# Patient Record
Sex: FEMALE | Race: WHITE | Employment: OTHER | URBAN - METROPOLITAN AREA
[De-identification: names, ages, dates, MRNs, and addresses within clinical notes are randomized per-mention and may not be internally consistent; named-entity substitution may affect disease eponyms.]

---

## 2017-01-05 ENCOUNTER — PRE VISIT (OUTPATIENT)
Dept: CARDIOLOGY | Facility: CLINIC | Age: 82
End: 2017-01-05

## 2017-01-05 DIAGNOSIS — I25.10 CORONARY ARTERY DISEASE INVOLVING NATIVE CORONARY ARTERY OF NATIVE HEART WITHOUT ANGINA PECTORIS: ICD-10-CM

## 2017-01-05 DIAGNOSIS — I10 BENIGN ESSENTIAL HYPERTENSION: Primary | ICD-10-CM

## 2017-01-11 ENCOUNTER — OFFICE VISIT (OUTPATIENT)
Dept: CARDIOLOGY | Facility: CLINIC | Age: 82
End: 2017-01-11
Attending: NURSE PRACTITIONER
Payer: COMMERCIAL

## 2017-01-11 VITALS
HEIGHT: 66 IN | WEIGHT: 193.2 LBS | HEART RATE: 58 BPM | DIASTOLIC BLOOD PRESSURE: 60 MMHG | BODY MASS INDEX: 31.05 KG/M2 | SYSTOLIC BLOOD PRESSURE: 138 MMHG

## 2017-01-11 DIAGNOSIS — I10 BENIGN ESSENTIAL HYPERTENSION: ICD-10-CM

## 2017-01-11 DIAGNOSIS — E78.00 HYPERCHOLESTEROLEMIA: ICD-10-CM

## 2017-01-11 DIAGNOSIS — Z82.49 FH: MITRAL VALVE REPAIR: ICD-10-CM

## 2017-01-11 DIAGNOSIS — I34.0 MITRAL VALVE INSUFFICIENCY, UNSPECIFIED ETIOLOGY: Primary | ICD-10-CM

## 2017-01-11 DIAGNOSIS — I48.20 CHRONIC ATRIAL FIBRILLATION (H): ICD-10-CM

## 2017-01-11 DIAGNOSIS — I50.22 CHRONIC SYSTOLIC CONGESTIVE HEART FAILURE (H): ICD-10-CM

## 2017-01-11 LAB
ANION GAP SERPL CALCULATED.3IONS-SCNC: 14.3 MMOL/L (ref 6–17)
BUN SERPL-MCNC: 17 MG/DL (ref 7–30)
CALCIUM SERPL-MCNC: 9.3 MG/DL (ref 8.5–10.5)
CHLORIDE SERPL-SCNC: 101 MMOL/L (ref 98–107)
CHOLEST SERPL-MCNC: 150 MG/DL
CO2 SERPL-SCNC: 25 MMOL/L (ref 23–29)
CREAT SERPL-MCNC: 0.93 MG/DL (ref 0.7–1.3)
GFR SERPL CREATININE-BSD FRML MDRD: 58 ML/MIN/1.7M2
GLUCOSE SERPL-MCNC: 114 MG/DL (ref 70–105)
HDLC SERPL-MCNC: 55 MG/DL
LDLC SERPL CALC-MCNC: 67 MG/DL
NONHDLC SERPL-MCNC: 95 MG/DL
POTASSIUM SERPL-SCNC: 4.3 MMOL/L (ref 3.5–5.1)
SODIUM SERPL-SCNC: 136 MMOL/L (ref 136–145)
TRIGL SERPL-MCNC: 141 MG/DL

## 2017-01-11 PROCEDURE — 80048 BASIC METABOLIC PNL TOTAL CA: CPT | Performed by: NURSE PRACTITIONER

## 2017-01-11 PROCEDURE — 99214 OFFICE O/P EST MOD 30 MIN: CPT | Performed by: INTERNAL MEDICINE

## 2017-01-11 PROCEDURE — 80061 LIPID PANEL: CPT | Performed by: NURSE PRACTITIONER

## 2017-01-11 PROCEDURE — 36415 COLL VENOUS BLD VENIPUNCTURE: CPT | Performed by: NURSE PRACTITIONER

## 2017-01-11 NOTE — Clinical Note
1/11/2017    Bess Lion MD  [facility]  [address]  [facility] [address]    RE: Kenyatta Donald       Dear Colleague,    I had the pleasure of seeing Kenyatta Donald in the [facility].    The patient is an 81-year-old overweight white female who presents to Cardiology Clinic for followup of atrial fibrillation, sometimes difficult to control hypertension, hypercholesterolemia.  There is no history of cigarette smoking, diabetes mellitus or family history of premature coronary disease.  She tends to be unaware of her irregular heartbeat but has had a diagnosis of atrial fibrillation that has been treated with aggressive rate control and anticoagulation.      In the summer 2015, she was hospitalized at [facility] because of volume overload, probable diastolic congestive heart failure, related atrial fibrillation with rapid ventricular response which responded nicely to diuretic therapy.  She continued to maintain her weight loss.  Unfortunately earlier this winter season, she fell and apparently injured her knee and required a short time in a TCU and followup and has limited her activity.        She denies arleth chest pain, shortness of breath at rest, dizziness, palpitations, nausea, vomiting, diaphoresis or syncope.  She is having a reasonable sleep and appetite at this time.  She does complain of easy fatigability and general malaise.  Echocardiography from last summer demonstrated a normal-sized left ventricle, normal left ventricular wall thickness, intact systolic function with ejection fraction 55%-60% with no regional wall motion abnormality.  The right ventricle was normal in size and function.  There was severe biatrial enlargement.        The patient is status post mitral valve clip on 01/09/2015.  There is moderate mitral insufficiency noted.  Mean gradient not evaluated.  No significant change from 2015.        Also in the summer, the  patient had a Holter monitor that demonstrated rhythm going from 33 to 139, average rate of 84, with rare ventricular ectopy and the primary rhythm was that of atrial fibrillation.  She had frequent brief pauses with the longest pause being approximately 3 seconds.  There were no symptoms described at the time of her pauses.      MEDICATIONS:   1.  Vitamin D 1000 units a day.   2.  Simvastatin 20 mg a day.   3.  Allopurinol 100 mg a day.   4.  Warfarin as directed.   5.  Atenolol 25 mg a day.   6.  Mycostatin 3 times a day.   7.  Levothyroxine 112 mcg a day.   8.  Furosemide 20 mg a day.   9.  Acetaminophen 650 mg every 4 hours as needed.   10.  Amoxicillin as indicated.   11.  Fosamax 70 mg a week.   12.  Albuterol inhaler as needed.   13.  Lisinopril 20 mg twice a day.      LABORATORY DATA:  Demonstrated cholesterol 150, HDL 55, LDL 67, triglyceride 141.  Sodium 136, potassium 4.3, BUN 17, creatinine 0.93.  ALT less than 5.      The patient presents to Cardiology Clinic for followup of her atrial fibrillation with evidence of bradytachy syndrome, hypertension and hyperlipidemia as well as a diagnosis of sleep apnea and gastroesophageal reflux disease.      PHYSICAL EXAMINATION:   VITAL SIGNS:  Blood pressure 138/60 with a heart rate of 50-60 and regular.  Weight was 193 pounds, which is relatively stable.   NECK:  Without jugular venous distention, carotid bruit or palpable thyroid.   CHEST:  Essentially clear to percussion and auscultation with slight decreased breath sounds at the bases, isolated crackles.   CARDIAC:  Revealed an irregular rhythm, variable S1.  There was a 2/6 holosystolic murmur heard best at the apex and radiating to the axilla.  No diastolic murmur, rub or S3.   EXTREMITIES:  Showed 1 to 2+ edema without cyanosis or erythema.      CLINICAL IMPRESSION:   1.  Stable cardiac condition.   2.  Status post episode of apparent diastolic congestive heart failure treated with diuretic therapy in  08/2015.   3.  Atrial fibrillation with evidence of a tachybrady syndrome, tending towards bradycardia.   4.  Mitral insufficiency, improved with mitral valve clip with very mild gradient across the mitral valve leaflets.   5.  Hypertension with slight elevation of systolic blood pressure.   6.  Hyperlipidemia, at goal.   7.  Sleep apnea.   8.  History of breast cancer.   9.  Gastroesophageal reflux disease.   10.  History of osteoarthritis and knee replacement.      DISCUSSION:  The patient has done reasonably well since her last clinic visit.  She has multiple medical issues.  She has not had significant symptoms of chest discomfort, shortness of breath, dizziness or palpitations.  She does have easy fatigability and general malaise.  She will have echocardiography and Holter monitor later this year to follow left ventricular function and rhythm.  She will continue antibiotic prophylaxis for dental and general surgical procedures as well as anticoagulation for atrial fibrillation.  She will need close followup of serum lipids, basic metabolic panel and blood pressure.      RECOMMENDATIONS:   1.  Continue present medications.   2.  Close followup of serum lipids, basic metabolic panel and blood pressure with followup with Bre Razo in 1 month.   3.  Anticoagulation.   4.  Antibiotic prophylaxis for dental and general surgical procedures.   5.  Echocardiography in 4 months to follow left ventricular function and mitral valve function.   6.  Holter monitor in 4 months to evaluate rhythm.   7.  Diet and exercise program, avoiding caffeine and salt.   8.  Routine medical followup.   9.  Cardiology followup up in 4 months.      Again, thank you for allowing me to participate in the care of your patient.      Sincerely,    Chris Guan MD     [facility]

## 2017-01-11 NOTE — MR AVS SNAPSHOT
After Visit Summary   1/11/2017    Kenyatta Donald    MRN: [id number]           Patient Information     Date Of Birth          1935        Visit Information        Provider Department      1/11/2017 2:45 PM Chris Guan MD [facility] [place]        Today's Diagnoses     Mitral valve insufficiency, unspecified etiology    -  1     Hypercholesterolemia         Chronic systolic congestive heart failure (H)         Benign essential hypertension         Chronic atrial fibrillation (H)         FH: mitral valve repair            Follow-ups after your visit        Additional Services     Follow-Up with Cardiac Advanced Practice Provider           Follow-Up with Cardiologist                 Your next 10 appointments already scheduled     Jan 16, 2017 11:30 AM   Anticoagulation Visit with [facility]   [facility]    [address] [place] [address] [id number] [phone]              Future tests that were ordered for you today     Open Future Orders        Priority Expected Expires Ordered    Basic metabolic panel Routine 5/11/2017 1/11/2018 1/11/2017    Lipid Profile Routine 5/11/2017 1/11/2018 1/11/2017    ALT Routine 5/11/2017 1/11/2018 1/11/2017    Echocardiogram Routine 5/11/2017 1/11/2018 1/11/2017    Follow-Up with Cardiologist Routine 5/11/2017 1/11/2018 1/11/2017    Basic metabolic panel Routine 2/10/2017 1/11/2018 1/11/2017    Follow-Up with Cardiac Advanced Practice Provider Routine 2/10/2017 1/11/2018 1/11/2017            Who to contact     If you have questions or need follow up information about today's clinic visit or your schedule please contact [facility] [place] directly at [phone].  Normal or non-critical lab and imaging results will be communicated to you by MyChart, letter or phone within 4  "business days after the clinic has received the results. If you do not hear from us within 7 days, please contact the clinic through [employer] or phone. If you have a critical or abnormal lab result, we will notify you by phone as soon as possible.  Submit refill requests through [employer] or call your pharmacy and they will forward the refill request to us. Please allow 3 business days for your refill to be completed.          Additional Information About Your Visit        [employer] Information     [employer] lets you send messages to your doctor, view your test results, renew your prescriptions, schedule appointments and more. To sign up, go to [email or web address] . Click on \"Log in\" on the left side of the screen, which will take you to the Welcome page. Then click on \"Sign up Now\" on the right side of the page.     You will be asked to enter the access code listed below, as well as some personal information. Please follow the directions to create your username and password.     Your access code is: [email or web address]  Expires: 2017  3:32 PM     Your access code will  in 90 days. If you need help or a new code, please call your [place] clinic or [phone].        Care EveryWhere ID     This is your Care EveryWhere ID. This could be used by other organizations to access your [place] medical records  [id number]        Your Vitals Were     Pulse Height BMI (Body Mass Index) Last Period          58 1.676 m (5' 6\") 31.20 kg/m2 (LMP Unknown)         Blood Pressure from Last 3 Encounters:   17 138/60   16 110/64   16 112/64    Weight from Last 3 Encounters:   17 87.635 kg (193 lb 3.2 oz)   16 87.091 kg (192 lb)   16 87.091 kg (192 lb)              We Performed the Following     Follow-Up with Cardiologist          Today's Medication Changes          These changes are accurate as of: 17  3:32 PM.  If you have any questions, ask your nurse or doctor.               These " medicines have changed or have updated prescriptions.        Dose/Directions    furosemide 20 MG tablet   Commonly known as:  LASIX   This may have changed:  Another medication with the same name was removed. Continue taking this medication, and follow the directions you see here.   Used for:  Chronic systolic congestive heart failure (H)   Changed by:  Bess Lion MD        TAKE 1 TABLET BY MOUTH EVERY MORNING FOR FLUID RETENTION   Quantity:  90 tablet   Refills:  3       * warfarin 1 MG tablet   Commonly known as:  COUMADIN   This may have changed:  Another medication with the same name was changed. Make sure you understand how and when to take each.   Used for:  Atrial fibrillation (H), History of pulmonary embolism   Changed by:  Bess Lion MD        Dose:  1 mg   Take 1 tablet (1 mg) by mouth daily   Quantity:  90 tablet   Refills:  1       * warfarin 4 MG tablet   Commonly known as:  COUMADIN   This may have changed:  See the new instructions.   Used for:  Atrial fibrillation (H), Personal history of PE (pulmonary embolism)   Changed by:  Raffy Lion MD        TAKE 1 1/2 TABLETS BY MOUTH DAILY ON MONDAY, FRIDAY AND TAKE 1 TABLET DAILY ALL OTHER DAYS OF THE WEEK   Quantity:  102 tablet   Refills:  1       * Notice:  This list has 2 medication(s) that are the same as other medications prescribed for you. Read the directions carefully, and ask your doctor or other care provider to review them with you.      Stop taking these medicines if you haven't already. Please contact your care team if you have questions.     olmesartan 20 MG tablet   Commonly known as:  BENICAR   Stopped by:  Chris Guan MD           PLAVIX PO   Stopped by:  Chris Guan MD                    Primary Care Provider Office Phone # Fax #    Bess Lion [phone]       [facility] [address]  [facility] [address]         Thank you!     Thank you for choosing [facility] [place]  for your care. Our goal is always to provide you with excellent care. Hearing back from our patients is one way we can continue to improve our services. Please take a few minutes to complete the written survey that you may receive in the mail after your visit with us. Thank you!             Your Updated Medication List - Protect others around you: Learn how to safely use, store and throw away your medicines at www.disposemymeds.org.          This list is accurate as of: 1/11/17  3:32 PM.  Always use your most recent med list.                   Brand Name Dispense Instructions for use    acetaminophen 325 MG tablet    TYLENOL    100 tablet    Take 2 tablets (650 mg) by mouth every 4 hours as needed for mild pain       albuterol 108 (90 BASE) MCG/ACT Inhaler    PROAIR HFA/PROVENTIL HFA/VENTOLIN HFA     Inhale 2 puffs into the lungs every 6 hours as needed for shortness of breath / dyspnea or wheezing       alendronate 70 MG tablet    FOSAMAX    12 tablet    TAKE 1 TABLET BY MOUTH EVERY 7 DAYS       allopurinol 100 MG tablet    ZYLOPRIM    90 tablet    TAKE 1 TABLET BY MOUTH EVERY DAY       amoxicillin 500 MG capsule    AMOXIL    4 capsule    Take 4 capsules by mouth 30 minutes prior to dental work       atenolol 25 MG tablet    TENORMIN    90 tablet    Take 1 tablet (25 mg) by mouth daily       cholecalciferol 1000 UNIT tablet    vitamin D     Take 1,000 Units by mouth daily       furosemide 20 MG tablet    LASIX    90 tablet    TAKE 1 TABLET BY MOUTH EVERY MORNING FOR FLUID RETENTION       levothyroxine 112 MCG tablet    SYNTHROID/LEVOTHROID    90 tablet    Take 1 tablet (112 mcg) by mouth daily       lisinopril 20 MG tablet    PRINIVIL/ZESTRIL    180 tablet    Take 1 tablet (20 mg) by mouth 2 times daily       nystatin 204805 UNIT/GM Powd    MYCOSTATIN    60 g    Apply topically 3 times daily as needed       order for DME      1 each    Equipment being ordered: mastectomy bras & prostheses  S/po mastectomy of unknown side     C50.919       simvastatin 40 MG tablet    ZOCOR    45 tablet    Take 0.5 tablets (20 mg) by mouth At Bedtime       * warfarin 1 MG tablet    COUMADIN    90 tablet    Take 1 tablet (1 mg) by mouth daily       * warfarin 4 MG tablet    COUMADIN    102 tablet    TAKE 1 1/2 TABLETS BY MOUTH DAILY ON MONDAY, FRIDAY AND TAKE 1 TABLET DAILY ALL OTHER DAYS OF THE WEEK       * Notice:  This list has 2 medication(s) that are the same as other medications prescribed for you. Read the directions carefully, and ask your doctor or other care provider to review them with you.

## 2017-01-12 NOTE — PROGRESS NOTES
HISTORY OF PRESENT ILLNESS:  The patient is an 81-year-old overweight white female who presents to Cardiology Clinic for followup of atrial fibrillation, sometimes difficult to control hypertension, hypercholesterolemia.  There is no history of cigarette smoking, diabetes mellitus or family history of premature coronary disease.  She tends to be unaware of her irregular heartbeat but has had a diagnosis of atrial fibrillation that has been treated with aggressive rate control and anticoagulation.      In the summer 2015, she was hospitalized at [facility] because of volume overload, probable diastolic congestive heart failure, related atrial fibrillation with rapid ventricular response which responded nicely to diuretic therapy.  She continued to maintain her weight loss.  Unfortunately earlier this winter season, she fell and apparently injured her knee and required a short time in a TCU and followup and has limited her activity.        She denies arleth chest pain, shortness of breath at rest, dizziness, palpitations, nausea, vomiting, diaphoresis or syncope.  She is having a reasonable sleep and appetite at this time.  She does complain of easy fatigability and general malaise.  Echocardiography from last summer demonstrated a normal-sized left ventricle, normal left ventricular wall thickness, intact systolic function with ejection fraction 55%-60% with no regional wall motion abnormality.  The right ventricle was normal in size and function.  There was severe biatrial enlargement.        The patient is status post mitral valve clip on 01/09/2015.  There is moderate mitral insufficiency noted.  Mean gradient not evaluated.  No significant change from 2015.        Also in the summer, the patient had a Holter monitor that demonstrated rhythm going from 33 to 139, average rate of 84, with rare ventricular ectopy and the primary rhythm was that of atrial fibrillation.  She had frequent brief pauses with the  longest pause being approximately 3 seconds.  There were no symptoms described at the time of her pauses.      MEDICATIONS:   1.  Vitamin D 1000 units a day.   2.  Simvastatin 20 mg a day.   3.  Allopurinol 100 mg a day.   4.  Warfarin as directed.   5.  Atenolol 25 mg a day.   6.  Mycostatin 3 times a day.   7.  Levothyroxine 112 mcg a day.   8.  Furosemide 20 mg a day.   9.  Acetaminophen 650 mg every 4 hours as needed.   10.  Amoxicillin as indicated.   11.  Fosamax 70 mg a week.   12.  Albuterol inhaler as needed.   13.  Lisinopril 20 mg twice a day.      LABORATORY DATA:  Demonstrated cholesterol 150, HDL 55, LDL 67, triglyceride 141.  Sodium 136, potassium 4.3, BUN 17, creatinine 0.93.  ALT less than 5.      The patient presents to Cardiology Clinic for followup of her atrial fibrillation with evidence of bradytachy syndrome, hypertension and hyperlipidemia as well as a diagnosis of sleep apnea and gastroesophageal reflux disease.      PHYSICAL EXAMINATION:   VITAL SIGNS:  Blood pressure 138/60 with a heart rate of 50-60 and regular.  Weight was 193 pounds, which is relatively stable.   NECK:  Without jugular venous distention, carotid bruit or palpable thyroid.   CHEST:  Essentially clear to percussion and auscultation with slight decreased breath sounds at the bases, isolated crackles.   CARDIAC:  Revealed an irregular rhythm, variable S1.  There was a 2/6 holosystolic murmur heard best at the apex and radiating to the axilla.  No diastolic murmur, rub or S3.   EXTREMITIES:  Showed 1 to 2+ edema without cyanosis or erythema.      CLINICAL IMPRESSION:   1.  Stable cardiac condition.   2.  Status post episode of apparent diastolic congestive heart failure treated with diuretic therapy in 08/2015.   3.  Atrial fibrillation with evidence of a tachybrady syndrome, tending towards bradycardia.   4.  Mitral insufficiency, improved with mitral valve clip with very mild gradient across the mitral valve leaflets.   5.   Hypertension with slight elevation of systolic blood pressure.   6.  Hyperlipidemia, at goal.   7.  Sleep apnea.   8.  History of breast cancer.   9.  Gastroesophageal reflux disease.   10.  History of osteoarthritis and knee replacement.      DISCUSSION:  The patient has done reasonably well since her last clinic visit.  She has multiple medical issues.  She has not had significant symptoms of chest discomfort, shortness of breath, dizziness or palpitations.  She does have easy fatigability and general malaise.  She will have echocardiography and Holter monitor later this year to follow left ventricular function and rhythm.  She will continue antibiotic prophylaxis for dental and general surgical procedures as well as anticoagulation for atrial fibrillation.  She will need close followup of serum lipids, basic metabolic panel and blood pressure.      RECOMMENDATIONS:   1.  Continue present medications.   2.  Close followup of serum lipids, basic metabolic panel and blood pressure with followup with Bre Razo in 1 month.   3.  Anticoagulation.   4.  Antibiotic prophylaxis for dental and general surgical procedures.   5.  Echocardiography in 4 months to follow left ventricular function and mitral valve function.   6.  Holter monitor in 4 months to evaluate rhythm.   7.  Diet and exercise program, avoiding caffeine and salt.   8.  Routine medical followup.   9.  Cardiology followup up in 4 months.      cc:      Bess Lion MD    [facility]   [address] [place] [address]         MATT RABAGO MD, [facility]             D: 2017 16:13   T: 2017 18:27   MT: MONI      Name:     DARLENE FRIAS   MRN:      [id number]        Account:      [id number]   :      1935           Service Date: 2017      Document: [id number]

## 2017-01-13 ENCOUNTER — OFFICE VISIT (OUTPATIENT)
Dept: FAMILY MEDICINE | Facility: CLINIC | Age: 82
End: 2017-01-13
Payer: COMMERCIAL

## 2017-01-13 VITALS
BODY MASS INDEX: 30.37 KG/M2 | OXYGEN SATURATION: 97 % | DIASTOLIC BLOOD PRESSURE: 60 MMHG | RESPIRATION RATE: 14 BRPM | TEMPERATURE: 97 F | WEIGHT: 189 LBS | SYSTOLIC BLOOD PRESSURE: 120 MMHG | HEIGHT: 66 IN | HEART RATE: 61 BPM

## 2017-01-13 DIAGNOSIS — E78.00 HYPERCHOLESTEROLEMIA: ICD-10-CM

## 2017-01-13 DIAGNOSIS — M10.279 DRUG-INDUCED GOUT INVOLVING TOE, UNSPECIFIED CHRONICITY, UNSPECIFIED LATERALITY: ICD-10-CM

## 2017-01-13 DIAGNOSIS — R73.02 GLUCOSE INTOLERANCE (IMPAIRED GLUCOSE TOLERANCE): Chronic | ICD-10-CM

## 2017-01-13 DIAGNOSIS — I50.22 CHRONIC SYSTOLIC CONGESTIVE HEART FAILURE (H): ICD-10-CM

## 2017-01-13 DIAGNOSIS — E03.9 ACQUIRED HYPOTHYROIDISM: ICD-10-CM

## 2017-01-13 DIAGNOSIS — Z79.01 LONG TERM CURRENT USE OF ANTICOAGULANT THERAPY: ICD-10-CM

## 2017-01-13 DIAGNOSIS — M54.41 ACUTE BILATERAL LOW BACK PAIN WITH RIGHT-SIDED SCIATICA: Primary | ICD-10-CM

## 2017-01-13 DIAGNOSIS — I10 BENIGN ESSENTIAL HYPERTENSION: ICD-10-CM

## 2017-01-13 DIAGNOSIS — E66.09 NON MORBID OBESITY DUE TO EXCESS CALORIES: ICD-10-CM

## 2017-01-13 DIAGNOSIS — S80.12XA TRAUMATIC ECCHYMOSIS OF LEFT LOWER LEG, INITIAL ENCOUNTER: ICD-10-CM

## 2017-01-13 PROCEDURE — 99214 OFFICE O/P EST MOD 30 MIN: CPT | Performed by: FAMILY MEDICINE

## 2017-01-13 NOTE — PATIENT INSTRUCTIONS
1. ICE  decreases inflammation & kills the pain coming from the nerves     WARM SOAKS/PACKS  Relax & loosen up tight muscles to reduce the pain of the        muscle tightness & spasm    2.  Weight Loss Tips  1. Do not eat after 6 hrs before your expected bedtime  2. Have your heaviest meal for breakfast, a slightly lighter meal at lunch and a snack 6 hrs before bed  3. No sugar/calorie drinks except milk ie no fruit juice, pop, alcohol.  4. Drink milk 30min before meals to decrease your hunger. Also it is excellent as part of your last meal of the day snack  5. Drink lots of water  6. Increase fiber in diet: all bran cereal, salads, popcorn etc  7. Have only one small serving of fruit a day about 1/2 cup (as this is high in sugar)  8. EXERCISE is the bottom line. Without it, you will gain weight even on a low calorie diet. Best if done 2-3X a day as can    Being overweight contributes to high blood pressure and high cholesterol, both of which cause heart attacks, strokes and kidney failure, prediabetes and diabetes, arthritis, and liver disease

## 2017-01-13 NOTE — NURSING NOTE
"Chief Complaint   Patient presents with     RECHECK     /60 mmHg  Pulse 61  Temp(Src) 97  F (36.1  C) (Tympanic)  Resp 14  Ht 5' 6\" (1.676 m)  Wt 189 lb (85.73 kg)  BMI 30.52 kg/m2  SpO2 97%  LMP  (LMP Unknown)  Breastfeeding? No Estimated body mass index is 30.52 kg/(m^2) as calculated from the following:    Height as of this encounter: 5' 6\" (1.676 m).    Weight as of this encounter: 189 lb (85.73 kg).  BP completed using cuff size: regular   Maxine Titus CMA    Health Maintenance Due   Topic Date Due     OP ANNUAL INR REFERRAL  04/08/2016     Health Maintenance reviewed at today's visit patient asked to schedule/complete:   None, Health Maintenance up to date.      "

## 2017-01-13 NOTE — MR AVS SNAPSHOT
After Visit Summary   1/13/2017    Kenyatta Donald    MRN: [id number]           Patient Information     Date Of Birth          1935        Visit Information        Provider Department      1/13/2017 11:00 AM Bess Lion MD [facility]        Today's Diagnoses     Acute bilateral low back pain with right-sided sciatica onset end of 12-16 - 1     Glucose intolerance (impaired glucose tolerance): HgbA1C= 5.5          Long term current use of anticoagulant therapy         Acquired hypothyroidism         Hypercholesterolemia         Non morbid obesity due to excess calories         Benign essential hypertension         Drug-induced gout involving toe, unspecified chronicity, unspecified laterality         Chronic systolic congestive heart failure (H)            Follow-ups after your visit        Additional Services     [facility] PT, HAND, AND CHIROPRACTIC REFERRAL       **This order will print in the [facility] Scheduling Office**    Physical Therapy, Hand Therapy and Chiropractic Care are available through:    *[facility]  *[facility]  *[facility]    Call one number to schedule at any of the above locations: [phone].    Your provider has referred you to: As Indicated:     Indication/Reason for Referral:   Onset of Illness:   Therapy Orders: Evaluate and Treat  Special Programs:   Special Request:     Harley Souza      Additional Comments for the Therapist or Chiropractor:     Please be aware that coverage of these services is subject to the terms and limitations of your health insurance plan.  Call member services at your health plan with any benefit or coverage questions.      Please bring the following to your appointment:    *Your personal calendar for scheduling future appointments  *Comfortable clothing                  Your next 10 appointments already scheduled     Jan 16, 2017 11:30 AM    Anticoagulation Visit with [facility]   [facility]    [address]  [facility] [address]   [phone]            Feb 17, 2017  2:10 PM   LAB with [facility]   [facility] ([facility])    [address] [place] [address] [address]  [facility] [id number] [phone]           Patient must bring picture ID.  Patient should be prepared to give a urine specimen  Please do not eat 10-12 hours before your appointment if you are coming in fasting for labs on lipids, cholesterol, or glucose (sugar).  Pregnant women should follow their Care Team instructions. Water with medications is okay. Do not drink coffee or other fluids.   If you have concerns about taking  your medications, please ask at office or if scheduling via [employer], send a message by clicking on Secure Messaging, Message Your Care Team.            Feb 17, 2017  3:10 PM   Return Visit with REBEKAH Nicolas CNP   [facility] ([facility])    [address] [place] [address] [address]  [facility] [id number] [phone]              Who to contact     If you have questions or need follow up information about today's clinic visit or your schedule please contact [facility] directly at [phone].  Normal or non-critical lab and imaging results will be communicated to you by MyChart, letter or phone within 4 business days after the clinic has received the results. If you do not hear from us within 7 days, please contact the clinic through MyChart or phone. If you have a critical or abnormal lab result, we will notify you by phone as soon as possible.  Submit refill requests through [employer] or call your pharmacy and they will forward the refill request to us. Please allow 3 business days for your refill to be completed.          Additional  "Information About Your Visit        Care EveryWhere ID     This is your Care EveryWhere ID. This could be used by other organizations to access your [facility] medical records  [id number]        Your Vitals Were     Pulse Temperature Respirations    61 97  F (36.1  C) (Tympanic) 14    Height BMI (Body Mass Index) Pulse Oximetry    5' 6\" (1.676 m) 30.52 kg/m2 97%    Last Period Breastfeeding?       (LMP Unknown) No        Blood Pressure from Last 3 Encounters:   01/13/17 120/60   01/11/17 138/60   11/07/16 110/64    Weight from Last 3 Encounters:   01/13/17 189 lb (85.73 kg)   01/11/17 193 lb 3.2 oz (87.635 kg)   11/07/16 192 lb (87.091 kg)              We Performed the Following     ALT     AST     Basic metabolic panel     HEART FAILURE ACTION PLAN     [facility], HAND, AND CHIROPRACTIC REFERRAL     Uric acid          Today's Medication Changes          These changes are accurate as of: 1/13/17 11:39 AM.  If you have any questions, ask your nurse or doctor.               These medicines have changed or have updated prescriptions.        Dose/Directions    * warfarin 1 MG tablet   Commonly known as:  COUMADIN   This may have changed:  Another medication with the same name was changed. Make sure you understand how and when to take each.   Used for:  Atrial fibrillation (H), History of pulmonary embolism   Changed by:  Bess Lion MD        Dose:  1 mg   Take 1 tablet (1 mg) by mouth daily   Quantity:  90 tablet   Refills:  1       * warfarin 4 MG tablet   Commonly known as:  COUMADIN   This may have changed:  See the new instructions.   Used for:  Atrial fibrillation (H), Personal history of PE (pulmonary embolism)   Changed by:  Raffy Lion MD        TAKE 1 1/2 TABLETS BY MOUTH DAILY ON MONDAY, FRIDAY AND TAKE 1 TABLET DAILY ALL OTHER DAYS OF THE WEEK   Quantity:  102 tablet   Refills:  1       * Notice:  This list has 2 medication(s) that are the same as other medications prescribed for " you. Read the directions carefully, and ask your doctor or other care provider to review them with you.             Primary Care Provider Office Phone # Fax #    Bess Lion [phone]       [facility] [address]  [facility] [address]        Thank you!     Thank you for choosing [facility]  for your care. Our goal is always to provide you with excellent care. Hearing back from our patients is one way we can continue to improve our services. Please take a few minutes to complete the written survey that you may receive in the mail after your visit with us. Thank you!             Your Updated Medication List - Protect others around you: Learn how to safely use, store and throw away your medicines at www.disposemymeds.org.          This list is accurate as of: 1/13/17 11:39 AM.  Always use your most recent med list.                   Brand Name Dispense Instructions for use    acetaminophen 325 MG tablet    TYLENOL    100 tablet    Take 2 tablets (650 mg) by mouth every 4 hours as needed for mild pain       albuterol 108 (90 BASE) MCG/ACT Inhaler    PROAIR HFA/PROVENTIL HFA/VENTOLIN HFA     Inhale 2 puffs into the lungs every 6 hours as needed for shortness of breath / dyspnea or wheezing       alendronate 70 MG tablet    FOSAMAX    12 tablet    TAKE 1 TABLET BY MOUTH EVERY 7 DAYS       allopurinol 100 MG tablet    ZYLOPRIM    90 tablet    TAKE 1 TABLET BY MOUTH EVERY DAY       amoxicillin 500 MG capsule    AMOXIL    4 capsule    Take 4 capsules by mouth 30 minutes prior to dental work       atenolol 25 MG tablet    TENORMIN    90 tablet    Take 1 tablet (25 mg) by mouth daily       cholecalciferol 1000 UNIT tablet    vitamin D     Take 1,000 Units by mouth daily       furosemide 20 MG tablet    LASIX    90 tablet    TAKE 1 TABLET BY MOUTH EVERY MORNING FOR FLUID RETENTION       levothyroxine 112 MCG tablet    SYNTHROID/LEVOTHROID    90  tablet    Take 1 tablet (112 mcg) by mouth daily       lisinopril 20 MG tablet    PRINIVIL/ZESTRIL    180 tablet    Take 1 tablet (20 mg) by mouth 2 times daily       nystatin [id number] UNIT/GM Powd    MYCOSTATIN    60 g    Apply topically 3 times daily as needed       order for DME     1 each    Equipment being ordered: mastectomy bras & prostheses  S/po mastectomy of unknown side     C50.919       simvastatin 40 MG tablet    ZOCOR    45 tablet    Take 0.5 tablets (20 mg) by mouth At Bedtime       * warfarin 1 MG tablet    COUMADIN    90 tablet    Take 1 tablet (1 mg) by mouth daily       * warfarin 4 MG tablet    COUMADIN    102 tablet    TAKE 1 1/2 TABLETS BY MOUTH DAILY ON MONDAY, FRIDAY AND TAKE 1 TABLET DAILY ALL OTHER DAYS OF THE WEEK       * Notice:  This list has 2 medication(s) that are the same as other medications prescribed for you. Read the directions carefully, and ask your doctor or other care provider to review them with you.

## 2017-01-13 NOTE — Clinical Note
My Heart Failure Action Plan   Name: Kenyatta Donald    YOB: 1935   Date: 1/13/2017    My doctor: Bess Lion     [address] [address]  [phone]  My Diagnosis: Systolic Heart Failure   My Ejection Fraction: Over 50%    My Exercise Goal: 30 minutes daily  .     My Weight Goal: 0   Wt Readings from Last 2 Encounters:   01/13/17 189 lb (85.73 kg)   01/11/17 193 lb 3.2 oz (87.635 kg)     Weigh yourself daily using the same scale. If you gain more than 2 pounds in 24 hours or 5 pounds in a week 0    My Diet Goal: No added salt    Emergency Room Visits:    Our goal is to improve your quality of life and help you avoid a visit to the emergency room or hospital.  If we work together, we can achieve this goal. But, if you feel you need to call 911 or go to the emergency room, please do so.  If you go to the emergency room, please bring your list of medicines and your daily weight chart with you.       GREEN ZONE     Doing well today    Weight gained is no more than 2 pounds a day or 5 pounds a week.    No swelling in feet, ankles, legs or stomach.    No more swelling than usual.    No more trouble breathing than usual.    No change in my sleep.    No other problems. Actions:    I am doing fine.  I will take my medicine, follow my diet, see my doctor, exercise, and watch for symptoms.           YELLOW ZONE         Having a bad day or flare up    Weight gain of more than 2 pounds in one day or 5 pounds in one week.    New swelling in ankle, leg, knee or thigh.    Bloating in belly, pants feel tighter.    Swelling in hands or face.    Coughing or trouble breathing while walking or talking.    Harder to breathe last night.    Have trouble sleeping, wake up short of breath.    Much more tired than usual.    Not eating.    Pain in my chest or bad leg cramps.    Feel weak or dizzy. Actions:    I need to take  action and call my doctor or nurse today.                 RED ZONE         Need medical care now    Weight gain of 5 pounds overnight.    Chest pain or pressure that does not go away.    Feel less alert.    Wheezing or have trouble breathing when at rest.    Cannot sleep lying down.    Cannot take my water pill.    Pass out or faint. Actions:    I need to call my doctor or nurse now!    Call 911 if I have chest pain or cannot breathe.        Electronically signed by: Bess Lion, January 13, 2017

## 2017-01-13 NOTE — PROGRESS NOTES
SUBJECTIVE:                                                    Kenyatta Donald is a 81 year old female who presents to clinic today for the following health issues:    Musculoskeletal problem/pain      Duration: Chronic    Description  Location: Lower Right Side of Back, down to Right Leg to prox calf    Intensity:  severe    Accompanying signs and symptoms: none    History  Previous similar problem: YES  Previous evaluation:  none    Precipitating or alleviating factors:  Trauma or overuse: no   Aggravating factors include: overuse    Therapies tried and outcome: heat, ice, immobilization and acetaminophen     Back Pain   Low with Rt Sciatica to prox calf        Duration: 12-16         Specific cause: none    Description:   Location of pain: low back right  Character of pain: sharp, dull ache and stabbing  Pain radiation:radiates into the right buttocks and radiates into the right leg  New numbness or weakness in legs, not attributed to pain:  no     Intensity: Currently 6/10    History:   Pain interferes with job: Not applicable  History of back problems: no prior back problems  Any previous MRI or X-rays: None  Sees a specialist for back pain:  No  Therapies tried without relief: 0-    Alleviating factors:   Improved by: 0      Precipitating factors:  Worsened by: Lifting, Bending, Standing and Lying Flat    Functional and Psychosocial Screen (Harley STarT Back):      Not performed today       Accompanying Signs & Symptoms:  Risk of Fracture:  None  Risk of Cauda Equina:  None  Risk of Infection:  None  Risk of Cancer:  None  Risk of Ankylosing Spondylitis:  Onset at age <35, male, AND morning back stiffness. no     NOTE:  Pt has severe muscular dystrophy that's worsening                Gout    Duration: yrs with recent episode   No Uric acid on chart   Description   Location: big toe - left  Joint Swelling: YES  Redness: YES  Pain intensity:  mild  Accompanying signs and symptoms: None  History  Previous  history of gout: YES   Trauma to the area: no   Precipitating factors:   Alcohol usage: none  Diuretic use: YES- lasix for CHF  Recent illness: no   Therapies tried and outcome: None     Bruising of med lower Lt calf       Duration: 2 weeks     Description  Location: above  Around the area of prior incisions for ca   Itching: no    Intensity:  mild    Accompanying signs and symptoms: None    History (similar episodes/previous evaluation): None    Precipitating or alleviating factors:  New exposures:  None  Recent travel: no      Therapies tried and outcome: none    Is on coumadin      Glucose Intolerance   Follow-up      Patient is checking blood sugars: not at all    Hi FBS     Diabetic concerns: None     Symptoms of hypoglycemia (low blood sugar): none     Paresthesias (numbness or burning in feet) or sores: No     Date of last diabetic eye exam: 2016     Hyperlipidemia Follow-Up      Rate your low fat/cholesterol diet?: good    Taking statin?  Yes, no muscle aches from 40mgm simvastatin    Other lipid medications/supplements?:  none     Hypertension Follow-up      Outpatient blood pressures are not being checked.   Here < 140/80    Low Salt Diet: no added salt     Heart Failure Follow-up      Symptoms:    Shortness of breath: none as cant exercise with her m disease    Lower extremity edema: none    Chest pain: No    Using more pillows than normal: No    Cough at night: No    Weight:    Checking weight daily: No    Weight change: none    Cardiology visits, ER/UC, or hospital admissions since last visit: None    Medication side effects: none: q d lasix      NONMORBID OBESITY    - comorbid HTN, CKD, hi chol,       Chronic Kidney Disease:Stage 3  Follow-up      - comorbid HTN, CKD, hi chol,    NSAID use?  No        Hypothyroidism Follow-up      Since last visit, patient describes the following symptoms: Weight stable, no hair loss, no skin changes, no constipation, no loose stools       Problem list and histories  "reviewed & adjusted, as indicated.  Additional history: as documented    Labs reviewed in EPIC  Problem list, Medication list, Allergies, and Medical/Social/Surgical histories reviewed in [facility] and updated as appropriate.    ROS:  C: NEGATIVE for fever, chills, change in weight  I: NEGATIVE for worrisome rashes, moles or lesions  E: NEGATIVE for vision changes or irritation  E/M: NEGATIVE for ear, mouth and throat problems  R: NEGATIVE for significant cough or SOB  B: NEGATIVE for masses, tenderness or discharge  CV: NEGATIVE for chest pain, palpitations or peripheral edema  GI: NEGATIVE for nausea, abdominal pain, heartburn, or change in bowel habits  : NEGATIVE for frequency, dysuria, or hematuria  MUSCULOSKELETAL:POSITIVE  for , back pain and radicular pain has spastic weak extrems and usea s walker   N: NEGATIVE for weakness, dizziness or paresthesias  E: NEGATIVE for temperature intolerance, skin/hair changes  H: NEGATIVE for bleeding problems  P: NEGATIVE for changes in mood or affect    OBJECTIVE:                                                    /60 mmHg  Pulse 61  Temp(Src) 97  F (36.1  C) (Tympanic)  Resp 14  Ht 5' 6\" (1.676 m)  Wt 189 lb (85.73 kg)  BMI 30.52 kg/m2  SpO2 97%  LMP  (LMP Unknown)  Breastfeeding? No  Body mass index is 30.52 kg/(m^2).  GENERAL: healthy, alert,  distress, obese, frail and elderly  EYES: Eyes grossly normal to inspection, PERRL and conjunctivae and sclerae normal  RESP: lungs clear to auscultation - no rales, rhonchi or wheezes  CV: regular rate and rhythm, normal S1 S2, no S3 or S4, no murmur, click or rub, no peripheral edema and peripheral pulses strong  MS: no gross musculoskeletal defects noted, no edema; has spastic weak extrems and usea s walker -moves slowly and painfully from LBP and leg pain  SKIN: no suspicious lesions or rashes  NEURO: Normal strength and tone, mentation intact and speech normal  PSYCH: mentation appears normal, affect " normal/bright    Diagnostic Test Results:  No results found for this or any previous visit (from the past 24 hour(s)).     ASSESSMENT/PLAN:                                                              ICD-10-CM    1. Acute bilateral low back pain with right-sided sciatica onset end of 12-16 M54.41 [facility], HAND, AND CHIROPRACTIC REFERRAL   2. Traumatic ecchymosis of left lower leg, initial encounter S80.12XA    3. Long term current use of anticoagulant therapy Z79.01    4. Glucose intolerance (impaired glucose tolerance): HgbA1C= 5.5  R73.02 Basic metabolic panel   5. Acquired hypothyroidism E03.9    6. Hypercholesterolemia E78.00 ALT     AST   7. Benign essential hypertension I10 Basic metabolic panel   8. Drug-induced gout involving toe, unspecified chronicity, unspecified laterality M10.279 Uric acid   9. Non morbid obesity due to excess calories w comorbid HTN,hi chol,CKD E66.09    10. Chronic systolic congestive heart failure (H) I50.22 HEART FAILURE ACTION PLAN       Patient Instructions   1. ICE  decreases inflammation & kills the pain coming from the nerves     WARM SOAKS/PACKS  Relax & loosen up tight muscles to reduce the pain of the        muscle tightness & spasm    2.  Weight Loss Tips  1. Do not eat after 6 hrs before your expected bedtime  2. Have your heaviest meal for breakfast, a slightly lighter meal at lunch and a snack 6 hrs before bed  3. No sugar/calorie drinks except milk ie no fruit juice, pop, alcohol.  4. Drink milk 30min before meals to decrease your hunger. Also it is excellent as part of your last meal of the day snack  5. Drink lots of water  6. Increase fiber in diet: all bran cereal, salads, popcorn etc  7. Have only one small serving of fruit a day about 1/2 cup (as this is high in sugar)  8. EXERCISE is the bottom line. Without it, you will gain weight even on a low calorie diet. Best if done 2-3X a day as can    Being overweight contributes to high blood pressure and high  cholesterol, both of which cause heart attacks, strokes and kidney failure, prediabetes and diabetes, arthritis, and liver disease           Bess Lion MD  [facility]    Weight management plan: Discussed healthy diet and exercise guidelines and patient will follow up in 3 months in clinic to re-evaluate.      Bess Lion MD

## 2017-01-16 ENCOUNTER — ANTICOAGULATION THERAPY VISIT (OUTPATIENT)
Dept: NURSING | Facility: CLINIC | Age: 82
End: 2017-01-16
Payer: COMMERCIAL

## 2017-01-16 ENCOUNTER — THERAPY VISIT (OUTPATIENT)
Dept: PHYSICAL THERAPY | Facility: CLINIC | Age: 82
End: 2017-01-16
Payer: MEDICARE

## 2017-01-16 DIAGNOSIS — M54.41 ACUTE RIGHT-SIDED LOW BACK PAIN WITH RIGHT-SIDED SCIATICA: Primary | ICD-10-CM

## 2017-01-16 DIAGNOSIS — I48.20 CHRONIC ATRIAL FIBRILLATION (H): ICD-10-CM

## 2017-01-16 DIAGNOSIS — Z79.01 LONG-TERM (CURRENT) USE OF ANTICOAGULANTS: Primary | ICD-10-CM

## 2017-01-16 LAB
INR POINT OF CARE: 4.5 (ref 0.86–1.14)
INR POINT OF CARE: NORMAL (ref 0.86–1.14)

## 2017-01-16 PROCEDURE — 36416 COLLJ CAPILLARY BLOOD SPEC: CPT

## 2017-01-16 PROCEDURE — G8982 BODY POS GOAL STATUS: HCPCS | Mod: GP | Performed by: PHYSICAL THERAPIST

## 2017-01-16 PROCEDURE — 97112 NEUROMUSCULAR REEDUCATION: CPT | Mod: GP | Performed by: PHYSICAL THERAPIST

## 2017-01-16 PROCEDURE — G8981 BODY POS CURRENT STATUS: HCPCS | Mod: GP | Performed by: PHYSICAL THERAPIST

## 2017-01-16 PROCEDURE — 99207 ZZC NO CHARGE NURSE ONLY: CPT

## 2017-01-16 PROCEDURE — 97161 PT EVAL LOW COMPLEX 20 MIN: CPT | Mod: GP | Performed by: PHYSICAL THERAPIST

## 2017-01-16 PROCEDURE — 85610 PROTHROMBIN TIME: CPT | Mod: QW

## 2017-01-16 NOTE — PROGRESS NOTES
Subjective:    Kenyatta Donald is a 81 year old female with a lumbar condition.  Condition occurred with:  Insidious onset.  Condition occurred: for unknown reasons.  This is a new condition  Insidious onset of LB and R LE pain to the thigh 12/26/2016. She lives alone in a senior building.  She reports being diagnosed with a muscle condition in 1942 that affects her LE and UE strength.  She uses a 4-wheeled walker for ambulation as a result.  She sleeps in a recliner due to breathing problems.    Patient reports pain:  Lumbar spine right.  Radiates to:  Gluteals right and thigh right.  Pain is described as sharp and aching and is intermittent and reported as 7/10.   Pain is the same all the time.  Exacerbated by: sit to stand, sitting 1 hour, walking 5 minutes, standing 10 minutes. and relieved by rest.  Since onset symptoms are unchanged.  Special testing: none.  Previous treatment: none.    General health as reported by patient is good.  Pertinent medical history includes:  Cancer, high blood pressure, heart problems and implanted device.  Medical allergies: no.  Surgical history: breast, heart valve.  Current medications:  High blood pressure medication.  Current occupation is retired.        Barriers include:  Lives alone.    Red flags:  None as reported by the patient.                      Objective:      Gait:  Flexed posture with gait  Assistive Devices:  Walker                 Lumbar/SI Evaluation  ROM:    AROM Lumbar:   Flexion:        75%, p. R LBP  Ext:                    25%, p. R LBP   Side Bend:        Left:     Right:   Rotation:           Left:     Right:   Side Glide:        Left:     Right:         Strength: *lumbar AROM testing limited by LE weakness and balance;  VALENT[place]--p. R LB and thigh pain, NW after, NE ROM; posture correct in sitting using lumbar roll--slight decrease in pain  Lumbar Myotomes:  not assessed (no complaints since onset of LBP)                Lumbar Dermtomes:  not assessed (no  complaints)                                                                       General     ROS    Assessment/Plan:      Patient is a 81 year old female with lumbar complaints.  She had pain with both flexion and extension today and will try extension at home to assess.  She sleeps in a recliner and has for quite some time, and this amount of flexion is likely contributing to her pain.  Treatment will focus on lumbar extension exercises and posture training to decrease stress on the lumbar spine, decrease pain, and improve overall mobility and function.      Patient has the following significant findings with corresponding treatment plan.                Diagnosis 1:  R LB, thigh pain  Pain -  self management, education, directional preference exercise and home program  Decreased ROM/flexibility - manual therapy, therapeutic exercise and home program  Decreased strength - therapeutic exercise, therapeutic activities and home program  Decreased function - therapeutic activities and home program  Impaired posture - neuro re-education and home program    Therapy Evaluation Codes:   1) History comprised of:   Personal factors that impact the plan of care:      None.    Comorbidity factors that impact the plan of care are:      Weakness and muscle disorder.     Medications impacting care: None.  2) Examination of Body Systems comprised of:   Body structures and functions that impact the plan of care:      Lumbar spine.   Activity limitations that impact the plan of care are:      Bending, Sitting, Standing, Walking and sit to stand transitions.  3) Clinical presentation characteristics are:   Stable/Uncomplicated.  4) Decision-Making    Low complexity using standardized patient assessment instrument and/or measureable assessment of functional outcome.  Cumulative Therapy Evaluation is: Low complexity.    Previous and current functional limitations:  (See Goal Flow Sheet for this information)    Short term and Long term  goals: (See Goal Flow Sheet for this information)     Communication ability:  Patient appears to be able to clearly communicate and understand verbal and written communication and follow directions correctly.  Treatment Explanation - The following has been discussed with the patient:   RX ordered/plan of care  Anticipated outcomes  Possible risks and side effects  This patient would benefit from PT intervention to resume normal activities.   Rehab potential is good.    Frequency:  1 X week, once daily  Duration:  for 4 weeks tapering to 2 X a month over 2 months  Discharge Plan:  Achieve all LTG.  Independent in home treatment program.  Reach maximal therapeutic benefit.    Please refer to the daily flowsheet for treatment today, total treatment time and time spent performing 1:1 timed codes.

## 2017-01-16 NOTE — PROGRESS NOTES
ANTICOAGULATION FOLLOW-UP CLINIC VISIT    Patient Name:  Kenyatta Donald  Date:  1/16/2017  Contact Type:  Face to Face    SUBJECTIVE:     Patient Findings     Positives Other Complaints (sciatic nerve pain- starting thera;y)           OBJECTIVE    INR PROTIME   Date Value Ref Range Status   01/16/2017 4.5* 0.86 - 1.14 Final   01/16/2017 4.5* 0.86 - 1.14 Final       ASSESSMENT / PLAN  INR assessment SUPRA    Recheck INR In: 1 WEEK    INR Location Clinic      Anticoagulation Summary as of 1/16/2017     INR goal 2.5-3.5   Selected INR 4.5! (1/16/2017)   Maintenance plan 6 mg (4 mg x 1 and 1 mg x 2) on Mon, Wed, Fri; 5 mg (4 mg x 1 and 1 mg x 1) all other days   Full instructions 1/16: 2 mg; Otherwise 6 mg on Mon, Wed, Fri; 5 mg all other days   Weekly total 38 mg   Plan last modified Nancy Zavala RN (11/28/2016)   Next INR check 1/23/2017   Target end date     Indications   Long-term (current) use of anticoagulants [Z79.01] [Z79.01]  Atrial fibrillation (H) [I48.91]  Mitral valve disorder s/po 1-9-15 remodeling percut  + clip  (Resolved) [I05.9]         Anticoagulation Episode Summary     [facility]    Preferred lab     Send INR reminders to [facility] INR/PROTIME    Comments       Anticoagulation Care Providers     Provider Role Specialty Phone number    Amisha Bess Pyle MD [facility] [phone]            See the Encounter Report to view Anticoagulation Flowsheet and Dosing Calendar (Go to Encounters tab in chart review, and find the Anticoagulation Therapy Visit)        Nancy Zavala, RN

## 2017-01-16 NOTE — Clinical Note
DEPARTMENT OF HEALTH AND HUMAN SERVICES  [facility]    PLAN/UPDATED PLAN OF PROGRESS FOR OUTPATIENT REHABILITATION    PATIENTS NAME:  Kenyatta Donald   : 1935  [facility]BER:    [id number]  [id number]   PROVIDER NAME: [facility]ICAL [facility]ERAP[facility]  MEDICAL RECORD NUMBER: [id number]   START OF CARE DATE:  SOC Date: 17   TYPE:  PT  PRIMARY/TREATMENT DIAGNOSIS: (Pertinent Medical Diagnosis)  Acute right-sided low back pain with right-sided sciatica    VISITS FROM START OF CARE:  Rxs Used: 1     Subjective:  Kenyatta Donald is a 81 year old female with a lumbar condition.  Condition occurred with:  Insidious onset.  Condition occurred: for unknown reasons.  This is a new condition  Insidious onset of LB and R LE pain to the thigh 2016. She lives alone in a senior building.  She reports being diagnosed with a muscle condition in 1942 that affects her LE and UE strength.  She uses a 4-wheeled walker for ambulation as a result.  She sleeps in a recliner due to breathing problems.  Patient reports pain:  Lumbar spine right.  Radiates to:  Gluteals right and thigh right.  Pain is described as sharp and aching and is intermittent and reported as 7/10.   Pain is the same all the time.  Exacerbated by: sit to stand, sitting 1 hour, walking 5 minutes, standing 10 minutes. and relieved by rest.  Since onset symptoms are unchanged.  Special testing: none.  Previous treatment: none.    General health as reported by patient is good.  Pertinent medical history includes:  Cancer, high blood pressure, heart problems and implanted device.  Medical allergies: no.  Surgical history: breast, heart valve.  Current medications:  High blood pressure medication.  Current occupation is retired.  Barriers include:  Lives alone.  Red flags:  None as reported by the patient.      Objective:  Gait:  Flexed posture with gait  Assistive Devices:   Walker  Lumbar/SI Evaluation  ROM:    AROM Lumbar:   Flexion:        75%, p. R LBP  Ext:                    25%, p. R LBP   Side Bend:        Left:     Right:   Rotation:           Left:     Right:   Side Glide:        Left:     Right:   Strength: *lumbar AROM testing limited by LE weakness and balance;  [place]--p. R LB and thigh pain, NW after, NE ROM; posture correct in sitting using lumbar roll--slight decrease in pain    Lumbar Myotomes:  not assessed (no complaints since onset of LBP)  Lumbar Dermtomes:  not assessed (no complaints)    ROS  Assessment/Plan:    Patient is a 81 year old female with lumbar complaints.  She had pain with both flexion and extension today and will try extension at home to assess.  She sleeps in a recliner and has for quite some time, and this amount of flexion is likely contributing to her pain.  Treatment will focus on lumbar extension exercises and posture training to decrease stress on the lumbar spine, decrease pain, and improve overall mobility and function.      Patient has the following significant findings with corresponding treatment plan.                Diagnosis 1:  R LB, thigh pain  Pain -  self management, education, directional preference exercise and home program  Decreased ROM/flexibility - manual therapy, therapeutic exercise and home program  Decreased strength - therapeutic exercise, therapeutic activities and home program  Decreased function - therapeutic activities and home program  Impaired posture - neuro re-education and home program    Therapy Evaluation Codes:   1) History comprised of:   Personal factors that impact the plan of care:      None.    Comorbidity factors that impact the plan of care are:      Weakness and muscle disorder.     Medications impacting care: None.  2) Examination of Body Systems comprised of:   Body structures and functions that impact the plan of care:      Lumbar spine.   Activity limitations that impact the plan of care are:   "    Bending, Sitting, Standing, Walking and sit to stand transitions.  3) Clinical presentation characteristics are:   Stable/Uncomplicated.  4) Decision-Making    Low complexity using standardized patient assessment instrument and/or measureable assessment of functional outcome.  Cumulative Therapy Evaluation is: Low complexity.    Previous and current functional limitations:  (See Goal Flow Sheet for this information)    Short term and Long term goals: (See Goal Flow Sheet for this information)     Communication ability:  Patient appears to be able to clearly communicate and understand verbal and written communication and follow directions correctly.  Treatment Explanation - The following has been discussed with the patient:   RX ordered/plan of care  Anticipated outcomes  Possible risks and side effects  This patient would benefit from PT intervention to resume normal activities.   Rehab potential is good.      Frequency:  1 X week, once daily  Duration:  for 4 weeks tapering to 2 X a month over 2 months  Discharge Plan:  Achieve all LTG.  Independent in home treatment program.  Reach maximal therapeutic benefit.    Caregiver Signature/Credentials _____________________________ Date ________       Treating Provider: Giuliana Levy, PT   I have reviewed and certified the need for these services and plan of treatment while under my care.        PHYSICIAN'S SIGNATURE:   _____________________________________  Date___________                         Bess Lion    Certification period:  Beginning of Cert date period: 01/16/17 to  End of Cert period date: 04/15/17     Functional Level Progress Report: Please see attached \"Goal Flow sheet for Functional level.\"    ____X____ Continue Services or       ________ DC Services                Service dates: From  SOC Date: 01/16/17 date to present                           "

## 2017-01-16 NOTE — MR AVS SNAPSHOT
Kenyatta Donald   1/16/2017 11:30 AM   Anticoagulation Therapy Visit    Description:  81 year old female   Provider:  [facility]   Department:  Bx Nurse           INR as of 1/16/2017     Selected INR 4.5! (1/16/2017)      Anticoagulation Summary as of 1/16/2017     INR goal 2.5-3.5   Selected INR 4.5! (1/16/2017)   Full instructions 1/16: 2 mg; Otherwise 6 mg on Mon, Wed, Fri; 5 mg all other days   Next INR check 1/23/2017    Indications   Long-term (current) use of anticoagulants [Z79.01] [Z79.01]  Atrial fibrillation (H) [I48.91]  Mitral valve disorder s/po 1-9-15 remodeling percut  + clip  (Resolved) [I05.9]         Description     Increase greens.         Contact Numbers     [facility]  Please call  [phone] to cancel and/or reschedule your appointment   The direct line to the anticoagulant nurse is [phone] on Monday, Wednesday, and Friday. On Thursday, the anticoagulant nurse can be reached directly at [phone].         January 2017 Details    Sun Mon Tue Wed Thu Fri Sat     1               2               3               4               5               6               7                 8               9               10               11               12               13               14                 15               16      2 mg   See details      17      5 mg         18      6 mg         19      5 mg         20      6 mg         21      5 mg           22      5 mg         23            24               25               26               27               28                 29               30               31                    Date Details   01/16 This INR check       Date of next INR:  1/23/2017         How to take your warfarin dose     To take:  2 mg Take 0.5 of a 4 mg tablet.    To take:  5 mg Take 1 of the 4 mg tablets and 1 of the 1 mg tablets.    To take:  6 mg Take 1 of the 4 mg tablets and 2 of the 1 mg tablets.

## 2017-01-23 ENCOUNTER — TELEPHONE (OUTPATIENT)
Dept: FAMILY MEDICINE | Facility: CLINIC | Age: 82
End: 2017-01-23

## 2017-01-23 ENCOUNTER — THERAPY VISIT (OUTPATIENT)
Dept: PHYSICAL THERAPY | Facility: CLINIC | Age: 82
End: 2017-01-23
Payer: MEDICARE

## 2017-01-23 ENCOUNTER — ANTICOAGULATION THERAPY VISIT (OUTPATIENT)
Dept: ANTICOAGULATION | Facility: CLINIC | Age: 82
End: 2017-01-23
Payer: COMMERCIAL

## 2017-01-23 DIAGNOSIS — M54.41 ACUTE RIGHT-SIDED LOW BACK PAIN WITH RIGHT-SIDED SCIATICA: Primary | ICD-10-CM

## 2017-01-23 LAB — INR POINT OF CARE: 4.8 (ref 0.86–1.14)

## 2017-01-23 PROCEDURE — 36416 COLLJ CAPILLARY BLOOD SPEC: CPT

## 2017-01-23 PROCEDURE — 97112 NEUROMUSCULAR REEDUCATION: CPT | Mod: GP | Performed by: PHYSICAL THERAPIST

## 2017-01-23 PROCEDURE — 85610 PROTHROMBIN TIME: CPT | Mod: QW

## 2017-01-23 PROCEDURE — 97530 THERAPEUTIC ACTIVITIES: CPT | Mod: GP | Performed by: PHYSICAL THERAPIST

## 2017-01-23 NOTE — TELEPHONE ENCOUNTER
Reason for Call:  Form, our goal is to have forms completed with 72 hours, however, some forms may require a visit or additional information.    Type of letter, form or note:  medical    Who is the form from?: [employer]    Where did the form come from: form was faxed in    What clinic location was the form placed at?: [facility]    Where the form was placed: 's Box: Bess Lion MD    What number is listed as a contact on the form?: Fax # [phone]       Additional comments: Plan of Progress for Outpatient Rehabilitation (SOC Date: 1/16/17)    Call taken on 1/23/2017 at 4:06 PM by Daniel Ortega

## 2017-01-23 NOTE — PROGRESS NOTES
ANTICOAGULATION FOLLOW-UP CLINIC VISIT    Patient Name:  Kenyatta Donald  Date:  1/23/2017  Contact Type:  Face to Face    SUBJECTIVE:     Patient Findings     Positives Inflammation (Pt has been having lower back pain, sciatical pain, managed with Tylenol and has been going to PT here at [facility], asking to have appts INR here while needing to go to PT.)           OBJECTIVE    INR PROTIME   Date Value Ref Range Status   01/23/2017 4.8* 0.86 - 1.14 Final       ASSESSMENT / PLAN  INR assessment SUPRA    Recheck INR In: 1 WEEK    INR Location Clinic      Anticoagulation Summary as of 1/23/2017     INR goal 2.5-3.5   Selected INR 4.8! (1/23/2017)   Maintenance plan 6 mg (4 mg x 1 and 1 mg x 2) on Mon, Wed, Fri; 5 mg (4 mg x 1 and 1 mg x 1) all other days   Full instructions 6 mg on Mon, Wed, Fri; 5 mg all other days   Weekly total 38 mg   Plan last modified Nancy Zavala RN (11/28/2016)   Next INR check 1/30/2017   Target end date     Indications   Long-term (current) use of anticoagulants [Z79.01] [Z79.01]  Atrial fibrillation (H) [I48.91]  Mitral valve disorder s/po 1-9-15 remodeling percut  + clip  (Resolved) [I05.9]         Anticoagulation Episode Summary     INR check location [facility]    Preferred lab     Send INR reminders to [facility] INR/PROTIME    Comments       Anticoagulation Care Providers     Provider Role Specialty Phone number    Bess Lion Mirna Pyle MD [facility] [phone]            See the Encounter Report to view Anticoagulation Flowsheet and Dosing Calendar (Go to Encounters tab in chart review, and find the Anticoagulation Therapy Visit)        Brenda Perez, RN

## 2017-01-23 NOTE — MR AVS SNAPSHOT
Kenyatta Donald   1/23/2017 2:30 PM   Anticoagulation Therapy Visit    Description:  81 year old female   Provider:   ANTICOAGULATION CLINIC   Department:   Anti Coagulation           INR as of 1/23/2017     Selected INR 4.8! (1/23/2017)      Anticoagulation Summary as of 1/23/2017     INR goal 2.5-3.5   Selected INR 4.8! (1/23/2017)   Full instructions 1/23: Hold; 1/24: 3 mg; Otherwise 6 mg on Mon, Wed, Fri; 5 mg all other days   Next INR check 1/30/2017    Indications   Long-term (current) use of anticoagulants [Z79.01] [Z79.01]  Atrial fibrillation (H) [I48.91]  Mitral valve disorder s/po 1-9-15 remodeling percut  + clip  (Resolved) [I05.9]         Your next Anticoagulation Clinic appointment(s)     Jan 30, 2017  3:00 PM   Anticoagulation Visit with  ANTICOAGULATION CLINIC   [facility]    [address] [place] [address] [phone]              Contact Numbers     [facility]  Please call  [phone] to cancel and/or reschedule your appointment   Please call  [phone] with any problems or questions regarding your therapy.        January 2017 Details    Sun Mon Tue Wed Thu Fri Sat     1               2               3               4               5               6               7                 8               9               10               11               12               13               14                 15               16               17               18               19               20               21                 22               23      Hold   See details      24      3 mg         25      6 mg         26      5 mg         27      6 mg         28      5 mg           29      5 mg         30            31                    Date Details   01/23 This INR check       Date of next INR:  1/30/2017         How to take your warfarin dose     To take:  3 mg Take 3 of the 1 mg tablets.    To take:  5 mg  Take 1 of the 4 mg tablets and 1 of the 1 mg tablets.    To take:  6 mg Take 1 of the 4 mg tablets and 2 of the 1 mg tablets.    Hold Do not take your warfarin dose. See the Details table to the right for additional instructions.

## 2017-01-30 ENCOUNTER — THERAPY VISIT (OUTPATIENT)
Dept: PHYSICAL THERAPY | Facility: CLINIC | Age: 82
End: 2017-01-30
Payer: MEDICARE

## 2017-01-30 ENCOUNTER — ANTICOAGULATION THERAPY VISIT (OUTPATIENT)
Dept: ANTICOAGULATION | Facility: CLINIC | Age: 82
End: 2017-01-30
Payer: COMMERCIAL

## 2017-01-30 DIAGNOSIS — M54.41 ACUTE RIGHT-SIDED LOW BACK PAIN WITH RIGHT-SIDED SCIATICA: Primary | ICD-10-CM

## 2017-01-30 DIAGNOSIS — I48.20 CHRONIC ATRIAL FIBRILLATION (H): ICD-10-CM

## 2017-01-30 DIAGNOSIS — Z79.01 LONG-TERM (CURRENT) USE OF ANTICOAGULANTS: Primary | ICD-10-CM

## 2017-01-30 LAB — INR POINT OF CARE: 4.5 (ref 0.86–1.14)

## 2017-01-30 PROCEDURE — 36416 COLLJ CAPILLARY BLOOD SPEC: CPT

## 2017-01-30 PROCEDURE — 97110 THERAPEUTIC EXERCISES: CPT | Mod: GP | Performed by: PHYSICAL THERAPIST

## 2017-01-30 PROCEDURE — 85610 PROTHROMBIN TIME: CPT | Mod: QW

## 2017-01-30 PROCEDURE — 97530 THERAPEUTIC ACTIVITIES: CPT | Mod: GP | Performed by: PHYSICAL THERAPIST

## 2017-01-30 NOTE — MR AVS SNAPSHOT
Kenyatta Donald   1/30/2017 3:00 PM   Anticoagulation Therapy Visit    Description:  81 year old female   Provider:   ANTICOAGULATION CLINIC   Department:   Anti Coagulation           INR as of 1/30/2017     Selected INR 4.5! (1/30/2017)      Anticoagulation Summary as of 1/30/2017     INR goal 2.5-3.5   Selected INR 4.5! (1/30/2017)   Full instructions 1/30: Hold; 2/1: 4 mg; 2/3: 4 mg; Otherwise 6 mg on Mon, Wed, Fri; 5 mg all other days   Next INR check 2/6/2017    Indications   Long-term (current) use of anticoagulants [Z79.01] [Z79.01]  Atrial fibrillation (H) [I48.91]  Mitral valve disorder s/po 1-9-15 remodeling percut  + clip  (Resolved) [I05.9]         Your next Anticoagulation Clinic appointment(s)     Feb 06, 2017  2:30 PM   Anticoagulation Visit with  ANTICOAGULATION CLINIC   [facility] ([facility])    [address] [address] [phone]              Contact Numbers     [facility]  Please call  [phone] to cancel and/or reschedule your appointment   Please call  [phone] with any problems or questions regarding your therapy.        January 2017 Details    Sun Mon Tue Wed Thu Fri Sat     1               2               3               4               5               6               7                 8               9               10               11               12               13               14                 15               16               17               18               19               20               21                 22               23               24               25               26               27               28                 29               30      Hold   See details      31      5 mg              Date Details   01/30 This INR check               How to take your warfarin dose     To take:  5 mg Take 1 of the 4 mg tablets and 1 of the 1 mg tablets.    Hold Do not take your  warfarin dose. See the Details table to the right for additional instructions.                February 2017 Details    Sun Mon Tue Wed Thu Fri Sat        1      4 mg         2      5 mg         3      4 mg         4      5 mg           5      5 mg         6            7               8               9               10               11                 12               13               14               15               16               17               18                 19               20               21               22               23               24               25                 26               27               28                    Date Details   No additional details    Date of next INR:  2/6/2017         How to take your warfarin dose     To take:  4 mg Take 1 of the 4 mg tablets.    To take:  5 mg Take 1 of the 4 mg tablets and 1 of the 1 mg tablets.    To take:  6 mg Take 1 of the 4 mg tablets and 2 of the 1 mg tablets.

## 2017-02-06 ENCOUNTER — ANTICOAGULATION THERAPY VISIT (OUTPATIENT)
Dept: ANTICOAGULATION | Facility: CLINIC | Age: 82
End: 2017-02-06
Payer: COMMERCIAL

## 2017-02-06 LAB — INR POINT OF CARE: 2.2 (ref 0.86–1.14)

## 2017-02-06 PROCEDURE — 36416 COLLJ CAPILLARY BLOOD SPEC: CPT

## 2017-02-06 PROCEDURE — 85610 PROTHROMBIN TIME: CPT | Mod: QW

## 2017-02-06 NOTE — PROGRESS NOTES
ANTICOAGULATION FOLLOW-UP CLINIC VISIT    Patient Name:  Kenyatta Donald  Date:  2/6/2017  Contact Type:  Face to Face    SUBJECTIVE:     Patient Findings     Positives Missed doses (pt missed her dose last evening)           OBJECTIVE    INR PROTIME   Date Value Ref Range Status   02/06/2017 2.2* 0.86 - 1.14 Final       ASSESSMENT / PLAN  INR assessment SUB    Recheck INR In: 8 WEEKS    INR Location Clinic      Anticoagulation Summary as of 2/6/2017     INR goal 2.5-3.5   Selected INR 2.2! (2/6/2017)   Maintenance plan 6 mg (4 mg x 1 and 1 mg x 2) on Mon, Wed, Fri; 5 mg (4 mg x 1 and 1 mg x 1) all other days   Full instructions 2/6: 8 mg; Otherwise 6 mg on Mon, Wed, Fri; 5 mg all other days   Weekly total 38 mg   Plan last modified Nancy Zavala RN (11/28/2016)   Next INR check 2/14/2017   Target end date     Indications   Long-term (current) use of anticoagulants [Z79.01] [Z79.01]  Atrial fibrillation (H) [I48.91]  Mitral valve disorder s/po 1-9-15 remodeling percut  + clip  (Resolved) [I05.9]         Anticoagulation Episode Summary     INR check location [facility]    Preferred lab     Send INR reminders to [facility] INR/PROTIME    Comments       Anticoagulation Care Providers     Provider Role Specialty Phone number    VelasquezBess ngo MD [facility] [phone]            See the Encounter Report to view Anticoagulation Flowsheet and Dosing Calendar (Go to Encounters tab in chart review, and find the Anticoagulation Therapy Visit)        Brenda Perez, RN

## 2017-02-13 ENCOUNTER — THERAPY VISIT (OUTPATIENT)
Dept: PHYSICAL THERAPY | Facility: CLINIC | Age: 82
End: 2017-02-13
Payer: MEDICARE

## 2017-02-13 DIAGNOSIS — M54.41 ACUTE RIGHT-SIDED LOW BACK PAIN WITH RIGHT-SIDED SCIATICA: ICD-10-CM

## 2017-02-13 PROCEDURE — 97530 THERAPEUTIC ACTIVITIES: CPT | Mod: GP | Performed by: PHYSICAL THERAPIST

## 2017-02-13 PROCEDURE — 97110 THERAPEUTIC EXERCISES: CPT | Mod: GP | Performed by: PHYSICAL THERAPIST

## 2017-02-13 NOTE — MR AVS SNAPSHOT
After Visit Summary   2/13/2017    Kenyatta Donald    MRN: [id number]           Patient Information     Date Of Birth          1935        Visit Information        Provider Department      2/13/2017 3:20 PM Giuliana Levy PT [facility]        Today's Diagnoses     Acute right-sided low back pain with right-sided sciatica           Follow-ups after your visit        Your next 10 appointments already scheduled     Feb 14, 2017  4:15 PM CST   Anticoagulation Visit with [facility]   [facility]    [address] [address]   [phone]            Feb 17, 2017  2:10 PM CST   LAB with [facility]   [facility] ([facility])    [address] [place] [id number] [phone]           Patient must bring picture ID.  Patient should be prepared to give a urine specimen  Please do not eat 10-12 hours before your appointment if you are coming in fasting for labs on lipids, cholesterol, or glucose (sugar).  Pregnant women should follow their Care Team instructions. Water with medications is okay. Do not drink coffee or other fluids.   If you have concerns about taking  your medications, please ask at office or if scheduling via [employer], send a message by clicking on Secure Messaging, Message Your Care Team.            Feb 17, 2017  3:10 PM CST   Return Visit with REBEKAH Nicolas CNP   [facility] ([facility])    [address] [place], [place] [address]  [facility] [id number] [phone]              Who to contact     If you have questions or need follow up information about today's clinic visit or your schedule please contact [facility] PH[facility]ERAP[facility] directly at [phone].  Normal or non-critical lab and  imaging results will be communicated to you by MyChart, letter or phone within 4 business days after the clinic has received the results. If you do not hear from us within 7 days, please contact the clinic through MyChart or phone. If you have a critical or abnormal lab result, we will notify you by phone as soon as possible.  Submit refill requests through [employer] or call your pharmacy and they will forward the refill request to us. Please allow 3 business days for your refill to be completed.          Additional Information About Your Visit        Care EveryWhere ID     This is your Care EveryWhere ID. This could be used by other organizations to access your [place] medical records  [id number]        Your Vitals Were     Last Period                   (LMP Unknown)            Blood Pressure from Last 3 Encounters:   01/13/17 120/60   01/11/17 138/60   11/07/16 110/64    Weight from Last 3 Encounters:   01/13/17 85.7 kg (189 lb)   01/11/17 87.6 kg (193 lb 3.2 oz)   11/07/16 87.1 kg (192 lb)              We Performed the Following     Therapeutic Activities     Therapeutic Exercises          Today's Medication Changes          These changes are accurate as of: 2/13/17  4:13 PM.  If you have any questions, ask your nurse or doctor.               These medicines have changed or have updated prescriptions.        Dose/Directions    * warfarin 1 MG tablet   Commonly known as:  COUMADIN   This may have changed:  Another medication with the same name was changed. Make sure you understand how and when to take each.   Used for:  Atrial fibrillation (H), History of pulmonary embolism        Dose:  1 mg   Take 1 tablet (1 mg) by mouth daily   Quantity:  90 tablet   Refills:  1       * warfarin 4 MG tablet   Commonly known as:  COUMADIN   This may have changed:  See the new instructions.   Used for:  Atrial fibrillation (H), Personal history of PE (pulmonary embolism)        TAKE 1 1/2 TABLETS BY MOUTH DAILY ON MONDAY,  FRIDAY AND TAKE 1 TABLET DAILY ALL OTHER DAYS OF THE WEEK   Quantity:  102 tablet   Refills:  1       * Notice:  This list has 2 medication(s) that are the same as other medications prescribed for you. Read the directions carefully, and ask your doctor or other care provider to review them with you.             Primary Care Provider Office Phone # Fax #    Bses Lion [phone]       [facility] [address]  [facility] [address]        Thank you!     Thank you for choosing [facility]  for your care. Our goal is always to provide you with excellent care. Hearing back from our patients is one way we can continue to improve our services. Please take a few minutes to complete the written survey that you may receive in the mail after your visit with us. Thank you!             Your Updated Medication List - Protect others around you: Learn how to safely use, store and throw away your medicines at www.disposemymeds.org.          This list is accurate as of: 2/13/17  4:13 PM.  Always use your most recent med list.                   Brand Name Dispense Instructions for use    acetaminophen 325 MG tablet    TYLENOL    100 tablet    Take 2 tablets (650 mg) by mouth every 4 hours as needed for mild pain       albuterol 108 (90 BASE) MCG/ACT Inhaler    PROAIR HFA/PROVENTIL HFA/VENTOLIN HFA     Inhale 2 puffs into the lungs every 6 hours as needed for shortness of breath / dyspnea or wheezing       alendronate 70 MG tablet    FOSAMAX    12 tablet    TAKE 1 TABLET BY MOUTH EVERY 7 DAYS       allopurinol 100 MG tablet    ZYLOPRIM    90 tablet    TAKE 1 TABLET BY MOUTH EVERY DAY       amoxicillin 500 MG capsule    AMOXIL    4 capsule    Take 4 capsules by mouth 30 minutes prior to dental work       atenolol 25 MG tablet    TENORMIN    90 tablet    Take 1 tablet (25 mg) by mouth daily       cholecalciferol 1000 UNIT tablet     vitamin D     Take 1,000 Units by mouth daily       furosemide 20 MG tablet    LASIX    90 tablet    TAKE 1 TABLET BY MOUTH EVERY MORNING FOR FLUID RETENTION       levothyroxine 112 MCG tablet    SYNTHROID/LEVOTHROID    90 tablet    Take 1 tablet (112 mcg) by mouth daily       lisinopril 20 MG tablet    PRINIVIL/ZESTRIL    180 tablet    Take 1 tablet (20 mg) by mouth 2 times daily       nystatin 470129 UNIT/GM Powd    MYCOSTATIN    60 g    Apply topically 3 times daily as needed       order for DME     1 each    Equipment being ordered: mastectomy bras & prostheses  S/po mastectomy of unknown side     C50.919       simvastatin 40 MG tablet    ZOCOR    45 tablet    Take 0.5 tablets (20 mg) by mouth At Bedtime       * warfarin 1 MG tablet    COUMADIN    90 tablet    Take 1 tablet (1 mg) by mouth daily       * warfarin 4 MG tablet    COUMADIN    102 tablet    TAKE 1 1/2 TABLETS BY MOUTH DAILY ON MONDAY, FRIDAY AND TAKE 1 TABLET DAILY ALL OTHER DAYS OF THE WEEK       * Notice:  This list has 2 medication(s) that are the same as other medications prescribed for you. Read the directions carefully, and ask your doctor or other care provider to review them with you.

## 2017-02-13 NOTE — PROGRESS NOTES
"Subjective:    HPI                    Objective:    System    Physical Exam    General     ROS    Assessment/Plan:      PROGRESS  REPORT    Progress reporting period is from 01/16/2017 to 02/13/2017.       SUBJECTIVE  Subjective: Patient reports she has \"good and bad days\" but overall feels she is doing better. She feels she can walk better and farther.  She normally would have pain with walking around in the grocery store and was able to do this for about 1/2 hour last Friday without pain.  She still has pain in the mornings.  She is able to get up from her recliner and make it to the bathroom but notices pain when she is done in the bathroom and is making her walk to the kitchen.  She has been doing her exercises 4-5x/day and reports she no longer has pain with them like she did previously and feels they are making her better.  She has been doing the best she can to support her back in her recliner when sleeping--uses a towel roll and lumbar roll which seems to help a bit.  She still has pain with getting up from sitting but less--3/10 now.      Current Pain level: 0/10.     Initial Pain level: 7/10.   Changes in function:  Yes, improved walking tolerance without pain.    Adverse reaction to treatment or activity: None    OBJECTIVE  Objective: Lumbar AROM:  extension 25%, NE.   Had pain with R hip flexion prior to doing VALENTINA but abolished after.  Needs to continue VALENTINA exercises and increase frequency, also encouraged end range.       ASSESSMENT/PLAN  Patient has been seen for 4 visits with treatment focusing on lumbar extension exercises as well as posture and body mechanics training to address LBP.  She has made good progress over the past 2 weeks and has reported decreased pain and improved function.  The fact that she sleeps in a recliner due to breathing issues has been a barrier due to the prolonged lumbar flexion, and she needs to continue with consistent extension exercises to counteract this.  She " receives consistent relief with extension exercises and will continue with this independently at this point to further manage symptoms.  She will return for a follow-up visit if not progressing.    Updated problem list and treatment plan: Diagnosis 1:  R LBP  Pain -  self management, education, directional preference exercise and home program  Decreased ROM/flexibility - home program  Decreased function - home program  Impaired posture - home program  STG/LTGs have been met or progress has been made towards goals:  Yes (See Goal flow sheet completed today.)  Assessment of Progress: The patient's condition is improving.  Self Management Plans:  Patient has been instructed in a home treatment program.  Patient is independent in a home treatment program.  Patient  has been instructed in self management of symptoms.  Patient is independent in self management of symptoms.  Kenyatta continues to require the following intervention to meet STG and LTG's:  PT intervention is no longer required to meet STG/LTG.    Recommendations:  Patient will continue with her HEP independently at this point and return for a follow-up visit if needed.  If no further follow-up visits are scheduled, patient will be discharged from PT.    Please refer to the daily flowsheet for treatment today, total treatment time and time spent performing 1:1 timed codes.

## 2017-02-14 ENCOUNTER — ANTICOAGULATION THERAPY VISIT (OUTPATIENT)
Dept: ANTICOAGULATION | Facility: CLINIC | Age: 82
End: 2017-02-14
Payer: COMMERCIAL

## 2017-02-14 LAB — INR POINT OF CARE: 3.2 (ref 0.86–1.14)

## 2017-02-14 PROCEDURE — 36416 COLLJ CAPILLARY BLOOD SPEC: CPT

## 2017-02-14 PROCEDURE — 85610 PROTHROMBIN TIME: CPT | Mod: QW

## 2017-02-14 NOTE — PROGRESS NOTES
ANTICOAGULATION FOLLOW-UP CLINIC VISIT    Patient Name:  Kenyatta Donald  Date:  2/14/2017  Contact Type:  Face to Face    SUBJECTIVE:     Patient Findings     Positives No Problem Findings           OBJECTIVE    INR Protime   Date Value Ref Range Status   02/14/2017 3.2 (A) 0.86 - 1.14 Final       ASSESSMENT / PLAN  INR assessment THER    Recheck INR In: 2 WEEKS    INR Location Clinic      Anticoagulation Summary as of 2/14/2017     INR goal 2.5-3.5   Today's INR 3.2   Maintenance plan 6 mg (4 mg x 1 and 1 mg x 2) on Mon, Wed, Fri; 5 mg (4 mg x 1 and 1 mg x 1) all other days   Full instructions 6 mg on Mon, Wed, Fri; 5 mg all other days   Weekly total 38 mg   No change documented Brenda Perez RN   Plan last modified Nancy Zavala RN (11/28/2016)   Next INR check 2/28/2017   Target end date     Indications   Long-term (current) use of anticoagulants [Z79.01] [Z79.01]  Atrial fibrillation (H) [I48.91]  Mitral valve disorder s/po 1-9-15 remodeling percut  + clip  (Resolved) [I05.9]         Anticoagulation Episode Summary     INR check location [facility]    Preferred lab     Send INR reminders to  [facility]    Comments       Anticoagulation Care Providers     Provider Role Specialty Phone number    Bess Lion Mirna Pyle MD [facility] [phone]            See the Encounter Report to view Anticoagulation Flowsheet and Dosing Calendar (Go to Encounters tab in chart review, and find the Anticoagulation Therapy Visit)        Brenda Perez, RN

## 2017-02-14 NOTE — MR AVS SNAPSHOT
Kenyatta Donald   2/14/2017 4:15 PM   Anticoagulation Therapy Visit    Description:  81 year old female   Provider:   ANTICOAGULATION CLINIC   Department:   Anti Coagulation           INR as of 2/14/2017     Today's INR 3.2      Anticoagulation Summary as of 2/14/2017     INR goal 2.5-3.5   Today's INR 3.2   Full instructions 6 mg on Mon, Wed, Fri; 5 mg all other days   Next INR check 2/28/2017    Indications   Long-term (current) use of anticoagulants [Z79.01] [Z79.01]  Atrial fibrillation (H) [I48.91]  Mitral valve disorder s/po 1-9-15 remodeling percut  + clip  (Resolved) [I05.9]         Your next Anticoagulation Clinic appointment(s)     Feb 28, 2017  2:45 PM CST   Anticoagulation Visit with  ANTICOAGULATION CLINIC   [facility]    [address] [address] [phone]              Contact Numbers     [facility]  Please call  [phone] to cancel and/or reschedule your appointment   Please call  [phone] with any problems or questions regarding your therapy.        February 2017 Details    Sun Mon Tue Wed Thu Fri Sat        1               2               3               4                 5               6               7               8               9               10               11                 12               13               14      5 mg   See details      15      6 mg         16      5 mg         17      6 mg         18      5 mg           19      5 mg         20      6 mg         21      5 mg         22      6 mg         23      5 mg         24      6 mg         25      5 mg           26      5 mg         27      6 mg         28                 Date Details   02/14 This INR check       Date of next INR:  2/28/2017         How to take your warfarin dose     To take:  5 mg Take 1 of the 4 mg tablets and 1 of the 1 mg tablets.    To take:  6 mg Take 1 of the 4 mg tablets and 2 of the 1 mg tablets.

## 2017-02-17 ENCOUNTER — OFFICE VISIT (OUTPATIENT)
Dept: CARDIOLOGY | Facility: CLINIC | Age: 82
End: 2017-02-17
Attending: INTERNAL MEDICINE
Payer: COMMERCIAL

## 2017-02-17 VITALS
BODY MASS INDEX: 32.15 KG/M2 | HEART RATE: 56 BPM | DIASTOLIC BLOOD PRESSURE: 62 MMHG | HEIGHT: 65 IN | SYSTOLIC BLOOD PRESSURE: 136 MMHG | WEIGHT: 193 LBS

## 2017-02-17 DIAGNOSIS — Z82.49 FH: MITRAL VALVE REPAIR: ICD-10-CM

## 2017-02-17 DIAGNOSIS — I34.0 MITRAL VALVE INSUFFICIENCY, UNSPECIFIED ETIOLOGY: ICD-10-CM

## 2017-02-17 DIAGNOSIS — I48.20 CHRONIC ATRIAL FIBRILLATION (H): ICD-10-CM

## 2017-02-17 DIAGNOSIS — I50.22 CHRONIC SYSTOLIC CONGESTIVE HEART FAILURE (H): ICD-10-CM

## 2017-02-17 DIAGNOSIS — E78.00 HYPERCHOLESTEROLEMIA: ICD-10-CM

## 2017-02-17 DIAGNOSIS — I10 BENIGN ESSENTIAL HYPERTENSION: ICD-10-CM

## 2017-02-17 LAB
ANION GAP SERPL CALCULATED.3IONS-SCNC: 10.7 MMOL/L (ref 6–17)
BUN SERPL-MCNC: 28 MG/DL (ref 7–30)
CALCIUM SERPL-MCNC: 9.5 MG/DL (ref 8.5–10.5)
CHLORIDE SERPL-SCNC: 103 MMOL/L (ref 98–107)
CO2 SERPL-SCNC: 29 MMOL/L (ref 23–29)
CREAT SERPL-MCNC: 1.03 MG/DL (ref 0.7–1.3)
GFR SERPL CREATININE-BSD FRML MDRD: 52 ML/MIN/1.7M2
GLUCOSE SERPL-MCNC: 134 MG/DL (ref 70–105)
POTASSIUM SERPL-SCNC: 4.7 MMOL/L (ref 3.5–5.1)
SODIUM SERPL-SCNC: 138 MMOL/L (ref 136–145)

## 2017-02-17 PROCEDURE — 80048 BASIC METABOLIC PNL TOTAL CA: CPT | Performed by: INTERNAL MEDICINE

## 2017-02-17 PROCEDURE — 99214 OFFICE O/P EST MOD 30 MIN: CPT | Performed by: NURSE PRACTITIONER

## 2017-02-17 PROCEDURE — 36415 COLL VENOUS BLD VENIPUNCTURE: CPT | Performed by: INTERNAL MEDICINE

## 2017-02-17 NOTE — LETTER
2/17/2017    Bess Lion MD  [facility]   [address]  [place] [address]    RE: Kenyatta Donald       Dear Colleague,    I had the pleasure of seeing Kenyatta Donald in the [facility].    Ms. Donald is a delightful 81-year-old woman that I am having the pleasure of seeing today.  She was seen last by Dr. Guan on 01/11.  She has a history of atrial fibrillation, hypertension, hypercholesterolemia.  She is fairly asymptomatic of her atrial fibrillation.  She is on warfarin therapy.      She is status post mitral clip 01/09/2015.  She has moderate mitral insufficiency, and there is no significant change from her echo in 2015.      She does have complaints of easy fatigability and malaise.  She has no complaints of chest pain, shortness of breath, lightheadedness or dizziness.  Her weight is stable.      She underwent a Holter monitor last summer which showed an average heart rate of 84 beats per minute with a minimum of 33 and a peak of 139 beats per minute.  Primary rhythm was atrial fibrillation.  She had brief pauses, longest being 3 seconds at night.      She was seen last month by Dr. Guan.  He asked that she follow up with me in 1 month with a BMP.  No medication changes were changed at the last visit.  Again, she is normotensive today at 136/62.  BMP is essentially stable with a creatinine of 1.03, BUN 28, GFR of 52.  All other review of systems, past medical history and physical exam are noted below.     Outpatient Encounter Prescriptions as of 2/17/2017   Medication Sig Dispense Refill     cholecalciferol (VITAMIN D) 1000 UNIT tablet Take 1,000 Units by mouth daily       simvastatin (ZOCOR) 40 MG tablet Take 0.5 tablets (20 mg) by mouth At Bedtime 45 tablet 3     allopurinol (ZYLOPRIM) 100 MG tablet TAKE 1 TABLET BY MOUTH EVERY DAY 90 tablet 1     warfarin (COUMADIN) 4 MG tablet TAKE 1 1/2 TABLETS BY MOUTH DAILY ON MONDAY, FRIDAY AND TAKE 1 TABLET  DAILY ALL OTHER DAYS OF THE WEEK (Patient taking differently: 6 mg m,w,f & 5 mg row) 102 tablet 1     order for DME Equipment being ordered: mastectomy bras & prostheses   S/po mastectomy of unknown side     C50.919 1 each 0     atenolol (TENORMIN) 25 MG tablet Take 1 tablet (25 mg) by mouth daily 90 tablet 3     warfarin (COUMADIN) 1 MG tablet Take 1 tablet (1 mg) by mouth daily 90 tablet 1     nystatin (MYCOSTATIN) 784320 UNIT/GM POWD Apply topically 3 times daily as needed 60 g 1     levothyroxine (SYNTHROID, LEVOTHROID) 112 MCG tablet Take 1 tablet (112 mcg) by mouth daily 90 tablet 2     furosemide (LASIX) 20 MG tablet TAKE 1 TABLET BY MOUTH EVERY MORNING FOR FLUID RETENTION 90 tablet 3     acetaminophen (TYLENOL) 325 MG tablet Take 2 tablets (650 mg) by mouth every 4 hours as needed for mild pain 100 tablet      amoxicillin (AMOXIL) 500 MG capsule Take 4 capsules by mouth 30 minutes prior to dental work 4 capsule 4     alendronate (FOSAMAX) 70 MG tablet TAKE 1 TABLET BY MOUTH EVERY 7 DAYS 12 tablet 3     albuterol (PROAIR HFA, PROVENTIL HFA, VENTOLIN HFA) 108 (90 BASE) MCG/ACT inhaler Inhale 2 puffs into the lungs every 6 hours as needed for shortness of breath / dyspnea or wheezing       [DISCONTINUED] lisinopril (PRINIVIL,ZESTRIL) 20 MG tablet Take 1 tablet (20 mg) by mouth 2 times daily 180 tablet 3     No facility-administered encounter medications on file as of 2/17/2017.       ASSESSMENT AND PLAN:  Ms. Donald is a delightful 81-year-old woman here today for followup.  She has a history of permanent atrial fibrillation on warfarin therapy.  She also has a previous history of mitral valve clip with regurgitation that is stable noted on echocardiogram.      She is stable from a cardiac standpoint.  I reviewed her BMP with her.  She will follow up with Dr. Guan as previously ordered on 05/22.  She will have a followup Holter, echocardiogram and labwork on 05/15.  If she has any questions or concerns in the  interim, I have asked that she please contact us.  No medication changes were warranted at today's visit.               Again, thank you for allowing me to participate in the care of your patient.      Sincerely,    Bre Razo NP, APRN [facility]

## 2017-02-17 NOTE — PATIENT INSTRUCTIONS
LABS look good    Results for DARLENE FRIAS (MRN [id number]) as of 2/17/2017 15:22   Ref. Range 2/17/2017 13:59   Sodium Latest Ref Range: 136 - 145 mmol/L 138   Potassium Latest Ref Range: 3.5 - 5.1 mmol/L 4.7   Chloride Latest Ref Range: 98 - 107 mmol/L 103   Carbon Dioxide Latest Ref Range: 23 - 29 mmol/L 29   Urea Nitrogen Latest Ref Range: 7 - 30 mg/dL 28   Creatinine Latest Ref Range: 0.70 - 1.30 mg/dL 1.03   GFR Estimate Latest Ref Range: >60 mL/min/1.7m2 52 (L)   GFR Estimate If Black Latest Ref Range: >60 mL/min/1.7m2 62   Calcium Latest Ref Range: 8.5 - 10.5 mg/dL 9.5   Anion Gap Latest Ref Range: 6 - 17 mmol/L 10.7   Glucose Latest Ref Range: 70 - 105 mg/dL 134 (H)

## 2017-02-17 NOTE — PROGRESS NOTES
HPI and Plan: 191637  See dictation    No orders of the defined types were placed in this encounter.      No orders of the defined types were placed in this encounter.      There are no discontinued medications.      Encounter Diagnoses   Name Primary?     Hypercholesterolemia      Chronic systolic congestive heart failure (H)      Benign essential hypertension      Mitral valve insufficiency, unspecified etiology      Chronic atrial fibrillation (H)      FH: mitral valve repair        CURRENT MEDICATIONS:  Current Outpatient Prescriptions   Medication Sig Dispense Refill     cholecalciferol (VITAMIN D) 1000 UNIT tablet Take 1,000 Units by mouth daily       simvastatin (ZOCOR) 40 MG tablet Take 0.5 tablets (20 mg) by mouth At Bedtime 45 tablet 3     allopurinol (ZYLOPRIM) 100 MG tablet TAKE 1 TABLET BY MOUTH EVERY DAY 90 tablet 1     warfarin (COUMADIN) 4 MG tablet TAKE 1 1/2 TABLETS BY MOUTH DAILY ON MONDAY, FRIDAY AND TAKE 1 TABLET DAILY ALL OTHER DAYS OF THE WEEK (Patient taking differently: 6 mg m,w,f & 5 mg row) 102 tablet 1     order for DME Equipment being ordered: mastectomy bras & prostheses   S/po mastectomy of unknown side     C50.919 1 each 0     atenolol (TENORMIN) 25 MG tablet Take 1 tablet (25 mg) by mouth daily 90 tablet 3     warfarin (COUMADIN) 1 MG tablet Take 1 tablet (1 mg) by mouth daily 90 tablet 1     nystatin (MYCOSTATIN) 769187 UNIT/GM POWD Apply topically 3 times daily as needed 60 g 1     levothyroxine (SYNTHROID, LEVOTHROID) 112 MCG tablet Take 1 tablet (112 mcg) by mouth daily 90 tablet 2     furosemide (LASIX) 20 MG tablet TAKE 1 TABLET BY MOUTH EVERY MORNING FOR FLUID RETENTION 90 tablet 3     acetaminophen (TYLENOL) 325 MG tablet Take 2 tablets (650 mg) by mouth every 4 hours as needed for mild pain 100 tablet      amoxicillin (AMOXIL) 500 MG capsule Take 4 capsules by mouth 30 minutes prior to dental work 4 capsule 4     alendronate (FOSAMAX) 70 MG tablet TAKE 1 TABLET BY MOUTH  EVERY 7 DAYS 12 tablet 3     albuterol (PROAIR HFA, PROVENTIL HFA, VENTOLIN HFA) 108 (90 BASE) MCG/ACT inhaler Inhale 2 puffs into the lungs every 6 hours as needed for shortness of breath / dyspnea or wheezing       lisinopril (PRINIVIL,ZESTRIL) 20 MG tablet Take 1 tablet (20 mg) by mouth 2 times daily 180 tablet 3       ALLERGIES   No Known Allergies    PAST MEDICAL HISTORY:  Past Medical History   Diagnosis Date     Atrial fibrillation (H)      Benign essential hypertension      Juan Pablo-tachy syndrome (H)      Breast cancer (H)      s/p bilateral mastectomy     CHF (congestive heart failure) (H)      Coronary artery disease involving native coronary artery of native heart without angina pectoris      cardiac cath 2014: 40-50% mid RCA stenosis with minimal other disease     GERD (gastroesophageal reflux disease)      Hypercholesterolaemia      Hypothyroidism      Kidney stone      Mitral valve regurgitation      sp mitral clip 1/9/15     Need for SBE (subacute bacterial endocarditis) prophylaxis      Osteoporosis      Pulmonary embolism (H)      Sleep apnea      Spinal muscular atrophy type III (H)      Dr. Daily, [place] [facility]       PAST SURGICAL HISTORY:  Past Surgical History   Procedure Laterality Date     Gyn surgery       hysterectomy     Orthopedic surgery       knee replacement     Mastectomy       bilateral     Anesthesia out of or percutaneous mitral valve repair N/A 1/9/2015     Procedure: ANESTHESIA OUT OF OR PERCUTANEOUS MITRAL VALVE REPAIR;  Surgeon: Generic Anesthesia Provider;  Location: [facility] OR     Percutaneious mitral valve repair  1/9/2015       FAMILY HISTORY:  Family History   Problem Relation Age of Onset     Cancer - colorectal Mother      DIABETES Daughter      Asthma Daughter      Family History Negative Father      Family History Negative Daughter      Unknown/Adopted Maternal Grandmother      Unknown/Adopted Maternal Grandfather      Unknown/Adopted Paternal Grandmother       "Unknown/Adopted Paternal Grandfather      Coronary Artery Disease No family hx of      Hypertension No family hx of      Hyperlipidemia No family hx of      CEREBROVASCULAR DISEASE No family hx of      Breast Cancer No family hx of      Colon Cancer No family hx of      Prostate Cancer No family hx of      Other Cancer No family hx of      Depression No family hx of      Anxiety Disorder No family hx of      MENTAL ILLNESS No family hx of      Substance Abuse No family hx of      Anesthesia Reaction No family hx of      OSTEOPOROSIS No family hx of      Genetic Disorder No family hx of      Thyroid Disease No family hx of      Obesity No family hx of        SOCIAL HISTORY:  Social History     Social History     Marital status:      Spouse name: N/A     Number of children: N/A     Years of education: N/A     Social History Main Topics     Smoking status: Never Smoker     Smokeless tobacco: Never Used     Alcohol use No     Drug use: No     Sexual activity: No     Other Topics Concern     Parent/Sibling W/ Cabg, Mi Or Angioplasty Before 65f 55m? No     Caffeine Concern No     Sleep Concern No     Stress Concern No     Weight Concern Yes     weight loss     Special Diet No     low sodium     Exercise No     PT     Seat Belt Yes     Social History Narrative       Review of Systems:  Skin:  Negative       Eyes:  Positive for glasses    ENT:  Negative      Respiratory:  Negative       Cardiovascular:    exercise intolerance;Positive for;edema edema improving   Gastroenterology: Positive for hemorrhoids    Genitourinary:  Negative      Musculoskeletal:  Positive for   LLE in brace, fx knee cap ; shoulder & arm pain - unable to raise left arm   Neurologic:  Negative      Psychiatric:  Negative      Heme/Lymph/Imm:  Positive for weight loss    Endocrine:  Positive for thyroid disorder      Physical Exam:  Vitals: /62  Pulse 56  Ht 1.638 m (5' 4.5\")  Wt 87.5 kg (193 lb)  LMP  (LMP Unknown)  BMI 32.62 " kg/m2    Constitutional:  cooperative, alert and oriented, well developed, well nourished, in no acute distress overweight      Skin:  warm and dry to the touch, no apparent skin lesions or masses noted        Head:  normocephalic, no masses or lesions        Eyes:  pupils equal and round, conjunctivae and lids unremarkable, sclera white, no xanthalasma, EOMS intact, no nystagmus        ENT:  no pallor or cyanosis, dentition good        Neck:  carotid pulses are full and equal bilaterally, JVP normal, no carotid bruit, no thyromegaly        Chest:  normal breath sounds, clear to auscultation, normal A-P diameter, normal symmetry, normal respiratory excursion, no use of accessory muscles          Cardiac: no S3 or S4 irregular rhythm     systolic murmur;LUSB;grade 1          Abdomen:  not assessed this visit        Extremities and Back:  no deformities, clubbing, cyanosis, erythema observed   bilateral LE edema;trace          Neurological:  affect appropriate, oriented to time, person and place;no gross motor deficits              CC  Chris Guan MD   [facility]  [address]  [place] [id number]

## 2017-02-17 NOTE — MR AVS SNAPSHOT
After Visit Summary   2/17/2017    Kenyatta Frias    MRN: [id number]           Patient Information     Date Of Birth          1935        Visit Information        Provider Department      2/17/2017 3:10 PM Bre Razo APRN CNP [facility] [place]        Today's Diagnoses     Hypercholesterolemia        Chronic systolic congestive heart failure (H)        Benign essential hypertension        Mitral valve insufficiency, unspecified etiology        Chronic atrial fibrillation (H)        FH: mitral valve repair          Care Instructions    LABS look good    Results for KENYATTA FRIAS (MRN [id number]) as of 2/17/2017 15:22   Ref. Range 2/17/2017 13:59   Sodium Latest Ref Range: 136 - 145 mmol/L 138   Potassium Latest Ref Range: 3.5 - 5.1 mmol/L 4.7   Chloride Latest Ref Range: 98 - 107 mmol/L 103   Carbon Dioxide Latest Ref Range: 23 - 29 mmol/L 29   Urea Nitrogen Latest Ref Range: 7 - 30 mg/dL 28   Creatinine Latest Ref Range: 0.70 - 1.30 mg/dL 1.03   GFR Estimate Latest Ref Range: >60 mL/min/1.7m2 52 (L)   GFR Estimate If Black Latest Ref Range: >60 mL/min/1.7m2 62   Calcium Latest Ref Range: 8.5 - 10.5 mg/dL 9.5   Anion Gap Latest Ref Range: 6 - 17 mmol/L 10.7   Glucose Latest Ref Range: 70 - 105 mg/dL 134 (H)           Follow-ups after your visit        Your next 10 appointments already scheduled     Feb 28, 2017  2:45 PM CST   Anticoagulation Visit with [facility]   [facility]    [address] [phone]            May 15, 2017  1:00 PM CDT   Ech Complete with [id number]   [facility] CV [facility]chocardiography ([facility])    [address] [place] [phone]           1.  Please bring or wear a comfortable two-piece outfit. 2.  You may eat, drink and take your normal  medicines. 3.  For any questions that cannot be answered, please contact the ordering physician            May 15, 2017  2:00 PM CDT   Holter Monitor with MALLORIE   [facility] - [facility] ([facility])    [address]  [place] [address] [phone]            May 15, 2017  2:30 PM CDT   LAB with [facility]   [facility] ([facility])    [address]  [facility] [phone]           Patient must bring picture ID.  Patient should be prepared to give a urine specimen  Please do not eat 10-12 hours before your appointment if you are coming in fasting for labs on lipids, cholesterol, or glucose (sugar).  Pregnant women should follow their Care Team instructions. Water with medications is okay. Do not drink coffee or other fluids.   If you have concerns about taking  your medications, please ask at office or if scheduling via [employer], send a message by clicking on Secure Messaging, Message Your Care Team.            May 22, 2017  3:45 PM CDT   Return Visit with Chris Guan MD   [facility] [place] ([facility])    [address]  [facility] [phone]              Who to contact     If you have questions or need follow up information about today's clinic visit or your schedule please contact [facility] directly at [phone].  Normal or non-critical lab and imaging results will be communicated to you by MyChart, letter or phone within 4 business days after the clinic has received the results. If you do not hear from us within 7 days, please contact the clinic through [employer] or phone. If you have a critical or abnormal lab result, we will notify you by phone as soon as possible.  Submit refill requests through [employer] or call your pharmacy and they will forward  "the refill request to us. Please allow 3 business days for your refill to be completed.          Additional Information About Your Visit        Care EveryWhere ID     This is your Care EveryWhere ID. This could be used by other organizations to access your [place] medical records  [id number]        Your Vitals Were     Pulse Height Last Period BMI (Body Mass Index)          56 1.638 m (5' 4.5\") (LMP Unknown) 32.62 kg/m2         Blood Pressure from Last 3 Encounters:   02/17/17 136/62   01/13/17 120/60   01/11/17 138/60    Weight from Last 3 Encounters:   02/17/17 87.5 kg (193 lb)   01/13/17 85.7 kg (189 lb)   01/11/17 87.6 kg (193 lb 3.2 oz)              We Performed the Following     Follow-Up with Cardiac Advanced Practice Provider          Today's Medication Changes          These changes are accurate as of: 2/17/17  3:26 PM.  If you have any questions, ask your nurse or doctor.               These medicines have changed or have updated prescriptions.        Dose/Directions    * warfarin 1 MG tablet   Commonly known as:  COUMADIN   This may have changed:  Another medication with the same name was changed. Make sure you understand how and when to take each.   Used for:  Atrial fibrillation (H), History of pulmonary embolism   Changed by:  Bess Lion MD        Dose:  1 mg   Take 1 tablet (1 mg) by mouth daily   Quantity:  90 tablet   Refills:  1       * warfarin 4 MG tablet   Commonly known as:  COUMADIN   This may have changed:  See the new instructions.   Used for:  Atrial fibrillation (H), Personal history of PE (pulmonary embolism)   Changed by:  Raffy Lion MD        TAKE 1 1/2 TABLETS BY MOUTH DAILY ON MONDAY, FRIDAY AND TAKE 1 TABLET DAILY ALL OTHER DAYS OF THE WEEK   Quantity:  102 tablet   Refills:  1       * Notice:  This list has 2 medication(s) that are the same as other medications prescribed for you. Read the directions carefully, and ask your doctor or other care " provider to review them with you.             Primary Care Provider Office Phone # Fax #    Bess Mirna Lion [phone]       [facility] JEAN PAUL [address]  [facility] [address]        Thank you!     Thank you for choosing [facility] [place]  for your care. Our goal is always to provide you with excellent care. Hearing back from our patients is one way we can continue to improve our services. Please take a few minutes to complete the written survey that you may receive in the mail after your visit with us. Thank you!             Your Updated Medication List - Protect others around you: Learn how to safely use, store and throw away your medicines at www.disposemymeds.org.          This list is accurate as of: 2/17/17  3:26 PM.  Always use your most recent med list.                   Brand Name Dispense Instructions for use    acetaminophen 325 MG tablet    TYLENOL    100 tablet    Take 2 tablets (650 mg) by mouth every 4 hours as needed for mild pain       albuterol 108 (90 BASE) MCG/ACT Inhaler    PROAIR HFA/PROVENTIL HFA/VENTOLIN HFA     Inhale 2 puffs into the lungs every 6 hours as needed for shortness of breath / dyspnea or wheezing       alendronate 70 MG tablet    FOSAMAX    12 tablet    TAKE 1 TABLET BY MOUTH EVERY 7 DAYS       allopurinol 100 MG tablet    ZYLOPRIM    90 tablet    TAKE 1 TABLET BY MOUTH EVERY DAY       amoxicillin 500 MG capsule    AMOXIL    4 capsule    Take 4 capsules by mouth 30 minutes prior to dental work       atenolol 25 MG tablet    TENORMIN    90 tablet    Take 1 tablet (25 mg) by mouth daily       cholecalciferol 1000 UNIT tablet    vitamin D     Take 1,000 Units by mouth daily       furosemide 20 MG tablet    LASIX    90 tablet    TAKE 1 TABLET BY MOUTH EVERY MORNING FOR FLUID RETENTION       levothyroxine 112 MCG tablet    SYNTHROID/LEVOTHROID    90 tablet    Take 1 tablet (112 mcg) by mouth daily        lisinopril 20 MG tablet    PRINIVIL/ZESTRIL    180 tablet    Take 1 tablet (20 mg) by mouth 2 times daily       nystatin 822919 UNIT/GM Powd    MYCOSTATIN    60 g    Apply topically 3 times daily as needed       order for DME     1 each    Equipment being ordered: mastectomy bras & prostheses  S/po mastectomy of unknown side     C50.919       simvastatin 40 MG tablet    ZOCOR    45 tablet    Take 0.5 tablets (20 mg) by mouth At Bedtime       * warfarin 1 MG tablet    COUMADIN    90 tablet    Take 1 tablet (1 mg) by mouth daily       * warfarin 4 MG tablet    COUMADIN    102 tablet    TAKE 1 1/2 TABLETS BY MOUTH DAILY ON MONDAY, FRIDAY AND TAKE 1 TABLET DAILY ALL OTHER DAYS OF THE WEEK       * Notice:  This list has 2 medication(s) that are the same as other medications prescribed for you. Read the directions carefully, and ask your doctor or other care provider to review them with you.

## 2017-02-23 NOTE — PROGRESS NOTES
HISTORY OF PRESENT ILLNESS:  Ms. Donald is a delightful 81-year-old woman that I am having the pleasure of seeing today.  She was seen last by Dr. Guan on 01/11.  She has a history of atrial fibrillation, hypertension, hypercholesterolemia.  She is fairly asymptomatic of her atrial fibrillation.  She is on warfarin therapy.      She is status post mitral clip 01/09/2015.  She has moderate mitral insufficiency, and there is no significant change from her echo in 2015.      She does have complaints of easy fatigability and malaise.  She has no complaints of chest pain, shortness of breath, lightheadedness or dizziness.  Her weight is stable.      She underwent a Holter monitor last summer which showed an average heart rate of 84 beats per minute with a minimum of 33 and a peak of 139 beats per minute.  Primary rhythm was atrial fibrillation.  She had brief pauses, longest being 3 seconds at night.      She was seen last month by Dr. Guan.  He asked that she follow up with me in 1 month with a BMP.  No medication changes were changed at the last visit.  Again, she is normotensive today at 136/62.  BMP is essentially stable with a creatinine of 1.03, BUN 28, GFR of 52.  All other review of systems, past medical history and physical exam are noted below.      ASSESSMENT AND PLAN:  Ms. Donald is a delightful 81-year-old woman here today for followup.  She has a history of permanent atrial fibrillation on warfarin therapy.  She also has a previous history of mitral valve clip with regurgitation that is stable noted on echocardiogram.      She is stable from a cardiac standpoint.  I reviewed her BMP with her.  She will follow up with Dr. Guan as previously ordered on 05/22.  She will have a followup Holter, echocardiogram and labwork on 05/15.  If she has any questions or concerns in the interim, I have asked that she please contact us.  No medication changes were warranted at today's visit.         NEVILLE SPRING NP              D: 2017 17:37   T: 2017 05:27   MT: COR[place]      Name:     DARLENE FRIAS   MRN:      [id number]        Account:      [id number]   :      1935           Service Date: 2017      Document: [id number]

## 2017-02-28 ENCOUNTER — ANTICOAGULATION THERAPY VISIT (OUTPATIENT)
Dept: ANTICOAGULATION | Facility: CLINIC | Age: 82
End: 2017-02-28
Payer: COMMERCIAL

## 2017-02-28 LAB — INR POINT OF CARE: 4.2 (ref 0.86–1.14)

## 2017-02-28 PROCEDURE — 85610 PROTHROMBIN TIME: CPT | Mod: QW

## 2017-02-28 PROCEDURE — 36416 COLLJ CAPILLARY BLOOD SPEC: CPT

## 2017-02-28 NOTE — PROGRESS NOTES
ANTICOAGULATION FOLLOW-UP CLINIC VISIT    Patient Name:  Kenyatta Donald  Date:  2/28/2017  Contact Type:  Face to Face    SUBJECTIVE:     Patient Findings     Positives Change in diet/appetite (Pt has been reluctant to taking greens. plans to have more greens consistetly. ), Inflammation (Pt has been bothered by back pain, pllans to have cortisone injection, needs to see ortho first.), No Problem Findings           OBJECTIVE    INR Protime   Date Value Ref Range Status   02/28/2017 4.2 (A) 0.86 - 1.14 Final       ASSESSMENT / PLAN  INR assessment SUPRA    Recheck INR In: 2 WEEKS    INR Location Clinic      Anticoagulation Summary as of 2/28/2017     INR goal 2.5-3.5   Today's INR 4.2!   Maintenance plan 6 mg (4 mg x 1 and 1 mg x 2) on Mon, Fri; 5 mg (4 mg x 1 and 1 mg x 1) all other days   Full instructions 2/28: Hold; 3/1: 4 mg; Otherwise 6 mg on Mon, Fri; 5 mg all other days   Weekly total 37 mg   Plan last modified Brenda Perez RN (2/28/2017)   Next INR check 3/14/2017   Target end date     Indications   Long-term (current) use of anticoagulants [Z79.01] [Z79.01]  Atrial fibrillation (H) [I48.91]  Mitral valve disorder s/po 1-9-15 remodeling percut  + clip  (Resolved) [I05.9]         Anticoagulation Episode Summary     INR check location [facility]    Preferred lab     Send INR reminders to [facility]    Comments       Anticoagulation Care Providers     Provider Role Specialty Phone number    AmishaBess MD [facility] [phone]            See the Encounter Report to view Anticoagulation Flowsheet and Dosing Calendar (Go to Encounters tab in chart review, and find the Anticoagulation Therapy Visit)        Brenda Perez, RN

## 2017-02-28 NOTE — MR AVS SNAPSHOT
Kenyatta Donald   2/28/2017 2:45 PM   Anticoagulation Therapy Visit    Description:  81 year old female   Provider:   ANTICOAGULATION CLINIC   Department:   Anti Coagulation           INR as of 2/28/2017     Today's INR 4.2!      Anticoagulation Summary as of 2/28/2017     INR goal 2.5-3.5   Today's INR 4.2!   Full instructions 2/28: Hold; 3/1: 4 mg; Otherwise 6 mg on Mon, Fri; 5 mg all other days   Next INR check 3/14/2017    Indications   Long-term (current) use of anticoagulants [Z79.01] [Z79.01]  Atrial fibrillation (H) [I48.91]  Mitral valve disorder s/po 1-9-15 remodeling percut  + clip  (Resolved) [I05.9]         Your next Anticoagulation Clinic appointment(s)     Mar 14, 2017  2:30 PM CDT   Anticoagulation Visit with  ANTICOAGULATION CLINIC   [facility]    [address] [address] [phone]              Contact Numbers     [facility]  Please call  [phone] to cancel and/or reschedule your appointment   Please call  [phone] with any problems or questions regarding your therapy.        February 2017 Details    Sun Mon Tue Wed Thu Fri Sat        1               2               3               4                 5               6               7               8               9               10               11                 12               13               14               15               16               17               18                 19               20               21               22               23               24               25                 26               27               28      Hold   See details           Date Details   02/28 This INR check               How to take your warfarin dose     Hold Do not take your warfarin dose. See the Details table to the right for additional instructions.                March 2017 Details    Sun Mon Tue Wed Thu Fri Sat        1      4 mg          2      5 mg         3      6 mg         4      5 mg           5      5 mg         6      6 mg         7      5 mg         8      5 mg         9      5 mg         10      6 mg         11      5 mg           12      5 mg         13      6 mg         14            15               16               17               18                 19               20               21               22               23               24               25                 26               27               28               29               30               31                 Date Details   No additional details    Date of next INR:  3/14/2017         How to take your warfarin dose     To take:  4 mg Take 1 of the 4 mg tablets.    To take:  5 mg Take 1 of the 4 mg tablets and 1 of the 1 mg tablets.    To take:  6 mg Take 1 of the 4 mg tablets and 2 of the 1 mg tablets.

## 2017-03-14 ENCOUNTER — ANTICOAGULATION THERAPY VISIT (OUTPATIENT)
Dept: ANTICOAGULATION | Facility: CLINIC | Age: 82
End: 2017-03-14
Payer: MEDICARE

## 2017-03-14 LAB — INR POINT OF CARE: 4.9 (ref 0.86–1.14)

## 2017-03-14 PROCEDURE — 36416 COLLJ CAPILLARY BLOOD SPEC: CPT

## 2017-03-14 PROCEDURE — 85610 PROTHROMBIN TIME: CPT | Mod: QW

## 2017-03-14 NOTE — PROGRESS NOTES
ANTICOAGULATION FOLLOW-UP CLINIC VISIT    Patient Name:  Kenyatta Donald  Date:  3/14/2017  Contact Type:  Face to Face    SUBJECTIVE:     Patient Findings     Positives Change in medications (Pt had been on solumedrol packet for 1 week, finished it 3 days ago.), Inflammation (Pt is having pain lower back hips, for severa weeks, pt had MRI done [facility], seeing MD to discuss  next  week)           OBJECTIVE    INR Protime   Date Value Ref Range Status   03/14/2017 4.9 (A) 0.86 - 1.14 Final       ASSESSMENT / PLAN  No question data found.  Anticoagulation Summary as of 3/14/2017     INR goal 2.5-3.5   Today's INR 4.9!   Maintenance plan 5 mg (4 mg x 1 and 1 mg x 1) every day   Full instructions 3/14: Hold; 3/15: 2 mg; 3/16: 3 mg; Otherwise 5 mg every day   Weekly total 35 mg   Plan last modified Brenda Perez RN (3/14/2017)   Next INR check 3/28/2017   Target end date     Indications   Long-term (current) use of anticoagulants [Z79.01] [Z79.01]  Atrial fibrillation (H) [I48.91]  Mitral valve disorder s/po 1-9-15 remodeling percut  + clip  (Resolved) [I05.9]         Anticoagulation Episode Summary     [facility] check [facility]    Preferred lab     Send INR reminders to [facility]    Comments       Anticoagulation Care Providers     Provider Role Specialty Phone number    AmishaBess MD [facility] [phone]            See the Encounter Report to view Anticoagulation Flowsheet and Dosing Calendar (Go to Encounters tab in chart review, and find the Anticoagulation Therapy Visit)        Brenda Perez, RN

## 2017-03-14 NOTE — MR AVS SNAPSHOT
Kenyattadennis Donald   3/14/2017 2:30 PM   Anticoagulation Therapy Visit    Description:  81 year old female   Provider:   ANTICOAGULATION CLINIC   Department:   Anti Coagulation           INR as of 3/14/2017     Today's INR 4.9!      Anticoagulation Summary as of 3/14/2017     INR goal 2.5-3.5   Today's INR 4.9!   Full instructions 3/14: Hold; 3/15: 2 mg; 3/16: 3 mg; Otherwise 5 mg every day   Next INR check 3/28/2017    Indications   Long-term (current) use of anticoagulants [Z79.01] [Z79.01]  Atrial fibrillation (H) [I48.91]  Mitral valve disorder s/po 1-9-15 remodeling percut  + clip  (Resolved) [I05.9]         Your next Anticoagulation Clinic appointment(s)     Mar 24, 2017  1:15 PM CDT   Anticoagulation Visit with  ANTICOAGULATION CLINIC   [facility] ([facility])    [address] [address] [phone]              Contact Numbers     [facility]  Please call  [phone] to cancel and/or reschedule your appointment   Please call  [phone] with any problems or questions regarding your therapy.        March 2017 Details    Sun Mon Tue Wed Thu Fri Sat        1               2               3               4                 5               6               7               8               9               10               11                 12               13               14      Hold   See details      15      2 mg         16      3 mg         17      5 mg         18      5 mg           19      5 mg         20      5 mg         21      5 mg         22      5 mg         23      5 mg         24      5 mg         25      5 mg           26      5 mg         27      5 mg         28            29               30               31                 Date Details   03/14 This INR check       Date of next INR:  3/28/2017         How to take your warfarin dose     To take:  2 mg Take 2 of the 1 mg tablets.    To take:  3 mg Take 3 of the  1 mg tablets.    To take:  5 mg Take 1 of the 4 mg tablets and 1 of the 1 mg tablets.    Hold Do not take your warfarin dose. See the Details table to the right for additional instructions.

## 2017-03-16 DIAGNOSIS — I10 ESSENTIAL HYPERTENSION, BENIGN: ICD-10-CM

## 2017-03-16 RX ORDER — LISINOPRIL 20 MG/1
20 TABLET ORAL 2 TIMES DAILY
Qty: 180 TABLET | Refills: 3 | Status: SHIPPED | OUTPATIENT
Start: 2017-03-16 | End: 2018-03-06

## 2017-03-24 ENCOUNTER — ANTICOAGULATION THERAPY VISIT (OUTPATIENT)
Dept: ANTICOAGULATION | Facility: CLINIC | Age: 82
End: 2017-03-24
Payer: COMMERCIAL

## 2017-03-24 LAB — INR POINT OF CARE: 3 (ref 0.86–1.14)

## 2017-03-24 PROCEDURE — 36416 COLLJ CAPILLARY BLOOD SPEC: CPT

## 2017-03-24 PROCEDURE — 85610 PROTHROMBIN TIME: CPT | Mod: QW

## 2017-03-24 NOTE — PROGRESS NOTES
ANTICOAGULATION FOLLOW-UP CLINIC VISIT    Patient Name:  Kenyatta Donald  Date:  3/24/2017  Contact Type:  Face to Face    SUBJECTIVE:     Patient Findings     Positives Inflammation (Pt is having epdural injection on 3/27/17. Pt told needed INR 24hrs prior, came in today to assist with possible adjustment, and will be seen AM on 3/27/17. )           OBJECTIVE    INR Protime   Date Value Ref Range Status   03/24/2017 3.0 (A) 0.86 - 1.14 Final       ASSESSMENT / PLAN  INR assessment THER    Recheck INR In: 3 DAYS Altered dose today and Sat due to having epidural, pt to be in low range INR for proceedure.   INR Location Clinic      Anticoagulation Summary as of 3/24/2017     INR goal 2.5-3.5   Today's INR 3.0   Maintenance plan 5 mg (4 mg x 1 and 1 mg x 1) every day   Full instructions 3/24: 2 mg; 3/25: 3 mg; Otherwise 5 mg every day   Weekly total 35 mg   Plan last modified Brenda Perez RN (3/14/2017)   Next INR check 3/27/2017   Target end date     Indications   Long-term (current) use of anticoagulants [Z79.01] [Z79.01]  Atrial fibrillation (H) [I48.91]  Mitral valve disorder s/po 1-9-15 remodeling percut  + clip  (Resolved) [I05.9]         Anticoagulation Episode Summary     [facility]    Preferred lab     Send INR reminders to [facility]    Comments       Anticoagulation Care Providers     Provider Role Specialty Phone number    Amisha Bess Pyle MD [facility] [phone]            See the Encounter Report to view Anticoagulation Flowsheet and Dosing Calendar (Go to Encounters tab in chart review, and find the Anticoagulation Therapy Visit)        Brenda Perez, RN

## 2017-03-24 NOTE — MR AVS SNAPSHOT
Kenyatta Mirna Donald   3/24/2017 1:15 PM   Anticoagulation Therapy Visit    Description:  81 year old female   Provider:   ANTICOAGULATION CLINIC   Department:   Anti Coagulation           INR as of 3/24/2017     Today's INR 3.0      Anticoagulation Summary as of 3/24/2017     INR goal 2.5-3.5   Today's INR 3.0   Full instructions 3/24: 2 mg; 3/25: 3 mg; Otherwise 5 mg every day   Next INR check 3/27/2017    Indications   Long-term (current) use of anticoagulants [Z79.01] [Z79.01]  Atrial fibrillation (H) [I48.91]  Mitral valve disorder s/po 1-9-15 remodeling percut  + clip  (Resolved) [I05.9]         Your next Anticoagulation Clinic appointment(s)     Mar 27, 2017  8:30 AM CDT   Anticoagulation Visit with  ANTICOAGULATION CLINIC   [facility]    [address] [place] [address] [phone]              Contact Numbers     [facility]  Please call  [phone] to cancel and/or reschedule your appointment   Please call  [phone] with any problems or questions regarding your therapy.        March 2017 Details    Sun Mon Tue Wed Thu Fri Sat        1               2               3               4                 5               6               7               8               9               10               11                 12               13               14               15               16               17               18                 19               20               21               22               23               24      2 mg   See details      25      3 mg           26      5 mg         27            28               29               30               31                 Date Details   03/24 This INR check       Date of next INR:  3/27/2017         How to take your warfarin dose     To take:  2 mg Take 2 of the 1 mg tablets.    To take:  3 mg Take 3 of the 1 mg tablets.    To take:  5 mg  Take 1 of the 4 mg tablets and 1 of the 1 mg tablets.

## 2017-03-27 ENCOUNTER — ANTICOAGULATION THERAPY VISIT (OUTPATIENT)
Dept: NURSING | Facility: CLINIC | Age: 82
End: 2017-03-27
Payer: COMMERCIAL

## 2017-03-27 DIAGNOSIS — I48.20 CHRONIC ATRIAL FIBRILLATION (H): ICD-10-CM

## 2017-03-27 DIAGNOSIS — Z79.01 LONG-TERM (CURRENT) USE OF ANTICOAGULANTS: ICD-10-CM

## 2017-03-27 LAB — INR POINT OF CARE: 2.8 (ref 0.86–1.14)

## 2017-03-27 PROCEDURE — 36416 COLLJ CAPILLARY BLOOD SPEC: CPT

## 2017-03-27 PROCEDURE — 99207 ZZC NO CHARGE NURSE ONLY: CPT

## 2017-03-27 PROCEDURE — 85610 PROTHROMBIN TIME: CPT | Mod: QW

## 2017-03-27 NOTE — MR AVS SNAPSHOT
Kenyatta Donald   3/27/2017 8:30 AM   Anticoagulation Therapy Visit    Description:  81 year old female   Provider:  [facility]   Department:  Bx Nurse           INR as of 3/27/2017     Today's INR 2.8      Anticoagulation Summary as of 3/27/2017     INR goal 2.5-3.5   Today's INR 2.8   Full instructions 4 mg on Mon, Wed, Fri; 5 mg all other days   Next INR check 4/11/2017    Indications   Long-term (current) use of anticoagulants [Z79.01] [Z79.01]  Atrial fibrillation (H) [I48.91]  Mitral valve disorder s/po 1-9-15 remodeling percut  + clip  (Resolved) [I05.9]         Your next Anticoagulation Clinic appointment(s)     Mar 27, 2017  8:30 AM CDT   Anticoagulation Visit with  ANTICOAGULATION CLINIC   [facility]    [address] [place] [id number] [phone]            Apr 07, 2017  1:15 PM CDT   Anticoagulation Visit with [facility]INIC   [facility] ([facility])    [address] [id number] [phone]              Contact Numbers     [facility]  Please call  [phone] to cancel and/or reschedule your appointment   The direct line to the anticoagulant nurse is [phone] on Monday, Wednesday, and Friday. On Thursday, the anticoagulant nurse can be reached directly at [phone].         March 2017 Details    Sun Mon Tue Wed Thu Fri Sat        1               2               3               4                 5               6               7               8               9               10               11                 12               13               14               15               16               17               18                 19               20               21               22               23               24               25                 26               27      4 mg    See details      28      5 mg         29      4 mg         30      5 mg         31      4 mg           Date Details   03/27 This INR check               How to take your warfarin dose     To take:  4 mg Take 1 of the 4 mg tablets.    To take:  5 mg Take 1 of the 4 mg tablets and 1 of the 1 mg tablets.           April 2017 Details    Sun Mon Tue Wed Thu Fri Sat           1      5 mg           2      5 mg         3      4 mg         4      5 mg         5      4 mg         6      5 mg         7      4 mg         8      5 mg           9      5 mg         10      4 mg         11            12               13               14               15                 16               17               18               19               20               21               22                 23               24               25               26               27               28               29                 30                      Date Details   No additional details    Date of next INR:  4/11/2017         How to take your warfarin dose     To take:  4 mg Take 1 of the 4 mg tablets.    To take:  5 mg Take 1 of the 4 mg tablets and 1 of the 1 mg tablets.

## 2017-03-27 NOTE — PROGRESS NOTES
ANTICOAGULATION FOLLOW-UP CLINIC VISIT    Patient Name:  Kenyatta Donald  Date:  3/27/2017  Contact Type:  Face to Face    SUBJECTIVE:     Patient Findings     Positives No Problem Findings           OBJECTIVE    INR Protime   Date Value Ref Range Status   03/27/2017 2.8 (A) 0.86 - 1.14 Final       ASSESSMENT / PLAN  INR assessment THER    Recheck INR In: 2 WEEKS    INR Location Clinic      Anticoagulation Summary as of 3/27/2017     INR goal 2.5-3.5   Today's INR 2.8   Maintenance plan 4 mg (4 mg x 1) on Mon, Wed, Fri; 5 mg (4 mg x 1 and 1 mg x 1) all other days   Full instructions 4 mg on Mon, Wed, Fri; 5 mg all other days   Weekly total 32 mg   Plan last modified Nancy Zavala RN (3/27/2017)   Next INR check 4/11/2017   Priority INR   Target end date     Indications   Long-term (current) use of anticoagulants [Z79.01] [Z79.01]  Atrial fibrillation (H) [I48.91]  Mitral valve disorder s/po 1-9-15 remodeling percut  + clip  (Resolved) [I05.9]         Anticoagulation Episode Summary     INR check location [facility]    Preferred lab     Send INR reminders to [facility]    Comments       Anticoagulation Care Providers     Provider Role Specialty Phone number    Amisha Bessanahi Pyle MD [facility] [phone]            See the Encounter Report to view Anticoagulation Flowsheet and Dosing Calendar (Go to Encounters tab in chart review, and find the Anticoagulation Therapy Visit)        Nancy Zavala, RN

## 2017-04-12 ENCOUNTER — ANTICOAGULATION THERAPY VISIT (OUTPATIENT)
Dept: ANTICOAGULATION | Facility: CLINIC | Age: 82
End: 2017-04-12
Payer: COMMERCIAL

## 2017-04-12 DIAGNOSIS — I48.20 CHRONIC ATRIAL FIBRILLATION (H): ICD-10-CM

## 2017-04-12 DIAGNOSIS — Z79.01 LONG-TERM (CURRENT) USE OF ANTICOAGULANTS: ICD-10-CM

## 2017-04-12 LAB — INR POINT OF CARE: 3.1 (ref 0.86–1.14)

## 2017-04-12 PROCEDURE — 36416 COLLJ CAPILLARY BLOOD SPEC: CPT

## 2017-04-12 PROCEDURE — 85610 PROTHROMBIN TIME: CPT | Mod: QW

## 2017-04-12 NOTE — MR AVS SNAPSHOT
Kenyatta Donald   4/12/2017 11:15 AM   Anticoagulation Therapy Visit    Description:  81 year old female   Provider:   ANTICOAGULATION CLINIC   Department:   Anti Coagulation           INR as of 4/12/2017     Today's INR 3.1      Anticoagulation Summary as of 4/12/2017     INR goal 2.5-3.5   Today's INR 3.1   Full instructions 4 mg on Mon, Wed, Fri; 5 mg all other days   Next INR check 5/2/2017    Indications   Long-term (current) use of anticoagulants [Z79.01] [Z79.01]  Atrial fibrillation (H) [I48.91]  Mitral valve disorder s/po 1-9-15 remodeling percut  + clip  (Resolved) [I05.9]         Your next Anticoagulation Clinic appointment(s)     Apr 12, 2017 11:15 AM CDT   Anticoagulation Visit with  ANTICOAGULATION CLINIC   [facility]    [address] [phone]            May 02, 2017  2:00 PM CDT   Anticoagulation Visit with  ANTICOAGULATION CLINIC   [facility]    [address] [phone]              Contact Numbers     [facility]  Please call  [phone] to cancel and/or reschedule your appointment   Please call  [phone] with any problems or questions regarding your therapy.        April 2017 Details    Sun Mon Tue Wed Thu Fri Sat           1                 2               3               4               5               6               7               8                 9               10               11               12      4 mg   See details      13      5 mg         14      4 mg         15      5 mg           16      5 mg         17      4 mg         18      5 mg         19      4 mg         20      5 mg         21      4 mg         22      5 mg           23      5 mg         24      4 mg         25      5 mg         26      4 mg         27      5 mg         28      4 mg         29      5  mg           30      5 mg                Date Details   04/12 This INR check               How to take your warfarin dose     To take:  4 mg Take 1 of the 4 mg tablets.    To take:  5 mg Take 1 of the 4 mg tablets and 1 of the 1 mg tablets.           May 2017 Details    Sun Mon Tue Wed Thu Fri Sat      1      4 mg         2            3               4               5               6                 7               8               9               10               11               12               13                 14               15               16               17               18               19               20                 21               22               23               24               25               26               27                 28               29               30               31                   Date Details   No additional details    Date of next INR:  5/2/2017         How to take your warfarin dose     To take:  4 mg Take 1 of the 4 mg tablets.    To take:  5 mg Take 1 of the 4 mg tablets and 1 of the 1 mg tablets.

## 2017-04-12 NOTE — PROGRESS NOTES
ANTICOAGULATION FOLLOW-UP CLINIC VISIT    Patient Name:  Kenyatta Donald  Date:  4/12/2017  Contact Type:  Face to Face    SUBJECTIVE:     Patient Findings     Positives No Problem Findings           OBJECTIVE    INR Protime   Date Value Ref Range Status   04/12/2017 3.1 (A) 0.86 - 1.14 Final       ASSESSMENT / PLAN  INR assessment THER    Recheck INR In: 3 WEEKS    INR Location Clinic      Anticoagulation Summary as of 4/12/2017     INR goal 2.5-3.5   Today's INR 3.1   Maintenance plan 4 mg (4 mg x 1) on Mon, Wed, Fri; 5 mg (4 mg x 1 and 1 mg x 1) all other days   Full instructions 4 mg on Mon, Wed, Fri; 5 mg all other days   Weekly total 32 mg   No change documented Gena Dias, RN   Plan last modified Nancy Zavala RN (3/27/2017)   Next INR check 5/2/2017   Priority INR   Target end date     Indications   Long-term (current) use of anticoagulants [Z79.01] [Z79.01]  Atrial fibrillation (H) [I48.91]  Mitral valve disorder s/po 1-9-15 remodeling percut  + clip  (Resolved) [I05.9]         Anticoagulation Episode Summary     IN[facility] location [facility]    Preferred lab     Send INR reminders to  [facility]    Comments       Anticoagulation Care Providers     Provider Role Specialty Phone number    Bess Lion Mirna Pyle MD [facility] [phone]            See the Encounter Report to view Anticoagulation Flowsheet and Dosing Calendar (Go to Encounters tab in chart review, and find the Anticoagulation Therapy Visit)        Gena Dias, RN

## 2017-04-20 ENCOUNTER — TELEPHONE (OUTPATIENT)
Dept: CARDIOLOGY | Facility: CLINIC | Age: 82
End: 2017-04-20

## 2017-04-20 DIAGNOSIS — E03.9 ACQUIRED HYPOTHYROIDISM: ICD-10-CM

## 2017-04-20 RX ORDER — LEVOTHYROXINE SODIUM 112 UG/1
TABLET ORAL
Qty: 90 TABLET | Refills: 0 | Status: SHIPPED | OUTPATIENT
Start: 2017-04-20 | End: 2017-07-18

## 2017-04-20 NOTE — TELEPHONE ENCOUNTER
Left message for patient to return call Re[facility] [address] [facility]  Not  on current medication list.

## 2017-04-20 NOTE — TELEPHONE ENCOUNTER
LEVOTHYROXINE 0.112MG (112MCG) TABS     Last Written Prescription Date: 8/22/2016  Last Quantity: 90, # refills: 2  Last Office Visit with [facility], [facility] or [facility] prescribing provider: 1/13/2017   Next 5 appointments (look out 90 days)     May 22, 2017  3:45 PM CDT   Return Visit with Chris Guan MD   [facility] ([facility])    [address] [place] [phone] [phone]                   TSH   Date Value Ref Range Status   03/01/2016 0.40 0.40 - 5.00 mU/L Final

## 2017-04-21 NOTE — TELEPHONE ENCOUNTER
Per note by Dr. Guan 1-17-16 patient had been in a TCU. Amlodipine is not listed on medication list at that time.

## 2017-04-24 ENCOUNTER — TELEPHONE (OUTPATIENT)
Dept: CARDIOLOGY | Facility: CLINIC | Age: 82
End: 2017-04-24

## 2017-04-24 ENCOUNTER — TELEPHONE (OUTPATIENT)
Dept: FAMILY MEDICINE | Facility: CLINIC | Age: 82
End: 2017-04-24

## 2017-04-24 DIAGNOSIS — I10 BENIGN ESSENTIAL HYPERTENSION: Primary | ICD-10-CM

## 2017-04-24 NOTE — TELEPHONE ENCOUNTER
"Patient called requesting refill for amlodipine 5 mg twice daily. Per OV 8/2016 Amlodipine is listed to be continued. On notes 1-20-17 and 2/2017 Amlodipine is not listed on note or current medication list. Patient states she has been taking it continuously for \"for a  time. \".   Will messageAPP /  Dr. Guan for medication review.   Patient will also check with PMD for possible refills.  "

## 2017-04-24 NOTE — TELEPHONE ENCOUNTER
Patient called requesting a refill for amlodipine 5 mg tablet. States she called cardiologist doctor Guan for refill but doctor is out of the office today. Per chart review, amlodipine was discontinued. Informed pt Rx is not active on her medication list. PCP will not be in at clinic until tomorrow. Pt has left a message with doctor Guan. She will wait for doctors response. Pt agreed to call PCP's office back tomorrow if she does not hear from doctor Aguilar office.

## 2017-04-25 NOTE — TELEPHONE ENCOUNTER
Attempted to contact patient to re-establish amlodipine 5mg BID, left message for patient to call back to verify dose and pharmacy

## 2017-04-27 RX ORDER — AMLODIPINE BESYLATE 5 MG/1
5 TABLET ORAL
Qty: 180 TABLET | Refills: 3 | Status: SHIPPED | OUTPATIENT
Start: 2017-04-27 | End: 2018-04-02

## 2017-05-01 ENCOUNTER — PRE VISIT (OUTPATIENT)
Dept: CARDIOLOGY | Facility: CLINIC | Age: 82
End: 2017-05-01

## 2017-05-02 ENCOUNTER — ANTICOAGULATION THERAPY VISIT (OUTPATIENT)
Dept: ANTICOAGULATION | Facility: CLINIC | Age: 82
End: 2017-05-02
Payer: COMMERCIAL

## 2017-05-02 LAB — INR POINT OF CARE: 2.7 (ref 0.86–1.14)

## 2017-05-02 PROCEDURE — 85610 PROTHROMBIN TIME: CPT | Mod: QW

## 2017-05-02 PROCEDURE — 36416 COLLJ CAPILLARY BLOOD SPEC: CPT

## 2017-05-02 NOTE — MR AVS SNAPSHOT
Kenyatta Donald   5/2/2017 2:00 PM   Anticoagulation Therapy Visit    Description:  81 year old female   Provider:   ANTICOAGULATION CLINIC   Department:   Anti Coagulation           INR as of 5/2/2017     Today's INR 2.7      Anticoagulation Summary as of 5/2/2017     INR goal 2.5-3.5   Today's INR 2.7   Full instructions 4 mg on Mon, Wed, Fri; 5 mg all other days   Next INR check 5/30/2017    Indications   Long-term (current) use of anticoagulants [Z79.01] [Z79.01]  Atrial fibrillation (H) [I48.91]  Mitral valve disorder s/po 1-9-15 remodeling percut  + clip  (Resolved) [I05.9]         Your next Anticoagulation Clinic appointment(s)     May 23, 2017  1:45 PM CDT   Anticoagulation Visit with  ANTICOAGULATION CLINIC   [facility]    [address] [address] [phone]              Contact Numbers     [facility]  Please call  [phone] to cancel and/or reschedule your appointment   Please call  [phone] with any problems or questions regarding your therapy.        May 2017 Details    Sun Mon Tue Wed Thu Fri Sat      1               2      5 mg   See details      3      4 mg         4      5 mg         5      4 mg         6      5 mg           7      5 mg         8      4 mg         9      5 mg         10      4 mg         11      5 mg         12      4 mg         13      5 mg           14      5 mg         15      4 mg         16      5 mg         17      4 mg         18      5 mg         19      4 mg         20      5 mg           21      5 mg         22      4 mg         23      5 mg         24      4 mg         25      5 mg         26      4 mg         27      5 mg           28      5 mg         29      4 mg         30            31                   Date Details   05/02 This INR check       Date of next INR:  5/30/2017         How to take your warfarin dose     To take:  4 mg Take 1 of the 4 mg tablets.     To take:  5 mg Take 1 of the 4 mg tablets and 1 of the 1 mg tablets.

## 2017-05-02 NOTE — PROGRESS NOTES
ANTICOAGULATION FOLLOW-UP CLINIC VISIT    Patient Name:  Kenyatta Donald  Date:  5/2/2017  Contact Type:  Face to Face    SUBJECTIVE:        OBJECTIVE    INR Protime   Date Value Ref Range Status   05/02/2017 2.7 (A) 0.86 - 1.14 Final       ASSESSMENT / PLAN  INR assessment THER    Recheck INR In: 4 WEEKS    INR Location Clinic      Anticoagulation Summary as of 5/2/2017     INR goal 2.5-3.5   Today's INR 2.7   Maintenance plan 4 mg (4 mg x 1) on Mon, Wed, Fri; 5 mg (4 mg x 1 and 1 mg x 1) all other days   Full instructions 4 mg on Mon, Wed, Fri; 5 mg all other days   Weekly total 32 mg   No change documented Brenda Perez, RN   Plan last modified Nancy Zavala RN (3/27/2017)   Next INR check 5/30/2017   Priority INR   Target end date     Indications   Long-term (current) use of anticoagulants [Z79.01] [Z79.01]  Atrial fibrillation (H) [I48.91]  Mitral valve disorder s/po 1-9-15 remodeling percut  + clip  (Resolved) [I05.9]         Anticoagulation Episode Summary     INR [facility] location [facility]    Preferred lab     Send INR reminders to [facility]    Comments       Anticoagulation Care Providers     Provider Role Specialty Phone number    VelasquezBess ngo MD [facility] [phone]            See the Encounter Report to view Anticoagulation Flowsheet and Dosing Calendar (Go to Encounters tab in chart review, and find the Anticoagulation Therapy Visit)        Brenda Perez, RN

## 2017-05-15 ENCOUNTER — HOSPITAL ENCOUNTER (OUTPATIENT)
Dept: CARDIOLOGY | Facility: CLINIC | Age: 82
Discharge: HOME | End: 2017-05-15
Attending: INTERNAL MEDICINE | Admitting: INTERNAL MEDICINE
Payer: MEDICARE

## 2017-05-15 ENCOUNTER — HOSPITAL ENCOUNTER (OUTPATIENT)
Dept: CARDIOLOGY | Facility: CLINIC | Age: 82
End: 2017-05-15
Attending: INTERNAL MEDICINE
Payer: MEDICARE

## 2017-05-15 DIAGNOSIS — I48.20 CHRONIC ATRIAL FIBRILLATION (H): ICD-10-CM

## 2017-05-15 DIAGNOSIS — I34.0 MITRAL VALVE INSUFFICIENCY, UNSPECIFIED ETIOLOGY: ICD-10-CM

## 2017-05-15 DIAGNOSIS — I10 BENIGN ESSENTIAL HYPERTENSION: ICD-10-CM

## 2017-05-15 DIAGNOSIS — E78.00 HYPERCHOLESTEROLEMIA: ICD-10-CM

## 2017-05-15 DIAGNOSIS — Z82.49 FH: MITRAL VALVE REPAIR: ICD-10-CM

## 2017-05-15 DIAGNOSIS — I50.22 CHRONIC SYSTOLIC CONGESTIVE HEART FAILURE (H): ICD-10-CM

## 2017-05-15 LAB
ALT SERPL W P-5'-P-CCNC: <5 U/L (ref 5–30)
ANION GAP SERPL CALCULATED.3IONS-SCNC: 12.3 MMOL/L (ref 6–17)
BUN SERPL-MCNC: 19 MG/DL (ref 7–30)
CALCIUM SERPL-MCNC: 9.6 MG/DL (ref 8.5–10.5)
CHLORIDE SERPL-SCNC: 105 MMOL/L (ref 98–107)
CHOLEST SERPL-MCNC: 161 MG/DL
CO2 SERPL-SCNC: 27 MMOL/L (ref 23–29)
CREAT SERPL-MCNC: 0.73 MG/DL (ref 0.7–1.3)
GFR SERPL CREATININE-BSD FRML MDRD: 77 ML/MIN/1.7M2
GLUCOSE SERPL-MCNC: 106 MG/DL (ref 70–105)
HDLC SERPL-MCNC: 54 MG/DL
LDLC SERPL CALC-MCNC: 80 MG/DL
NONHDLC SERPL-MCNC: 107 MG/DL
POTASSIUM SERPL-SCNC: 4.3 MMOL/L (ref 3.5–5.1)
SODIUM SERPL-SCNC: 140 MMOL/L (ref 136–145)
TRIGL SERPL-MCNC: 134 MG/DL

## 2017-05-15 PROCEDURE — 93225 XTRNL ECG REC<48 HRS REC: CPT

## 2017-05-15 PROCEDURE — 93227 XTRNL ECG REC<48 HR R&I: CPT | Performed by: INTERNAL MEDICINE

## 2017-05-15 PROCEDURE — 93306 TTE W/DOPPLER COMPLETE: CPT | Mod: 26 | Performed by: INTERNAL MEDICINE

## 2017-05-15 PROCEDURE — 40000264 ECHO COMPLETE WITH OPTISON

## 2017-05-15 PROCEDURE — 80061 LIPID PANEL: CPT | Performed by: INTERNAL MEDICINE

## 2017-05-15 PROCEDURE — 84460 ALANINE AMINO (ALT) (SGPT): CPT | Performed by: INTERNAL MEDICINE

## 2017-05-15 PROCEDURE — 36415 COLL VENOUS BLD VENIPUNCTURE: CPT | Performed by: INTERNAL MEDICINE

## 2017-05-15 PROCEDURE — 25500064 ZZH RX 255 OP 636: Performed by: INTERNAL MEDICINE

## 2017-05-15 PROCEDURE — 80048 BASIC METABOLIC PNL TOTAL CA: CPT | Performed by: INTERNAL MEDICINE

## 2017-05-15 RX ADMIN — HUMAN ALBUMIN MICROSPHERES AND PERFLUTREN 3 ML: 10; .22 INJECTION, SOLUTION INTRAVENOUS at 14:00

## 2017-05-22 ENCOUNTER — OFFICE VISIT (OUTPATIENT)
Dept: CARDIOLOGY | Facility: CLINIC | Age: 82
End: 2017-05-22
Attending: INTERNAL MEDICINE
Payer: COMMERCIAL

## 2017-05-22 VITALS
BODY MASS INDEX: 31.65 KG/M2 | HEIGHT: 65 IN | DIASTOLIC BLOOD PRESSURE: 52 MMHG | WEIGHT: 190 LBS | HEART RATE: 56 BPM | SYSTOLIC BLOOD PRESSURE: 98 MMHG

## 2017-05-22 DIAGNOSIS — I10 BENIGN ESSENTIAL HYPERTENSION: ICD-10-CM

## 2017-05-22 DIAGNOSIS — Z82.49 FH: MITRAL VALVE REPAIR: ICD-10-CM

## 2017-05-22 DIAGNOSIS — I34.0 MITRAL VALVE INSUFFICIENCY, UNSPECIFIED ETIOLOGY: ICD-10-CM

## 2017-05-22 DIAGNOSIS — I50.22 CHRONIC SYSTOLIC CONGESTIVE HEART FAILURE (H): ICD-10-CM

## 2017-05-22 DIAGNOSIS — I48.20 CHRONIC ATRIAL FIBRILLATION (H): ICD-10-CM

## 2017-05-22 DIAGNOSIS — E78.00 HYPERCHOLESTEROLEMIA: ICD-10-CM

## 2017-05-22 PROCEDURE — 99214 OFFICE O/P EST MOD 30 MIN: CPT | Performed by: INTERNAL MEDICINE

## 2017-05-22 NOTE — MR AVS SNAPSHOT
After Visit Summary   5/22/2017    Kenyatta Donald    MRN: [id number]           Patient Information     Date Of Birth          1935        Visit Information        Provider Department      5/22/2017 3:45 PM Chris Guan MD [facility] [place]        Today's Diagnoses     Hypercholesterolemia        Chronic systolic congestive heart failure (H)        Benign essential hypertension        Mitral valve insufficiency, unspecified etiology        Chronic atrial fibrillation (H)        FH: mitral valve repair           Follow-ups after your visit        Additional Services     Follow-Up with Cardiac Advanced Practice Provider           Follow-Up with Cardiologist                 Your next 10 appointments already scheduled     May 23, 2017  1:45 PM CDT   Anticoagulation Visit with [facility]   [facility] ([facility])    [address] [place] [id number] [phone]              Future tests that were ordered for you today     Open Future Orders        Priority Expected Expires Ordered    Basic metabolic panel Routine 11/18/2017 5/22/2018 5/22/2017    Lipid Profile Routine 11/18/2017 5/22/2018 5/22/2017    ALT Routine 11/18/2017 5/22/2018 5/22/2017    Follow-Up with Cardiologist Routine 11/18/2017 5/22/2018 5/22/2017    Basic metabolic panel Routine 8/20/2017 5/22/2018 5/22/2017    Follow-Up with Cardiac Advanced Practice Provider Routine 8/20/2017 5/22/2018 5/22/2017            Who to contact     If you have questions or need follow up information about today's clinic visit or your schedule please contact [facility] directly at [phone].  Normal or non-critical lab and imaging results will be communicated to you by MyChart, letter or phone within 4 business days after the clinic has received the results. If you do not hear from us  "within 7 days, please contact the clinic through [employer] or phone. If you have a critical or abnormal lab result, we will notify you by phone as soon as possible.  Submit refill requests through [employer] or call your pharmacy and they will forward the refill request to us. Please allow 3 business days for your refill to be completed.          Additional Information About Your Visit        [employer] Information     [employer] lets you send messages to your doctor, view your test results, renew your prescriptions, schedule appointments and more. To sign up, go to [email or web address] . Click on \"Log in\" on the left side of the screen, which will take you to the Welcome page. Then click on \"Sign up Now\" on the right side of the page.     You will be asked to enter the access code listed below, as well as some personal information. Please follow the directions to create your username and password.     Your access code is: [email or web address]  Expires: 2017  4:21 PM     Your access code will  in 90 days. If you need help or a new code, please call your [place] clinic or [phone].        Care EveryWhere ID     This is your Care EveryWhere ID. This could be used by other organizations to access your [place] medical records  [id number]        Your Vitals Were     Pulse Height Last Period BMI (Body Mass Index)          56 1.638 m (5' 4.5\") (LMP Unknown) 32.11 kg/m2         Blood Pressure from Last 3 Encounters:   17 98/52   17 136/62   17 120/60    Weight from Last 3 Encounters:   17 86.2 kg (190 lb)   17 87.5 kg (193 lb)   17 85.7 kg (189 lb)              We Performed the Following     Follow-Up with Cardiologist          Today's Medication Changes          These changes are accurate as of: 17  4:21 PM.  If you have any questions, ask your nurse or doctor.               These medicines have changed or have updated prescriptions.        Dose/Directions    * warfarin 1 MG " tablet   Commonly known as:  COUMADIN   This may have changed:  Another medication with the same name was changed. Make sure you understand how and when to take each.   Used for:  Atrial fibrillation (H), History of pulmonary embolism   Changed by:  Bess Lion MD        Dose:  1 mg   Take 1 tablet (1 mg) by mouth daily   Quantity:  90 tablet   Refills:  1       * warfarin 4 MG tablet   Commonly known as:  COUMADIN   This may have changed:  See the new instructions.   Used for:  Atrial fibrillation (H), Personal history of PE (pulmonary embolism)   Changed by:  Raffy Lion MD        TAKE 1 1/2 TABLETS BY MOUTH DAILY ON MONDAY, FRIDAY AND TAKE 1 TABLET DAILY ALL OTHER DAYS OF THE WEEK   Quantity:  102 tablet   Refills:  1       * Notice:  This list has 2 medication(s) that are the same as other medications prescribed for you. Read the directions carefully, and ask your doctor or other care provider to review them with you.             Primary Care Provider Office Phone # Fax #    Bess Lion [phone]       [facility] [address] [facility] [address]        Thank you!     Thank you for choosing [facility] [place]  for your care. Our goal is always to provide you with excellent care. Hearing back from our patients is one way we can continue to improve our services. Please take a few minutes to complete the written survey that you may receive in the mail after your visit with us. Thank you!             Your Updated Medication List - Protect others around you: Learn how to safely use, store and throw away your medicines at www.disposemymeds.org.          This list is accurate as of: 5/22/17  4:21 PM.  Always use your most recent med list.                   Brand Name Dispense Instructions for use    acetaminophen 325 MG tablet    TYLENOL    100 tablet    Take 2 tablets (650 mg) by mouth every 4 hours  as needed for mild pain       albuterol 108 (90 BASE) MCG/ACT Inhaler    PROAIR HFA/PROVENTIL HFA/VENTOLIN HFA     Inhale 2 puffs into the lungs every 6 hours as needed for shortness of breath / dyspnea or wheezing       alendronate 70 MG tablet    FOSAMAX    12 tablet    TAKE 1 TABLET BY MOUTH EVERY 7 DAYS       allopurinol 100 MG tablet    ZYLOPRIM    90 tablet    TAKE 1 TABLET BY MOUTH EVERY DAY       amLODIPine 5 MG tablet    NORVASC    180 tablet    Take 1 tablet (5 mg) by mouth 2 times daily       amoxicillin 500 MG capsule    AMOXIL    4 capsule    Take 4 capsules by mouth 30 minutes prior to dental work       atenolol 25 MG tablet    TENORMIN    90 tablet    Take 1 tablet (25 mg) by mouth daily       cholecalciferol 1000 UNIT tablet    vitamin D     Take 1,000 Units by mouth daily       furosemide 20 MG tablet    LASIX    90 tablet    TAKE 1 TABLET BY MOUTH EVERY MORNING FOR FLUID RETENTION       levothyroxine 112 MCG tablet    SYNTHROID/LEVOTHROID    90 tablet    TAKE 1 TABLET(112 MCG) BY MOUTH DAILY       lisinopril 20 MG tablet    PRINIVIL/ZESTRIL    180 tablet    Take 1 tablet (20 mg) by mouth 2 times daily       nystatin 222077 UNIT/GM Powd    MYCOSTATIN    60 g    Apply topically 3 times daily as needed       order for DME     1 each    Equipment being ordered: mastectomy bras & prostheses  S/po mastectomy of unknown side     C50.919       simvastatin 40 MG tablet    ZOCOR    45 tablet    Take 0.5 tablets (20 mg) by mouth At Bedtime       * warfarin 1 MG tablet    COUMADIN    90 tablet    Take 1 tablet (1 mg) by mouth daily       * warfarin 4 MG tablet    COUMADIN    102 tablet    TAKE 1 1/2 TABLETS BY MOUTH DAILY ON MONDAY, FRIDAY AND TAKE 1 TABLET DAILY ALL OTHER DAYS OF THE WEEK       * Notice:  This list has 2 medication(s) that are the same as other medications prescribed for you. Read the directions carefully, and ask your doctor or other care provider to review them with you.

## 2017-05-22 NOTE — LETTER
5/22/2017    Bess Lion MD  [facility]   [address]  [facility] [address]    RE: Kenyatta Donald       Dear Colleague,    I had the pleasure of seeing Kenyatta Donald in the [facility].    The patient is an 81-year-old overweight white female who presents to Cardiology Clinic for followup of atrial fibrillation, sometimes difficult to control hypertension and hypercholesterolemia.  There is no history of cigarette smoking, diabetes mellitus or family history of premature coronary disease.  She tends to be unaware of irregular heartbeat, but has a diagnosis atrial fibrillation which has been treated with aggressive rate control and anticoagulation.  In the summer 2015, she was hospitalized at [facility] because of volume overload, probably diastolic congestive heart failure related to atrial fibrillation with rapid ventricular response which responded nicely to diuretic therapy.  She has continued to maintain weight loss.        Unfortunately, last fall, she fell and injured her knee required an admission to TCU, which has limited her activity.  She denies arleth chest pain, shortness of breath at rest, dizziness, palpitations, nausea, vomiting, diaphoresis or syncope.  She is having reasonable sleep and appetite at this time.  She does have easy fatigability and general malaise.        She is status post mitral valve clip placement on 01/09/2015 with subsequent moderate mitral insufficiency noted, which was an improvement from moderately severe to severe insufficiency noted.  The patient also had a Holter monitor that previously showed a heart rate in the summer 2016 of  and average rate of 84, with rare ventricular ectopy and atrial fibrillation.  She also had somewhat frequent brief pauses, the longest ones being 3 seconds without symptoms.        Most recent Holter monitor done this month demonstrated a heart rate varying from , average  rate of 69, with rare ventricular ectopy with frequent pauses greater than 2 seconds, and the longest pause again being approximately 3 seconds.  There was no other tachydysrhythmia noted.  It was felt to be similar to previous Holter monitor in 2016.        She had followup echocardiography for her mitral valve clip, which demonstrated a mitral valve clip prosthesis that was visible and appeared stable.  There was moderate mitral insufficiency, primarily posteriorly directed.  The mean mitral gradient was 7.7 mm.  Previous mitral valve gradient 2015 was 9.3 mmHg, heart rate was 78.  There was moderate mitral and tricuspid insufficiency with estimated right ventricular systolic pressure of 36 mmHg plus right atrial pressure.  Left ventricle was normal in size and systolic function with ejection fraction 60%-65%.  There was no regional wall motion abnormality noted.  The right ventricle appeared to be normal in size and function.  The patient is able to participate in activities but is mostly limited by her osteoarthritis for which she has to use a walker.      MEDICATIONS:   1.  Amlodipine 5 mg twice a day.   2.  Levothyroxine 112 mcg a day.   3.  Lisinopril 20 mg twice a day.   4.  Vitamin D 1000 units a day.   5.  Simvastatin 20 mg at bedtime.   6.  Allopurinol 100 mg every day.   7.  Warfarin as directed.   8.  Atenolol 25 mg a day.   9.  Furosemide 20 mg every morning.   10.  Amoxicillin 500 mg as needed.   12.  Fosamax 70 mg a week.   13.  Albuterol inhaler as needed.      LABORATORY DATA:  Demonstrated cholesterol 161, HDL 54, LDL 80, triglycerides 134.  Sodium 140, potassium 4.3, BUN 19, creatinine 0.73.  Hemoglobin 12.4, platelet count 215,000.  ALT less than 5.      The patient presents to Cardiology Clinic for followup of her mitral valve disease as well as hypertension and hyperlipidemia.      PHYSICAL EXAMINATION:   VITAL SIGNS:  Blood pressure 100/60 with a heart rate of 50-60, irregular.  Weight was  190 pounds, which is down 3 pounds from previous weight.   NECK:  Without jugular venous distention, carotid bruit or palpable thyroid.   CHEST:  Essentially clear to percussion and auscultation with slight decreased breath sounds at the bases, isolated crackles.   CARDIAC:  Revealed an irregular rhythm, variable S1, a 2/6, holosystolic murmur heard best at the apex radiating to axilla.  No diastolic murmur, rub or S3.   EXTREMITIES:  Showed 1 to 2+ edema without cyanosis or erythema.      CLINICAL IMPRESSION:   1.  Stable cardiac condition.   2.  Status post episode of apparent diastolic congestive heart failure treated with diuretic therapy in 08/2015.   3.  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   4.  Mitral insufficiency, improved with mitral valve clip with mild gradient across the mitral valve leaflets.   5.  Hypertension, well controlled.   6.  Hyperlipidemia, at goal.   7.  Sleep apnea.   8.  History of breast cancer.   9.  Gastroesophageal reflux disease.   10.  History of osteoarthritis and knee replacement surgery.      DISCUSSION:  The patient has actually done well since her last clinic visit.  She does have multiple medical problems that limit her activity.  She has not had significant symptoms of chest discomfort, shortness of breath, dizziness or palpitations.  She does have easy fatigability and general malaise.  Echocardiography showed no change from 2015 to 2017 with regard to mitral valve function and left ventricular function.  Holter monitor also does not show significant dysrhythmia.  There are frequent pauses of 2-3 seconds.  However, she is asymptomatic and is tolerating medication and has reasonably good control of the tachycardia.  She will need close followup of serum lipids, basic metabolic panel and blood pressure.  She will need continued anticoagulation.      RECOMMENDATIONS:   1.  Continue present medications.   2.  Close followup of  serum lipids, basic metabolic panel and blood pressure with followup with Bre Razo in 3 months.   3.  Anticoagulation.   4.  Antibiotic prophylaxis for dental and general surgical procedures.   5.  Diet and exercise, avoiding caffeine and salt.   6.  Routine medical followup.   7.  Cardiology followup in 6 months.     Again, thank you for allowing me to participate in the care of your patient.      Sincerely,    Chris Guan MD     [facility]

## 2017-05-23 ENCOUNTER — ANTICOAGULATION THERAPY VISIT (OUTPATIENT)
Dept: ANTICOAGULATION | Facility: CLINIC | Age: 82
End: 2017-05-23
Payer: COMMERCIAL

## 2017-05-23 LAB — INR POINT OF CARE: 2.5 (ref 0.86–1.14)

## 2017-05-23 PROCEDURE — 36416 COLLJ CAPILLARY BLOOD SPEC: CPT

## 2017-05-23 PROCEDURE — 85610 PROTHROMBIN TIME: CPT | Mod: QW

## 2017-05-23 NOTE — PROGRESS NOTES
ANTICOAGULATION FOLLOW-UP CLINIC VISIT    Patient Name:  Kenyatta Donald  Date:  5/23/2017  Contact Type:  Face to Face    SUBJECTIVE:     Patient Findings     Positives No Problem Findings           OBJECTIVE    INR Protime   Date Value Ref Range Status   05/23/2017 2.5 (A) 0.86 - 1.14 Final       ASSESSMENT / PLAN  INR assessment THER    Recheck INR In: 4 WEEKS    INR Location Clinic Pt moving to [facility] [place], operated by [facility] for summer, will have INR's done at [facility], letter done today for ongoing orders of INR     Anticoagulation Summary as of 5/23/2017     INR goal 2.5-3.5   Today's INR 2.5   Maintenance plan 4 mg (4 mg x 1) on Mon, Wed, Fri; 5 mg (4 mg x 1 and 1 mg x 1) all other days   Full instructions 4 mg on Mon, Wed, Fri; 5 mg all other days   Weekly total 32 mg   No change documented Brenda Perez, RN   Plan last modified Nancy Zavala RN (3/27/2017)   Next INR check 6/20/2017   Priority INR   Target end date     Indications   Long-term (current) use of anticoagulants [Z79.01] [Z79.01]  Atrial fibrillation (H) [I48.91]  Mitral valve disorder s/po 1-9-15 remodeling percut  + clip  (Resolved) [I05.9]         Anticoagulation Episode Summary     INR check location [facility]    Preferred lab     Send INR reminders to [facility]    Comments       Anticoagulation Care Providers     Provider Role Specialty Phone number    Amisha Bess Pyle MD [facility] [phone]            See the Encounter Report to view Anticoagulation Flowsheet and Dosing Calendar (Go to Encounters tab in chart review, and find the Anticoagulation Therapy Visit)        Brenda Perez, RN

## 2017-05-23 NOTE — PROGRESS NOTES
HISTORY OF PRESENT ILLNESS:  The patient is an 81-year-old overweight white female who presents to Cardiology Clinic for followup of atrial fibrillation, sometimes difficult to control hypertension and hypercholesterolemia.  There is no history of cigarette smoking, diabetes mellitus or family history of premature coronary disease.  She tends to be unaware of irregular heartbeat, but has a diagnosis atrial fibrillation which has been treated with aggressive rate control and anticoagulation.  In the summer 2015, she was hospitalized at [facility] because of volume overload, probably diastolic congestive heart failure related to atrial fibrillation with rapid ventricular response which responded nicely to diuretic therapy.  She has continued to maintain weight loss.        Unfortunately, last fall, she fell and injured her knee required an admission to TCU, which has limited her activity.  She denies arleth chest pain, shortness of breath at rest, dizziness, palpitations, nausea, vomiting, diaphoresis or syncope.  She is having reasonable sleep and appetite at this time.  She does have easy fatigability and general malaise.        She is status post mitral valve clip placement on 01/09/2015 with subsequent moderate mitral insufficiency noted, which was an improvement from moderately severe to severe insufficiency noted.  The patient also had a Holter monitor that previously showed a heart rate in the summer 2016 of  and average rate of 84, with rare ventricular ectopy and atrial fibrillation.  She also had somewhat frequent brief pauses, the longest ones being 3 seconds without symptoms.        Most recent Holter monitor done this month demonstrated a heart rate varying from , average rate of 69, with rare ventricular ectopy with frequent pauses greater than 2 seconds, and the longest pause again being approximately 3 seconds.  There was no other tachydysrhythmia noted.  It was felt to be similar to  previous Holter monitor in 2016.        She had followup echocardiography for her mitral valve clip, which demonstrated a mitral valve clip prosthesis that was visible and appeared stable.  There was moderate mitral insufficiency, primarily posteriorly directed.  The mean mitral gradient was 7.7 mm.  Previous mitral valve gradient 2015 was 9.3 mmHg, heart rate was 78.  There was moderate mitral and tricuspid insufficiency with estimated right ventricular systolic pressure of 36 mmHg plus right atrial pressure.  Left ventricle was normal in size and systolic function with ejection fraction 60%-65%.  There was no regional wall motion abnormality noted.  The right ventricle appeared to be normal in size and function.  The patient is able to participate in activities but is mostly limited by her osteoarthritis for which she has to use a walker.      MEDICATIONS:   1.  Amlodipine 5 mg twice a day.   2.  Levothyroxine 112 mcg a day.   3.  Lisinopril 20 mg twice a day.   4.  Vitamin D 1000 units a day.   5.  Simvastatin 20 mg at bedtime.   6.  Allopurinol 100 mg every day.   7.  Warfarin as directed.   8.  Atenolol 25 mg a day.   9.  Furosemide 20 mg every morning.   10.  Amoxicillin 500 mg as needed.   12.  Fosamax 70 mg a week.   13.  Albuterol inhaler as needed.      LABORATORY DATA:  Demonstrated cholesterol 161, HDL 54, LDL 80, triglycerides 134.  Sodium 140, potassium 4.3, BUN 19, creatinine 0.73.  Hemoglobin 12.4, platelet count 215,000.  ALT less than 5.      The patient presents to Cardiology Clinic for followup of her mitral valve disease as well as hypertension and hyperlipidemia.      PHYSICAL EXAMINATION:   VITAL SIGNS:  Blood pressure 100/60 with a heart rate of 50-60, irregular.  Weight was 190 pounds, which is down 3 pounds from previous weight.   NECK:  Without jugular venous distention, carotid bruit or palpable thyroid.   CHEST:  Essentially clear to percussion and auscultation with slight decreased  breath sounds at the bases, isolated crackles.   CARDIAC:  Revealed an irregular rhythm, variable S1, a 2/6, holosystolic murmur heard best at the apex radiating to axilla.  No diastolic murmur, rub or S3.   EXTREMITIES:  Showed 1 to 2+ edema without cyanosis or erythema.      CLINICAL IMPRESSION:   1.  Stable cardiac condition.   2.  Status post episode of apparent diastolic congestive heart failure treated with diuretic therapy in 08/2015.   3.  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   4.  Mitral insufficiency, improved with mitral valve clip with mild gradient across the mitral valve leaflets.   5.  Hypertension, well controlled.   6.  Hyperlipidemia, at goal.   7.  Sleep apnea.   8.  History of breast cancer.   9.  Gastroesophageal reflux disease.   10.  History of osteoarthritis and knee replacement surgery.      DISCUSSION:  The patient has actually done well since her last clinic visit.  She does have multiple medical problems that limit her activity.  She has not had significant symptoms of chest discomfort, shortness of breath, dizziness or palpitations.  She does have easy fatigability and general malaise.  Echocardiography showed no change from 2015 to 2017 with regard to mitral valve function and left ventricular function.  Holter monitor also does not show significant dysrhythmia.  There are frequent pauses of 2-3 seconds.  However, she is asymptomatic and is tolerating medication and has reasonably good control of the tachycardia.  She will need close followup of serum lipids, basic metabolic panel and blood pressure.  She will need continued anticoagulation.      RECOMMENDATIONS:   1.  Continue present medications.   2.  Close followup of serum lipids, basic metabolic panel and blood pressure with followup with Bre Razo in 3 months.   3.  Anticoagulation.   4.  Antibiotic prophylaxis for dental and general surgical procedures.   5.  Diet and exercise,  avoiding caffeine and salt.   6.  Routine medical followup.   7.  Cardiology followup in 6 months.      cc:      Bess Lion MD    [facility]   [address] [place] [address]         MATT RABAGO MD, [facility]             D: 2017 16:34   T: 2017 10:52   MT: MONI      Name:     DARLENE FRIAS   MRN:      [id number]        Account:      [id number]   :      1935           Service Date: 2017      Document: [id number]

## 2017-05-23 NOTE — MR AVS SNAPSHOT
Kenyatta Donald   5/23/2017 1:45 PM   Anticoagulation Therapy Visit    Description:  81 year old female   Provider:   ANTICOAGULATION CLINIC   Department:   Anti Coagulation           INR as of 5/23/2017     Today's INR 2.5      Anticoagulation Summary as of 5/23/2017     INR goal 2.5-3.5   Today's INR 2.5   Full instructions 5/23: 7 mg; Otherwise 4 mg on Mon, Wed, Fri; 5 mg all other days   Next INR check 6/20/2017    Indications   Long-term (current) use of anticoagulants [Z79.01] [Z79.01]  Atrial fibrillation (H) [I48.91]  Mitral valve disorder s/po 1-9-15 remodeling percut  + clip  (Resolved) [I05.9]         Contact Numbers     [facility]  Please call  [phone] to cancel and/or reschedule your appointment   Please call  [phone] with any problems or questions regarding your therapy.        May 2017 Details    Sun Mon Tue Wed Thu Fri Sat      1               2               3               4               5               6                 7               8               9               10               11               12               13                 14               15               16               17               18               19               20                 21               22               23      7 mg   See details      24      4 mg         25      5 mg         26      4 mg         27      5 mg           28      5 mg         29      4 mg         30      5 mg         31      4 mg             Date Details   05/23 This INR check               How to take your warfarin dose     To take:  4 mg Take 1 of the 4 mg tablets.    To take:  5 mg Take 1 of the 4 mg tablets and 1 of the 1 mg tablets.    To take:  7 mg Take 1 of the 4 mg tablets and 3 of the 1 mg tablets.           June 2017 Details    Sun Mon Tue Wed Thu Fri Sat         1      5 mg         2      4 mg         3      5 mg           4      5 mg         5      4 mg         6      5 mg         7      4 mg         8      5 mg          9      4 mg         10      5 mg           11      5 mg         12      4 mg         13      5 mg         14      4 mg         15      5 mg         16      4 mg         17      5 mg           18      5 mg         19      4 mg         20            21               22               23               24                 25               26               27               28               29               30                 Date Details   No additional details    Date of next INR:  6/20/2017         How to take your warfarin dose     To take:  4 mg Take 1 of the 4 mg tablets.    To take:  5 mg Take 1 of the 4 mg tablets and 1 of the 1 mg tablets.

## 2017-05-23 NOTE — LETTER
[facility]  ANTICOAGULATION CLINIC  [address] [address]  (Appt. Line)  [phone]  (Nurse Line) [phone]        DATE:  5/23/2017      TO:  Whom It May Concern     RE:   Kenyatta Donald                 Please be advised that Kenyatta Donald is on anticoagulation therapy with warfarin with a diagnosis of Atrial Fibrillation (427.31).      Because of this ongoing anticoagulation therapy, the above patient will needINR evaluations 1-4 times per month. Please call results to [phone] or Fax results to [phone].   Once the results are received, we will contact the patient with warfarin dose and advise next date for INR check.   The patient may be given her INR results directly.      Thank you for your assistance.  Please call the above telephone number with any questions.         Regards,            Bess Lion MD

## 2017-06-09 DIAGNOSIS — I48.91 ATRIAL FIBRILLATION, UNSPECIFIED TYPE (H): ICD-10-CM

## 2017-06-09 DIAGNOSIS — Z86.711 HISTORY OF PULMONARY EMBOLISM: ICD-10-CM

## 2017-06-09 RX ORDER — WARFARIN SODIUM 4 MG/1
4 TABLET ORAL DAILY
Qty: 135 TABLET | Refills: 2 | Status: SHIPPED | OUTPATIENT
Start: 2017-06-09 | End: 2018-04-30

## 2017-06-09 NOTE — TELEPHONE ENCOUNTER
Warfarin 4 mg  Last Written Prescription Date: 11/16/16  Last Fill Qty: 102 , # refills: 1  Last Office Visit with [facility], [facility] or [facility] prescribing provider: 1/13/2017  Next 5 appointments (look out 90 days)     Aug 11, 2017  2:30 PM CDT   Return Visit with REBEKAH Nicolas CNP   [facility] ([facility])    [address] [place] [id number] [phone]                   Date and Result of Last PT/INR:   Lab Results   Component Value Date    INR 2.5 05/23/2017    INR 2.7 05/02/2017    INR 3.3 09/06/2016    INR 3.6 09/02/2016    PT 24.7 09/07/2012    PT 27.6 07/24/2012      Prescription approved per [facility] Refill Protocol.

## 2017-06-16 DIAGNOSIS — M10.279 DRUG-INDUCED GOUT INVOLVING TOE, UNSPECIFIED CHRONICITY, UNSPECIFIED LATERALITY: ICD-10-CM

## 2017-06-16 DIAGNOSIS — E03.9 ACQUIRED HYPOTHYROIDISM: Primary | ICD-10-CM

## 2017-06-16 NOTE — TELEPHONE ENCOUNTER
allopurinol (ZYLOPRIM) 100 MG tablet       Last Written Prescription Date: 11/16/16  Last Fill Quantity: 90, # refills: 1  Last Office Visit with [facility] or [facility] prescribing provider:  1/13/17   Next 5 appointments (look out 90 days)     Aug 11, 2017  2:30 PM CDT   Return Visit with REBEKAH Nicolas CNP   [facility] [place] ([facility])    [address] [place] [address] [phone]                   Uric Acid   Date Value Ref Range Status   04/16/2015 5.6 2.5 - 7.5 mg/dL Final   ]  Creatinine   Date Value Ref Range Status   05/15/2017 0.73 0.70 - 1.30 mg/dL Final   ]  Lab Results   Component Value Date    WBC 8.8 07/26/2016     Lab Results   Component Value Date    RBC 4.22 07/26/2016     Lab Results   Component Value Date    HGB 12.4 07/26/2016     Lab Results   Component Value Date    HCT 37.4 07/26/2016     No components found for: MCT  Lab Results   Component Value Date    MCV 89 07/26/2016     Lab Results   Component Value Date    MCH 29.4 07/26/2016     Lab Results   Component Value Date    MCHC 33.2 07/26/2016     Lab Results   Component Value Date    RDW 14.9 07/26/2016     Lab Results   Component Value Date     07/26/2016     Lab Results   Component Value Date    AST 19 01/09/2015     Lab Results   Component Value Date    ALT <5 05/15/2017

## 2017-06-19 RX ORDER — ALLOPURINOL 100 MG/1
100 TABLET ORAL DAILY
Qty: 90 TABLET | Refills: 0 | Status: SHIPPED | OUTPATIENT
Start: 2017-06-19 | End: 2017-10-30

## 2017-06-26 DIAGNOSIS — M10.9 GOUT: ICD-10-CM

## 2017-06-26 NOTE — LETTER
[facility]  [address]  [facility] [address]  [phone]                                                                                                           Kenyatta Donald  [address]   [facility] [address]    July 13, 2017          Dear Kenyatta Donald,      Please schedule a follow up appointment with me, you are due for labs and an office visit                Sincerely,    Bess Lion MD

## 2017-06-27 ENCOUNTER — ANTICOAGULATION THERAPY VISIT (OUTPATIENT)
Dept: ANTICOAGULATION | Facility: CLINIC | Age: 82
End: 2017-06-27
Payer: COMMERCIAL

## 2017-06-27 ENCOUNTER — TELEPHONE (OUTPATIENT)
Dept: CARDIOLOGY | Facility: CLINIC | Age: 82
End: 2017-06-27

## 2017-06-27 DIAGNOSIS — I34.0 MITRAL VALVE INSUFFICIENCY: Primary | ICD-10-CM

## 2017-06-27 DIAGNOSIS — Z86.711 HISTORY OF PULMONARY EMBOLISM: Primary | ICD-10-CM

## 2017-06-27 PROBLEM — Z29.8 * * * SBE PROPHYLAXIS * * *: Status: ACTIVE | Noted: 2017-06-27

## 2017-06-27 LAB — INR POINT OF CARE: 2.6 (ref 0.86–1.14)

## 2017-06-27 PROCEDURE — 85610 PROTHROMBIN TIME: CPT | Mod: QW

## 2017-06-27 PROCEDURE — 99207 ZZC NO CHARGE NURSE ONLY: CPT

## 2017-06-27 PROCEDURE — 36416 COLLJ CAPILLARY BLOOD SPEC: CPT

## 2017-06-27 RX ORDER — AMOXICILLIN 500 MG/1
CAPSULE ORAL
Qty: 4 CAPSULE | Refills: 4 | Status: SHIPPED | OUTPATIENT
Start: 2017-06-27 | End: 2017-06-28

## 2017-06-27 RX ORDER — ALLOPURINOL 100 MG/1
TABLET ORAL
Qty: 90 TABLET | Refills: 0 | OUTPATIENT
Start: 2017-06-27

## 2017-06-27 NOTE — MR AVS SNAPSHOT
Kenyatta Donald   6/27/2017 11:00 AM   Anticoagulation Therapy Visit    Description:  81 year old female   Provider:   ANTICOAGULATION CLINIC   Department:   Anti Coagulation           INR as of 6/27/2017     Today's INR 2.6      Anticoagulation Summary as of 6/27/2017     INR goal 2.5-3.5   Today's INR 2.6   Full instructions 4 mg on Mon, Wed, Fri; 5 mg all other days   Next INR check 7/25/2017    Indications   Long-term (current) use of anticoagulants [Z79.01] [Z79.01]  Atrial fibrillation (H) [I48.91]  Mitral valve disorder s/po 1-9-15 remodeling percut  + clip  (Resolved) [I05.9]         Your next Anticoagulation Clinic appointment(s)     Jul 24, 2017 11:15 AM CDT   Anticoagulation Visit with  ANTICOAGULATION CLINIC   [facility]    [address] [address] [phone]              Contact Numbers     [facility]  Please call  [phone] to cancel and/or reschedule your appointment   Please call  [phone] with any problems or questions regarding your therapy.        June 2017 Details    Sun Mon Tue Wed Thu Fri Sat         1               2               3                 4               5               6               7               8               9               10                 11               12               13               14               15               16               17                 18               19               20               21               22               23               24                 25               26               27      5 mg   See details      28      4 mg         29      5 mg         30      4 mg           Date Details   06/27 This INR check               How to take your warfarin dose     To take:  4 mg Take 1 of the 4 mg tablets.    To take:  5 mg Take 1 of the 4 mg tablets and 1 of the 1 mg tablets.           July 2017 Details    Sun Mon Tue Wed Thu Fri Sat            1      5 mg           2      5 mg         3      4 mg         4      5 mg         5      4 mg         6      5 mg         7      4 mg         8      5 mg           9      5 mg         10      4 mg         11      5 mg         12      4 mg         13      5 mg         14      4 mg         15      5 mg           16      5 mg         17      4 mg         18      5 mg         19      4 mg         20      5 mg         21      4 mg         22      5 mg           23      5 mg         24      4 mg         25            26               27               28               29                 30               31                     Date Details   No additional details    Date of next INR:  7/25/2017         How to take your warfarin dose     To take:  4 mg Take 1 of the 4 mg tablets.    To take:  5 mg Take 1 of the 4 mg tablets and 1 of the 1 mg tablets.

## 2017-06-27 NOTE — PROGRESS NOTES
ANTICOAGULATION FOLLOW-UP CLINIC VISIT    Patient Name:  Kenyatta Donald  Date:  6/27/2017  Contact Type:  Face to Face    SUBJECTIVE:     Patient Findings     Positives No Problem Findings           OBJECTIVE    INR Protime   Date Value Ref Range Status   06/27/2017 2.6 (A) 0.86 - 1.14 Final       ASSESSMENT / PLAN  INR assessment THER    Recheck INR In: 4 WEEKS    INR Location Clinic      Anticoagulation Summary as of 6/27/2017     INR goal 2.5-3.5   Today's INR 2.6   Maintenance plan 4 mg (4 mg x 1) on Mon, Wed, Fri; 5 mg (4 mg x 1 and 1 mg x 1) all other days   Full instructions 4 mg on Mon, Wed, Fri; 5 mg all other days   Weekly total 32 mg   No change documented Brenda Perez RN   Plan last modified Nancy Zavala RN (3/27/2017)   Next INR check 7/25/2017   Priority INR   Target end date     Indications   Long-term (current) use of anticoagulants [Z79.01] [Z79.01]  Atrial fibrillation (H) [I48.91]  Mitral valve disorder s/po 1-9-15 remodeling percut  + clip  (Resolved) [I05.9]         Anticoagulation Episode Summary     INR check location [facility]    Preferred lab     Send INR reminders to  [facility]    Comments       Anticoagulation Care Providers     Provider Role Specialty Phone number    Bess Lion Mirna Pyle MD [facility] [phone]            See the Encounter Report to view Anticoagulation Flowsheet and Dosing Calendar (Go to Encounters tab in chart review, and find the Anticoagulation Therapy Visit)        Brenda Perez, RN

## 2017-06-27 NOTE — TELEPHONE ENCOUNTER
Patient is scheduled for a dental appointment today and requests RX for Amoxicillin 500mg 4 tablets 30-60 minutes prior to dental appointment RX sent to [facility] in [place].

## 2017-06-28 DIAGNOSIS — I34.0 MITRAL VALVE INSUFFICIENCY: ICD-10-CM

## 2017-06-28 RX ORDER — AMOXICILLIN 500 MG/1
CAPSULE ORAL
Qty: 4 CAPSULE | Refills: 4 | Status: SHIPPED | OUTPATIENT
Start: 2017-06-28 | End: 2020-08-10

## 2017-07-13 NOTE — TELEPHONE ENCOUNTER
Script has already been filled, letter was sent to patient regarding need for f/u office visit and labs.

## 2017-07-18 DIAGNOSIS — E03.9 ACQUIRED HYPOTHYROIDISM: ICD-10-CM

## 2017-07-18 NOTE — TELEPHONE ENCOUNTER
Levothyroxine 112 mcg     Last Written Prescription Date: 4/20/17  Last Quantity: 90, # refills: 0  Last Office Visit with [facility], [facility] or [facility] prescribing provider: 1/13/17   Next 5 appointments (look out 90 days)     Aug 11, 2017  2:30 PM CDT   Return Visit with REBEKAH Nicolas CNP   [facility] [place] ([facility])    [address] [place] [id number] [phone]                   TSH   Date Value Ref Range Status   03/01/2016 0.40 0.40 - 5.00 mU/L Final

## 2017-07-19 RX ORDER — LEVOTHYROXINE SODIUM 112 UG/1
TABLET ORAL
Qty: 90 TABLET | Refills: 0 | Status: SHIPPED | OUTPATIENT
Start: 2017-07-19 | End: 2017-08-26

## 2017-07-26 ENCOUNTER — ANTICOAGULATION THERAPY VISIT (OUTPATIENT)
Dept: NURSING | Facility: CLINIC | Age: 82
End: 2017-07-26
Payer: COMMERCIAL

## 2017-07-26 DIAGNOSIS — Z79.01 LONG-TERM (CURRENT) USE OF ANTICOAGULANTS: ICD-10-CM

## 2017-07-26 DIAGNOSIS — I48.20 CHRONIC ATRIAL FIBRILLATION (H): ICD-10-CM

## 2017-07-26 LAB — INR POINT OF CARE: 2.2 (ref 2.5–3.5)

## 2017-07-26 PROCEDURE — 99207 ZZC NO CHARGE NURSE ONLY: CPT

## 2017-07-26 NOTE — MR AVS SNAPSHOT
Kenyatta Donald   7/26/2017 4:00 PM   Anticoagulation Therapy Visit    Description:  81 year old female   Provider:  [facility]   Department:  Bx Nurse           INR as of 7/26/2017     Today's INR 2.2!      Anticoagulation Summary as of 7/26/2017     INR goal 2.5-3.5   Today's INR 2.2!   Full instructions 7/26: 7.5 mg; Otherwise 4 mg on Mon, Wed, Fri; 5 mg all other days   Next INR check 8/23/2017    Indications   Long-term (current) use of anticoagulants [Z79.01] [Z79.01]  Atrial fibrillation (H) [I48.91]  Mitral valve disorder s/po 1-9-15 remodeling percut  + clip  (Resolved) [I05.9]         Contact Numbers     [facility]  Please call  [phone] to cancel and/or reschedule your appointment   The direct line to the anticoagulant nurse is [phone] on Monday, Wednesday, and Friday. On Thursday, the anticoagulant nurse can be reached directly at [phone].         July 2017 Details    Sun Mon Tue Wed Thu Fri Sat           1                 2               3               4               5               6               7               8                 9               10               11               12               13               14               15                 16               17               18               19               20               21               22                 23               24               25               26      7.5 mg   See details      27      5 mg         28      4 mg         29      5 mg           30      5 mg         31      4 mg               Date Details   07/26 This INR check               How to take your warfarin dose     To take:  4 mg Take 1 of the 4 mg tablets.    To take:  5 mg Take 1 of the 4 mg tablets and 1 of the 1 mg tablets.    To take:  7.5 mg Take 1 of the 4 mg tablets and 3.5 of the 1 mg tablets.           August 2017 Details    Sun Mon Tue Wed Thu Fri Sat       1      5 mg         2      4 mg         3      5 mg         4       4 mg         5      5 mg           6      5 mg         7      4 mg         8      5 mg         9      4 mg         10      5 mg         11      4 mg         12      5 mg           13      5 mg         14      4 mg         15      5 mg         16      4 mg         17      5 mg         18      4 mg         19      5 mg           20      5 mg         21      4 mg         22      5 mg         23            24               25               26                 27               28               29               30               31                  Date Details   No additional details    Date of next INR:  8/23/2017         How to take your warfarin dose     To take:  4 mg Take 1 of the 4 mg tablets.    To take:  5 mg Take 1 of the 4 mg tablets and 1 of the 1 mg tablets.

## 2017-07-26 NOTE — PROGRESS NOTES
ANTICOAGULATION FOLLOW-UP CLINIC VISIT    Patient Name:  Kenyatta Donald  Date:  7/26/2017  Contact Type:  Telephone    SUBJECTIVE:        OBJECTIVE    INR Protime   Date Value Ref Range Status   07/26/2017 2.2 (A) 2.5 - 3.5 Final       ASSESSMENT / PLAN  INR assessment SUB    Recheck INR In: 4 WEEKS    INR Location Outside lab      Anticoagulation Summary as of 7/26/2017     INR goal 2.5-3.5   Today's INR 2.2!   Maintenance plan 4 mg (4 mg x 1) on Mon, Wed, Fri; 5 mg (4 mg x 1 and 1 mg x 1) all other days   Full instructions 7/26: 7.5 mg; Otherwise 4 mg on Mon, Wed, Fri; 5 mg all other days   Weekly total 32 mg   Plan last modified Nancy Zavala RN (3/27/2017)   Next INR check 8/23/2017   Priority INR   Target end date     Indications   Long-term (current) use of anticoagulants [Z79.01] [Z79.01]  Atrial fibrillation (H) [I48.91]  Mitral valve disorder s/po 1-9-15 remodeling percut  + clip  (Resolved) [I05.9]         Anticoagulation Episode Summary     INR check location [facility]    Preferred lab     Send INR reminders to [facility]    Comments       Anticoagulation Care Providers     Provider Role Specialty Phone number    VelasquezBess ngo MD [facility] [phone]            See the Encounter Report to view Anticoagulation Flowsheet and Dosing Calendar (Go to Encounters tab in chart review, and find the Anticoagulation Therapy Visit)        Lisa Draper RN

## 2017-08-08 ENCOUNTER — TRANSFERRED RECORDS (OUTPATIENT)
Dept: HEALTH INFORMATION MANAGEMENT | Facility: CLINIC | Age: 82
End: 2017-08-08

## 2017-08-08 LAB — INR PPP: 2.4 (ref 0.9–1.3)

## 2017-08-09 ENCOUNTER — DOCUMENTATION ONLY (OUTPATIENT)
Dept: CARDIOLOGY | Facility: CLINIC | Age: 82
End: 2017-08-09

## 2017-08-09 ENCOUNTER — ANTICOAGULATION THERAPY VISIT (OUTPATIENT)
Dept: NURSING | Facility: CLINIC | Age: 82
End: 2017-08-09
Payer: COMMERCIAL

## 2017-08-09 DIAGNOSIS — Z79.01 LONG-TERM (CURRENT) USE OF ANTICOAGULANTS: ICD-10-CM

## 2017-08-09 DIAGNOSIS — E78.00 HYPERCHOLESTEREMIA: Primary | ICD-10-CM

## 2017-08-09 DIAGNOSIS — I48.20 CHRONIC ATRIAL FIBRILLATION (H): ICD-10-CM

## 2017-08-09 PROCEDURE — 99207 ZZC NO CHARGE NURSE ONLY: CPT

## 2017-08-09 NOTE — MR AVS SNAPSHOT
Kenyatta Donald   8/9/2017 8:45 AM   Anticoagulation Therapy Visit    Description:  81 year old female   Provider:  [facility]   Department:  Bx Nurse           INR as of 8/9/2017     Today's INR 2.4!      Anticoagulation Summary as of 8/9/2017     INR goal 2.5-3.5   Today's INR 2.4!   Full instructions 8/14: 7.5 mg; Otherwise 4 mg on Mon, Wed, Fri; 5 mg all other days   Next INR check 8/21/2017    Indications   Long-term (current) use of anticoagulants [Z79.01] [Z79.01]  Atrial fibrillation (H) [I48.91]  Mitral valve disorder s/po 1-9-15 remodeling percut  + clip  (Resolved) [I05.9]         Your next Anticoagulation Clinic appointment(s)     Aug 21, 2017 10:30 AM CDT   Anticoagulation Visit with  ANTICOAGULATION CLINIC   [facility]    [address] [place] [address] [phone]              Contact Numbers     [facility]  Please call  [phone] to cancel and/or reschedule your appointment   The direct line to the anticoagulant nurse is [phone] on Monday, Wednesday, and Friday. On Thursday, the anticoagulant nurse can be reached directly at [phone].         August 2017 Details    Sun Mon Tue Wed Thu Fri Sat       1               2               3               4               5                 6               7               8               9      4 mg   See details      10      5 mg         11      4 mg         12      5 mg           13      5 mg         14      7.5 mg         15      5 mg         16      4 mg         17      5 mg         18      4 mg         19      5 mg           20      5 mg         21            22               23               24               25               26                 27               28               29               30               31                  Date Details   08/09 This INR check       Date of next INR:  8/21/2017          How to take your warfarin dose     To take:  4 mg Take 1 of the 4 mg tablets.    To take:  5 mg Take 1 of the 4 mg tablets and 1 of the 1 mg tablets.    To take:  7.5 mg Take 1 of the 4 mg tablets and 3.5 of the 1 mg tablets.

## 2017-08-14 LAB — INR PPP: 2.4 (ref 2.5–3.5)

## 2017-08-14 NOTE — PROGRESS NOTES
ANTICOAGULATION FOLLOW-UP CLINIC VISIT    Patient Name:  Kenyatta Donald  Date:  8/14/2017  Contact Type:  Telephone    SUBJECTIVE:     Patient Findings     Positives No Problem Findings           OBJECTIVE    INR   Date Value Ref Range Status   08/09/2017 2.4 (A) 2.5 - 3.5 Final       ASSESSMENT / PLAN  INR assessment SUB    Recheck INR In: 1 WEEK    INR Location Clinic      Anticoagulation Summary as of 8/9/2017     INR goal 2.5-3.5   Today's INR 2.4!   Maintenance plan 4 mg (4 mg x 1) on Mon, Wed, Fri; 5 mg (4 mg x 1 and 1 mg x 1) all other days   Full instructions 8/14: 7.5 mg; Otherwise 4 mg on Mon, Wed, Fri; 5 mg all other days   Weekly total 32 mg   Plan last modified Nancy Zavala RN (3/27/2017)   Next INR check 8/21/2017   Priority INR   Target end date     Indications   Long-term (current) use of anticoagulants [Z79.01] [Z79.01]  Atrial fibrillation (H) [I48.91]  Mitral valve disorder s/po 1-9-15 remodeling percut  + clip  (Resolved) [I05.9]         Anticoagulation Episode Summary     INR check location [facility]    Preferred lab     Send INR reminders to [facility]    Comments       Anticoagulation Care Providers     Provider Role Specialty Phone number    AmishaBess MD [facility] [phone]            See the Encounter Report to view Anticoagulation Flowsheet and Dosing Calendar (Go to Encounters tab in chart review, and find the Anticoagulation Therapy Visit)        Lisa Draper RN

## 2017-08-21 ENCOUNTER — ANTICOAGULATION THERAPY VISIT (OUTPATIENT)
Dept: NURSING | Facility: CLINIC | Age: 82
End: 2017-08-21
Payer: COMMERCIAL

## 2017-08-21 DIAGNOSIS — I48.20 CHRONIC ATRIAL FIBRILLATION (H): ICD-10-CM

## 2017-08-21 DIAGNOSIS — Z79.01 LONG-TERM (CURRENT) USE OF ANTICOAGULANTS: ICD-10-CM

## 2017-08-21 LAB — INR POINT OF CARE: 2.2 (ref 0.86–1.14)

## 2017-08-21 PROCEDURE — 36416 COLLJ CAPILLARY BLOOD SPEC: CPT

## 2017-08-21 PROCEDURE — 99207 ZZC NO CHARGE NURSE ONLY: CPT

## 2017-08-21 PROCEDURE — 85610 PROTHROMBIN TIME: CPT | Mod: QW

## 2017-08-21 NOTE — MR AVS SNAPSHOT
Kenyatta Donald   8/21/2017 10:30 AM   Anticoagulation Therapy Visit    Description:  81 year old female   Provider:  [facility]   Department:  Bx Nurse           INR as of 8/21/2017     Today's INR 2.2!      Anticoagulation Summary as of 8/21/2017     INR goal 2.5-3.5   Today's INR 2.2!   Full instructions 8/23: 5 mg; Otherwise 4 mg on Mon, Fri; 5 mg all other days   Next INR check 10/2/2017    Indications   Long-term (current) use of anticoagulants [Z79.01] [Z79.01]  Atrial fibrillation (H) [I48.91]  Mitral valve disorder s/po 1-9-15 remodeling percut  + clip  (Resolved) [I05.9]         Your next Anticoagulation Clinic appointment(s)     Oct 02, 2017 10:30 AM CDT   Anticoagulation Visit with  ANTICOAGULATION CLINIC   [facility]    [address] [place] [address] [phone]              Contact Numbers     [facility]  Please call  [phone] to cancel and/or reschedule your appointment   The direct line to the anticoagulant nurse is [phone] on Monday, Wednesday, and Friday. On Thursday, the anticoagulant nurse can be reached directly at [phone].         August 2017 Details    Sun Mon Tue Wed Thu Fri Sat       1               2               3               4               5                 6               7               8               9               10               11               12                 13               14               15               16               17               18               19                 20               21      4 mg   See details      22      5 mg         23      5 mg         24      5 mg         25      4 mg         26      5 mg           27      5 mg         28      4 mg         29      5 mg         30      5 mg         31      5 mg            Date Details   08/21 This INR check               How to take your warfarin dose      To take:  4 mg Take 1 of the 4 mg tablets.    To take:  5 mg Take 1 of the 4 mg tablets and 1 of the 1 mg tablets.           September 2017 Details    Sun Mon Tue Wed Thu Fri Sat          1      4 mg         2      5 mg           3      5 mg         4      4 mg         5      5 mg         6      5 mg         7      5 mg         8      4 mg         9      5 mg           10      5 mg         11      4 mg         12      5 mg         13      5 mg         14      5 mg         15      4 mg         16      5 mg           17      5 mg         18      4 mg         19      5 mg         20      5 mg         21      5 mg         22      4 mg         23      5 mg           24      5 mg         25      4 mg         26      5 mg         27      5 mg         28      5 mg         29      4 mg         30      5 mg          Date Details   No additional details            How to take your warfarin dose     To take:  4 mg Take 1 of the 4 mg tablets.    To take:  5 mg Take 1 of the 4 mg tablets and 1 of the 1 mg tablets.           October 2017 Details    Sun Mon Tue Wed Thu Fri Sat     1      5 mg         2            3               4               5               6               7                 8               9               10               11               12               13               14                 15               16               17               18               19               20               21                 22               23               24               25               26               27               28                 29               30               31                    Date Details   No additional details    Date of next INR:  10/2/2017         How to take your warfarin dose     To take:  4 mg Take 1 of the 4 mg tablets.    To take:  5 mg Take 1 of the 4 mg tablets and 1 of the 1 mg tablets.

## 2017-08-22 ENCOUNTER — TELEPHONE (OUTPATIENT)
Dept: CARDIOLOGY | Facility: CLINIC | Age: 82
End: 2017-08-22

## 2017-08-22 ENCOUNTER — OFFICE VISIT (OUTPATIENT)
Dept: CARDIOLOGY | Facility: CLINIC | Age: 82
End: 2017-08-22
Payer: COMMERCIAL

## 2017-08-22 VITALS
SYSTOLIC BLOOD PRESSURE: 127 MMHG | HEIGHT: 65 IN | WEIGHT: 190.6 LBS | DIASTOLIC BLOOD PRESSURE: 61 MMHG | BODY MASS INDEX: 31.75 KG/M2 | HEART RATE: 65 BPM

## 2017-08-22 DIAGNOSIS — I10 BENIGN ESSENTIAL HYPERTENSION: ICD-10-CM

## 2017-08-22 DIAGNOSIS — E78.5 HYPERLIPIDEMIA LDL GOAL <70: ICD-10-CM

## 2017-08-22 DIAGNOSIS — I48.20 CHRONIC ATRIAL FIBRILLATION (H): ICD-10-CM

## 2017-08-22 DIAGNOSIS — I25.10 CORONARY ARTERY DISEASE INVOLVING NATIVE CORONARY ARTERY OF NATIVE HEART WITHOUT ANGINA PECTORIS: Primary | ICD-10-CM

## 2017-08-22 LAB
ALT SERPL W P-5'-P-CCNC: <5 U/L (ref 5–30)
ANION GAP SERPL CALCULATED.3IONS-SCNC: 11.7 MMOL/L (ref 6–17)
BUN SERPL-MCNC: 19 MG/DL (ref 7–30)
CALCIUM SERPL-MCNC: 10.3 MG/DL (ref 8.5–10.5)
CHLORIDE SERPL-SCNC: 103 MMOL/L (ref 98–107)
CHOLEST SERPL-MCNC: 154 MG/DL
CO2 SERPL-SCNC: 28 MMOL/L (ref 23–29)
CREAT SERPL-MCNC: 0.73 MG/DL (ref 0.7–1.3)
GFR SERPL CREATININE-BSD FRML MDRD: 77 ML/MIN/1.7M2
GLUCOSE SERPL-MCNC: 100 MG/DL (ref 70–105)
HDLC SERPL-MCNC: 51 MG/DL
LDLC SERPL CALC-MCNC: 76 MG/DL
NONHDLC SERPL-MCNC: 103 MG/DL
POTASSIUM SERPL-SCNC: 4.7 MMOL/L (ref 3.5–5.1)
SODIUM SERPL-SCNC: 138 MMOL/L (ref 136–145)
TRIGL SERPL-MCNC: 136 MG/DL

## 2017-08-22 PROCEDURE — 99214 OFFICE O/P EST MOD 30 MIN: CPT | Performed by: NURSE PRACTITIONER

## 2017-08-22 PROCEDURE — 80061 LIPID PANEL: CPT | Performed by: NURSE PRACTITIONER

## 2017-08-22 PROCEDURE — 80048 BASIC METABOLIC PNL TOTAL CA: CPT | Performed by: NURSE PRACTITIONER

## 2017-08-22 PROCEDURE — 84460 ALANINE AMINO (ALT) (SGPT): CPT | Performed by: NURSE PRACTITIONER

## 2017-08-22 PROCEDURE — 36415 COLL VENOUS BLD VENIPUNCTURE: CPT | Performed by: NURSE PRACTITIONER

## 2017-08-22 NOTE — PROGRESS NOTES
Cardiology Clinic Progress Note  Kenyatta Donald MRN# [id number]   YOB: 1935 Age: 81 year old     Reason For Visit:  three-month follow-up    Primary Cardiologist:   Dr. Guan          History of Presenting Illness:    Kenyatta Donald is a pleasant 80 year old patient with a past cardiac history significant for mild nonobstructive CAD with 40-50% stenosis in the mid RCA and minimal disease elsewhere, HTN, atrial fibrillation with tachybradycardia syndrome on Coumadin, PE, diastolic heart failure, mitral insufficiency status post mitral valve clip 2015 with mild gradient across the mitral valve leaflets, and hyperlipidemia.  Past medical history of breast CA, and sleep apnea.     Patient was last seen by Dr. Guan and 5/22/2017 and had been doing well from a cardiac standpoint.  Most recent echocardiogram showed no change from 2015 to 2017.  Holter monitor showed no significant arrhythmias but did however have frequent pauses 2-3 seconds which were asymptomatic.      Pt presents today for three-month follow-up.  She has not had her lab work done yet today but will do this before leaving the clinic.  Overall, she has been doing well from a cardiac standpoint since she was last seen.  Blood pressure today in the clinic is well controlled at 127/61.  Her weight has remained stable at 190 pounds, since her last appointment.  She denies any symptoms of heart failure or angina.  She does have occasional palpitations which she has had for some time.  She has been tolerating all of her medications.  She does note that she sleeps in a recliner but has been doing this for many years and does not have any trouble with breathing at night.  On exam, she does have bilateral 1+ ankle edema. Patient reports no chest pain, shortness of breath, PND, orthopnea, presyncope, syncope, heart racing.      Current Cardiac Medications   Coumadin  Amlodipine 5 mg b.i.d.  Lisinopril 20 mg b.i.d.  Simvastatin 20 mg  daily  Atenolol 25 mg daily   Lasix 20 mg daily                    Assessment and Plan:     Plan  1.  Have BMP, lipid profile, and ALT drawn today  2.  Continue current medications  3.  Call the clinic with any questions or problems prior to next visit  4.  Follow-up with Dr. Guan 11/2017 with lab work prior, as planned      1. Mild nonobstructive CAD    Prior angiogram showing 40-50% stenosis in the mid RCA with minimal disease elsewhere    No angina     continue statin, ACE inhibitor, beta blocker, no aspirin due to Coumadin      2. Chronic atrial fibrillation    Asymptomatic    History of tachybradycardia syndrome    Continue rate control with atenolol and anticoagulation with Coumadin      3. Mitral insufficiency status post mitral valve clip 2015    Mild gradient across mitral valve leaflets seen on most recent echocardiogram    Antibiotic prophylaxis for dental and surgical procedures      4. Hypertension    127/61, well-controlled    Continue amlodipine, lisinopril, atenolol      5. Hyperlipidemia    Last LDL 80 5/2017    Continue simvastatin 20 mg daily         Thank you for allowing me to participate in this delightful patient's care.      This note was completed in part using Dragon voice recognition software. Although reviewed after completion, some word and grammatical errors may occur.    Patience Weathers, APRN, CNP           Data:   All laboratory data reviewed

## 2017-08-22 NOTE — MR AVS SNAPSHOT
After Visit Summary   8/22/2017    Kenyatta Donald    MRN: [id number]           Patient Information     Date Of Birth          1935        Visit Information        Provider Department      8/22/2017 12:30 PM Patience Weathers APRN CNP [facility] [place]        Today's Diagnoses     Coronary artery disease involving native coronary artery of native heart without angina pectoris    -  1    Benign essential hypertension        Hyperlipidemia LDL goal <70        Chronic atrial fibrillation (H)          Care Instructions    Thanks for coming into [facility] clinic today.    We discussed:   Call with any problems prior to returning   Get lab work today     Medication changes:    No changes     Follow up:   Dr. Guan  11/2017 with labs prior- Call early next week to schedule this.     Please call my nurse Ninfa at [phone] with any questions or concerns.    Scheduling phone number: [phone]  Reminder: Please bring in all current medications, over the counter supplements and vitamin bottles to your next appointment.                  Follow-ups after your visit        Your next 10 appointments already scheduled     Aug 25, 2017 11:00 AM CDT   SHORT with Bess Lion MD   [facility]    [address] [place] [id number]   [phone]            Oct 02, 2017 10:30 AM CDT   Anticoagulation Visit with  ANTICOAGULATION CLINIC   [facility] ([facility]    [address] [place] [id number]   [phone]              Who to contact     If you have questions or need follow up information about today's clinic visit or your schedule please contact [facility] directly at [phone].  Normal or  "non-critical lab and imaging results will be communicated to you by MyChart, letter or phone within 4 business days after the clinic has received the results. If you do not hear from us within 7 days, please contact the clinic through [employer] or phone. If you have a critical or abnormal lab result, we will notify you by phone as soon as possible.  Submit refill requests through [employer] or call your pharmacy and they will forward the refill request to us. Please allow 3 business days for your refill to be completed.          Additional Information About Your Visit        [employer] Information     [employer] lets you send messages to your doctor, view your test results, renew your prescriptions, schedule appointments and more. To sign up, go to [email or web address] . Click on \"Log in\" on the left side of the screen, which will take you to the Welcome page. Then click on \"Sign up Now\" on the right side of the page.     You will be asked to enter the access code listed below, as well as some personal information. Please follow the directions to create your username and password.     Your access code is: [email or web address]  Expires: 2017 12:53 PM     Your access code will  in 90 days. If you need help or a new code, please call your [facility] clinic or [phone].        Care EveryWhere ID     This is your Care EveryWhere ID. This could be used by other organizations to access your [facility] medical records  [id number]        Your Vitals Were     Pulse Height Last Period BMI (Body Mass Index)          65 1.638 m (5' 4.5\") (LMP Unknown) 32.21 kg/m2         Blood Pressure from Last 3 Encounters:   17 127/61   17 98/52   17 136/62    Weight from Last 3 Encounters:   17 86.5 kg (190 lb 9.6 oz)   17 86.2 kg (190 lb)   17 87.5 kg (193 lb)              Today, you had the following     No orders found for display         Today's Medication Changes          These changes are accurate as " of: 8/22/17 12:53 PM.  If you have any questions, ask your nurse or doctor.               These medicines have changed or have updated prescriptions.        Dose/Directions    * warfarin 1 MG tablet   Commonly known as:  COUMADIN   This may have changed:  Another medication with the same name was removed. Continue taking this medication, and follow the directions you see here.   Used for:  Atrial fibrillation (H), History of pulmonary embolism   Changed by:  Bess Lion MD        Dose:  1 mg   Take 1 tablet (1 mg) by mouth daily   Quantity:  90 tablet   Refills:  1       * warfarin 4 MG tablet   Commonly known as:  COUMADIN   This may have changed:  Another medication with the same name was removed. Continue taking this medication, and follow the directions you see here.   Used for:  Atrial fibrillation, unspecified type (H), History of pulmonary embolism   Changed by:  Raffy Lion MD        Dose:  4 mg   Take 1 tablet (4 mg) by mouth daily Taking 4 mg m,w,f & 4mg + 1 mg rest of week   Quantity:  135 tablet   Refills:  2       * Notice:  This list has 2 medication(s) that are the same as other medications prescribed for you. Read the directions carefully, and ask your doctor or other care provider to review them with you.             Primary Care Provider Office Phone # Fax #    Bess Lion [phone] [address] [facility] [address]        Equal Access to Services     [facility] AH: Hadii zonia shannon hadasho Soharsha, waaxda luqadaha, qaybta kaalmada ronyada, emma castellanos . So [facility] [phone].    ATENCIÓN: Si habla español, tiene a duenas disposición servicios gratuitos de asistencia lingüística. Llame al [phone].    We comply with applicable federal civil rights laws and [place] laws. We do not discriminate on the basis of race, color, national origin, age, disability sex, sexual orientation or gender  identity.            Thank you!     Thank you for choosing [facility] AT [place]  for your care. Our goal is always to provide you with excellent care. Hearing back from our patients is one way we can continue to improve our services. Please take a few minutes to complete the written survey that you may receive in the mail after your visit with us. Thank you!             Your Updated Medication List - Protect others around you: Learn how to safely use, store and throw away your medicines at www.disposemymeds.org.          This list is accurate as of: 8/22/17 12:53 PM.  Always use your most recent med list.                   Brand Name Dispense Instructions for use Diagnosis    acetaminophen 325 MG tablet    TYLENOL    100 tablet    Take 2 tablets (650 mg) by mouth every 4 hours as needed for mild pain    Closed nondisplaced fracture of left patella, unspecified fracture morphology, initial encounter       albuterol 108 (90 BASE) MCG/ACT Inhaler    PROAIR HFA/PROVENTIL HFA/VENTOLIN HFA     Inhale 2 puffs into the lungs every 6 hours as needed for shortness of breath / dyspnea or wheezing        alendronate 70 MG tablet    FOSAMAX    12 tablet    TAKE 1 TABLET BY MOUTH EVERY 7 DAYS    Osteoporosis       allopurinol 100 MG tablet    ZYLOPRIM    90 tablet    Take 1 tablet (100 mg) by mouth daily    Drug-induced gout involving toe, unspecified chronicity, unspecified laterality       amLODIPine 5 MG tablet    NORVASC    180 tablet    Take 1 tablet (5 mg) by mouth 2 times daily    Benign essential hypertension       amoxicillin 500 MG capsule    AMOXIL    4 capsule    Take 4 capsules by mouth 30 minutes prior to dental work    Mitral valve insufficiency       atenolol 25 MG tablet    TENORMIN    90 tablet    Take 1 tablet (25 mg) by mouth daily    HTN (hypertension)       cholecalciferol 1000 UNIT tablet    vitamin D     Take 1,000 Units by mouth daily        furosemide 20 MG tablet    LASIX     90 tablet    TAKE 1 TABLET BY MOUTH EVERY MORNING FOR FLUID RETENTION    Chronic systolic congestive heart failure (H)       levothyroxine 112 MCG tablet    SYNTHROID/LEVOTHROID    90 tablet    TAKE 1 TABLET BY MOUTH DAILY    Acquired hypothyroidism       lisinopril 20 MG tablet    PRINIVIL/ZESTRIL    180 tablet    Take 1 tablet (20 mg) by mouth 2 times daily    Essential hypertension, benign       nystatin 186770 UNIT/GM Powd    MYCOSTATIN    60 g    Apply topically 3 times daily as needed    Intertrigo       order for DME     1 each    Equipment being ordered: mastectomy bras & prostheses  S/po mastectomy of unknown side     C50.919    Malignant neoplasm of female breast, unspecified laterality, unspecified site of breast       simvastatin 40 MG tablet    ZOCOR    45 tablet    Take 0.5 tablets (20 mg) by mouth At Bedtime    Mixed hyperlipidemia       * warfarin 1 MG tablet    COUMADIN    90 tablet    Take 1 tablet (1 mg) by mouth daily    Atrial fibrillation (H), History of pulmonary embolism       * warfarin 4 MG tablet    COUMADIN    135 tablet    Take 1 tablet (4 mg) by mouth daily Taking 4 mg m,w,f & 4mg + 1 mg rest of week    Atrial fibrillation, unspecified type (H), History of pulmonary embolism       * Notice:  This list has 2 medication(s) that are the same as other medications prescribed for you. Read the directions carefully, and ask your doctor or other care provider to review them with you.

## 2017-08-22 NOTE — LETTER
8/22/2017    Bess Lion MD  [address]  [facility] [address]    RE: Kenyatta Donald       Dear Colleague,    I had the pleasure of seeing Kenyatta Donald in the [facility].    Cardiology Clinic Progress Note  Kenyatta Donald MRN# [id number]   YOB: 1935 Age: 81 year old     Reason For Visit:  three-month follow-up    Primary Cardiologist:   Dr. Guan          History of Presenting Illness:    Kenyatta Donald is a pleasant 80 year old patient with a past cardiac history significant for mild nonobstructive CAD with 40-50% stenosis in the mid RCA and minimal disease elsewhere, HTN, atrial fibrillation with tachybradycardia syndrome on Coumadin, PE, diastolic heart failure, mitral insufficiency status post mitral valve clip 2015 with mild gradient across the mitral valve leaflets, and hyperlipidemia.  Past medical history of breast CA, and sleep apnea.     Patient was last seen by Dr. Guan and 5/22/2017 and had been doing well from a cardiac standpoint.  Most recent echocardiogram showed no change from 2015 to 2017.  Holter monitor showed no significant arrhythmias but did however have frequent pauses 2-3 seconds which were asymptomatic.      Pt presents today for three-month follow-up.  She has not had her lab work done yet today but will do this before leaving the clinic.  Overall, she has been doing well from a cardiac standpoint since she was last seen.  Blood pressure today in the clinic is well controlled at 127/61.  Her weight has remained stable at 190 pounds, since her last appointment.  She denies any symptoms of heart failure or angina.  She does have occasional palpitations which she has had for some time.  She has been tolerating all of her medications.  She does note that she sleeps in a recliner but has been doing this for many years and does not have any trouble with breathing at night.  On exam, she does have bilateral 1+ ankle edema. Patient  reports no chest pain, shortness of breath, PND, orthopnea, presyncope, syncope, heart racing.      Current Cardiac Medications   Coumadin  Amlodipine 5 mg b.i.d.  Lisinopril 20 mg b.i.d.  Simvastatin 20 mg daily  Atenolol 25 mg daily   Lasix 20 mg daily                    Assessment and Plan:     Plan  1.  Have BMP, lipid profile, and ALT drawn today  2.  Continue current medications  3.  Call the clinic with any questions or problems prior to next visit  4.  Follow-up with Dr. Guan 11/2017 with lab work prior, as planned      1. Mild nonobstructive CAD    Prior angiogram showing 40-50% stenosis in the mid RCA with minimal disease elsewhere    No angina     continue statin, ACE inhibitor, beta blocker, no aspirin due to Coumadin      2. Chronic atrial fibrillation    Asymptomatic    History of tachybradycardia syndrome    Continue rate control with atenolol and anticoagulation with Coumadin      3. Mitral insufficiency status post mitral valve clip 2015    Mild gradient across mitral valve leaflets seen on most recent echocardiogram    Antibiotic prophylaxis for dental and surgical procedures      4. Hypertension    127/61, well-controlled    Continue amlodipine, lisinopril, atenolol      5. Hyperlipidemia    Last LDL 80 5/2017    Continue simvastatin 20 mg daily         Thank you for allowing me to participate in this delightful patient's care.      This note was completed in part using Dragon voice recognition software. Although reviewed after completion, some word and grammatical errors may occur.    Patience Weathers, APRN, CNP           Data:   All laboratory data reviewed      HPI and Plan:   See dictation    Orders Placed This Encounter   Procedures     Basic metabolic panel     Lipid Profile     ALT       No orders of the defined types were placed in this encounter.      Medications Discontinued During This Encounter   Medication Reason     warfarin (COUMADIN) 4 MG tablet          Encounter Diagnoses    Name Primary?     Coronary artery disease involving native coronary artery of native heart without angina pectoris Yes     Benign essential hypertension      Hyperlipidemia LDL goal <70      Chronic atrial fibrillation (H)        CURRENT MEDICATIONS:  Current Outpatient Prescriptions   Medication Sig Dispense Refill     levothyroxine (SYNTHROID/LEVOTHROID) 112 MCG tablet TAKE 1 TABLET BY MOUTH DAILY 90 tablet 0     amoxicillin (AMOXIL) 500 MG capsule Take 4 capsules by mouth 30 minutes prior to dental work 4 capsule 4     allopurinol (ZYLOPRIM) 100 MG tablet Take 1 tablet (100 mg) by mouth daily 90 tablet 0     warfarin (COUMADIN) 4 MG tablet Take 1 tablet (4 mg) by mouth daily Taking 4 mg m,w,f & 4mg + 1 mg rest of week 135 tablet 2     amLODIPine (NORVASC) 5 MG tablet Take 1 tablet (5 mg) by mouth 2 times daily 180 tablet 3     lisinopril (PRINIVIL/ZESTRIL) 20 MG tablet Take 1 tablet (20 mg) by mouth 2 times daily 180 tablet 3     cholecalciferol (VITAMIN D) 1000 UNIT tablet Take 1,000 Units by mouth daily       simvastatin (ZOCOR) 40 MG tablet Take 0.5 tablets (20 mg) by mouth At Bedtime 45 tablet 3     order for DME Equipment being ordered: mastectomy bras & prostheses   S/po mastectomy of unknown side     C50.919 1 each 0     atenolol (TENORMIN) 25 MG tablet Take 1 tablet (25 mg) by mouth daily 90 tablet 3     warfarin (COUMADIN) 1 MG tablet Take 1 tablet (1 mg) by mouth daily 90 tablet 1     nystatin (MYCOSTATIN) 117938 UNIT/GM POWD Apply topically 3 times daily as needed 60 g 1     furosemide (LASIX) 20 MG tablet TAKE 1 TABLET BY MOUTH EVERY MORNING FOR FLUID RETENTION 90 tablet 3     acetaminophen (TYLENOL) 325 MG tablet Take 2 tablets (650 mg) by mouth every 4 hours as needed for mild pain 100 tablet      alendronate (FOSAMAX) 70 MG tablet TAKE 1 TABLET BY MOUTH EVERY 7 DAYS 12 tablet 3     albuterol (PROAIR HFA, PROVENTIL HFA, VENTOLIN HFA) 108 (90 BASE) MCG/ACT inhaler Inhale 2 puffs into the lungs  every 6 hours as needed for shortness of breath / dyspnea or wheezing       [DISCONTINUED] warfarin (COUMADIN) 4 MG tablet TAKE 1 1/2 TABLETS BY MOUTH DAILY ON MONDAY, FRIDAY AND TAKE 1 TABLET DAILY ALL OTHER DAYS OF THE WEEK (Patient not taking: Reported on 8/22/2017) 102 tablet 1       ALLERGIES   No Known Allergies    PAST MEDICAL HISTORY:  Past Medical History:   Diagnosis Date     Atrial fibrillation (H)      Benign essential hypertension      Juan Pablo-tachy syndrome (H)      Breast cancer (H)     s/p bilateral mastectomy     CHF (congestive heart failure) (H)      Coronary artery disease involving native coronary artery of native heart without angina pectoris     cardiac cath 2014: 40-50% mid RCA stenosis with minimal other disease     GERD (gastroesophageal reflux disease)      Hypercholesterolaemia      Hypothyroidism      Kidney stone      Mitral valve regurgitation     sp mitral clip 1/9/15     Need for SBE (subacute bacterial endocarditis) prophylaxis      Osteoporosis      Pulmonary embolism (H)      Sleep apnea      Spinal muscular atrophy type III (H)     Dr. Daily, [facility]       PAST SURGICAL HISTORY:  Past Surgical History:   Procedure Laterality Date     ANESTHESIA OUT OF OR PERCUTANEOUS MITRAL VALVE REPAIR N/A 1/9/2015    Procedure: ANESTHESIA OUT OF OR PERCUTANEOUS MITRAL VALVE REPAIR;  Surgeon: Generic Anesthesia Provider;  Location: [facility] OR     GYN SURGERY      hysterectomy     MASTECTOMY      bilateral     ORTHOPEDIC SURGERY      knee replacement     PERCUTANEIOUS MITRAL VALVE REPAIR  1/9/2015       FAMILY HISTORY:  Family History   Problem Relation Age of Onset     Cancer - colorectal Mother      DIABETES Daughter      Asthma Daughter      Family History Negative Father      Family History Negative Daughter      Unknown/Adopted Maternal Grandmother      Unknown/Adopted Maternal Grandfather      Unknown/Adopted Paternal Grandmother      Unknown/Adopted Paternal Grandfather       "Coronary Artery Disease No family hx of      Hypertension No family hx of      Hyperlipidemia No family hx of      CEREBROVASCULAR DISEASE No family hx of      Breast Cancer No family hx of      Colon Cancer No family hx of      Prostate Cancer No family hx of      Other Cancer No family hx of      Depression No family hx of      Anxiety Disorder No family hx of      MENTAL ILLNESS No family hx of      Substance Abuse No family hx of      Anesthesia Reaction No family hx of      OSTEOPOROSIS No family hx of      Genetic Disorder No family hx of      Thyroid Disease No family hx of      Obesity No family hx of        SOCIAL HISTORY:  Social History     Social History     Marital status:      Spouse name: N/A     Number of children: N/A     Years of education: N/A     Social History Main Topics     Smoking status: Never Smoker     Smokeless tobacco: Never Used     Alcohol use No     Drug use: No     Sexual activity: No     Other Topics Concern     Parent/Sibling W/ Cabg, Mi Or Angioplasty Before 65f 55m? No     Caffeine Concern No     Sleep Concern No     Stress Concern No     Weight Concern Yes     weight loss     Special Diet No     low sodium     Exercise No     PT     Seat Belt Yes     Social History Narrative       Review of Systems:  Skin:  Negative       Eyes:  Positive for glasses    ENT:  Negative      Respiratory:  Negative       Cardiovascular:  Negative      Gastroenterology: Negative      Genitourinary:  Negative      Musculoskeletal:  Positive for arthritis LLE in brace, fx knee cap ; shoulder & arm pain - unable to raise left arm   Neurologic:  Negative      Psychiatric:  Negative      Heme/Lymph/Imm:  Positive for weight loss    Endocrine:  Positive for thyroid disorder      Physical Exam:  Vitals: /61  Pulse 65  Ht 1.638 m (5' 4.5\")  Wt 86.5 kg (190 lb 9.6 oz)  LMP  (LMP Unknown)  BMI 32.21 kg/m2    Constitutional:  cooperative, alert and oriented, well developed, well nourished, in " no acute distress overweight      Skin:  warm and dry to the touch        Head:  normocephalic        Eyes:  sclera white        ENT:  no pallor or cyanosis        Neck:  carotid pulses are full and equal bilaterally        Chest:  normal breath sounds, clear to auscultation, normal A-P diameter, normal symmetry, normal respiratory excursion, no use of accessory muscles          Cardiac: no S3 or S4;regular rhythm       systolic murmur;LUSB;grade 1          Abdomen:  abdomen soft        Vascular: pulses full and equal                                        Extremities and Back:  no deformities, clubbing, cyanosis, erythema observed   bilateral LE edema;trace;1+     right groin without bruit    Neurological:  affect appropriate, oriented to time, person and place        Thank you for allowing me to participate in the care of your patient.    Sincerely,     REBEKAH [facility]

## 2017-08-22 NOTE — PATIENT INSTRUCTIONS
Thanks for coming into [facility] clinic today.    We discussed:   Call with any problems prior to returning   Get lab work today     Medication changes:    No changes     Follow up:   Dr. Guan  11/2017 with labs prior- Call early next week to schedule this.     Please call my nurse Ninfa at [phone] with any questions or concerns.    Scheduling phone number: [phone]  Reminder: Please bring in all current medications, over the counter supplements and vitamin bottles to your next appointment.

## 2017-08-22 NOTE — PROGRESS NOTES
HPI and Plan:   See dictation    Orders Placed This Encounter   Procedures     Basic metabolic panel     Lipid Profile     ALT       No orders of the defined types were placed in this encounter.      Medications Discontinued During This Encounter   Medication Reason     warfarin (COUMADIN) 4 MG tablet          Encounter Diagnoses   Name Primary?     Coronary artery disease involving native coronary artery of native heart without angina pectoris Yes     Benign essential hypertension      Hyperlipidemia LDL goal <70      Chronic atrial fibrillation (H)        CURRENT MEDICATIONS:  Current Outpatient Prescriptions   Medication Sig Dispense Refill     levothyroxine (SYNTHROID/LEVOTHROID) 112 MCG tablet TAKE 1 TABLET BY MOUTH DAILY 90 tablet 0     amoxicillin (AMOXIL) 500 MG capsule Take 4 capsules by mouth 30 minutes prior to dental work 4 capsule 4     allopurinol (ZYLOPRIM) 100 MG tablet Take 1 tablet (100 mg) by mouth daily 90 tablet 0     warfarin (COUMADIN) 4 MG tablet Take 1 tablet (4 mg) by mouth daily Taking 4 mg m,w,f & 4mg + 1 mg rest of week 135 tablet 2     amLODIPine (NORVASC) 5 MG tablet Take 1 tablet (5 mg) by mouth 2 times daily 180 tablet 3     lisinopril (PRINIVIL/ZESTRIL) 20 MG tablet Take 1 tablet (20 mg) by mouth 2 times daily 180 tablet 3     cholecalciferol (VITAMIN D) 1000 UNIT tablet Take 1,000 Units by mouth daily       simvastatin (ZOCOR) 40 MG tablet Take 0.5 tablets (20 mg) by mouth At Bedtime 45 tablet 3     order for DME Equipment being ordered: mastectomy bras & prostheses   S/po mastectomy of unknown side     C50.919 1 each 0     atenolol (TENORMIN) 25 MG tablet Take 1 tablet (25 mg) by mouth daily 90 tablet 3     warfarin (COUMADIN) 1 MG tablet Take 1 tablet (1 mg) by mouth daily 90 tablet 1     nystatin (MYCOSTATIN) 401072 UNIT/GM POWD Apply topically 3 times daily as needed 60 g 1     furosemide (LASIX) 20 MG tablet TAKE 1 TABLET BY MOUTH EVERY MORNING FOR FLUID RETENTION 90 tablet 3      acetaminophen (TYLENOL) 325 MG tablet Take 2 tablets (650 mg) by mouth every 4 hours as needed for mild pain 100 tablet      alendronate (FOSAMAX) 70 MG tablet TAKE 1 TABLET BY MOUTH EVERY 7 DAYS 12 tablet 3     albuterol (PROAIR HFA, PROVENTIL HFA, VENTOLIN HFA) 108 (90 BASE) MCG/ACT inhaler Inhale 2 puffs into the lungs every 6 hours as needed for shortness of breath / dyspnea or wheezing       [DISCONTINUED] warfarin (COUMADIN) 4 MG tablet TAKE 1 1/2 TABLETS BY MOUTH DAILY ON MONDAY, FRIDAY AND TAKE 1 TABLET DAILY ALL OTHER DAYS OF THE WEEK (Patient not taking: Reported on 8/22/2017) 102 tablet 1       ALLERGIES   No Known Allergies    PAST MEDICAL HISTORY:  Past Medical History:   Diagnosis Date     Atrial fibrillation (H)      Benign essential hypertension      Juan Pablo-tachy syndrome (H)      Breast cancer (H)     s/p bilateral mastectomy     CHF (congestive heart failure) (H)      Coronary artery disease involving native coronary artery of native heart without angina pectoris     cardiac cath 2014: 40-50% mid RCA stenosis with minimal other disease     GERD (gastroesophageal reflux disease)      Hypercholesterolaemia      Hypothyroidism      Kidney stone      Mitral valve regurgitation     sp mitral clip 1/9/15     Need for SBE (subacute bacterial endocarditis) prophylaxis      Osteoporosis      Pulmonary embolism (H)      Sleep apnea      Spinal muscular atrophy type III (H)     Dr. Daily, [facility]       PAST SURGICAL HISTORY:  Past Surgical History:   Procedure Laterality Date     ANESTHESIA OUT OF OR PERCUTANEOUS MITRAL VALVE REPAIR N/A 1/9/2015    Procedure: ANESTHESIA OUT OF OR PERCUTANEOUS MITRAL VALVE REPAIR;  Surgeon: Generic Anesthesia Provider;  Location: [facility] OR     GYN SURGERY      hysterectomy     MASTECTOMY      bilateral     ORTHOPEDIC SURGERY      knee replacement     PERCUTANEIOUS MITRAL VALVE REPAIR  1/9/2015       FAMILY HISTORY:  Family History   Problem Relation Age of  Onset     Cancer - colorectal Mother      DIABETES Daughter      Asthma Daughter      Family History Negative Father      Family History Negative Daughter      Unknown/Adopted Maternal Grandmother      Unknown/Adopted Maternal Grandfather      Unknown/Adopted Paternal Grandmother      Unknown/Adopted Paternal Grandfather      Coronary Artery Disease No family hx of      Hypertension No family hx of      Hyperlipidemia No family hx of      CEREBROVASCULAR DISEASE No family hx of      Breast Cancer No family hx of      Colon Cancer No family hx of      Prostate Cancer No family hx of      Other Cancer No family hx of      Depression No family hx of      Anxiety Disorder No family hx of      MENTAL ILLNESS No family hx of      Substance Abuse No family hx of      Anesthesia Reaction No family hx of      OSTEOPOROSIS No family hx of      Genetic Disorder No family hx of      Thyroid Disease No family hx of      Obesity No family hx of        SOCIAL HISTORY:  Social History     Social History     Marital status:      Spouse name: N/A     Number of children: N/A     Years of education: N/A     Social History Main Topics     Smoking status: Never Smoker     Smokeless tobacco: Never Used     Alcohol use No     Drug use: No     Sexual activity: No     Other Topics Concern     Parent/Sibling W/ Cabg, Mi Or Angioplasty Before 65f 55m? No     Caffeine Concern No     Sleep Concern No     Stress Concern No     Weight Concern Yes     weight loss     Special Diet No     low sodium     Exercise No     PT     Seat Belt Yes     Social History Narrative       Review of Systems:  Skin:  Negative       Eyes:  Positive for glasses    ENT:  Negative      Respiratory:  Negative       Cardiovascular:  Negative      Gastroenterology: Negative      Genitourinary:  Negative      Musculoskeletal:  Positive for arthritis LLE in brace, fx knee cap ; shoulder & arm pain - unable to raise left arm   Neurologic:  Negative      Psychiatric:   "Negative      Heme/Lymph/Imm:  Positive for weight loss    Endocrine:  Positive for thyroid disorder      Physical Exam:  Vitals: /61  Pulse 65  Ht 1.638 m (5' 4.5\")  Wt 86.5 kg (190 lb 9.6 oz)  LMP  (LMP Unknown)  BMI 32.21 kg/m2    Constitutional:  cooperative, alert and oriented, well developed, well nourished, in no acute distress overweight      Skin:  warm and dry to the touch        Head:  normocephalic        Eyes:  sclera white        ENT:  no pallor or cyanosis        Neck:  carotid pulses are full and equal bilaterally        Chest:  normal breath sounds, clear to auscultation, normal A-P diameter, normal symmetry, normal respiratory excursion, no use of accessory muscles          Cardiac: no S3 or S4;regular rhythm       systolic murmur;LUSB;grade 1          Abdomen:  abdomen soft        Vascular: pulses full and equal                                        Extremities and Back:  no deformities, clubbing, cyanosis, erythema observed   bilateral LE edema;trace;1+     right groin without bruit    Neurological:  affect appropriate, oriented to time, person and place                Chris Guan MD  [id number]  [place] [id number]              "

## 2017-08-23 NOTE — TELEPHONE ENCOUNTER
Spoke with patient to let her know per Patience Weathers her labs look good. Patient did not have any questions or concerns.

## 2017-08-25 ENCOUNTER — OFFICE VISIT (OUTPATIENT)
Dept: FAMILY MEDICINE | Facility: CLINIC | Age: 82
End: 2017-08-25
Payer: COMMERCIAL

## 2017-08-25 VITALS
DIASTOLIC BLOOD PRESSURE: 52 MMHG | OXYGEN SATURATION: 97 % | WEIGHT: 190 LBS | RESPIRATION RATE: 18 BRPM | BODY MASS INDEX: 31.65 KG/M2 | HEART RATE: 71 BPM | SYSTOLIC BLOOD PRESSURE: 128 MMHG | TEMPERATURE: 96.1 F | HEIGHT: 65 IN

## 2017-08-25 DIAGNOSIS — E78.00 HYPERCHOLESTEROLEMIA: ICD-10-CM

## 2017-08-25 DIAGNOSIS — R73.02 GLUCOSE INTOLERANCE (IMPAIRED GLUCOSE TOLERANCE): Primary | Chronic | ICD-10-CM

## 2017-08-25 DIAGNOSIS — I50.22 CHRONIC SYSTOLIC CONGESTIVE HEART FAILURE (H): ICD-10-CM

## 2017-08-25 DIAGNOSIS — I10 BENIGN ESSENTIAL HYPERTENSION: ICD-10-CM

## 2017-08-25 DIAGNOSIS — M10.279 DRUG-INDUCED GOUT INVOLVING TOE, UNSPECIFIED CHRONICITY, UNSPECIFIED LATERALITY: ICD-10-CM

## 2017-08-25 DIAGNOSIS — Z23 NEED FOR PROPHYLACTIC VACCINATION AND INOCULATION AGAINST INFLUENZA: ICD-10-CM

## 2017-08-25 DIAGNOSIS — E03.9 ACQUIRED HYPOTHYROIDISM: ICD-10-CM

## 2017-08-25 DIAGNOSIS — E66.09 NON MORBID OBESITY DUE TO EXCESS CALORIES: ICD-10-CM

## 2017-08-25 LAB
BASOPHILS # BLD AUTO: 0 10E9/L (ref 0–0.2)
BASOPHILS NFR BLD AUTO: 0.2 %
DIFFERENTIAL METHOD BLD: NORMAL
EOSINOPHIL # BLD AUTO: 0.1 10E9/L (ref 0–0.7)
EOSINOPHIL NFR BLD AUTO: 0.9 %
ERYTHROCYTE [DISTWIDTH] IN BLOOD BY AUTOMATED COUNT: 14.5 % (ref 10–15)
HCT VFR BLD AUTO: 41.8 % (ref 35–47)
HGB BLD-MCNC: 13.2 G/DL (ref 11.7–15.7)
LYMPHOCYTES # BLD AUTO: 2 10E9/L (ref 0.8–5.3)
LYMPHOCYTES NFR BLD AUTO: 23 %
MCH RBC QN AUTO: 29.3 PG (ref 26.5–33)
MCHC RBC AUTO-ENTMCNC: 31.6 G/DL (ref 31.5–36.5)
MCV RBC AUTO: 93 FL (ref 78–100)
MONOCYTES # BLD AUTO: 0.7 10E9/L (ref 0–1.3)
MONOCYTES NFR BLD AUTO: 7.7 %
NEUTROPHILS # BLD AUTO: 5.8 10E9/L (ref 1.6–8.3)
NEUTROPHILS NFR BLD AUTO: 68.2 %
PLATELET # BLD AUTO: 261 10E9/L (ref 150–450)
RBC # BLD AUTO: 4.51 10E12/L (ref 3.8–5.2)
TSH SERPL DL<=0.005 MIU/L-ACNC: 0.58 MU/L (ref 0.4–4)
URATE SERPL-MCNC: 5.8 MG/DL (ref 2.6–6)
WBC # BLD AUTO: 8.5 10E9/L (ref 4–11)

## 2017-08-25 PROCEDURE — 36415 COLL VENOUS BLD VENIPUNCTURE: CPT | Performed by: FAMILY MEDICINE

## 2017-08-25 PROCEDURE — 84550 ASSAY OF BLOOD/URIC ACID: CPT | Performed by: FAMILY MEDICINE

## 2017-08-25 PROCEDURE — 85025 COMPLETE CBC W/AUTO DIFF WBC: CPT | Performed by: FAMILY MEDICINE

## 2017-08-25 PROCEDURE — 99214 OFFICE O/P EST MOD 30 MIN: CPT | Performed by: FAMILY MEDICINE

## 2017-08-25 PROCEDURE — 84443 ASSAY THYROID STIM HORMONE: CPT | Performed by: FAMILY MEDICINE

## 2017-08-25 RX ORDER — LEVOTHYROXINE SODIUM 112 UG/1
112 TABLET ORAL DAILY
Qty: 90 TABLET | Refills: 0 | Status: CANCELLED | OUTPATIENT
Start: 2017-08-25

## 2017-08-25 NOTE — PATIENT INSTRUCTIONS
1.  Weight Loss Tips  1. Do not eat after 6 hrs before your expected bedtime  2. Have your heaviest meal for breakfast, a slightly lighter meal at lunch and a snack 6 hrs before bed  3. No sugar/calorie drinks except milk ie no fruit juice, pop, alcohol.  4. Drink milk 30min before meals to decrease your hunger. Also it is excellent as part of your last meal of the day snack  5. Drink lots of water  6. Increase fiber in diet: all bran cereal, salads, popcorn etc  7. Have only one small serving of fruit a day about 1/2 cup (as this is high in sugar)  8. EXERCISE is the bottom line. Without it, you will gain weight even on a low calorie diet. Best if done 2-3X a day as can    Being overweight contributes to high blood pressure and high cholesterol, both of which cause heart attacks, strokes and kidney failure, prediabetes and diabetes, arthritis, and liver disease

## 2017-08-25 NOTE — PROGRESS NOTES
SUBJECTIVE:   Kenyatta Donald is a 81 year old female who presents to clinic today for the following health issues:    done}    Gout    Duration: yrs with recent episode   No Uric acid on chart   Description   Location: big toe - left  Joint Swelling: YES  Redness: YES  Pain intensity:  mild  Accompanying signs and symptoms: None  History  Previous history of gout: YES   Trauma to the area: no   Precipitating factors:   Alcohol usage: none  Diuretic use: YES- lasix for CHF  Recent illness: no   Therapies tried and outcome: None  Last U A  < 6       Glucose Intolerance Follow-up      Patient is checking blood sugars: not at all    Hi FBS     Diabetic concerns: None     Symptoms of hypoglycemia (low blood sugar): none     Paresthesias (numbness or burning in feet) or sores: No     Date of last diabetic eye exam: 2016     Hyperlipidemia:LDL Follow-Up      Rate your low fat/cholesterol diet?: good    Taking statin?  Yes, no muscle aches from 40mgm simvastatin    Other lipid medications/supplements?:  none     Hypertension Follow-up      Outpatient blood pressures are not being checked.   Here < 140/80    Low Salt Diet: no added salt     Heart Failure Follow-up      Symptoms:    Shortness of breath: none as cant exercise with her m disease    Lower extremity edema: none    Chest pain: No    Using more pillows than normal: No    Cough at night: No    Weight:    Checking weight daily: No    Weight change: none    Cardiology visits, ER/UC, or hospital admissions since last visit: None    Medication side effects: none: q d lasix      NONMORBID OBESITY      Comorbid HTN, CKD, hi chol,     Chronic Kidney Disease:Stage 3  Follow-up      Comorbid HTN, CKD, hi chol,    NSAID use?  No    Hypothyroidism Follow-up      Since last visit, patient describes the following symptoms: Weight stable, no hair loss, no skin changes, no constipation, no loose stools    Levothyroxine 112 mcg                 Problem list and histories reviewed  "& adjusted, as indicated.  Additional history: as documented    Labs reviewed in EPIC    Reviewed and updated as needed this visit by clinical staff  Tobacco  Allergies  Meds  Problems  Med Hx  Surg Hx  Fam Hx  Soc Hx        Reviewed and updated as needed this visit by Provider         ROS:  C: NEGATIVE for fever, chills, change in weight  I: NEGATIVE for worrisome rashes, moles or lesions  E: NEGATIVE for vision changes or irritation  E/M: NEGATIVE for ear, mouth and throat problems  R: NEGATIVE for significant cough or SOB  B: NEGATIVE for masses, tenderness or discharge  CV: NEGATIVE for chest pain, palpitations or peripheral edema  GI: NEGATIVE for nausea, abdominal pain, heartburn, or change in bowel habits  : NEGATIVE for frequency, dysuria, or hematuria  M: NEGATIVE for significant arthralgias or myalgia --the usual m weakness   N: NEGATIVE for weakness, dizziness or paresthesias  E: NEGATIVE for temperature intolerance, skin/hair changes  H: NEGATIVE for bleeding problems  P: NEGATIVE for changes in mood or affect    OBJECTIVE:     /52  Pulse 71  Temp 96.1  F (35.6  C) (Tympanic)  Resp 18  Ht 5' 4.5\" (1.638 m)  Wt 190 lb (86.2 kg)  LMP  (LMP Unknown)  SpO2 97%  Breastfeeding? No  BMI 32.11 kg/m2  Body mass index is 32.11 kg/(m^2).  GENERAL: healthy, alert, no distress, obese and elderly- uses a walker for weak legs   EYES: Eyes grossly normal to inspection, PERRL and conjunctivae and sclerae normal  HENT: ear canals and TM's normal, nose and mouth without ulcers or lesions  NECK: no adenopathy, no asymmetry, masses, or scars and thyroid normal to palpation  RESP: lungs clear to auscultation - no rales, rhonchi or wheezes-- early basilar rhonchi ; poor expansion  BREAST: normal without masses, tenderness or nipple discharge and no palpable axillary masses or adenopathy  CV: regular rate and rhythm, normal S1 S2, no S3 or S4, no murmur, click or rub, no peripheral edema and peripheral " pulses strong  ABDOMEN: soft, nontender, no hepatosplenomegaly, no masses and bowel sounds normal  MS: extremities normal- no gross deformities noted  SKIN: no suspicious lesions or rashes  NEURO: Normal strength and tone, mentation intact and speech normal  PSYCH: mentation appears normal, affect normal/bright    Diagnostic Test Results:  Results for orders placed or performed in visit on 08/25/17   TSH WITH FREE T4 REFLEX   Result Value Ref Range    TSH 0.58 0.40 - 4.00 mU/L   CBC with platelets differential   Result Value Ref Range    WBC 8.5 4.0 - 11.0 10e9/L    RBC Count 4.51 3.8 - 5.2 10e12/L    Hemoglobin 13.2 11.7 - 15.7 g/dL    Hematocrit 41.8 35.0 - 47.0 %    MCV 93 78 - 100 fl    MCH 29.3 26.5 - 33.0 pg    MCHC 31.6 31.5 - 36.5 g/dL    RDW 14.5 10.0 - 15.0 %    Platelet Count 261 150 - 450 10e9/L    Diff Method Automated Method     % Neutrophils 68.2 %    % Lymphocytes 23.0 %    % Monocytes 7.7 %    % Eosinophils 0.9 %    % Basophils 0.2 %    Absolute Neutrophil 5.8 1.6 - 8.3 10e9/L    Absolute Lymphocytes 2.0 0.8 - 5.3 10e9/L    Absolute Monocytes 0.7 0.0 - 1.3 10e9/L    Absolute Eosinophils 0.1 0.0 - 0.7 10e9/L    Absolute Basophils 0.0 0.0 - 0.2 10e9/L   Uric acid   Result Value Ref Range    Uric Acid 5.8 2.6 - 6.0 mg/dL       ASSESSMENT/PLAN:               ICD-10-CM    1. Glucose intolerance (impaired glucose tolerance): HgbA1C= 5.5  R73.02 CBC with platelets differential   2. Acquired hypothyroidism E03.9 TSH WITH FREE T4 REFLEX     CBC with platelets differential   3. Hypercholesterolemia E78.00    4. Non morbid obesity due to excess calories w comorbid HTN,hi chol,CKD E66.09    5. Chronic systolic congestive heart failure (H) I50.22 CBC with platelets differential     HEART FAILURE ACTION PLAN   6. Benign essential hypertension I10 CBC with platelets differential   7. Drug-induced gout involving toe, unspecified chronicity, unspecified laterality M10.279 Uric acid   8. Need for prophylactic  vaccination and inoculation against influenza Z23        Patient Instructions   1.  Weight Loss Tips  1. Do not eat after 6 hrs before your expected bedtime  2. Have your heaviest meal for breakfast, a slightly lighter meal at lunch and a snack 6 hrs before bed  3. No sugar/calorie drinks except milk ie no fruit juice, pop, alcohol.  4. Drink milk 30min before meals to decrease your hunger. Also it is excellent as part of your last meal of the day snack  5. Drink lots of water  6. Increase fiber in diet: all bran cereal, salads, popcorn etc  7. Have only one small serving of fruit a day about 1/2 cup (as this is high in sugar)  8. EXERCISE is the bottom line. Without it, you will gain weight even on a low calorie diet. Best if done 2-3X a day as can    Being overweight contributes to high blood pressure and high cholesterol, both of which cause heart attacks, strokes and kidney failure, prediabetes and diabetes, arthritis, and liver disease       Di lower the levothyroxine  From 112 to 100 mcg to get her out of the borderling hyperthyroid range     Bess Lion MD  [facility]    Weight management plan: Discussed healthy diet and exercise guidelines and patient will follow up in 1 month in clinic to re-evaluate.    .Bess Lion MD

## 2017-08-25 NOTE — LETTER
My Heart Failure Action Plan   Name: Kenyatta Donald    YOB: 1935   Date: 8/26/2017    My doctor: Bess Lion     [address] [address]  [phone]  My Diagnosis: Systolic Heart Failure   My Ejection Fraction: Over 50%    My Exercise Goal: 30 minutes daily  .     My Weight Goal: --  Wt Readings from Last 2 Encounters:   08/25/17 190 lb (86.2 kg)   08/22/17 190 lb 9.6 oz (86.5 kg)     Weigh yourself daily using the same scale. If you gain more than 2 pounds in 24 hours or 5 pounds in a week 0    My Diet Goal: No added salt    Emergency Room Visits:    Our goal is to improve your quality of life and help you avoid a visit to the emergency room or hospital.  If we work together, we can achieve this goal. But, if you feel you need to call 911 or go to the emergency room, please do so.  If you go to the emergency room, please bring your list of medicines and your daily weight chart with you.       GREEN ZONE     Doing well today    Weight gained is no more than 2 pounds a day or 5 pounds a week.    No swelling in feet, ankles, legs or stomach.    No more swelling than usual.    No more trouble breathing than usual.    No change in my sleep.    No other problems. Actions:    I am doing fine.  I will take my medicine, follow my diet, see my doctor, exercise, and watch for symptoms.           YELLOW ZONE         Having a bad day or flare up    Weight gain of more than 2 pounds in one day or 5 pounds in one week.    New swelling in ankle, leg, knee or thigh.    Bloating in belly, pants feel tighter.    Swelling in hands or face.    Coughing or trouble breathing while walking or talking.    Harder to breathe last night.    Have trouble sleeping, wake up short of breath.    Much more tired than usual.    Not eating.    Pain in my chest or bad leg cramps.    Feel weak or dizzy. Actions:    I need to take  action and call my doctor or nurse today.                 RED ZONE         Need medical care now    Weight gain of 5 pounds overnight.    Chest pain or pressure that does not go away.    Feel less alert.    Wheezing or have trouble breathing when at rest.    Cannot sleep lying down.    Cannot take my water pill.    Pass out or faint. Actions:    I need to call my doctor or nurse now!    Call 911 if I have chest pain or cannot breathe.        Electronically signed by: Bess Lion, August 26, 2017

## 2017-08-25 NOTE — NURSING NOTE
"Chief Complaint   Patient presents with     Recheck Medication     /52  Pulse 71  Temp 96.1  F (35.6  C) (Tympanic)  Resp 18  Ht 5' 4.5\" (1.638 m)  Wt 190 lb (86.2 kg)  LMP  (LMP Unknown)  SpO2 97%  Breastfeeding? No  BMI 32.11 kg/m2 Estimated body mass index is 32.11 kg/(m^2) as calculated from the following:    Height as of this encounter: 5' 4.5\" (1.638 m).    Weight as of this encounter: 190 lb (86.2 kg).  BP completed using cuff size: latha Titus CMA    Health Maintenance Due   Topic Date Due     OP ANNUAL INR REFERRAL  04/08/2016     TSH W/ FREE T4 REFLEX Q1 YEAR  03/01/2017     CBC Q1 YR  07/26/2017     FALL RISK ASSESSMENT  08/19/2017     Health Maintenance reviewed at today's visit patient asked to schedule/complete:   None, Health Maintenance up to date.    "

## 2017-08-25 NOTE — MR AVS SNAPSHOT
After Visit Summary   8/25/2017    Kenyatta Donald    MRN: [id number]           Patient Information     Date Of Birth          1935        Visit Information        Provider Department      8/25/2017 11:00 AM Bess Lion MD [facility]        Today's Diagnoses     Glucose intolerance (impaired glucose tolerance): HgbA1C= 5.5     -  1    Acquired hypothyroidism        Hypercholesterolemia        Non morbid obesity due to excess calories w comorbid HTN,hi chol,CKD        Chronic systolic congestive heart failure (H)        Benign essential hypertension        Drug-induced gout involving toe, unspecified chronicity, unspecified laterality        Need for prophylactic vaccination and inoculation against influenza          Care Instructions    1.  Weight Loss Tips  1. Do not eat after 6 hrs before your expected bedtime  2. Have your heaviest meal for breakfast, a slightly lighter meal at lunch and a snack 6 hrs before bed  3. No sugar/calorie drinks except milk ie no fruit juice, pop, alcohol.  4. Drink milk 30min before meals to decrease your hunger. Also it is excellent as part of your last meal of the day snack  5. Drink lots of water  6. Increase fiber in diet: all bran cereal, salads, popcorn etc  7. Have only one small serving of fruit a day about 1/2 cup (as this is high in sugar)  8. EXERCISE is the bottom line. Without it, you will gain weight even on a low calorie diet. Best if done 2-3X a day as can    Being overweight contributes to high blood pressure and high cholesterol, both of which cause heart attacks, strokes and kidney failure, prediabetes and diabetes, arthritis, and liver disease             Follow-ups after your visit        Your next 10 appointments already scheduled     Oct 02, 2017 10:30 AM CDT   Anticoagulation Visit with [facility]   [facility] ([facility])     [address] [address]   [phone]            Oct 30, 2017  8:50 AM CDT   LAB with [facility]   [facility] ([facility])    [address] [place] [id number] [phone]           Patient must bring picture ID. Patient should be prepared to give a urine specimen  Please do not eat 10-12 hours before your appointment if you are coming in fasting for labs on lipids, cholesterol, or glucose (sugar). Pregnant women should follow their Care Team instructions. Water with medications is okay. Do not drink coffee or other fluids. If you have concerns about taking  your medications, please ask at office or if scheduling via [employer], send a message by clicking on Secure Messaging, Message Your Care Team.            Oct 30, 2017  9:45 AM CDT   Return Visit with Chris Guan MD   [facility] ([facility])    [address] [place], [place] [id number]  [facility] [id number] [phone]              Who to contact     If you have questions or need follow up information about today's clinic visit or your schedule please contact [facility] directly at [phone].  Normal or non-critical lab and imaging results will be communicated to you by [employer], letter or phone within 4 business days after the clinic has received the results. If you do not hear from us within 7 days, please contact the clinic through [email or web address] or phone. If you have a critical or abnormal lab result, we will notify you by phone as soon as possible.  Submit refill requests through [employer] or call your pharmacy and they will forward the refill request to us. Please allow 3 business days for your refill to be completed.          Additional Information About Your Visit        [employer] Information     [employer] lets you send messages to your doctor, view your test results, renew  "your prescriptions, schedule appointments and more. To sign up, go to [email or web address] . Click on \"Log in\" on the left side of the screen, which will take you to the Welcome page. Then click on \"Sign up Now\" on the right side of the page.     You will be asked to enter the access code listed below, as well as some personal information. Please follow the directions to create your username and password.     Your access code is: [email or web address]  Expires: 2017 12:53 PM     Your access code will  in 90 days. If you need help or a new code, please call your [place] clinic or [phone].        Care EveryWhere ID     This is your Care EveryWhere ID. This could be used by other organizations to access your [place] medical records  [id number]        Your Vitals Were     Pulse Temperature Respirations Height Last Period Pulse Oximetry    71 96.1  F (35.6  C) (Tympanic) 18 5' 4.5\" (1.638 m) (LMP Unknown) 97%    Breastfeeding? BMI (Body Mass Index)                No 32.11 kg/m2           Blood Pressure from Last 3 Encounters:   17 128/52   17 127/61   17 98/52    Weight from Last 3 Encounters:   17 190 lb (86.2 kg)   17 190 lb 9.6 oz (86.5 kg)   17 190 lb (86.2 kg)              We Performed the Following     CBC with platelets differential     TSH WITH FREE T4 REFLEX     Uric acid        Primary Care Provider Office Phone # Fax #    Bess Lion [phone] [address] [facility] [address]        Equal Access to Services     [facility] Center[facility] : Gina Gonzalez, ronit mirza, tami ferraro, emma akers. So [facility] [phone].    ATENCIÓN: Si habla español, tiene a duenas disposición servicios gratuitos de asistencia lingüística. Llame al [phone].    We comply with applicable federal civil rights laws and [place] laws. We do not discriminate on the basis of race, color, " national origin, age, disability sex, sexual orientation or gender identity.            Thank you!     Thank you for choosing [facility]  for your care. Our goal is always to provide you with excellent care. Hearing back from our patients is one way we can continue to improve our services. Please take a few minutes to complete the written survey that you may receive in the mail after your visit with us. Thank you!             Your Updated Medication List - Protect others around you: Learn how to safely use, store and throw away your medicines at www.disposemymeds.org.          This list is accurate as of: 8/25/17 11:44 AM.  Always use your most recent med list.                   Brand Name Dispense Instructions for use Diagnosis    acetaminophen 325 MG tablet    TYLENOL    100 tablet    Take 2 tablets (650 mg) by mouth every 4 hours as needed for mild pain    Closed nondisplaced fracture of left patella, unspecified fracture morphology, initial encounter       albuterol 108 (90 BASE) MCG/ACT Inhaler    PROAIR HFA/PROVENTIL HFA/VENTOLIN HFA     Inhale 2 puffs into the lungs every 6 hours as needed for shortness of breath / dyspnea or wheezing        alendronate 70 MG tablet    FOSAMAX    12 tablet    TAKE 1 TABLET BY MOUTH EVERY 7 DAYS    Osteoporosis       allopurinol 100 MG tablet    ZYLOPRIM    90 tablet    Take 1 tablet (100 mg) by mouth daily    Drug-induced gout involving toe, unspecified chronicity, unspecified laterality       amLODIPine 5 MG tablet    NORVASC    180 tablet    Take 1 tablet (5 mg) by mouth 2 times daily    Benign essential hypertension       amoxicillin 500 MG capsule    AMOXIL    4 capsule    Take 4 capsules by mouth 30 minutes prior to dental work    Mitral valve insufficiency       atenolol 25 MG tablet    TENORMIN    90 tablet    Take 1 tablet (25 mg) by mouth daily    HTN (hypertension)       cholecalciferol 1000 UNIT tablet    vitamin D     Take 1,000 Units  by mouth daily        furosemide 20 MG tablet    LASIX    90 tablet    TAKE 1 TABLET BY MOUTH EVERY MORNING FOR FLUID RETENTION    Chronic systolic congestive heart failure (H)       levothyroxine 112 MCG tablet    SYNTHROID/LEVOTHROID    90 tablet    TAKE 1 TABLET BY MOUTH DAILY    Acquired hypothyroidism       lisinopril 20 MG tablet    PRINIVIL/ZESTRIL    180 tablet    Take 1 tablet (20 mg) by mouth 2 times daily    Essential hypertension, benign       nystatin 485974 UNIT/GM Powd    MYCOSTATIN    60 g    Apply topically 3 times daily as needed    Intertrigo       order for DME     1 each    Equipment being ordered: mastectomy bras & prostheses  S/po mastectomy of unknown side     C50.919    Malignant neoplasm of female breast, unspecified laterality, unspecified site of breast       simvastatin 40 MG tablet    ZOCOR    45 tablet    Take 0.5 tablets (20 mg) by mouth At Bedtime    Mixed hyperlipidemia       * warfarin 1 MG tablet    COUMADIN    90 tablet    Take 1 tablet (1 mg) by mouth daily    Atrial fibrillation (H), History of pulmonary embolism       * warfarin 4 MG tablet    COUMADIN    135 tablet    Take 1 tablet (4 mg) by mouth daily Taking 4 mg m,w,f & 4mg + 1 mg rest of week    Atrial fibrillation, unspecified type (H), History of pulmonary embolism       * Notice:  This list has 2 medication(s) that are the same as other medications prescribed for you. Read the directions carefully, and ask your doctor or other care provider to review them with you.

## 2017-08-26 ENCOUNTER — TELEPHONE (OUTPATIENT)
Dept: FAMILY MEDICINE | Facility: CLINIC | Age: 82
End: 2017-08-26

## 2017-08-26 DIAGNOSIS — E03.9 ACQUIRED HYPOTHYROIDISM: Primary | ICD-10-CM

## 2017-08-26 RX ORDER — LEVOTHYROXINE SODIUM 100 UG/1
100 TABLET ORAL DAILY
Qty: 90 TABLET | Refills: 1 | Status: SHIPPED | OUTPATIENT
Start: 2017-08-26 | End: 2018-02-02

## 2017-08-26 NOTE — TELEPHONE ENCOUNTER
Patient calling to check on labs and medication. Patient states she is leaving to go out of town. Please call

## 2017-09-12 DIAGNOSIS — M81.0 AGE-RELATED OSTEOPOROSIS WITHOUT CURRENT PATHOLOGICAL FRACTURE: Primary | ICD-10-CM

## 2017-09-12 RX ORDER — ALENDRONATE SODIUM 70 MG/1
TABLET ORAL
Qty: 12 TABLET | Refills: 3 | Status: SHIPPED | OUTPATIENT
Start: 2017-09-12 | End: 2017-10-30

## 2017-09-12 NOTE — TELEPHONE ENCOUNTER
alendronate (FOSAMAX) 70 MG tablet    Last Written Prescription Date: 4/11/16  Last Fill Quantity: 12, # refills: 3  Last Office Visit with [facility] or [facility] prescribing provider: 8/25/17  Next 5 appointments (look out 90 days)     Oct 30, 2017  9:45 AM CDT   Return Visit with Chris Guan MD   [facility] PHYSICIANS [facility] ([facility])    [address] [place], [place] [address]  [facility] [address] [phone]                   DEXA Scan:  No order of DX HIP/PELVIS/SPINE is found.     No order of DX HIP/PELVIS/SPINE W LAT FRACTION ANALYSIS is found.       Creatinine   Date Value Ref Range Status   08/22/2017 0.73 0.70 - 1.30 mg/dL Final

## 2017-09-14 DIAGNOSIS — I48.91 ATRIAL FIBRILLATION (H): ICD-10-CM

## 2017-09-14 RX ORDER — WARFARIN SODIUM 1 MG/1
TABLET ORAL
Qty: 180 TABLET | Refills: 1 | Status: SHIPPED | OUTPATIENT
Start: 2017-09-14 | End: 2017-10-30

## 2017-09-14 NOTE — TELEPHONE ENCOUNTER
Warfarin 1 mg    Last Written Prescription Date: 9/22/16  Last Fill Qty: 90, # refills: 1  Last Office Visit with [facility] or [facility] prescribing provider: 8/25/17  Next 5 appointments (look out 90 days)     Oct 30, 2017  9:45 AM CDT   Return Visit with Chris Guan MD   [facility] ([facility])    [address] [place] [id number] [phone]                   Date and Result of Last PT/INR:   Lab Results   Component Value Date    INR 2.2 08/21/2017    INR 2.4 08/09/2017    INR 2.4 08/08/2017    PT 24.7 09/07/2012    PT 27.6 07/24/2012

## 2017-09-15 DIAGNOSIS — E78.2 MIXED HYPERLIPIDEMIA: ICD-10-CM

## 2017-09-15 RX ORDER — SIMVASTATIN 40 MG
20 TABLET ORAL AT BEDTIME
Qty: 45 TABLET | Refills: 3 | Status: SHIPPED | OUTPATIENT
Start: 2017-09-15 | End: 2018-08-31

## 2017-09-18 ENCOUNTER — TELEPHONE (OUTPATIENT)
Dept: CARDIOLOGY | Facility: CLINIC | Age: 82
End: 2017-09-18

## 2017-09-18 ENCOUNTER — TRANSFERRED RECORDS (OUTPATIENT)
Dept: HEALTH INFORMATION MANAGEMENT | Facility: CLINIC | Age: 82
End: 2017-09-18

## 2017-09-18 DIAGNOSIS — I25.10 CORONARY ARTERY DISEASE INVOLVING NATIVE CORONARY ARTERY OF NATIVE HEART WITHOUT ANGINA PECTORIS: Primary | ICD-10-CM

## 2017-09-18 NOTE — TELEPHONE ENCOUNTER
Patient called requesting an alternative for Atenolol 25 mg one tablet daily.   Will message Dr. Guan.

## 2017-09-21 DIAGNOSIS — I50.22 CHRONIC SYSTOLIC CONGESTIVE HEART FAILURE (H): ICD-10-CM

## 2017-09-21 RX ORDER — FUROSEMIDE 20 MG
TABLET ORAL
Qty: 90 TABLET | Refills: 0 | Status: ON HOLD | OUTPATIENT
Start: 2017-09-21 | End: 2017-12-07

## 2017-09-21 NOTE — TELEPHONE ENCOUNTER
furosemide (LASIX) 20 MG tablet      Last Written Prescription Date: 8/18/16  Last Fill Quantity: 90, # refills: 3  Last Office Visit with [facility], [facility] or [facility] prescribing provider: 8/25/17  Next 5 appointments (look out 90 days)     Oct 30, 2017  9:45 AM CDT   Return Visit with Chris Guan MD   [facility] ([facility])    [address] [place] [id number] [phone]                   Potassium   Date Value Ref Range Status   08/22/2017 4.7 3.5 - 5.1 mmol/L Final     Creatinine   Date Value Ref Range Status   08/22/2017 0.73 0.70 - 1.30 mg/dL Final     BP Readings from Last 3 Encounters:   08/25/17 128/52   08/22/17 127/61   05/22/17 98/52

## 2017-09-25 RX ORDER — METOPROLOL SUCCINATE 50 MG/1
50 TABLET, EXTENDED RELEASE ORAL DAILY
Qty: 90 TABLET | Refills: 1 | Status: SHIPPED | OUTPATIENT
Start: 2017-09-25 | End: 2017-10-30

## 2017-09-25 NOTE — TELEPHONE ENCOUNTER
Contacted patient with Dr. Guan's recommendation to replace atenolol (current national shortage) with toprol XL 50mg daily. Patient verbalized understanding and agreed with plan. Rx escripted.

## 2017-10-02 ENCOUNTER — ANTICOAGULATION THERAPY VISIT (OUTPATIENT)
Dept: NURSING | Facility: CLINIC | Age: 82
End: 2017-10-02
Payer: COMMERCIAL

## 2017-10-02 DIAGNOSIS — Z79.01 LONG-TERM (CURRENT) USE OF ANTICOAGULANTS: ICD-10-CM

## 2017-10-02 LAB — INR POINT OF CARE: 3.4 (ref 0.86–1.14)

## 2017-10-02 PROCEDURE — 36416 COLLJ CAPILLARY BLOOD SPEC: CPT

## 2017-10-02 PROCEDURE — 85610 PROTHROMBIN TIME: CPT | Mod: QW

## 2017-10-02 PROCEDURE — 99207 ZZC NO CHARGE NURSE ONLY: CPT

## 2017-10-02 NOTE — MR AVS SNAPSHOT
Kenyatta Donald   10/2/2017 10:30 AM   Anticoagulation Therapy Visit    Description:  81 year old female   Provider:  [facility]   Department:  Bx Nurse           INR as of 10/2/2017     Today's INR 3.4      Anticoagulation Summary as of 10/2/2017     INR goal 2.5-3.5   Today's INR 3.4   Full instructions 4 mg on Mon, Fri; 5 mg all other days   Next INR check 10/30/2017    Indications   Long-term (current) use of anticoagulants [Z79.01] [Z79.01]  Atrial fibrillation (H) [I48.91]  Mitral valve disorder s/po 1-9-15 remodeling percut  + clip  (Resolved) [I05.9]         Your next Anticoagulation Clinic appointment(s)     Oct 30, 2017 10:30 AM CDT   Anticoagulation Visit with  ANTICOAGULATION CLINIC   [facility] ([facility])    [address] [id number] [phone]              Contact Numbers     [facility]  Please call  [phone] to cancel and/or reschedule your appointment   The direct line to the anticoagulant nurse is [phone] on Monday, Wednesday, and Friday. On Thursday, the anticoagulant nurse can be reached directly at [phone].         October 2017 Details    Sun Mon Tue Wed Thu Fri Sat     1               2      4 mg   See details      3      5 mg         4      5 mg         5      5 mg         6      4 mg         7      5 mg           8      5 mg         9      4 mg         10      5 mg         11      5 mg         12      5 mg         13      4 mg         14      5 mg           15      5 mg         16      4 mg         17      5 mg         18      5 mg         19      5 mg         20      4 mg         21      5 mg           22      5 mg         23      4 mg         24      5 mg         25      5 mg         26      5 mg         27      4 mg         28      5 mg           29      5 mg         30            31                    Date Details   10/02 This INR check        Date of next INR:  10/30/2017         How to take your warfarin dose     To take:  4 mg Take 1 of the 4 mg tablets.    To take:  5 mg Take 1 of the 4 mg tablets and 1 of the 1 mg tablets.

## 2017-10-02 NOTE — PROGRESS NOTES
ANTICOAGULATION FOLLOW-UP CLINIC VISIT    Patient Name:  Kenyatta Donald  Date:  10/2/2017  Contact Type:  Face to Face    SUBJECTIVE:     Patient Findings     Positives Bruising (Resolved large bruising under right thigh from a fall, new bruising on left arm ), Other complaints (Pt reports a fall on labor day, assessed at [facility], no findings per patient )           OBJECTIVE    INR Protime   Date Value Ref Range Status   10/02/2017 3.4 (A) 0.86 - 1.14 Final       ASSESSMENT / PLAN  INR assessment THER    Recheck INR In: 4 WEEKS    INR Location Clinic      Anticoagulation Summary as of 10/2/2017     INR goal 2.5-3.5   Today's INR 3.4   Maintenance plan 4 mg (4 mg x 1) on Mon, Fri; 5 mg (4 mg x 1 and 1 mg x 1) all other days   Full instructions 4 mg on Mon, Fri; 5 mg all other days   Weekly total 33 mg   No change documented Sree Oreilly RN   Plan last modified Nancy Zavala RN (8/21/2017)   Next INR check 10/30/2017   Priority INR   Target end date     Indications   Long-term (current) use of anticoagulants [Z79.01] [Z79.01]  Atrial fibrillation (H) [I48.91]  Mitral valve disorder s/po 1-9-15 remodeling percut  + clip  (Resolved) [I05.9]         Anticoagulation Episode Summary     INR check location [facility]    Preferred lab     Send INR reminders to [facility]    Comments       Anticoagulation Care Providers     Provider Role Specialty Phone number    Bess Lion Mirna Pyle MD [facility] [phone]            See the Encounter Report to view Anticoagulation Flowsheet and Dosing Calendar (Go to Encounters tab in chart review, and find the Anticoagulation Therapy Visit)    Dosage adjustment made based on physician directed care plan. No changes made, recheck in 4 weeks. Patient reported tripping and falling over shoes/walker on a parking lot on labor day. She had a large bruise on right under thigh which has resolved and new bruising on left arm. Pt continues to drive  herself and says she has no difficulty sitting, just walking.     Sree Oreilly RN

## 2017-10-16 ENCOUNTER — PRE VISIT (OUTPATIENT)
Dept: CARDIOLOGY | Facility: CLINIC | Age: 82
End: 2017-10-16

## 2017-10-16 NOTE — TELEPHONE ENCOUNTER
Chart prepping, noted BMP and FLP's done at [facility] 8-22-17.  Patient contacted, to cancel labs since already done. Message to call back.

## 2017-10-25 ENCOUNTER — ANTICOAGULATION THERAPY VISIT (OUTPATIENT)
Dept: NURSING | Facility: CLINIC | Age: 82
End: 2017-10-25
Payer: COMMERCIAL

## 2017-10-25 DIAGNOSIS — Z79.01 LONG-TERM (CURRENT) USE OF ANTICOAGULANTS: ICD-10-CM

## 2017-10-25 DIAGNOSIS — I48.91 ATRIAL FIBRILLATION (H): ICD-10-CM

## 2017-10-25 LAB — INR POINT OF CARE: 2.2 (ref 2.5–3.5)

## 2017-10-25 PROCEDURE — 99207 ZZC NO CHARGE NURSE ONLY: CPT

## 2017-10-25 PROCEDURE — 36416 COLLJ CAPILLARY BLOOD SPEC: CPT

## 2017-10-25 PROCEDURE — 85610 PROTHROMBIN TIME: CPT | Mod: QW

## 2017-10-25 NOTE — PROGRESS NOTES
ANTICOAGULATION FOLLOW-UP CLINIC VISIT    Patient Name:  Kenyatta Donald  Date:  10/25/2017  Contact Type:  Face to Face    SUBJECTIVE:     Patient Findings     Positives Inflammation           OBJECTIVE    INR Protime   Date Value Ref Range Status   10/25/2017 2.2 (A) 2.5 - 3.5 Final       ASSESSMENT / PLAN  INR assessment SUB    Recheck INR In: 2 WEEKS    INR Location Clinic      Anticoagulation Summary as of 10/25/2017     INR goal 2.5-3.5   Today's INR 2.2!   Maintenance plan 4 mg (4 mg x 1) on Mon, Fri; 5 mg (4 mg x 1 and 1 mg x 1) all other days   Full instructions 10/25: 8 mg; Otherwise 4 mg on Mon, Fri; 5 mg all other days   Weekly total 33 mg   Plan last modified Nancy Zavala RN (8/21/2017)   Next INR check 11/8/2017   Priority INR   Target end date     Indications   Long-term (current) use of anticoagulants [Z79.01] [Z79.01]  Atrial fibrillation (H) [I48.91]  Mitral valve disorder s/po 1-9-15 remodeling percut  + clip  (Resolved) [I05.9]         Anticoagulation Episode Summary     INR check location [facility]    Preferred lab     Send INR reminders to [facility]    Comments       Anticoagulation Care Providers     Provider Role Specialty Phone number    VelasquezBess ngo MD [facility] [phone]            See the Encounter Report to view Anticoagulation Flowsheet and Dosing Calendar (Go to Encounters tab in chart review, and find the Anticoagulation Therapy Visit)        Lisa Draper RN

## 2017-10-25 NOTE — MR AVS SNAPSHOT
Kenyatta Donald   10/25/2017 1:15 PM   Anticoagulation Therapy Visit    Description:  81 year old female   Provider:   ANTICOAGULATION CLINIC   Department:  Bx Nurse           INR as of 10/25/2017     Today's INR 2.2!      Anticoagulation Summary as of 10/25/2017     INR goal 2.5-3.5   Today's INR 2.2!   Full instructions 10/25: 8 mg; Otherwise 4 mg on Mon, Fri; 5 mg all other days   Next INR check 11/8/2017    Indications   Long-term (current) use of anticoagulants [Z79.01] [Z79.01]  Atrial fibrillation (H) [I48.91]  Mitral valve disorder s/po 1-9-15 remodeling percut  + clip  (Resolved) [I05.9]         Your next Anticoagulation Clinic appointment(s)     Oct 25, 2017  1:15 PM CDT   Anticoagulation Visit with  ANTICOAGULATION CLINIC   [facility]    [address] [place] [id number] [phone]            Nov 08, 2017  1:15 PM CST   Anticoagulation Visit with  ANTICOAGULATION CLINIC   [facility] ([facility])    [address] [place] [id number] [phone]              Contact Numbers     [facility]  Please call  [phone] to cancel and/or reschedule your appointment   The direct line to the anticoagulant nurse is [phone] on Monday, Wednesday, and Friday. On Thursday, the anticoagulant nurse can be reached directly at [phone].         October 2017 Details    Sun Mon Tue Wed Thu Fri Sat     1               2               3               4               5               6               7                 8               9               10               11               12               13               14                 15               16               17               18               19               20               21                 22               23               24               25       8 mg   See details      26      5 mg         27      4 mg         28      5 mg           29      5 mg         30      4 mg         31      5 mg              Date Details   10/25 This INR check               How to take your warfarin dose     To take:  4 mg Take 1 of the 4 mg tablets.    To take:  5 mg Take 1 of the 4 mg tablets and 1 of the 1 mg tablets.    To take:  8 mg Take 2 of the 4 mg tablets.           November 2017 Details    Sun Mon Tue Wed Thu Fri Sat        1      5 mg         2      5 mg         3      4 mg         4      5 mg           5      5 mg         6      4 mg         7      5 mg         8            9               10               11                 12               13               14               15               16               17               18                 19               20               21               22               23               24               25                 26               27               28               29               30                  Date Details   No additional details    Date of next INR:  11/8/2017         How to take your warfarin dose     To take:  4 mg Take 1 of the 4 mg tablets.    To take:  5 mg Take 1 of the 4 mg tablets and 1 of the 1 mg tablets.

## 2017-10-30 ENCOUNTER — OFFICE VISIT (OUTPATIENT)
Dept: CARDIOLOGY | Facility: CLINIC | Age: 82
End: 2017-10-30
Attending: INTERNAL MEDICINE
Payer: COMMERCIAL

## 2017-10-30 VITALS
DIASTOLIC BLOOD PRESSURE: 80 MMHG | BODY MASS INDEX: 33.29 KG/M2 | SYSTOLIC BLOOD PRESSURE: 174 MMHG | WEIGHT: 195 LBS | HEART RATE: 93 BPM | HEIGHT: 64 IN

## 2017-10-30 DIAGNOSIS — I50.22 CHRONIC SYSTOLIC CONGESTIVE HEART FAILURE (H): ICD-10-CM

## 2017-10-30 DIAGNOSIS — Z82.49 FH: MITRAL VALVE REPAIR: ICD-10-CM

## 2017-10-30 DIAGNOSIS — I25.10 CORONARY ARTERY DISEASE INVOLVING NATIVE CORONARY ARTERY OF NATIVE HEART WITHOUT ANGINA PECTORIS: ICD-10-CM

## 2017-10-30 DIAGNOSIS — E78.00 HYPERCHOLESTEROLEMIA: ICD-10-CM

## 2017-10-30 DIAGNOSIS — I10 BENIGN ESSENTIAL HYPERTENSION: ICD-10-CM

## 2017-10-30 DIAGNOSIS — I34.0 MITRAL VALVE INSUFFICIENCY, UNSPECIFIED ETIOLOGY: ICD-10-CM

## 2017-10-30 DIAGNOSIS — I48.20 CHRONIC ATRIAL FIBRILLATION (H): ICD-10-CM

## 2017-10-30 PROCEDURE — 99214 OFFICE O/P EST MOD 30 MIN: CPT | Performed by: INTERNAL MEDICINE

## 2017-10-30 RX ORDER — METOPROLOL SUCCINATE 50 MG/1
75 TABLET, EXTENDED RELEASE ORAL DAILY
Qty: 135 TABLET | Refills: 3 | Status: SHIPPED | OUTPATIENT
Start: 2017-10-30 | End: 2017-12-04

## 2017-10-30 NOTE — MR AVS SNAPSHOT
After Visit Summary   10/30/2017    Kenyatta Donald    MRN: [id number]           Patient Information     Date Of Birth          1935        Visit Information        Provider Department      10/30/2017 9:45 AM Chris Guan MD [facility] [place]        Today's Diagnoses     Hypercholesterolemia        Chronic systolic congestive heart failure (H)        Benign essential hypertension        Mitral valve insufficiency, unspecified etiology        Chronic atrial fibrillation (H)        FH: mitral valve repair        Coronary artery disease involving native coronary artery of native heart without angina pectoris           Follow-ups after your visit        Additional Services     Follow-Up with Cardiac Advanced Practice Provider           Follow-Up with Cardiologist                 Your next 10 appointments already scheduled     Nov 08, 2017  1:15 PM CST   Anticoagulation Visit with [facility]   [facility]    [address] [place] [id number]   [phone]              Future tests that were ordered for you today     Open Future Orders        Priority Expected Expires Ordered    Basic metabolic panel Routine 2/27/2018 10/30/2018 10/30/2017    Lipid Profile Routine 2/27/2018 10/30/2018 10/30/2017    ALT Routine 2/27/2018 10/30/2018 10/30/2017    Echocardiogram Routine 2/27/2018 10/30/2018 10/30/2017    Holter Monitor 24 hour - Adult Routine 2/27/2018 10/30/2018 10/30/2017    Follow-Up with Cardiologist Routine 2/27/2018 10/30/2018 10/30/2017    Basic metabolic panel Routine 11/29/2017 10/30/2018 10/30/2017    Follow-Up with Cardiac Advanced Practice Provider Routine 11/29/2017 10/30/2018 10/30/2017            Who to contact     If you have questions or need follow up information about today's clinic visit or your schedule please contact  "[facility] [place] directly at [phone].  Normal or non-critical lab and imaging results will be communicated to you by MyChart, letter or phone within 4 business days after the clinic has received the results. If you do not hear from us within 7 days, please contact the clinic through [employer] or phone. If you have a critical or abnormal lab result, we will notify you by phone as soon as possible.  Submit refill requests through [employer] or call your pharmacy and they will forward the refill request to us. Please allow 3 business days for your refill to be completed.          Additional Information About Your Visit        [employer] Information     [employer] lets you send messages to your doctor, view your test results, renew your prescriptions, schedule appointments and more. To sign up, go to [email or web address] . Click on \"Log in\" on the left side of the screen, which will take you to the Welcome page. Then click on \"Sign up Now\" on the right side of the page.     You will be asked to enter the access code listed below, as well as some personal information. Please follow the directions to create your username and password.     Your access code is: [email or web address]  Expires: 2017 12:53 PM     Your access code will  in 90 days. If you need help or a new code, please call your [facility] clinic or [phone].        Care EveryWhere ID     This is your Care EveryWhere ID. This could be used by other organizations to access your [facility] medical records  [id number]        Your Vitals Were     Pulse Height Last Period BMI (Body Mass Index)          93 1.638 m (5' 4.49\") (LMP Unknown) 32.97 kg/m2         Blood Pressure from Last 3 Encounters:   10/30/17 174/80   17 128/52   17 127/61    Weight from Last 3 Encounters:   10/30/17 88.5 kg (195 lb)   17 86.2 kg (190 lb)   17 86.5 kg (190 lb 9.6 oz)              We Performed the Following     Follow-Up " with Cardiologist          Today's Medication Changes          These changes are accurate as of: 10/30/17 11:01 AM.  If you have any questions, ask your nurse or doctor.               These medicines have changed or have updated prescriptions.        Dose/Directions    metoprolol 50 MG 24 hr tablet   Commonly known as:  TOPROL-XL   This may have changed:  how much to take   Used for:  Coronary artery disease involving native coronary artery of native heart without angina pectoris   Changed by:  Chris Guan MD        Dose:  75 mg   Take 1.5 tablets (75 mg) by mouth daily   Quantity:  135 tablet   Refills:  3            Where to get your medicines      These medications were sent to [id number] - [facility] [address] AT [facility] [address]  [address], [facility] [address]     Phone:  [phone]     metoprolol 50 MG 24 hr tablet                Primary Care Provider Office Phone # Fax #    Bess Lion [phone] [address]  [facility] [address]        Equal Access to Services     MAGALYS KIMBALL : Hadii aad ku hadasho Soomaali, waaxda luqadaha, qaybta kaalmada adeanselmoyada, emma akers. So [facility] [phone].    ATENCIÓN: Si habla español, tiene a duenas disposición servicios gratbernieos de asistencia lingüística. [facility] [phone].    We comply with applicable federal civil rights laws and [place] laws. We do not discriminate on the basis of race, color, national origin, age, disability, sex, sexual orientation, or gender identity.            Thank you!     Thank you for choosing [facility] AT [place]  for your care. Our goal is always to provide you with excellent care. Hearing back from our patients is one way we can continue to improve our services. Please take a few minutes to complete the written survey that you may receive in the mail after your visit with  us. Thank you!             Your Updated Medication List - Protect others around you: Learn how to safely use, store and throw away your medicines at www.disposemymeds.org.          This list is accurate as of: 10/30/17 11:01 AM.  Always use your most recent med list.                   Brand Name Dispense Instructions for use Diagnosis    acetaminophen 325 MG tablet    TYLENOL    100 tablet    Take 2 tablets (650 mg) by mouth every 4 hours as needed for mild pain    Closed nondisplaced fracture of left patella, unspecified fracture morphology, initial encounter       albuterol 108 (90 BASE) MCG/ACT Inhaler    PROAIR HFA/PROVENTIL HFA/VENTOLIN HFA     Inhale 2 puffs into the lungs every 6 hours as needed for shortness of breath / dyspnea or wheezing        allopurinol 100 MG tablet    ZYLOPRIM    90 tablet    TAKE 1 TABLET BY MOUTH EVERY DAY    Gout       amLODIPine 5 MG tablet    NORVASC    180 tablet    Take 1 tablet (5 mg) by mouth 2 times daily    Benign essential hypertension       amoxicillin 500 MG capsule    AMOXIL    4 capsule    Take 4 capsules by mouth 30 minutes prior to dental work    Mitral valve insufficiency       cholecalciferol 1000 UNIT tablet    vitamin D3     Take 1,000 Units by mouth daily        furosemide 20 MG tablet    LASIX    90 tablet    TAKE 1 TABLET BY MOUTH EVERY MORNING FOR FLUID RETENTION    Chronic systolic congestive heart failure (H)       levothyroxine 100 MCG tablet    SYNTHROID/LEVOTHROID    90 tablet    Take 1 tablet (100 mcg) by mouth daily    Acquired hypothyroidism       lisinopril 20 MG tablet    PRINIVIL/ZESTRIL    180 tablet    Take 1 tablet (20 mg) by mouth 2 times daily    Essential hypertension, benign       metoprolol 50 MG 24 hr tablet    TOPROL-XL    135 tablet    Take 1.5 tablets (75 mg) by mouth daily    Coronary artery disease involving native coronary artery of native heart without angina pectoris       nystatin [id number] UNIT/GM Powd    MYCOSTATIN    60 g    Apply  topically 3 times daily as needed    Intertrigo       order for DME     1 each    Equipment being ordered: mastectomy bras & prostheses  S/po mastectomy of unknown side     C50.919    Malignant neoplasm of female breast, unspecified laterality, unspecified site of breast       simvastatin 40 MG tablet    ZOCOR    45 tablet    Take 0.5 tablets (20 mg) by mouth At Bedtime    Mixed hyperlipidemia       warfarin 4 MG tablet    COUMADIN    135 tablet    Take 1 tablet (4 mg) by mouth daily Taking 4 mg m,w,f & 4mg + 1 mg rest of week    Atrial fibrillation, unspecified type (H), History of pulmonary embolism

## 2017-10-30 NOTE — PROGRESS NOTES
HISTORY OF PRESENT ILLNESS:  The patient is an 81-year-old overweight white female who presents to Cardiology Clinic for followup of atrial fibrillation, sometimes difficult to control hypertension and hypercholesterolemia.  There is no history of cigarette smoking, diabetes mellitus or family history of premature coronary disease.  She is unaware of an irregular heartbeat.  She has a diagnosis of atrial fibrillation which has been treated primarily with aggressive rate control and anticoagulation.      In the summer of 2015, she was hospitalized at [facility] because of volume overload consistent with diastolic congestive heart failure related to atrial fibrillation with rapid ventricular response which responded nicely to diuretic therapy.  She has continued to maintain a stable weight.      In the fall of 2016, she injured her knee which required admission to a TCU.  She has slowly improved but still needs to use a walker.  She denies arleth chest pain, shortness of breath, dizziness, palpitations, nausea, vomiting, diaphoresis or syncope.  She has reasonable sleep and appetite at this time.  She does have easy fatigability and general malaise.        Of note is that she is status post mitral valve clip placement on 01/09/2015 with subsequent moderate mitral insufficiency noted, which is an improvement from moderately severe to severe insufficiency noted prior to that time.        A Holter monitor was performed earlier this year demonstrated a heart rate varying from , average rate of 69, with the longest pause of 3 seconds.  There were rare ventricular ectopic beats.  No significant symptoms noted.  Echocardiogram performed earlier this spring showed a mitral valve clip prosthesis present with moderate mitral insufficiency with a mean mitral valve gradient of 7.7 mm, consistent with moderate mitral stenosis.  There was moderate tricuspid insufficiency with mild to moderate increase in right  ventricular systolic pressure of 36 mmHg plus right atrial pressure.  Left ventricle was normal in size and function with ejection fraction 60%-65%.  No changes were noted from previous study in 2016, although gradient across the mitral valve clip did not have comment.      MEDICATIONS:   1.  Metoprolol XL 50 mg a day.   2.  Allopurinol 100 mg a day.   3.  Furosemide 20 mg a day.   4.  Simvastatin 20 mg at bedtime.   5.  Levothyroxine 100 mcg a day.   6.  Amoxicillin as needed, primarily for antibiotic prophylaxis for dental and general surgical procedures   7.  Warfarin.   8.  Amlodipine 5 mg twice a day.   9.  Lisinopril 20 mg twice a day.   10.  Vitamin D 1000 units a day.   11.  Acetaminophen 650 mg as needed for pain.   12.  Albuterol as needed.      The patient has had significant pain from either lower back degenerative joint disease or some diffuse muscle disorder.      LABORATORY DATA:  Most recent laboratory data demonstrated cholesterol 154, HDL 51, LDL 76, triglyceride 136.  Sodium 138, potassium 4.7, BUN 19, creatinine 0.73.  TSH 0.53.  ALT less than 5.  INR 2.2.      PHYSICAL EXAMINATION:   VITAL SIGNS:  Blood pressure 150/77 (second reading), heart rate  and regular.  Weight was 195 pounds which is up 5 pounds from previous clinic visit.   NECK:  Without obvious jugular venous distention, with jugular venous pressure of 4-5 cm.  No carotid bruit or palpable thyroid.   CHEST:  Essentially clear to percussion and auscultation, with slight decreased breath sounds at bases with isolated crackles.   CARDIAC:  Revealed an irregular rhythm, variable S1, 2/6 holosystolic murmur heard best at the apex radiating to the axilla.  No diastolic murmur, rub or S3.   EXTREMITIES:  Showed 1 to 2+ without cyanosis or edema.      CLINICAL IMPRESSION:   1.  Relatively stable cardiac condition.   2.  Status post episode of apparent diastolic congestive heart failure treated with diuretic therapy in 08/2015.   3.   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   4.  Mitral insufficiency, improved mitral valve clip with gradient, mild to moderate gradient across the mitral valve leaflets.   5.  Hypertension with slight elevation in systolic blood pressure.   6.  Hyperlipidemia, at goal.   7.  History of sleep apnea.   8.  History of breast cancer.   9.  Gastroesophageal reflux disease.   10.  History of osteoarthritis and knee replacement surgery as well as degenerative joint disease.      DISCUSSION:  The patient has done reasonably well since last clinic visit, although she has multiple medical problems.  Her primary problem at this time is that of pain related to her diffuse muscle aches and pain and degenerative joint disease.  She will work with her orthopedic and primary care physicians for better pain control.  Injections in her lower back did not improve the increased pain.  The increased pain may be contributing to the elevated systolic blood pressure as well as elevated heart rate.  She will be cautiously increased from 50 mg a day of metoprolol XL to 75 mg a day for better blood pressure control and heart rate control, especially in light of her chronic pain.  It should be noted that she took atenolol at 25 mg a day and was switched to metoprolol shortage of shortness of atenolol.  The patient is comfortable with this approach at the present time.  She will need close followup of serum lipids, basic metabolic panel and blood pressure.  She will need continued anticoagulation and antibiotic prophylaxis for dental and general surgical procedures.      RECOMMENDATIONS:   1.  Continue present medications, increasing metoprolol XL slightly to 75 mg q.a.m.   2.  Close followup of serum lipids, basic metabolic panel and blood pressure with followup with Bre Razo or Patience Weathers in 1-2 months.   3.  Anticoagulation.   4.  Antibiotic prophylaxis for dental and general surgical  procedures.   5.  Diet and exercise, avoiding caffeine and salt.   6.  Holter monitor in 4-6 months to follow rhythm.   7.  Echocardiogram in 4-6 months to follow left ventricular function and mitral valve function.   8.  Routine medical followup.   9.  Cardiology followup in 4-6 months.      cc:      Bess Lion MD    [facility]   [address] [facility] [place] [address]         MATT RABAGO MD, [facility]             D: 10/30/2017 11:41   T: 10/30/2017 13:54   MT: MONI      Name:     DARLENE FRIAS   MRN:      [id number]        Account:      [id number]   :      1935           Service Date: 10/30/2017      Document: [id number]

## 2017-10-30 NOTE — LETTER
10/30/2017    Bess Lion MD  [address]  [facility] [address]    RE: Kenyatta Donald       Dear Colleague,    I had the pleasure of seeing Kenyatta Donald in the [facility].    The patient is an 81-year-old overweight white female who presents to Cardiology Clinic for followup of atrial fibrillation, sometimes difficult to control hypertension and hypercholesterolemia.  There is no history of cigarette smoking, diabetes mellitus or family history of premature coronary disease.  She is unaware of an irregular heartbeat.  She has a diagnosis of atrial fibrillation which has been treated primarily with aggressive rate control and anticoagulation.      In the summer of 2015, she was hospitalized at [facility] because of volume overload consistent with diastolic congestive heart failure related to atrial fibrillation with rapid ventricular response which responded nicely to diuretic therapy.  She has continued to maintain a stable weight.      In the fall of 2016, she injured her knee which required admission to a TCU.  She has slowly improved but still needs to use a walker.  She denies arleth chest pain, shortness of breath, dizziness, palpitations, nausea, vomiting, diaphoresis or syncope.  She has reasonable sleep and appetite at this time.  She does have easy fatigability and general malaise.        Of note is that she is status post mitral valve clip placement on 01/09/2015 with subsequent moderate mitral insufficiency noted, which is an improvement from moderately severe to severe insufficiency noted prior to that time.        A Holter monitor was performed earlier this year demonstrated a heart rate varying from , average rate of 69, with the longest pause of 3 seconds.  There were rare ventricular ectopic beats.  No significant symptoms noted.  Echocardiogram performed earlier this spring showed a mitral valve clip prosthesis present with moderate mitral  insufficiency with a mean mitral valve gradient of 7.7 mm, consistent with moderate mitral stenosis.  There was moderate tricuspid insufficiency with mild to moderate increase in right ventricular systolic pressure of 36 mmHg plus right atrial pressure.  Left ventricle was normal in size and function with ejection fraction 60%-65%.  No changes were noted from previous study in 2016, although gradient across the mitral valve clip did not have comment.      MEDICATIONS:   1.  Metoprolol XL 50 mg a day.   2.  Allopurinol 100 mg a day.   3.  Furosemide 20 mg a day.   4.  Simvastatin 20 mg at bedtime.   5.  Levothyroxine 100 mcg a day.   6.  Amoxicillin as needed, primarily for antibiotic prophylaxis for dental and general surgical procedures   7.  Warfarin.   8.  Amlodipine 5 mg twice a day.   9.  Lisinopril 20 mg twice a day.   10.  Vitamin D 1000 units a day.   11.  Acetaminophen 650 mg as needed for pain.   12.  Albuterol as needed.      The patient has had significant pain from either lower back degenerative joint disease or some diffuse muscle disorder.      LABORATORY DATA:  Most recent laboratory data demonstrated cholesterol 154, HDL 51, LDL 76, triglyceride 136.  Sodium 138, potassium 4.7, BUN 19, creatinine 0.73.  TSH 0.53.  ALT less than 5.  INR 2.2.      PHYSICAL EXAMINATION:   VITAL SIGNS:  Blood pressure 150/77 (second reading), heart rate  and regular.  Weight was 195 pounds which is up 5 pounds from previous clinic visit.   NECK:  Without obvious jugular venous distention, with jugular venous pressure of 4-5 cm.  No carotid bruit or palpable thyroid.   CHEST:  Essentially clear to percussion and auscultation, with slight decreased breath sounds at bases with isolated crackles.   CARDIAC:  Revealed an irregular rhythm, variable S1, 2/6 holosystolic murmur heard best at the apex radiating to the axilla.  No diastolic murmur, rub or S3.   EXTREMITIES:  Showed 1 to 2+ without cyanosis or edema.       CLINICAL IMPRESSION:   1.  Relatively stable cardiac condition.   2.  Status post episode of apparent diastolic congestive heart failure treated with diuretic therapy in 08/2015.   3.  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   4.  Mitral insufficiency, improved mitral valve clip with gradient, mild to moderate gradient across the mitral valve leaflets.   5.  Hypertension with slight elevation in systolic blood pressure.   6.  Hyperlipidemia, at goal.   7.  History of sleep apnea.   8.  History of breast cancer.   9.  Gastroesophageal reflux disease.   10.  History of osteoarthritis and knee replacement surgery as well as degenerative joint disease.      DISCUSSION:  The patient has done reasonably well since last clinic visit, although she has multiple medical problems.  Her primary problem at this time is that of pain related to her diffuse muscle aches and pain and degenerative joint disease.  She will work with her orthopedic and primary care physicians for better pain control.  Injections in her lower back did not improve the increased pain.  The increased pain may be contributing to the elevated systolic blood pressure as well as elevated heart rate.  She will be cautiously increased from 50 mg a day of metoprolol XL to 75 mg a day for better blood pressure control and heart rate control, especially in light of her chronic pain.  It should be noted that she took atenolol at 25 mg a day and was switched to metoprolol shortage of shortness of atenolol.  The patient is comfortable with this approach at the present time.  She will need close followup of serum lipids, basic metabolic panel and blood pressure.  She will need continued anticoagulation and antibiotic prophylaxis for dental and general surgical procedures.      RECOMMENDATIONS:   1.  Continue present medications, increasing metoprolol XL slightly to 75 mg q.a.m.   2.  Close followup of serum lipids, basic  metabolic panel and blood pressure with followup with Bre Razo or Patience Weathers in 1-2 months.   3.  Anticoagulation.   4.  Antibiotic prophylaxis for dental and general surgical procedures.   5.  Diet and exercise, avoiding caffeine and salt.   6.  Holter monitor in 4-6 months to follow rhythm.   7.  Echocardiogram in 4-6 months to follow left ventricular function and mitral valve function.   8.  Routine medical followup.   9.  Cardiology followup in 4-6 months.     Again, thank you for allowing me to participate in the care of your patient.      Sincerely,    Chris Guan MD     [facility]

## 2017-11-08 ENCOUNTER — ANTICOAGULATION THERAPY VISIT (OUTPATIENT)
Dept: NURSING | Facility: CLINIC | Age: 82
End: 2017-11-08
Payer: COMMERCIAL

## 2017-11-08 DIAGNOSIS — Z79.01 LONG-TERM (CURRENT) USE OF ANTICOAGULANTS: ICD-10-CM

## 2017-11-08 DIAGNOSIS — I48.91 ATRIAL FIBRILLATION (H): ICD-10-CM

## 2017-11-08 LAB
INR POINT OF CARE: 2.6 (ref 2.5–3.5)
INR POINT OF CARE: 2.6 (ref 2–3)

## 2017-11-08 PROCEDURE — 36416 COLLJ CAPILLARY BLOOD SPEC: CPT

## 2017-11-08 PROCEDURE — 85610 PROTHROMBIN TIME: CPT | Mod: QW

## 2017-11-08 PROCEDURE — 99207 ZZC NO CHARGE NURSE ONLY: CPT

## 2017-11-08 NOTE — MR AVS SNAPSHOT
Kenyatta Donald   11/8/2017 1:15 PM   Anticoagulation Therapy Visit    Description:  81 year old female   Provider:  [facility]   Department:  B[facility] Nurse           INR as of 11/8/2017     Today's INR 2.6      Anticoagulation Summary as of 11/8/2017     INR goal 2.5-3.5   Today's INR 2.6   Full instructions 4 mg on Mon, Fri; 5 mg all other days   Next INR check 12/6/2017    Indications   Long-term (current) use of anticoagulants [Z79.01] [Z79.01]  Atrial fibrillation (H) [I48.91]  Mitral valve disorder s/po 1-9-15 remodeling percut  + clip  (Resolved) [I05.9]         Your next Anticoagulation Clinic appointment(s)     Dec 06, 2017  1:00 PM CST   Anticoagulation Visit with  ANTICOAGULATION CLINIC   [facility] ([facility])    [address] [place] [id number] [phone]              Contact Numbers     [facility]  Please call  [phone] to cancel and/or reschedule your appointment   The direct line to the anticoagulant nurse is [phone] on Monday, Wednesday, and Friday. On Thursday, the anticoagulant nurse can be reached directly at [phone].         November 2017 Details    Sun Mon Tue Wed Thu Fri Sat        1               2               3               4                 5               6               7               8      5 mg   See details      9      5 mg         10      4 mg         11      5 mg           12      5 mg         13      4 mg         14      5 mg         15      5 mg         16      5 mg         17      4 mg         18      5 mg           19      5 mg         20      4 mg         21      5 mg         22      5 mg         23      5 mg         24      4 mg         25      5 mg           26      5 mg         27      4 mg         28      5 mg         29      5 mg         30      5 mg            Date Details   11/08 This INR check               How to take your warfarin  dose     To take:  4 mg Take 1 of the 4 mg tablets.    To take:  5 mg Take 1 of the 4 mg tablets and 1 of the 1 mg tablets.           December 2017 Details    Sun Mon Tue Wed Thu Fri Sat          1      4 mg         2      5 mg           3      5 mg         4      4 mg         5      5 mg         6            7               8               9                 10               11               12               13               14               15               16                 17               18               19               20               21               22               23                 24               25               26               27               28               29               30                 31                      Date Details   No additional details    Date of next INR:  12/6/2017         How to take your warfarin dose     To take:  4 mg Take 1 of the 4 mg tablets.    To take:  5 mg Take 1 of the 4 mg tablets and 1 of the 1 mg tablets.

## 2017-11-08 NOTE — PROGRESS NOTES
ANTICOAGULATION FOLLOW-UP CLINIC VISIT    Patient Name:  Kenyatta Donald  Date:  11/8/2017  Contact Type:  Face to Face    SUBJECTIVE:     Patient Findings     Positives Inflammation           OBJECTIVE    INR Protime   Date Value Ref Range Status   11/08/2017 2.6 2.0 - 3.0 Final   11/08/2017 2.6 2.5 - 3.5 Final       ASSESSMENT / PLAN  INR assessment THER    Recheck INR In: 4 WEEKS    INR Location Clinic      Anticoagulation Summary as of 11/8/2017     INR goal 2.5-3.5   Today's INR 2.6   Maintenance plan 4 mg (4 mg x 1) on Mon, Fri; 5 mg (4 mg x 1 and 1 mg x 1) all other days   Full instructions 4 mg on Mon, Fri; 5 mg all other days   Weekly total 33 mg   No change documented Lisa Draper RN   Plan last modified Nancy Zavala RN (8/21/2017)   Next INR check 12/6/2017   Priority INR   Target end date     Indications   Long-term (current) use of anticoagulants [Z79.01] [Z79.01]  Atrial fibrillation (H) [I48.91]  Mitral valve disorder s/po 1-9-15 remodeling percut  + clip  (Resolved) [I05.9]         Anticoagulation Episode Summary     INR check location [facility]    Preferred lab     Send INR reminders to  [facility]    Comments       Anticoagulation Care Providers     Provider Role Specialty Phone number    Bess Lion Mirna Pyle MD [facility] [phone]            See the Encounter Report to view Anticoagulation Flowsheet and Dosing Calendar (Go to Encounters tab in chart review, and find the Anticoagulation Therapy Visit)        Lisa Draper RN

## 2017-11-09 ENCOUNTER — OFFICE VISIT (OUTPATIENT)
Dept: FAMILY MEDICINE | Facility: CLINIC | Age: 82
End: 2017-11-09
Payer: COMMERCIAL

## 2017-11-09 VITALS
HEIGHT: 64 IN | TEMPERATURE: 97 F | OXYGEN SATURATION: 97 % | HEART RATE: 87 BPM | BODY MASS INDEX: 33.46 KG/M2 | SYSTOLIC BLOOD PRESSURE: 130 MMHG | WEIGHT: 196 LBS | DIASTOLIC BLOOD PRESSURE: 80 MMHG | RESPIRATION RATE: 12 BRPM

## 2017-11-09 DIAGNOSIS — I48.20 CHRONIC ATRIAL FIBRILLATION (H): ICD-10-CM

## 2017-11-09 DIAGNOSIS — I49.5 BRADY-TACHY SYNDROME (H): ICD-10-CM

## 2017-11-09 DIAGNOSIS — M54.6 PAIN IN THORACIC SPINE: ICD-10-CM

## 2017-11-09 DIAGNOSIS — E66.811 CLASS 1 OBESITY DUE TO EXCESS CALORIES WITH SERIOUS COMORBIDITY AND BODY MASS INDEX (BMI) OF 33.0 TO 33.9 IN ADULT: ICD-10-CM

## 2017-11-09 DIAGNOSIS — E66.09 CLASS 1 OBESITY DUE TO EXCESS CALORIES WITH SERIOUS COMORBIDITY AND BODY MASS INDEX (BMI) OF 33.0 TO 33.9 IN ADULT: ICD-10-CM

## 2017-11-09 DIAGNOSIS — M25.511 CHRONIC PAIN OF BOTH SHOULDERS: ICD-10-CM

## 2017-11-09 DIAGNOSIS — G89.29 CHRONIC PAIN OF BOTH SHOULDERS: ICD-10-CM

## 2017-11-09 DIAGNOSIS — I50.22 CHRONIC SYSTOLIC CONGESTIVE HEART FAILURE (H): ICD-10-CM

## 2017-11-09 DIAGNOSIS — M54.2 CERVICALGIA: ICD-10-CM

## 2017-11-09 DIAGNOSIS — G12.1 SPINAL MUSCULAR ATROPHY TYPE III (H): ICD-10-CM

## 2017-11-09 DIAGNOSIS — M54.41 ACUTE RIGHT-SIDED LOW BACK PAIN WITH RIGHT-SIDED SCIATICA: ICD-10-CM

## 2017-11-09 DIAGNOSIS — I89.0 LYMPHEDEMA OF BOTH LOWER EXTREMITIES: Primary | ICD-10-CM

## 2017-11-09 DIAGNOSIS — M25.512 CHRONIC PAIN OF BOTH SHOULDERS: ICD-10-CM

## 2017-11-09 PROCEDURE — 99214 OFFICE O/P EST MOD 30 MIN: CPT | Performed by: FAMILY MEDICINE

## 2017-11-09 NOTE — PROGRESS NOTES
SUBJECTIVE:   Kenyatta Donald is a 81 year old female who presents to clinic today for the following health issues:    Musculoskeletal problem/pain: Progressive Muscular Dystrophy with increasing pain , анна in joints she uses to stand etc  ie the upper shoulder girdle and the back       Duration: Chronic & worsening     Description  Location: Shoulder and Upper Legs    Intensity:  severe    Accompanying signs and symptoms: none    History  Previous similar problem: no   Previous evaluation:  none    Precipitating or alleviating factors:  Trauma or overuse: no   Aggravating factors include: sitting, standing, walking and lifting    Therapies tried and outcome: rest/inactivity and acetaminophen but only 2 of the 325 mgm     LYPHEDEMA OF LEGS       Duration: chronic but worse lately as is becoming more & more sedentary as the m dystrophy progresses , weakening her     Description (location/character/radiation): full to pitting lege     Intensity:  moderate    Accompanying signs and symptoms: ache     History (similar episodes/previous evaluation): None    Precipitating or alleviating factors: above    Therapies tried and outcome: None         Problem list and histories reviewed & adjusted, as indicated.  Additional history: as documented    Labs reviewed in EPIC    Reviewed and updated as needed this visit by clinical staff  Tobacco  Allergies  Meds  Problems  Med Hx  Surg Hx  Fam Hx  Soc Hx        Reviewed and updated as needed this visit by Provider         ROS:  C: NEGATIVE for fever, chills, change in weight-gaining  I: NEGATIVE for worrisome rashes, moles or lesions  E: NEGATIVE for vision changes or irritation  E/M: NEGATIVE for ear, mouth and throat problems  R: NEGATIVE for significant cough or SOB  B: NEGATIVE for masses, tenderness or discharge  CV: NEGATIVE for chest pain, palpitations or peripheral edema  GI: NEGATIVE for nausea, abdominal pain, heartburn, or change in bowel habits  : NEGATIVE  "for frequency, dysuria, or hematuria  MUSCULOSKELETAL:POSITIVE  for , back pain, joint stiffness , neck pain and radicular pain --shoulder ggirdle pain + weakness   N: NEGATIVE for weakness, dizziness or paresthesias  E: NEGATIVE for temperature intolerance, skin/hair changes  H: NEGATIVE for bleeding problems  P: NEGATIVE for changes in mood or affect    OBJECTIVE:     /80  Pulse 87  Temp 97  F (36.1  C) (Tympanic)  Resp 12  Ht 5' 4.49\" (1.638 m)  Wt 196 lb (88.9 kg)  LMP  (LMP Unknown)  SpO2 97%  Breastfeeding? No  BMI 33.14 kg/m2  Body mass index is 33.14 kg/(m^2).  GENERAL: healthy, alert, no distress, obese and elderly  EYES: Eyes grossly normal to inspection, PERRL and conjunctivae and sclerae normal  NECK: no adenopathy, no asymmetry, masses, or scars and thyroid normal to palpation  RESP: lungs clear to auscultation - no rales, rhonchi or wheezes  CV: regular rate and rhythm, normal S1 S2, no S3 or S4, no murmur, click or rub, no peripheral edema and peripheral pulses strong  MS: no gross musculoskeletal defects noted, no edema; sits partially bent over, uses a walker and lifts ft to walk; has to rock to get momentum to stand and fall onto walker   SKIN: no suspicious lesions or rashes  NEURO: Normal strength and tone, mentation intact and speech normal  PSYCH: mentation appears normal, affect normal/bright    Diagnostic Test Results:  none     ASSESSMENT/PLAN:               ICD-10-CM    1. Lymphedema of both lower extremities-worsening  I89.0 order for DME   2. Juan Pablo-tachy syndrome (H) I49.5    3. Chronic atrial fibrillation (H) I48.2    4. Chronic systolic congestive heart failure (H) I50.22    5. Pain in thoracic spine M54.6    6. Cervicalgia M54.2    7. Spinal muscular atrophy type III -onset 43y/o G12.1    8. Acute right-sided low back pain with right-sided sciatica M54.41    9. Chronic pain of both shoulders M25.511     G89.29     M25.512    10. Class 1 obesity due to excess calories " with serious comorbidity and body mass index (BMI) of 33.0 to 33.9 in adult E66.09     Z68.33            Bess Lion MD  [facility]    Patient Instructions   1. ICE  decreases inflammation & kills the pain coming from the nerves     WARM SOAKS/PACKS  Relax & loosen up tight muscles to reduce the pain of the        muscle tightness & spasm    2. No difference was  Noted by patients in a double blind study when given codeine, tylenol ( acetaminophen) or ibuprofen (all in identical pills). They felt no difference in pain relief. Since ibuprofen and the NSAIDs  causes kidney damage, esophageal damage with heartburn, and can increase the risk of esophageal and stomach cancer and ulcers,and colonic strictures. They also cause increased risk of heart attack .   I recommend that you use tylenol(acetaminophen) for pain. Use the acetaminophen ES  Which has 500mgm/tablet You can take up to 2 tablets 4 times a day as need for pain.  If this is not enough, you can add in ibuprofen or aleve(naprosyn) with 2 glasses of fluid and some food-to protect the stomach and esophagus. Please let us know if you are continuing to take ibuprofen or aleve, as we will need to periodically check your kidney function with a blood test.  j500gm up to 8 /day   Reg strength up to 12 / day   Total of < 4000mgm a day     3.  Weight Loss Tips  1. Do not eat after 6 hrs before your expected bedtime  2. Have your heaviest meal for breakfast, a slightly lighter meal at lunch and a snack 6 hrs before bed  3. No sugar/calorie drinks except milk ie no fruit juice, pop, alcohol.  4. Drink milk 30min before meals to decrease your hunger. Also it is excellent as part of your last meal of the day snack  5. Drink lots of water  6. Increase fiber in diet: all bran cereal, salads, popcorn etc  7. Have only one small serving of fruit a day about 1/2 cup (as this is high in sugar)  8. EXERCISE is the bottom line. Without it, you  will gain weight even on a low calorie diet. Best if done 2-3X a day as can    Being overweight contributes to high blood pressure and high cholesterol, both of which cause heart attacks, strokes and kidney failure, prediabetes and diabetes, arthritis, and liver disease     3. We discussed the problem with narcotics incl constipation, increasing pain, falls , addiction & that once started for her pain fro m the progressing muscular dystrophy , she would need tocontinue   Better not to start   1st increase the tylenol as above as she is only on 2 a d of 325 mgm now     4. Keep the injured area above the level of the heart as much as possible to help decrease the pain and  the healing time . Put pillows on either side while sleeping to keep the arm or leg elevated     Discussed all with pt  And the patient expresses understanding  That the edema is worsening as with the progression of the muscular dystrophy, tho she has persisted to try to stay active, it is becoming more &more difficult so she does sit longer with legs dependent and never makes an effort to Keep the injured area above the level of the heart as much as possible to help decrease the pain and  the healing time . Put pillows on either side while sleeping to keep the arm or leg elevated   whe will in the future + support hose --would avoid starting diuretics as long as can     I  Explained the treatment and the reason for it   And she agrees     .Weight management plan: Established an exercise regimen with the patient. Activity goal: 3 x a daay. New exercise routine: walking and weightlifting. Diet regimen was discussed and plan is self-directed dieting: reduce calories, reduce portions, reduce carbs, avoid sweets and fruits.m  Weight management plan: Discussed healthy diet and exercise guidelines and patient will follow up in 1 month in clinic to re-evaluate.    Time spent with the patient 32mins, more than 50% in counseling and coordinating care, Re  above medical problems.  See all above discussion done with pt  And eval and anticipation       Bess Lion MD

## 2017-11-09 NOTE — PATIENT INSTRUCTIONS
1. ICE  decreases inflammation & kills the pain coming from the nerves     WARM SOAKS/PACKS  Relax & loosen up tight muscles to reduce the pain of the        muscle tightness & spasm    2. No difference was  Noted by patients in a double blind study when given codeine, tylenol ( acetaminophen) or ibuprofen (all in identical pills). They felt no difference in pain relief. Since ibuprofen and the NSAIDs  causes kidney damage, esophageal damage with heartburn, and can increase the risk of esophageal and stomach cancer and ulcers,and colonic strictures. They also cause increased risk of heart attack .   I recommend that you use tylenol(acetaminophen) for pain. Use the acetaminophen ES  Which has 500mgm/tablet You can take up to 2 tablets 4 times a day as need for pain.  If this is not enough, you can add in ibuprofen or aleve(naprosyn) with 2 glasses of fluid and some food-to protect the stomach and esophagus. Please let us know if you are continuing to take ibuprofen or aleve, as we will need to periodically check your kidney function with a blood test.  j500gm up to 8 /day   Reg strength up to 12 / day   Total of < 4000mgm a day     3.  Weight Loss Tips  1. Do not eat after 6 hrs before your expected bedtime  2. Have your heaviest meal for breakfast, a slightly lighter meal at lunch and a snack 6 hrs before bed  3. No sugar/calorie drinks except milk ie no fruit juice, pop, alcohol.  4. Drink milk 30min before meals to decrease your hunger. Also it is excellent as part of your last meal of the day snack  5. Drink lots of water  6. Increase fiber in diet: all bran cereal, salads, popcorn etc  7. Have only one small serving of fruit a day about 1/2 cup (as this is high in sugar)  8. EXERCISE is the bottom line. Without it, you will gain weight even on a low calorie diet. Best if done 2-3X a day as can    Being overweight contributes to high blood pressure and high cholesterol, both of which cause heart attacks, strokes  and kidney failure, prediabetes and diabetes, arthritis, and liver disease     3. We discussed the problem with narcotics incl constipation, increasing pain, falls , addiction & that once started for her pain fro m the progressing muscular dystrophy , she would need tocontinue   Better not to start   1st increase the tylenol as above as she is only on 2 a d of 325 mgm now     4. Keep the injured area above the level of the heart as much as possible to help decrease the pain and  the healing time . Put pillows on either side while sleeping to keep the arm or leg elevated

## 2017-11-09 NOTE — MR AVS SNAPSHOT
After Visit Summary   11/9/2017    Kenyatta Donald    MRN: [id number]           Patient Information     Date Of Birth          1935        Visit Information        Provider Department      11/9/2017 1:20 PM Bess Lion MD [facility]        Today's Diagnoses     Need for prophylactic vaccination and inoculation against influenza    -  1    Lymphedema of both lower extremities-worsening           Care Instructions    1. ICE  decreases inflammation & kills the pain coming from the nerves     WARM SOAKS/PACKS  Relax & loosen up tight muscles to reduce the pain of the        muscle tightness & spasm    2. No difference was  Noted by patients in a double blind study when given codeine, tylenol ( acetaminophen) or ibuprofen (all in identical pills). They felt no difference in pain relief. Since ibuprofen and the NSAIDs  causes kidney damage, esophageal damage with heartburn, and can increase the risk of esophageal and stomach cancer and ulcers,and colonic strictures. They also cause increased risk of heart attack .   I recommend that you use tylenol(acetaminophen) for pain. Use the acetaminophen ES  Which has 500mgm/tablet You can take up to 2 tablets 4 times a day as need for pain.  If this is not enough, you can add in ibuprofen or aleve(naprosyn) with 2 glasses of fluid and some food-to protect the stomach and esophagus. Please let us know if you are continuing to take ibuprofen or aleve, as we will need to periodically check your kidney function with a blood test.  j500gm up to 8 /day   Reg strength up to 12 / day   Total of < 4000mgm a day     3.  Weight Loss Tips  1. Do not eat after 6 hrs before your expected bedtime  2. Have your heaviest meal for breakfast, a slightly lighter meal at lunch and a snack 6 hrs before bed  3. No sugar/calorie drinks except milk ie no fruit juice, pop, alcohol.  4. Drink milk 30min before meals to decrease your hunger.  Also it is excellent as part of your last meal of the day snack  5. Drink lots of water  6. Increase fiber in diet: all bran cereal, salads, popcorn etc  7. Have only one small serving of fruit a day about 1/2 cup (as this is high in sugar)  8. EXERCISE is the bottom line. Without it, you will gain weight even on a low calorie diet. Best if done 2-3X a day as can    Being overweight contributes to high blood pressure and high cholesterol, both of which cause heart attacks, strokes and kidney failure, prediabetes and diabetes, arthritis, and liver disease     3. We discussed the problem with narcotics incl constipation, increasing pain, falls , addiction     4. Keep the injured area above the level of the heart as much as possible to help decrease the pain and  the healing time . Put pillows on either side while sleeping to keep the arm or leg elevated             Follow-ups after your visit        Follow-up notes from your care team     Return in about 3 months (around 2/9/2018) for Routine Visit.      Your next 10 appointments already scheduled     Dec 01, 2017  2:30 PM CST   LAB with [facility]   [facility] [place] ([facility])    [address] [place] [id number]   [phone]           Please do not eat 10-12 hours before your appointment if you are coming in fasting for labs on lipids, cholesterol, or glucose (sugar). This does not apply to pregnant women. Water, hot tea and black coffee (with nothing added) are okay. Do not drink other fluids, diet soda or chew gum.            Dec 01, 2017  3:30 PM CST   Return Visit with REBEKAH [facility] ([facility])    [address] [place] [id number]   [phone]            Dec 06, 2017  1:00 PM CST   Anticoagulation Visit with  ANTICOAGULATION CLINIC   [facility]  "[facility])    [address] [id number] [phone]              Who to contact     If you have questions or need follow up information about today's clinic visit or your schedule please contact [facility] directly at [phone].  Normal or non-critical lab and imaging results will be communicated to you by MyChart, letter or phone within 4 business days after the clinic has received the results. If you do not hear from us within 7 days, please contact the clinic through MyChart or phone. If you have a critical or abnormal lab result, we will notify you by phone as soon as possible.  Submit refill requests through [employer] or call your pharmacy and they will forward the refill request to us. Please allow 3 business days for your refill to be completed.          Additional Information About Your Visit        [employer] Information     [employer] lets you send messages to your doctor, view your test results, renew your prescriptions, schedule appointments and more. To sign up, go to [email or web address] . Click on \"Log in\" on the left side of the screen, which will take you to the Welcome page. Then click on \"Sign up Now\" on the right side of the page.     You will be asked to enter the access code listed below, as well as some personal information. Please follow the directions to create your username and password.     Your access code is: [email or web address]  Expires: 2017 11:53 AM     Your access code will  in 90 days. If you need help or a new code, please call your [facility] or [phone].        Care EveryWhere ID     This is your Care EveryWhere ID. This could be used by other organizations to access your [place] medical records  [id number]        Your Vitals Were     Pulse Temperature Respirations Height Last Period Pulse Oximetry    87 97  F (36.1  C) (Tympanic) 12 5' 4.49\" (1.638 m) (LMP Unknown) 97%    " Breastfeeding? BMI (Body Mass Index)                No 33.14 kg/m2           Blood Pressure from Last 3 Encounters:   11/09/17 130/80   10/30/17 174/80   08/25/17 128/52    Weight from Last 3 Encounters:   11/09/17 196 lb (88.9 kg)   10/30/17 195 lb (88.5 kg)   08/25/17 190 lb (86.2 kg)              Today, you had the following     No orders found for display         Today's Medication Changes          These changes are accurate as of: 11/9/17  1:52 PM.  If you have any questions, ask your nurse or doctor.               Start taking these medicines.        Dose/Directions    ASPIRIN NOT PRESCRIBED   Commonly known as:  INTENTIONAL   Used for:  Need for prophylactic vaccination and inoculation against influenza   Started by:  Bess Lion MD        Please choose reason not prescribed, below   Quantity:  1 each   Refills:  0         These medicines have changed or have updated prescriptions.        Dose/Directions    * order for DME   This may have changed:  Another medication with the same name was added. Make sure you understand how and when to take each.   Used for:  Malignant neoplasm of female breast, unspecified laterality, unspecified site of breast   Changed by:  Bess Lion MD        Equipment being ordered: mastectomy bras & prostheses  S/po mastectomy of unknown side     C50.919   Quantity:  1 each   Refills:  0       * order for DME   This may have changed:  You were already taking a medication with the same name, and this prescription was added. Make sure you understand how and when to take each.   Used for:  Lymphedema of both lower extremities   Changed by:  Bess Lion MD        Equipment being ordered: compression hose  BK 20-30mm  2 pair as insurance will pay   Quantity:  1 each   Refills:  0       * Notice:  This list has 2 medication(s) that are the same as other medications prescribed for you. Read the directions carefully, and ask your doctor or  other care provider to review them with you.         Where to get your medicines      Some of these will need a paper prescription and others can be bought over the counter.  Ask your nurse if you have questions.     Bring a paper prescription for each of these medications     order for DME       You don't need a prescription for these medications     ASPIRIN NOT PRESCRIBED                Primary Care Provider Office Phone # Fax #    Bess Lion [phone] [address] [facility] [address]        Equal Access to Services     SUKUMAR KIMBALL : Hadii aad ku hadasho Soomaali, waaxda luqadaha, qaybta kaalmada adeegyada, waxay idiin hayaan adeeg kharash laryne . So [facility] [phone].    ATENCIÓN: Si habla español, tiene a duenas disposición servicios gratuitos de asistencia lingüística. Llame al [phone].    We comply with applicable federal civil rights laws and [place] laws. We do not discriminate on the basis of race, color, national origin, age, disability, sex, sexual orientation, or gender identity.            Thank you!     Thank you for choosing [facility]  for your care. Our goal is always to provide you with excellent care. Hearing back from our patients is one way we can continue to improve our services. Please take a few minutes to complete the written survey that you may receive in the mail after your visit with us. Thank you!             Your Updated Medication List - Protect others around you: Learn how to safely use, store and throw away your medicines at www.disposemymeds.org.          This list is accurate as of: 11/9/17  1:52 PM.  Always use your most recent med list.                   Brand Name Dispense Instructions for use Diagnosis    acetaminophen 325 MG tablet    TYLENOL    100 tablet    Take 2 tablets (650 mg) by mouth every 4 hours as needed for mild pain    Closed nondisplaced fracture of left patella, unspecified  fracture morphology, initial encounter       albuterol 108 (90 BASE) MCG/ACT Inhaler    PROAIR HFA/PROVENTIL HFA/VENTOLIN HFA     Inhale 2 puffs into the lungs every 6 hours as needed for shortness of breath / dyspnea or wheezing        allopurinol 100 MG tablet    ZYLOPRIM    90 tablet    TAKE 1 TABLET BY MOUTH EVERY DAY    Gout       amLODIPine 5 MG tablet    NORVASC    180 tablet    Take 1 tablet (5 mg) by mouth 2 times daily    Benign essential hypertension       amoxicillin 500 MG capsule    AMOXIL    4 capsule    Take 4 capsules by mouth 30 minutes prior to dental work    Mitral valve insufficiency       ASPIRIN NOT PRESCRIBED    INTENTIONAL    1 each    Please choose reason not prescribed, below    Need for prophylactic vaccination and inoculation against influenza       cholecalciferol 1000 UNIT tablet    vitamin D3     Take 1,000 Units by mouth daily        furosemide 20 MG tablet    LASIX    90 tablet    TAKE 1 TABLET BY MOUTH EVERY MORNING FOR FLUID RETENTION    Chronic systolic congestive heart failure (H)       levothyroxine 100 MCG tablet    SYNTHROID/LEVOTHROID    90 tablet    Take 1 tablet (100 mcg) by mouth daily    Acquired hypothyroidism       lisinopril 20 MG tablet    PRINIVIL/ZESTRIL    180 tablet    Take 1 tablet (20 mg) by mouth 2 times daily    Essential hypertension, benign       metoprolol 50 MG 24 hr tablet    TOPROL-XL    135 tablet    Take 1.5 tablets (75 mg) by mouth daily    Coronary artery disease involving native coronary artery of native heart without angina pectoris       nystatin 187307 UNIT/GM Powd    MYCOSTATIN    60 g    Apply topically 3 times daily as needed    Intertrigo       * order for DME     1 each    Equipment being ordered: mastectomy bras & prostheses  S/po mastectomy of unknown side     C50.919    Malignant neoplasm of female breast, unspecified laterality, unspecified site of breast       * order for DME     1 each    Equipment being ordered: compression hose  BK  20-30mm  2 pair as insurance will pay    Lymphedema of both lower extremities       simvastatin 40 MG tablet    ZOCOR    45 tablet    Take 0.5 tablets (20 mg) by mouth At Bedtime    Mixed hyperlipidemia       warfarin 4 MG tablet    COUMADIN    135 tablet    Take 1 tablet (4 mg) by mouth daily Taking 4 mg m,w,f & 4mg + 1 mg rest of week    Atrial fibrillation, unspecified type (H), History of pulmonary embolism       * Notice:  This list has 2 medication(s) that are the same as other medications prescribed for you. Read the directions carefully, and ask your doctor or other care provider to review them with you.

## 2017-11-09 NOTE — NURSING NOTE
"Chief Complaint   Patient presents with     Musculoskeletal Problem     /80  Pulse 87  Temp 97  F (36.1  C) (Tympanic)  Resp 12  Ht 5' 4.49\" (1.638 m)  Wt 196 lb (88.9 kg)  LMP  (LMP Unknown)  SpO2 97%  Breastfeeding? No  BMI 33.14 kg/m2 Estimated body mass index is 33.14 kg/(m^2) as calculated from the following:    Height as of this encounter: 5' 4.49\" (1.638 m).    Weight as of this encounter: 196 lb (88.9 kg).  BP completed using cuff size: large   Maxine Titus CMA    Health Maintenance Due   Topic Date Due     OP ANNUAL INR REFERRAL  04/08/2016     Health Maintenance reviewed at today's visit patient asked to schedule/complete:   None, Health Maintenance up to date.    "

## 2017-11-11 PROBLEM — E66.811 CLASS 1 OBESITY DUE TO EXCESS CALORIES WITH SERIOUS COMORBIDITY AND BODY MASS INDEX (BMI) OF 33.0 TO 33.9 IN ADULT: Status: ACTIVE | Noted: 2017-11-11

## 2017-11-11 PROBLEM — E66.09 CLASS 1 OBESITY DUE TO EXCESS CALORIES WITH SERIOUS COMORBIDITY AND BODY MASS INDEX (BMI) OF 33.0 TO 33.9 IN ADULT: Status: ACTIVE | Noted: 2017-11-11

## 2017-11-11 PROBLEM — I48.20 CHRONIC ATRIAL FIBRILLATION (H): Status: ACTIVE | Noted: 2017-11-11

## 2017-12-01 ENCOUNTER — OFFICE VISIT (OUTPATIENT)
Dept: CARDIOLOGY | Facility: CLINIC | Age: 82
End: 2017-12-01
Attending: INTERNAL MEDICINE
Payer: COMMERCIAL

## 2017-12-01 VITALS
SYSTOLIC BLOOD PRESSURE: 136 MMHG | WEIGHT: 202.7 LBS | HEIGHT: 65 IN | OXYGEN SATURATION: 95 % | HEART RATE: 67 BPM | DIASTOLIC BLOOD PRESSURE: 70 MMHG | BODY MASS INDEX: 33.77 KG/M2

## 2017-12-01 DIAGNOSIS — I10 BENIGN ESSENTIAL HYPERTENSION: ICD-10-CM

## 2017-12-01 DIAGNOSIS — I89.0 LYMPHEDEMA: ICD-10-CM

## 2017-12-01 DIAGNOSIS — E78.5 HYPERLIPIDEMIA LDL GOAL <70: ICD-10-CM

## 2017-12-01 DIAGNOSIS — I48.20 CHRONIC ATRIAL FIBRILLATION (H): ICD-10-CM

## 2017-12-01 DIAGNOSIS — I34.0 MITRAL VALVE INSUFFICIENCY, UNSPECIFIED ETIOLOGY: ICD-10-CM

## 2017-12-01 DIAGNOSIS — E87.79 OTHER HYPERVOLEMIA: ICD-10-CM

## 2017-12-01 DIAGNOSIS — I50.22 CHRONIC SYSTOLIC CONGESTIVE HEART FAILURE (H): ICD-10-CM

## 2017-12-01 DIAGNOSIS — Z82.49 FH: MITRAL VALVE REPAIR: ICD-10-CM

## 2017-12-01 DIAGNOSIS — I50.32 CHRONIC DIASTOLIC CONGESTIVE HEART FAILURE (H): ICD-10-CM

## 2017-12-01 DIAGNOSIS — I10 BENIGN ESSENTIAL HYPERTENSION: Primary | ICD-10-CM

## 2017-12-01 DIAGNOSIS — I25.10 CORONARY ARTERY DISEASE INVOLVING NATIVE CORONARY ARTERY OF NATIVE HEART WITHOUT ANGINA PECTORIS: ICD-10-CM

## 2017-12-01 DIAGNOSIS — E78.00 HYPERCHOLESTEROLEMIA: ICD-10-CM

## 2017-12-01 LAB
ANION GAP SERPL CALCULATED.3IONS-SCNC: 12.5 MMOL/L (ref 6–17)
BUN SERPL-MCNC: 15 MG/DL (ref 7–30)
CALCIUM SERPL-MCNC: 9.9 MG/DL (ref 8.5–10.5)
CHLORIDE SERPL-SCNC: 105 MMOL/L (ref 98–107)
CO2 SERPL-SCNC: 25 MMOL/L (ref 23–29)
CREAT SERPL-MCNC: 0.9 MG/DL (ref 0.7–1.3)
GFR SERPL CREATININE-BSD FRML MDRD: 60 ML/MIN/1.7M2
GLUCOSE SERPL-MCNC: 117 MG/DL (ref 70–105)
NT-PROBNP SERPL-MCNC: 1639 PG/ML (ref 0–450)
POTASSIUM SERPL-SCNC: 4.5 MMOL/L (ref 3.5–5.1)
SODIUM SERPL-SCNC: 138 MMOL/L (ref 136–145)

## 2017-12-01 PROCEDURE — 80048 BASIC METABOLIC PNL TOTAL CA: CPT | Performed by: INTERNAL MEDICINE

## 2017-12-01 PROCEDURE — 83880 ASSAY OF NATRIURETIC PEPTIDE: CPT | Performed by: NURSE PRACTITIONER

## 2017-12-01 PROCEDURE — 36415 COLL VENOUS BLD VENIPUNCTURE: CPT | Performed by: INTERNAL MEDICINE

## 2017-12-01 PROCEDURE — 99214 OFFICE O/P EST MOD 30 MIN: CPT | Performed by: NURSE PRACTITIONER

## 2017-12-01 NOTE — PROGRESS NOTES
HPI and Plan:   See dictation    Orders Placed This Encounter   Procedures     N terminal pro BNP outpatient     LYMPHEDEMA THERAPY REFERRAL       No orders of the defined types were placed in this encounter.      There are no discontinued medications.      Encounter Diagnoses   Name Primary?     Benign essential hypertension Yes     Chronic atrial fibrillation (H)      FH: mitral valve repair      Coronary artery disease involving native coronary artery of native heart without angina pectoris      Hyperlipidemia LDL goal <70      Lymphedema      Chronic diastolic congestive heart failure (H)        CURRENT MEDICATIONS:  Current Outpatient Prescriptions   Medication Sig Dispense Refill     order for DME Equipment being ordered: compression hose   BK 20-30mm   2 pair as insurance will pay 1 each 0     metoprolol (TOPROL-XL) 50 MG 24 hr tablet Take 1.5 tablets (75 mg) by mouth daily 135 tablet 3     allopurinol (ZYLOPRIM) 100 MG tablet TAKE 1 TABLET BY MOUTH EVERY DAY 90 tablet 2     furosemide (LASIX) 20 MG tablet TAKE 1 TABLET BY MOUTH EVERY MORNING FOR FLUID RETENTION 90 tablet 0     simvastatin (ZOCOR) 40 MG tablet Take 0.5 tablets (20 mg) by mouth At Bedtime 45 tablet 3     levothyroxine (SYNTHROID/LEVOTHROID) 100 MCG tablet Take 1 tablet (100 mcg) by mouth daily 90 tablet 1     amoxicillin (AMOXIL) 500 MG capsule Take 4 capsules by mouth 30 minutes prior to dental work 4 capsule 4     warfarin (COUMADIN) 4 MG tablet Take 1 tablet (4 mg) by mouth daily Taking 4 mg m,w,f & 4mg + 1 mg rest of week 135 tablet 2     amLODIPine (NORVASC) 5 MG tablet Take 1 tablet (5 mg) by mouth 2 times daily 180 tablet 3     lisinopril (PRINIVIL/ZESTRIL) 20 MG tablet Take 1 tablet (20 mg) by mouth 2 times daily 180 tablet 3     cholecalciferol (VITAMIN D) 1000 UNIT tablet Take 1,000 Units by mouth daily       order for DME Equipment being ordered: mastectomy bras & prostheses   S/po mastectomy of unknown side     C50.919 1 each 0      nystatin (MYCOSTATIN) 108285 UNIT/GM POWD Apply topically 3 times daily as needed 60 g 1     acetaminophen (TYLENOL) 325 MG tablet Take 2 tablets (650 mg) by mouth every 4 hours as needed for mild pain 100 tablet      albuterol (PROAIR HFA, PROVENTIL HFA, VENTOLIN HFA) 108 (90 BASE) MCG/ACT inhaler Inhale 2 puffs into the lungs every 6 hours as needed for shortness of breath / dyspnea or wheezing       ASPIRIN NOT PRESCRIBED (INTENTIONAL) Please choose reason not prescribed, below (Patient not taking: Reported on 12/1/2017) 1 each 0       ALLERGIES   No Known Allergies    PAST MEDICAL HISTORY:  Past Medical History:   Diagnosis Date     Atrial fibrillation (H)      Benign essential hypertension      Juan Pablo-tachy syndrome (H)      Breast cancer (H)     s/p bilateral mastectomy     CHF (congestive heart failure) (H)      Coronary artery disease involving native coronary artery of native heart without angina pectoris     cardiac cath 2014: 40-50% mid RCA stenosis with minimal other disease     GERD (gastroesophageal reflux disease)      Hypercholesterolaemia      Hypothyroidism      Kidney stone      Mitral valve regurgitation     sp mitral clip 1/9/15     Need for SBE (subacute bacterial endocarditis) prophylaxis      Osteoporosis      Pulmonary embolism (H)      Sleep apnea      Spinal muscular atrophy type III (H)     Dr. Daily, [place] [facility]       PAST SURGICAL HISTORY:  Past Surgical History:   Procedure Laterality Date     ANESTHESIA OUT OF OR PERCUTANEOUS MITRAL VALVE REPAIR N/A 1/9/2015    Procedure: ANESTHESIA OUT OF OR PERCUTANEOUS MITRAL VALVE REPAIR;  Surgeon: Generic Anesthesia Provider;  Location: [facility] OR     GYN SURGERY      hysterectomy     MASTECTOMY      bilateral     ORTHOPEDIC SURGERY      knee replacement     PERCUTANEIOUS MITRAL VALVE REPAIR  1/9/2015       FAMILY HISTORY:  Family History   Problem Relation Age of Onset     Cancer - colorectal Mother      DIABETES Daughter      Asthma  Daughter      Family History Negative Father      Family History Negative Daughter      Unknown/Adopted Maternal Grandmother      Unknown/Adopted Maternal Grandfather      Unknown/Adopted Paternal Grandmother      Unknown/Adopted Paternal Grandfather      Coronary Artery Disease No family hx of      Hypertension No family hx of      Hyperlipidemia No family hx of      CEREBROVASCULAR DISEASE No family hx of      Breast Cancer No family hx of      Colon Cancer No family hx of      Prostate Cancer No family hx of      Other Cancer No family hx of      Depression No family hx of      Anxiety Disorder No family hx of      MENTAL ILLNESS No family hx of      Substance Abuse No family hx of      Anesthesia Reaction No family hx of      OSTEOPOROSIS No family hx of      Genetic Disorder No family hx of      Thyroid Disease No family hx of      Obesity No family hx of        SOCIAL HISTORY:  Social History     Social History     Marital status:      Spouse name: N/A     Number of children: N/A     Years of education: N/A     Social History Main Topics     Smoking status: Never Smoker     Smokeless tobacco: Never Used     Alcohol use No     Drug use: No     Sexual activity: No     Other Topics Concern     Parent/Sibling W/ Cabg, Mi Or Angioplasty Before 65f 55m? No     Caffeine Concern No     Sleep Concern No     Stress Concern No     Weight Concern Yes     weight loss     Special Diet No     low sodium     Exercise No     PT     Seat Belt Yes     Social History Narrative       Review of Systems:  Skin:  Negative rash;bruising     Eyes:  Positive for glasses    ENT:  Negative      Respiratory:  Negative       Cardiovascular:  Negative exercise intolerance;Positive for;edema    Gastroenterology: Negative hemorrhoids    Genitourinary:  Negative hematuria    Musculoskeletal:  Positive for arthritis;muscular weakness LLE in brace, fx knee cap ; shoulder & arm pain - unable to raise left arm   Neurologic:  Negative  "stroke;numbness or tingling of hands;numbness or tingling of feet    Psychiatric:  Negative      Heme/Lymph/Imm:  Positive for weight loss    Endocrine:  Positive for thyroid disorder      Physical Exam:  Vitals: /70  Pulse 67  Ht 1.638 m (5' 4.5\")  Wt 91.9 kg (202 lb 11.2 oz)  LMP  (LMP Unknown)  SpO2 95%  BMI 34.26 kg/m2    Constitutional:  cooperative, alert and oriented, well developed, well nourished, in no acute distress overweight      Skin:  warm and dry to the touch          Head:  normocephalic        Eyes:  sclera white        Lymph:      ENT:  no pallor or cyanosis        Neck:  carotid pulses are full and equal bilaterally        Respiratory:  normal breath sounds, clear to auscultation, normal A-P diameter, normal symmetry, normal respiratory excursion, no use of accessory muscles         Cardiac:   irregularly irregular rhythm     systolic murmur;LUSB;grade 1          pulses below the femoral arteries are diminished                                      GI:  abdomen soft        Extremities and Muscular Skeletal:      bilateral LE edema;2+;pitting          Neurological:  affect appropriate        Psych:  Alert and Oriented x 3        CC  Chris Guan MD  [address]  [place] [id number]              "

## 2017-12-01 NOTE — MR AVS SNAPSHOT
After Visit Summary   12/1/2017    Kenyatta Frias    MRN: [id number]           Patient Information     Date Of Birth          1935        Visit Information        Provider Department      12/1/2017 3:30 PM Patience Weathers APRN CNP [facility]   Anastacia        Today's Diagnoses     Benign essential hypertension    -  1    Chronic atrial fibrillation (H)        FH: mitral valve repair        Coronary artery disease involving native coronary artery of native heart without angina pectoris        Hyperlipidemia LDL goal <70        Lymphedema        Other hypervolemia        Chronic diastolic congestive heart failure (H)          Care Instructions    Thanks for coming into [facility] clinic today.    We discussed:   1. Check daily weights and call the clinic if your weight has increased more than 2 lbs in one day or 5 lbs in one week.  2. Call the clinic if your blood pressure is consistently greater than 140/90.     Medication changes:    No changes     Results:   Results for KENYATTA FRIAS (MRN [id number]) as of 12/1/2017 15:43   Ref. Range 12/1/2017 14:26   Sodium Latest Ref Range: 136 - 145 mmol/L 138   Potassium Latest Ref Range: 3.5 - 5.1 mmol/L 4.5   Chloride Latest Ref Range: 98 - 107 mmol/L 105   Carbon Dioxide Latest Ref Range: 23 - 29 mmol/L 25   Urea Nitrogen Latest Ref Range: 7 - 30 mg/dL 15   Creatinine Latest Ref Range: 0.70 - 1.30 mg/dL 0.90   GFR Estimate Latest Ref Range: >60 mL/min/1.7m2 60 (L)   GFR Estimate If Black Latest Ref Range: >60 mL/min/1.7m2 73   Calcium Latest Ref Range: 8.5 - 10.5 mg/dL 9.9   Anion Gap Latest Ref Range: 6 - 17 mmol/L 12.5       Follow up:   1. Lymphedema clinic will call to set up appt with you. If you have not heard from the scheduling office within 2 business days, please call [phone]   2. NP or PA  2-4 weeks   3. Dr. Guan in feb with echo, holter monitor, and lab work     Please call my  "nurse Ocasio at [phone] with any questions or concerns.    Scheduling phone number: [phone]  Reminder: Please bring in all current medications, over the counter supplements and vitamin bottles to your next appointment.                  Follow-ups after your visit        Additional Services     LYMPHEDEMA THERAPY REFERRAL       *This therapy referral will be filtered to a centralized scheduling office at [facility] and the patient will receive a call to schedule an appointment at a [place] location most convenient for them. *   If you have not heard from the scheduling office within 2 business days, please call [phone] for all locations, with the exception of [place], please call [phone].     Treatment: PT or OT Evaluation & Treatment  Special Instructions: lymphedema bilateral LE   PT/OT Treatment Diagnosis: Edema    Please be aware that coverage of these services is subject to the terms and limitations of your health insurance plan.  Call member services at your health plan with any benefit or coverage questions.      **Note to Provider:  If you are referring outside of [place] for the therapy appointment, please list the name of the location in the \"special instructions\" above, print the referral and give to the patient to schedule the appointment.                  Your next 10 appointments already scheduled     Dec 06, 2017  1:00 PM CST   Anticoagulation Visit with [facility]   [facility] ([facility])    [address] [place] [id number] [phone]              Future tests that were ordered for you today     Open Future Orders        Priority Expected Expires Ordered    N terminal pro BNP outpatient Add-On 12/1/2017 12/1/2018 12/1/2017    LYMPHEDEMA THERAPY REFERRAL Routine 12/1/2017 12/1/2019 12/1/2017            Who to contact     If you have questions or need " "follow up information about today's clinic visit or your schedule please contact [facility] directly at [phone].  Normal or non-critical lab and imaging results will be communicated to you by MyChart, letter or phone within 4 business days after the clinic has received the results. If you do not hear from us within 7 days, please contact the clinic through [employer] or phone. If you have a critical or abnormal lab result, we will notify you by phone as soon as possible.  Submit refill requests through [employer] or call your pharmacy and they will forward the refill request to us. Please allow 3 business days for your refill to be completed.          Additional Information About Your Visit        [employer] Information     [employer] lets you send messages to your doctor, view your test results, renew your prescriptions, schedule appointments and more. To sign up, go to [email or web address] . Click on \"Log in\" on the left side of the screen, which will take you to the Welcome page. Then click on \"Sign up Now\" on the right side of the page.     You will be asked to enter the access code listed below, as well as some personal information. Please follow the directions to create your username and password.     Your access code is: [email or web address]  Expires: 3/1/2018  3:46 PM     Your access code will  in 90 days. If you need help or a new code, please call your [place] clinic or [phone].        Care EveryWhere ID     This is your Care EveryWhere ID. This could be used by other organizations to access your [place] medical records  [id number]        Your Vitals Were     Pulse Height Last Period Pulse Oximetry BMI (Body Mass Index)       67 1.638 m (5' 4.5\") (LMP Unknown) 95% 34.26 kg/m2        Blood Pressure from Last 3 Encounters:   17 146/70   17 130/80   10/30/17 174/80    Weight from Last 3 Encounters:   17 91.9 kg (202 lb 11.2 oz)   17 88.9 kg " (196 lb)   10/30/17 88.5 kg (195 lb)              We Performed the Following     Follow-Up with Cardiac Advanced Practice Provider        Primary Care Provider Office Phone # Fax #    Bess Lion [phone] [address]  [facility] [address]        Equal Access to Services     [facility]: Hadii aad ku hadasho Soomaali, waaxda luqadaha, qaybta kaalmada adeegyada, waxay idiin hayaan adeeg kharash la'aan . So [facility] [phone].    ATENCIÓN: Si habla español, tiene a duenas disposición servicios gratuitos de asistencia lingüística. Llame al [phone].    We comply with applicable federal civil rights laws and [place] laws. We do not discriminate on the basis of race, color, national origin, age, disability, sex, sexual orientation, or gender identity.            Thank you!     Thank you for choosing [facility]  for your care. Our goal is always to provide you with excellent care. Hearing back from our patients is one way we can continue to improve our services. Please take a few minutes to complete the written survey that you may receive in the mail after your visit with us. Thank you!             Your Updated Medication List - Protect others around you: Learn how to safely use, store and throw away your medicines at www.disposemymeds.org.          This list is accurate as of: 12/1/17  3:46 PM.  Always use your most recent med list.                   Brand Name Dispense Instructions for use Diagnosis    acetaminophen 325 MG tablet    TYLENOL    100 tablet    Take 2 tablets (650 mg) by mouth every 4 hours as needed for mild pain    Closed nondisplaced fracture of left patella, unspecified fracture morphology, initial encounter       albuterol 108 (90 BASE) MCG/ACT Inhaler    PROAIR HFA/PROVENTIL HFA/VENTOLIN HFA     Inhale 2 puffs into the lungs every 6 hours as needed for shortness of breath / dyspnea or wheezing        allopurinol  100 MG tablet    ZYLOPRIM    90 tablet    TAKE 1 TABLET BY MOUTH EVERY DAY    Gout       amLODIPine 5 MG tablet    NORVASC    180 tablet    Take 1 tablet (5 mg) by mouth 2 times daily    Benign essential hypertension       amoxicillin 500 MG capsule    AMOXIL    4 capsule    Take 4 capsules by mouth 30 minutes prior to dental work    Mitral valve insufficiency       ASPIRIN NOT PRESCRIBED    INTENTIONAL    1 each    Please choose reason not prescribed, below        cholecalciferol 1000 UNIT tablet    vitamin D3     Take 1,000 Units by mouth daily        furosemide 20 MG tablet    LASIX    90 tablet    TAKE 1 TABLET BY MOUTH EVERY MORNING FOR FLUID RETENTION    Chronic systolic congestive heart failure (H)       levothyroxine 100 MCG tablet    SYNTHROID/LEVOTHROID    90 tablet    Take 1 tablet (100 mcg) by mouth daily    Acquired hypothyroidism       lisinopril 20 MG tablet    PRINIVIL/ZESTRIL    180 tablet    Take 1 tablet (20 mg) by mouth 2 times daily    Essential hypertension, benign       metoprolol 50 MG 24 hr tablet    TOPROL-XL    135 tablet    Take 1.5 tablets (75 mg) by mouth daily    Coronary artery disease involving native coronary artery of native heart without angina pectoris       nystatin 128132 UNIT/GM Powd    MYCOSTATIN    60 g    Apply topically 3 times daily as needed    Intertrigo       * order for DME     1 each    Equipment being ordered: mastectomy bras & prostheses  S/po mastectomy of unknown side     C50.919    Malignant neoplasm of female breast, unspecified laterality, unspecified site of breast       * order for DME     1 each    Equipment being ordered: compression hose  BK 20-30mm  2 pair as insurance will pay    Lymphedema of both lower extremities       simvastatin 40 MG tablet    ZOCOR    45 tablet    Take 0.5 tablets (20 mg) by mouth At Bedtime    Mixed hyperlipidemia       warfarin 4 MG tablet    COUMADIN    135 tablet    Take 1 tablet (4 mg) by mouth daily Taking 4 mg m,w,f & 4mg  + 1 mg rest of week    Atrial fibrillation, unspecified type (H), History of pulmonary embolism       * Notice:  This list has 2 medication(s) that are the same as other medications prescribed for you. Read the directions carefully, and ask your doctor or other care provider to review them with you.

## 2017-12-01 NOTE — PATIENT INSTRUCTIONS
Thanks for coming into [facility] clinic today.    We discussed:   1. Check daily weights and call the clinic if your weight has increased more than 2 lbs in one day or 5 lbs in one week.  2. Call the clinic if your blood pressure is consistently greater than 140/90.     Medication changes:    No changes     Results:   Results for DARLENE FRIAS (MRN [id number]) as of 12/1/2017 15:43   Ref. Range 12/1/2017 14:26   Sodium Latest Ref Range: 136 - 145 mmol/L 138   Potassium Latest Ref Range: 3.5 - 5.1 mmol/L 4.5   Chloride Latest Ref Range: 98 - 107 mmol/L 105   Carbon Dioxide Latest Ref Range: 23 - 29 mmol/L 25   Urea Nitrogen Latest Ref Range: 7 - 30 mg/dL 15   Creatinine Latest Ref Range: 0.70 - 1.30 mg/dL 0.90   GFR Estimate Latest Ref Range: >60 mL/min/1.7m2 60 (L)   GFR Estimate If Black Latest Ref Range: >60 mL/min/1.7m2 73   Calcium Latest Ref Range: 8.5 - 10.5 mg/dL 9.9   Anion Gap Latest Ref Range: 6 - 17 mmol/L 12.5       Follow up:   1. Lymphedema clinic will call to set up appt with you. If you have not heard from the scheduling office within 2 business days, please call [phone]   2. NP or PA  2-4 weeks   3. Dr. Guan in feb with echo, holter monitor, and lab work     Please call my nurse Ninfa at [phone] with any questions or concerns.    Scheduling phone number: [phone]  Reminder: Please bring in all current medications, over the counter supplements and vitamin bottles to your next appointment.

## 2017-12-01 NOTE — LETTER
12/1/2017    Bess Lion MD  [address]  [facility] [address]    RE: Kenyatta Donald       Dear Colleague,    I had the pleasure of seeing Kenyatta Donald in the [facility].    Cardiology Clinic Progress Note  Kenyatta Donald MRN# [id number]   YOB: 1935 Age: 81 year old     Reason For Visit:  1-month follow-up    Primary Cardiologist:   Dr. Guan          History of Presenting Illness:    Kenyatta Donald is a pleasant 80 year old patient with a past cardiac history significant for mild nonobstructive CAD with 40-50% stenosis in the mid RCA and minimal disease elsewhere, HTN, atrial fibrillation with tachybradycardia syndrome on Coumadin, PE, diastolic heart failure, mitral insufficiency status post mitral valve clip 2015 with mild gradient across the mitral valve leaflets, and hyperlipidemia.  Past medical history of breast CA, and sleep apnea.     Patient saw Dr. Guan on 10/30/2017.  Her blood pressure was noted to be elevated at 150/77 and her metoprolol XL was increased to 75 mg daily.  She continued with muscle aches and pain from her DJD which was thought to be contributing to her elevated blood pressures.    Pt presents today for 1-month follow-up.  BMP today is within normal limits.  These results were reviewed with her today.  She has increased her metoprolol to 75 mg daily without any side effects.  Her blood pressure today in the clinic is 136/70.  There is also an office visit from 11/9/2017 with a reading of 130/80.  She does not currently check her blood pressures at home.  Since she was last seen, she has had worsening leg weakness.  She was told in her 40s that she had a muscular disorder where she may not be able to walk at some point.  She has not followed up with anybody about the worsening leg weakness and I recommended she start with her PCP.  She has also noted increased bilateral lower extremity edema.  On exam today her heart rate is  irregularly irregular but rate is controlled at 67 bpm.  She is currently unable to put on her compression stockings and is unable to elevate her legs due to her leg weakness.  I have referred her to lymphedema clinic to help with the edema. Her weight today in the clinic is up 7 pounds since she was last seen a month ago. She does not currently check weights at home but will start checking daily weights and call us if there is significant increased more than 2 pounds in a day or 5 pounds in a week. We have also discussed decreasing the salt in her diet. Patient reports no chest pain, shortness of breath, PND, orthopnea, presyncope, syncope, heart racing.      Current Cardiac Medications   Coumadin  Amlodipine 5 mg b.i.d.  Lisinopril 20 mg b.i.d.  Simvastatin 20 mg daily  Metoprolol XL 75 mg daily  Lasix 20 mg daily                    Assessment and Plan:     Plan  1.  Add-on N-terminal proBNP to today's labs  2.  Lymphedema clinic referral  3.  Follow-up with PCP regarding leg weakness  4.  Check daily weights and call the clinic if your weight has increased more than 2 lbs in one day or 5 lbs in one week.  5.  Call the clinic if your blood pressure is consistently greater than 140/90.   6.  Follow up with MICHAELA in 2-4 weeks to reassess lower extremity edema  7.  Follow-up with Dr. Guan 2/2018 with lab work, Holter, and echocardiogram prior      1. Mild nonobstructive CAD    Prior angiogram showing 40-50% stenosis in the mid RCA with minimal disease elsewhere    No angina     continue statin, ACE inhibitor, beta blocker, no aspirin due to Coumadin      2. Chronic atrial fibrillation    Asymptomatic    History of tachybradycardia syndrome    Continue rate control with metoprolol and anticoagulation with Coumadin      3. Mitral insufficiency status post mitral valve clip 2015    Mild gradient across mitral valve leaflets seen on most recent echocardiogram    Antibiotic prophylaxis for dental and surgical  procedures      4. Hypertension    136/70, controlled     Continue amlodipine, lisinopril, metoprolol    Consider increasing metoprolol, if needed      5. Hyperlipidemia    Last LDL 76 8/2017    Continue simvastatin 20 mg daily       6.           Lower extremity edema     7 pound weight gain with bilateral lower extremity edema 2+ pitting     Will refer to lymphedema clinic    May need to consider increasing lasix if BNP significantly elevated          Thank you for allowing me to participate in this delightful patient's care.      This note was completed in part using Dragon voice recognition software. Although reviewed after completion, some word and grammatical errors may occur.    Patience Weathers, APRN, CNP           Data:   All laboratory data reviewed      HPI and Plan:   See dictation    Orders Placed This Encounter   Procedures     N terminal pro BNP outpatient     LYMPHEDEMA THERAPY REFERRAL       No orders of the defined types were placed in this encounter.      There are no discontinued medications.      Encounter Diagnoses   Name Primary?     Benign essential hypertension Yes     Chronic atrial fibrillation (H)      FH: mitral valve repair      Coronary artery disease involving native coronary artery of native heart without angina pectoris      Hyperlipidemia LDL goal <70      Lymphedema      Chronic diastolic congestive heart failure (H)        CURRENT MEDICATIONS:  Current Outpatient Prescriptions   Medication Sig Dispense Refill     order for DME Equipment being ordered: compression hose   BK 20-30mm   2 pair as insurance will pay 1 each 0     metoprolol (TOPROL-XL) 50 MG 24 hr tablet Take 1.5 tablets (75 mg) by mouth daily 135 tablet 3     allopurinol (ZYLOPRIM) 100 MG tablet TAKE 1 TABLET BY MOUTH EVERY DAY 90 tablet 2     furosemide (LASIX) 20 MG tablet TAKE 1 TABLET BY MOUTH EVERY MORNING FOR FLUID RETENTION 90 tablet 0     simvastatin (ZOCOR) 40 MG tablet Take 0.5 tablets (20 mg) by mouth  At Bedtime 45 tablet 3     levothyroxine (SYNTHROID/LEVOTHROID) 100 MCG tablet Take 1 tablet (100 mcg) by mouth daily 90 tablet 1     amoxicillin (AMOXIL) 500 MG capsule Take 4 capsules by mouth 30 minutes prior to dental work 4 capsule 4     warfarin (COUMADIN) 4 MG tablet Take 1 tablet (4 mg) by mouth daily Taking 4 mg m,w,f & 4mg + 1 mg rest of week 135 tablet 2     amLODIPine (NORVASC) 5 MG tablet Take 1 tablet (5 mg) by mouth 2 times daily 180 tablet 3     lisinopril (PRINIVIL/ZESTRIL) 20 MG tablet Take 1 tablet (20 mg) by mouth 2 times daily 180 tablet 3     cholecalciferol (VITAMIN D) 1000 UNIT tablet Take 1,000 Units by mouth daily       order for DME Equipment being ordered: mastectomy bras & prostheses   S/po mastectomy of unknown side     C50.919 1 each 0     nystatin (MYCOSTATIN) 811041 UNIT/GM POWD Apply topically 3 times daily as needed 60 g 1     acetaminophen (TYLENOL) 325 MG tablet Take 2 tablets (650 mg) by mouth every 4 hours as needed for mild pain 100 tablet      albuterol (PROAIR HFA, PROVENTIL HFA, VENTOLIN HFA) 108 (90 BASE) MCG/ACT inhaler Inhale 2 puffs into the lungs every 6 hours as needed for shortness of breath / dyspnea or wheezing       ASPIRIN NOT PRESCRIBED (INTENTIONAL) Please choose reason not prescribed, below (Patient not taking: Reported on 12/1/2017) 1 each 0       ALLERGIES   No Known Allergies    PAST MEDICAL HISTORY:  Past Medical History:   Diagnosis Date     Atrial fibrillation (H)      Benign essential hypertension      Juan Pablo-tachy syndrome (H)      Breast cancer (H)     s/p bilateral mastectomy     CHF (congestive heart failure) (H)      Coronary artery disease involving native coronary artery of native heart without angina pectoris     cardiac cath 2014: 40-50% mid RCA stenosis with minimal other disease     GERD (gastroesophageal reflux disease)      Hypercholesterolaemia      Hypothyroidism      Kidney stone      Mitral valve regurgitation     sp mitral clip 1/9/15      Need for SBE (subacute bacterial endocarditis) prophylaxis      Osteoporosis      Pulmonary embolism (H)      Sleep apnea      Spinal muscular atrophy type III (H)     Dr. Daily, [facility]       PAST SURGICAL HISTORY:  Past Surgical History:   Procedure Laterality Date     ANESTHESIA OUT OF OR PERCUTANEOUS MITRAL VALVE REPAIR N/A 1/9/2015    Procedure: ANESTHESIA OUT OF OR PERCUTANEOUS MITRAL VALVE REPAIR;  Surgeon: Generic Anesthesia Provider;  Location: [facility] OR     GYN SURGERY      hysterectomy     MASTECTOMY      bilateral     ORTHOPEDIC SURGERY      knee replacement     PERCUTANEIOUS MITRAL VALVE REPAIR  1/9/2015       FAMILY HISTORY:  Family History   Problem Relation Age of Onset     Cancer - colorectal Mother      DIABETES Daughter      Asthma Daughter      Family History Negative Father      Family History Negative Daughter      Unknown/Adopted Maternal Grandmother      Unknown/Adopted Maternal Grandfather      Unknown/Adopted Paternal Grandmother      Unknown/Adopted Paternal Grandfather      Coronary Artery Disease No family hx of      Hypertension No family hx of      Hyperlipidemia No family hx of      CEREBROVASCULAR DISEASE No family hx of      Breast Cancer No family hx of      Colon Cancer No family hx of      Prostate Cancer No family hx of      Other Cancer No family hx of      Depression No family hx of      Anxiety Disorder No family hx of      MENTAL ILLNESS No family hx of      Substance Abuse No family hx of      Anesthesia Reaction No family hx of      OSTEOPOROSIS No family hx of      Genetic Disorder No family hx of      Thyroid Disease No family hx of      Obesity No family hx of        SOCIAL HISTORY:  Social History     Social History     Marital status:      Spouse name: N/A     Number of children: N/A     Years of education: N/A     Social History Main Topics     Smoking status: Never Smoker     Smokeless tobacco: Never Used     Alcohol use No     Drug use: No  "    Sexual activity: No     Other Topics Concern     Parent/Sibling W/ Cabg, Mi Or Angioplasty Before 65f 55m? No     Caffeine Concern No     Sleep Concern No     Stress Concern No     Weight Concern Yes     weight loss     Special Diet No     low sodium     Exercise No     PT     Seat Belt Yes     Social History Narrative       Review of Systems:  Skin:  Negative rash;bruising     Eyes:  Positive for glasses    ENT:  Negative      Respiratory:  Negative       Cardiovascular:  Negative exercise intolerance;Positive for;edema    Gastroenterology: Negative hemorrhoids    Genitourinary:  Negative hematuria    Musculoskeletal:  Positive for arthritis;muscular weakness LLE in brace, fx knee cap ; shoulder & arm pain - unable to raise left arm   Neurologic:  Negative stroke;numbness or tingling of hands;numbness or tingling of feet    Psychiatric:  Negative      Heme/Lymph/Imm:  Positive for weight loss    Endocrine:  Positive for thyroid disorder      Physical Exam:  Vitals: /70  Pulse 67  Ht 1.638 m (5' 4.5\")  Wt 91.9 kg (202 lb 11.2 oz)  LMP  (LMP Unknown)  SpO2 95%  BMI 34.26 kg/m2    Constitutional:  cooperative, alert and oriented, well developed, well nourished, in no acute distress overweight      Skin:  warm and dry to the touch          Head:  normocephalic        Eyes:  sclera white        Lymph:      ENT:  no pallor or cyanosis        Neck:  carotid pulses are full and equal bilaterally        Respiratory:  normal breath sounds, clear to auscultation, normal A-P diameter, normal symmetry, normal respiratory excursion, no use of accessory muscles         Cardiac:   irregularly irregular rhythm     systolic murmur;LUSB;grade 1          pulses below the femoral arteries are diminished                                      GI:  abdomen soft        Extremities and Muscular Skeletal:      bilateral LE edema;2+;pitting          Neurological:  affect appropriate        Psych:  Alert and Oriented x 3  "   Thank you for allowing me to participate in the care of your patient.    Sincerely,     REBEKAH [facility]

## 2017-12-01 NOTE — PROGRESS NOTES
Cardiology Clinic Progress Note  Kenyatta Donald MRN# [id number]   YOB: 1935 Age: 81 year old     Reason For Visit:  1-month follow-up    Primary Cardiologist:   Dr. Guan          History of Presenting Illness:    Kenyatta Donald is a pleasant 80 year old patient with a past cardiac history significant for mild nonobstructive CAD with 40-50% stenosis in the mid RCA and minimal disease elsewhere, HTN, atrial fibrillation with tachybradycardia syndrome on Coumadin, PE, diastolic heart failure, mitral insufficiency status post mitral valve clip 2015 with mild gradient across the mitral valve leaflets, and hyperlipidemia.  Past medical history of breast CA, and sleep apnea.     Patient saw Dr. Guan on 10/30/2017.  Her blood pressure was noted to be elevated at 150/77 and her metoprolol XL was increased to 75 mg daily.  She continued with muscle aches and pain from her DJD which was thought to be contributing to her elevated blood pressures.    Pt presents today for 1-month follow-up.  BMP today is within normal limits.  These results were reviewed with her today.  She has increased her metoprolol to 75 mg daily without any side effects.  Her blood pressure today in the clinic is 136/70.  There is also an office visit from 11/9/2017 with a reading of 130/80.  She does not currently check her blood pressures at home.  Since she was last seen, she has had worsening leg weakness.  She was told in her 40s that she had a muscular disorder where she may not be able to walk at some point.  She has not followed up with anybody about the worsening leg weakness and I recommended she start with her PCP.  She has also noted increased bilateral lower extremity edema.  On exam today her heart rate is irregularly irregular but rate is controlled at 67 bpm.  She is currently unable to put on her compression stockings and is unable to elevate her legs due to her leg weakness.  I have referred her to lymphedema clinic  to help with the edema. Her weight today in the clinic is up 7 pounds since she was last seen a month ago. She does not currently check weights at home but will start checking daily weights and call us if there is significant increased more than 2 pounds in a day or 5 pounds in a week. We have also discussed decreasing the salt in her diet. Patient reports no chest pain, shortness of breath, PND, orthopnea, presyncope, syncope, heart racing.      Current Cardiac Medications   Coumadin  Amlodipine 5 mg b.i.d.  Lisinopril 20 mg b.i.d.  Simvastatin 20 mg daily  Metoprolol XL 75 mg daily  Lasix 20 mg daily                    Assessment and Plan:     Plan  1.  Add-on N-terminal proBNP to today's labs  2.  Lymphedema clinic referral  3.  Follow-up with PCP regarding leg weakness  4.  Check daily weights and call the clinic if your weight has increased more than 2 lbs in one day or 5 lbs in one week.  5.  Call the clinic if your blood pressure is consistently greater than 140/90.   6.  Follow up with MICHAELA in 2-4 weeks to reassess lower extremity edema  7.  Follow-up with Dr. Guan 2/2018 with lab work, Holter, and echocardiogram prior      1. Mild nonobstructive CAD    Prior angiogram showing 40-50% stenosis in the mid RCA with minimal disease elsewhere    No angina     continue statin, ACE inhibitor, beta blocker, no aspirin due to Coumadin      2. Chronic atrial fibrillation    Asymptomatic    History of tachybradycardia syndrome    Continue rate control with metoprolol and anticoagulation with Coumadin      3. Mitral insufficiency status post mitral valve clip 2015    Mild gradient across mitral valve leaflets seen on most recent echocardiogram    Antibiotic prophylaxis for dental and surgical procedures      4. Hypertension    136/70, controlled     Continue amlodipine, lisinopril, metoprolol    Consider increasing metoprolol, if needed      5. Hyperlipidemia    Last LDL 76 8/2017    Continue simvastatin 20 mg daily        6.           Lower extremity edema     7 pound weight gain with bilateral lower extremity edema 2+ pitting     Will refer to lymphedema clinic    May need to consider increasing lasix if BNP significantly elevated          Thank you for allowing me to participate in this delightful patient's care.      This note was completed in part using Dragon voice recognition software. Although reviewed after completion, some word and grammatical errors may occur.    Patience Weathers APRN, CNP           Data:   All laboratory data reviewed

## 2017-12-04 DIAGNOSIS — I25.10 CORONARY ARTERY DISEASE INVOLVING NATIVE CORONARY ARTERY OF NATIVE HEART WITHOUT ANGINA PECTORIS: ICD-10-CM

## 2017-12-04 RX ORDER — METOPROLOL SUCCINATE 50 MG/1
75 TABLET, EXTENDED RELEASE ORAL DAILY
Qty: 135 TABLET | Refills: 1 | Status: SHIPPED | OUTPATIENT
Start: 2017-12-04 | End: 2018-11-27

## 2017-12-05 ENCOUNTER — HOSPITAL ENCOUNTER (INPATIENT)
Facility: CLINIC | Age: 82
LOS: 2 days | Discharge: HOME HEALTH | DRG: 291 | End: 2017-12-07
Attending: EMERGENCY MEDICINE | Admitting: INTERNAL MEDICINE
Payer: MEDICARE

## 2017-12-05 ENCOUNTER — APPOINTMENT (OUTPATIENT)
Dept: GENERAL RADIOLOGY | Facility: CLINIC | Age: 82
DRG: 291 | End: 2017-12-05
Attending: EMERGENCY MEDICINE
Payer: MEDICARE

## 2017-12-05 ENCOUNTER — TELEPHONE (OUTPATIENT)
Dept: CARDIOLOGY | Facility: CLINIC | Age: 82
End: 2017-12-05

## 2017-12-05 ENCOUNTER — HOSPITAL ENCOUNTER (OUTPATIENT)
Dept: OCCUPATIONAL THERAPY | Facility: CLINIC | Age: 82
Setting detail: THERAPIES SERIES
End: 2017-12-05
Attending: NURSE PRACTITIONER
Payer: MEDICARE

## 2017-12-05 DIAGNOSIS — I89.0 LYMPHEDEMA: ICD-10-CM

## 2017-12-05 DIAGNOSIS — I50.33 ACUTE ON CHRONIC DIASTOLIC CONGESTIVE HEART FAILURE (H): Primary | ICD-10-CM

## 2017-12-05 PROBLEM — I50.9 CHF (CONGESTIVE HEART FAILURE) (H): Status: ACTIVE | Noted: 2017-12-05

## 2017-12-05 LAB
ALBUMIN SERPL-MCNC: 3.6 G/DL (ref 3.4–5)
ALP SERPL-CCNC: 55 U/L (ref 40–150)
ALT SERPL W P-5'-P-CCNC: 16 U/L (ref 0–50)
ANION GAP SERPL CALCULATED.3IONS-SCNC: 9 MMOL/L (ref 3–14)
AST SERPL W P-5'-P-CCNC: 19 U/L (ref 0–45)
BASOPHILS # BLD AUTO: 0 10E9/L (ref 0–0.2)
BASOPHILS NFR BLD AUTO: 0.1 %
BILIRUB SERPL-MCNC: 0.7 MG/DL (ref 0.2–1.3)
BUN SERPL-MCNC: 15 MG/DL (ref 7–30)
CALCIUM SERPL-MCNC: 9.6 MG/DL (ref 8.5–10.1)
CHLORIDE SERPL-SCNC: 106 MMOL/L (ref 94–109)
CO2 SERPL-SCNC: 26 MMOL/L (ref 20–32)
CREAT SERPL-MCNC: 0.51 MG/DL (ref 0.52–1.04)
DIFFERENTIAL METHOD BLD: ABNORMAL
EOSINOPHIL # BLD AUTO: 0.1 10E9/L (ref 0–0.7)
EOSINOPHIL NFR BLD AUTO: 0.6 %
ERYTHROCYTE [DISTWIDTH] IN BLOOD BY AUTOMATED COUNT: 15.1 % (ref 10–15)
GFR SERPL CREATININE-BSD FRML MDRD: >90 ML/MIN/1.7M2
GLUCOSE SERPL-MCNC: 105 MG/DL (ref 70–99)
HCT VFR BLD AUTO: 37.5 % (ref 35–47)
HGB BLD-MCNC: 12 G/DL (ref 11.7–15.7)
IMM GRANULOCYTES # BLD: 0 10E9/L (ref 0–0.4)
IMM GRANULOCYTES NFR BLD: 0.2 %
INR PPP: 2.51 (ref 0.86–1.14)
INTERPRETATION ECG - MUSE: NORMAL
LYMPHOCYTES # BLD AUTO: 1.4 10E9/L (ref 0.8–5.3)
LYMPHOCYTES NFR BLD AUTO: 16.3 %
MCH RBC QN AUTO: 28.4 PG (ref 26.5–33)
MCHC RBC AUTO-ENTMCNC: 32 G/DL (ref 31.5–36.5)
MCV RBC AUTO: 89 FL (ref 78–100)
MONOCYTES # BLD AUTO: 0.8 10E9/L (ref 0–1.3)
MONOCYTES NFR BLD AUTO: 9.2 %
NEUTROPHILS # BLD AUTO: 6.2 10E9/L (ref 1.6–8.3)
NEUTROPHILS NFR BLD AUTO: 73.6 %
NRBC # BLD AUTO: 0 10*3/UL
NRBC BLD AUTO-RTO: 0 /100
NT-PROBNP SERPL-MCNC: 1198 PG/ML (ref 0–1800)
PLATELET # BLD AUTO: 282 10E9/L (ref 150–450)
POTASSIUM SERPL-SCNC: 4.1 MMOL/L (ref 3.4–5.3)
PROT SERPL-MCNC: 7.4 G/DL (ref 6.8–8.8)
RBC # BLD AUTO: 4.22 10E12/L (ref 3.8–5.2)
SODIUM SERPL-SCNC: 141 MMOL/L (ref 133–144)
TROPONIN I SERPL-MCNC: <0.015 UG/L (ref 0–0.04)
WBC # BLD AUTO: 8.4 10E9/L (ref 4–11)

## 2017-12-05 PROCEDURE — 83880 ASSAY OF NATRIURETIC PEPTIDE: CPT | Performed by: EMERGENCY MEDICINE

## 2017-12-05 PROCEDURE — G8987 SELF CARE CURRENT STATUS: HCPCS | Mod: GO,CN | Performed by: OCCUPATIONAL THERAPIST

## 2017-12-05 PROCEDURE — G8988 SELF CARE GOAL STATUS: HCPCS | Mod: GO,CJ | Performed by: OCCUPATIONAL THERAPIST

## 2017-12-05 PROCEDURE — 85025 COMPLETE CBC W/AUTO DIFF WBC: CPT | Performed by: EMERGENCY MEDICINE

## 2017-12-05 PROCEDURE — 80053 COMPREHEN METABOLIC PANEL: CPT | Performed by: EMERGENCY MEDICINE

## 2017-12-05 PROCEDURE — 25000128 H RX IP 250 OP 636: Performed by: INTERNAL MEDICINE

## 2017-12-05 PROCEDURE — 85610 PROTHROMBIN TIME: CPT | Performed by: EMERGENCY MEDICINE

## 2017-12-05 PROCEDURE — 97167 OT EVAL HIGH COMPLEX 60 MIN: CPT | Mod: GO | Performed by: OCCUPATIONAL THERAPIST

## 2017-12-05 PROCEDURE — 40000445 ZZHC STATISTIC OT VISIT, LYMPHEDEMA: Performed by: OCCUPATIONAL THERAPIST

## 2017-12-05 PROCEDURE — A9270 NON-COVERED ITEM OR SERVICE: HCPCS | Mod: GY | Performed by: INTERNAL MEDICINE

## 2017-12-05 PROCEDURE — 93005 ELECTROCARDIOGRAM TRACING: CPT

## 2017-12-05 PROCEDURE — 99285 EMERGENCY DEPT VISIT HI MDM: CPT

## 2017-12-05 PROCEDURE — 84484 ASSAY OF TROPONIN QUANT: CPT | Performed by: EMERGENCY MEDICINE

## 2017-12-05 PROCEDURE — 71020 XR CHEST 2 VW: CPT

## 2017-12-05 PROCEDURE — 96374 THER/PROPH/DIAG INJ IV PUSH: CPT

## 2017-12-05 PROCEDURE — 25000128 H RX IP 250 OP 636: Performed by: EMERGENCY MEDICINE

## 2017-12-05 PROCEDURE — 25000132 ZZH RX MED GY IP 250 OP 250 PS 637: Mod: GY | Performed by: INTERNAL MEDICINE

## 2017-12-05 PROCEDURE — 12000000 ZZH R&B MED SURG/OB

## 2017-12-05 PROCEDURE — 99223 1ST HOSP IP/OBS HIGH 75: CPT | Mod: AI | Performed by: INTERNAL MEDICINE

## 2017-12-05 RX ORDER — ALBUTEROL SULFATE 90 UG/1
2 AEROSOL, METERED RESPIRATORY (INHALATION) EVERY 4 HOURS PRN
Status: DISCONTINUED | OUTPATIENT
Start: 2017-12-05 | End: 2017-12-07 | Stop reason: HOSPADM

## 2017-12-05 RX ORDER — AMOXICILLIN 250 MG
1 CAPSULE ORAL 2 TIMES DAILY PRN
Status: DISCONTINUED | OUTPATIENT
Start: 2017-12-05 | End: 2017-12-07 | Stop reason: HOSPADM

## 2017-12-05 RX ORDER — POLYETHYLENE GLYCOL 3350 17 G/17G
17 POWDER, FOR SOLUTION ORAL DAILY PRN
Status: DISCONTINUED | OUTPATIENT
Start: 2017-12-05 | End: 2017-12-07 | Stop reason: HOSPADM

## 2017-12-05 RX ORDER — WARFARIN SODIUM 2.5 MG/1
2.5 TABLET ORAL DAILY
Status: DISCONTINUED | OUTPATIENT
Start: 2017-12-06 | End: 2017-12-05

## 2017-12-05 RX ORDER — ONDANSETRON 2 MG/ML
4 INJECTION INTRAMUSCULAR; INTRAVENOUS EVERY 6 HOURS PRN
Status: DISCONTINUED | OUTPATIENT
Start: 2017-12-05 | End: 2017-12-07 | Stop reason: HOSPADM

## 2017-12-05 RX ORDER — HYDRALAZINE HYDROCHLORIDE 25 MG/1
25 TABLET, FILM COATED ORAL EVERY 6 HOURS PRN
Status: DISCONTINUED | OUTPATIENT
Start: 2017-12-05 | End: 2017-12-07 | Stop reason: HOSPADM

## 2017-12-05 RX ORDER — AMOXICILLIN 250 MG
2 CAPSULE ORAL 2 TIMES DAILY PRN
Status: DISCONTINUED | OUTPATIENT
Start: 2017-12-05 | End: 2017-12-07 | Stop reason: HOSPADM

## 2017-12-05 RX ORDER — ONDANSETRON 4 MG/1
4 TABLET, ORALLY DISINTEGRATING ORAL EVERY 6 HOURS PRN
Status: DISCONTINUED | OUTPATIENT
Start: 2017-12-05 | End: 2017-12-07 | Stop reason: HOSPADM

## 2017-12-05 RX ORDER — ALLOPURINOL 100 MG/1
100 TABLET ORAL DAILY
Status: DISCONTINUED | OUTPATIENT
Start: 2017-12-06 | End: 2017-12-07 | Stop reason: HOSPADM

## 2017-12-05 RX ORDER — ACETAMINOPHEN 325 MG/1
650 TABLET ORAL EVERY 4 HOURS PRN
Status: DISCONTINUED | OUTPATIENT
Start: 2017-12-05 | End: 2017-12-05

## 2017-12-05 RX ORDER — LEVOTHYROXINE SODIUM 100 UG/1
100 TABLET ORAL
Status: DISCONTINUED | OUTPATIENT
Start: 2017-12-06 | End: 2017-12-07 | Stop reason: HOSPADM

## 2017-12-05 RX ORDER — AMLODIPINE BESYLATE 5 MG/1
5 TABLET ORAL
Status: DISCONTINUED | OUTPATIENT
Start: 2017-12-06 | End: 2017-12-07 | Stop reason: HOSPADM

## 2017-12-05 RX ORDER — FUROSEMIDE 10 MG/ML
20 INJECTION INTRAMUSCULAR; INTRAVENOUS ONCE
Status: COMPLETED | OUTPATIENT
Start: 2017-12-05 | End: 2017-12-05

## 2017-12-05 RX ORDER — LISINOPRIL 20 MG/1
20 TABLET ORAL 2 TIMES DAILY
Status: DISCONTINUED | OUTPATIENT
Start: 2017-12-05 | End: 2017-12-07 | Stop reason: HOSPADM

## 2017-12-05 RX ORDER — FUROSEMIDE 10 MG/ML
20 INJECTION INTRAMUSCULAR; INTRAVENOUS
Status: DISCONTINUED | OUTPATIENT
Start: 2017-12-05 | End: 2017-12-07 | Stop reason: HOSPADM

## 2017-12-05 RX ORDER — NALOXONE HYDROCHLORIDE 0.4 MG/ML
.1-.4 INJECTION, SOLUTION INTRAMUSCULAR; INTRAVENOUS; SUBCUTANEOUS
Status: DISCONTINUED | OUTPATIENT
Start: 2017-12-05 | End: 2017-12-07 | Stop reason: HOSPADM

## 2017-12-05 RX ORDER — ACETAMINOPHEN 325 MG/1
650 TABLET ORAL EVERY 4 HOURS PRN
Status: DISCONTINUED | OUTPATIENT
Start: 2017-12-05 | End: 2017-12-07 | Stop reason: HOSPADM

## 2017-12-05 RX ORDER — SIMVASTATIN 20 MG
20 TABLET ORAL AT BEDTIME
Status: DISCONTINUED | OUTPATIENT
Start: 2017-12-05 | End: 2017-12-07 | Stop reason: HOSPADM

## 2017-12-05 RX ADMIN — FUROSEMIDE 20 MG: 10 INJECTION, SOLUTION INTRAVENOUS at 18:31

## 2017-12-05 RX ADMIN — FUROSEMIDE 20 MG: 10 INJECTION, SOLUTION INTRAVENOUS at 22:43

## 2017-12-05 RX ADMIN — LISINOPRIL 20 MG: 20 TABLET ORAL at 22:43

## 2017-12-05 RX ADMIN — SIMVASTATIN 20 MG: 20 TABLET, FILM COATED ORAL at 22:43

## 2017-12-05 ASSESSMENT — ENCOUNTER SYMPTOMS
FEVER: 0
DYSURIA: 0
COUGH: 0
UNEXPECTED WEIGHT CHANGE: 1
ABDOMINAL PAIN: 0
SHORTNESS OF BREATH: 1
HEMATURIA: 0
FREQUENCY: 0

## 2017-12-05 NOTE — TELEPHONE ENCOUNTER
Lindsey, Lymphedema Clinic Therapist, called, Patient is currently there. Has not been evaluated or therapy started. Therapist notes firm edema from bilateral feet up to her lower back. Patient states she has lower back pain and underwear feel tight due to fluid.  Patient states it is difficult to walk and uncomfortable  because her feet and legs are so swollen. Patient states she has gained at least 2+  lbs since MICHAELA OV 12-1-17. Patient is trying to watch her fluid  And Na+ intake . Patient denies chest discomfort, shortness of breath, or light headedness/dizziness. Patient is on Norvasc 5 mg twice daily  and Lasix 20 mg daily.  Reviewed with Dr. Guan. Recommendation is for patient to go to ER for evaluation.   If patient declines Lasix can be doubled to 40 mg daily, repeat BMP and daily follow up.   Patient agrees with going to ER.   ER called and updated that patient will be coming.

## 2017-12-05 NOTE — LETTER
Transition Communication Hand-off for Care Transitions to Next Level of Care Provider    Name: Kenyatta Donald  MRN #: [id number]  Primary Care Provider: Bess Lion     Primary Clinic: [address]  [place] [address]     Reason for Hospitalization:  Acute on chronic diastolic congestive heart failure (H) [I50.33]  Admit Date/Time: 12/5/2017  4:56 PM  Discharge Date: 12/7/17  Payor Source: Payor: BCBS / Plan: [employer] PLATINUM BLUE / Product Type: PPO /     Readmission Assessment Measure (UNA) Risk Score/category:Average         Reason for Communication Hand-off Referral: Admission diagnoses: CHF    Discharge Plan: Home       Concern for non-adherence with plan of care:   No    Already enrolled in Tele-monitoring program and name of program:  No  Follow-up specialty is recommended: Cardiology  Follow-up plan:  Future Appointments  Date Time Provider Department Center   12/12/2017 11:00 AM Bess Lion MD [facility]   12/19/2017 1:50 PM Maddie Contreras PA [facility]   2/1/2018 12:20 PM [facility]   2/1/2018 12:45 PM [id number]   2/1/2018 2:00 PM [facility]   2/16/2018 10:45 AM Chris Guan MD [facility]       Any outstanding tests or procedures:        Referrals     Future Labs/Procedures    Home care nursing referral     Comments:    RN extended hours visit. RN to assess heart failure and change sacral dressing and monitor healing.    Your provider has ordered home care nursing services. If you have not been contacted within 2 days of your discharge please call the inpatient department phone number at [phone] .    Home Care OT Referral for Hospital Discharge     Comments:    OT eval and treat    Your provider has ordered home care - occupational therapy. If you have not been contacted within 2 days of your discharge please call the department phone number listed on the top of this document.    Home Care PT Referral for  Hospital Discharge     Comments:    PT to eval and treat    Your provider has ordered home care - physical therapy. If you have not been contacted within 2 days of your discharge please call the department phone number listed on the top of this document.            Key Recommendations:  Pt was at her first outpt Lymphedema appt when the the therapist alerted [facility] concerning how large pt's legs were and she was having difficulty walking.  She is much improved after removal of over 10 pounds.  Hospitalist does not feel pt has Lymphedema, as her legs have much improved with IV lasix.  Pt's INR is 1.69, she is to continue same Coumadin dosing with INR when she has her PCP appointment on 12/12/17.  If her PCP wanted an INR sooner the home care RN could draw this.  Pt lives alone and at baseline does drive.    Thank-you,    Clara Slade  RN Care Coordinator  [facility]    AVS/Discharge Summary is the source of truth; this is a helpful guide for improved communication of patient story

## 2017-12-05 NOTE — IP AVS SNAPSHOT
MRN:[id number]                      After Visit Summary   12/5/2017    Kenyatta Donald    MRN: [id number]           Thank you!     Thank you for choosing [facility] for your care. Our goal is always to provide you with excellent care. Hearing back from our patients is one way we can continue to improve our services. Please take a few minutes to complete the written survey that you may receive in the mail after you visit with us. Thank you!        Patient Information     Date Of Birth          1935        Designated Caregiver       Most Recent Value    Caregiver    Will someone help with your care after discharge? no      About your hospital stay     You were admitted on:  December 5, 2017 You last received care in the:  [place] [address] [facility]    You were discharged on:  December 7, 2017        Reason for your hospital stay       Heart failure with leg swelling.                  Who to Call     For medical emergencies, please call 911.  For non-urgent questions about your medical care, please call your primary care provider or clinic, [phone]          Attending Provider     Provider Specialty    Evans Roldan MD Emergency Medicine    [facility], Chace Romero MD Internal Medicine       Primary Care Provider Office Phone # Fax #    Bess Mirna Lion [phone]      After Care Instructions     Activity       Your activity upon discharge: activity as tolerated.  Do not sleep in a chair, try sleeping in your bed -- to allow the pressure ulcer on your butt to heal, and help keep your legs from swelling.            Diet       Follow this diet upon discharge: Regular, no added salt.            Discharge Instructions       Call Dr. Zaragoza at Pager [phone] if questions, or Cell Phone [phone].            Wound care and dressings       Instructions to care for your wound at home:  Every other day and as needed   1.  Cleanse with MicroKlenz,  pat dry.  2.  Swab periwound with skin prep, let dry.  3.  Cover with 4x4 Mepilex, staying well above perineal area                  Follow-up Appointments     Adult [facility] Follow-up and recommended labs and tests       Follow up with primary care provider, Bess Lion, as scheduled 12/12/17 at 11:00am, check BMP and INR then, and keep appointment with cardiology as scheduled on 12/19/17 at 1:50 PM.                  Your next 10 appointments already scheduled     Dec 12, 2017 11:00 AM CST   [facility] with Bess Lion MD   [facility]    [address]  [facility] [address] [phone]           Bring a current list of meds and any records pertaining to this visit. For Physicals, please bring immunization records and any forms needing to be filled out. Please arrive 10 minutes early to complete paperwork.            Dec 19, 2017  1:50 PM CST   Return Discharge with CHYNA Hutchinson   [facility] ([facility])    [address]  [facility] [address]   [phone]            Feb 01, 2018 12:20 PM CST   LAB with [facility]   [facility] [place] ([facility])    [address] [place] [address] [address]  [facility] [address]   [phone]           Please do not eat 10-12 hours before your appointment if you are coming in fasting for labs on lipids, cholesterol, or glucose (sugar). This does not apply to pregnant women. Water, hot tea and black coffee (with nothing added) are okay. Do not drink other fluids, diet soda or chew gum.            Feb 01, 2018 12:45 PM CST   Ech Complete with [id number]   [facility] ([facility])    [address] [place] [address]  [address]  [facility] [address] [phone]           1. Please bring or wear a comfortable  two-piece outfit. 2. You may eat, drink and take your normal medicines. 3. For any questions that cannot be answered, please contact the ordering physician            Feb 01, 2018  2:00 PM CST   Holter Monitor with MALLORIE   [facility] ([facility])    [address]  [place] [address] [phone]            Feb 16, 2018 10:45 AM CST   Return Visit with Chris Guan MD   [facility] ([facility])    [address]  [place] [address] [phone]              Additional Services     Home Care OT Referral for Hospital Discharge       OT eval and treat    Your provider has ordered home care - occupational therapy. If you have not been contacted within 2 days of your discharge please call the department phone number listed on the top of this document.            Home Care PT Referral for Hospital Discharge       PT to eval and treat    Your provider has ordered home care - physical therapy. If you have not been contacted within 2 days of your discharge please call the department phone number listed on the top of this document.            Home care nursing referral       RN extended hours visit. RN to assess heart failure and change sacral dressing and monitor healing.    Your provider has ordered home care nursing services. If you have not been contacted within 2 days of your discharge please call the inpatient department phone number at [phone] .                  Further instructions from your care team       [facility] was sent a referral for home RN, Physical Therapy and Occupational Therapy.  They will call before coming to your home. Their number is [phone].    General Recommendations To Control Heart Failure When You Get Home       Heart Failure Instructions for Patients and Families: Please read and check off each of these important instructions as you  do them when you get home.          Weight and Symptoms       ___ Put a scale in your bathroom.    ___ Post a weight chart or calendar next to your scale.    ___ Weigh yourself everyday as soon as you get up in the morning (before breakfast).  You should only be wearing your pajamas.  Write your weight on the chart/calendar.  ___ Bring your weight chart/calendar with you to all appointments.  ___ Call your doctor or nurse practitioner if you gain 2 pounds (in 1 day) or 5 pounds in (1 week) from your goal  good  weight.  Your good weight is also called your  dry  weight.  Your doctor or nurse will tell you what your good weight should be.    ___ Call your doctor or nurse practitioner if you have shortness of breath that gets worse over time, leg swelling or fatigue.       Medications and Diet       ___ Make sure to take your medication as prescribed.    ___ Bring a current list of your medication and all of your medicine bottles with you to all appointments.    ___ Limit fluids if you still have swelling or shortness of breath, or if your doctor tells you to do so.    ___ Eat less than 2000 mg of sodium (salt) every day. Read food labels, and do not add salt to meals. Remember, if you eat less salt you retain less fluid.  ___ Follow a heart healthy diet that is low in saturated fat.        Activity and Suggested Lifestyle Changes      ___ Stay active. Talk to your doctor about an exercise program that is safe for your heart.  ___ Stop smoking. Reduce alcohol use.      ___ Lose weight if you are overweight. Extra weight puts a lot of stress on the heart.                 Control for Leg Swelling     ___ Keep your legs elevated (raised) as needed for swelling. If swelling is uncomfortable or elevation doesn t help, ask your doctor about using ACE wraps or support stockings.        What is the [facility] Clinic?  Cardiomyopathy  Optimization  Rehabilitation  Education    The [facility] Clinic is a heart failure specialty  clinic within [facility].  It is an outpatient disease management program that is based on a phase-by-phase approach, which is tailored to each patient s individual needs.  The cardiologist, nurse practitioner, physician assistant and nurses provide an ongoing outpatient care and treatment plan that guides heart failure and cardiomyopathy patients from evaluation and education to stabilization. This team works with your current primary care doctor and cardiologist to help you:    - Avoid hospitalizations  - Slow the progression of the disease  - Improve length and quality of life  - Know who and when to call if heart failure symptoms appear  - Receive easy access to quality health care and advice  - Better understand your condition and treatment  - Decrease the tremendous cost burden of heart failure care  - Detect future heart problems before they become life threatening                                 Follow-up Appointments     ___ An appointment with the [facility] Clinic may already have been made for you (see section   Your next appointments already scheduled ).  If you have a question about your appointment, would like to speak with a C.O.R.E. nurse, or would like to become a [facility].O.R.E[facility] Clinic patient, please call one of the following locations:     - [facility]):                                             [phone]  - [facility]):                                            [phone]  - [facility] ([place]):                 [phone]      ___ Your C.O.R.E. Clinic Team will continue to educate you on your heart failure and may adjust medications based on your vital signs, lab work, and how you are feeling.  Therefore, it is very important to bring the following to all C.O.R.E. appointments:    - An accurate list of your medications  - Your medicine bottles  - Your weight chart/calendar                   Pending  "Results     No orders found from 12/3/2017 to 2017.            Statement of Approval     Ordered          17 1308  I have reviewed and agree with all the recommendations and orders detailed in this document.  EFFECTIVE NOW     Approved and electronically signed by:  Chace Zaragoza MD             Admission Information     Date & Time Provider Department Dept. Phone    2017 Chace Zaragoza MD [place] [address] [facility] [phone]      Your Vitals Were     Blood Pressure Pulse Temperature Respirations Weight Last Period    140/61 82 97.9  F (36.6  C) (Oral) 16 87.8 kg (193 lb 9 oz) (LMP Unknown)    Pulse Oximetry BMI (Body Mass Index)                95% 32.71 kg/m2          MyChart Information     [facility] lets you send messages to your doctor, view your test results, renew your prescriptions, schedule appointments and more. To sign up, go to [email or web address] . Click on \"Log in\" on the left side of the screen, which will take you to the Welcome page. Then click on \"Sign up Now\" on the right side of the page.     You will be asked to enter the access code listed below, as well as some personal information. Please follow the directions to create your username and password.     Your access code is: [email or web address]  Expires: 3/1/2018  3:46 PM     Your access code will  in 90 days. If you need help or a new code, please call your [place] clinic or [phone].        Care EveryWhere ID     This is your Care EveryWhere ID. This could be used by other organizations to access your [facility] medical records  [id number]        Equal Access to Services     [facility]: Hadii zonia Gonzalez, wafransiscoda hermes, qaybta kaalemma menjivar. [facility] [phone].    ATENCIÓN: Si habla español, tiene a duenas disposición servicios gratuitos de asistencia lingüística. Llame al [phone].    We comply with applicable " federal civil rights laws and [place] laws. We do not discriminate on the basis of race, color, national origin, age, disability, sex, sexual orientation, or gender identity.               Review of your medicines      CONTINUE these medicines which may have CHANGED, or have new prescriptions. If we are uncertain of the size of tablets/capsules you have at home, strength may be listed as something that might have changed.        Dose / Directions    furosemide 40 MG tablet   Commonly known as:  LASIX   This may have changed:  See the new instructions.   Used for:  Acute on chronic diastolic congestive heart failure (H)        Dose:  40 mg   Take 1 tablet (40 mg) by mouth daily For heart failure and leg swelling   Quantity:  60 tablet   Refills:  3         CONTINUE these medicines which have NOT CHANGED        Dose / Directions    acetaminophen 325 MG tablet   Commonly known as:  TYLENOL   Used for:  Closed nondisplaced fracture of left patella, unspecified fracture morphology, initial encounter        Dose:  650 mg   Take 2 tablets (650 mg) by mouth every 4 hours as needed for mild pain   Quantity:  100 tablet   Refills:  0       albuterol 108 (90 BASE) MCG/ACT Inhaler   Commonly known as:  PROAIR HFA/PROVENTIL HFA/VENTOLIN HFA        Dose:  2 puff   Inhale 2 puffs into the lungs every 6 hours as needed for shortness of breath / dyspnea or wheezing   Refills:  0       allopurinol 100 MG tablet   Commonly known as:  ZYLOPRIM   Used for:  Gout        TAKE 1 TABLET BY MOUTH EVERY DAY   Quantity:  90 tablet   Refills:  2       amLODIPine 5 MG tablet   Commonly known as:  NORVASC   Used for:  Benign essential hypertension        Dose:  5 mg   Take 1 tablet (5 mg) by mouth 2 times daily   Quantity:  180 tablet   Refills:  3       amoxicillin 500 MG capsule   Commonly known as:  AMOXIL   Used for:  Mitral valve insufficiency        Take 4 capsules by mouth 30 minutes prior to dental work   Quantity:  4 capsule   Refills:   4       ASPIRIN NOT PRESCRIBED   Commonly known as:  INTENTIONAL        Please choose reason not prescribed, below   Quantity:  1 each   Refills:  0       cholecalciferol 1000 UNIT tablet   Commonly known as:  vitamin D3        Dose:  2000 Units   Take 2,000 Units by mouth daily   Refills:  0       levothyroxine 100 MCG tablet   Commonly known as:  SYNTHROID/LEVOTHROID   Used for:  Acquired hypothyroidism        Dose:  100 mcg   Take 1 tablet (100 mcg) by mouth daily   Quantity:  90 tablet   Refills:  1       lisinopril 20 MG tablet   Commonly known as:  PRINIVIL/ZESTRIL   Used for:  Essential hypertension, benign        Dose:  20 mg   Take 1 tablet (20 mg) by mouth 2 times daily   Quantity:  180 tablet   Refills:  3       metoprolol 50 MG 24 hr tablet   Commonly known as:  TOPROL-XL   Used for:  Coronary artery disease involving native coronary artery of native heart without angina pectoris        Dose:  75 mg   Take 1.5 tablets (75 mg) by mouth daily   Quantity:  135 tablet   Refills:  1       nystatin 119514 UNIT/GM Powd   Commonly known as:  MYCOSTATIN   Used for:  Intertrigo        Apply topically 3 times daily as needed   Quantity:  60 g   Refills:  1       * order for DME   Used for:  Malignant neoplasm of female breast, unspecified laterality, unspecified site of breast        Equipment being ordered: mastectomy bras & prostheses  S/po mastectomy of unknown side     C50.919   Quantity:  1 each   Refills:  0       * order for DME   Used for:  Lymphedema of both lower extremities        Equipment being ordered: compression hose  BK 20-30mm  2 pair as insurance will pay   Quantity:  1 each   Refills:  0       simvastatin 40 MG tablet   Commonly known as:  ZOCOR   Used for:  Mixed hyperlipidemia        Dose:  20 mg   Take 0.5 tablets (20 mg) by mouth At Bedtime   Quantity:  45 tablet   Refills:  3       warfarin 4 MG tablet   Commonly known as:  COUMADIN   Used for:  Atrial fibrillation, unspecified type (H),  History of pulmonary embolism        Dose:  4 mg   Take 1 tablet (4 mg) by mouth daily Taking 4 mg m,w,f & 4mg + 1 mg rest of week   Quantity:  135 tablet   Refills:  2       * Notice:  This list has 2 medication(s) that are the same as other medications prescribed for you. Read the directions carefully, and ask your doctor or other care provider to review them with you.         Where to get your medicines      These medications were sent to [facility] [place], [place] - [address]  [address], [place] [address]     Phone:  [phone]     furosemide 40 MG tablet                Protect others around you: Learn how to safely use, store and throw away your medicines at www.disposemymeds.org.             Medication List: This is a list of all your medications and when to take them. Check marks below indicate your daily home schedule. Keep this list as a reference.      Medications           Morning Afternoon Evening Bedtime As Needed    acetaminophen 325 MG tablet   Commonly known as:  TYLENOL   Take 2 tablets (650 mg) by mouth every 4 hours as needed for mild pain   Last time this was given:  650 mg on 12/6/2017 10:39 PM                                   albuterol 108 (90 BASE) MCG/ACT Inhaler   Commonly known as:  PROAIR HFA/PROVENTIL HFA/VENTOLIN HFA   Inhale 2 puffs into the lungs every 6 hours as needed for shortness of breath / dyspnea or wheezing                                   allopurinol 100 MG tablet   Commonly known as:  ZYLOPRIM   TAKE 1 TABLET BY MOUTH EVERY DAY   Last time this was given:  100 mg on 12/7/2017  8:33 AM   Next Dose Due:  Take tomorrow AM 12/8 12/8                       amLODIPine 5 MG tablet   Commonly known as:  NORVASC   Take 1 tablet (5 mg) by mouth 2 times daily   Last time this was given:  5 mg on 12/7/2017  8:33 AM   Next Dose Due:  Take this evening 12/7 12/8 12/7               amoxicillin 500 MG capsule   Commonly  known as:  AMOXIL   Take 4 capsules by mouth 30 minutes prior to dental work                                   ASPIRIN NOT PRESCRIBED   Commonly known as:  INTENTIONAL   Please choose reason not prescribed, below                                cholecalciferol 1000 UNIT tablet   Commonly known as:  vitamin D3   Take 2,000 Units by mouth daily   Next Dose Due:  Resume home schedule, not given in hospital                                furosemide 40 MG tablet   Commonly known as:  LASIX   Take 1 tablet (40 mg) by mouth daily For heart failure and leg swelling   Next Dose Due:  Start tomorrow 12/8; you had IV lasix today            12/8                       levothyroxine 100 MCG tablet   Commonly known as:  SYNTHROID/LEVOTHROID   Take 1 tablet (100 mcg) by mouth daily   Last time this was given:  100 mcg on 12/7/2017  6:25 AM   Next Dose Due:  Take tomorrow AM 12/8 12/8                       lisinopril 20 MG tablet   Commonly known as:  PRINIVIL/ZESTRIL   Take 1 tablet (20 mg) by mouth 2 times daily   Last time this was given:  20 mg on 12/7/2017  8:33 AM   Next Dose Due:  Take this evening 12/7 12/8 12/7               metoprolol 50 MG 24 hr tablet   Commonly known as:  TOPROL-XL   Take 1.5 tablets (75 mg) by mouth daily   Last time this was given:  75 mg on 12/7/2017  8:33 AM   Next Dose Due:  Take tomorrow AM 12/8 12/8                       nystatin 475242 UNIT/GM Powd   Commonly known as:  MYCOSTATIN   Apply topically 3 times daily as needed                                   * order for DME   Equipment being ordered: mastectomy bras & prostheses  S/po mastectomy of unknown side     C50.919                                * order for DME   Equipment being ordered: compression hose  BK 20-30mm  2 pair as insurance will pay                                simvastatin 40 MG tablet   Commonly known as:  ZOCOR   Take 0.5 tablets (20 mg) by mouth At Bedtime   Last time this was  given:  20 mg on 12/6/2017  9:39 PM   Next Dose Due:  Take at bedtime tonight 12/7 12/7           warfarin 4 MG tablet   Commonly known as:  COUMADIN   Take 1 tablet (4 mg) by mouth daily Taking 4 mg m,w,f & 4mg + 1 mg rest of week   Last time this was given:  5 mg on 12/6/2017  5:17 PM   Next Dose Due:  Take this evening 12/7 12/7               * Notice:  This list has 2 medication(s) that are the same as other medications prescribed for you. Read the directions carefully, and ask your doctor or other care provider to review them with you.              More Information        Left- or Right- Side Congestive Heart Failure (CHF)    The heart is a large muscle. It is a pump that circulates blood throughout the body. Blood carries oxygen to all of the organs, including the brain, muscles, and skin. After your body takes the oxygen out of the blood, the blood returns to the heart. The right side of the heart collects the blood from the body and pumps it to the lungs. In the lungs, it gets fresh oxygen and gives up carbon dioxide. The oxygen-rich blood from the lungs then returns to the left side of the heart, where it is pumped back out to the rest of your body, starting the process all over.  Congestive heart failure (CHF) occurs when the heart muscle is weakened. This affects the pumping action of the heart. Heart failure can affect the right side of the heart or the left side. But heart failure may affect not only the right side of the heart or only the left side. Although it may have started on one side, it can and often eventually does affect both sides.  Right-side heart failure  When the right side of the heart is weakened, it can t handle the blood it is getting from the rest of the body. This blood returns to the heart through veins. When too much pressure builds up in the veins, fluid leaks out into the tissues. Gravity then causes that fluid to move to those parts of  the body that are the lowest. So one of the first symptoms of right-side CHF can include swelling in the feet and ankles. If the condition gets worse, the swelling can even go up past the knees. Sometimes it gets so severe, the liver can get congested as well.  Left-side heart failure  When the left side of the heart is weakened, it can t handle the blood it gets from the lungs. Pressure then builds up in the veins of the lungs, causing fluid to leak into the lung tissues. This may cause CHF and pulmonary edema. This causes you to feel short of breath, weak, or dizzy. These symptoms are often worse with exertion, such as when climbing stairs or walking up hills. Lying with your head flat is uncomfortable and can make your breathing worse. This may make sleeping difficult. You may need to use extra pillows to elevate your upper body to sleep well. The same is true when just resting during the daytime.  There are many causes of heart failure including:    Coronary artery disease    Past heart attack (also known as acute myocardial infarction, or AMI)    High blood pressure    Damaged heart valve    Diabetes    Obesity    Cigarette smoking    Alcohol abuse  Heart failure is a chronic condition. There is no cure. The purpose of medical treatment is to improve the pumping action of the heart. The main way to do this is to remove excess water from the body. A number of medicines can help reach this goal, improve symptoms, and prevent the heart from becoming weaker. Sometimes, heart failure can become so severe that a device is placed in the heart to help with pumping. Another major goal is to better treat the causes of heart failure, such as diabetes and high blood pressure, by making changes in your lifestyle and maximizing medical control when needed.  Home care  Follow these guidelines when caring for yourself at home:    Check your weight every day. This is very important because a sudden increase in weight gain could  mean worsening heart failure. Keep these things in mind:    Use the same scale every day.    Weigh yourself at the same time every day.    Make sure the scale is on a hard floor surface, not on a rug or carpet.    Keep a record of your weight every day so your healthcare provider can see it. If you are not given a log sheet for this, keep a separate journal for this purpose.     Cut back on the amount of salt (sodium) you eat. Follow your healthcare provider's recommendation on how much salt or sodium you should have each day.    Avoid high-salt foods. These include olives, pickles, smoked meats, salted potato chips, and most prepared foods.    Don't add salt to your food at the table. Use only small amounts of salt when cooking.    Read the labels carefully on food packages to learn how much salt or sodium is in each serving in the package. Remember, a can or package of food may contain more than 1 serving. So if you eat all the food in the package, you may be getting more salt than you think.    Follow your healthcare provider's recommendations about how much fluid you should have. Be aware that some foods, such as soup, pudding, and juicy fruits like oranges or melons, contain liquid. You'll need to count the liquid in those foods as part of your daily fluid intake. Your provider can help you with this.    Stop smoking.    Cut back on how much alcohol you drink.    Lose weight if you are overweight. The excess weight adds a lot of stress on the workload of the heart.    Stay active. Talk with your provider about an exercise program that is safe for your heart.    Keep your feet elevated to reduce swelling. Ask your provider about support hose as a preventive treatment for daytime leg swelling.  Besides taking your medicine as instructed, an important part of treatment is lifestyle changes. These include diet, physical activity, stopping smoking, and weight control.  Improve your diet by including more fresh  foods, cutting back on how much sugar and saturated fat you eat, and eating fewer processed foods and less salt.  Follow-up care  Follow up with your healthcare provider, or as advised.  Make sure to keep any appointments that were made for you. These can help better control your congestive heart failure. You will need to follow up with your provider on a routine basis to make sure your heart failure is well managed.  If an X-ray, ECG, or other tests were done, you will be told of any new findings that may affect your care.  Call 911  Call 911 if you:    Become severely short of breath    Feel lightheaded, or feel like you might pass out or faint    Have chest pain or discomfort that is different than usual, the medicines your doctor told you to use for this don't help, or the pain lasts longer than 10 to 15 minutes    You suddenly develop a rapid heart rate  When to seek medical advice  The following may be signs that your heart failure is getting worse. Call your healthcare provider right away if any of these happen:    Sudden weight gain. This means 3 or more pounds in one day, or 5 or more pounds in 1 week.    Trouble breathing not related to being active    New or increased swelling of your legs or ankles    Swelling or pain in your abdomen    Breathing trouble at night. This means waking up short of breath or needing more pillows to breathe.    Frequent coughing that doesn t go away    Feeling much more tired than usual  Date Last Reviewed: 1/4/2016 2000-2017 [employer]. [address] [place] [address], [place] [address]. All rights reserved. This information is not intended as a substitute for professional medical care. Always follow your healthcare professional's instructions.                Discharge Instructions for Heart Failure  The heart is a muscle that pumps oxygen-rich blood to all parts of the body. When you have heart failure, the heart is not able to pump as well as it should. Blood and  fluid may back up into the lungs (congestive heart failure), and some parts of the body don t get enough oxygen-rich blood to work normally. These problems lead to the symptoms of heart failure. Heart failure can occur due to an injury to the heart or from natural processes.  You can control symptoms of heart failure with some lifestyle changes and by following your doctor's advice.  Home care  Activity  Ask your healthcare provider about an exercise program. You can benefit from simple activities such as walking or gardening. Exercising most days of the week can make you feel better. Don't be discouraged if your progress is slow at first. Rest as needed. Stop activity if you develop symptoms such as chest pain, lightheadedness, or significant shortness of breath. Find activities that you enjoy, such as brisk walking, dancing, swimming, or gardening. These will help you stay active and strengthen your heart.  Diet  Follow a heart healthy diet. And make sure to limit the salt (sodium) in your diet. Salt causes your body to hold water. This makes your heart work harder as there is more fluid for the heart to pump. Limit your salt by doing the following:    Limit canned, dried, packaged, and fast foods.    Don't add salt to your food.    Season foods with herbs instead of salt.    Watch how much liquids you drink. Drinking too much can make heart failure worse. Talk with your health care provider about how much you should drink each day.    Limit the amount of alcohol you drink. It may harm your heart. Women should have no more than 1 drink a day and men should have no more than 2.    When you eat out, request that your meals have no added salt.  Tobacco  If you smoke, it's very important to quit. Smoking increases your chances of having a heart attack by harming the blood vessels that provide oxygen to your heart. This makes heart failure worse. Quitting smoking is the number one thing you can do to improve your  health. Enroll in a stop-smoking program to improve your chances of success. Talk with your healthcare provider about medicines or nicotine replacement therapy to help you quit smoking. Ask your healthcare provider about smoking cessation support groups.  Medicine  Take your medicines exactly as prescribed. Learn the names and purpose of each of your medicines. Keep an accurate medicine list and current dosages with you at all times. Don't skip doses. If you miss a dose of your medicine, take it as soon as you remember. If you miss a dose and it's almost time for your next dose, just wait and take your next dose at the normal time. Don't take a double dose. If you are unsure, call your doctor's office. Make sure not to mix up your medicines or forget what you've taken the same day.  Weight monitoring  Weigh yourself every day. A sudden weight gain can mean your heart failure is getting worse. Weigh yourself at the same time of day and in the same kind of clothes. Ideally, weigh yourself first thing in the morning after you empty your bladder, but before you eat breakfast. Your healthcare provider will show you how to track your weight. He or she will also discuss with you when you should call if you have a sudden, unexpected increase in your weight.  In general, your healthcare provider may ask you to report if your weight goes up by more than 2 pounds in 1 day,  5 pounds in 1 week, or whatever weight gain you were told by your doctor. This is a sign that you are retaining more fluid than you should be. Clues to weight gain include checking your ankles for swelling, or noticing you are short of breath when you lie down.  Follow-up care  Make a follow-up appointment as directed. Depending on the type and severity of heart failure you have, you may need follow-up as early as 7 days from hospital discharge. Keep appointments for checkups and lab tests that are needed to check your medicines and condition.  Recognize  that your health and even survival depend on your following medical recommendations.  Symptoms  Heart failure can cause a variety of symptoms, including:    Shortness of breath    Trouble breathing at night, especially when you lie down    Swelling in the legs and feet or in the belly (abdomen)    Becoming easily fatigued    Irregular or rapid heartbeat    Weakness or lightheadedness    Swelling of the neck veins  It is important to know what to do if symptoms get worse or if you develop signs of worsening heart failure.     When to see your healthcare provider  Call your doctor right away if you have any of these signs of worsening heart failure:    Sudden weight gain (more than 2 pounds in 1 day or 5 pounds in 1 week, or whatever weight gain you were told to report by your doctor)    Trouble breathing not related to being active    New or increased swelling of your legs or ankles    Swelling or pain in your abdomen    Breathing trouble at night (waking up short of breath, needing more pillows to breathe)    Frequent coughing that doesn't go away    Feeling much more tired than usual  Call 911  Call 911 right away if you have:    Severe shortness of breath, such that you can't catch your breath even while resting    Severe chest pain that does not resolve with rest or nitroglycerin    Pink, foamy mucus with cough and shortness of breath    A continuous rapid or irregular heartbeat    Passing out or fainting    Stroke symptoms such as sudden numbness or weakness on one side of your face, arm, or leg or sudden confusion, trouble speaking or vision changes   Date Last Reviewed: 3/21/2016    [id number] [employer]. [address] [place] [address], [place] [address]. All rights reserved. This information is not intended as a substitute for professional medical care. Always follow your healthcare professional's instructions.

## 2017-12-05 NOTE — TELEPHONE ENCOUNTER
Patience Weathers, REBEKAH CNP  [facility]                     BNP is not significantly elevated given her age, wt, and h/o a. Fib. Let's continue with starting lymphedema therapy which is scheduled for tomorrow and see if any improvement in LE edema.  Pt informed of this information and asked to call if any changes regarding  fluid issues or increased swelling. GITA Rick RN

## 2017-12-05 NOTE — PROGRESS NOTES
"   12/05/17 1500   Rehab Discipline   Discipline OT   Type of Visit   Type of visit Initial Edema Evaluation   General Information   Start of care 12/05/17   Referring physician REBEKAH Sandoval CNP   Orders Evaluate and treat as indicated   Order date 12/01/17   Medical diagnosis BLE Lymphedema   Onset of illness / date of surgery 12/01/17   Edema onset 12/01/17   Edema etiology comments Pt first noted swelling BLE & buttocks early November 2017.   She presented to her primary MD, Dr. Petit, who advised pt wear compression socks.  Pt has not been able to don/doff compression socks.   She is unable to elevate her legs during sleep b/c she must sleep with her head up, \"...so I can breathe.\"  Pt has been gaining weight, \"A good 6 lbs\" and was advised by Patience CUI at Cardiac clinic to call if weight gain > 2 # day   Pertinent history of current problem (PT: include personal factors and/or comorbidities that impact the POC; OT: include additional occupational profile info) PMHx includes: Mild non-obstructive CAD, HTN, Afib with tachybradycardia syndrome on Coumadin, h/o PE, h/o CHF, s/p mitral valve clip '15/mitral valve regurgitation, hyperlipidemia/cholesterolemia, WALESKA, GERDs, OA/DJD, obese (BMI in Epic = 34), hypothyroid, toe gout secondary to meds, spinal muscle atrophy type III primarily decreases quad strength & shoulders (dx'd age 44), fall and home summer 2016 and had 2 week rehab stay [facility] & then home care for strengthening, deconditioned, osteoporosis, has rx for prophy Amoxicillin prn b4 dental/surg procedures, multi meds, s/p L TKA, B br ca 20 yrs apart with B mastectomies.   Surgical / medical history reviewed Yes   Prior level of functional mobility Uses wheeled walker for ambulation   Prior treatment Diuretics;Compression garments   Patient role / employment history Retired   Living environment comments Lives in independent living senior apartment complex   Fall Risk Screen   Fall screen " "completed by OT   Have you fallen and had an injury in the past year? Yes   Is patient a fall risk? Yes   System Outcome Measures   Lymphedema Life Impact Scale (score range 0-72). A higher score indicates greater impairment. 43   Subjective Report   Patient report of symptoms Skin tight BLE & buttocks, legs & buttocks swollen, decr AROM/strength BLE, lacks knowledge re lymphedema mgmt, impaired mobility for ADLs, impaired sleep, impaired fit of clothing \"My underwear are tight.\"   Precautions   Precautions comments Deconditioned/easily fatigued, falls risk, osteoporosis, incr risk infection, decr BLE strength, anti-coagulated   Pain   Pain comments Severe low back pain, \"When I walk.   When I sit there is no pain.\"   Cognitive Status   Orientation Orientation to person, place and time   Level of consciousness Alert   Follows commands and answers questions 100% of the time   Personal safety and judgement Intact   Memory Intact   Edema Exam / Assessment   Skin condition comments Firm, 3+ pitting edema visible B ankles.   Further exam was not performed (please see \"Clinical impression comments\" below   Girth Measurements   Girth Measurements (Msmts will be taken if pt appropriate for CDT)   Range of Motion   ROM comments Pt able to sit to stand to walk from seated on her wheeled walker with difficulty d/t back pain.   Activities of Daily Living   Activities of Daily Living Pt does drive, but had a friend drive her to this appt, \"When I don't know what is going to happen.\", has assistance with bathing 2 x week d/t falls risk, assistance with cleaning, assistance with grocery shopping.   Gait / Locomotion   Gait / Locomotion \"When I go to get up in the morning I am really painful and can hardly walk.\"   Planned Edema Interventions   Planned edema intervention comments Brief ED provided re typical etiology of swelling/lymphedema possibly d/t exacerbation of CHF.   Pt advised to return for completion of this evaluation " "and recommendations for Complete Decongestive Therapy at this clinic s/p Dr. Guan deems appropriate if swelling persists.    Clinical Impression   Criteria for skilled therapeutic intervention met Yes   Therapy diagnosis BLE & buttocks swollen possibly d/t exacerbation of CHF; plz see \"clinical impression comments\" below   Influenced by the following impairments / conditions Edema   Assessment of Occupational Performance 5 or more Performance Deficits   Identified Performance Deficits Pain, impaired mobility, impaired ADL performance, rapid exacerbation BLE/buttocks swelling, BLE weakness, pt deconditioned, complex PMHx/cardio-vasc issues, multi meds, impaired fit of appropriate footwear & clothing   Clinical Decision Making (Complexity) High complexity   Treatment frequency (Pt to return for complete eval & possible CDT if appropriate)   Patient / family and/or staff in agreement with plan of care Yes   Risks and benefits of therapy have been explained No  (Full evaluation & education not completed on this date)   Clinical impression comments D/t pt's report of swelling with acute onset first week of November, swelling progressed from distal now into buttocks, and weight gain this writer phoned [facility] and spoke with Dr. Guan's RN Zeynep; Zeynep cited approx 10# weight gain in past month.   Zeynep spoke with Dr. Guan who advised pt present to ED at [facility], \"I will call them and tell them you are on the way and that your Lasix isn't working to get that water off your legs.\"   Pt's friend Sammie stated she would ensure pt was transported from this clinic to [facility] ED.   Pt will return to this clinic prn once this possible exacerbation of CHF is appropriately managed if swelling persists.   Goal 1   Goal identifier Lymphedema Education   Goal description For improved fit of appropriate clothing & footwear, decr risk skin breakdown/wounds & infection pt will present for lymphedema evaluation & possible CDT " when appropriate.   Target date 03/05/18   Total Evaluation Time   Total evaluation time 40   Certification   Certification date from 12/05/17   Certification date to 03/05/18   Medical Diagnosis Lymphedema BLE

## 2017-12-05 NOTE — IP AVS SNAPSHOT
[facility] [facility]    [address] [place] [address]    Phone:  [phone]                                       After Visit Summary   12/5/2017    Kenyatta Donald    MRN: [id number]           After Visit Summary Signature Page     I have received my discharge instructions, and my questions have been answered. I have discussed any challenges I see with this plan with the nurse or doctor.    ..........................................................................................................................................  Patient/Patient Representative Signature      ..........................................................................................................................................  Patient Representative Print Name and Relationship to Patient    ..................................................               ................................................  Date                                            Time    ..........................................................................................................................................  Reviewed by Signature/Title    ...................................................              ..............................................  Date                                                            Time

## 2017-12-05 NOTE — PROGRESS NOTES
"                                                                                                            [facility]        OUTPATIENT OCCUPATIONAL THERAPY EDEMA EVALUATION  PLAN OF TREATMENT FOR OUTPATIENT REHABILITATION  (COMPLETE FOR INITIAL CLAIMS ONLY)  Patient's Last Name, First Name, Kenyatta Edge                           Provider s Name:   [facility] Medical Record No.  [id number]     Start of Care Date:  12/05/17   Onset Date:  12/01/17   Type:  OT   Medical Diagnosis:  Lymphedema BLE   Therapy Diagnosis:  BLE & buttocks swollen possibly d/t exacerbation of CHF; plz see \"clinical impression comments\" below Visits from SOC:  1                                     __________________________________________________________________________________   Plan of Treatment/Functional Goals:       Brief ED provided re typical etiology of swelling/lymphedema possibly d/t exacerbation of CHF.   Pt advised to return for completion of this evaluation and recommendations for Complete Decongestive Therapy at this clinic s/p Dr. Roge smithems appropriate if swelling persists.      GOALS  1. Goal description: For improved fit of appropriate clothing & footwear, decr risk skin breakdown/wounds & infection pt will present for lymphedema evaluation & possible CDT when appropriate.       Target date: 03/05/18  2.            3.            4.            5.            6.               7.             8.              Treatment frequency:  (Pt to return for complete eval & possible CDT if appropriate)        Lindsey Chappell, OT                                    I CERTIFY THE NEED FOR THESE SERVICES FURNISHED UNDER        THIS PLAN OF TREATMENT AND WHILE UNDER MY CARE     (Physician co-signature of this document indicates review and certification of the therapy plan).                     Certification date from: 12/05/17       Certification date to: 03/05/18           Referring " physician: REBEKAH Sandoval CNP   Initial Assessment  See Epic Evaluation- Start of care: 12/05/17

## 2017-12-05 NOTE — ED PROVIDER NOTES
History     Chief Complaint:  Leg Swelling       HPI   Kenyatta Donald is a 81 year old female with a complex medical history including diastolic heart failure, mitral valve replacement on Coumadin for chronic atrial fibrillation who presents with leg swelling. The patient reports that she was sent here from her occupational therapy appointment today for increased bilateral leg swelling recently, with a 12 pound weight gain in the past month with no change in her eating habits. She notes associated shortness of breath while lying flat, stating that she has to sleep in a recliner at night because otherwise the shortness of breath stops her from sleeping well, along with increased shortness of breath with exertion recently. She denies urinary symptoms such as hematuria, dysuria and frequency, abdominal pain, fever and cough. She notes that she does not use home oxygen and she took all of her medications today.     Allergies:  The patient has no known drug allergies.    Medications:    Toprol  Aspirin  Zyloprim  Lasix  Zocor  Synthroid/Levothroid  Amoxil  Coumadin  Norvasc  Prinivil  Vitamin D  Mycostatin  Tylenol  Proair    Past Medical History:    Chronic atrial fibrillation  Class 1 obesity due to excess calories with serious comorbidity and body mass index of 33 to 33.9 in adult  Age-related osteoporosis without current pathological fracture  Acute right-sided low back pain with right-sided sciatica  Drug-induced gout involving toe, unspecified chronicity, unspecificied laterally  Acute bilateral low back pain with right-sided sciatica onset end of 12-16  Coronary artery disease involving native coronary artery of native heart without angina pectoris  Pulmonary embolism  DOI 7/25/2016  Painful respiration - anterior chest wall from increased congestion with URI  Hypoxia since at least 2010 from poor muscle tone of chest  Chest pain  Benign essential hypertension  Long-term (current) use of  anticoagulants  Hypercholesteremia  Chronic systolic congestive heart failure  Acquired hypothyroidism  Need for SBE with prophylaxis  Benign neoplasm of skin  Risk for falls  Mitral valve regurgitation severe  Osteoporosis  Juan Pablo-tachy syndrome  Spinal muscular atrophy Type III causing decreasing ability of respiratory  Sleep apnea  Breast cancer  Glucose intolerance  Dysphagia  Pain in thoracic spine  Gastroesophageal reflux disease  Cervicalgia,   Pain in joint, pelvic region and thigh  Lumbago  Coronary artery disease involving native coronary artery of native heart without angina pectoris  Kidney stone    Past Surgical History:    Anesthesia out of or percutaneous mitral valve repair  Gyn Surgery- Hysterectomy  Mastectomy- bilateral  Orthopedic surgery- Knee replacement  Percutaneous mitral valve repair    Family History:    Colorectal cancer  Diabetes  Asthma    Social History:  Relationship status:   Tobacco Use: Neg  Alcohol Use: Neg  The patient presents alone.  The patient is retired.    Review of Systems   Constitutional: Positive for unexpected weight change. Negative for fever.   Respiratory: Positive for shortness of breath. Negative for cough.    Cardiovascular: Positive for leg swelling.   Gastrointestinal: Negative for abdominal pain.   Genitourinary: Negative for dysuria, frequency and hematuria.   All other systems reviewed and are negative.    Physical Exam   First Vitals:  Patient Vitals for the past 24 hrs:   BP Temp Temp src Pulse Resp SpO2   12/05/17 2025 166/60 98.2  F (36.8  C) Oral 74 - 96 %   12/05/17 1654 181/80 97.5  F (36.4  C) Temporal 77 16 95 %     Physical Exam  General: Alert, appears well-developed and well-nourished. Cooperative.     In mild distress  HEENT:  Head:  Atraumatic  Ears:  External ears are normal  Mouth/Throat:  Oropharynx is without erythema or exudate and mucous membranes are moist.   Eyes:   Conjunctivae normal and EOM are normal. No scleral  icterus.    Pupils are equal, round, and reactive to light.   CV:  Irregular rate, irregular rhythm, normal heart sounds and radial and dorsalis pedis   pulses are 2+ and symmetric.  Resp:  Breath sounds are clear bilaterally    Non-labored, no retractions or accessory muscle use  GI:  Abdomen is soft, no distension, no tenderness. No rebound or guarding.  MS:  Normal range of motion.    Normal strength in all 4 extremities.     Back atraumatic.    2+ pitting edema to bilateral lower extremities  Skin:  Warm and dry.  No rash or lesions noted.  Neuro:  Alert. Normal strength.  Sensation intact in all 4 extremities. GCS: 15   Psych:  Normal mood and affect.    Emergency Department Course   ECG @ 1713  Indication: Leg Swelling  Rate 78 bpm.   PA interval * ms.   QRS duration 86 ms.   QT/QTc 384/437 ms.   P-R-T axes 76.  Notes: Atrial fibrillation, minimal voltage criteria for LVH- may be normal variant, Abnormal ECG.  Time read 1719    Imaging:  Radiographic findings were communicated with the patient who voiced understanding of the findings.    Chest XR, per radiology:  Cardiomegaly is present. There is minimal prominence of central pulmonary vasculature but there is no edema or infiltrates. No pleural effusions. Degenerative changes noted in spine. Small radiopaque density again noted projecting over the heart.    Laboratory:  CBC: WNL (WBC 8.4, HGB 12.0, )   CMP: Glucose 105 (H), Creatinine 0.51 (L) o/w WNL   BNP: 1198  1715: Troponin I: <0.015  1715: INR: 2.51 (H)    Interventions:  1831: Lasix, 20 mg, IV    Emergency Department Course:  Nursing notes and vitals reviewed.  I performed an exam of the patient as documented above.  The above workup was undertaken.  1843: I rechecked the patient and discussed results. We decided that we will keep her in the hospital for further evaluation and monitoring.  1855: I spoke to Dr. Zaragoza of Hospitalist Services on the phone who accepted care of the patient in the  hospital.  Admit to an inpatient medical bed under the care of Dr. Zaragoza.    Impression & Plan    Medical Decision Making:  Kenyatta Donald is a 81 year old female with a complex past medical history including diastolic heart failure, mitral valve replacement on Coumadin, and chronic atrial fibrillation who presents with bilateral lower extremity swelling and 12lb weight gain in last one month. Patient with likely acute on chronic heart failure exacerbation today, significant bilateral lower extremity swelling, 2+ on my exam. She is unable to lift her legs at night or lie flat at night due to symptomatic shortness of breath. She also has some mild worsening shortness of breath with exertion. Patient has otherwise normal lab findings. INR of 2.51 within her therapeutic range for being on Coumadin. Patient has a chest X-ray which does not show significant pulmonary edema, no evidence of pneumothorax or pneumonia. Patient with no infectious symptoms here today. She was given a dose of IV Lasix for concern of acute decompensated diastolic heart failure. She warrants a repeat Echo as well as titration of her diuretic and hypertensive medications. Patient has continued her oral medications at home but seems to have significant weight gain in the last 1 month. Discussed case with Dr. Zaragoza who agreed to admission.    Critical Care time:  none    Diagnosis:    ICD-10-CM   1. Acute on chronic diastolic congestive heart failure (H) I50.33     Disposition:  Admit to an inpatient medical bed under the care of Dr. Zaragoza.    Sulema POP, am serving as a scribe on 12/5/2017 at 5:06 PM to personally document services performed by Evans Roldan MD, based on my observations and the provider's statements to me.     EMERGENCY DEPARTMENT       Evans Roldan MD  12/05/17 2041

## 2017-12-06 ENCOUNTER — APPOINTMENT (OUTPATIENT)
Dept: CARDIOLOGY | Facility: CLINIC | Age: 82
DRG: 291 | End: 2017-12-06
Attending: INTERNAL MEDICINE
Payer: MEDICARE

## 2017-12-06 LAB
ANION GAP SERPL CALCULATED.3IONS-SCNC: 9 MMOL/L (ref 3–14)
BUN SERPL-MCNC: 14 MG/DL (ref 7–30)
CALCIUM SERPL-MCNC: 9.3 MG/DL (ref 8.5–10.1)
CHLORIDE SERPL-SCNC: 103 MMOL/L (ref 94–109)
CO2 SERPL-SCNC: 27 MMOL/L (ref 20–32)
CREAT SERPL-MCNC: 0.56 MG/DL (ref 0.52–1.04)
GFR SERPL CREATININE-BSD FRML MDRD: >90 ML/MIN/1.7M2
GLUCOSE SERPL-MCNC: 91 MG/DL (ref 70–99)
INR PPP: 1.96 (ref 0.86–1.14)
POTASSIUM SERPL-SCNC: 3.5 MMOL/L (ref 3.4–5.3)
SODIUM SERPL-SCNC: 139 MMOL/L (ref 133–144)
TROPONIN I SERPL-MCNC: <0.015 UG/L (ref 0–0.04)

## 2017-12-06 PROCEDURE — 85610 PROTHROMBIN TIME: CPT | Performed by: INTERNAL MEDICINE

## 2017-12-06 PROCEDURE — A9270 NON-COVERED ITEM OR SERVICE: HCPCS | Mod: GY | Performed by: INTERNAL MEDICINE

## 2017-12-06 PROCEDURE — 93306 TTE W/DOPPLER COMPLETE: CPT | Mod: 26 | Performed by: INTERNAL MEDICINE

## 2017-12-06 PROCEDURE — 25000132 ZZH RX MED GY IP 250 OP 250 PS 637: Mod: GY | Performed by: INTERNAL MEDICINE

## 2017-12-06 PROCEDURE — 80048 BASIC METABOLIC PNL TOTAL CA: CPT | Performed by: INTERNAL MEDICINE

## 2017-12-06 PROCEDURE — 25000128 H RX IP 250 OP 636: Performed by: INTERNAL MEDICINE

## 2017-12-06 PROCEDURE — 99232 SBSQ HOSP IP/OBS MODERATE 35: CPT | Performed by: INTERNAL MEDICINE

## 2017-12-06 PROCEDURE — 25500064 ZZH RX 255 OP 636: Performed by: INTERNAL MEDICINE

## 2017-12-06 PROCEDURE — 12000000 ZZH R&B MED SURG/OB

## 2017-12-06 PROCEDURE — 84484 ASSAY OF TROPONIN QUANT: CPT | Performed by: INTERNAL MEDICINE

## 2017-12-06 PROCEDURE — 40000264 ECHO COMPLETE WITH LUMASON

## 2017-12-06 PROCEDURE — 36415 COLL VENOUS BLD VENIPUNCTURE: CPT | Performed by: INTERNAL MEDICINE

## 2017-12-06 RX ORDER — WARFARIN SODIUM 5 MG/1
5 TABLET ORAL
Status: COMPLETED | OUTPATIENT
Start: 2017-12-06 | End: 2017-12-06

## 2017-12-06 RX ORDER — HYDRALAZINE HYDROCHLORIDE 25 MG/1
25 TABLET, FILM COATED ORAL
Status: DISCONTINUED | OUTPATIENT
Start: 2017-12-06 | End: 2017-12-06

## 2017-12-06 RX ADMIN — ALLOPURINOL 100 MG: 100 TABLET ORAL at 08:10

## 2017-12-06 RX ADMIN — LISINOPRIL 20 MG: 20 TABLET ORAL at 08:10

## 2017-12-06 RX ADMIN — ACETAMINOPHEN 650 MG: 325 TABLET, FILM COATED ORAL at 03:51

## 2017-12-06 RX ADMIN — ACETAMINOPHEN 650 MG: 325 TABLET, FILM COATED ORAL at 22:39

## 2017-12-06 RX ADMIN — METOPROLOL SUCCINATE 75 MG: 50 TABLET, EXTENDED RELEASE ORAL at 08:10

## 2017-12-06 RX ADMIN — LISINOPRIL 20 MG: 20 TABLET ORAL at 21:39

## 2017-12-06 RX ADMIN — AMLODIPINE BESYLATE 5 MG: 5 TABLET ORAL at 08:09

## 2017-12-06 RX ADMIN — LEVOTHYROXINE SODIUM 100 MCG: 100 TABLET ORAL at 06:23

## 2017-12-06 RX ADMIN — SIMVASTATIN 20 MG: 20 TABLET, FILM COATED ORAL at 21:39

## 2017-12-06 RX ADMIN — FUROSEMIDE 20 MG: 10 INJECTION, SOLUTION INTRAVENOUS at 17:17

## 2017-12-06 RX ADMIN — AMLODIPINE BESYLATE 5 MG: 5 TABLET ORAL at 17:17

## 2017-12-06 RX ADMIN — FUROSEMIDE 20 MG: 10 INJECTION, SOLUTION INTRAVENOUS at 08:10

## 2017-12-06 RX ADMIN — SULFUR HEXAFLUORIDE 2 ML: KIT at 14:30

## 2017-12-06 RX ADMIN — WARFARIN SODIUM 5 MG: 5 TABLET ORAL at 17:17

## 2017-12-06 NOTE — PHARMACY-ADMISSION MEDICATION HISTORY
Admission medication history interview status for the 12/5/2017  admission is complete. See EPIC admission navigator for prior to admission medications     Medication history source reliability:Good    Actions taken by pharmacist (provider contacted, etc):None     Additional medication history information not noted on PTA med list :None    Medication reconciliation/reorder completed by provider prior to medication history? No    Time spent in this activity: 12 min    Prior to Admission medications    Medication Sig Last Dose Taking? Auth Provider   allopurinol (ZYLOPRIM) 100 MG tablet TAKE 1 TABLET BY MOUTH EVERY DAY 12/5/2017 at Unknown time Yes Bess Lion MD   furosemide (LASIX) 20 MG tablet TAKE 1 TABLET BY MOUTH EVERY MORNING FOR FLUID RETENTION 12/5/2017 at Unknown time Yes Bess Lion MD   simvastatin (ZOCOR) 40 MG tablet Take 0.5 tablets (20 mg) by mouth At Bedtime 12/4/2017 at Unknown time Yes Chris Guan MD   levothyroxine (SYNTHROID/LEVOTHROID) 100 MCG tablet Take 1 tablet (100 mcg) by mouth daily 12/5/2017 at Unknown time Yes Bess Lion MD   warfarin (COUMADIN) 4 MG tablet Take 1 tablet (4 mg) by mouth daily Taking 4 mg m,w,f & 4mg + 1 mg rest of week 12/4/2017 at Unknown time Yes Bess Lion MD   amLODIPine (NORVASC) 5 MG tablet Take 1 tablet (5 mg) by mouth 2 times daily 12/5/2017 at am Yes Yahaira Valladares MD   lisinopril (PRINIVIL/ZESTRIL) 20 MG tablet Take 1 tablet (20 mg) by mouth 2 times daily 12/5/2017 at am Yes Chris Guan MD   cholecalciferol (VITAMIN D) 1000 UNIT tablet Take 2,000 Units by mouth daily  12/5/2017 at Unknown time Yes Reported, Patient   nystatin (MYCOSTATIN) 740960 UNIT/GM POWD Apply topically 3 times daily as needed  Yes Funmi Lynch PA-C   acetaminophen (TYLENOL) 325 MG tablet Take 2 tablets (650 mg) by mouth every 4 hours as needed for mild pain  Yes Srinath Syed  MD [place]   albuterol (PROAIR HFA, PROVENTIL HFA, VENTOLIN HFA) 108 (90 BASE) MCG/ACT inhaler Inhale 2 puffs into the lungs every 6 hours as needed for shortness of breath / dyspnea or wheezing  Yes Unknown, Entered By History   metoprolol (TOPROL-XL) 50 MG 24 hr tablet Take 1.5 tablets (75 mg) by mouth daily   Chris Guan MD   ASPIRIN NOT PRESCRIBED (INTENTIONAL) Please choose reason not prescribed, below  Patient not taking: Reported on 12/1/2017   Bess Lion MD   order for DME Equipment being ordered: compression hose   BK 20-30mm   2 pair as insurance will pay   Bess Lion MD   amoxicillin (AMOXIL) 500 MG capsule Take 4 capsules by mouth 30 minutes prior to dental work   Chris Guan MD   order for DME Equipment being ordered: mastectomy bras & prostheses   S/po mastectomy of unknown side     C50.919   Bess Lion MD

## 2017-12-06 NOTE — PLAN OF CARE
Problem: Patient Care Overview  Goal: Plan of Care/Patient Progress Review  Outcome: No Change  Admitted from ED with increased swelling to back and BLE's.  A&O x4.  Ax1 with GB and personal walker.  2gm Na diet. Up to bedside commode x2.  Outputs charted.  Tele for known A-Fib.  Apical pulse irregular. VSS on RA ex. HTN.  Swollen from flanks to feet; pitting in ankles and feet.  Generally 2+ edema.  Lung sounds with crackles.  Echo tomorrow.  Wound to coccyx present on admission; cleansed and foam applied.  Educated on turn/repo and keeping HOB less than 30 degrees.  Denies SOB and stated she could tolerate 30 degrees.  WOC consult ordered.  Appears that this wound may have been present 7/2016 hospitalization as well.  Discharge in three or so days pending improvement.

## 2017-12-06 NOTE — ED NOTES
[facility]  ED Nurse Handoff Report    ED Chief complaint: Leg Swelling (Patient sent over by heart clinic for bilateral leg swelling. States edema from her ankles up to her back. Also states weight gain of 12 lbs in 1 month.)      ED Diagnosis:   Final diagnoses:   Acute on chronic diastolic congestive heart failure (H)       Code Status: DNR / DNI    Allergies: No Known Allergies    Activity level - Baseline/Home:  Independent    Activity Level - Current:   Stand with Assist     Needed?: No    Isolation: No  Infection: Not Applicable    Bariatric?: No    Vital Signs:   Vitals:    12/05/17 1654   BP: 181/80   Pulse: 77   Resp: 16   Temp: 97.5  F (36.4  C)   TempSrc: Temporal   SpO2: 95%       Cardiac Rhythm: ,        Pain level:      Is this patient confused?: No    Patient Report: Initial Complaint: Kenyatta Donald is a 81 year old female who presents with leg swelling. The patient reports that she was sent here from her occupational therapy appointment today for increased bilateral leg swelling recently, with a 12 pound weight gain in the past month with no change in her eating habits. She notes associated shortness of breath while lying flat, stating that she has to sleep in a recliner at night because otherwise the shortness of breath stops her from sleeping well. She denies urinary symptoms such as hematuria, dysuria and frequency, abdominal pain, fever and cough. She notes that she does not use home oxygen and she took all of her medications today. The patient takes Coumadin for chronic atrial fibrillation.  Focused Assessment: obese, SOB with any activity, lungs clear, +2 edema bilat LE's  Abnormal Results:   Results for orders placed or performed during the hospital encounter of 12/05/17 (from the past 24 hour(s))   EKG 12-lead, tracing only   Result Value Ref Range    Interpretation ECG Click View Image link to view waveform and result    Nt probnp inpatient (BNP)   Result Value  Ref Range    N-Terminal Pro BNP Inpatient 1198 0 - 1800 pg/mL   Comprehensive metabolic panel   Result Value Ref Range    Sodium 141 133 - 144 mmol/L    Potassium 4.1 3.4 - 5.3 mmol/L    Chloride 106 94 - 109 mmol/L    Carbon Dioxide 26 20 - 32 mmol/L    Anion Gap 9 3 - 14 mmol/L    Glucose 105 (H) 70 - 99 mg/dL    Urea Nitrogen 15 7 - 30 mg/dL    Creatinine 0.51 (L) 0.52 - 1.04 mg/dL    GFR Estimate >90 >60 mL/min/1.7m2    GFR Estimate If Black >90 >60 mL/min/1.7m2    Calcium 9.6 8.5 - 10.1 mg/dL    Bilirubin Total 0.7 0.2 - 1.3 mg/dL    Albumin 3.6 3.4 - 5.0 g/dL    Protein Total 7.4 6.8 - 8.8 g/dL    Alkaline Phosphatase 55 40 - 150 U/L    ALT 16 0 - 50 U/L    AST 19 0 - 45 U/L   Troponin I   Result Value Ref Range    Troponin I ES <0.015 0.000 - 0.045 ug/L   CBC with platelets differential   Result Value Ref Range    WBC 8.4 4.0 - 11.0 10e9/L    RBC Count 4.22 3.8 - 5.2 10e12/L    Hemoglobin 12.0 11.7 - 15.7 g/dL    Hematocrit 37.5 35.0 - 47.0 %    MCV 89 78 - 100 fl    MCH 28.4 26.5 - 33.0 pg    MCHC 32.0 31.5 - 36.5 g/dL    RDW 15.1 (H) 10.0 - 15.0 %    Platelet Count 282 150 - 450 10e9/L    Diff Method Automated Method     % Neutrophils 73.6 %    % Lymphocytes 16.3 %    % Monocytes 9.2 %    % Eosinophils 0.6 %    % Basophils 0.1 %    % Immature Granulocytes 0.2 %    Nucleated RBCs 0 0 /100    Absolute Neutrophil 6.2 1.6 - 8.3 10e9/L    Absolute Lymphocytes 1.4 0.8 - 5.3 10e9/L    Absolute Monocytes 0.8 0.0 - 1.3 10e9/L    Absolute Eosinophils 0.1 0.0 - 0.7 10e9/L    Absolute Basophils 0.0 0.0 - 0.2 10e9/L    Abs Immature Granulocytes 0.0 0 - 0.4 10e9/L    Absolute Nucleated RBC 0.0    INR   Result Value Ref Range    INR 2.51 (H) 0.86 - 1.14   XR Chest 2 Views    Narrative    CHEST TWO VIEW   12/5/2017 5:43 PM     HISTORY: Shortness of breath with lying flat, concern for CHF  exacerbation.     COMPARISON: None.      Impression    IMPRESSION: Cardiomegaly is present. There is minimal prominence of  central  pulmonary vasculature but there is no edema or infiltrates. No  pleural effusions. Degenerative changes noted in spine. Small  radiopaque density again noted projecting over the heart.     Treatments provided: 20 mg lasix IV    Family Comments: Neighbor present,  no family present    OBS brochure/video discussed/provided to patient: No    ED Medications:   Medications   furosemide (LASIX) injection 20 mg (20 mg Intravenous Given 12/5/17 8612)       Drips infusing?:  No      ED NURSE PHONE NUMBER: *[phone]

## 2017-12-06 NOTE — PROVIDER NOTIFICATION
MD Notification    Notified Person:  MD    Notified Persons Name: Dr. Zaragoza    Notification Date/Time: 12/5/17 6960    Notification Interaction:  Webpaged Physician    Purpose of Notification: Patient is missing a diet order.  States she is on a 2gm Na diet at home.    Orders Received: 2gm Na diet.    Comments:

## 2017-12-06 NOTE — H&P
[facility]    History and Physical  Hospitalist       Date of Admission:  12/5/2017    Assessment & Plan   Kenyatta Donald is a 81 year old female who presents with leg swelling and has:    Summary:    Principal Problem:    Acute on chronic systolic heart failure (H)  Active Problems:    Spinal muscular atrophy type III causing decreased ability of respiratory mm-onset 43y/o     Sleep apnea    Mitral valve regurgitation severe -- S/P Mitral Clip 1/2015    Benign essential hypertension    Chronic atrial fibrillation (H)    CHF (congestive heart failure) (H)     Plan -- IV lasix, weight daily, cardiac echo, wrap legs with Ace wraps in AM.  Daily INR and BMP.  Discussed living will -- has one and desires DNR/DNI (friend is with her and agrees).  Will have PT and OT eval to see if able to help with her weakness.     DVT Prophylaxis: Warfarin  Code Status: DNR / DNI    Disposition: Expected discharge in 3 days once breathing better and weight down to about 190 lbs.    Chace Yan MD  Pager: [phone]  Cell Phone:  [phone]       Primary Care Physician   Bess Lion    Chief Complaint   Leg swelling    History is obtained from the patient    History of Present Illness   82 yo white female with mitral regur, s/p mitral valve clips, HTN, associated heart failure and muscle disorder (since age 44 with associated decreased resp effort and hypoxia) who presents with leg swelling, but then reports she has been sleeping in a chair for about a year because she can't lie flat.  Her weight has increased 12 lbs in the last 2-4 weeks -- normally weighs 190 and now weighs 204.  She is on Lasix 20 mg daily.  No change in her diet.  She was seen by cardiology clinic who sent her to lymphedema clinic, who saw her and suggested she go to the ER.       Past Medical History    I have reviewed this patient's medical history and updated it with pertinent information if needed.   Past Medical History:    Diagnosis Date     Atrial fibrillation (H)      Benign essential hypertension      Juan Pablo-tachy syndrome (H)      Breast cancer (H)     s/p bilateral mastectomy     CHF (congestive heart failure) (H)      Coronary artery disease involving native coronary artery of native heart without angina pectoris     cardiac cath 2014: 40-50% mid RCA stenosis with minimal other disease     GERD (gastroesophageal reflux disease)      Hypercholesterolaemia      Hypothyroidism      Kidney stone      Mitral valve regurgitation     sp mitral clip 1/9/15     Need for SBE (subacute bacterial endocarditis) prophylaxis      Osteoporosis      Pulmonary embolism (H)      Sleep apnea      Spinal muscular atrophy type III (H)     Dr. Daily, [facility]       Past Surgical History   I have reviewed this patient's surgical history and updated it with pertinent information if needed.  Past Surgical History:   Procedure Laterality Date     ANESTHESIA OUT OF OR PERCUTANEOUS MITRAL VALVE REPAIR N/A 1/9/2015    Procedure: ANESTHESIA OUT OF OR PERCUTANEOUS MITRAL VALVE REPAIR;  Surgeon: Generic Anesthesia Provider;  Location: [facility] OR     GYN SURGERY      hysterectomy     MASTECTOMY      bilateral     ORTHOPEDIC SURGERY      knee replacement     PERCUTANEIOUS MITRAL VALVE REPAIR  1/9/2015       Prior to Admission Medications   Prior to Admission Medications   Prescriptions Last Dose Informant Patient Reported? Taking?   ASPIRIN NOT PRESCRIBED (INTENTIONAL)  Self No No   Sig: Please choose reason not prescribed, below   Patient not taking: Reported on 12/1/2017   acetaminophen (TYLENOL) 325 MG tablet  Self No Yes   Sig: Take 2 tablets (650 mg) by mouth every 4 hours as needed for mild pain   albuterol (PROAIR HFA, PROVENTIL HFA, VENTOLIN HFA) 108 (90 BASE) MCG/ACT inhaler  Self Yes Yes   Sig: Inhale 2 puffs into the lungs every 6 hours as needed for shortness of breath / dyspnea or wheezing   allopurinol (ZYLOPRIM) 100 MG tablet  12/5/2017 at Unknown time Self No Yes   Sig: TAKE 1 TABLET BY MOUTH EVERY DAY   amLODIPine (NORVASC) 5 MG tablet 12/5/2017 at am Self No Yes   Sig: Take 1 tablet (5 mg) by mouth 2 times daily   amoxicillin (AMOXIL) 500 MG capsule  Self No No   Sig: Take 4 capsules by mouth 30 minutes prior to dental work   cholecalciferol (VITAMIN D) 1000 UNIT tablet 12/5/2017 at Unknown time Self Yes Yes   Sig: Take 2,000 Units by mouth daily    furosemide (LASIX) 20 MG tablet 12/5/2017 at Unknown time Self No Yes   Sig: TAKE 1 TABLET BY MOUTH EVERY MORNING FOR FLUID RETENTION   levothyroxine (SYNTHROID/LEVOTHROID) 100 MCG tablet 12/5/2017 at Unknown time Self No Yes   Sig: Take 1 tablet (100 mcg) by mouth daily   lisinopril (PRINIVIL/ZESTRIL) 20 MG tablet 12/5/2017 at am Self No Yes   Sig: Take 1 tablet (20 mg) by mouth 2 times daily   metoprolol (TOPROL-XL) 50 MG 24 hr tablet  Self No No   Sig: Take 1.5 tablets (75 mg) by mouth daily   nystatin (MYCOSTATIN) 895863 UNIT/GM POWD  Self No Yes   Sig: Apply topically 3 times daily as needed   order for DME  Self No No   Sig: Equipment being ordered: mastectomy bras & prostheses   S/po mastectomy of unknown side     C50.919   order for DME  Self No No   Sig: Equipment being ordered: compression hose   BK 20-30mm   2 pair as insurance will pay   simvastatin (ZOCOR) 40 MG tablet 12/4/2017 at Unknown time Self No Yes   Sig: Take 0.5 tablets (20 mg) by mouth At Bedtime   warfarin (COUMADIN) 4 MG tablet 12/4/2017 at Unknown time Self No Yes   Sig: Take 1 tablet (4 mg) by mouth daily Taking 4 mg m,w,f & 4mg + 1 mg rest of week      Facility-Administered Medications: None     Allergies   No Known Allergies    Social History   I have reviewed this patient's social history and updated it with pertinent information if needed. Kenyatta Donald  reports that she has never smoked. She has never used smokeless tobacco. She reports that she drinks alcohol. She reports that she does not use illicit  drugs.    Family History   I have reviewed this patient's family history and updated it with pertinent information if needed.   Family History   Problem Relation Age of Onset     Cancer - colorectal Mother      Alzheimer Disease Mother       age 78     DIABETES Daughter      Asthma Daughter      HEART DISEASE Father       age 60     Family History Negative Daughter      Unknown/Adopted Maternal Grandmother      Unknown/Adopted Maternal Grandfather      Unknown/Adopted Paternal Grandmother      Unknown/Adopted Paternal Grandfather      Coronary Artery Disease No family hx of      Hypertension No family hx of      Hyperlipidemia No family hx of      CEREBROVASCULAR DISEASE No family hx of      Breast Cancer No family hx of      Colon Cancer No family hx of      Prostate Cancer No family hx of      Other Cancer No family hx of      Depression No family hx of      Anxiety Disorder No family hx of      MENTAL ILLNESS No family hx of      Substance Abuse No family hx of      Anesthesia Reaction No family hx of      OSTEOPOROSIS No family hx of      Genetic Disorder No family hx of      Thyroid Disease No family hx of      Obesity No family hx of        Review of Systems   The 10 point Review of Systems is negative other than noted in the HPI or here.     Physical Exam   Temp: 98.2  F (36.8  C) Temp src: Oral BP: 166/60 Pulse: 74   Resp: 16 SpO2: 96 % O2 Device: None (Room air)    Vital Signs with Ranges  Temp:  [97.5  F (36.4  C)-98.2  F (36.8  C)] 98.2  F (36.8  C)  Pulse:  [74-77] 74  Resp:  [16] 16  BP: (166-181)/(60-80) 166/60  SpO2:  [95 %-96 %] 96 %  0 lbs 0 oz    Constitutional: Awake, alert, cooperative, no apparent distress.  Eyes: Conjunctiva and pupils examined and normal.  HEENT: Moist mucous membranes, normal dentition.  Respiratory: Clear to auscultation bilaterally, no crackles or wheezing.  Cardiovascular: irregular rate and rhythm, normal S1 and S2, and no murmur noted.  GI: Soft, non-distended,  non-tender, normal bowel sounds.  Lymph/Hematologic: No anterior cervical or supraclavicular adenopathy.  Skin: No rashes, no cyanosis, 2+ bilateral lower leg edema.  Musculoskeletal: No joint swelling, erythema or tenderness.  Neurologic: Cranial nerves 2-12 intact, normal strength and sensation.  Psychiatric: Alert, oriented to person, place and time, no obvious anxiety or depression.    Data   Data reviewed today:  I personally reviewed the EKG tracing showing afib with rate 78.    Recent Labs  Lab 12/05/17  1715 12/01/17  1426   WBC 8.4  --    HGB 12.0  --    MCV 89  --      --    INR 2.51*  --     138   POTASSIUM 4.1 4.5   CHLORIDE 106 105   CO2 26 25   BUN 15 15   CR 0.51* 0.90   ANIONGAP 9 12.5   VICKIE 9.6 9.9   * 117*   ALBUMIN 3.6  --    PROTTOTAL 7.4  --    BILITOTAL 0.7  --    ALKPHOS 55  --    ALT 16  --    AST 19  --    TROPI <0.015  --        Imaging:  Recent Results (from the past 24 hour(s))   XR Chest 2 Views    Narrative    CHEST TWO VIEW   12/5/2017 5:43 PM     HISTORY: Shortness of breath with lying flat, concern for CHF  exacerbation.     COMPARISON: None.      Impression    IMPRESSION: Cardiomegaly is present. There is minimal prominence of  central pulmonary vasculature but there is no edema or infiltrates. No  pleural effusions. Degenerative changes noted in spine. Small  radiopaque density again noted projecting over the heart.    JODI ARREOLA MD

## 2017-12-06 NOTE — PLAN OF CARE
Problem: Patient Care Overview  Goal: Plan of Care/Patient Progress Review  Outcome: No Change  A&Ox4. VSS on RA. C/o pain in shoulder/legs, relief with repositioning and Tylenol x1.  Up with assist x1 GB/walker to BSC. 2 gm Na diet. +2 generalized edema. LS diminished in bases. Wound on coccyx, mepilex, CDI. WOC to see pt. Turn and repo q2h. Denied SOB. Tele afib CVR. Echo today. Continue to monitor.

## 2017-12-06 NOTE — PROVIDER NOTIFICATION
MD Notification    Notified Person:  MD    Notified Persons Name: Dr. Stanford    Notification Date/Time: 12/5/17 2106    Notification Interaction:  Paged Physician    Purpose of Notification: Patient admitted from ER and still does not have any orders.  Dr. Zaragoza not in house.    Orders Received:  Dr. Zaragoza entered orders; no further need.    Comments:

## 2017-12-06 NOTE — PROVIDER NOTIFICATION
Prescriber Notification Note    The pharmacist has communicated with this patient's provider regarding a concern or therapy recommendation.    Notified Person: [facility]  Date/Time of Notification: 12/5/17 2127, 2136  Interaction: text page and phone  Concern/Recommendation:discussed warfarin order. Warfarin needs to be ordered as a consult per pharmacy or provider. Each dose needs to be ordered daily based on INR.     Comments/Additional Details:warfarin per provider. No warfarin tonight.

## 2017-12-06 NOTE — PROGRESS NOTES
[facility]    Hospitalist Progress Note    Assessment & Plan   Kenyatta Donald is a 81 year old female who was admitted on 12/5/2017 with leg swelling and found to have:    Impression:   Principal Problem:    Acute on chronic systolic heart failure (H)  Active Problems:    Spinal muscular atrophy type III causing decreased ability of respiratory mm-onset 45y/o     Sleep apnea    Mitral valve regurgitation severe -- S/P Mitral Clip 1/2015    Benign essential hypertension    Chronic atrial fibrillation (H)    CHF (congestive heart failure) (H)      Plan:  Continue diuresis, lower BP to help with mitral regur.     DVT Prophylaxis: Warfarin  Code Status: DNR/DNI    Disposition: Expected discharge in 1-2 days once leg swelling better and able to lie flatter.    Chace Yan MD  Pager [phone]  Cell Phone [phone]  Text Page (7am to 6pm)    Interval History   Overall feels better, legs less swollen.     Physical Exam   Temp: 98.2  F (36.8  C) Temp src: Oral BP: 112/63 Pulse: 71   Resp: 16 SpO2: 95 % O2 Device: None (Room air)    There were no vitals filed for this visit.  Vital Signs with Ranges  Temp:  [97.9  F (36.6  C)-98.2  F (36.8  C)] 98.2  F (36.8  C)  Pulse:  [65-79] 71  Resp:  [16] 16  BP: (112-166)/(60-76) 112/63  SpO2:  [93 %-96 %] 95 %  I/O last 3 completed shifts:  In: -   Out: 3850 [Urine:3850]    Constitutional: Awake, alert, cooperative, no apparent distress  Respiratory: Clear to auscultation bilaterally, no crackles or wheezing  Cardiovascular: Regular rate and rhythm, normal S1 and S2, and ?mitral click, no murmur heard   GI: Normal bowel sounds, soft, non-distended, non-tender  Extrem: No calf tenderness, 1-2+ ankle edema bilaterally   Neuro: Ox3, no focal motor or sensory deficits    Medications     - MEDICATION INSTRUCTIONS -       Warfarin Therapy Reminder         furosemide  20 mg Intravenous BID     allopurinol  100 mg Oral Daily     amLODIPine  5 mg Oral BID      levothyroxine  100 mcg Oral QAM AC     lisinopril  20 mg Oral BID     metoprolol  75 mg Oral Daily     simvastatin  20 mg Oral At Bedtime     sodium chloride (PF)  3 mL Intracatheter Q8H       Data     Recent Labs  Lab 12/06/17  0910 12/05/17  1715 12/01/17  1426   WBC  --  8.4  --    HGB  --  12.0  --    MCV  --  89  --    PLT  --  282  --    INR 1.96* 2.51*  --     141 138   POTASSIUM 3.5 4.1 4.5   CHLORIDE 103 106 105   CO2 27 26 25   BUN 14 15 15   CR 0.56 0.51* 0.90   ANIONGAP 9 9 12.5   VICKIE 9.3 9.6 9.9   GLC 91 105* 117*   ALBUMIN  --  3.6  --    PROTTOTAL  --  7.4  --    BILITOTAL  --  0.7  --    ALKPHOS  --  55  --    ALT  --  16  --    AST  --  19  --    TROPI <0.015 <0.015  --        Imaging:   Recent Results (from the past 24 hour(s))   XR Chest 2 Views    Narrative    CHEST TWO VIEW   12/5/2017 5:43 PM     HISTORY: Shortness of breath with lying flat, concern for CHF  exacerbation.     COMPARISON: None.      Impression    IMPRESSION: Cardiomegaly is present. There is minimal prominence of  central pulmonary vasculature but there is no edema or infiltrates. No  pleural effusions. Degenerative changes noted in spine. Small  radiopaque density again noted projecting over the heart.    JODI ARREOLA MD     Echo   The visual ejection fraction is estimated at 55-60%.  Normal left ventricular wall motion  The right ventricle is normal in size and function.  Mitral clip apparatus present  There is trace to mild mitral regurgitation.  There is mild mitral stenosis.  The mean mitral valve gradient is 5mmHg at HR around 70.  Compared to the prior study, EF unchanged, less MR and TR noted on present  study.

## 2017-12-06 NOTE — PLAN OF CARE
Problem: Patient Care Overview  Goal: Plan of Care/Patient Progress Review  Outcome: No Change  A&Ox4. VSS on RA. C/o bilateral leg pain. Declines intervention. Tele. LS diminished. Tolerating 2g sodium diet. +2 edema to BLE; elevated with pillows when in bed. ACE wraps to BLE applied at about 1400. Up with SBA-assist of 1 with home walker to commode. Loose/watery stool x 1. Redness, blanchable to inner buttocks; dressing to coccyx/inner buttocks is C/D/I. Encouraging frequent turning/repositioning. IV SL. Nursing will continue to monitor.

## 2017-12-07 VITALS
RESPIRATION RATE: 16 BRPM | SYSTOLIC BLOOD PRESSURE: 140 MMHG | TEMPERATURE: 97.9 F | DIASTOLIC BLOOD PRESSURE: 61 MMHG | BODY MASS INDEX: 32.71 KG/M2 | HEART RATE: 82 BPM | OXYGEN SATURATION: 95 % | WEIGHT: 193.56 LBS

## 2017-12-07 LAB
ANION GAP SERPL CALCULATED.3IONS-SCNC: 8 MMOL/L (ref 3–14)
BUN SERPL-MCNC: 20 MG/DL (ref 7–30)
CALCIUM SERPL-MCNC: 9.4 MG/DL (ref 8.5–10.1)
CHLORIDE SERPL-SCNC: 102 MMOL/L (ref 94–109)
CO2 SERPL-SCNC: 28 MMOL/L (ref 20–32)
CREAT SERPL-MCNC: 0.51 MG/DL (ref 0.52–1.04)
GFR SERPL CREATININE-BSD FRML MDRD: >90 ML/MIN/1.7M2
GLUCOSE SERPL-MCNC: 110 MG/DL (ref 70–99)
INR PPP: 1.6 (ref 0.86–1.14)
POTASSIUM SERPL-SCNC: 3.6 MMOL/L (ref 3.4–5.3)
SODIUM SERPL-SCNC: 138 MMOL/L (ref 133–144)

## 2017-12-07 PROCEDURE — 85610 PROTHROMBIN TIME: CPT | Performed by: INTERNAL MEDICINE

## 2017-12-07 PROCEDURE — 25000128 H RX IP 250 OP 636: Performed by: INTERNAL MEDICINE

## 2017-12-07 PROCEDURE — 80048 BASIC METABOLIC PNL TOTAL CA: CPT | Performed by: INTERNAL MEDICINE

## 2017-12-07 PROCEDURE — 36415 COLL VENOUS BLD VENIPUNCTURE: CPT | Performed by: INTERNAL MEDICINE

## 2017-12-07 PROCEDURE — A9270 NON-COVERED ITEM OR SERVICE: HCPCS | Mod: GY | Performed by: INTERNAL MEDICINE

## 2017-12-07 PROCEDURE — 99212 OFFICE O/P EST SF 10 MIN: CPT

## 2017-12-07 PROCEDURE — 25000132 ZZH RX MED GY IP 250 OP 250 PS 637: Mod: GY | Performed by: INTERNAL MEDICINE

## 2017-12-07 RX ORDER — FUROSEMIDE 40 MG
40 TABLET ORAL DAILY
Qty: 60 TABLET | Refills: 3 | Status: SHIPPED | OUTPATIENT
Start: 2017-12-07 | End: 2018-01-11

## 2017-12-07 RX ADMIN — LISINOPRIL 20 MG: 20 TABLET ORAL at 08:33

## 2017-12-07 RX ADMIN — FUROSEMIDE 20 MG: 10 INJECTION, SOLUTION INTRAVENOUS at 08:33

## 2017-12-07 RX ADMIN — AMLODIPINE BESYLATE 5 MG: 5 TABLET ORAL at 08:33

## 2017-12-07 RX ADMIN — ALLOPURINOL 100 MG: 100 TABLET ORAL at 08:33

## 2017-12-07 RX ADMIN — LEVOTHYROXINE SODIUM 100 MCG: 100 TABLET ORAL at 06:25

## 2017-12-07 RX ADMIN — METOPROLOL SUCCINATE 75 MG: 50 TABLET, EXTENDED RELEASE ORAL at 08:33

## 2017-12-07 NOTE — PROGRESS NOTES
[facility] Nurse Inpatient Adult Pressure Injury (PI) Assessment     Initial Assessment of PI(s) on pt's:   Coccyx     Data:   Patient History:      per MD note(s): Principal Problem:    Acute on chronic systolic heart failure (H)  Active Problems:    Spinal muscular atrophy type III causing decreased ability of respiratory mm-onset 43y/o     Sleep apnea    Mitral valve regurgitation severe -- S/P Mitral Clip 2015    Benign essential hypertension    Chronic atrial fibrillation (H)    CHF (congestive heart failure) (H)       Nelson Assessment and sub scores:   Nelson Score  Av.2  Min: 17  Max: 18    Positioning: Pillows    Mattress:  Standard , Atmos Air       Moisture Management:  Incontinence Protocol prn      Current Diet / Nutrition:           Active Diet Order      Combination Diet 2 gm NA Diet     Labs:   Recent Labs   Lab Test  17   0852   17   1715   10/17/14   0950   ALBUMIN   --    --   3.6   < >   --    HGB   --    --   12.0   < >  13.1   INR  1.60*   < >  2.51*   < >   --    WBC   --    --   8.4   < >  7.1   A1C   --    --    --    --   5.9    < > = values in this interval not displayed.                                                                                                                          Pressure Injury Assessment  (location):   Coccyx     Wound History:   Present on admission.  Per report, pt usually sleeps in her recliner at home, has done this for a couple of years.  Pt did not know she had wound.  Pt is able to roll to sides well and can slowly ambulate but has difficulty getting up out of bed and lying flat.     Wound Base: pink moist tissue, hard to fully see base through the tiny opening    Specific Dimensions (length x width x depth, in cm) :   Approx. 0.2 x 0.2 x 0.3cm    Tunneling:  N/A    Undermining: N/A    Palpation of the wound bed:  normal    Slough appearance:  none    Eschar appearance:  none    Periwound Skin: intact and  "erythema; small amount whitish scar tissue    Color: pink, blanchable    Temperature  normal     Drainage:  None noted         Odor: none    Pain: minimal         Intervention:     Patient's chart evaluated.      Nelson Interventions:  Current Nelson Interventions and Care Plan reviewed and updated, appropriate at this time.    Wound assessed, covered with Mepilex    Orders  Written    Supplies  Reviewed    Discussed plan of care with Patient, Nurse and Physician Dr. Zaragoza who was present for assessment           Assessment:     Pressure Injury (PI) located on coccyx: very small Stage 3, is a tiny hole with a little depth.  Community acquired.  Clean, no s/s infection.  Will cover with Mepilex for added padding and protection.     Pt sleeps in recliner at home which is likely what has caused the wound.  Discussed PIP measures and rationale with pt who was receptive.          Plan:     Nursing to notify the Provider(s) and re-consult the [facility] Nurse if wound(s) deteriorate(s)or if the wound care plan needs reevaluation.    If pt is refusing to turn or reposition they must be educated on the  potential injury from not off loading pressure.  Then this \"educated refusal\" needs to be documented as an \"educated refusal to turn/ reposition\" and document if alert, etc.      Plan for wound care to coccyx: every other day and prn:  1.  Cleanse with MicroKlenz, pat dry.  2.  Swab periwound with skin prep, let dry.  3.  Cover with 4x4 Mepilex, staying well above perineal area.  Label with time/date/initials.   4.  PIP measures:   - pt should reposition every 1-2 hours in bed, side to side only   - pt should stand for a few minutes every 1 hr in chair, and shift weight a couple of times an hour  - avoid 45 degree angle as much as possible; best to sit up straight 90 degrees, or recline at 30 degrees or less    - consider chair cushion (# [id number])    [facility] Nurse will return: weekly and prn  Face to face time: 20 min    "

## 2017-12-07 NOTE — PLAN OF CARE
Problem: Patient Care Overview  Goal: Plan of Care/Patient Progress Review  Outcome: Improving  A&O x4.  SBA with GB and walker.  Uses bedside commode.  I&O's.  Obtained bed weight this evening.  Will need another weight in the morning.  Appears to be diuresing well.  Tolerates 2gm NA diet.  No loose stool this shift.  VSS on RA.  Tele: A-fib CVR.  MD came and wrapped BLE's this evening.  Leave ACE wraps on until morning, remove for a brief period, and rewrap.  Coccyx wound cleansed and new dressing applied.  WOC will assess tomorrow.  Wound is a small hole surrounded by pale, moist, pink tissue deep in between the buttocks.  PRN Tylenol at HS for complaints of bilateral foot pain.  Possible discharge home tomorrow.

## 2017-12-07 NOTE — PLAN OF CARE
Problem: Patient Care Overview  Goal: Plan of Care/Patient Progress Review  Outcome: Adequate for Discharge Date Met: 12/07/17  A/O x4. VSS on RA. Tele: Afib CVR. Up w/1 GB/walker. C/o generalized pain, declines intervention. Denies nausea/SOB. Given IV lasix, good UOP via commode. LS clear.Good appetite. ACE wraps removed this AM, edema +1, MD said okay to keep off and no more lymphedema therapy. WOC saw patient-mepilex on coccyx. Chair pad ordered for patient to take home. Patient educated to not sleep in recliner and to sleep in bed with legs up.      Patient discharged to home at 1500. All patient belongings accounted for and sent home with patient. Discharge AVS reviewed and patient educated on CHF. Lasix dose increased for home and patient aware, otherwise no other med changes. Lasix sent with patient on discharge. Home PT/OT/RN ordered, phone number given to patient in case the do not contact her. INR 1.60; asked MD if he'd like INR checked tomorrow by home care RN. MD said okay to wait until follow up with PCP. Follow up appointment and INR made with PCP and patient aware of this. Patient verbalized understanding and had no further questions.

## 2017-12-07 NOTE — DISCHARGE SUMMARY
[facility]    Discharge Summary  Hospitalist    Date of Admission:  12/5/2017  Date of Discharge:  12/7/2017  Discharging Provider: Chace Yan MD    Discharge Diagnoses   Principal Problem:    Acute on chronic systolic heart failure (H)  Active Problems:    Spinal muscular atrophy type III causing decreased ability of respiratory mm-onset 43y/o     Sleep apnea    Mitral valve regurgitation severe -- S/P Mitral Clip 1/2015    Benign essential hypertension    Chronic atrial fibrillation (H)    CHF (congestive heart failure) (H)    Stage 3 sacral decub ulcer    History of Present Illness   82 yo white female with mitral regur, s/p mitral valve clips, HTN, associated heart failure and muscle disorder (since age 44 with associated decreased resp effort and hypoxia) who presents with leg swelling, but then reports she has been sleeping in a chair for about a year because she can't lie flat.  Her weight has increased 12 lbs in the last 2-4 weeks -- normally weighs 190 and now weighs 204.  She is on Lasix 20 mg daily.  No change in her diet.  She was seen by cardiology clinic who sent her to lymphedema clinic, who saw her and suggested she go to the ER.  She does have 2+ pitting edema to knees bilaterally, and SOB if tries to lay flat.  CXR with cardiomegaly and pulmonary vascular congestion.          Hospital Course   Treated with IV lasix, weight down 11 lbs after 2 days, ankle edema down to trace and patient able to lie down in bed.  Echocardiogram showed:  The visual ejection fraction is estimated at 55-60%.  Normal left ventricular wall motion  The right ventricle is normal in size and function.  Mitral clip apparatus present  There is trace to mild mitral regurgitation.  There is mild mitral stenosis.  The mean mitral valve gradient is 5mmHg at HR around 70.  Compared to the prior study, EF unchanged, less MR and TR noted on present  Study.    She will discharge on lasix 40 mg daily, and  check BMP and INR in 5 days.    Chace Yan MD  Pager: [phone]  Cell Phone:  [phone]     Significant Results and Procedures   As above      Code Status   DNR / DNI       Primary Care Physician   Bess Lion    Physical Exam   Temp: 97.9  F (36.6  C) Temp src: Oral BP: 140/61 Pulse: 82   Resp: 16 SpO2: 95 % O2 Device: None (Room air)    Vitals:    12/06/17 1715 12/07/17 0700   Weight: 89.8 kg (198 lb) 87.8 kg (193 lb 9 oz)     Vital Signs with Ranges  Temp:  [97.5  F (36.4  C)-98.2  F (36.8  C)] 97.9  F (36.6  C)  Pulse:  [64-95] 82  Resp:  [16] 16  BP: (112-155)/(53-87) 140/61  SpO2:  [94 %-95 %] 95 %  I/O last 3 completed shifts:  In: 1090 [P.O.:1090]  Out: 2875 [Urine:2875]    Exam on discharge: lungs clear, ankles with trace ankle edema bilaterally.     Discharge Disposition   Discharged to home  Condition at discharge: Stable    Consultations This Hospital Stay   PHARMACY TO DOSE WARFARIN  WOUND OSTOMY CONTINENCE NURSE  IP CONSULT    Time Spent on this Encounter   I spent a total of 35 minutes discharging this patient.     Discharge Orders     Home care nursing referral     Reason for your hospital stay   Heart failure with leg swelling.     [facility] Follow-up and recommended labs and tests   Follow up with primary care provider, Bess Lion, in 5 days, check BMP and INR then, and keep appointment with cardiology as scheduled on 12/19/17 at 1:50 PM.     Activity   Your activity upon discharge: activity as tolerated.  Do not sleep in a chair, try sleeping in your bed -- to allow the pressure ulcer on your butt to heal, and help keep your legs from swelling.     Discharge Instructions   Call Dr. Yan at Pager [phone] if questions, or Cell Phone [phone].     MD face to face encounter   Documentation of Face to Face and Certification for Home Health Services    I certify that patient: Kenyatta Donald is under my care and that I, or a nurse practitioner or physician's  assistant working with me, had a face-to-face encounter that meets the physician face-to-face encounter requirements with this patient on: 12/7/2017.    This encounter with the patient was in whole, or in part, for the following medical condition, which is the primary reason for home health care: heart failure and monitor healing of sacral decubitus ulcer.    I certify that, based on my findings, the following services are medically necessary home health services: Nursing.    My clinical findings support the need for the above services because: Nurse is needed: To assess healing of sacral ulcer and change Mepilex dressing, and monitor heart failure and leg swelling after changes in medications.     Further, I certify that my clinical findings support that this patient is homebound (i.e. absences from home require considerable and taxing effort and are for medical reasons or [employer] services or infrequently or of short duration when for other reasons) because: Leaving home is medically contraindicated for the following reason(s): Dyspnea on exertion that makes it so they cannot leave their home for needed services without clinical deterioration...    Based on the above findings. I certify that this patient is confined to the home and needs intermittent skilled nursing care, physical therapy and/or speech therapy.  The patient is under my care, and I have initiated the establishment of the plan of care.  This patient will be followed by a physician who will periodically review the plan of care.  Physician/Provider to provide follow up care: Bess Lion    Attending hospital physician (the Medicare certified [place] provider): Chace Yan  Physician Signature: See electronic signature associated with these discharge orders.  Date: 12/7/2017     DNR/DNI     Diet   Follow this diet upon discharge: Regular, no added salt.       Discharge Medications   Current Discharge Medication List       CONTINUE these medications which have CHANGED    Details   furosemide (LASIX) 40 MG tablet Take 1 tablet (40 mg) by mouth daily For heart failure and leg swelling  Qty: 60 tablet, Refills: 3    Associated Diagnoses: Acute on chronic diastolic congestive heart failure (H)         CONTINUE these medications which have NOT CHANGED    Details   allopurinol (ZYLOPRIM) 100 MG tablet TAKE 1 TABLET BY MOUTH EVERY DAY  Qty: 90 tablet, Refills: 2    Associated Diagnoses: Gout      simvastatin (ZOCOR) 40 MG tablet Take 0.5 tablets (20 mg) by mouth At Bedtime  Qty: 45 tablet, Refills: 3    Associated Diagnoses: Mixed hyperlipidemia      levothyroxine (SYNTHROID/LEVOTHROID) 100 MCG tablet Take 1 tablet (100 mcg) by mouth daily  Qty: 90 tablet, Refills: 1    Comments: Should do lab in 6-8 weeks to 3 mo  Associated Diagnoses: Acquired hypothyroidism      warfarin (COUMADIN) 4 MG tablet Take 1 tablet (4 mg) by mouth daily Taking 4 mg m,w,f & 4mg + 1 mg rest of week  Qty: 135 tablet, Refills: 2    Associated Diagnoses: Atrial fibrillation, unspecified type (H); History of pulmonary embolism      amLODIPine (NORVASC) 5 MG tablet Take 1 tablet (5 mg) by mouth 2 times daily  Qty: 180 tablet, Refills: 3    Associated Diagnoses: Benign essential hypertension      lisinopril (PRINIVIL/ZESTRIL) 20 MG tablet Take 1 tablet (20 mg) by mouth 2 times daily  Qty: 180 tablet, Refills: 3    Associated Diagnoses: Essential hypertension, benign      cholecalciferol (VITAMIN D) 1000 UNIT tablet Take 2,000 Units by mouth daily       nystatin (MYCOSTATIN) 295415 UNIT/GM POWD Apply topically 3 times daily as needed  Qty: 60 g, Refills: 1    Associated Diagnoses: Intertrigo      acetaminophen (TYLENOL) 325 MG tablet Take 2 tablets (650 mg) by mouth every 4 hours as needed for mild pain  Qty: 100 tablet    Associated Diagnoses: Closed nondisplaced fracture of left patella, unspecified fracture morphology, initial encounter      albuterol (PROAIR HFA,  PROVENTIL HFA, VENTOLIN HFA) 108 (90 BASE) MCG/ACT inhaler Inhale 2 puffs into the lungs every 6 hours as needed for shortness of breath / dyspnea or wheezing      metoprolol (TOPROL-XL) 50 MG 24 hr tablet Take 1.5 tablets (75 mg) by mouth daily  Qty: 135 tablet, Refills: 1    Associated Diagnoses: Coronary artery disease involving native coronary artery of native heart without angina pectoris      ASPIRIN NOT PRESCRIBED (INTENTIONAL) Please choose reason not prescribed, below  Qty: 1 each, Refills: 0      !! order for DME Equipment being ordered: compression hose   BK 20-30mm   2 pair as insurance will pay  Qty: 1 each, Refills: 0    Associated Diagnoses: Lymphedema of both lower extremities      amoxicillin (AMOXIL) 500 MG capsule Take 4 capsules by mouth 30 minutes prior to dental work  Qty: 4 capsule, Refills: 4    Associated Diagnoses: Mitral valve insufficiency      !! order for DME Equipment being ordered: mastectomy bras & prostheses   S/po mastectomy of unknown side     C50.919  Qty: 1 each, Refills: 0    Associated Diagnoses: Malignant neoplasm of female breast, unspecified laterality, unspecified site of breast       !! - Potential duplicate medications found. Please discuss with provider.        Allergies   No Known Allergies  Data   Most Recent 3 CBC's:  Recent Labs   Lab Test  12/05/17   1715  08/25/17   1145  07/26/16   0816   WBC  8.4  8.5  8.8   HGB  12.0  13.2  12.4   MCV  89  93  89   PLT  282  261  215      Most Recent 3 BMP's:  Recent Labs   Lab Test  12/07/17   0852  12/06/17   0910  12/05/17   1715   NA  138  139  141   POTASSIUM  3.6  3.5  4.1   CHLORIDE  102  103  106   CO2  28  27  26   BUN  20  14  15   CR  0.51*  0.56  0.51*   ANIONGAP  8  9  9   VICKIE  9.4  9.3  9.6   GLC  110*  91  105*     Most Recent 2 LFT's:  Recent Labs   Lab Test  12/05/17   1715  08/22/17   1304   01/09/15   1330   AST  19   --    --   19   ALT  16  <5*   < >  22   ALKPHOS  55   --    --   49   BILITOTAL  0.7   --     --   1.2    < > = values in this interval not displayed.     Most Recent INR's and Anticoagulation Dosing History:  Anticoagulation Dose History     Recent Dosing and Labs Latest Ref Rng & Units 10/2/2017 10/25/2017 11/8/2017 11/8/2017 12/5/2017 12/6/2017 12/7/2017    Warfarin 5 mg - - - - - - 5 mg -    INR 0.86 - 1.14 - - - - 2.51(H) 1.96(H) 1.60(H)    INR 2.5 - 3.5 3.4(A) 2.2(A) 2.6 2.6 - - -        Most Recent 3 Troponin's:  Recent Labs   Lab Test  12/06/17   0910  12/05/17   1715  04/03/16   0610   TROPI  <0.015  <0.015  <0.015  The 99th percentile for upper reference range is 0.045 ug/L.  Troponin values in   the range of 0.045 - 0.120 ug/L may be associated with risks of adverse   clinical events.       Most Recent Cholesterol Panel:  Recent Labs   Lab Test  08/22/17   1304   CHOL  154   LDL  76   HDL  51   TRIG  136     Most Recent 6 Bacteria Isolates From Any Culture (See EPIC Reports for Culture Details):  Recent Labs   Lab Test  04/02/16   1818  04/02/16   1811   CULT  No growth  No growth     Most Recent TSH, T4 and A1c Labs:  Recent Labs   Lab Test  08/25/17   1145   10/17/14   0950   TSH  0.58   < >  1.68   A1C   --    --   5.9    < > = values in this interval not displayed.

## 2017-12-07 NOTE — DISCHARGE INSTRUCTIONS
[facility] was sent a referral for home RN, Physical Therapy and Occupational Therapy.  They will call before coming to your home. Their number is [phone].

## 2017-12-07 NOTE — CONSULTS
"CLINICAL NUTRITION SERVICES  -  (BRIEF) ASSESSMENT NOTE    BRIEF NUTRITION ASSESSMENT    REASON FOR ASSESSMENT:  Kenyatta Donald is a 81 year old female seen by Registered Dietitian for nutrition screen - Stageable pressure injuries or large/non-healing wound.    NUTRITION HISTORY:  Information obtained from patient:  - Very well informed and compliant at home w/low sodium diet.  She said she hasn't received formal education, but looked up information on own. Reads labels and weighs foods (for ex, lunch meat) to make sure she's w/in restriction.  - Eats 3 meals/day, and makes sure to include protein w/each meal.  - Diet Hx:   B - Cereal w/fruit (usually an orange)   L - Fruit and sometimes a sandwich, using low sodium turkey (weighed out) and low sodium bread.   D - Chicken and steamed vegetables (frozen).     CURRENT DIET AND INTAKE:  Diet:  2 gm Na Diet    - Patient reports appetite has been normal.  - [id number]% of breakfast 12/7 (scrambled eggs and fruit cup)  Per flow chart - consumed 100% of dinner 12/6    ANTHROPOMETRICS:  Height: 5'4.5\"  Weight: 87.8 kg (193 lb) - Current wt 12/7 (Per EMR - patient is currently diuresing)  BMI: 32.71 kg/m2  IBW:  55.7 kg +/-10%  Weight Status: Obesity Grade I BMI 30-34.9  %IBW: 158%  Weight History:  Per patient   - UBW (w/o fluid) is ~190 lb (86.4 kg).    Wt Readings from Last 10 Encounters:   12/07/17 87.8 kg (193 lb 9 oz)   12/01/17 91.9 kg (202 lb 11.2 oz)   11/09/17 88.9 kg (196 lb)   10/30/17 88.5 kg (195 lb)   08/25/17 86.2 kg (190 lb)   08/22/17 86.5 kg (190 lb 9.6 oz)   05/22/17 86.2 kg (190 lb)   02/17/17 87.5 kg (193 lb)   01/13/17 85.7 kg (189 lb)   01/11/17 87.6 kg (193 lb 3.2 oz)     LABS:  Labs noted    MALNUTRITION:  Patient does not meet two of the following criteria necessary for diagnosing malnutrition: significant weight loss, reduced intake, subcutaneous fat loss, muscle loss or fluid retention    NUTRITION INTERVENTION:  Nutrition Diagnosis:  No " nutrition diagnosis at this time.    Implementation:  Nutrition Education:  Per Provider order if indicated  - Discussed importance of consuming 3 regular meals/day and incorporating a source of protein at each meal.    FOLLOW UP/MONITORING:   Will re-evaluate in 7 - 10 days, or sooner, if re-consulted.    Suzie Latif, Dietetic Intern

## 2017-12-07 NOTE — PROGRESS NOTES
Care Transition Initial Assessment - RN        Met with: Patient.    DATA   Principal Problem:    Acute on chronic systolic heart failure (H)  Active Problems:    Spinal muscular atrophy type III causing decreased ability of respiratory mm-onset 43y/o     Sleep apnea    Mitral valve regurgitation severe -- S/P Mitral Clip 1/2015    Benign essential hypertension    Chronic atrial fibrillation (H)    CHF (congestive heart failure) (H)       Cognitive Status: awake, alert and oriented.     Contact information and PCP information verified: Yes    Lives With: alone     Insurance concerns: No Insurance issues identified    ASSESSMENT  Patient currently receives the following services:  She was starting Outpt Lymphedema Therapy        Identified issues/concerns regarding health management: Pt lives alone at the [facility]  apartments.  She does drive and presently a little below her baseline.  She has had close follow-up with [facility] and her PCP.  Pt was at her first Lymphedema the day of admission.  Rounding Hospitalist feels that pt does not have lymphedema.  Pt's legs are much improved after IV lasix for two days.  Pt understands best home practices to prevent HF.  She follows a low salt diet.  She does not weigh herself daily, but plans to start this practice.  INR is 1.69.  Her Anticoagulation Clinic goal is 2.5-3.5.    PLAN  Financial costs for the patient include NA  Patient given options and choices for discharge: Choice of Patient/family is agreeable to the plan?  Yes: Home with home care     Patient anticipates needs for home equipment: No     Discharge Planner   Plan/Disposition: Home   Appointments:PCP 12/12/17 and P Heart 12/19/17  Care  (CTS) will continue to follow as needed.

## 2017-12-07 NOTE — PLAN OF CARE
Problem: Patient Care Overview  Goal: Plan of Care/Patient Progress Review  Outcome: Improving  A&Ox4. VSS on RA. Denies pain/SOB. Up with assist x1 with home walker to commode. +2 edema to BLE; elevated with pillows, wrapped with ACE wraps. May be removed in am for brief period than rewrapped per MD. 2 gm sodium diet. Tele afib with CVR. Coccyx wound dressing CDI, encouraged frequent turning/repositioning. WOC will assess today. Possible discharge today or tomorrow. Continue to monitor.

## 2017-12-08 ENCOUNTER — CARE COORDINATION (OUTPATIENT)
Dept: CARE COORDINATION | Facility: CLINIC | Age: 82
End: 2017-12-08

## 2017-12-08 NOTE — LETTER
[facility]  Complex Care Plan  About Me  Patient Name:  Kenyatta Donald    YOB: 1935  Age:   82 year old   [facility] MRN: [id number] Telephone Information:     Home Phone [phone]   Mobile [phone]       Address:    [address]   [place] [address] Email address:  No e-mail address on record      Emergency Contact(s)  Name Relationship Lgl Grd Work Phone Home Phone Mobile Phone   1. PASHA RAGLAND Daughter No none [phone] none   2. ERNESTO SPICER Daughter No none [phone]           Primary language:  English     needed? No   [facility] [employer]:  [phone] op. 1  Other communication barriers: No  Preferred Method of Communication:  Letter, Phone  Current living arrangement: I live alone  Mobility Status/ Medical Equipment: Independent w/Device  Other information to know about me:    Health Maintenance  Health Maintenance Reviewed: Not assessed    My Access Plan  Medical Emergency 911   Primary Clinic Line [facility]- [phone]   24 Hour Appointment Line [phone] or  [phone]) (toll-free)   24 Hour Nurse Line [phone] (toll-free)   Preferred Urgent Care  (NA)   Preferred Hospital [facility]  [phone]   [facility] [address] AT [facility] [address]     Behavioral Health Crisis Line The National Suicide Prevention Lifeline at 1-480.836.1313 or 911     My Care Team Members  Patient Care Team       Relationship Specialty Notifications Start End    Bess Lion MD PCP - General Family Practice  12/12/12     Phone: [phone] Fax: [phone]         [address] [place] [address]    Tommy Daily   Neurology  11/11/14     Phone: [phone] Fax: [phone]         [facility] [address] [place] [address]     Ana Benjamin, RN Clinic Care Coordinator  Admissions 8/16/16     Phone: [phone] Fax: [phone]        Raphael Perez MD MD Orthopedics  8/16/16     Phone: [phone] Fax: [phone]         [facility] [address] [place] [address]    Chris Guan MD MD Cardiology  8/16/16     Phone: [phone] Fax: [phone] [address] [place] [address]         My Care Plans  Self Management and Treatment Plan  Goals and (Comments)  Goal #1: I will follow the Heart Failure Action Plan-I will seek medical help if experiencing increased symptoms of concern        0% of goal reached        Action Plans on File: CHF  Advance Care Plans/Directives Type:   Type Advanced Care Plans/Directives: DNR/DNI    My Medical and Care Information  Problem List   Patient Active Problem List   Diagnosis     Lumbago     Pain in joint, pelvic region and thigh     Pain in thoracic spine     Cervicalgia     Glucose intolerance (impaired glucose tolerance): HgbA1C= 5.5      Dysphagia     GERD (gastroesophageal reflux disease) for 10 + yrs ---2000     Anticoagulated     Osteoporosis     Juan Pablo-tachy syndrome (H)     Spinal muscular atrophy type III causing decreased ability of respiratory mm-onset 45y/o      Sleep apnea     Breast cancer (H)     Mitral valve regurgitation severe -- S/P Mitral Clip 1/2015     Risk for falls     Long term current use of anticoagulant therapy     Benign neoplasm of skin     Need for SBE (subacute bacterial endocarditis) prophylaxis     Acquired hypothyroidism     Hypercholesterolemia     Acute on chronic systolic heart failure (H)     Long-term (current) use of anticoagulants [Z79.01]     Benign essential hypertension     Chest pain     Painful respiration-ant chest wall from increased congestion w URI      Hypoxia since at least 2010 from poor muscle tone of chest      DOI 7-25-16     History of pulmonary embolism     Coronary artery disease involving native  coronary artery of native heart without angina pectoris     Drug-induced gout involving toe, unspecified chronicity, unspecified laterality     Acute bilateral low back pain with right-sided sciatica onset end of 12-16     Acute right-sided low back pain with right-sided sciatica     * * * SBE PROPHYLAXIS * * *     Age-related osteoporosis without current pathological fracture     Chronic atrial fibrillation (H)     Class 1 obesity due to excess calories with serious comorbidity and body mass index (BMI) of 33.0 to 33.9 in adult     CHF (congestive heart failure) (H)      Current Medications and Allergies:  See printed Medication Report.    Care Coordination Start Date:  (NA)   Frequency of Care Coordination:  (2-4 weeks)   Form Last Updated: 12/08/2017

## 2017-12-08 NOTE — PROGRESS NOTES
"Clinic Care Coordination Contact  OUTREACH    Referral Information:  Referral Source: CTS  Reason for Contact: Iesha Gaxiola Hospitalization --12/5-12/7/2017--Acute on Chronic diastolic CHF   Care Conference: No     Universal Utilization:   ED Visits in last year: 1  Hospital visits in last year: 3  Last PCP appointment: 11/09/17  Missed Appointments:  (4%)  Concerns:  (NO)  Multiple Providers or Specialists: PCP Cardiology     Clinical Concerns:  Patient states she has Lymphedema and states she is not able to get support stockings on.  Patient has OT and will have leg wraps in the future -  Patient was instructed to sleep in the bed /not in the chair due to buttocks decubiti.  Patient started out in the bed lat night but then sleep the remainder of the night in the chair.  Patient has a cushion she uses in the chair and walker     Education Provided to patient: CC mailed CHF educational materials   Clinical Pathway Name: Heart Failure  Clinical Pathway: Clinic Care Coordination Heart Failure Assessment:  Day of hospital discharge:  12/7/2017  Home scale available:  Yes  Patient will start checking weight daily    Following a low sodium diet:  Yes      Heart Failure Zones sheet on refrigerator or available: CC mailed Heart Failure Action Plan   What Heart Failure Zone currently in:  Green  Any increased SOB since hospital discharge:  No  Any increased edema since hospital discharge:  No  Medications:  \"How many new medications are you on since your hospitalization?\"  0 - 1   \"How many of your current medicines changed (dose, timing, name, etc.) while you were in the hospital?\"  0 - 1  \"Do you have questions about your medications?\"  Yes  Is patient on Warfarin?  Yes:   Home Care Nurse will follow INRs  Is Ejection Fraction <40%: No  55-60%  Medication reconciliation completed?  Yes  Was MTM referral placed (*Make sure to put transitions as reason for referral)?  No  When is the first appointment with the CHF " clinic: 12/19/2017  When is the appointment with PCP: 12/12/2017    Medication Management:  Reviewed     Functional Status:  Mobility Status: Independent w/Device  Equipment Currently Used at Home: walker, rolling     Psychosocial:  Current living arrangement:: I live alone     Resources and Interventions:  Current Resources:  (NA); Home Care  PAS Number:  (NA)  Senior Linkage Line Referral Placed:  (NA)  Advanced Care Plans/Directives on file:: Yes  Referrals Placed:  (NA)     Barriers: Deconditioned   Strengths: Home Care PT/OT   Patient/Caregiver understanding: Patient expresses a good understanding of discharge instructions   Frequency of Care Coordination:  (2-4 weeks)  Upcoming appointment: 12/12/17     Plan:   CC will leave Ana Benjamin RN CC contact with HC RN   CC will follow up when the patient has been discharged from home care     Jagruti David RN / Clinical Care Coordinator     [address] [address]  [email or web address]  Office :  [phone] / Fax :  [phone]

## 2017-12-11 ENCOUNTER — DOCUMENTATION ONLY (OUTPATIENT)
Dept: CARDIOLOGY | Facility: CLINIC | Age: 82
End: 2017-12-11

## 2017-12-11 ENCOUNTER — TELEPHONE (OUTPATIENT)
Dept: FAMILY MEDICINE | Facility: CLINIC | Age: 82
End: 2017-12-11

## 2017-12-11 DIAGNOSIS — R60.0 PERIPHERAL EDEMA: Primary | ICD-10-CM

## 2017-12-11 NOTE — TELEPHONE ENCOUNTER
Alana called back she opened pt to home care over the weekend. Suggesting SN visits 1x/wk for 1 wk, 2x/wk for 2 wks, 1x/wk for 2 wks, and 3 prns. PT Eval and treat. OT eval and treat. SW to eval. HHA 2x/wk for 4 wks. Verbal Okay given.

## 2017-12-11 NOTE — PROGRESS NOTES
Received a form to re-instate lymphedema therapy  Patient was discharged 12/7/17 after admission two days earlier for acute on chronic systolic heart failure (H),  Spinal muscular atrophy type III causing decreased ability of respiratory mm-onset 45y/o, Sleep apnea, Mitral valve regurgitation severe -- S/P Mitral Clip 1/2015, Benign essential hypertension, Chronic atrial fibrillation (H). Lasix was increased from 20mg to 40mg daily at discharge. Patient had gone to her first lymphedema visit and was assessed and sent to the ER the same day. Per hospitalist notes - questions if lymphedema therapy is needed at this time and defers renewal of order to Dr. Guan. Patient has f/u PMD visit 12/12/17 and see MICHAELA Maddie Diane on 12/19/17.  Will message Dr. Guan to review    Per Dr. Guan's recommendation: Consider lymphedema clinic based on extent of leg edema on lasix at clinic visit with BMP.    Contacted patient with Dr. Guan's plan to wait to re-instate the lymphedema clinic until she is assessed at [facility] 12/19/17. BMP ordered and scheduled at 1:00 same day at [facility]. Patient verbalized understanding and agreed with plan.

## 2017-12-11 NOTE — PROGRESS NOTES
Clinic Care Coordination Contact      Home care  with [facility] is Jessica Hammond ([phone]).    Left clinic care coordinator's contact information with .     Ana Benjamin RN, CCM - Care Coordinator     12/11/2017    10:36 AM  [phone]

## 2017-12-11 NOTE — TELEPHONE ENCOUNTER
Reception relayed this message: Home care called requesting home care orders.   Message left for Alana on VM to call triage back.

## 2017-12-12 ENCOUNTER — OFFICE VISIT (OUTPATIENT)
Dept: FAMILY MEDICINE | Facility: CLINIC | Age: 82
End: 2017-12-12
Payer: COMMERCIAL

## 2017-12-12 VITALS
WEIGHT: 187 LBS | TEMPERATURE: 96.2 F | HEART RATE: 90 BPM | BODY MASS INDEX: 31.16 KG/M2 | HEIGHT: 65 IN | OXYGEN SATURATION: 97 % | RESPIRATION RATE: 12 BRPM | SYSTOLIC BLOOD PRESSURE: 130 MMHG | DIASTOLIC BLOOD PRESSURE: 78 MMHG

## 2017-12-12 DIAGNOSIS — Z79.01 ANTICOAGULATED: ICD-10-CM

## 2017-12-12 DIAGNOSIS — I48.20 CHRONIC ATRIAL FIBRILLATION (H): ICD-10-CM

## 2017-12-12 DIAGNOSIS — R09.02 HYPOXIA: ICD-10-CM

## 2017-12-12 DIAGNOSIS — I50.23 ACUTE ON CHRONIC SYSTOLIC CONGESTIVE HEART FAILURE (H): Primary | ICD-10-CM

## 2017-12-12 DIAGNOSIS — G12.1 SPINAL MUSCULAR ATROPHY TYPE III (H): ICD-10-CM

## 2017-12-12 DIAGNOSIS — R25.2 CRAMP OF LIMB: ICD-10-CM

## 2017-12-12 PROCEDURE — 80048 BASIC METABOLIC PNL TOTAL CA: CPT | Performed by: FAMILY MEDICINE

## 2017-12-12 PROCEDURE — 99495 TRANSJ CARE MGMT MOD F2F 14D: CPT | Performed by: FAMILY MEDICINE

## 2017-12-12 PROCEDURE — 36415 COLL VENOUS BLD VENIPUNCTURE: CPT | Performed by: FAMILY MEDICINE

## 2017-12-12 NOTE — LETTER
My Heart Failure Action Plan   Name: Kenyatta Donald    YOB: 1935   Date: 12/12/2017    My doctor: Bess Lion     [address] [address]  [phone]  My Diagnosis: Systolic Heart Failure   My Ejection Fraction: Over 50%    My Exercise Goal: 30 minutes daily  .     My Weight Goal: 185-189#  Wt Readings from Last 2 Encounters:   12/12/17 187 lb (84.8 kg)   12/07/17 193 lb 9 oz (87.8 kg)     Weigh yourself daily using the same scale. If you gain more than 2 pounds in 24 hours or 5 pounds in a week increase your diuretic to Lasix (Furosemide)    My Diet Goal: No added salt    Emergency Room Visits:    Our goal is to improve your quality of life and help you avoid a visit to the emergency room or hospital.  If we work together, we can achieve this goal. But, if you feel you need to call 911 or go to the emergency room, please do so.  If you go to the emergency room, please bring your list of medicines and your daily weight chart with you.       GREEN ZONE     Doing well today    Weight gained is no more than 2 pounds a day or 5 pounds a week.    No swelling in feet, ankles, legs or stomach.    No more swelling than usual.    No more trouble breathing than usual.    No change in my sleep.    No other problems. Actions:    I am doing fine.  I will take my medicine, follow my diet, see my doctor, exercise, and watch for symptoms.           YELLOW ZONE         Having a bad day or flare up    Weight gain of more than 2 pounds in one day or 5 pounds in one week.    New swelling in ankle, leg, knee or thigh.    Bloating in belly, pants feel tighter.    Swelling in hands or face.    Coughing or trouble breathing while walking or talking.    Harder to breathe last night.    Have trouble sleeping, wake up short of breath.    Much more tired than usual.    Not eating.    Pain in my chest or bad leg cramps.    Feel  weak or dizzy. Actions:    I need to take action and call my doctor or nurse today.                 RED ZONE         Need medical care now    Weight gain of 5 pounds overnight.    Chest pain or pressure that does not go away.    Feel less alert.    Wheezing or have trouble breathing when at rest.    Cannot sleep lying down.    Cannot take my water pill.    Pass out or faint. Actions:    I need to call my doctor or nurse now!    Call 911 if I have chest pain or cannot breathe.        Electronically signed by: Bess Lion, December 12, 2017

## 2017-12-12 NOTE — PATIENT INSTRUCTIONS
1.   Weight Loss Tips  1. Do not eat after 6 hrs before your expected bedtime  2. Have your heaviest meal for breakfast, a slightly lighter meal at lunch and a snack 6 hrs before bed  3. No sugar/calorie drinks except milk ie no fruit juice, pop, alcohol.  4. Drink milk 30min before meals to decrease your hunger. Also it is excellent as part of your last meal of the day snack  5. Drink lots of water  6. Increase fiber in diet: all bran cereal, salads, popcorn etc  7. Have only one small serving of fruit a day about 1/2 cup (as this is high in sugar)  8. EXERCISE is the bottom line. Without it, you will gain weight even on a low calorie diet. Best if done 2-3X a day as can    Being overweight contributes to high blood pressure and high cholesterol, both of which cause heart attacks, strokes and kidney failure, prediabetes and diabetes, arthritis, and liver disease     2. 40mgm furosemide  Every am     Weigh q am and keep wt 185-190#     If higher , take a 2nd 1/2 pill at 10-12 N     If lower take only 1/2 pill and reweigh in the late afternoon and take the 2nd 1/2 if wt too high     Record wts and amt of furosemide take q day     4. Keep the legs above the level of the heart as much as possible to help decrease the pain and  the healing time . Put pillows on either side while sleeping to keep the arm or leg elevated     5. No difference was  Noted by patients in a double blind study when given codeine, tylenol ( acetaminophen) or ibuprofen (all in identical pills). They felt no difference in pain relief. Since ibuprofen and the NSAIDs  causes kidney damage, esophageal damage with heartburn, and can increase the risk of esophageal and stomach cancer and ulcers,and colonic strictures. They also cause increased risk of heart attack .   I recommend that you use tylenol(acetaminophen) for pain. Use the acetaminophen ES  Which has 500mgm/tablet You can take up to 2 tablets 4 times a day as need for pain.  If this is not  enough, you can add in ibuprofen or aleve(naprosyn) with 2 glasses of fluid and some food-to protect the stomach and esophagus. Please let us know if you are continuing to take ibuprofen or aleve, as we will need to periodically check your kidney function with a blood test.

## 2017-12-12 NOTE — NURSING NOTE
"Chief Complaint   Patient presents with     RECHECK     /78  Pulse 90  Temp 96.2  F (35.7  C) (Tympanic)  Resp 12  Ht 5' 4.5\" (1.638 m)  Wt 187 lb (84.8 kg)  LMP  (LMP Unknown)  SpO2 97%  Breastfeeding? No  BMI 31.6 kg/m2 Estimated body mass index is 31.6 kg/(m^2) as calculated from the following:    Height as of this encounter: 5' 4.5\" (1.638 m).    Weight as of this encounter: 187 lb (84.8 kg).  BP completed using cuff size: vita Titus CMA    Health Maintenance Due   Topic Date Due     OP ANNUAL INR REFERRAL  04/08/2016     Health Maintenance reviewed at today's visit patient asked to schedule/complete:   None, Health Maintenance up to date.    "

## 2017-12-12 NOTE — LETTER
December 14, 2017      Kenyatta Mirna Odilon  [address]   [facility] [address]        Dear ,    We are writing to inform you of your test results.    All of your lab results are normal, except as noted below.     The following are explanations of some of our lab tests     THIS DOES NOT MEAN THAT YOU HAD ALL OF THESE DONE     Please continue on the same medications unless a change is noted above     These are some general explanations for tests:     Hgb is the blood iron level   WBC means White Blood Cells   Platelets are small blood cells that help with forming the blood clots along with other blood factors.   Electrolytes are Sodium, Potassium, Calcium, Magnesium, Phosphorus.   Liver tests are: AST, ALT, Bilirubin, Alkaline Phosphatase.   Kidney tests are Creatinine, GFR.   HDL Cholesterol - is the good cholesterol and it is good to have it high.   LDL cholesterol is the bad cholesterol and it is good to have it low.   It is recommended to have LDL less than 130 for people with hypertension and to have it less than 100 for people with heart disease, diabetes and chronic kidney disease.   Triglycerides are another type of lipid and can also cause heart disease so should be kept low.   Thyroid tests are TSH, T4, T3   Glucose is sugar##this remains a little high , but no change ###   A1c is a test that gives us an idea about how well was controlled the diabetes for the last 3 months.   PSA stands for Prostate Specific Antigen and it can be elevated with prostate cancer or prostate inflammation.    Resulted Orders   Basic metabolic panel   Result Value Ref Range    Sodium 140 133 - 144 mmol/L    Potassium 4.5 3.4 - 5.3 mmol/L    Chloride 107 94 - 109 mmol/L    Carbon Dioxide 24 20 - 32 mmol/L    Anion Gap 9 3 - 14 mmol/L    Glucose 107 (H) 70 - 99 mg/dL    Urea Nitrogen 22 7 - 30 mg/dL    Creatinine 0.53 0.52 - 1.04 mg/dL    GFR Estimate >90 >60 mL/min/1.7m2      Comment:      Non   American GFR Calc    GFR Estimate If Black >90 >60 mL/min/1.7m2      Comment:       GFR Calc    Calcium 9.9 8.5 - 10.1 mg/dL       If you have any questions or concerns, please call the clinic at the number listed above.       Sincerely,        Bess Lion MD

## 2017-12-12 NOTE — PROGRESS NOTES
SUBJECTIVE:   Kenyatta Donald is a 82 year old female who presents to clinic today for the following health issues:    Hospital Follow-up Visit:    Hospital/Nursing Home/IP Rehab Facility: [facility]  Date of Admission: 12/05/17  Date of Discharge: 12/07/17  Reason(s) for Admission: Acute on Chronic Systolic Heart Failure            Problems taking medications regularly:  None       Medication changes since discharge: Yes, Increased Lasix to 40mg from 20mg       Problems adhering to non-medication therapy:  None    Summary of hospitalization:  [facility] discharge summary reviewed  Diagnostic Tests/Treatments reviewed.  Follow up needed: none  Other Healthcare Providers Involved in Patient s Care:         Homecare   Sees [facility] , Dr Caldwell in 2-18   Update since discharge: improved.     Post Discharge Medication Reconciliation: discharge medications reconciled, continue medications without change.  Plan of care communicated with patient     Coding guidelines for this visit:  Type of Medical   Decision Making Face-to-Face Visit       within 7 Days of discharge Face-to-Face Visit        within 14 days of discharge   Moderate Complexity [id number]   High Complexity [id number]        LYPHEDEMA OF LEGS       Duration: chronic but worse lately as is becoming more & more sedentary as the m dystrophy progresses , weakening her --but now on lasix increased to 40mgm has only min pretib pitting     Description (location/character/radiation): full to pitting lege     Intensity:  moderate    Accompanying signs and symptoms: ache     History (similar episodes/previous evaluation): None    Precipitating or alleviating factors: above    Therapies tried and outcome: None          Hypertension Follow-up      Outpatient blood pressures are not being checked.   Here < 140/80    Low Salt Diet: no added salt     Systolic Heart Failure Follow-up & Chronic A Fib   \on long term anticoagulation / coumadin        Symptoms:    Shortness of breath: none as cant exercise with her m disease    Lower extremity edema: none    Chest pain: No    Using more pillows than normal: No    Cough at night: No    Weight:    Checking weight daily: yes but not recording     Weight change: none at 187# at home     Cardiology visits, ER/UC, or hospital admissions since last visityes - admit above     Medication side effects: none: q d lasix  20--> now on 40 mgm a day      NONMORBID OBESITY      Comorbid HTN, CKD, hi chol,     Chronic Kidney Disease:Stage 3  Follow-up      Comorbid HTN, CKD, hi chol,    NSAID use?  No      HYPOXIA     - from her progressive resp mm weakness   -also from the CHF          Amount of exercise or physical activity: None    Problems taking medications regularly: No    Medication side effects: none    Diet: regular (no restrictions)        Problem list and histories reviewed & adjusted, as indicated.  Additional history: as documented    Labs reviewed in EPIC    Reviewed and updated as needed this visit by clinical staff  Allergies  Meds  Problems       Reviewed and updated as needed this visit by Provider         ROS:  C: NEGATIVE for fever, chills, change in weight  I: NEGATIVE for worrisome rashes, moles or lesions  E: NEGATIVE for vision changes or irritation  E/M: NEGATIVE for ear, mouth and throat problems  RESP:NEGATIVE for significant cough or SOB, POSITIVE for  and dyspnea on exertion  B: NEGATIVE for masses, tenderness or discharge  CV: NEGATIVE for chest pain, palpitations or peripheral edema  GI: NEGATIVE for nausea, abdominal pain, heartburn, or change in bowel habits  : NEGATIVE for frequency, dysuria, or hematuria  M: NEGATIVE for significant arthralgias or myalgia  N: NEGATIVE for weakness, dizziness or paresthesias  E: NEGATIVE for temperature intolerance, skin/hair changes  H: NEGATIVE for bleeding problems  P: NEGATIVE for changes in mood or affect    OBJECTIVE:     /78  Pulse 90   "Temp 96.2  F (35.7  C) (Tympanic)  Resp 12  Ht 5' 4.5\" (1.638 m)  Wt 187 lb (84.8 kg)  LMP  (LMP Unknown)  SpO2 97%  Breastfeeding? No  BMI 31.6 kg/m2  Body mass index is 31.6 kg/(m^2).  GENERAL: healthy, alert and no distress  EYES: Eyes grossly normal to inspection, PERRL and conjunctivae and sclerae normal  RESP: lungs clear to auscultation - no rales, rhonchi or wheezes  CV: regular rate and rhythm, normal S1 S2, no S3 or S4, no murmur, click or rub, < 1+ pretib pitting  peripheral edema and peripheral pulses strong  MS: no gross musculoskeletal defects noted, no edema--overall weak and can barely pull self up to use walker   SKIN: no suspicious lesions or rashes  NEURO: Normal strength and tone, mentation intact and speech normal  PSYCH: mentation appears normal, affect normal/bright    Diagnostic Test Results:  Results for orders placed or performed during the hospital encounter of 12/05/17   XR Chest 2 Views    Narrative    CHEST TWO VIEW   12/5/2017 5:43 PM     HISTORY: Shortness of breath with lying flat, concern for CHF  exacerbation.     COMPARISON: None.      Impression    IMPRESSION: Cardiomegaly is present. There is minimal prominence of  central pulmonary vasculature but there is no edema or infiltrates. No  pleural effusions. Degenerative changes noted in spine. Small  radiopaque density again noted projecting over the heart.    JODI ARREOLA MD   Nt probnp inpatient (BNP)   Result Value Ref Range    N-Terminal Pro BNP Inpatient 1198 0 - 1800 pg/mL   Comprehensive metabolic panel   Result Value Ref Range    Sodium 141 133 - 144 mmol/L    Potassium 4.1 3.4 - 5.3 mmol/L    Chloride 106 94 - 109 mmol/L    Carbon Dioxide 26 20 - 32 mmol/L    Anion Gap 9 3 - 14 mmol/L    Glucose 105 (H) 70 - 99 mg/dL    Urea Nitrogen 15 7 - 30 mg/dL    Creatinine 0.51 (L) 0.52 - 1.04 mg/dL    GFR Estimate >90 >60 mL/min/1.7m2    GFR Estimate If Black >90 >60 mL/min/1.7m2    Calcium 9.6 8.5 - 10.1 mg/dL    Bilirubin " Total 0.7 0.2 - 1.3 mg/dL    Albumin 3.6 3.4 - 5.0 g/dL    Protein Total 7.4 6.8 - 8.8 g/dL    Alkaline Phosphatase 55 40 - 150 U/L    ALT 16 0 - 50 U/L    AST 19 0 - 45 U/L   Troponin I   Result Value Ref Range    Troponin I ES <0.015 0.000 - 0.045 ug/L   CBC with platelets differential   Result Value Ref Range    WBC 8.4 4.0 - 11.0 10e9/L    RBC Count 4.22 3.8 - 5.2 10e12/L    Hemoglobin 12.0 11.7 - 15.7 g/dL    Hematocrit 37.5 35.0 - 47.0 %    MCV 89 78 - 100 fl    MCH 28.4 26.5 - 33.0 pg    MCHC 32.0 31.5 - 36.5 g/dL    RDW 15.1 (H) 10.0 - 15.0 %    Platelet Count 282 150 - 450 10e9/L    Diff Method Automated Method     % Neutrophils 73.6 %    % Lymphocytes 16.3 %    % Monocytes 9.2 %    % Eosinophils 0.6 %    % Basophils 0.1 %    % Immature Granulocytes 0.2 %    Nucleated RBCs 0 0 /100    Absolute Neutrophil 6.2 1.6 - 8.3 10e9/L    Absolute Lymphocytes 1.4 0.8 - 5.3 10e9/L    Absolute Monocytes 0.8 0.0 - 1.3 10e9/L    Absolute Eosinophils 0.1 0.0 - 0.7 10e9/L    Absolute Basophils 0.0 0.0 - 0.2 10e9/L    Abs Immature Granulocytes 0.0 0 - 0.4 10e9/L    Absolute Nucleated RBC 0.0    INR   Result Value Ref Range    INR 2.51 (H) 0.86 - 1.14   INR   Result Value Ref Range    INR 1.96 (H) 0.86 - 1.14   Basic metabolic panel   Result Value Ref Range    Sodium 139 133 - 144 mmol/L    Potassium 3.5 3.4 - 5.3 mmol/L    Chloride 103 94 - 109 mmol/L    Carbon Dioxide 27 20 - 32 mmol/L    Anion Gap 9 3 - 14 mmol/L    Glucose 91 70 - 99 mg/dL    Urea Nitrogen 14 7 - 30 mg/dL    Creatinine 0.56 0.52 - 1.04 mg/dL    GFR Estimate >90 >60 mL/min/1.7m2    GFR Estimate If Black >90 >60 mL/min/1.7m2    Calcium 9.3 8.5 - 10.1 mg/dL   Troponin I   Result Value Ref Range    Troponin I ES <0.015 0.000 - 0.045 ug/L   INR   Result Value Ref Range    INR 1.60 (H) 0.86 - 1.14   Basic metabolic panel   Result Value Ref Range    Sodium 138 133 - 144 mmol/L    Potassium 3.6 3.4 - 5.3 mmol/L    Chloride 102 94 - 109 mmol/L    Carbon Dioxide 28  20 - 32 mmol/L    Anion Gap 8 3 - 14 mmol/L    Glucose 110 (H) 70 - 99 mg/dL    Urea Nitrogen 20 7 - 30 mg/dL    Creatinine 0.51 (L) 0.52 - 1.04 mg/dL    GFR Estimate >90 >60 mL/min/1.7m2    GFR Estimate If Black >90 >60 mL/min/1.7m2    Calcium 9.4 8.5 - 10.1 mg/dL   EKG 12-lead, tracing only   Result Value Ref Range    Interpretation ECG Click View Image link to view waveform and result    Echo Complete with Lumason    Narrative    [id number]  [facility]  [id number]  [id number]^ALLEGRA^BERTHA^[facility]  Echocardiography Laboratory  [address] [place] [address]        Name: DARLENE FRIAS  MRN: [id number]  : 1935  Study Date: 2017 01:08 PM  Age: 81 yrs  Gender: Female  Patient Location: [facility]  Reason For Study: CHF  Ordering Physician: BERTHA DINH  Referring Physician: Bess Lion  Performed By: Vandana Walter     BSA: 2.0 m2  Height: 64 in  Weight: 202 lb  HR: 70  BP: 166/60 mmHg  _____________________________________________________________________________  __        Procedure  Complete Portable Echo Adult. Contrast Lumason.  _____________________________________________________________________________  __        Interpretation Summary     The visual ejection fraction is estimated at 55-60%.  Normal left ventricular wall motion  The right ventricle is normal in size and function.  Mitral clip apparatus present  There is trace to mild mitral regurgitation.  There is mild mitral stenosis.  The mean mitral valve gradient is 5mmHg at HR around 70.  Compared to the prior study, EF unchanged, less MR and TR noted on present  study.  No hemodynamically significant valvular abnormalities. The study was  technically difficult.  _____________________________________________________________________________  __        Left Ventricle  The left ventricle is normal in size. There is normal left ventricular wall  thickness. The visual ejection fraction is estimated at  55-60%. E by E prime  ratio is greater than 15, that likely suggests increased left ventricular  filling pressures. PV S<<D. Left ventricular diastolic function is  indeterminate. Normal left ventricular wall motion.     Right Ventricle  The right ventricle is normal in size and function.     Atria  The left atrium is moderately dilated. Right atrial size is normal. There is  no color Doppler evidence of an atrial shunt.     Mitral Valve  The mitral valve leaflets are mildly thickened. There is trace to mild mitral  regurgitation. The mean mitral valve gradient is 5mmHg. There is mild mitral  stenosis. Mitral clip apparatus present.        Tricuspid Valve  The tricuspid valve is not well visualized. No tricuspid regurgitation. Right  ventricular systolic pressure could not be approximated due to inadequate  tricuspid regurgitation. Dilated IVC (>2.5cm) with <50% respiratory collapse;  right atrial pressure is estimated at 15-20mmHg.     Aortic Valve  The aortic valve is not well visualized. No aortic regurgitation is present.     Pulmonic Valve  The pulmonic valve is not well visualized. There is trace to mild pulmonic  valvular regurgitation. Normal pulmonic valve velocity.     Vessels  The aortic root is normal size. Normal size ascending aorta. Dilated inferior  vena cava.     Pericardium  There is no pericardial effusion.        Rhythm  The rhythm was atrial fibrillation with controlled ventricular rate at rest.  _____________________________________________________________________________  __  MMode/2D Measurements & Calculations  IVSd: 1.00 cm     LVIDd: 4.6 cm  LVIDs: 2.9 cm  LVPWd: 0.99 cm  FS: 37.3 %  EDV(Teich): 95.4 ml  ESV(Teich): 31.1 ml  LV mass(C)d: 154.9 grams  LV mass(C)dI: 78.9 grams/m2  Ao root diam: 3.1 cm  LA dimension: 3.8 cm  asc Aorta Diam: 3.0 cm  LA/Ao: 1.2  LA Volume (BP): 80.0 ml  LA Volume Index (BP): 40.8 ml/m2  RWT: 0.43           Doppler Measurements & Calculations  MV E max corbin:  167.0 cm/sec  MV max P.9 mmHg  MV mean P.0 mmHg  MV V2 VTI: 57.3 cm  MV P1/2t max corbin: 195.5 cm/sec  MV P1/2t: 112.2 msec  MVA(P1/2t): 2.0 cm2  MV dec slope: 510.4 cm/sec2  Ao V2 max: 152.0 cm/sec  Ao max P.0 mmHg  PA acc time: 0.12 sec  E/E' av.5  Lateral E/e': 23.1  Medial E/e': 21.9           _____________________________________________________________________________  __           Report approved by: Elvira Roberts 2017 03:07 PM          ASSESSMENT/PLAN:               ICD-10-CM    1. Acute on chronic systolic congestive heart failure (H) I50.23 Basic metabolic panel     HEART FAILURE ACTION PLAN   2. Cramp of limbs both UE & LE in am's since 43y/o but worse pain since 2-17 R25.2 NEUROLOGY ADULT REFERRAL   3. Spinal muscular atrophy type III causing decreased ability of respiratory mm-onset 43y/o  G12.1 NEUROLOGY ADULT REFERRAL   4. Hypoxia R09.02    5. Chronic atrial fibrillation (H) I48.2    6. Anticoagulated Z79.01        Patient Instructions   1.   Weight Loss Tips  1. Do not eat after 6 hrs before your expected bedtime  2. Have your heaviest meal for breakfast, a slightly lighter meal at lunch and a snack 6 hrs before bed  3. No sugar/calorie drinks except milk ie no fruit juice, pop, alcohol.  4. Drink milk 30min before meals to decrease your hunger. Also it is excellent as part of your last meal of the day snack  5. Drink lots of water  6. Increase fiber in diet: all bran cereal, salads, popcorn etc  7. Have only one small serving of fruit a day about 1/2 cup (as this is high in sugar)  8. EXERCISE is the bottom line. Without it, you will gain weight even on a low calorie diet. Best if done 2-3X a day as can    Being overweight contributes to high blood pressure and high cholesterol, both of which cause heart attacks, strokes and kidney failure, prediabetes and diabetes, arthritis, and liver disease     2. 40mgm furosemide  Every am     Weigh q am and keep wt 185-190#     If  higher , take a 2nd 1/2 pill at 10-12 N     If lower take only 1/2 pill and reweigh in the late afternoon and take the 2nd 1/2 if wt too high     Record wts and amt of furosemide take q day     4. Keep the legs above the level of the heart as much as possible to help decrease the pain and  the healing time . Put pillows on either side while sleeping to keep the arm or leg elevated     5. No difference was  Noted by patients in a double blind study when given codeine, tylenol ( acetaminophen) or ibuprofen (all in identical pills). They felt no difference in pain relief. Since ibuprofen and the NSAIDs  causes kidney damage, esophageal damage with heartburn, and can increase the risk of esophageal and stomach cancer and ulcers,and colonic strictures. They also cause increased risk of heart attack .   I recommend that you use tylenol(acetaminophen) for pain. Use the acetaminophen ES  Which has 500mgm/tablet You can take up to 2 tablets 4 times a day as need for pain.  If this is not enough, you can add in ibuprofen or aleve(naprosyn) with 2 glasses of fluid and some food-to protect the stomach and esophagus. Please let us know if you are continuing to take ibuprofen or aleve, as we will need to periodically check your kidney function with a blood test.          Bess Lion MD  [facility]    Time spent with the patient 41mins, more than 50% in counseling and coordinating care, Re above medical problems.  1) coordinAted and instructed her on use of lasix with q d wts and when to call   2) watch also for leg edema   3) cxounselled on the dangers of starting chronic narcotics for the extrem cramps and pain as her idsease is unrelenting and she would then be on them for life ad there is a risk of falls, with her poor leg strength any how   4) for now will have PT to strengthen her and see neurol for any other ideas for her progressing disease   5) pt realizes that she is deteriorating and  that this is a terminal disease     Bess Lion MD

## 2017-12-13 ENCOUNTER — TELEPHONE (OUTPATIENT)
Dept: FAMILY MEDICINE | Facility: CLINIC | Age: 82
End: 2017-12-13

## 2017-12-13 LAB
ANION GAP SERPL CALCULATED.3IONS-SCNC: 9 MMOL/L (ref 3–14)
BUN SERPL-MCNC: 22 MG/DL (ref 7–30)
CALCIUM SERPL-MCNC: 9.9 MG/DL (ref 8.5–10.1)
CHLORIDE SERPL-SCNC: 107 MMOL/L (ref 94–109)
CO2 SERPL-SCNC: 24 MMOL/L (ref 20–32)
CREAT SERPL-MCNC: 0.53 MG/DL (ref 0.52–1.04)
GFR SERPL CREATININE-BSD FRML MDRD: >90 ML/MIN/1.7M2
GLUCOSE SERPL-MCNC: 107 MG/DL (ref 70–99)
POTASSIUM SERPL-SCNC: 4.5 MMOL/L (ref 3.4–5.3)
SODIUM SERPL-SCNC: 140 MMOL/L (ref 133–144)

## 2017-12-13 NOTE — TELEPHONE ENCOUNTER
KP Ambrosio from  PT called requesting order approval for PT 2 x a wk for 2 wks and 1 x a wk for 1 wk. Verbal approval given.

## 2017-12-14 ENCOUNTER — ANTICOAGULATION THERAPY VISIT (OUTPATIENT)
Dept: NURSING | Facility: CLINIC | Age: 82
End: 2017-12-14
Payer: COMMERCIAL

## 2017-12-14 LAB — INR POINT OF CARE: 2.7 (ref 0.86–1.14)

## 2017-12-14 PROCEDURE — 36416 COLLJ CAPILLARY BLOOD SPEC: CPT

## 2017-12-14 PROCEDURE — 99207 ZZC NO CHARGE NURSE ONLY: CPT

## 2017-12-14 PROCEDURE — 85610 PROTHROMBIN TIME: CPT | Mod: QW

## 2017-12-14 NOTE — PROGRESS NOTES
ANTICOAGULATION FOLLOW-UP CLINIC VISIT    Patient Name:  Kenyatta Donald  Date:  12/14/2017  Contact Type:  Telephone    SUBJECTIVE:     Patient Findings     Positives Change in medications (metoprolol 50 mg & lasix 40 mg), Hospital admission (just out of hospital), Other complaints (lymphedema)           OBJECTIVE    INR Protime   Date Value Ref Range Status   12/14/2017 2.7 (A) 0.86 - 1.14 Final       ASSESSMENT / PLAN  INR assessment THER    Recheck INR In: 1 WEEK    INR Location Clinic      Anticoagulation Summary as of 12/14/2017     INR goal     Today's INR 2.7    Maintenance plan 4 mg (4 mg x 1) on Mon, Wed, Fri; 5 mg (4 mg x 1 and 1 mg x 1) all other days    Full instructions 4 mg on Mon, Wed, Fri; 5 mg all other days    Weekly total 32 mg    Plan last modified Nancy Zavala RN (12/14/2017)    Next INR check 12/21/2017    Target end date       Anticoagulation Episode Summary     INR check location     Preferred lab     Send INR reminders to     Comments             See the Encounter Report to view Anticoagulation Flowsheet and Dosing Calendar (Go to Encounters tab in chart review, and find the Anticoagulation Therapy Visit)        Nancy Zavala, RN

## 2017-12-14 NOTE — PROGRESS NOTES
Please see attached lab results  All of your lab results are normal, except as noted below.    The following are explanations of some of our lab tests    THIS DOES NOT MEAN THAT YOU HAD ALL OF THESE DONE    Please continue on the same medications unless a change is noted above    These are some general explanations for tests:    Hgb is the blood iron level  WBC means White Blood Cells  Platelets are small blood cells that help with forming the blood clots along with other blood factors.  Electrolytes are Sodium, Potassium, Calcium, Magnesium, Phosphorus.  Liver tests are: AST, ALT, Bilirubin, Alkaline Phosphatase.  Kidney tests are Creatinine, GFR.  HDL Cholesterol - is the good cholesterol and it is good to have it high.  LDL cholesterol is the bad cholesterol and it is good to have it low.  It is recommended to have LDL less than 130 for people with hypertension and to have it less than 100 for people with heart disease, diabetes and chronic kidney disease.  Triglycerides are another type of lipid and can also cause heart disease so should be kept low.   Thyroid tests are TSH, T4, T3  Glucose is sugar##this remains a little high , but no change ###  A1c is a test that gives us an idea about how well was controlled the diabetes for the last 3 months.   PSA stands for Prostate Specific Antigen and it can be elevated with prostate cancer or prostate inflammation.

## 2017-12-14 NOTE — MR AVS SNAPSHOT
Kenyatta Donald   12/14/2017 3:00 PM   Anticoagulation Therapy Visit    Description:  82 year old female   Provider:   ANTICOAGULATION CLINIC   Department:  [facility]           INR as of 12/14/2017     Today's INR 2.7      Anticoagulation Summary as of 12/14/2017     INR goal 2.5-3.5    Today's INR 2.7    Full instructions 4 mg on Mon, Wed, Fri; 5 mg all other days    Next INR check 12/21/2017      Description     Dosing to  [facility] [phone]      Your next Anticoagulation Clinic appointment(s)     Dec 14, 2017  3:00 PM CST   Anticoagulation Visit with  ANTICOAGULATION CLINIC   [facility]    [address] [address] [phone]              Contact Numbers     [facility]  Please call  [phone] to cancel and/or reschedule your appointment   The direct line to the anticoagulant nurse is [phone] on Monday, Wednesday, and Friday. On Thursday, the anticoagulant nurse can be reached directly at [phone].         December 2017 Details    Sun Mon Tue Wed Thu Fri Sat          1               2                 3               4               5               6               7               8               9                 10               11               12               13               14      5 mg   See details      15      4 mg         16      5 mg           17      5 mg         18      4 mg         19      5 mg         20      4 mg         21            22               23                 24               25               26               27               28               29               30                 31                      Date Details   12/14 This INR check       Date of next INR:  12/21/2017         How to take your warfarin dose     To take:  4 mg Take 1 of the 4 mg tablets.    To take:  5 mg Take 1 of the 4 mg tablets and 1 of the 1 mg tablets.

## 2017-12-15 ENCOUNTER — DOCUMENTATION ONLY (OUTPATIENT)
Dept: CARE COORDINATION | Facility: CLINIC | Age: 82
End: 2017-12-15

## 2017-12-15 NOTE — PROGRESS NOTES
[facility] now requests orders and shares plan of care/discharge summaries for some patients through [facility].  Please REPLY TO THIS MESSAGE in order to give authorization for orders when needed.  This is considered a verbal order, you will still receive a faxed copy of orders for signature.  Thank you for your assistance in improving collaboration for our patients.    ORDER    Occupational Therapy 1/w/3 to treat for UE HEP development, equipment recommendations and energy conservation for home safety w/ ADLs/IADLs.

## 2017-12-19 ENCOUNTER — OFFICE VISIT (OUTPATIENT)
Dept: CARDIOLOGY | Facility: CLINIC | Age: 82
End: 2017-12-19
Payer: COMMERCIAL

## 2017-12-19 VITALS
DIASTOLIC BLOOD PRESSURE: 85 MMHG | WEIGHT: 190 LBS | BODY MASS INDEX: 31.65 KG/M2 | SYSTOLIC BLOOD PRESSURE: 133 MMHG | HEIGHT: 65 IN | HEART RATE: 74 BPM

## 2017-12-19 DIAGNOSIS — I48.0 PAROXYSMAL ATRIAL FIBRILLATION (H): ICD-10-CM

## 2017-12-19 DIAGNOSIS — R60.0 PERIPHERAL EDEMA: ICD-10-CM

## 2017-12-19 DIAGNOSIS — I10 BENIGN ESSENTIAL HYPERTENSION: Primary | ICD-10-CM

## 2017-12-19 DIAGNOSIS — I34.0 NON-RHEUMATIC MITRAL REGURGITATION: ICD-10-CM

## 2017-12-19 LAB
ANION GAP SERPL CALCULATED.3IONS-SCNC: 15 MMOL/L (ref 6–17)
BUN SERPL-MCNC: 18 MG/DL (ref 7–30)
CALCIUM SERPL-MCNC: 10.2 MG/DL (ref 8.5–10.5)
CHLORIDE SERPL-SCNC: 103 MMOL/L (ref 98–107)
CO2 SERPL-SCNC: 25 MMOL/L (ref 23–29)
CREAT SERPL-MCNC: 0.73 MG/DL (ref 0.7–1.3)
GFR SERPL CREATININE-BSD FRML MDRD: 77 ML/MIN/1.7M2
GLUCOSE SERPL-MCNC: 112 MG/DL (ref 70–105)
POTASSIUM SERPL-SCNC: 4 MMOL/L (ref 3.5–5.1)
SODIUM SERPL-SCNC: 139 MMOL/L (ref 136–145)

## 2017-12-19 PROCEDURE — 36415 COLL VENOUS BLD VENIPUNCTURE: CPT | Performed by: INTERNAL MEDICINE

## 2017-12-19 PROCEDURE — 80048 BASIC METABOLIC PNL TOTAL CA: CPT | Performed by: INTERNAL MEDICINE

## 2017-12-19 PROCEDURE — 99214 OFFICE O/P EST MOD 30 MIN: CPT | Performed by: PHYSICIAN ASSISTANT

## 2017-12-19 NOTE — PATIENT INSTRUCTIONS
Visit Summary:    Today we discussed:   You appear down to your baseline weight. Continue to watch daily weights at home.  No medication changes today.    Test results:   Results for DARLENE FRIAS (MRN [id number]) as of 12/19/2017 14:06   Ref. Range 12/19/2017 12:52   Sodium Latest Ref Range: 136 - 145 mmol/L 139   Potassium Latest Ref Range: 3.5 - 5.1 mmol/L 4.0   Chloride Latest Ref Range: 98 - 107 mmol/L 103   Carbon Dioxide Latest Ref Range: 23 - 29 mmol/L 25   Urea Nitrogen Latest Ref Range: 7 - 30 mg/dL 18   Creatinine Latest Ref Range: 0.70 - 1.30 mg/dL 0.73   GFR Estimate Latest Ref Range: >60 mL/min/1.7m2 77   GFR Estimate If Black Latest Ref Range: >60 mL/min/1.7m2 >90   Calcium Latest Ref Range: 8.5 - 10.5 mg/dL 10.2   Anion Gap Latest Ref Range: 6 - 17 mmol/L 15       Follow up:   In February with Dr. Guan with the testing he had previously requested.     Please call my nurse Serina at [phone] with any questions or concerns. Scheduling phone number: [phone] if needed.

## 2017-12-19 NOTE — LETTER
12/19/2017    Bess Lion MD  [address]  [facility] [address]    RE: Kenyatta Poweringa       Dear Colleague,    I had the pleasure of seeing Kenyatta Donald in the [facility].      Cardiology Progress Note    Date of Service: 12/19/2017      Reason for visit: hospital follow up for congestive heart failure    Primary cardiologist: Dr. Anderson Guan       HPI:  Ms. 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 She was seen last by Dr. Guan in October and was doing well with the exception of muscle weakness and discomfort due to known chronic muscle disorder. She was moderately hypertensive, and her Toprol XL was increased to 75mg daily at that time. Plans were for follow up with him in 4-6 months for echo and Holter monitor with routine follow up. She was then seen by Patience Weathers NP on 12/1/17 for evaluation after medication adjustment. Her BP had improved slightly and renal function remained stable. However, she was having ongoing lower extremity edema and worsening leg weakness. She remained in afib but was rate controlled at that time. She was referred to the lymphedema clinic for further evaluation.     When she went to the lymphedema clinic, she was noted to have gained weight and was having worsening swelling. Thus, Dr. Guan was contacted and recommendation was to go to the ED for evaluation. She was then hospitalized from 12/5 to 12/7 for diastolic heart failure. She was diuresed with IV lasix, down to her baseline weight, which she tells me is around 190 lbs. She had a repeat echo at that time which showed preserved LVEF of 55-60% which was unchanged, and there was normal LV wall motion. RV was also normal in terms of size and function, and no valvular  abnormalities were identified. Troponins were negative, and her EKG showed continued afib which was rate controlled in the 70s. She was discharged home on an increased dose of lasix (40mg daily) and is here today for hospital follow up.     Since being home, she is feeling better in regard to her breathing. She thinks her ankles are much smaller, and her weight remains stable at 190 today. She weighs daily at home. She continues to sleep in a recliner, but states this is longstanding for her. Her biggest complaint remains muscle weakness and aches. It appears she does not have a regular neurologist to follow this and she is asking how she may get a referral for this; I directed her to her PMD. She has a home nurse checking on her regularly since discharge and overall feels she is doing well. Her renal panel was done today, and her kidney function and electrolytes remain stable on her current diuretics.       ASSESSMENT/PLAN:    1. Acute on chronic diastolic heart failure.   --Back to her baseline in regard to weight and symptoms. Continue 40mg daily of lasix for now. Renal function stable.    --May benefit from [facility] clinic if she has recurrence of heart failure, but appears stable today. Asked her to continue to weigh herself daily at home and contact us with concerns.    --She has possible underlying WALESKA but states she was unable to complete a sleep test in the past and declines repeat testing at this juncture.     2.  Chronic atrial fibrillation, history of tachy-adrianna syndrome.   --Rate controlled, continue current dose Toprol XL.    --Continue Ac with warfarin. She has her INRs monitored in [place].     3. Mitral insufficiency, s/p mitral valve clip 2015.   --Mild gradient across valve leaflets, continues with routine echo surveillance.    --Continue with Abx prophylaxis for dental and surgical procedures per Dr. Guan.     4.  Nonobstructive CAD.   --Last angiogram 12/2014 with 40-50% stenosis in mid RCA  and minimal disease elsewhere.   --No angina.    --Not on ASA as she is on warfarin.   --Continue simvastatin 20mg daily, Last LDL 76 8/2017.     5. Hypertension.   --Relatively well controlled with SBP running 120-130s.     --Continue metoprolol, amlodipine, lisinopril without change.       Follow up plan: With Dr. Anderson Guan in February as previously planned.       Orders this Visit:  No orders of the defined types were placed in this encounter.    No orders of the defined types were placed in this encounter.    There are no discontinued medications.        CURRENT MEDICATIONS:  Current Outpatient Prescriptions   Medication Sig Dispense Refill     furosemide (LASIX) 40 MG tablet Take 1 tablet (40 mg) by mouth daily For heart failure and leg swelling 60 tablet 3     metoprolol (TOPROL-XL) 50 MG 24 hr tablet Take 1.5 tablets (75 mg) by mouth daily 135 tablet 1     ASPIRIN NOT PRESCRIBED (INTENTIONAL) Please choose reason not prescribed, below 1 each 0     order for DME Equipment being ordered: compression hose   BK 20-30mm   2 pair as insurance will pay 1 each 0     allopurinol (ZYLOPRIM) 100 MG tablet TAKE 1 TABLET BY MOUTH EVERY DAY 90 tablet 2     simvastatin (ZOCOR) 40 MG tablet Take 0.5 tablets (20 mg) by mouth At Bedtime 45 tablet 3     levothyroxine (SYNTHROID/LEVOTHROID) 100 MCG tablet Take 1 tablet (100 mcg) by mouth daily 90 tablet 1     amoxicillin (AMOXIL) 500 MG capsule Take 4 capsules by mouth 30 minutes prior to dental work 4 capsule 4     warfarin (COUMADIN) 4 MG tablet Take 1 tablet (4 mg) by mouth daily Taking 4 mg m,w,f & 4mg + 1 mg rest of week 135 tablet 2     amLODIPine (NORVASC) 5 MG tablet Take 1 tablet (5 mg) by mouth 2 times daily 180 tablet 3     lisinopril (PRINIVIL/ZESTRIL) 20 MG tablet Take 1 tablet (20 mg) by mouth 2 times daily 180 tablet 3     cholecalciferol (VITAMIN D) 1000 UNIT tablet Take 2,000 Units by mouth daily        order for DME Equipment being ordered: mastectomy bras  & prostheses   S/po mastectomy of unknown side     C50.919 1 each 0     nystatin (MYCOSTATIN) 164149 UNIT/GM POWD Apply topically 3 times daily as needed 60 g 1     acetaminophen (TYLENOL) 325 MG tablet Take 2 tablets (650 mg) by mouth every 4 hours as needed for mild pain 100 tablet      albuterol (PROAIR HFA, PROVENTIL HFA, VENTOLIN HFA) 108 (90 BASE) MCG/ACT inhaler Inhale 2 puffs into the lungs every 6 hours as needed for shortness of breath / dyspnea or wheezing         ALLERGIES   No Known Allergies    PAST MEDICAL HISTORY:  Past Medical History:   Diagnosis Date     Atrial fibrillation (H)      Benign essential hypertension      Juan Pablo-tachy syndrome (H)      Breast cancer (H)     s/p bilateral mastectomy     CHF (congestive heart failure) (H)      Coronary artery disease involving native coronary artery of native heart without angina pectoris     cardiac cath 2014: 40-50% mid RCA stenosis with minimal other disease     GERD (gastroesophageal reflux disease)      Hypercholesterolaemia      Hypothyroidism      Kidney stone      Mitral valve regurgitation     sp mitral clip 1/9/15     Need for SBE (subacute bacterial endocarditis) prophylaxis      Osteoporosis      Pulmonary embolism (H)      Sleep apnea      Spinal muscular atrophy type III (H)     Dr. Daily, [place] [facility]       PAST SURGICAL HISTORY:  Past Surgical History:   Procedure Laterality Date     ANESTHESIA OUT OF OR PERCUTANEOUS MITRAL VALVE REPAIR N/A 2015    Procedure: ANESTHESIA OUT OF OR PERCUTANEOUS MITRAL VALVE REPAIR;  Surgeon: Generic Anesthesia Provider;  Location: [facility] OR     GYN SURGERY      hysterectomy     MASTECTOMY      bilateral     ORTHOPEDIC SURGERY      knee replacement     PERCUTANEIOUS MITRAL VALVE REPAIR  2015       FAMILY HISTORY:  Family History   Problem Relation Age of Onset     Cancer - colorectal Mother      Alzheimer Disease Mother       age 78     DIABETES Daughter      Asthma Daughter      HEART  DISEASE Father       age 60     Family History Negative Daughter      Unknown/Adopted Maternal Grandmother      Unknown/Adopted Maternal Grandfather      Unknown/Adopted Paternal Grandmother      Unknown/Adopted Paternal Grandfather      Coronary Artery Disease No family hx of      Hypertension No family hx of      Hyperlipidemia No family hx of      CEREBROVASCULAR DISEASE No family hx of      Breast Cancer No family hx of      Colon Cancer No family hx of      Prostate Cancer No family hx of      Other Cancer No family hx of      Depression No family hx of      Anxiety Disorder No family hx of      MENTAL ILLNESS No family hx of      Substance Abuse No family hx of      Anesthesia Reaction No family hx of      OSTEOPOROSIS No family hx of      Genetic Disorder No family hx of      Thyroid Disease No family hx of      Obesity No family hx of        SOCIAL HISTORY:  Social History     Social History     Marital status:      Spouse name: N/A     Number of children: 3     Years of education: N/A     Occupational History     Retired teacher      Social History Main Topics     Smoking status: Never Smoker     Smokeless tobacco: Never Used     Alcohol use 0.0 oz/week     0 Standard drinks or equivalent per week      Comment: has 1 drink a week     Drug use: No     Sexual activity: No     Other Topics Concern     Parent/Sibling W/ Cabg, Mi Or Angioplasty Before 65f 55m? No     Caffeine Concern No     Sleep Concern No     Stress Concern No     Weight Concern Yes     weight loss     Special Diet No     low sodium     Exercise No     PT     Seat Belt Yes     Social History Narrative    , 3 children, lives in a Coop ([employer]), has a living will and wants to be DNR/DNI.   (last updated 2017)        Review of Systems:  Cardiovascular: negative for chest pain, palpitations, pos chronic orthopnea, pos for LE edema but much improved  Constitutional: negative for chills, sweats, fevers. Pos for fatigue,  "muscle aches (chronic)  Resp: Negative for worsening DEWITT, cough, wheezing, hemoptysis  HEENT: Negative for new visual changes, frequent headaches  Gastrointestinal: negative for abdominal pain, diarrhea, blood in stool, nausea, vomiting  Hematologic/lymphatic: pos for current systemic anticoagulation, hx of blood clots  Musculoskeletal: pos for chronic joint pain  Neurological: negative for focal weakness, LOC, seizures, syncope      Physical Exam:  Vitals: /85  Pulse 74  Ht 1.638 m (5' 4.5\")  Wt 86.2 kg (190 lb)  LMP  (LMP Unknown)  BMI 32.11 kg/m2   Wt Readings from Last 4 Encounters:   12/19/17 86.2 kg (190 lb)   12/12/17 84.8 kg (187 lb)   12/07/17 87.8 kg (193 lb 9 oz)   12/01/17 91.9 kg (202 lb 11.2 oz)       GEN:  In general, this is a well nourished  female in no acute distress on room air.  Patient ambulatory with use of rolling walker.   HEENT:  Pupils equal, round. Sclerae nonicteric. Clear oropharynx. Mucous membranes moist.  NECK: Supple, no masses appreciated. Trachea midline. No JVD while upright.  C/V:  Regular rate and rhythm, 1/6 PAUL LSB. No rub or gallop.   RESP: Respirations are unlabored. No use of accessory muscles. Clear to auscultation bilaterally without wheezing, rales, or rhonchi though somewhat diminished in bases posteriorly.  GI: Abdomen soft, nontender, nondistended.   EXTREM: Trace bilateral LE edema. No cyanosis or clubbing.  NEURO: Alert and oriented, cooperative. Gait not formally assessed. No obvious focal deficits.   PSYCH: Normal affect.  SKIN: Warm and dry. No rashes or petechiae appreciated.       Recent Lab Results:  LIPID RESULTS:  Lab Results   Component Value Date    CHOL 154 08/22/2017    HDL 51 08/22/2017    LDL 76 08/22/2017    TRIG 136 08/22/2017       BMP RESULTS:  Lab Results   Component Value Date     12/19/2017    POTASSIUM 4.0 12/19/2017    CHLORIDE 103 12/19/2017    CO2 25 12/19/2017    ANIONGAP 15 12/19/2017     (H) 12/19/2017    " BUN 18 12/19/2017    CR 0.73 12/19/2017    GFRESTIMATED 77 12/19/2017    GFRESTBLACK >90 12/19/2017    VICKIE 10.2 12/19/2017        INR RESULTS:  Lab Results   Component Value Date    INR 2.7 (A) 12/14/2017    INR 1.60 (H) 12/07/2017    INR 1.96 (H) 12/06/2017         New/Pertinent imaging results since last visit:    EKG 12/5/17          Echo 12/6/17  Interpretation Summary     The visual ejection fraction is estimated at 55-60%.  Normal left ventricular wall motion  The right ventricle is normal in size and function.  Mitral clip apparatus present  There is trace to mild mitral regurgitation.  There is mild mitral stenosis.  The mean mitral valve gradient is 5mmHg at HR around 70.  Compared to the prior study, EF unchanged, less MR and TR noted on present  study.  No hemodynamically significant valvular abnormalities. The study was  technically difficult.    Maddie Contreras PA-C  [facility]  Pager [phone]    Thank you for allowing me to participate in the care of your patient.    Sincerely,     CHYNA Hutchinson     [facility]    cc:   Nicci Oliver  [place] [address]

## 2017-12-19 NOTE — MR AVS SNAPSHOT
After Visit Summary   12/19/2017    Kenyatta Frias    MRN: [id number]           Patient Information     Date Of Birth          1935        Visit Information        Provider Department      12/19/2017 1:50 PM Maddie Contreras PA [facility]        Today's Diagnoses     Benign essential hypertension    -  1    Paroxysmal atrial fibrillation (H)          Care Instructions    Visit Summary:    Today we discussed:   You appear down to your baseline weight. Continue to watch daily weights at home.  No medication changes today.    Test results:   Results for KENYATTA FRIAS (MRN [id number]) as of 12/19/2017 14:06   Ref. Range 12/19/2017 12:52   Sodium Latest Ref Range: 136 - 145 mmol/L 139   Potassium Latest Ref Range: 3.5 - 5.1 mmol/L 4.0   Chloride Latest Ref Range: 98 - 107 mmol/L 103   Carbon Dioxide Latest Ref Range: 23 - 29 mmol/L 25   Urea Nitrogen Latest Ref Range: 7 - 30 mg/dL 18   Creatinine Latest Ref Range: 0.70 - 1.30 mg/dL 0.73   GFR Estimate Latest Ref Range: >60 mL/min/1.7m2 77   GFR Estimate If Black Latest Ref Range: >60 mL/min/1.7m2 >90   Calcium Latest Ref Range: 8.5 - 10.5 mg/dL 10.2   Anion Gap Latest Ref Range: 6 - 17 mmol/L 15       Follow up:   In February with Dr. Guan with the testing he had previously requested.     Please call my nurse Serina at [phone] with any questions or concerns. Scheduling phone number: [phone] if needed.                 Follow-ups after your visit        Your next 10 appointments already scheduled     Dec 21, 2017  3:30 PM CST   Anticoagulation Visit with [facility]AGULATION CLINIC   [facility]    [address] [address] [phone]            Feb 01, 2018 12:20 PM CST   LAB with ALCARAZ LAB   [facility] [place] ([facility])    [address] [place] [address]  [address]  [facility] [address]   [phone]           Please do not eat 10-12 hours before your appointment if you are coming in fasting for labs on lipids, cholesterol, or glucose (sugar). This does not apply to pregnant women. Water, hot tea and black coffee (with nothing added) are okay. Do not drink other fluids, diet soda or chew gum.            Feb 01, 2018 12:45 PM CST   Ech Complete with [id number]   [facility] ([facility])    [address]  [facility] [address] [phone]           1. Please bring or wear a comfortable two-piece outfit. 2. You may eat, drink and take your normal medicines. 3. For any questions that cannot be answered, please contact the ordering physician            Feb 01, 2018  2:00 PM CST   Holter Monitor with MALLORIE   [facility] ([facility])    [address]  [facility] [address] [phone]            Feb 16, 2018 10:45 AM CST   Return Visit with Chris Guan MD   [facility] ([facility])    [address]  [facility] [address] [phone]              Who to contact     If you have questions or need follow up information about today's clinic visit or your schedule please contact [facility] directly at [phone].  Normal or non-critical lab and imaging results will be communicated to you by MyChart, letter or phone within 4 business days after the clinic has received the results. If you do not hear from us within 7 days, please contact the clinic through [employer] or phone. If you have a critical or abnormal lab result, we will notify you by phone as soon as possible.  Submit refill requests through [employer] or call your pharmacy and they will forward the refill request to us. Please allow 3 business days for  "your refill to be completed.          Additional Information About Your Visit        [employer] Information     [employer] lets you send messages to your doctor, view your test results, renew your prescriptions, schedule appointments and more. To sign up, go to [email or web address] . Click on \"Log in\" on the left side of the screen, which will take you to the Welcome page. Then click on \"Sign up Now\" on the right side of the page.     You will be asked to enter the access code listed below, as well as some personal information. Please follow the directions to create your username and password.     Your access code is: [email or web address]  Expires: 3/1/2018  3:46 PM     Your access code will  in 90 days. If you need help or a new code, please call your [place] clinic or [phone].        Care EveryWhere ID     This is your Care EveryWhere ID. This could be used by other organizations to access your [place] medical records  [id number]        Your Vitals Were     Pulse Height Last Period BMI (Body Mass Index)          74 1.638 m (5' 4.5\") (LMP Unknown) 32.11 kg/m2         Blood Pressure from Last 3 Encounters:   17 133/85   17 130/78   17 140/61    Weight from Last 3 Encounters:   17 86.2 kg (190 lb)   17 84.8 kg (187 lb)   17 87.8 kg (193 lb 9 oz)              Today, you had the following     No orders found for display       Primary Care Provider Office Phone # Fax #    Bess Lion [phone] [address] [facility] [address]        Equal Access to Services     [facility] : Gina Gonzalez, ronit mirza, tami ferraro, emma akers. So [facility] [phone].    ATENCIÓN: Si habla español, tiene a duenas disposición servicios gratuitos de asistencia lingüística. Llame al [phone].    We comply with applicable federal civil rights laws and [place] laws. We do not discriminate on " the basis of race, color, national origin, age, disability, sex, sexual orientation, or gender identity.            Thank you!     Thank you for choosing [facility]  for your care. Our goal is always to provide you with excellent care. Hearing back from our patients is one way we can continue to improve our services. Please take a few minutes to complete the written survey that you may receive in the mail after your visit with us. Thank you!             Your Updated Medication List - Protect others around you: Learn how to safely use, store and throw away your medicines at www.disposemymeds.org.          This list is accurate as of: 12/19/17  2:13 PM.  Always use your most recent med list.                   Brand Name Dispense Instructions for use Diagnosis    acetaminophen 325 MG tablet    TYLENOL    100 tablet    Take 2 tablets (650 mg) by mouth every 4 hours as needed for mild pain    Closed nondisplaced fracture of left patella, unspecified fracture morphology, initial encounter       albuterol 108 (90 BASE) MCG/ACT Inhaler    PROAIR HFA/PROVENTIL HFA/VENTOLIN HFA     Inhale 2 puffs into the lungs every 6 hours as needed for shortness of breath / dyspnea or wheezing        allopurinol 100 MG tablet    ZYLOPRIM    90 tablet    TAKE 1 TABLET BY MOUTH EVERY DAY    Gout       amLODIPine 5 MG tablet    NORVASC    180 tablet    Take 1 tablet (5 mg) by mouth 2 times daily    Benign essential hypertension       amoxicillin 500 MG capsule    AMOXIL    4 capsule    Take 4 capsules by mouth 30 minutes prior to dental work    Mitral valve insufficiency       ASPIRIN NOT PRESCRIBED    INTENTIONAL    1 each    Please choose reason not prescribed, below        cholecalciferol 1000 UNIT tablet    vitamin D3     Take 2,000 Units by mouth daily        furosemide 40 MG tablet    LASIX    60 tablet    Take 1 tablet (40 mg) by mouth daily For heart failure and leg swelling    Acute on chronic  diastolic congestive heart failure (H)       levothyroxine 100 MCG tablet    SYNTHROID/LEVOTHROID    90 tablet    Take 1 tablet (100 mcg) by mouth daily    Acquired hypothyroidism       lisinopril 20 MG tablet    PRINIVIL/ZESTRIL    180 tablet    Take 1 tablet (20 mg) by mouth 2 times daily    Essential hypertension, benign       metoprolol 50 MG 24 hr tablet    TOPROL-XL    135 tablet    Take 1.5 tablets (75 mg) by mouth daily    Coronary artery disease involving native coronary artery of native heart without angina pectoris       nystatin 219972 UNIT/GM Powd    MYCOSTATIN    60 g    Apply topically 3 times daily as needed    Intertrigo       * order for DME     1 each    Equipment being ordered: mastectomy bras & prostheses  S/po mastectomy of unknown side     C50.919    Malignant neoplasm of female breast, unspecified laterality, unspecified site of breast       * order for DME     1 each    Equipment being ordered: compression hose  BK 20-30mm  2 pair as insurance will pay    Lymphedema of both lower extremities       simvastatin 40 MG tablet    ZOCOR    45 tablet    Take 0.5 tablets (20 mg) by mouth At Bedtime    Mixed hyperlipidemia       warfarin 4 MG tablet    COUMADIN    135 tablet    Take 1 tablet (4 mg) by mouth daily Taking 4 mg m,w,f & 4mg + 1 mg rest of week    Atrial fibrillation, unspecified type (H), History of pulmonary embolism       * Notice:  This list has 2 medication(s) that are the same as other medications prescribed for you. Read the directions carefully, and ask your doctor or other care provider to review them with you.

## 2017-12-19 NOTE — PROGRESS NOTES
Cardiology Progress Note    Date of Service: 12/19/2017      Reason for visit: hospital follow up for congestive heart failure    Primary cardiologist: Dr. Anderson Guan       HPI:  Ms. 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 She was seen last by Dr. Guan in October and was doing well with the exception of muscle weakness and discomfort due to known chronic muscle disorder. She was moderately hypertensive, and her Toprol XL was increased to 75mg daily at that time. Plans were for follow up with him in 4-6 months for echo and Holter monitor with routine follow up. She was then seen by Patience Weathers NP on 12/1/17 for evaluation after medication adjustment. Her BP had improved slightly and renal function remained stable. However, she was having ongoing lower extremity edema and worsening leg weakness. She remained in afib but was rate controlled at that time. She was referred to the lymphedema clinic for further evaluation.     When she went to the lymphedema clinic, she was noted to have gained weight and was having worsening swelling. Thus, Dr. Guan was contacted and recommendation was to go to the ED for evaluation. She was then hospitalized from 12/5 to 12/7 for diastolic heart failure. She was diuresed with IV lasix, down to her baseline weight, which she tells me is around 190 lbs. She had a repeat echo at that time which showed preserved LVEF of 55-60% which was unchanged, and there was normal LV wall motion. RV was also normal in terms of size and function, and no valvular abnormalities were identified. Troponins were negative, and her EKG showed continued afib which was rate controlled in the 70s. She was discharged home on an increased dose of lasix (40mg daily) and is here today for hospital  follow up.     Since being home, she is feeling better in regard to her breathing. She thinks her ankles are much smaller, and her weight remains stable at 190 today. She weighs daily at home. She continues to sleep in a recliner, but states this is longstanding for her. Her biggest complaint remains muscle weakness and aches. It appears she does not have a regular neurologist to follow this and she is asking how she may get a referral for this; I directed her to her PMD. She has a home nurse checking on her regularly since discharge and overall feels she is doing well. Her renal panel was done today, and her kidney function and electrolytes remain stable on her current diuretics.       ASSESSMENT/PLAN:    1. Acute on chronic diastolic heart failure.   --Back to her baseline in regard to weight and symptoms. Continue 40mg daily of lasix for now. Renal function stable.    --May benefit from [facility] clinic if she has recurrence of heart failure, but appears stable today. Asked her to continue to weigh herself daily at home and contact us with concerns.    --She has possible underlying WALESKA but states she was unable to complete a sleep test in the past and declines repeat testing at this juncture.     2.  Chronic atrial fibrillation, history of tachy-adrianna syndrome.   --Rate controlled, continue current dose Toprol XL.    --Continue Ac with warfarin. She has her INRs monitored in [place].     3. Mitral insufficiency, s/p mitral valve clip 2015.   --Mild gradient across valve leaflets, continues with routine echo surveillance.    --Continue with Abx prophylaxis for dental and surgical procedures per Dr. Guan.     4.  Nonobstructive CAD.   --Last angiogram 12/2014 with 40-50% stenosis in mid RCA and minimal disease elsewhere.   --No angina.    --Not on ASA as she is on warfarin.   --Continue simvastatin 20mg daily, Last LDL 76 8/2017.     5. Hypertension.   --Relatively well controlled with SBP running 120-130s.      --Continue metoprolol, amlodipine, lisinopril without change.       Follow up plan: With Dr. Anedrson Guan in February as previously planned.       Orders this Visit:  No orders of the defined types were placed in this encounter.    No orders of the defined types were placed in this encounter.    There are no discontinued medications.        CURRENT MEDICATIONS:  Current Outpatient Prescriptions   Medication Sig Dispense Refill     furosemide (LASIX) 40 MG tablet Take 1 tablet (40 mg) by mouth daily For heart failure and leg swelling 60 tablet 3     metoprolol (TOPROL-XL) 50 MG 24 hr tablet Take 1.5 tablets (75 mg) by mouth daily 135 tablet 1     ASPIRIN NOT PRESCRIBED (INTENTIONAL) Please choose reason not prescribed, below 1 each 0     order for DME Equipment being ordered: compression hose   BK 20-30mm   2 pair as insurance will pay 1 each 0     allopurinol (ZYLOPRIM) 100 MG tablet TAKE 1 TABLET BY MOUTH EVERY DAY 90 tablet 2     simvastatin (ZOCOR) 40 MG tablet Take 0.5 tablets (20 mg) by mouth At Bedtime 45 tablet 3     levothyroxine (SYNTHROID/LEVOTHROID) 100 MCG tablet Take 1 tablet (100 mcg) by mouth daily 90 tablet 1     amoxicillin (AMOXIL) 500 MG capsule Take 4 capsules by mouth 30 minutes prior to dental work 4 capsule 4     warfarin (COUMADIN) 4 MG tablet Take 1 tablet (4 mg) by mouth daily Taking 4 mg m,w,f & 4mg + 1 mg rest of week 135 tablet 2     amLODIPine (NORVASC) 5 MG tablet Take 1 tablet (5 mg) by mouth 2 times daily 180 tablet 3     lisinopril (PRINIVIL/ZESTRIL) 20 MG tablet Take 1 tablet (20 mg) by mouth 2 times daily 180 tablet 3     cholecalciferol (VITAMIN D) 1000 UNIT tablet Take 2,000 Units by mouth daily        order for DME Equipment being ordered: mastectomy bras & prostheses   S/po mastectomy of unknown side     C50.919 1 each 0     nystatin (MYCOSTATIN) 720250 UNIT/GM POWD Apply topically 3 times daily as needed 60 g 1     acetaminophen (TYLENOL) 325 MG tablet Take 2 tablets (650  mg) by mouth every 4 hours as needed for mild pain 100 tablet      albuterol (PROAIR HFA, PROVENTIL HFA, VENTOLIN HFA) 108 (90 BASE) MCG/ACT inhaler Inhale 2 puffs into the lungs every 6 hours as needed for shortness of breath / dyspnea or wheezing         ALLERGIES   No Known Allergies    PAST MEDICAL HISTORY:  Past Medical History:   Diagnosis Date     Atrial fibrillation (H)      Benign essential hypertension      Juan Pablo-tachy syndrome (H)      Breast cancer (H)     s/p bilateral mastectomy     CHF (congestive heart failure) (H)      Coronary artery disease involving native coronary artery of native heart without angina pectoris     cardiac cath 2014: 40-50% mid RCA stenosis with minimal other disease     GERD (gastroesophageal reflux disease)      Hypercholesterolaemia      Hypothyroidism      Kidney stone      Mitral valve regurgitation     sp mitral clip 1/9/15     Need for SBE (subacute bacterial endocarditis) prophylaxis      Osteoporosis      Pulmonary embolism (H)      Sleep apnea      Spinal muscular atrophy type III (H)     Dr. Daily, [facility]       PAST SURGICAL HISTORY:  Past Surgical History:   Procedure Laterality Date     ANESTHESIA OUT OF OR PERCUTANEOUS MITRAL VALVE REPAIR N/A 2015    Procedure: ANESTHESIA OUT OF OR PERCUTANEOUS MITRAL VALVE REPAIR;  Surgeon: Generic Anesthesia Provider;  Location: [facility] OR     GYN SURGERY      hysterectomy     MASTECTOMY      bilateral     ORTHOPEDIC SURGERY      knee replacement     PERCUTANEIOUS MITRAL VALVE REPAIR  2015       FAMILY HISTORY:  Family History   Problem Relation Age of Onset     Cancer - colorectal Mother      Alzheimer Disease Mother       age 78     DIABETES Daughter      Asthma Daughter      HEART DISEASE Father       age 60     Family History Negative Daughter      Unknown/Adopted Maternal Grandmother      Unknown/Adopted Maternal Grandfather      Unknown/Adopted Paternal Grandmother      Unknown/Adopted Paternal  Grandfather      Coronary Artery Disease No family hx of      Hypertension No family hx of      Hyperlipidemia No family hx of      CEREBROVASCULAR DISEASE No family hx of      Breast Cancer No family hx of      Colon Cancer No family hx of      Prostate Cancer No family hx of      Other Cancer No family hx of      Depression No family hx of      Anxiety Disorder No family hx of      MENTAL ILLNESS No family hx of      Substance Abuse No family hx of      Anesthesia Reaction No family hx of      OSTEOPOROSIS No family hx of      Genetic Disorder No family hx of      Thyroid Disease No family hx of      Obesity No family hx of        SOCIAL HISTORY:  Social History     Social History     Marital status:      Spouse name: N/A     Number of children: 3     Years of education: N/A     Occupational History     Retired teacher      Social History Main Topics     Smoking status: Never Smoker     Smokeless tobacco: Never Used     Alcohol use 0.0 oz/week     0 Standard drinks or equivalent per week      Comment: has 1 drink a week     Drug use: No     Sexual activity: No     Other Topics Concern     Parent/Sibling W/ Cabg, Mi Or Angioplasty Before 65f 55m? No     Caffeine Concern No     Sleep Concern No     Stress Concern No     Weight Concern Yes     weight loss     Special Diet No     low sodium     Exercise No     PT     Seat Belt Yes     Social History Narrative    , 3 children, lives in a Coop ([employer]), has a living will and wants to be DNR/DNI.   (last updated 12/5/2017)        Review of Systems:  Cardiovascular: negative for chest pain, palpitations, pos chronic orthopnea, pos for LE edema but much improved  Constitutional: negative for chills, sweats, fevers. Pos for fatigue, muscle aches (chronic)  Resp: Negative for worsening DEWITT, cough, wheezing, hemoptysis  HEENT: Negative for new visual changes, frequent headaches  Gastrointestinal: negative for abdominal pain, diarrhea, blood in stool,  "nausea, vomiting  Hematologic/lymphatic: pos for current systemic anticoagulation, hx of blood clots  Musculoskeletal: pos for chronic joint pain  Neurological: negative for focal weakness, LOC, seizures, syncope      Physical Exam:  Vitals: /85  Pulse 74  Ht 1.638 m (5' 4.5\")  Wt 86.2 kg (190 lb)  LMP  (LMP Unknown)  BMI 32.11 kg/m2   Wt Readings from Last 4 Encounters:   12/19/17 86.2 kg (190 lb)   12/12/17 84.8 kg (187 lb)   12/07/17 87.8 kg (193 lb 9 oz)   12/01/17 91.9 kg (202 lb 11.2 oz)       GEN:  In general, this is a well nourished  female in no acute distress on room air.  Patient ambulatory with use of rolling walker.   HEENT:  Pupils equal, round. Sclerae nonicteric. Clear oropharynx. Mucous membranes moist.  NECK: Supple, no masses appreciated. Trachea midline. No JVD while upright.  C/V:  Regular rate and rhythm, 1/6 PAUL LSB. No rub or gallop.   RESP: Respirations are unlabored. No use of accessory muscles. Clear to auscultation bilaterally without wheezing, rales, or rhonchi though somewhat diminished in bases posteriorly.  GI: Abdomen soft, nontender, nondistended.   EXTREM: Trace bilateral LE edema. No cyanosis or clubbing.  NEURO: Alert and oriented, cooperative. Gait not formally assessed. No obvious focal deficits.   PSYCH: Normal affect.  SKIN: Warm and dry. No rashes or petechiae appreciated.       Recent Lab Results:  LIPID RESULTS:  Lab Results   Component Value Date    CHOL 154 08/22/2017    HDL 51 08/22/2017    LDL 76 08/22/2017    TRIG 136 08/22/2017       BMP RESULTS:  Lab Results   Component Value Date     12/19/2017    POTASSIUM 4.0 12/19/2017    CHLORIDE 103 12/19/2017    CO2 25 12/19/2017    ANIONGAP 15 12/19/2017     (H) 12/19/2017    BUN 18 12/19/2017    CR 0.73 12/19/2017    GFRESTIMATED 77 12/19/2017    GFRESTBLACK >90 12/19/2017    VICKIE 10.2 12/19/2017        INR RESULTS:  Lab Results   Component Value Date    INR 2.7 (A) 12/14/2017    INR 1.60 " (H) 12/07/2017    INR 1.96 (H) 12/06/2017         New/Pertinent imaging results since last visit:    EKG 12/5/17          Echo 12/6/17  Interpretation Summary     The visual ejection fraction is estimated at 55-60%.  Normal left ventricular wall motion  The right ventricle is normal in size and function.  Mitral clip apparatus present  There is trace to mild mitral regurgitation.  There is mild mitral stenosis.  The mean mitral valve gradient is 5mmHg at HR around 70.  Compared to the prior study, EF unchanged, less MR and TR noted on present  study.  No hemodynamically significant valvular abnormalities. The study was  technically difficult.        Maddie Contreras PA-C  [facility]  Pager [phone]

## 2017-12-20 ENCOUNTER — CARE COORDINATION (OUTPATIENT)
Dept: CARE COORDINATION | Facility: CLINIC | Age: 82
End: 2017-12-20

## 2017-12-20 ENCOUNTER — TELEPHONE (OUTPATIENT)
Dept: FAMILY MEDICINE | Facility: CLINIC | Age: 82
End: 2017-12-20

## 2017-12-20 NOTE — PROGRESS NOTES
Clinic Care Coordination Contact  Care Team Conversations    Per home care patient continues with nursing for INR draws and OT for home safety and ADLs.    Clinic care coordinator will continue to follow and collaborate with home care.     Ana Benjamin RN, CCM - Care Coordinator     12/20/2017    1:45 PM  [phone]

## 2017-12-20 NOTE — TELEPHONE ENCOUNTER
Reason for Call:  Form, our goal is to have forms completed with 72 hours, however, some forms may require a visit or additional information.    Type of letter, form or note:  medical    Who is the form from?: Home care    Where did the form come from: form was faxed in    What clinic location was the form placed at?: [facility]    Where the form was placed: 's Box: Bess Lion MD    What number is listed as a contact on the form?: [phone]       Additional comments: [facility] PT 2 w 2, 1 w 1; OT 1 w 3: SN INR 2.7 from 12/14/17    Call taken on 12/20/2017 at 4:53 PM by Suzanna More

## 2017-12-21 ENCOUNTER — ANTICOAGULATION THERAPY VISIT (OUTPATIENT)
Dept: NURSING | Facility: CLINIC | Age: 82
End: 2017-12-21
Payer: COMMERCIAL

## 2017-12-21 LAB — INR POINT OF CARE: 2.7 (ref 0.86–1.14)

## 2017-12-21 PROCEDURE — 99207 ZZC NO CHARGE NURSE ONLY: CPT

## 2017-12-21 PROCEDURE — 85610 PROTHROMBIN TIME: CPT | Mod: QW

## 2017-12-21 PROCEDURE — 36416 COLLJ CAPILLARY BLOOD SPEC: CPT

## 2017-12-21 NOTE — MR AVS SNAPSHOT
Kenyatta Donald   12/21/2017 3:30 PM   Anticoagulation Therapy Visit    Description:  82 year old female   Provider:   ANTICOAGULATION CLINIC   Department:  [facility]           INR as of 12/21/2017     Today's INR 2.7      Anticoagulation Summary as of 12/21/2017     INR goal 2.5-3.5    Today's INR 2.7    Full instructions 4 mg on Mon, Wed, Fri; 5 mg all other days    Next INR check 12/28/2017      Description     Dosing to [facility] [phone]      Your next Anticoagulation Clinic appointment(s)     Dec 28, 2017  3:30 PM CST   Anticoagulation Visit with  ANTICOAGULATION CLINIC   [facility]    [address] [address] [phone]              Contact Numbers     [facility]  Please call  [phone] to cancel and/or reschedule your appointment   The direct line to the anticoagulant nurse is [phone] on Monday, Wednesday, and Friday. On Thursday, the anticoagulant nurse can be reached directly at [phone].         December 2017 Details    Sun Mon Tue Wed Thu Fri Sat          1               2                 3               4               5               6               7               8               9                 10               11               12               13               14               15               16                 17               18               19               20               21      5 mg   See details      22      4 mg         23      5 mg           24      5 mg         25      4 mg         26      5 mg         27      4 mg         28            29               30                 31                      Date Details   12/21 This INR check       Date of next INR:  12/28/2017         How to take your warfarin dose     To take:  4 mg Take 1 of the 4 mg tablets.    To take:  5 mg Take 1 of the 4 mg tablets and 1 of the 1 mg tablets.

## 2017-12-21 NOTE — PROGRESS NOTES
ANTICOAGULATION FOLLOW-UP CLINIC VISIT    Patient Name:  Kenyatta Donald  Date:  12/21/2017  Contact Type:  Face to Face    SUBJECTIVE:     Patient Findings     Positives No Problem Findings           OBJECTIVE    INR Protime   Date Value Ref Range Status   12/21/2017 2.7 (A) 0.86 - 1.14 Final       ASSESSMENT / PLAN  INR assessment THER    Recheck INR In: 1 WEEK    INR Location [facility]R      Anticoagulation Summary as of 12/21/2017     INR goal 2.5-3.5    Today's INR 2.7    Maintenance plan 4 mg (4 mg x 1) on Mon, Wed, Fri; 5 mg (4 mg x 1 and 1 mg x 1) all other days    Full instructions 4 mg on Mon, Wed, Fri; 5 mg all other days    Weekly total 32 mg    Plan last modified Nancy Zavala RN (12/14/2017)    Next INR check 12/28/2017    Target end date       Anticoagulation Episode Summary     INR check location     Preferred lab     Send INR reminders to [facility] INR/PROTIME    Comments       Anticoagulation Care Providers     Provider Role Specialty Phone number    Bess Lion Mirna Pyle MD [facility] [phone]            See the Encounter Report to view Anticoagulation Flowsheet and Dosing Calendar (Go to Encounters tab in chart review, and find the Anticoagulation Therapy Visit)        Nancy Zavala RN               ANTICOAGULATION FOLLOW-UP CLINIC VISIT    Patient Name:  Kenyatta Donald  Date:  12/21/2017  Contact Type:  Telephone    SUBJECTIVE:     Patient Findings     Positives No Problem Findings           OBJECTIVE    INR Protime   Date Value Ref Range Status   12/21/2017 2.7 (A) 0.86 - 1.14 Final       ASSESSMENT / PLAN  INR assessment THER    Recheck INR In: 1 WEEK    INR Location [facility]      Anticoagulation Summary as of 12/21/2017     INR goal 2.5-3.5    Today's INR 2.7    Maintenance plan 4 mg (4 mg x 1) on Mon, Wed, Fri; 5 mg (4 mg x 1 and 1 mg x 1) all other days    Full instructions 4 mg on Mon, Wed, Fri; 5 mg all other days    Weekly total 32 mg    Plan last  modified Nancy Zavala RN (12/14/2017)    Next INR check 12/28/2017    Target end date       Anticoagulation Episode Summary     INR check location     Preferred lab     Send INR reminders to [facility] INR/PROTIME    Comments       Anticoagulation Care Providers     Provider Role Specialty Phone number    Bess Lion MD [facility] [phone]            See the Encounter Report to view Anticoagulation Flowsheet and Dosing Calendar (Go to Encounters tab in chart review, and find the Anticoagulation Therapy Visit)        Nancy Zavala RN               ANTICOAGULATION FOLLOW-UP CLINIC VISIT    Patient Name:  Kenyatta Donald  Date:  12/21/2017  Contact Type:  Telephone    SUBJECTIVE:     Patient Findings     Positives No Problem Findings           OBJECTIVE    INR Protime   Date Value Ref Range Status   12/21/2017 2.7 (A) 0.86 - 1.14 Final       ASSESSMENT / PLAN  INR assessment THER    Recheck INR In: 1 WEEK    INR Location [facility]R      Anticoagulation Summary as of 12/21/2017     INR goal 2.5-3.5    Today's INR 2.7    Maintenance plan 4 mg (4 mg x 1) on Mon, Wed, Fri; 5 mg (4 mg x 1 and 1 mg x 1) all other days    Full instructions 4 mg on Mon, Wed, Fri; 5 mg all other days    Weekly total 32 mg    Plan last modified Nancy Zavala RN (12/14/2017)    Next INR check 12/28/2017    Target end date       Anticoagulation Episode Summary     INR check location     Preferred lab     Send INR reminders to [facility] INR/PROTIME    Comments       Anticoagulation Care Providers     Provider Role Specialty Phone number    Bess Lion MD Responsible [facility] [phone]            See the Encounter Report to view Anticoagulation Flowsheet and Dosing Calendar (Go to Encounters tab in chart review, and find the Anticoagulation Therapy Visit)        Nancy Zavala RN

## 2017-12-27 ENCOUNTER — TELEPHONE (OUTPATIENT)
Dept: FAMILY MEDICINE | Facility: CLINIC | Age: 82
End: 2017-12-27

## 2017-12-27 NOTE — TELEPHONE ENCOUNTER
Reason for Call:  Form, our goal is to have forms completed with 72 hours, however, some forms may require a visit or additional information.    Type of letter, form or note:  medical    Who is the form from?: Home care    Where did the form come from: form was faxed in    What clinic location was the form placed at?: [facility]    Where the form was placed: 's Box: Bess Lion MD    What number is listed as a contact on the form?: [phone]       Additional comments: [facility] SN INR 2.7 from 12/21/17    Call taken on 12/27/2017 at 1:18 PM by Suzanna More

## 2018-01-03 ENCOUNTER — TELEPHONE (OUTPATIENT)
Dept: FAMILY MEDICINE | Facility: CLINIC | Age: 83
End: 2018-01-03

## 2018-01-03 NOTE — TELEPHONE ENCOUNTER
Reason for Call:  Form, our goal is to have forms completed with 72 hours, however, some forms may require a visit or additional information.    Type of letter, form or note:  medical    Who is the form from?: Home care    Where did the form come from: form was faxed in    What clinic location was the form placed at?: [facility]    Where the form was placed: 's Box: Bess Lion MD    What number is listed as a contact on the form?: [phone]       Additional comments: [facility]  SN INR 2.9 from 12/28/17    Call taken on 1/3/2018 at 3:20 PM by Suzanna More

## 2018-01-04 ENCOUNTER — TELEPHONE (OUTPATIENT)
Dept: FAMILY MEDICINE | Facility: CLINIC | Age: 83
End: 2018-01-04

## 2018-01-04 NOTE — TELEPHONE ENCOUNTER
Reason for Call:  Form, our goal is to have forms completed with 72 hours, however, some forms may require a visit or additional information.    Type of letter, form or note:  medical    Who is the form from?: Home care    Where did the form come from: form was faxed in    What clinic location was the form placed at?: [facility]    Where the form was placed: 's Box: Bess Lion MD    What number is listed as a contact on the form?: [phone]       Additional comments: [facility] SN INR 2.9 from 12/28/17    Call taken on 1/4/2018 at 3:31 PM by Suzanna More

## 2018-01-10 ENCOUNTER — TELEPHONE (OUTPATIENT)
Dept: FAMILY MEDICINE | Facility: CLINIC | Age: 83
End: 2018-01-10

## 2018-01-10 NOTE — TELEPHONE ENCOUNTER
Reason for Call:  Form, our goal is to have forms completed with 72 hours, however, some forms may require a visit or additional information.    Type of letter, form or note:  medical    Who is the form from?: Home care    Where did the form come from: form was faxed in    What clinic location was the form placed at?: [facility]    Where the form was placed: 's Box: Bess Lion MD    What number is listed as a contact on the form?: [phone]       Additional comments: [facility] 12/9/17 to 2/6/18    Call taken on 1/10/2018 at 3:55 PM by Suzanna More

## 2018-01-10 NOTE — TELEPHONE ENCOUNTER
Reason for Call:  Form, our goal is to have forms completed with 72 hours, however, some forms may require a visit or additional information.    Type of letter, form or note:  medical    Who is the form from?: Home care    Where did the form come from: form was faxed in    What clinic location was the form placed at?: [facility]    Where the form was placed: 's Box: Bess Lion MD    What number is listed as a contact on the form?: [phone]       Additional comments: [facility] SN INR 3.9 from 1/4/18    Call taken on 1/10/2018 at 3:53 PM by Suzanna More

## 2018-01-11 ENCOUNTER — ANTICOAGULATION THERAPY VISIT (OUTPATIENT)
Dept: ANTICOAGULATION | Facility: CLINIC | Age: 83
End: 2018-01-11
Payer: COMMERCIAL

## 2018-01-11 DIAGNOSIS — Z53.9 DIAGNOSIS NOT YET DEFINED: Primary | ICD-10-CM

## 2018-01-11 DIAGNOSIS — I50.33 ACUTE ON CHRONIC DIASTOLIC CONGESTIVE HEART FAILURE (H): ICD-10-CM

## 2018-01-11 DIAGNOSIS — Z79.01 LONG-TERM (CURRENT) USE OF ANTICOAGULANTS: ICD-10-CM

## 2018-01-11 LAB — INR POINT OF CARE: 2.3 (ref 0.86–1.14)

## 2018-01-11 PROCEDURE — 85610 PROTHROMBIN TIME: CPT | Mod: QW

## 2018-01-11 PROCEDURE — 36416 COLLJ CAPILLARY BLOOD SPEC: CPT

## 2018-01-11 PROCEDURE — G0180 MD CERTIFICATION HHA PATIENT: HCPCS | Performed by: FAMILY MEDICINE

## 2018-01-11 NOTE — MR AVS SNAPSHOT
Kenyatta Donald   1/11/2018 2:00 PM   Anticoagulation Therapy Visit    Description:  82 year old female   Provider:   ANTICOAGULATION CLINIC   Department:   Anti Coagulation           INR as of 1/11/2018     Today's INR 2.3!      Anticoagulation Summary as of 1/11/2018     INR goal 2.5-3.5    Today's INR 2.3!    Full instructions 1/11: 5 mg; Otherwise 5 mg on Mon, Wed, Fri; 4 mg all other days    Next INR check 1/25/2018      Your next Anticoagulation Clinic appointment(s)     Jan 25, 2018  2:00 PM CST   Anticoagulation Visit with  ANTICOAGULATION CLINIC   [facility] ([facility]    [address] [address] [phone]              Contact Numbers     [facility]  Please call  [phone] to cancel and/or reschedule your appointment   Please call  [phone] with any problems or questions regarding your therapy.        January 2018 Details    Sun Mon Tue Wed Thu Fri Sat      1               2               3               4               5               6                 7               8               9               10               11      5 mg   See details      12      5 mg         13      4 mg           14      4 mg         15      5 mg         16      4 mg         17      5 mg         18      4 mg         19      5 mg         20      4 mg           21      4 mg         22      5 mg         23      4 mg         24      5 mg         25            26               27                 28               29               30               31                   Date Details   01/11 This INR check       Date of next INR:  1/25/2018         How to take your warfarin dose     To take:  4 mg Take 1 of the 4 mg tablets.    To take:  5 mg Take 1 of the 4 mg tablets and 1 of the 1 mg tablets.

## 2018-01-11 NOTE — PROGRESS NOTES
ANTICOAGULATION FOLLOW-UP CLINIC VISIT    Patient Name:  Kenyatta Donald  Date:  1/11/2018  Contact Type:  Face to Face    SUBJECTIVE:        OBJECTIVE    INR Protime   Date Value Ref Range Status   01/11/2018 2.3 (A) 0.86 - 1.14 Final       ASSESSMENT / PLAN  INR assessment SUB    Recheck INR In: 2 WEEKS    INR Location Clinic      Anticoagulation Summary as of 1/11/2018     INR goal 2.5-3.5   Today's INR 2.3!   Maintenance plan 5 mg (4 mg x 1 and 1 mg x 1) on Mon, Wed, Fri; 4 mg (4 mg x 1) all other days   Full instructions 1/11: 5 mg; Otherwise 5 mg on Mon, Wed, Fri; 4 mg all other days   Weekly total 31 mg   Plan last modified Nancy Zavala RN (12/28/2017)   Next INR check 1/25/2018   Priority INR   Target end date     Indications   Long-term (current) use of anticoagulants [Z79.01] [Z79.01]  Mitral valve disorder s/po 1-9-15 remodeling percut  + clip  (Resolved) [I05.9]         Anticoagulation Episode Summary     INR check location     Preferred lab     Send INR reminders to [facility]    Comments       Anticoagulation Care Providers     Provider Role Specialty Phone number    Bess Lion Mirna Pyle MD [facility] [phone]            See the Encounter Report to view Anticoagulation Flowsheet and Dosing Calendar (Go to Encounters tab in chart review, and find the Anticoagulation Therapy Visit)        Monique Brito RN

## 2018-01-12 ENCOUNTER — CARE COORDINATION (OUTPATIENT)
Dept: CARE COORDINATION | Facility: CLINIC | Age: 83
End: 2018-01-12

## 2018-01-12 RX ORDER — FUROSEMIDE 40 MG
TABLET ORAL
Qty: 90 TABLET | Refills: 1 | Status: SHIPPED | OUTPATIENT
Start: 2018-01-12 | End: 2018-07-15

## 2018-01-12 NOTE — TELEPHONE ENCOUNTER
"Requested Prescriptions   Pending Prescriptions Disp Refills     furosemide (LASIX) 40 MG tablet [Pharmacy Med Name: FUROSEMIDE 40MG TABLETS]  Last Written Prescription Date:  12/7/2017  Last Fill Quantity: 60,  # refills: 3   Last Office Visit  12/12/2017        with  [facility] or [facility] prescribing provider:     Future Office Visit:    Next 5 appointments (look out 90 days)     Feb 16, 2018 10:45 AM CST   Return Visit with Chris Guan MD   [facility]    [address] [place] [id number] [phone]                    90 tablet 0     Sig: TAKE 1 TABLET BY MOUTH DAILY    Diuretics (Including Combos) Protocol Passed    1/12/2018 12:14 PM       Passed - Blood pressure under 140/90    BP Readings from Last 3 Encounters:   12/19/17 133/85   12/12/17 130/78   12/07/17 140/61                Passed - Recent or future visit with authorizing provider's specialty    Patient had office visit in the last year or has a visit in the next 30 days with authorizing provider.  See \"Patient Info\" tab in inbasket, or \"Choose Columns\" in Meds & Orders section of the refill encounter.              Passed - Patient is age 18 or older       Passed - No active pregancy on record       Passed - Normal serum creatinine on file in past 12 months    Recent Labs   Lab Test  12/19/17   1252   CR  0.73             Passed - Normal serum potassium on file in past 12 months    Recent Labs   Lab Test  12/19/17   1252   POTASSIUM  4.0                   Passed - Normal serum sodium on file in past 12 months    Recent Labs   Lab Test  12/19/17   1252   NA  139             Passed - No positive pregnancy test in past 12 months        Prescription approved per [facility] Refill Protocol.    "

## 2018-01-12 NOTE — PROGRESS NOTES
Clinic Care Coordination Contact  Care Team Conversations    Clinic care coordinator received call from  home care , Jessica ([phone]).    Jessica states she closed patient to home care services.  Patient is doing well in her home. The open area on her coccyx has healed. She is weighing daily.    Patient has hired a private pay PCA through [employer] to come and stay during the night. PCA will then take her to appointments, grocery shopping etc in the mornings.      Clinic care coordinator will follow up with patient in a few weeks.     Ana Benjamin RN, CCM - Care Coordinator     1/12/2018    7:37 AM  [phone]

## 2018-01-18 ENCOUNTER — PRE VISIT (OUTPATIENT)
Dept: CARDIOLOGY | Facility: CLINIC | Age: 83
End: 2018-01-18

## 2018-01-25 ENCOUNTER — ANTICOAGULATION THERAPY VISIT (OUTPATIENT)
Dept: ANTICOAGULATION | Facility: CLINIC | Age: 83
End: 2018-01-25
Payer: COMMERCIAL

## 2018-01-25 DIAGNOSIS — Z79.01 LONG-TERM (CURRENT) USE OF ANTICOAGULANTS: ICD-10-CM

## 2018-01-25 LAB — INR POINT OF CARE: 3.9 (ref 0.86–1.14)

## 2018-01-25 PROCEDURE — 85610 PROTHROMBIN TIME: CPT | Mod: QW

## 2018-01-25 PROCEDURE — 36416 COLLJ CAPILLARY BLOOD SPEC: CPT

## 2018-01-25 NOTE — PROGRESS NOTES
ANTICOAGULATION FOLLOW-UP CLINIC VISIT    Patient Name:  Kenyatta Donald  Date:  1/25/2018  Contact Type:  Face to Face    SUBJECTIVE:     Patient Findings     Positives No Problem Findings           OBJECTIVE    INR Protime   Date Value Ref Range Status   01/25/2018 3.9 (A) 0.86 - 1.14 Final       ASSESSMENT / PLAN  INR assessment THER    Recheck INR In: 2 WEEKS    INR Location Clinic      Anticoagulation Summary as of 1/25/2018     INR goal 2.5-3.5   Today's INR 3.9!   Maintenance plan 5 mg (4 mg x 1 and 1 mg x 1) on Mon, Wed, Fri; 4 mg (4 mg x 1) all other days   Full instructions 1/25: 2 mg; Otherwise 5 mg on Mon, Wed, Fri; 4 mg all other days   Weekly total 31 mg   Plan last modified Nancy Zavala RN (12/28/2017)   Next INR check 2/8/2018   Priority INR   Target end date     Indications   Long-term (current) use of anticoagulants [Z79.01] [Z79.01]  Mitral valve disorder s/po 1-9-15 remodeling percut  + clip  (Resolved) [I05.9]         Anticoagulation Episode Summary     INR check location     Preferred lab     Send INR reminders to  [facility]    Comments       Anticoagulation Care Providers     Provider Role Specialty Phone number    Waqas Lionanahi Pyle MD [facility] [phone]            See the Encounter Report to view Anticoagulation Flowsheet and Dosing Calendar (Go to Encounters tab in chart review, and find the Anticoagulation Therapy Visit)          Monique Brito RN

## 2018-01-25 NOTE — MR AVS SNAPSHOT
Kenyatta Donald   1/25/2018 2:00 PM   Anticoagulation Therapy Visit    Description:  82 year old female   Provider:   ANTICOAGULATION CLINIC   Department:   Anti Coagulation           INR as of 1/25/2018     Today's INR 3.9!      Anticoagulation Summary as of 1/25/2018     INR goal 2.5-3.5   Today's INR 3.9!   Full instructions 1/25: 2 mg; Otherwise 5 mg on Mon, Wed, Fri; 4 mg all other days   Next INR check 2/8/2018    Indications   Long-term (current) use of anticoagulants [Z79.01] [Z79.01]  Mitral valve disorder s/po 1-9-15 remodeling percut  + clip  (Resolved) [I05.9]         Your next Anticoagulation Clinic appointment(s)     Feb 08, 2018  2:00 PM CST   Anticoagulation Visit with  ANTICOAGULATION CLINIC   [facility] ([facility])    [address] [address] [phone]              Contact Numbers     [facility]  Please call  [phone] to cancel and/or reschedule your appointment   Please call  [phone] with any problems or questions regarding your therapy.        January 2018 Details    Sun Mon Tue Wed Thu Fri Sat      1               2               3               4               5               6                 7               8               9               10               11               12               13                 14               15               16               17               18               19               20                 21               22               23               24               25      2 mg   See details      26      5 mg         27      4 mg           28      4 mg         29      5 mg         30      4 mg         31      5 mg             Date Details   01/25 This INR check               How to take your warfarin dose     To take:  2 mg Take 0.5 of a 4 mg tablet.    To take:  4 mg Take 1 of the 4 mg tablets.    To take:  5 mg Take 1 of the 4 mg tablets and 1 of the 1 mg  tablets.           February 2018 Details    Sun Mon Tue Wed Thu Fri Sat         1      4 mg         2      5 mg         3      4 mg           4      4 mg         5      5 mg         6      4 mg         7      5 mg         8            9               10                 11               12               13               14               15               16               17                 18               19               20               21               22               23               24                 25               26               27               28                   Date Details   No additional details    Date of next INR:  2/8/2018         How to take your warfarin dose     To take:  4 mg Take 1 of the 4 mg tablets.    To take:  5 mg Take 1 of the 4 mg tablets and 1 of the 1 mg tablets.

## 2018-02-01 ENCOUNTER — HOSPITAL ENCOUNTER (OUTPATIENT)
Dept: CARDIOLOGY | Facility: CLINIC | Age: 83
Discharge: HOME | End: 2018-02-01
Attending: INTERNAL MEDICINE | Admitting: INTERNAL MEDICINE
Payer: MEDICARE

## 2018-02-01 DIAGNOSIS — I50.22 CHRONIC SYSTOLIC CONGESTIVE HEART FAILURE (H): ICD-10-CM

## 2018-02-01 DIAGNOSIS — E78.00 HYPERCHOLESTEROLEMIA: ICD-10-CM

## 2018-02-01 DIAGNOSIS — I25.10 CORONARY ARTERY DISEASE INVOLVING NATIVE CORONARY ARTERY OF NATIVE HEART WITHOUT ANGINA PECTORIS: ICD-10-CM

## 2018-02-01 DIAGNOSIS — I10 BENIGN ESSENTIAL HYPERTENSION: ICD-10-CM

## 2018-02-01 DIAGNOSIS — I48.20 CHRONIC ATRIAL FIBRILLATION (H): ICD-10-CM

## 2018-02-01 DIAGNOSIS — I34.0 MITRAL VALVE INSUFFICIENCY, UNSPECIFIED ETIOLOGY: ICD-10-CM

## 2018-02-01 DIAGNOSIS — Z82.49 FH: MITRAL VALVE REPAIR: ICD-10-CM

## 2018-02-01 LAB
ALT SERPL W P-5'-P-CCNC: <5 U/L (ref 5–30)
ANION GAP SERPL CALCULATED.3IONS-SCNC: 12.8 MMOL/L (ref 6–17)
BUN SERPL-MCNC: 21 MG/DL (ref 7–30)
CALCIUM SERPL-MCNC: 10.1 MG/DL (ref 8.5–10.5)
CHLORIDE SERPL-SCNC: 105 MMOL/L (ref 98–107)
CHOLEST SERPL-MCNC: 149 MG/DL
CO2 SERPL-SCNC: 26 MMOL/L (ref 23–29)
CREAT SERPL-MCNC: 0.8 MG/DL (ref 0.7–1.3)
GFR SERPL CREATININE-BSD FRML MDRD: 69 ML/MIN/1.7M2
GLUCOSE SERPL-MCNC: 115 MG/DL (ref 70–105)
HDLC SERPL-MCNC: 52 MG/DL
LDLC SERPL CALC-MCNC: 74 MG/DL
NONHDLC SERPL-MCNC: 97 MG/DL
POTASSIUM SERPL-SCNC: 4.8 MMOL/L (ref 3.5–5.1)
SODIUM SERPL-SCNC: 139 MMOL/L (ref 136–145)
TRIGL SERPL-MCNC: 113 MG/DL

## 2018-02-01 PROCEDURE — 93306 TTE W/DOPPLER COMPLETE: CPT | Mod: 26 | Performed by: INTERNAL MEDICINE

## 2018-02-01 PROCEDURE — 84460 ALANINE AMINO (ALT) (SGPT): CPT | Performed by: INTERNAL MEDICINE

## 2018-02-01 PROCEDURE — 80061 LIPID PANEL: CPT | Performed by: INTERNAL MEDICINE

## 2018-02-01 PROCEDURE — 80048 BASIC METABOLIC PNL TOTAL CA: CPT | Performed by: INTERNAL MEDICINE

## 2018-02-01 PROCEDURE — 93227 XTRNL ECG REC<48 HR R&I: CPT | Performed by: INTERNAL MEDICINE

## 2018-02-01 PROCEDURE — 36415 COLL VENOUS BLD VENIPUNCTURE: CPT | Performed by: INTERNAL MEDICINE

## 2018-02-01 PROCEDURE — 93306 TTE W/DOPPLER COMPLETE: CPT

## 2018-02-01 PROCEDURE — 93226 XTRNL ECG REC<48 HR SCAN A/R: CPT

## 2018-02-07 ENCOUNTER — CARE COORDINATION (OUTPATIENT)
Dept: CARE COORDINATION | Facility: CLINIC | Age: 83
End: 2018-02-07

## 2018-02-07 NOTE — PROGRESS NOTES
Clinic Care Coordination Contact  OUTREACH    Referral Information:  Referral Source: CTS  Reason for Contact: Follow up   Care Conference: No     Universal Utilization:   ED Visits in last year: 1  Hospital visits in last year: 3  Last PCP appointment: 11/09/17  Missed Appointments:  (4%)  Concerns:  (NO)  Multiple Providers or Specialists: PCP Cardiology     Clinical Concerns:    Current Medical Concerns: Patient was originally inpatient with CHF exacerbation in early December 2017. Patient had skilled nursing services which have not ended.    Patient states she is feeling fine. Her biggest problem is she is unable to lift her arms and her hands are weak. This limits her mobility.  She states she hired a private pay nurse to be with her during the night but this is way too expensive.   She does have life line and feels this is adequate for assistance if needed. She states she now has an aide coming for three hours every morning from 9 - noon to help her dress, shower when needed, assist with housekeeping and laundry.  She states she has great neighbors and they offer to do her shopping for her when they are going out to do their own.  She states she does not want to become a burden on them.    Patient states her one daughter who live in [place] wants her to move to [facility] assisted living one bedroom apartment. She states it is amanda but she feels more comfortable in her environment. She has condo that she owns, knows all her neighbors and there are some services if needed. Now that she has private pay daily aides she feels she is safe and much happier where she is at.             Current Behavioral Concerns: Denies concerns        Clinical Pathway Name: Heart Failure    Clinical Pathway: Denies shortness of breath. Denies increased edema. Weighs herself daily and weight is stable. She has heart failure action plan and agrees to contact PCP or cardiology if she has any changes.      Medication  Management:  Manages medications with assistance of home care     Functional Status:  Mobility Status: Independent w/Device  Equipment Currently Used at Home: walker, rolling  Transportation: Family and friends        Psychosocial:  Current living arrangement:: I live alone       Resources and Interventions:  Current Resources:   Home Care (Private pay)    Advanced Care Plans/Directives on file:: Yes       Goals:   Goal 1 Statement: I will follow the Hear Failure Action Plan   Goal 1 Supportive Steps: I will seek medical attention if my symptoms become worse  Goal 1 Progression Percent: 70%  Goal 1 Progression Date: 02/07/18     Barriers: Mobility  Strengths: Understands symptoms    Patient/Caregiver understanding: Expresses understanding    Frequency of Care Coordination:  Monthly    Upcoming appointment: 12/12/17     Plan: Will continue to follow in clinic care coordination.     Ana Benjamin RN, CCM - Care Coordinator     2/7/2018    1:39 PM  [phone]

## 2018-02-08 ENCOUNTER — ANTICOAGULATION THERAPY VISIT (OUTPATIENT)
Dept: ANTICOAGULATION | Facility: CLINIC | Age: 83
End: 2018-02-08
Payer: COMMERCIAL

## 2018-02-08 DIAGNOSIS — Z79.01 LONG-TERM (CURRENT) USE OF ANTICOAGULANTS: ICD-10-CM

## 2018-02-08 LAB — INR POINT OF CARE: 2.3 (ref 0.86–1.14)

## 2018-02-08 PROCEDURE — 36416 COLLJ CAPILLARY BLOOD SPEC: CPT

## 2018-02-08 PROCEDURE — 85610 PROTHROMBIN TIME: CPT | Mod: QW

## 2018-02-08 NOTE — PROGRESS NOTES
ANTICOAGULATION FOLLOW-UP CLINIC VISIT    Patient Name:  Kenyatta Donald  Date:  2/8/2018  Contact Type:  Face to Face    SUBJECTIVE:     Patient Findings     Positives No Problem Findings           OBJECTIVE    INR Protime   Date Value Ref Range Status   02/08/2018 2.3 (A) 0.86 - 1.14 Final       ASSESSMENT / PLAN  INR assessment SUB    Recheck INR In: 2 WEEKS    INR Location Clinic      Anticoagulation Summary as of 2/8/2018     INR goal 2.5-3.5   Today's INR 2.3!   Maintenance plan 5 mg (4 mg x 1 and 1 mg x 1) on Mon, Wed, Fri; 4 mg (4 mg x 1) all other days   Full instructions 2/8: 6 mg; Otherwise 5 mg on Mon, Wed, Fri; 4 mg all other days   Weekly total 31 mg   Plan last modified Nancy Zavala RN (12/28/2017)   Next INR check 2/22/2018   Priority INR   Target end date     Indications   Long-term (current) use of anticoagulants [Z79.01] [Z79.01]  Mitral valve disorder s/po 1-9-15 remodeling percut  + clip  (Resolved) [I05.9]         Anticoagulation Episode Summary     INR check location     Preferred lab     Send INR reminders to  [facility]    Comments       Anticoagulation Care Providers     Provider Role Specialty Phone number    Waqas Lionanahi Pyle MD [facility] [phone]            See the Encounter Report to view Anticoagulation Flowsheet and Dosing Calendar (Go to Encounters tab in chart review, and find the Anticoagulation Therapy Visit)        Monique Brito RN

## 2018-02-08 NOTE — MR AVS SNAPSHOT
Kenyatta Donald   2/8/2018 2:00 PM   Anticoagulation Therapy Visit    Description:  82 year old female   Provider:   ANTICOAGULATION CLINIC   Department:   Anti Coagulation           INR as of 2/8/2018     Today's INR 2.3!      Anticoagulation Summary as of 2/8/2018     INR goal 2.5-3.5   Today's INR 2.3!   Full instructions 2/8: 6 mg; Otherwise 5 mg on Mon, Wed, Fri; 4 mg all other days   Next INR check 2/22/2018    Indications   Long-term (current) use of anticoagulants [Z79.01] [Z79.01]  Mitral valve disorder s/po 1-9-15 remodeling percut  + clip  (Resolved) [I05.9]         Your next Anticoagulation Clinic appointment(s)     Feb 22, 2018  2:00 PM CST   Anticoagulation Visit with  ANTICOAGULATION CLINIC   [facility] ([facility])    [address] [address] [phone]              Contact Numbers     [facility]  Please call  [phone] to cancel and/or reschedule your appointment   Please call  [phone] with any problems or questions regarding your therapy.        February 2018 Details    Sun Mon Tue Wed Thu Fri Sat         1               2               3                 4               5               6               7               8      6 mg   See details      9      5 mg         10      4 mg           11      4 mg         12      5 mg         13      4 mg         14      5 mg         15      4 mg         16      5 mg         17      4 mg           18      4 mg         19      5 mg         20      4 mg         21      5 mg         22            23               24                 25               26               27               28                   Date Details   02/08 This INR check       Date of next INR:  2/22/2018         How to take your warfarin dose     To take:  4 mg Take 1 of the 4 mg tablets.    To take:  5 mg Take 1 of the 4 mg tablets and 1 of the 1 mg tablets.    To take:  6 mg Take 1 of the 4 mg  tablets and 2 of the 1 mg tablets.

## 2018-02-16 ENCOUNTER — OFFICE VISIT (OUTPATIENT)
Dept: CARDIOLOGY | Facility: CLINIC | Age: 83
End: 2018-02-16
Attending: INTERNAL MEDICINE
Payer: COMMERCIAL

## 2018-02-16 VITALS
DIASTOLIC BLOOD PRESSURE: 62 MMHG | HEART RATE: 64 BPM | SYSTOLIC BLOOD PRESSURE: 133 MMHG | WEIGHT: 188 LBS | BODY MASS INDEX: 31.32 KG/M2 | HEIGHT: 65 IN

## 2018-02-16 DIAGNOSIS — Z82.49 FH: MITRAL VALVE REPAIR: ICD-10-CM

## 2018-02-16 DIAGNOSIS — I10 BENIGN ESSENTIAL HYPERTENSION: ICD-10-CM

## 2018-02-16 DIAGNOSIS — I50.22 CHRONIC SYSTOLIC CONGESTIVE HEART FAILURE (H): ICD-10-CM

## 2018-02-16 DIAGNOSIS — I34.0 MITRAL VALVE INSUFFICIENCY, UNSPECIFIED ETIOLOGY: ICD-10-CM

## 2018-02-16 DIAGNOSIS — I48.20 CHRONIC ATRIAL FIBRILLATION (H): ICD-10-CM

## 2018-02-16 DIAGNOSIS — E78.00 HYPERCHOLESTEROLEMIA: ICD-10-CM

## 2018-02-16 DIAGNOSIS — I25.10 CORONARY ARTERY DISEASE INVOLVING NATIVE CORONARY ARTERY OF NATIVE HEART WITHOUT ANGINA PECTORIS: ICD-10-CM

## 2018-02-16 PROCEDURE — 99214 OFFICE O/P EST MOD 30 MIN: CPT | Performed by: INTERNAL MEDICINE

## 2018-02-16 NOTE — LETTER
2/16/2018    Bess Lion MD  [address]  [facility] [address]    RE: Kenyatta Donald       Dear Colleague,    I had the pleasure of seeing Kenyatta Donald in the [facility].    Service Date: 02/16/2018      HISTORY OF PRESENT ILLNESS:  The patient is an 82-year-old overweight white female who presents to Cardiology Clinic for followup of atrial fibrillation, problematic hypertension and hypercholesterolemia.  There is no history of cigarette smoking, diabetes mellitus or family history of premature coronary disease.  She has been unaware of an irregular heartbeat but has been diagnosed atrial fibrillation treated primarily with aggressive rate control and anticoagulation.      In the summer of 2015, she was hospitalized at [facility] because of volume overload consistent with diastolic congestive heart failure related to atrial fibrillation with rapid ventricular response, which responded nicely to diuretic therapy.  She has continued to maintain a stable weight.      In the fall of 2016, she injured her knee which required admission to the TCU.  She slowly improved but still needs to use a walker at times.  She denies arleth chest pain, shortness of breath, dizziness, palpitations, nausea, vomiting, diaphoresis or syncope.  He denies PND, orthopnea, fever, chills or sweats.  She has reasonable sleep and appetite at this time.  She does have easy fatigability and some general malaise.        It should be noted that she is status post mitral valve clip for severe mitral insufficiency in 01/2015 with subsequent moderate mitral insufficiency which is an improvement from the previous moderately severe to severe insufficiency noted.  In 2018, she had a Holter monitor performed which demonstrated a heart rate varying from , average rate of 73 with chronic atrial fibrillation, longest pause was up to 2.9 seconds with other pauses at 2.0 seconds and essentially  unchanged from previous Holter monitor without significant symptoms.  She has had evidence of bradytachycardia symptoms in the past.      MEDICATIONS:   1.  Levothyroxine 100 mcg a day.   2.  Furosemide 40 mg a day.   3.  Metoprolol XL 75 mg a day.   4.  Allopurinol 100 mg a day.   5.  Simvastatin 20 mg a day.   6.  Amoxicillin as needed.   7.  Warfarin as directed.   8.  Amlodipine 5 mg twice a day.   9.  Lisinopril 20 mg twice a day.   10.  Vitamin D 2000 units a day.   11.  Acetaminophen 650 mg every 4 hours as needed.   12.  Albuterol inhaler as directed.      LABORATORY DATA:  Demonstrates a cholesterol 149, HDL 52, LDL 74, triglyceride 113.  Sodium 139, potassium 4.8, BUN 21, creatinine 0.8.  ALT less than 5.  She presents for followup of her atrial dysrhythmia, hypertension and mitral insufficiency.      PHYSICAL EXAMINATION:   VITAL SIGNS:  Blood pressure 133/62 with a heart rate of 60-70 and regular.  Weight was 188 pounds which is down 7 pounds from previous clinic visit.   NECK:  Without significant jugular venous distention, carotid bruit or palpable thyroid.   CHEST:  Essentially clear to percussion and auscultation with slight decreased breath sounds at the bases with isolated crackles.   CARDIAC:  Irregular rhythm, variable S1, 2/6 holosystolic murmur heard best at the apex radiating to the axilla.  No diastolic murmur, rub or S3.   EXTREMITIES:  Showed 1 to 2+ edema without cyanosis or erythema.      CLINICAL IMPRESSION:   1.  Relatively stable cardiac condition.   2.  Status post episode of apparent diastolic congestive heart failure treated with diuretic therapy in 2015.   3.  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   4.  Mitral insufficiency with a mitral valve clip with mild gradient with mild to moderate gradient across the mitral valve leaflets.   5.  Hypertension, better controlled.   6.  Hyperlipidemia, at goal.   7.  History of  sleep apnea.   8.  History of breast cancer.   9.  Gastroesophageal reflux disease.   10.  History of osteoarthritis and knee replacement surgery.      DISCUSSION:  The patient has done reasonably well since her last clinic visit.  She is mostly restricted by osteoarthritis and degenerative joint disease as well as diffuse muscle pains.  Medications have been adjusted in the past to try and manage her adrianna-tachy syndrome.  Her most recent echocardiogram showed ejection fraction 65%-70% with moderate pulmonary hypertension, mitral valve clicks noted with mild mitral stenosis with mitral valve gradient of 6.0 mmHg.  There was mild to moderate mitral insufficiency that was difficult to quantify with probably no significant change from 2017.  She appears to be comfortable with her present approach to medications.  She will need close followup of serum lipids, basic metabolic panel and blood pressure.  She will also continue anticoagulation as well as antibiotic prophylaxis for dental and general surgical procedures.      RECOMMENDATIONS:   1.  Continue present medications.   2.  Close followup of serum lipids, basic metabolic panel and blood pressure with followup with Bre Razo in 1-2 months.   3.  Anticoagulation.     4.  Antibiotic prophylaxis for dental and general surgical procedures.   5.  Diet and exercise, avoiding caffeine and salt.   6.  Routine medical followup.   7.  Cardiology followup in 4-6 months.      cc:      Bess Lion MD    [facility]   [address] [place] [address]         MATT RABAGO MD, [facility]             D: 2018   T: 2018   MT: MONI      Name:     DARLENE FRIAS   MRN:      [id number]        Account:      [id number]   :      1935           Service Date: 2018      Document: [id number]       Thank you for allowing me to participate in the care of your patient.      Sincerely,     Matt Rabago MD     [facility]  [facility]    cc:   Chris Guan MD  [address]  [place] [id number]

## 2018-02-16 NOTE — MR AVS SNAPSHOT
After Visit Summary   2/16/2018    Kenyatta Donald    MRN: [id number]           Patient Information     Date Of Birth          1935        Visit Information        Provider Department      2/16/2018 10:45 AM Chris Guan MD [facility]        Today's Diagnoses     Hypercholesterolemia        Chronic systolic congestive heart failure (H)        Benign essential hypertension        Mitral valve insufficiency, unspecified etiology        Chronic atrial fibrillation (H)        FH: mitral valve repair        Coronary artery disease involving native coronary artery of native heart without angina pectoris           Follow-ups after your visit        Additional Services     Follow-Up with Cardiac Advanced Practice Provider           Palliative Care (referral)       Your provider has referred you to Palliative Care Services.    To schedule an Outpatient Palliative Care Referral appointment, please call: [facility]: [facility] [phone]   [email or web address]    Please be aware that coverage of these services is subject to the terms and limitations of your health insurance plan.  Call member services at your health plan with any benefit or coverage questions.      Please bring the following with you to your appointment:            Follow-Up with Cardiologist                 Your next 10 appointments already scheduled     Feb 22, 2018  2:00 PM CST   Anticoagulation Visit with [facility]   [facility]    [address] [address] [phone]              Future tests that were ordered for you today     Open Future Orders        Priority Expected Expires Ordered    Basic metabolic panel Routine 6/16/2018 2/16/2019 2/16/2018    Lipid Profile Routine 6/16/2018 2/16/2019 2/16/2018    ALT Routine 6/16/2018 2/16/2019 2/16/2018     "Follow-Up with Cardiologist Routine 2018    Palliative Care (referral) Routine 3/18/2018 2019 2018    Basic metabolic panel Routine 3/18/2018 2019 2018    Follow-Up with Cardiac Advanced Practice Provider Routine 3/18/2018 2019 2018            Who to contact     If you have questions or need follow up information about today's clinic visit or your schedule please contact [facility] directly at [phone].  Normal or non-critical lab and imaging results will be communicated to you by Zillabytehart, letter or phone within 4 business days after the clinic has received the results. If you do not hear from us within 7 days, please contact the clinic through Zillabytehart or phone. If you have a critical or abnormal lab result, we will notify you by phone as soon as possible.  Submit refill requests through MakersKit or call your pharmacy and they will forward the refill request to us. Please allow 3 business days for your refill to be completed.          Additional Information About Your Visit        MyChart Information     MakersKit lets you send messages to your doctor, view your test results, renew your prescriptions, schedule appointments and more. To sign up, go to [email or web address] . Click on \"Log in\" on the left side of the screen, which will take you to the Welcome page. Then click on \"Sign up Now\" on the right side of the page.     You will be asked to enter the access code listed below, as well as some personal information. Please follow the directions to create your username and password.     Your access code is: [email or web address]  Expires: 3/1/2018  3:46 PM     Your access code will  in 90 days. If you need help or a new code, please call your [place] clinic or [phone].        Care EveryWhere ID     This is your Care EveryWhere ID. This could be used by other organizations to access your [place] medical " "records  [id number]        Your Vitals Were     Pulse Height Last Period BMI (Body Mass Index)          64 1.638 m (5' 4.5\") (LMP Unknown) 31.77 kg/m2         Blood Pressure from Last 3 Encounters:   02/16/18 133/62   12/19/17 133/85   12/12/17 130/78    Weight from Last 3 Encounters:   02/16/18 85.3 kg (188 lb)   12/19/17 86.2 kg (190 lb)   12/12/17 84.8 kg (187 lb)              We Performed the Following     Follow-Up with Cardiologist          Today's Medication Changes          These changes are accurate as of 2/16/18 11:51 AM.  If you have any questions, ask your nurse or doctor.               These medicines have changed or have updated prescriptions.        Dose/Directions    warfarin 4 MG tablet   Commonly known as:  COUMADIN   This may have changed:  additional instructions   Used for:  Atrial fibrillation, unspecified type (H), History of pulmonary embolism        Dose:  4 mg   Take 1 tablet (4 mg) by mouth daily Taking 4 mg m,w,f & 4mg + 1 mg rest of week   Quantity:  135 tablet   Refills:  2                Primary Care Provider Office Phone # Fax #    Bess Mirna Lion [phone]       [address] [facility] [address]        Equal Access to Services     SUKUMAR KIMBALL AH: Hadii aad ku hadasho Soomaali, waaxda luqadaha, qaybta kaalmada adeegyada, emma wick hayhajan ron castellanos . So [facility] [phone].    ATENCIÓN: Si habla español, tiene a duenas disposición servicios gratuitos de asistencia lingüística. Llame al [phone].    We comply with applicable federal civil rights laws and [place] laws. We do not discriminate on the basis of race, color, national origin, age, disability, sex, sexual orientation, or gender identity.            Thank you!     Thank you for choosing [facility]  for your care. Our goal is always to provide you with excellent care. Hearing back from our patients is one way we can continue to improve our " services. Please take a few minutes to complete the written survey that you may receive in the mail after your visit with us. Thank you!             Your Updated Medication List - Protect others around you: Learn how to safely use, store and throw away your medicines at www.disposemymeds.org.          This list is accurate as of 2/16/18 11:51 AM.  Always use your most recent med list.                   Brand Name Dispense Instructions for use Diagnosis    acetaminophen 325 MG tablet    TYLENOL    100 tablet    Take 2 tablets (650 mg) by mouth every 4 hours as needed for mild pain    Closed nondisplaced fracture of left patella, unspecified fracture morphology, initial encounter       albuterol 108 (90 BASE) MCG/ACT Inhaler    PROAIR HFA/PROVENTIL HFA/VENTOLIN HFA     Inhale 2 puffs into the lungs every 6 hours as needed for shortness of breath / dyspnea or wheezing        allopurinol 100 MG tablet    ZYLOPRIM    90 tablet    TAKE 1 TABLET BY MOUTH EVERY DAY    Gout       amLODIPine 5 MG tablet    NORVASC    180 tablet    Take 1 tablet (5 mg) by mouth 2 times daily    Benign essential hypertension       amoxicillin 500 MG capsule    AMOXIL    4 capsule    Take 4 capsules by mouth 30 minutes prior to dental work    Mitral valve insufficiency       ASPIRIN NOT PRESCRIBED    INTENTIONAL    1 each    Please choose reason not prescribed, below        cholecalciferol 1000 UNIT tablet    vitamin D3     Take 2,000 Units by mouth daily        furosemide 40 MG tablet    LASIX    90 tablet    TAKE 1 TABLET BY MOUTH DAILY    Acute on chronic diastolic congestive heart failure (H)       levothyroxine 100 MCG tablet    SYNTHROID/LEVOTHROID    90 tablet    TAKE 1 TABLET BY MOUTH DAILY    Acquired hypothyroidism       lisinopril 20 MG tablet    PRINIVIL/ZESTRIL    180 tablet    Take 1 tablet (20 mg) by mouth 2 times daily    Essential hypertension, benign       metoprolol succinate 50 MG 24 hr tablet    TOPROL-XL    135 tablet     Take 1.5 tablets (75 mg) by mouth daily    Coronary artery disease involving native coronary artery of native heart without angina pectoris       nystatin 722034 UNIT/GM Powd    MYCOSTATIN    60 g    Apply topically 3 times daily as needed    Intertrigo       * order for DME     1 each    Equipment being ordered: mastectomy bras & prostheses  S/po mastectomy of unknown side     C50.919    Malignant neoplasm of female breast, unspecified laterality, unspecified site of breast       * order for DME     1 each    Equipment being ordered: compression hose  BK 20-30mm  2 pair as insurance will pay    Lymphedema of both lower extremities       simvastatin 40 MG tablet    ZOCOR    45 tablet    Take 0.5 tablets (20 mg) by mouth At Bedtime    Mixed hyperlipidemia       warfarin 4 MG tablet    COUMADIN    135 tablet    Take 1 tablet (4 mg) by mouth daily Taking 4 mg m,w,f & 4mg + 1 mg rest of week    Atrial fibrillation, unspecified type (H), History of pulmonary embolism       * Notice:  This list has 2 medication(s) that are the same as other medications prescribed for you. Read the directions carefully, and ask your doctor or other care provider to review them with you.

## 2018-02-16 NOTE — LETTER
2/16/2018      Bess Lion MD  [address]  [facility] [address]      RE: Kenyatta Donald       Dear Colleague,    I had the pleasure of seeing Kenyatta Donald in the [facility].    Service Date: 02/16/2018      HISTORY OF PRESENT ILLNESS:  The patient is an 82-year-old overweight white female who presents to Cardiology Clinic for followup of atrial fibrillation, problematic hypertension and hypercholesterolemia.  There is no history of cigarette smoking, diabetes mellitus or family history of premature coronary disease.  She has been unaware of an irregular heartbeat but has been diagnosed atrial fibrillation treated primarily with aggressive rate control and anticoagulation.      In the summer of 2015, she was hospitalized at [facility] because of volume overload consistent with diastolic congestive heart failure related to atrial fibrillation with rapid ventricular response, which responded nicely to diuretic therapy.  She has continued to maintain a stable weight.      In the fall of 2016, she injured her knee which required admission to the TCU.  She slowly improved but still needs to use a walker at times.  She denies arleth chest pain, shortness of breath, dizziness, palpitations, nausea, vomiting, diaphoresis or syncope.  He denies PND, orthopnea, fever, chills or sweats.  She has reasonable sleep and appetite at this time.  She does have easy fatigability and some general malaise.        It should be noted that she is status post mitral valve clip for severe mitral insufficiency in 01/2015 with subsequent moderate mitral insufficiency which is an improvement from the previous moderately severe to severe insufficiency noted.  In 2018, she had a Holter monitor performed which demonstrated a heart rate varying from , average rate of 73 with chronic atrial fibrillation, longest pause was up to 2.9 seconds with other pauses at 2.0 seconds and essentially  unchanged from previous Holter monitor without significant symptoms.  She has had evidence of bradytachycardia symptoms in the past.      MEDICATIONS:   1.  Levothyroxine 100 mcg a day.   2.  Furosemide 40 mg a day.   3.  Metoprolol XL 75 mg a day.   4.  Allopurinol 100 mg a day.   5.  Simvastatin 20 mg a day.   6.  Amoxicillin as needed.   7.  Warfarin as directed.   8.  Amlodipine 5 mg twice a day.   9.  Lisinopril 20 mg twice a day.   10.  Vitamin D 2000 units a day.   11.  Acetaminophen 650 mg every 4 hours as needed.   12.  Albuterol inhaler as directed.      LABORATORY DATA:  Demonstrates a cholesterol 149, HDL 52, LDL 74, triglyceride 113.  Sodium 139, potassium 4.8, BUN 21, creatinine 0.8.  ALT less than 5.  She presents for followup of her atrial dysrhythmia, hypertension and mitral insufficiency.      PHYSICAL EXAMINATION:   VITAL SIGNS:  Blood pressure 133/62 with a heart rate of 60-70 and regular.  Weight was 188 pounds which is down 7 pounds from previous clinic visit.   NECK:  Without significant jugular venous distention, carotid bruit or palpable thyroid.   CHEST:  Essentially clear to percussion and auscultation with slight decreased breath sounds at the bases with isolated crackles.   CARDIAC:  Irregular rhythm, variable S1, 2/6 holosystolic murmur heard best at the apex radiating to the axilla.  No diastolic murmur, rub or S3.   EXTREMITIES:  Showed 1 to 2+ edema without cyanosis or erythema.      CLINICAL IMPRESSION:   1.  Relatively stable cardiac condition.   2.  Status post episode of apparent diastolic congestive heart failure treated with diuretic therapy in 2015.   3.  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   4.  Mitral insufficiency with a mitral valve clip with mild gradient with mild to moderate gradient across the mitral valve leaflets.   5.  Hypertension, better controlled.   6.  Hyperlipidemia, at goal.   7.  History of  sleep apnea.   8.  History of breast cancer.   9.  Gastroesophageal reflux disease.   10.  History of osteoarthritis and knee replacement surgery.      DISCUSSION:  The patient has done reasonably well since her last clinic visit.  She is mostly restricted by osteoarthritis and degenerative joint disease as well as diffuse muscle pains.  Medications have been adjusted in the past to try and manage her adrianna-tachy syndrome.  Her most recent echocardiogram showed ejection fraction 65%-70% with moderate pulmonary hypertension, mitral valve clicks noted with mild mitral stenosis with mitral valve gradient of 6.0 mmHg.  There was mild to moderate mitral insufficiency that was difficult to quantify with probably no significant change from 2017.  She appears to be comfortable with her present approach to medications.  She will need close followup of serum lipids, basic metabolic panel and blood pressure.  She will also continue anticoagulation as well as antibiotic prophylaxis for dental and general surgical procedures.      RECOMMENDATIONS:   1.  Continue present medications.   2.  Close followup of serum lipids, basic metabolic panel and blood pressure with followup with Bre Razo in 1-2 months.   3.  Anticoagulation.     4.  Antibiotic prophylaxis for dental and general surgical procedures.   5.  Diet and exercise, avoiding caffeine and salt.   6.  Routine medical followup.   7.  Cardiology followup in 4-6 months.      cc:      Bess Lion MD    [facility]   [address] [place] [address]         MATT RABAGO MD, [facility]             D: 2018   T: 2018   MT: MONI      Name:     DARLENE FRIAS   MRN:      [id number]        Account:      [id number]   :      1935           Service Date: 2018      Document: [id number]       Outpatient Encounter Prescriptions as of 2018   Medication Sig Dispense Refill     levothyroxine (SYNTHROID/LEVOTHROID) 100 MCG  tablet TAKE 1 TABLET BY MOUTH DAILY 90 tablet 1     furosemide (LASIX) 40 MG tablet TAKE 1 TABLET BY MOUTH DAILY 90 tablet 1     metoprolol (TOPROL-XL) 50 MG 24 hr tablet Take 1.5 tablets (75 mg) by mouth daily 135 tablet 1     ASPIRIN NOT PRESCRIBED (INTENTIONAL) Please choose reason not prescribed, below 1 each 0     order for DME Equipment being ordered: compression hose   BK 20-30mm   2 pair as insurance will pay 1 each 0     allopurinol (ZYLOPRIM) 100 MG tablet TAKE 1 TABLET BY MOUTH EVERY DAY 90 tablet 2     simvastatin (ZOCOR) 40 MG tablet Take 0.5 tablets (20 mg) by mouth At Bedtime 45 tablet 3     amoxicillin (AMOXIL) 500 MG capsule Take 4 capsules by mouth 30 minutes prior to dental work 4 capsule 4     warfarin (COUMADIN) 4 MG tablet Take 1 tablet (4 mg) by mouth daily Taking 4 mg m,w,f & 4mg + 1 mg rest of week (Patient taking differently: Take 4 mg by mouth daily Taking 5 mg m,w,f & 4mg rest of week) 135 tablet 2     amLODIPine (NORVASC) 5 MG tablet Take 1 tablet (5 mg) by mouth 2 times daily 180 tablet 3     lisinopril (PRINIVIL/ZESTRIL) 20 MG tablet Take 1 tablet (20 mg) by mouth 2 times daily 180 tablet 3     cholecalciferol (VITAMIN D) 1000 UNIT tablet Take 2,000 Units by mouth daily        order for DME Equipment being ordered: mastectomy bras & prostheses   S/po mastectomy of unknown side     C50.919 1 each 0     nystatin (MYCOSTATIN) 002269 UNIT/GM POWD Apply topically 3 times daily as needed 60 g 1     acetaminophen (TYLENOL) 325 MG tablet Take 2 tablets (650 mg) by mouth every 4 hours as needed for mild pain 100 tablet      albuterol (PROAIR HFA, PROVENTIL HFA, VENTOLIN HFA) 108 (90 BASE) MCG/ACT inhaler Inhale 2 puffs into the lungs every 6 hours as needed for shortness of breath / dyspnea or wheezing       No facility-administered encounter medications on file as of 2/16/2018.        Again, thank you for allowing me to participate in the care of your patient.      Sincerely,    Chris Etienne  MD Roge     [facility]

## 2018-02-19 NOTE — PROGRESS NOTES
Service Date: 02/16/2018      HISTORY OF PRESENT ILLNESS:  The patient is an 82-year-old overweight white female who presents to Cardiology Clinic for followup of atrial fibrillation, problematic hypertension and hypercholesterolemia.  There is no history of cigarette smoking, diabetes mellitus or family history of premature coronary disease.  She has been unaware of an irregular heartbeat but has been diagnosed atrial fibrillation treated primarily with aggressive rate control and anticoagulation.      In the summer of 2015, she was hospitalized at [facility] because of volume overload consistent with diastolic congestive heart failure related to atrial fibrillation with rapid ventricular response, which responded nicely to diuretic therapy.  She has continued to maintain a stable weight.      In the fall of 2016, she injured her knee which required admission to the TCU.  She slowly improved but still needs to use a walker at times.  She denies arleth chest pain, shortness of breath, dizziness, palpitations, nausea, vomiting, diaphoresis or syncope.  He denies PND, orthopnea, fever, chills or sweats.  She has reasonable sleep and appetite at this time.  She does have easy fatigability and some general malaise.        It should be noted that she is status post mitral valve clip for severe mitral insufficiency in 01/2015 with subsequent moderate mitral insufficiency which is an improvement from the previous moderately severe to severe insufficiency noted.  In 2018, she had a Holter monitor performed which demonstrated a heart rate varying from , average rate of 73 with chronic atrial fibrillation, longest pause was up to 2.9 seconds with other pauses at 2.0 seconds and essentially unchanged from previous Holter monitor without significant symptoms.  She has had evidence of bradytachycardia symptoms in the past.      MEDICATIONS:   1.  Levothyroxine 100 mcg a day.   2.  Furosemide 40 mg a day.   3.   Metoprolol XL 75 mg a day.   4.  Allopurinol 100 mg a day.   5.  Simvastatin 20 mg a day.   6.  Amoxicillin as needed.   7.  Warfarin as directed.   8.  Amlodipine 5 mg twice a day.   9.  Lisinopril 20 mg twice a day.   10.  Vitamin D 2000 units a day.   11.  Acetaminophen 650 mg every 4 hours as needed.   12.  Albuterol inhaler as directed.      LABORATORY DATA:  Demonstrates a cholesterol 149, HDL 52, LDL 74, triglyceride 113.  Sodium 139, potassium 4.8, BUN 21, creatinine 0.8.  ALT less than 5.  She presents for followup of her atrial dysrhythmia, hypertension and mitral insufficiency.      PHYSICAL EXAMINATION:   VITAL SIGNS:  Blood pressure 133/62 with a heart rate of 60-70 and regular.  Weight was 188 pounds which is down 7 pounds from previous clinic visit.   NECK:  Without significant jugular venous distention, carotid bruit or palpable thyroid.   CHEST:  Essentially clear to percussion and auscultation with slight decreased breath sounds at the bases with isolated crackles.   CARDIAC:  Irregular rhythm, variable S1, 2/6 holosystolic murmur heard best at the apex radiating to the axilla.  No diastolic murmur, rub or S3.   EXTREMITIES:  Showed 1 to 2+ edema without cyanosis or erythema.      CLINICAL IMPRESSION:   1.  Relatively stable cardiac condition.   2.  Status post episode of apparent diastolic congestive heart failure treated with diuretic therapy in 2015.   3.  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   4.  Mitral insufficiency with a mitral valve clip with mild gradient with mild to moderate gradient across the mitral valve leaflets.   5.  Hypertension, better controlled.   6.  Hyperlipidemia, at goal.   7.  History of sleep apnea.   8.  History of breast cancer.   9.  Gastroesophageal reflux disease.   10.  History of osteoarthritis and knee replacement surgery.      DISCUSSION:  The patient has done reasonably well since her last clinic  visit.  She is mostly restricted by osteoarthritis and degenerative joint disease as well as diffuse muscle pains.  Medications have been adjusted in the past to try and manage her adrianna-tachy syndrome.  Her most recent echocardiogram showed ejection fraction 65%-70% with moderate pulmonary hypertension, mitral valve clicks noted with mild mitral stenosis with mitral valve gradient of 6.0 mmHg.  There was mild to moderate mitral insufficiency that was difficult to quantify with probably no significant change from 2017.  She appears to be comfortable with her present approach to medications.  She will need close followup of serum lipids, basic metabolic panel and blood pressure.  She will also continue anticoagulation as well as antibiotic prophylaxis for dental and general surgical procedures.      RECOMMENDATIONS:   1.  Continue present medications.   2.  Close followup of serum lipids, basic metabolic panel and blood pressure with followup with Bre Razo in 1-2 months.   3.  Anticoagulation.     4.  Antibiotic prophylaxis for dental and general surgical procedures.   5.  Diet and exercise, avoiding caffeine and salt.   6.  Routine medical followup.   7.  Cardiology followup in 4-6 months.      cc:      Bess Lion MD    [facility]   [address] [place] [address]         MATT RABAGO MD, FACC             D: 2018   T: 2018   MT: MONI      Name:     DARLENE FRIAS   MRN:      [id number]        Account:      [id number]   :      1935           Service Date: 2018      Document: [id number]

## 2018-02-22 ENCOUNTER — ANTICOAGULATION THERAPY VISIT (OUTPATIENT)
Dept: ANTICOAGULATION | Facility: CLINIC | Age: 83
End: 2018-02-22
Payer: COMMERCIAL

## 2018-02-22 DIAGNOSIS — Z79.01 LONG-TERM (CURRENT) USE OF ANTICOAGULANTS: ICD-10-CM

## 2018-02-22 LAB — INR POINT OF CARE: 2.6 (ref 0.86–1.14)

## 2018-02-22 PROCEDURE — 36416 COLLJ CAPILLARY BLOOD SPEC: CPT

## 2018-02-22 PROCEDURE — 85610 PROTHROMBIN TIME: CPT | Mod: QW

## 2018-02-22 NOTE — PROGRESS NOTES
ANTICOAGULATION FOLLOW-UP CLINIC VISIT    Patient Name:  Kenyatta Donald  Date:  2/22/2018  Contact Type:  Face to Face    SUBJECTIVE:     Patient Findings     Positives No Problem Findings           OBJECTIVE    INR Protime   Date Value Ref Range Status   02/22/2018 2.6 (A) 0.86 - 1.14 Final       ASSESSMENT / PLAN  INR assessment THER    Recheck INR In: 3 WEEKS    INR Location Clinic      Anticoagulation Summary as of 2/22/2018     INR goal 2.5-3.5   Today's INR 2.6   Maintenance plan 5 mg (4 mg x 1 and 1 mg x 1) on Mon, Wed, Fri; 4 mg (4 mg x 1) all other days   Full instructions 5 mg on Mon, Wed, Fri; 4 mg all other days   Weekly total 31 mg   No change documented Monique Brito RN   Plan last modified Nancy Zavala RN (12/28/2017)   Next INR check 3/15/2018   Priority INR   Target end date     Indications   Long-term (current) use of anticoagulants [Z79.01] [Z79.01]  Mitral valve disorder s/po 1-9-15 remodeling percut  + clip  (Resolved) [I05.9]         Anticoagulation Episode Summary     INR check location     Preferred lab     Send INR reminders to  [facility]    Comments       Anticoagulation Care Providers     Provider Role Specialty Phone number    Waqas Lionanahi Pyle MD [facility] [phone]            See the Encounter Report to view Anticoagulation Flowsheet and Dosing Calendar (Go to Encounters tab in chart review, and find the Anticoagulation Therapy Visit)        Monique Brito, RN

## 2018-02-22 NOTE — MR AVS SNAPSHOT
Kenyatta Donald   2/22/2018 2:00 PM   Anticoagulation Therapy Visit    Description:  82 year old female   Provider:   ANTICOAGULATION CLINIC   Department:   Anti Coagulation           INR as of 2/22/2018     Today's INR 2.6      Anticoagulation Summary as of 2/22/2018     INR goal 2.5-3.5   Today's INR 2.6   Full instructions 5 mg on Mon, Wed, Fri; 4 mg all other days   Next INR check 3/15/2018    Indications   Long-term (current) use of anticoagulants [Z79.01] [Z79.01]  Mitral valve disorder s/po 1-9-15 remodeling percut  + clip  (Resolved) [I05.9]         Your next Anticoagulation Clinic appointment(s)     Feb 22, 2018  2:00 PM CST   Anticoagulation Visit with  ANTICOAGULATION CLINIC   [facility] ([facility])    [address] [address] [phone]            Mar 15, 2018  2:00 PM CDT   Anticoagulation Visit with  ANTICOAGULATION CLINIC   [facility] ([facility])    [address] [place] [address] [phone]              Contact Numbers     [facility]  Please call  [phone] to cancel and/or reschedule your appointment   Please call  [phone] with any problems or questions regarding your therapy.        February 2018 Details    Sun Mon Tue Wed Thu Fri Sat         1               2               3                 4               5               6               7               8               9               10                 11               12               13               14               15               16               17                 18               19               20               21               22      4 mg   See details      23      5 mg         24      4 mg           25      4 mg         26      5 mg         27      4 mg         28      5 mg             Date Details   02/22 This INR check               How to take your warfarin  dose     To take:  4 mg Take 1 of the 4 mg tablets.    To take:  5 mg Take 1 of the 4 mg tablets and 1 of the 1 mg tablets.           March 2018 Details    Sun Mon Tue Wed Thu Fri Sat         1      4 mg         2      5 mg         3      4 mg           4      4 mg         5      5 mg         6      4 mg         7      5 mg         8      4 mg         9      5 mg         10      4 mg           11      4 mg         12      5 mg         13      4 mg         14      5 mg         15            16               17                 18               19               20               21               22               23               24                 25               26               27               28               29               30               31                Date Details   No additional details    Date of next INR:  3/15/2018         How to take your warfarin dose     To take:  4 mg Take 1 of the 4 mg tablets.    To take:  5 mg Take 1 of the 4 mg tablets and 1 of the 1 mg tablets.

## 2018-02-27 ENCOUNTER — HOSPITAL ENCOUNTER (EMERGENCY)
Facility: CLINIC | Age: 83
Discharge: HOME | End: 2018-02-27
Attending: EMERGENCY MEDICINE | Admitting: EMERGENCY MEDICINE
Payer: MEDICARE

## 2018-02-27 ENCOUNTER — APPOINTMENT (OUTPATIENT)
Dept: GENERAL RADIOLOGY | Facility: CLINIC | Age: 83
End: 2018-02-27
Attending: EMERGENCY MEDICINE
Payer: MEDICARE

## 2018-02-27 VITALS
TEMPERATURE: 97.9 F | DIASTOLIC BLOOD PRESSURE: 70 MMHG | HEIGHT: 65 IN | BODY MASS INDEX: 31.32 KG/M2 | RESPIRATION RATE: 16 BRPM | WEIGHT: 188 LBS | OXYGEN SATURATION: 96 % | SYSTOLIC BLOOD PRESSURE: 150 MMHG

## 2018-02-27 DIAGNOSIS — M54.41 ACUTE BILATERAL LOW BACK PAIN WITH RIGHT-SIDED SCIATICA: Primary | ICD-10-CM

## 2018-02-27 DIAGNOSIS — M62.81 GENERALIZED MUSCLE WEAKNESS: ICD-10-CM

## 2018-02-27 PROCEDURE — 72100 X-RAY EXAM L-S SPINE 2/3 VWS: CPT

## 2018-02-27 PROCEDURE — 99284 EMERGENCY DEPT VISIT MOD MDM: CPT

## 2018-02-27 PROCEDURE — 25000132 ZZH RX MED GY IP 250 OP 250 PS 637: Mod: GY | Performed by: EMERGENCY MEDICINE

## 2018-02-27 PROCEDURE — 72220 X-RAY EXAM SACRUM TAILBONE: CPT

## 2018-02-27 PROCEDURE — A9270 NON-COVERED ITEM OR SERVICE: HCPCS | Mod: GY | Performed by: EMERGENCY MEDICINE

## 2018-02-27 RX ORDER — TRAMADOL HYDROCHLORIDE 50 MG/1
50 TABLET ORAL ONCE
Status: COMPLETED | OUTPATIENT
Start: 2018-02-27 | End: 2018-02-27

## 2018-02-27 RX ORDER — TRAMADOL HYDROCHLORIDE 50 MG/1
50 TABLET ORAL EVERY 6 HOURS PRN
Qty: 15 TABLET | Refills: 0 | Status: SHIPPED | OUTPATIENT
Start: 2018-02-27 | End: 2018-03-03

## 2018-02-27 RX ADMIN — TRAMADOL HYDROCHLORIDE 50 MG: 50 TABLET, COATED ORAL at 13:48

## 2018-02-27 ASSESSMENT — ENCOUNTER SYMPTOMS
NUMBNESS: 0
MYALGIAS: 1

## 2018-02-27 NOTE — ED AVS SNAPSHOT
Emergency Department    [address] [place] [address]    Phone:  [phone]    Fax:  [phone]                                       Kenyatta Donald   MRN: [id number]    Department:   Emergency Department   Date of Visit:  2/27/2018           After Visit Summary Signature Page     I have received my discharge instructions, and my questions have been answered. I have discussed any challenges I see with this plan with the nurse or doctor.    ..........................................................................................................................................  Patient/Patient Representative Signature      ..........................................................................................................................................  Patient Representative Print Name and Relationship to Patient    ..................................................               ................................................  Date                                            Time    ..........................................................................................................................................  Reviewed by Signature/Title    ...................................................              ..............................................  Date                                                            Time

## 2018-02-27 NOTE — ED PROVIDER NOTES
History     Chief Complaint:  Leg Pain     HPI   Kenyatta Donald is a 82 year old female, with a complex history including systolic CHF, CAD, a-fib, PE, breast cancer, cervicalgia, right-sided sciatica, osteoporosis, and on Coumadin, who presents with her family to the ED for evaluation of right leg pain. The patient reports she ambulates with a walker. She has had intermittent pain which worsened yesterday. The patient notes she is unable to ambulate because she has leg pain every time she bears weight, changes position, and walks. She called paramedics today after her third episode of getting up to use the bathroom and could not tolerate the pain anymore. The patient reports standing up results in shooting pain into her buttock as well. She has been taking Tylenol for the pain. The patient denies any numbness, tingling, or incontinence.       Allergies:  No known drug allergies    Medications:    levothyroxine (SYNTHROID/LEVOTHROID) 100 MCG tablet   furosemide (LASIX) 40 MG tablet   metoprolol (TOPROL-XL) 50 MG 24 hr tablet   allopurinol (ZYLOPRIM) 100 MG tablet   simvastatin (ZOCOR) 40 MG tablet   warfarin (COUMADIN) 4 MG tablet   amLODIPine (NORVASC) 5 MG tablet   lisinopril (PRINIVIL/ZESTRIL) 20 MG tablet   cholecalciferol (VITAMIN D) 1000 UNIT tablet   nystatin (MYCOSTATIN) 794412 UNIT/GM POWD   acetaminophen (TYLENOL) 325 MG tablet   albuterol (PROAIR HFA, PROVENTIL HFA, VENTOLIN HFA) 108 (90 BASE) MCG/ACT inhaler     Past Medical History:    A-fib  HTN  Obesity   Osteoporosis  Rright-sided sciatica  Gout   CAD  PE  HLD  Systolic CHF  Hypothyroidism  Skin neoplasm   Mitral valve regurgitation   Juan Pablo-tachy syndrome  Spinal muscular atrophy, type 3  Sleep apnea  Breast cancer  Dysphagia  GERD  Cervicalgia  Lumbago  Kidney stone    Past Surgical History:    Mitral valve repair  Hysterectomy  Bilateral mastectomy  Knee replacement    Family History:    Colorectal cancer  Diabetes  Asthma  Heart  "disease  Alzheimer s     Social History:  Smoking status: Never smoker  Alcohol use: Yes, x1/week   Presents to ED with family    Marital Status:   [5]     Review of Systems   Musculoskeletal: Positive for gait problem and myalgias.   Neurological: Negative for numbness.   All other systems reviewed and are negative.    Physical Exam     Patient Vitals for the past 24 hrs:   BP Temp Temp src Heart Rate Resp SpO2 Height Weight   02/27/18 1438 - - - - 16 - - -   02/27/18 1432 150/70 - - - - 96 % - -   02/27/18 1044 156/75 97.9  F (36.6  C) Oral 77 14 99 % 1.651 m (5' 5\") 85.3 kg (188 lb)     Physical Exam  General: Alert, appears frail and elderly.  Cooperative.     In no acute distress  HEENT:  Head:  Atraumatic  Ears:  External ears are normal  Mouth/Throat:  Oropharynx is without erythema or exudate and mucous membranes are moist.   Eyes:   Conjunctivae normal and EOM are normal. No scleral icterus.  Neck:   Normal range of motion. Neck supple.  CV:  Normal rate, regular rhythm, normal heart sounds and radial pulses are 2+ and symmetric.   Resp:  Breath sounds are clear bilaterally    Non-labored, no retractions or accessory muscle use  GI:  Abdomen is soft, no distension, no tenderness. No rebound or guarding.  No CVA tenderness bilaterally  MS:  Unable to extend right lower leg due to previously known quadriceps weakness.  Negative straight leg raise on right.  Normal distal motor, circulation and strength of right lower leg.  Normal range of motion. 1+ edema bilaterally.    Pain in sciatic distribution of right lower extremity.     Back atraumatic.  Very low coccygeal discomfort to palpation in midline.    No midline cervical, or thoracic tenderness  Skin:  Warm and dry.  No rash or lesions noted.  Neuro: Alert. Normal strength.  Sensation intact in all 4 extremities. GCS: 15  Psych:  Normal mood and affect.  Lymph: No anterior or posterior cervical lymphadenopathy noted.    Emergency Department Course "     Imaging:  Radiographic findings were communicated with the patient and family who voiced understanding of the findings.    Lumbar Spine XR, 2-3 Views  IMPRESSION:    Lumbar spine: Multilevel degenerative changes most prominent at L5-S1  where there is disc space narrowing and endplate sclerosis. Posterior  alignment satisfactory. Posterior degenerative facet changes in the  mid and in the lower spine. Atherosclerotic vascular calcifications.  Sacrum and coccyx: Suggestion of degenerative changes in the mid  coccygeal region. No acute process.  As read by Radiology.     XR Sacrum & Coccyx 2 Views  IMPRESSION:    Lumbar spine: Multilevel degenerative changes most prominent at L5-S1  where there is disc space narrowing and endplate sclerosis. Posterior  alignment satisfactory. Posterior degenerative facet changes in the  mid and in the lower spine. Atherosclerotic vascular calcifications.  Sacrum and coccyx: Suggestion of degenerative changes in the mid  coccygeal region. No acute process.  As read by Radiology.     Interventions:  1348: Tramadol 50mg Oral     Emergency Department Course:  Patient arrived by EMS.     Past medical records, nursing notes, and vitals reviewed.  1102: I performed an exam of the patient and obtained history, as documented above.    The patient was sent for a lumbar spine x-ray and sacrum & coccyx x-ray while in the emergency department, findings above.    1405: Luz Maria, RN care coordinator, spoke to the patient and family.    1414: I spoke to Luz Maria after her discussion with the patient and the family.     1418: I rechecked the patient. Findings and plan explained to the Patient and family. Patient discharged home with instructions regarding supportive care, medications, and reasons to return. The importance of close follow-up was reviewed.     Impression & Plan      Medical Decision Making:  Patient is a 82-year-old female presents with low back pain and right leg pain.  Patient with  right leg pain in a sciatic distribution according to her history.  I'm unable to reproduce the pain here in the emergency department.  Patient was given tramadol for lower back pain.  This improved her pain.  X-rays show no evidence of acute fractures or dislocations.  Patient will be given tramadol for at-home use.  Patient also referred for physical therapy to assess at home.  She does have at home care to assist with most of her daily activities.  Family has also been interested in looking into further options for home assistance.  Patient may require an MRI at some point although there is no emergent indication for MRI at this time.  Our , Luz Maria, did talk with the patient and family regarding the physical therapy as well as discussion of future options for assisted living.      Patient did feel comfortable with discharging home with a small dose of tramadol.  We discussed that she should only use the tramadol in a safe environment; i.e. she should not be walking or doing important activities if she is taking the pain medication.  She understands that this could put her at higher risk for fall.  She will follow up with primary care later this week for reassessment of her right leg pain and lower back symptoms.  She may require an MRI at some point in the near future.  Discharge home after all return precautions and follow-up instructions understood.    Diagnosis:    ICD-10-CM    1. Acute bilateral low back pain with right-sided sciatica M54.41 Home Care PT Referral for Hospital Discharge   2. Generalized muscle weakness M62.81 Home Care PT Referral for Hospital Discharge     Disposition: Patient discharged to home with family      Discharge Medications:   Details   traMADol (ULTRAM) 50 MG tablet Take 1 tablet (50 mg) by mouth every 6 hours as needed for moderate pain or severe pain, Disp-15 tablet, R-0, Local Print     Macy Boles  2/27/2018    EMERGENCY DEPARTMENT    I, Macy Boles, am  serving as a scribe at 11:02 AM on 2/27/2018 to document services personally performed by Evans Roldan MD based on my observations and the provider's statements to me.        Evans Roldan MD  02/27/18 2051

## 2018-02-27 NOTE — PROGRESS NOTES
I was asked to assist this patient with a discharge plan.  She lives in an apt alone and has an aide come in for 3 hours a day 7 days a week.  She has a friend come in every evening and helps her get into bed and gives her a bath x3/week. She has a cleaning lady that also gets her groceries.  This patient doesn't drive anymore but does go up a floor every evening to play cards, Bridge etc. She walks with a walker. She admits her legs have been weak and now her arms are getting weaker.  I discussed a long term plan and she said she was just offered an [facility] apt at [facility] and she turned it down.  This patient would like to go home with current services but would like to add PT with her current company.  She works with [employer] and the contract thru [employer] for PT services. I faxed the referral.  I gave this patient a senior living book for further SEAN's in the area. This patient will have to ambulate with the walker before discharge.  I have updated the staff.

## 2018-02-27 NOTE — ED NOTES
Bed: ED15  Expected date:   Expected time:   Means of arrival:   Comments:  jennifer - 512 - 80 F back pain eta 1034

## 2018-02-27 NOTE — ED AVS SNAPSHOT
Emergency Department    [address] [facility] [address]    Phone:  [phone]    Fax:  [phone]                                       Kenyatta Donald   MRN: [id number]    Department:   Emergency Department   Date of Visit:  2/27/2018           Patient Information     Date Of Birth          1935        Your diagnoses for this visit were:     Acute bilateral low back pain with right-sided sciatica     Generalized muscle weakness        You were seen by Evans Roldan MD.      Follow-up Information     Follow up with Bess Lion MD. Schedule an appointment as soon as possible for a visit in 3 days.    Specialty:  Family Practice    Why:  for re-check from today's ED visit    Contact information:    [address]  [facility] [id number] [phone]          Follow up with  Emergency Department.    Specialty:  EMERGENCY MEDICINE    Why:  If symptoms worsen    Contact information:    [address] [facility] [address] [phone]      Future Appointments        Provider Department Dept Phone Center    3/13/2018 10:15 AM Alverto Alvarez MD [facility] [place] [address] [phone] [facility]    3/15/2018 2:00 PM [facility] [phone]     3/20/2018 1:30 PM MARIBEL Gaxiola [facility] AT [place] [address] [phone] [facility]    3/20/2018 2:30 PM CHYNA Hutchinson [facility]   [place] [address] [phone] [facility]      24 Hour Appointment Hotline       To make an appointment at any [facility], call [phone] ([phone]). If you don't have a family doctor or clinic, we will help you find one. [facility] are conveniently located to serve the needs of you and your family.          ED Discharge Orders     Home Care PT Referral for Hospital Discharge       PT to eval and treat    Your  provider has ordered home care - physical therapy. If you have not been contacted within 2 days of your discharge please call the department phone number listed on the top of this document.            MD face to face encounter       Documentation of Face to Face and Certification for Home Health Services    I certify that patient: Kenyatta Donald is under my care and that I, or a nurse practitioner or physician's assistant working with me, had a face-to-face encounter that meets the physician face-to-face encounter requirements with this patient on: 2/27/2018.    This encounter with the patient was in whole, or in part, for the following medical condition, which is the primary reason for home health care: sciatica and back pain, weakness.    I certify that, based on my findings, the following services are medically necessary home health services: Physical Therapy.    My clinical findings support the need for the above services because: Physical Therapy Services are needed to assess and treat the following functional impairments: back pain, movement at home.    Further, I certify that my clinical findings support that this patient is homebound (i.e. absences from home require considerable and taxing effort and are for medical reasons or [employer] services or infrequently or of short duration when for other reasons) because: Requires assistance of another person or specialized equipment to access medical services because patient: Has prohibitive pain during ambulation. and Is unable to exit home safely on own due to: back pain and weakness, gait instability.    Based on the above findings. I certify that this patient is confined to the home and needs intermittent skilled nursing care, physical therapy and/or speech therapy.  The patient is under my care, and I have initiated the establishment of the plan of care.  This patient will be followed by a physician who will periodically review the plan of  care.  Physician/Provider to provide follow up care: Bess Lion    Attending hospital physician (the Medicare certified PECOS provider): Evans Roldan MD  Physician Signature: See electronic signature associated with these discharge orders.  Date: 2/27/2018                     Review of your medicines      START taking        Dose / Directions Last dose taken    traMADol 50 MG tablet   Commonly known as:  ULTRAM   Dose:  50 mg   Quantity:  15 tablet        Take 1 tablet (50 mg) by mouth every 6 hours as needed for moderate pain or severe pain   Refills:  0          Our records show that you are taking the medicines listed below. If these are incorrect, please call your family doctor or clinic.        Dose / Directions Last dose taken    acetaminophen 325 MG tablet   Commonly known as:  TYLENOL   Dose:  650 mg   Quantity:  100 tablet        Take 2 tablets (650 mg) by mouth every 4 hours as needed for mild pain   Refills:  0        albuterol 108 (90 BASE) MCG/ACT Inhaler   Commonly known as:  PROAIR HFA/PROVENTIL HFA/VENTOLIN HFA   Dose:  2 puff        Inhale 2 puffs into the lungs every 6 hours as needed for shortness of breath / dyspnea or wheezing   Refills:  0        allopurinol 100 MG tablet   Commonly known as:  ZYLOPRIM   Quantity:  90 tablet        TAKE 1 TABLET BY MOUTH EVERY DAY   Refills:  2        amLODIPine 5 MG tablet   Commonly known as:  NORVASC   Dose:  5 mg   Quantity:  180 tablet        Take 1 tablet (5 mg) by mouth 2 times daily   Refills:  3        amoxicillin 500 MG capsule   Commonly known as:  AMOXIL   Quantity:  4 capsule        Take 4 capsules by mouth 30 minutes prior to dental work   Refills:  4        ASPIRIN NOT PRESCRIBED   Commonly known as:  INTENTIONAL   Quantity:  1 each        Please choose reason not prescribed, below   Refills:  0        cholecalciferol 1000 UNIT tablet   Commonly known as:  vitamin D3   Dose:  2000 Units        Take 2,000 Units by mouth daily    Refills:  0        furosemide 40 MG tablet   Commonly known as:  LASIX   Quantity:  90 tablet        TAKE 1 TABLET BY MOUTH DAILY   Refills:  1        levothyroxine 100 MCG tablet   Commonly known as:  SYNTHROID/LEVOTHROID   Quantity:  90 tablet        TAKE 1 TABLET BY MOUTH DAILY   Refills:  1        lisinopril 20 MG tablet   Commonly known as:  PRINIVIL/ZESTRIL   Dose:  20 mg   Quantity:  180 tablet        Take 1 tablet (20 mg) by mouth 2 times daily   Refills:  3        metoprolol succinate 50 MG 24 hr tablet   Commonly known as:  TOPROL-XL   Dose:  75 mg   Quantity:  135 tablet        Take 1.5 tablets (75 mg) by mouth daily   Refills:  1        nystatin 259803 UNIT/GM Powd   Commonly known as:  MYCOSTATIN   Quantity:  60 g        Apply topically 3 times daily as needed   Refills:  1        * order for DME   Quantity:  1 each        Equipment being ordered: mastectomy bras & prostheses  S/po mastectomy of unknown side     C50.919   Refills:  0        * order for DME   Quantity:  1 each        Equipment being ordered: compression hose  BK 20-30mm  2 pair as insurance will pay   Refills:  0        simvastatin 40 MG tablet   Commonly known as:  ZOCOR   Dose:  20 mg   Quantity:  45 tablet        Take 0.5 tablets (20 mg) by mouth At Bedtime   Refills:  3        warfarin 4 MG tablet   Commonly known as:  COUMADIN   Dose:  4 mg   Quantity:  135 tablet        Take 1 tablet (4 mg) by mouth daily Taking 4 mg m,w,f & 4mg + 1 mg rest of week   Refills:  2        * Notice:  This list has 2 medication(s) that are the same as other medications prescribed for you. Read the directions carefully, and ask your doctor or other care provider to review them with you.            Prescriptions were sent or printed at these locations (1 Prescription)                   Other Prescriptions                Printed at Department/Unit printer (1 of 1)         traMADol (ULTRAM) 50 MG tablet                Procedures and tests performed during  your visit     I have reviewed and agree with all the recommendations and orders detailed in this document.    Lumbar spine XR, 2-3 views    XR Sacrum and Coccyx 2 Views      Orders Needing Specimen Collection     None      Pending Results     No orders found from 2/25/2018 to 2/28/2018.            Pending Culture Results     No orders found from 2/25/2018 to 2/28/2018.            Pending Results Instructions     If you had any lab results that were not finalized at the time of your Discharge, you can call the ED Lab Result RN at [phone]. You will be contacted by this team for any positive Lab results or changes in treatment. The nurses are available 7 days a week from 10A to 6:30P.  You can leave a message 24 hours per day and they will return your call.        Test Results From Your Hospital Stay        2/27/2018 12:25 PM      Narrative     SACRUM AND COCCYX 2 VW, LUMBAR SPINE 2-3 VIEWS  2/27/2018 12:14 PM    HISTORY: Pain over sacrum when sitting.     COMPARISON:  None.        Impression     IMPRESSION:      Lumbar spine: Multilevel degenerative changes most prominent at L5-S1  where there is disc space narrowing and endplate sclerosis. Posterior  alignment satisfactory. Posterior degenerative facet changes in the  mid and in the lower spine. Atherosclerotic vascular calcifications.    Sacrum and coccyx: Suggestion of degenerative changes in the mid  coccygeal region. No acute process.    JOANN GUNN MD         2/27/2018 12:25 PM      Narrative     SACRUM AND COCCYX 2 VW, LUMBAR SPINE 2-3 VIEWS  2/27/2018 12:14 PM    HISTORY: Pain over sacrum when sitting.     COMPARISON:  None.        Impression     IMPRESSION:      Lumbar spine: Multilevel degenerative changes most prominent at L5-S1  where there is disc space narrowing and endplate sclerosis. Posterior  alignment satisfactory. Posterior degenerative facet changes in the  mid and in the lower spine. Atherosclerotic vascular calcifications.    Sacrum and  coccyx: Suggestion of degenerative changes in the mid  coccygeal region. No acute process.    JOANN GUNN MD                Clinical Quality Measure: Blood Pressure Screening     Your blood pressure was checked while you were in the emergency department today. The last reading we obtained was  BP: 156/75 . Please read the guidelines below about what these numbers mean and what you should do about them.  If your systolic blood pressure (the top number) is less than 120 and your diastolic blood pressure (the bottom number) is less than 80, then your blood pressure is normal. There is nothing more that you need to do about it.  If your systolic blood pressure (the top number) is 120-139 or your diastolic blood pressure (the bottom number) is 80-89, your blood pressure may be higher than it should be. You should have your blood pressure rechecked within a year by a primary care provider.  If your systolic blood pressure (the top number) is 140 or greater or your diastolic blood pressure (the bottom number) is 90 or greater, you may have high blood pressure. High blood pressure is treatable, but if left untreated over time it can put you at risk for heart attack, stroke, or kidney failure. You should have your blood pressure rechecked by a primary care provider within the next 4 weeks.  If your provider in the emergency department today gave you specific instructions to follow-up with your doctor or provider even sooner than that, you should follow that instruction and not wait for up to 4 weeks for your follow-up visit.        Thank you for choosing [facility]       Thank you for choosing [facility] for your care. Our goal is always to provide you with excellent care. Hearing back from our patients is one way we can continue to improve our services. Please take a few minutes to complete the written survey that you may receive in the mail after you visit with us. Thank you!        Statement of Approval     Ordered        "   18 1417  I have reviewed and agree with all the recommendations and orders detailed in this document.  EFFECTIVE NOW     Approved and electronically signed by:  Evans Roldan MD MyChart Information     [email or web address] lets you send messages to your doctor, view your test results, renew your prescriptions, schedule appointments and more. To sign up, go to [email or web address] . Click on \"Log in\" on the left side of the screen, which will take you to the Welcome page. Then click on \"Sign up Now\" on the right side of the page.     You will be asked to enter the access code listed below, as well as some personal information. Please follow the directions to create your username and password.     Your access code is: [email or web address]  Expires: 3/1/2018  3:46 PM     Your access code will  in 90 days. If you need help or a new code, please call your [place] clinic or [phone].        Care EveryWhere ID     This is your Care EveryWhere ID. This could be used by other organizations to access your [facility] medical records  [id number]        Equal Access to Services     MAGALYS KIMBALL : Hadcharlie Gonzalez, ronit mirza, tami ferraro, emma castellanos . So [facility] [phone].    ATENCIÓN: Si habla español, tiene a duenas disposición servicios gratuitos de asistencia lingüística. Llame al [phone].    We comply with applicable federal civil rights laws and [place] laws. We do not discriminate on the basis of race, color, national origin, age, disability, sex, sexual orientation, or gender identity.            After Visit Summary       This is your record. Keep this with you and show to your community pharmacist(s) and doctor(s) at your next visit.                  "

## 2018-02-28 ENCOUNTER — CARE COORDINATION (OUTPATIENT)
Dept: CARE COORDINATION | Facility: CLINIC | Age: 83
End: 2018-02-28

## 2018-02-28 NOTE — PROGRESS NOTES
Clinic Care Coordination Contact  OUTREACH    Referral Information:  Referral Source: CTS  Reason for Contact: ED visit follow up  Care Conference: No     Universal Utilization:   ED Visits in last year: 2  Hospital visits in last year: 3  Last PCP appointment: 11/09/17  Missed Appointments:  (4%)  Concerns:  (NO)  Multiple Providers or Specialists: PCP Cardiology     Clinical Concerns:    Current Medical Concerns: Patient was seen in ED at [facility] on 2/27/18 for sciatic pain in right leg. Patient states she was not able to weight bear and her leg felt like it was on fire.    Patient states she is better this am but she has not sat on the toilet which is where she was unable to get up from.  Her home care person is with her currently and they have adapted items in her apartment so she can go to the bathroom without using  The toilet.     Home care PT was ordered at the time of discharge. Patient has not heard about when that may start.  Her home care (private pay) is with [facility].     Patient states when she is sitting at rest she has no pain. The tramadol is helpful.      Current Behavioral Concerns: Denies concerns      Education Provided to patient: Discussed the possibility of elevated toilet seat, etc. That PT can help her with.      Clinical Pathway Name: Heart Failure      Medication Management:  Patient is independent but does have assistance if needed.       Functional Status:  Mobility Status: Independent w/Device  Equipment Currently Used at Home: walker, rolling  Transportation: normally she drives, but is unable to at this time. Friends and family can assist.     Psychosocial:  Current living arrangement:: I live alone       Resources and Interventions:  Current Resources:  (NA); Home Care (Private pay)    Advanced Care Plans/Directives on file:: Yes  Referrals Placed:  (NA)     Goals:   Goal 1 Statement: I will follow the Hear Failure Action Plan   Goal 1 Supportive Steps: I will seek medical  attention if my symptoms become worse  Goal 1 Progression Percent: 70%  Goal 1 Progression Date: 02/07/18     Barriers: Progressive disease  Strengths: Understands her treatment plan    Patient/Caregiver understanding: Expresses understanding.     Frequency of Care Coordination:  (2-4 weeks)    Upcoming appointment: patient will schedule when she knows her PT schedule     Plan: Clinic care coordinator will continue to follow.

## 2018-03-02 ENCOUNTER — TELEPHONE (OUTPATIENT)
Dept: FAMILY MEDICINE | Facility: CLINIC | Age: 83
End: 2018-03-02

## 2018-03-02 NOTE — TELEPHONE ENCOUNTER
Verbal okay given for [facility] to have Physical Therapy eval and treat. Requesting H/P,problem list,and medication list be faxed to them to update records. Fax number is [phone].Request completed.

## 2018-03-03 DIAGNOSIS — M54.50 ACUTE MIDLINE LOW BACK PAIN WITHOUT SCIATICA: Primary | ICD-10-CM

## 2018-03-03 NOTE — TELEPHONE ENCOUNTER
Patient called through [facility] and wondering if we can assisting her in getting this medication filled after hours, and noted we can't as it is a controlled medication, can send high prior message to care team and they may or may not address it prior to Monday or else it will be addressed on Monday. She is currently out and in a lot of pain but she is aware of the issue.  Triage nurse will route this message to care team as well.

## 2018-03-03 NOTE — TELEPHONE ENCOUNTER
Reason for Call:  Medication or medication refill:    Do you use a [facility]?  Name of the pharmacy and phone number for the current request:  sher    Name of the medication requested: traMADol (ULTRAM) 50 MG tablet    Other request: none, out of medication    Can we leave a detailed message on this number? YES    Phone number patient can be reached at: Home number on file [phone] (home)    Best Time: any    Call taken on 3/3/2018 at 9:25 AM by LISSETTE ASCENCIO

## 2018-03-05 ENCOUNTER — TELEPHONE (OUTPATIENT)
Dept: FAMILY MEDICINE | Facility: CLINIC | Age: 83
End: 2018-03-05

## 2018-03-05 RX ORDER — TRAMADOL HYDROCHLORIDE 50 MG/1
50 TABLET ORAL EVERY 6 HOURS PRN
Qty: 15 TABLET | Refills: 0 | Status: SHIPPED | OUTPATIENT
Start: 2018-03-05 | End: 2018-03-22

## 2018-03-05 NOTE — TELEPHONE ENCOUNTER
SONIA Elizabeth from [facility] called requesting orders for PT to work on strengthening, endurance and pain management. Orders needed    1 x 1 wk, 2 x a wk for 8 wks and OT to evaluate and Tx.   Verbal order approval given.    Nurse also request copy of pt's problem list this was faxed to [phone]

## 2018-03-05 NOTE — TELEPHONE ENCOUNTER
Faxed Rx (tramadol) to the pharmacy-[facility] on [address]  Called pt to let her know.

## 2018-03-05 NOTE — TELEPHONE ENCOUNTER
Reason for Call:  Form, our goal is to have forms completed with 72 hours, however, some forms may require a visit or additional information.    Type of letter, form or note:  medical    Who is the form from?: Home care    Where did the form come from: form was faxed in    What clinic location was the form placed at?: [facility]    Where the form was placed: 's Box: Bess Lion MD    What number is listed as a contact on the form?: [phone]  Phone [phone]       Additional comments: kat at home  medication issue communication/ order     Call taken on 3/5/2018 at 3:14 PM by Suzanna More

## 2018-03-06 ENCOUNTER — TELEPHONE (OUTPATIENT)
Dept: FAMILY MEDICINE | Facility: CLINIC | Age: 83
End: 2018-03-06

## 2018-03-06 DIAGNOSIS — I10 ESSENTIAL HYPERTENSION, BENIGN: ICD-10-CM

## 2018-03-06 RX ORDER — LISINOPRIL 20 MG/1
20 TABLET ORAL 2 TIMES DAILY
Qty: 180 TABLET | Refills: 3 | Status: SHIPPED | OUTPATIENT
Start: 2018-03-06 | End: 2019-02-20

## 2018-03-06 NOTE — TELEPHONE ENCOUNTER
Reason for Call:  Form, our goal is to have forms completed with 72 hours, however, some forms may require a visit or additional information.    Type of letter, form or note:  medical    Who is the form from?: Home care    Where did the form come from: form was faxed in    What clinic location was the form placed at?: [facility]    Where the form was placed: 's Box: Bess Lion MD    What number is listed as a contact on the form?: [phone]  Phone [phone]       Additional comments: kat at home  homecare order form  PT to eval and treat    Call taken on 3/6/2018 at 2:59 PM by Suzanna More

## 2018-03-09 ENCOUNTER — OFFICE VISIT (OUTPATIENT)
Dept: FAMILY MEDICINE | Facility: CLINIC | Age: 83
End: 2018-03-09
Payer: COMMERCIAL

## 2018-03-09 VITALS
BODY MASS INDEX: 30.66 KG/M2 | SYSTOLIC BLOOD PRESSURE: 134 MMHG | HEIGHT: 65 IN | WEIGHT: 184 LBS | DIASTOLIC BLOOD PRESSURE: 60 MMHG | HEART RATE: 69 BPM | OXYGEN SATURATION: 98 % | RESPIRATION RATE: 20 BRPM | TEMPERATURE: 96.8 F

## 2018-03-09 DIAGNOSIS — E66.811 CLASS 1 OBESITY DUE TO EXCESS CALORIES WITH SERIOUS COMORBIDITY AND BODY MASS INDEX (BMI) OF 30.0 TO 30.9 IN ADULT: ICD-10-CM

## 2018-03-09 DIAGNOSIS — I25.10 CORONARY ARTERY DISEASE INVOLVING NATIVE CORONARY ARTERY OF NATIVE HEART WITHOUT ANGINA PECTORIS: ICD-10-CM

## 2018-03-09 DIAGNOSIS — M54.41 ACUTE BILATERAL LOW BACK PAIN WITH RIGHT-SIDED SCIATICA: Primary | ICD-10-CM

## 2018-03-09 DIAGNOSIS — G12.1 SPINAL MUSCULAR ATROPHY TYPE III (H): ICD-10-CM

## 2018-03-09 DIAGNOSIS — M62.81 GENERALIZED MUSCLE WEAKNESS: ICD-10-CM

## 2018-03-09 DIAGNOSIS — I10 BENIGN ESSENTIAL HYPERTENSION: ICD-10-CM

## 2018-03-09 DIAGNOSIS — I89.0 LYMPHEDEMA: ICD-10-CM

## 2018-03-09 DIAGNOSIS — Z79.01 ANTICOAGULATED: ICD-10-CM

## 2018-03-09 DIAGNOSIS — E66.09 CLASS 1 OBESITY DUE TO EXCESS CALORIES WITH SERIOUS COMORBIDITY AND BODY MASS INDEX (BMI) OF 30.0 TO 30.9 IN ADULT: ICD-10-CM

## 2018-03-09 DIAGNOSIS — I50.23 ACUTE ON CHRONIC SYSTOLIC CONGESTIVE HEART FAILURE (H): ICD-10-CM

## 2018-03-09 DIAGNOSIS — I48.20 CHRONIC ATRIAL FIBRILLATION (H): ICD-10-CM

## 2018-03-09 PROCEDURE — 99214 OFFICE O/P EST MOD 30 MIN: CPT | Performed by: FAMILY MEDICINE

## 2018-03-09 NOTE — PATIENT INSTRUCTIONS
1. .No difference was  Noted by patients in a double blind study when given codeine, tylenol ( acetaminophen) or ibuprofen (all in identical pills). They felt no difference in pain relief. Since ibuprofen and the NSAIDs  causes kidney damage, esophageal damage with heartburn, and can increase the risk of esophageal and stomach cancer and ulcers,and colonic strictures. They also cause increased risk of heart attack .   I recommend that you use tylenol(acetaminophen) for pain. Use the acetaminophen ES  Which has 500mgm/tablet You can take up to 2 tablets 4 times a day as need for pain.  If this is not enough, you can add in ibuprofen or aleve(naprosyn) with 2 glasses of fluid and some food-to protect the stomach and esophagus. Please let us know if you are continuing to take ibuprofen or aleve, as we will need to periodically check your kidney function with a blood test.    2. Do the physical therapy -in the home  And should do every yr     3.  Weight Loss Tips  1. Do not eat after 6 hrs before your expected bedtime  2. Have your heaviest meal for breakfast, a slightly lighter meal at lunch and a snack 6 hrs before bed  3. No sugar/calorie drinks except milk ie no fruit juice, pop, alcohol.  4. Drink milk 30min before meals to decrease your hunger. Also it is excellent as part of your last meal of the day snack  5. Drink lots of water  6. Increase fiber in diet: all bran cereal, salads, popcorn etc  7. Have only one small serving of fruit a day about 1/2 cup (as this is high in sugar)  8. EXERCISE is the bottom line. Without it, you will gain weight even on a low calorie diet. Best if done 2-3X a day as can    Being overweight contributes to high blood pressure and high cholesterol, both of which cause heart attacks, strokes and kidney failure, prediabetes and diabetes, arthritis, and liver disease

## 2018-03-09 NOTE — PROGRESS NOTES
SUBJECTIVE:   Kenyatta Donald is a 82 year old female who presents to clinic today for the following health issues:    ED/UC Followup:    Facility:  [facility]  Date of visit: 02/27/18  Reason for visit: Back Pain and Leg Pain  Current Status: Feeling Better     Musculoskeletal problem/pain: Progressive Muscular Dystrophy with increasing pain & Generalized ongoing Muscle Weakness  , анна in joints she uses to stand etc  ie the upper shoulder girdle and the back       Duration: Chronic & worsening     Description  Location: Shoulder and Upper Legs    Intensity:  severe    Accompanying signs and symptoms: none    History  Previous similar problem: no   Previous evaluation:  none    Precipitating or alleviating factors:  Trauma or overuse: no   Aggravating factors include: sitting, standing, walking and lifting    Therapies tried and outcome: rest/inactivity and acetaminophen but only 2 of the 325 mgm     LYPHEDEMA OF LEGS       Duration: chronic but worse lately as is becoming more & more sedentary as the m dystrophy progresses , weakening her but good this 1pm     Description (location/character/radiation): full to pitting lege     Intensity:  moderate    Accompanying signs and symptoms: ache     History (similar episodes/previous evaluation): None    Precipitating or alleviating factors: above    Therapies tried and outcome: None       NONMORBID OBESITY    -bmi= 30.7  -COMORBID HTN ,CAD, CHF       Low Back Pain with Rt Sciatica      Duration: onset 12-16 and recurrent since         Specific cause: walking, related to m weakness     Description:   Location of pain: low back bilateral and middle of back bilateral  Character of pain: sharp, dull ache, stabbing and gnawing  Pain radiation:radiates into the right buttocks, radiates into the right leg and radiates into the right foot  New numbness or weakness in legs, not attributed to pain:  no     Intensity: Currently 4/10    History:   Pain interferes with job: Not  applicable  History of back problems: no prior back problems  Any previous MRI or X-rays: None  Sees a specialist for back pain:  No  Therapies tried without relief: 0    Alleviating factors:   Improved by: 0      Precipitating factors:  Worsened by: Standing and pulling herself up to the walker     Functional and Psychosocial Screen (Harley STarT Back):      Not performed today          Accompanying Signs & Symptoms:  Risk of Fracture:  None  Risk of Cauda Equina:  None  Risk of Infection:  None  Risk of Cancer:  None  Risk of Ankylosing Spondylitis:  Onset at age <35, male, AND morning back stiffness. no             Hypertension Follow-up      Outpatient blood pressures are not being checked.    Here < 140/80    Low Salt Diet: not monitoring salt    Vascular Disease Follow-up:  Coronary Artery Disease (CAD)/A Fib       Chest pain or pressure, left side neck or arm pain: No    Shortness of breath/increased sweats/nausea with exertion: No    Pain in calves walking 1-2 blocks: No but can hardly walk in  House     Worsened or new symptoms since last visit: No    Nitroglycerin use: no    Daily aspirin use: no anticoag -coumadin --also for PE in hx     Heart Failure Follow-up    Symptoms:    Shortness of breath: none    Lower extremity edema: min     Chest pain: No    Using more pillows than normal: No    Cough at night: No    Weight:    Checking weight daily: No    Weight change: none    Cardiology visits, ER/UC, or hospital admissions since last visit: None and Cardiology Visit - q 6 mo     Medication side effects: none      Problem list and histories reviewed & adjusted, as indicated.  Additional history: as documented    Labs reviewed in EPIC    Reviewed and updated as needed this visit by clinical staff       Reviewed and updated as needed this visit by Provider         ROS:  CONSTITUTIONAL: NEGATIVE for fever, chills, change in weight  POS for wt gain   INTEGUMENTARY/SKIN: NEGATIVE for worrisome rashes, moles or  "lesions  EYES: NEGATIVE for vision changes or irritation  ENT/MOUTH: NEGATIVE for ear, mouth and throat problems  RESP: NEGATIVE for significant cough or SOB  BREAST: NEGATIVE for masses, tenderness or discharge  CV: NEGATIVE for chest pain, palpitations or peripheral edema  GI: NEGATIVE for nausea, abdominal pain, heartburn, or change in bowel habits  : NEGATIVE for frequency, dysuria, or hematuria  MUSCULOSKELETAL: NEGATIVE for significant arthralgias or myalgia  POS weak spastic mms and uses walker & LBP with sciatica   NEURO: NEGATIVE for weakness, dizziness or paresthesias  ENDOCRINE: NEGATIVE for temperature intolerance, skin/hair changes  HEME: NEGATIVE for bleeding problems  PSYCHIATRIC: NEGATIVE for changes in mood or affect    OBJECTIVE:     /60  Pulse 69  Temp 96.8  F (36  C) (Tympanic)  Resp 20  Ht 5' 5\" (1.651 m)  Wt 184 lb (83.5 kg)  LMP  (LMP Unknown)  SpO2 98%  Breastfeeding? No  BMI 30.62 kg/m2  Body mass index is 30.62 kg/(m^2).  GENERAL: healthy, alert, no distress, obese, frail, elderly and fatigued  EYES: Eyes grossly normal to inspection, PERRL and conjunctivae and sclerae normal  RESP: lungs clear to auscultation - no rales, rhonchi or wheezes POS poor respiragtory effort from weak mm   CV: regular rate and rhythm, normal S1 S2, no S3 or S4, no murmur, click or rub, no peripheral edema and peripheral pulses strong  MS: no gross musculoskeletal defects noted, no edema--POS weak spastic mms and rocks to rise -uses walker - weak    SKIN: no suspicious lesions or rashes  NEURO: Normal strength and tone, mentation intact and speech normal- see abov   PSYCH: mentation appears normal, affect normal/bright    Diagnostic Test Results:  Results for orders placed or performed during the hospital encounter of 02/27/18   XR Sacrum and Coccyx 2 Views    Narrative    SACRUM AND COCCYX 2 VW, LUMBAR SPINE 2-3 VIEWS  2/27/2018 12:14 PM    HISTORY: Pain over sacrum when sitting. "     COMPARISON:  None.      Impression    IMPRESSION:      Lumbar spine: Multilevel degenerative changes most prominent at L5-S1  where there is disc space narrowing and endplate sclerosis. Posterior  alignment satisfactory. Posterior degenerative facet changes in the  mid and in the lower spine. Atherosclerotic vascular calcifications.    Sacrum and coccyx: Suggestion of degenerative changes in the mid  coccygeal region. No acute process.    JOANN GUNN MD   Lumbar spine XR, 2-3 views    Narrative    SACRUM AND COCCYX 2 VW, LUMBAR SPINE 2-3 VIEWS  2/27/2018 12:14 PM    HISTORY: Pain over sacrum when sitting.     COMPARISON:  None.      Impression    IMPRESSION:      Lumbar spine: Multilevel degenerative changes most prominent at L5-S1  where there is disc space narrowing and endplate sclerosis. Posterior  alignment satisfactory. Posterior degenerative facet changes in the  mid and in the lower spine. Atherosclerotic vascular calcifications.    Sacrum and coccyx: Suggestion of degenerative changes in the mid  coccygeal region. No acute process.    JOANN GUNN MD       ASSESSMENT/PLAN:               ICD-10-CM    1. Acute bilateral low back pain with right-sided sciatica onset end of 12-16  worse 2-25-18 M54.41    2. Spinal muscular atrophy type III causing decreased ability of respiratory mm-onset 43y/o  G12.1    3. Generalized muscle weakness M62.81    4. Acute on chronic systolic congestive heart failure (H) I50.23    5. Chronic atrial fibrillation (H) I48.2    6. Coronary artery disease involving native coronary artery of native heart without angina pectoris I25.10    7. Lymphedema of legs  I89.0    8. Benign essential hypertension I10    9. Anticoagulated Z79.01    10. Class 1 obesity due to excess calories with serious comorbidity and body mass index (BMI) of 30.0 to 30.9 in adult E66.09     Z68.30        Patient Instructions   1. .No difference was  Noted by patients in a double blind study when given  codeine, tylenol ( acetaminophen) or ibuprofen (all in identical pills). They felt no difference in pain relief. Since ibuprofen and the NSAIDs  causes kidney damage, esophageal damage with heartburn, and can increase the risk of esophageal and stomach cancer and ulcers,and colonic strictures. They also cause increased risk of heart attack .   I recommend that you use tylenol(acetaminophen) for pain. Use the acetaminophen ES  Which has 500mgm/tablet You can take up to 2 tablets 4 times a day as need for pain.  If this is not enough, you can add in ibuprofen or aleve(naprosyn) with 2 glasses of fluid and some food-to protect the stomach and esophagus. Please let us know if you are continuing to take ibuprofen or aleve, as we will need to periodically check your kidney function with a blood test.    2. Do the physical therapy -in the home  And should do every yr     3.  Weight Loss Tips  1. Do not eat after 6 hrs before your expected bedtime  2. Have your heaviest meal for breakfast, a slightly lighter meal at lunch and a snack 6 hrs before bed  3. No sugar/calorie drinks except milk ie no fruit juice, pop, alcohol.  4. Drink milk 30min before meals to decrease your hunger. Also it is excellent as part of your last meal of the day snack  5. Drink lots of water  6. Increase fiber in diet: all bran cereal, salads, popcorn etc  7. Have only one small serving of fruit a day about 1/2 cup (as this is high in sugar)  8. EXERCISE is the bottom line. Without it, you will gain weight even on a low calorie diet. Best if done 2-3X a day as can    Being overweight contributes to high blood pressure and high cholesterol, both of which cause heart attacks, strokes and kidney failure, prediabetes and diabetes, arthritis, and liver disease     DISCUSSION     Lovely Negro has POA and is with [facility]   The lovely here wants her in an NH   Pt feels she has adequate care with caregiver 9am-12N q d and  another in pm to get her into bed q d   Drove herself here     I support her in her desire to remain home     Bess Lion MD  [facility]    Weight management plan: Discussed healthy diet and exercise guidelines and patient will follow up in 3 months in clinic to re-evaluate.    Bess Lion MD

## 2018-03-09 NOTE — MR AVS SNAPSHOT
After Visit Summary   3/9/2018    Kenyatta Donald    MRN: [id number]           Patient Information     Date Of Birth          1935        Visit Information        Provider Department      3/9/2018 1:40 PM Bess Lion MD [facility]        Today's Diagnoses     Acute bilateral low back pain with right-sided sciatica onset end of 12-16  worse 2-25-18    -  1    Spinal muscular atrophy type III causing decreased ability of respiratory mm-onset 43y/o         Generalized muscle weakness        Acute on chronic systolic congestive heart failure (H)        Chronic atrial fibrillation (H)        Coronary artery disease involving native coronary artery of native heart without angina pectoris        Lymphedema of legs         Benign essential hypertension        Anticoagulated        Class 1 obesity due to excess calories with serious comorbidity and body mass index (BMI) of 30.0 to 30.9 in adult          Care Instructions    1. .No difference was  Noted by patients in a double blind study when given codeine, tylenol ( acetaminophen) or ibuprofen (all in identical pills). They felt no difference in pain relief. Since ibuprofen and the NSAIDs  causes kidney damage, esophageal damage with heartburn, and can increase the risk of esophageal and stomach cancer and ulcers,and colonic strictures. They also cause increased risk of heart attack .   I recommend that you use tylenol(acetaminophen) for pain. Use the acetaminophen ES  Which has 500mgm/tablet You can take up to 2 tablets 4 times a day as need for pain.  If this is not enough, you can add in ibuprofen or aleve(naprosyn) with 2 glasses of fluid and some food-to protect the stomach and esophagus. Please let us know if you are continuing to take ibuprofen or aleve, as we will need to periodically check your kidney function with a blood test.    2. Do the physical therapy -in the home  And should do every yr      3.  Weight Loss Tips  1. Do not eat after 6 hrs before your expected bedtime  2. Have your heaviest meal for breakfast, a slightly lighter meal at lunch and a snack 6 hrs before bed  3. No sugar/calorie drinks except milk ie no fruit juice, pop, alcohol.  4. Drink milk 30min before meals to decrease your hunger. Also it is excellent as part of your last meal of the day snack  5. Drink lots of water  6. Increase fiber in diet: all bran cereal, salads, popcorn etc  7. Have only one small serving of fruit a day about 1/2 cup (as this is high in sugar)  8. EXERCISE is the bottom line. Without it, you will gain weight even on a low calorie diet. Best if done 2-3X a day as can    Being overweight contributes to high blood pressure and high cholesterol, both of which cause heart attacks, strokes and kidney failure, prediabetes and diabetes, arthritis, and liver disease             Follow-ups after your visit        Your next 10 appointments already scheduled     Mar 20, 2018  1:30 PM CDT   LAB with [facility]   [facility] [place] ([facility])    [address] [place] [id number] [phone]           Please do not eat 10-12 hours before your appointment if you are coming in fasting for labs on lipids, cholesterol, or glucose (sugar). This does not apply to pregnant women. Water, hot tea and black coffee (with nothing added) are okay. Do not drink other fluids, diet soda or chew gum.            Mar 20, 2018  2:30 PM CDT   Return Visit with CHYNA Hutchinson   [facility] ([facility])    [address] [place] [id number] [phone]            Apr 12, 2018  2:00 PM CDT   [facility]ticoagulation [facility] with [facility]   [facility]    [address] [address] [phone]              Who to contact      "If you have questions or need follow up information about today's clinic visit or your schedule please contact [facility] directly at [phone].  Normal or non-critical lab and imaging results will be communicated to you by MyChart, letter or phone within 4 business days after the clinic has received the results. If you do not hear from us within 7 days, please contact the clinic through MyChart or phone. If you have a critical or abnormal lab result, we will notify you by phone as soon as possible.  Submit refill requests through [email or web address] or call your pharmacy and they will forward the refill request to us. Please allow 3 business days for your refill to be completed.          Additional Information About Your Visit        [email or web address] Information     [email or web address] lets you send messages to your doctor, view your test results, renew your prescriptions, schedule appointments and more. To sign up, go to [email or web address] . Click on \"Log in\" on the left side of the screen, which will take you to the Welcome page. Then click on \"Sign up Now\" on the right side of the page.     You will be asked to enter the access code listed below, as well as some personal information. Please follow the directions to create your username and password.     Your access code is: -[email or web address]  Expires: 2018  3:20 PM     Your access code will  in 90 days. If you need help or a new code, please call your [facility] clinic or [phone].        Care EveryWhere ID     This is your Care EveryWhere ID. This could be used by other organizations to access your [facility] medical records  [id number]        Your Vitals Were     Pulse Temperature Respirations Height Last Period Pulse Oximetry    69 96.8  F (36  C) (Tympanic) 20 5' 5\" (1.651 m) (LMP Unknown) 98%    Breastfeeding? BMI (Body Mass Index)                No 30.62 kg/m2           Blood Pressure from Last 3 Encounters:   18 130/70   18 134/60 "   02/27/18 150/70    Weight from Last 3 Encounters:   03/13/18 187 lb 3.2 oz (84.9 kg)   03/09/18 184 lb (83.5 kg)   02/27/18 188 lb (85.3 kg)              Today, you had the following     No orders found for display         Today's Medication Changes          These changes are accurate as of 3/9/18 11:59 PM.  If you have any questions, ask your nurse or doctor.               These medicines have changed or have updated prescriptions.        Dose/Directions    warfarin 4 MG tablet   Commonly known as:  COUMADIN   This may have changed:  additional instructions   Used for:  Atrial fibrillation, unspecified type (H), History of pulmonary embolism        Dose:  4 mg   Take 1 tablet (4 mg) by mouth daily Taking 4 mg m,w,f & 4mg + 1 mg rest of week   Quantity:  135 tablet   Refills:  2                Primary Care Provider Office Phone # Fax #    Bess Mirna Lion [phone]       [address] [facility] EMMANUELLEInd[facility] [address]        Equal Access to Services     [facility]: Hadii aad ku hadasho Soomaali, waaxda luqadaha, qaybta kaalmada adeegyada, waxay marcusin mariola castellanos . So [facility] [phone].    ATENCIÓN: Si habla español, tiene a duenas disposición servicios gratuitos de asistencia lingüística. Llame al [phone].    We comply with applicable federal civil rights laws and [place] laws. We do not discriminate on the basis of race, color, national origin, age, disability, sex, sexual orientation, or gender identity.            Thank you!     [facility] you [facility]  [facility] your [facility]. Our goal is always to provide you with excellent care. Hearing back from our patients is one way we can continue to improve our services. Please take a few minutes to complete the written survey that you may receive in the mail after your visit with us. Thank you!             Your Updated Medication List - Protect others around you: Learn how to safely use, store  and throw away your medicines at www.disposemymeds.org.          This list is accurate as of 3/9/18 11:59 PM.  Always use your most recent med list.                   Brand Name Dispense Instructions for use Diagnosis    acetaminophen 325 MG tablet    TYLENOL    100 tablet    Take 2 tablets (650 mg) by mouth every 4 hours as needed for mild pain    Closed nondisplaced fracture of left patella, unspecified fracture morphology, initial encounter       albuterol 108 (90 BASE) MCG/ACT Inhaler    PROAIR HFA/PROVENTIL HFA/VENTOLIN HFA     Inhale 2 puffs into the lungs every 6 hours as needed for shortness of breath / dyspnea or wheezing        allopurinol 100 MG tablet    ZYLOPRIM    90 tablet    TAKE 1 TABLET BY MOUTH EVERY DAY    Gout       amLODIPine 5 MG tablet    NORVASC    180 tablet    Take 1 tablet (5 mg) by mouth 2 times daily    Benign essential hypertension       amoxicillin 500 MG capsule    AMOXIL    4 capsule    Take 4 capsules by mouth 30 minutes prior to dental work    Mitral valve insufficiency       ASPIRIN NOT PRESCRIBED    INTENTIONAL    1 each    Please choose reason not prescribed, below        cholecalciferol 1000 UNIT tablet    vitamin D3     Take 2,000 Units by mouth daily        furosemide 40 MG tablet    LASIX    90 tablet    TAKE 1 TABLET BY MOUTH DAILY    Acute on chronic diastolic congestive heart failure (H)       levothyroxine 100 MCG tablet    SYNTHROID/LEVOTHROID    90 tablet    TAKE 1 TABLET BY MOUTH DAILY    Acquired hypothyroidism       lisinopril 20 MG tablet    PRINIVIL/ZESTRIL    180 tablet    Take 1 tablet (20 mg) by mouth 2 times daily    Essential hypertension, benign       metoprolol succinate 50 MG 24 hr tablet    TOPROL-XL    135 tablet    Take 1.5 tablets (75 mg) by mouth daily    Coronary artery disease involving native coronary artery of native heart without angina pectoris       nystatin 198981 UNIT/GM Powd    MYCOSTATIN    60 g    Apply topically 3 times daily as needed     Intertrigo       * order for DME     1 each    Equipment being ordered: mastectomy bras & prostheses  S/po mastectomy of unknown side     C50.919    Malignant neoplasm of female breast, unspecified laterality, unspecified site of breast       * order for DME     1 each    Equipment being ordered: compression hose  BK 20-30mm  2 pair as insurance will pay    Lymphedema of both lower extremities       simvastatin 40 MG tablet    ZOCOR    45 tablet    Take 0.5 tablets (20 mg) by mouth At Bedtime    Mixed hyperlipidemia       traMADol 50 MG tablet    ULTRAM    15 tablet    Take 1 tablet (50 mg) by mouth every 6 hours as needed for moderate pain or severe pain    Acute midline low back pain without sciatica       warfarin 4 MG tablet    COUMADIN    135 tablet    Take 1 tablet (4 mg) by mouth daily Taking 4 mg m,w,f & 4mg + 1 mg rest of week    Atrial fibrillation, unspecified type (H), History of pulmonary embolism       * Notice:  This list has 2 medication(s) that are the same as other medications prescribed for you. Read the directions carefully, and ask your doctor or other care provider to review them with you.

## 2018-03-09 NOTE — NURSING NOTE
"Chief Complaint   Patient presents with     Hospital F/U     RECHECK     /60  Pulse 69  Temp 96.8  F (36  C) (Tympanic)  Resp 20  Ht 5' 5\" (1.651 m)  Wt 184 lb (83.5 kg)  LMP  (LMP Unknown)  SpO2 98%  Breastfeeding? No  BMI 30.62 kg/m2 Estimated body mass index is 30.62 kg/(m^2) as calculated from the following:    Height as of this encounter: 5' 5\" (1.651 m).    Weight as of this encounter: 184 lb (83.5 kg).  BP completed using cuff size: large   Maxine Titus CMA    Health Maintenance Due   Topic Date Due     OP ANNUAL INR REFERRAL  04/08/2016     Health Maintenance reviewed at today's visit patient asked to schedule/complete:   None, Health Maintenance up to date.    "

## 2018-03-13 ENCOUNTER — OFFICE VISIT (OUTPATIENT)
Dept: PALLIATIVE MEDICINE | Facility: CLINIC | Age: 83
End: 2018-03-13
Attending: INTERNAL MEDICINE
Payer: COMMERCIAL

## 2018-03-13 VITALS
BODY MASS INDEX: 31.19 KG/M2 | HEIGHT: 65 IN | WEIGHT: 187.2 LBS | SYSTOLIC BLOOD PRESSURE: 130 MMHG | DIASTOLIC BLOOD PRESSURE: 70 MMHG | HEART RATE: 69 BPM

## 2018-03-13 DIAGNOSIS — G12.1 SPINAL MUSCULAR ATROPHY TYPE III (H): Primary | ICD-10-CM

## 2018-03-13 DIAGNOSIS — Z71.89 ADVANCE CARE PLANNING: ICD-10-CM

## 2018-03-13 DIAGNOSIS — I50.32 CHRONIC DIASTOLIC CONGESTIVE HEART FAILURE (H): ICD-10-CM

## 2018-03-13 DIAGNOSIS — I48.20 CHRONIC ATRIAL FIBRILLATION (H): ICD-10-CM

## 2018-03-13 PROCEDURE — 99205 OFFICE O/P NEW HI 60 MIN: CPT | Performed by: INTERNAL MEDICINE

## 2018-03-13 NOTE — PROGRESS NOTES
Palliative Staff/Outpatient Clinic    (This note was transcribed using voice recognition software. While I review and edit the transcription, I may miss errors. Also, the software capitalizes words strangely sometimes. Please let me know of any serious mis-transcriptions and I will addend this note.)    CC/Patient ID:  She has heart disease (A fib on warfarin, CAD, hx of mitral clip, episode of volume overload c/w HFpEF).  Also spinal muscular atrophy type III onset in her 40s.     Has a DNR/DNI order on the chart x 2016, no POLST or HCD    History:  She is seen alone today.  She is not sure why she is here and explained the role of palliative care in the heart center.  Overall she feels like her health is stable and we have a extensive discussion about advance care planning.    She lives alone in independent living.  She has a PCA for a few hours a day as well as friends and neighbors who help with dressing, bathing, grocery shopping, and cleaning.  She gets to the bathroom by herself.  She has friends to help her get her talked into bed.  She is on a waiting list for assisted living but is hoping to remain in her independent living as long as possible.  Friends or relatives drive her.  She uses a walker.  She is had a recent episode of sciatica and is now receiving home physical therapy and Occupational Therapy is about to start.  She is a hard time getting her socks on as well as shirts on due to her shoulder weakness.  She fell once in the last few months without injury.  She has a portable lifeline button.    She had an episode of volume overload thought to be from tachycardia and diastolic dysfunction from her atrial fibrillation from which she has made a good recovery.  Her major current concerns are around her SMA and ongoing weakness.  He was diagnosed in her 40s and caused moderate disability but she always remained ambulatory and still does.  The last couple years is begun to affect her shoulder girdle  "and she cannot raise her  arms over her head which is causing problems with ADLs as described above.      She sleeps in a recliner due to orthopnea .  No current edema.  She is not physically active due to her weakness and use of a walker but is not significantly limited by dyspnea or angina.    She describes her mood and spirits is excellent and she remains happy and hopeful and really engage in her family and community life with daily thinks she looks forward to.  She has 3 daughters, 2 of whom are local, and many grandchildren and great-grandchildren.    She takes tramadol less than weekly for her sciatica pain without side effects and wants to continue this.  She takes acetaminophen.  No other major physical concerns today.    We talk about advanced care planning.  She would want her daughters to be her decision makers.  She has had a DNR/DNI order on the chart for few years and I talked that through with her and confirmed with her that that does not fact still reflect her wishes which it does.    Serious Illness Conversation    SERIOUS ILLNESS CONVERSATION 3/13/2018   People Present Patient   How much information about what is likely to be ahead with your illness would you like from me? Patient asks to be fully informed   Prognosis Communication Comments \"I'm 82 and could die at any time.\" Has serious medical problems but is currently stable and also may live for many years she understands   What are your most important goals if your health situation worsens? Select all that apply Live as long as possible as long as quality of life is good (document what that means to patient in comment row);Not cause financial/physical/emotional stress for family/loved ones;Be independent   What are your biggest fears and worries about the future with your health? Loss of dignity;Loss of control   What gives you strength as you think about your future with your illness? Family/loved ones   If you become sicker, how much are " "you willing to go through for the possibility of gaining more time?  Be in the hospital   Trade-Off Comments Wants a DNR/DNI order and explicity d/w her what that means today   How much does your family know about your priorities and wishes? Some discussion. Further discussion needed.   Family Comment Would want her 3 daughters to make decisions for her if needed. Dtr in [place] has financial POA.   Reassurance of Ongoing Support Provided to Patient/Family Yes         SH:   As above,     ROS:  Comprehensive review of systems is negative except as above    PE:   /70  Pulse 69  Ht 1.651 m (5' 5\")  Wt 84.9 kg (187 lb 3.2 oz)  LMP  (LMP Unknown)  BMI 31.15 kg/m2  Alert, comfortable appearing, NAD. Frail, sitting in her walker.  Head NCAT.  Eyes anicteric without injection  Face symmetric, eyes conjugate  Mouth pink, moist, no lesions.  Neck supple without masses, thyromegaly, LAD  Lungs unlabored, CTAB  CV irregular, distant precordial sounds, s1s2  Abd soft, obese, nontender  Back well aligned, no masses, tenderness  No LE edema  Skin warm, dry, without lesions  MSK joints of hand normal; shoulder joints nl bilat  Neuro Face symmetric, shoulder --cannot raise above 45deg bilat, strength 4/5 distall bilat in UE  Neuropsych exam normal including affect, sensorium, gross memory, thought processes, and fund of knowledge.     Impression & Recommendations:  82-year-old woman with spinal muscular atrophy type 3 causing loss of some of her ADLs, A. fib and heart failure with preserved ejection fraction.  Advanced care planning as above.  Completed DNR/DNI POLST form today.  Follow-up as needed        Chart data reviewed today:    Family History   Problem Relation Age of Onset     Cancer - colorectal Mother      Alzheimer Disease Mother       age 78     DIABETES Daughter      Asthma Daughter      HEART DISEASE Father       age 60     Family History Negative Daughter      Unknown/Adopted Maternal Grandmother "      Unknown/Adopted Maternal Grandfather      Unknown/Adopted Paternal Grandmother      Unknown/Adopted Paternal Grandfather      Coronary Artery Disease No family hx of      Hypertension No family hx of      Hyperlipidemia No family hx of      CEREBROVASCULAR DISEASE No family hx of      Breast Cancer No family hx of      Colon Cancer No family hx of      Prostate Cancer No family hx of      Other Cancer No family hx of      Depression No family hx of      Anxiety Disorder No family hx of      MENTAL ILLNESS No family hx of      Substance Abuse No family hx of      Anesthesia Reaction No family hx of      OSTEOPOROSIS No family hx of      Genetic Disorder No family hx of      Thyroid Disease No family hx of      Obesity No family hx of      Past Medical History:   Diagnosis Date     Atrial fibrillation (H)      Benign essential hypertension      Juan Pablo-tachy syndrome (H)      Breast cancer (H)     s/p bilateral mastectomy     CHF (congestive heart failure) (H)      Coronary artery disease involving native coronary artery of native heart without angina pectoris     cardiac cath 2014: 40-50% mid RCA stenosis with minimal other disease     GERD (gastroesophageal reflux disease)      Hypercholesterolaemia      Hypothyroidism      Kidney stone      Mitral valve regurgitation     sp mitral clip 1/9/15     Need for SBE (subacute bacterial endocarditis) prophylaxis      Osteoporosis      Pulmonary embolism (H)      Sleep apnea      Spinal muscular atrophy type III (H)     Dr. Daily, [facility]     Past Surgical History:   Procedure Laterality Date     ANESTHESIA OUT OF OR PERCUTANEOUS MITRAL VALVE REPAIR N/A 1/9/2015    Procedure: ANESTHESIA OUT OF OR PERCUTANEOUS MITRAL VALVE REPAIR;  Surgeon: Generic Anesthesia Provider;  Location: [facility] OR     GYN SURGERY      hysterectomy     MASTECTOMY      bilateral     ORTHOPEDIC SURGERY      knee replacement     PERCUTANEIOUS MITRAL VALVE REPAIR  1/9/2015     No Known  Allergies       Medications:   I have reviewed this patient's medication profile.      Data reviewed:  I reviewed recent labs and imaging, comments on pertinent findings:    Na 139, Cr 0.8, no lab signs of hypercapnea  No PFTs  6MWT 2015 250meters  Sleep study 2012 some WALESKA, no signs of central sleep disorder    Echo Feb  The left ventricle is normal in size.  Left ventricular systolic function is normal.  The visual ejection fraction is estimated at 65-70%.  There is trace tricuspid regurgitation.  Right ventricular systolic pressure is elevated, consistent with moderate  pulmonary hypertension.  A mitral clip is seen centrally on the valve.  There is mild mitral stenosis.  The mean mitral valve gradient is 6.0mmHg.  There is mild to moderate (1-2+) mitral regurgitation.  The mitral regurgiation is difficult to quantify due to significant acoustic  shadowing from the overlying mitral clip and thickened valve.    Thank you for involving us in the patient's care.   Alverto Alvarez MD / Palliative Medicine / Pager [phone] / [facility] Inpatient Team Consult Pager [phone] (answered 8am-430pm M-F) - ok to text page via [employer] / After-Hours Answering Service [phone] / Palliative Clinic in the [facility] at the [facility] - [phone] (scheduling); [phone] (triage).

## 2018-03-13 NOTE — LETTER
3/13/2018       RE: Kenyatta Donald  [address]   [place] [address]     Dear Colleague,    Thank you for referring your patient, Kenyatta Donald, to the [facility] [place] at [facility]. Please see a copy of my visit note below.    Palliative Staff/Outpatient Clinic    (This note was transcribed using voice recognition software. While I review and edit the transcription, I may miss errors. Also, the software capitalizes words strangely sometimes. Please let me know of any serious mis-transcriptions and I will addend this note.)    CC/Patient ID:  She has heart disease (A fib on warfarin, CAD, hx of mitral clip, episode of volume overload c/w HFpEF).  Also spinal muscular atrophy type III onset in her 40s.     Has a DNR/DNI order on the chart x 2016, no POLST or HCD    History:  She is seen alone today.  She is not sure why she is here and explained the role of palliative care in the heart center.  Overall she feels like her health is stable and we have a extensive discussion about advance care planning.    She lives alone in independent living.  She has a PCA for a few hours a day as well as friends and neighbors who help with dressing, bathing, grocery shopping, and cleaning.  She gets to the bathroom by herself.  She has friends to help her get her talked into bed.  She is on a waiting list for assisted living but is hoping to remain in her independent living as long as possible.  Friends or relatives drive her.  She uses a walker.  She is had a recent episode of sciatica and is now receiving home physical therapy and Occupational Therapy is about to start.  She is a hard time getting her socks on as well as shirts on due to her shoulder weakness.  She fell once in the last few months without injury.  She has a portable lifeline button.    She had an episode of volume overload thought to be from tachycardia and  "diastolic dysfunction from her atrial fibrillation from which she has made a good recovery.  Her major current concerns are around her SMA and ongoing weakness.  He was diagnosed in her 40s and caused moderate disability but she always remained ambulatory and still does.  The last couple years is begun to affect her shoulder girdle and she cannot raise her  arms over her head which is causing problems with ADLs as described above.      She sleeps in a recliner due to orthopnea .  No current edema.  She is not physically active due to her weakness and use of a walker but is not significantly limited by dyspnea or angina.    She describes her mood and spirits is excellent and she remains happy and hopeful and really engage in her family and community life with daily thinks she looks forward to.  She has 3 daughters, 2 of whom are local, and many grandchildren and great-grandchildren.    She takes tramadol less than weekly for her sciatica pain without side effects and wants to continue this.  She takes acetaminophen.  No other major physical concerns today.    We talk about advanced care planning.  She would want her daughters to be her decision makers.  She has had a DNR/DNI order on the chart for few years and I talked that through with her and confirmed with her that that does not fact still reflect her wishes which it does.    Serious Illness Conversation    SERIOUS ILLNESS CONVERSATION 3/13/2018   People Present Patient   How much information about what is likely to be ahead with your illness would you like from me? Patient asks to be fully informed   Prognosis Communication Comments \"I'm 82 and could die at any time.\" Has serious medical problems but is currently stable and also may live for many years she understands   What are your most important goals if your health situation worsens? Select all that apply Live as long as possible as long as quality of life is good (document what that means to patient in " "comment row);Not cause financial/physical/emotional stress for family/loved ones;Be independent   What are your biggest fears and worries about the future with your health? Loss of dignity;Loss of control   What gives you strength as you think about your future with your illness? Family/loved ones   If you become sicker, how much are you willing to go through for the possibility of gaining more time?  Be in the hospital   Trade-Off Comments Wants a DNR/DNI order and explicity d/w her what that means today   How much does your family know about your priorities and wishes? Some discussion. Further discussion needed.   Family Comment Would want her 3 daughters to make decisions for her if needed. Dtr in [place] has financial POA.   Reassurance of Ongoing Support Provided to Patient/Family Yes         SH:   As above,     ROS:  Comprehensive review of systems is negative except as above    PE:   /70  Pulse 69  Ht 1.651 m (5' 5\")  Wt 84.9 kg (187 lb 3.2 oz)  LMP  (LMP Unknown)  BMI 31.15 kg/m2  Alert, comfortable appearing, NAD. Frail, sitting in her walker.  Head NCAT.  Eyes anicteric without injection  Face symmetric, eyes conjugate  Mouth pink, moist, no lesions.  Neck supple without masses, thyromegaly, LAD  Lungs unlabored, CTAB  CV irregular, distant precordial sounds, s1s2  Abd soft, obese, nontender  Back well aligned, no masses, tenderness  No LE edema  Skin warm, dry, without lesions  MSK joints of hand normal; shoulder joints nl bilat  Neuro Face symmetric, shoulder --cannot raise above 45deg bilat, strength 4/5 distall bilat in UE  Neuropsych exam normal including affect, sensorium, gross memory, thought processes, and fund of knowledge.     Impression & Recommendations:  82-year-old woman with spinal muscular atrophy type 3 causing loss of some of her ADLs, A. fib and heart failure with preserved ejection fraction.  Advanced care planning as above.  Completed DNR/DNI POLST form today.  Follow-up " as needed        Chart data reviewed today:    Family History   Problem Relation Age of Onset     Cancer - colorectal Mother      Alzheimer Disease Mother       age 78     DIABETES Daughter      Asthma Daughter      HEART DISEASE Father       age 60     Family History Negative Daughter      Unknown/Adopted Maternal Grandmother      Unknown/Adopted Maternal Grandfather      Unknown/Adopted Paternal Grandmother      Unknown/Adopted Paternal Grandfather      Coronary Artery Disease No family hx of      Hypertension No family hx of      Hyperlipidemia No family hx of      CEREBROVASCULAR DISEASE No family hx of      Breast Cancer No family hx of      Colon Cancer No family hx of      Prostate Cancer No family hx of      Other Cancer No family hx of      Depression No family hx of      Anxiety Disorder No family hx of      MENTAL ILLNESS No family hx of      Substance Abuse No family hx of      Anesthesia Reaction No family hx of      OSTEOPOROSIS No family hx of      Genetic Disorder No family hx of      Thyroid Disease No family hx of      Obesity No family hx of      Past Medical History:   Diagnosis Date     Atrial fibrillation (H)      Benign essential hypertension      Juan Pablo-tachy syndrome (H)      Breast cancer (H)     s/p bilateral mastectomy     CHF (congestive heart failure) (H)      Coronary artery disease involving native coronary artery of native heart without angina pectoris     cardiac cath 2014: 40-50% mid RCA stenosis with minimal other disease     GERD (gastroesophageal reflux disease)      Hypercholesterolaemia      Hypothyroidism      Kidney stone      Mitral valve regurgitation     sp mitral clip 1/9/15     Need for SBE (subacute bacterial endocarditis) prophylaxis      Osteoporosis      Pulmonary embolism (H)      Sleep apnea      Spinal muscular atrophy type III (H)     Dr. Daily, [facility]     Past Surgical History:   Procedure Laterality Date     ANESTHESIA OUT OF OR  PERCUTANEOUS MITRAL VALVE REPAIR N/A 1/9/2015    Procedure: ANESTHESIA OUT OF OR PERCUTANEOUS MITRAL VALVE REPAIR;  Surgeon: Generic Anesthesia Provider;  Location: [facility] OR     GYN SURGERY      hysterectomy     MASTECTOMY      bilateral     ORTHOPEDIC SURGERY      knee replacement     PERCUTANEIOUS MITRAL VALVE REPAIR  1/9/2015     No Known Allergies       Medications:   I have reviewed this patient's medication profile.      Data reviewed:  I reviewed recent labs and imaging, comments on pertinent findings:    Na 139, Cr 0.8, no lab signs of hypercapnea  No PFTs  6MWT 2015 250meters  Sleep study 2012 some WALESKA, no signs of central sleep disorder    Echo Feb  The left ventricle is normal in size.  Left ventricular systolic function is normal.  The visual ejection fraction is estimated at 65-70%.  There is trace tricuspid regurgitation.  Right ventricular systolic pressure is elevated, consistent with moderate  pulmonary hypertension.  A mitral clip is seen centrally on the valve.  There is mild mitral stenosis.  The mean mitral valve gradient is 6.0mmHg.  There is mild to moderate (1-2+) mitral regurgitation.  The mitral regurgiation is difficult to quantify due to significant acoustic  shadowing from the overlying mitral clip and thickened valve.    Thank you for involving us in the patient's care.   Alverto Alvarez MD / Palliative Medicine / Pager [phone] / [facility] Inpatient Team Consult Pager [phone] (answered 8am-430pm M-F) - ok to text page via [email or web address] / After-Hours Answering Service [phone] / Palliative Clinic in the [facility] at the [facility] - [phone] (scheduling); [phone] (triage).        Again, thank you for allowing me to participate in the care of your patient.      Sincerely,    Alverto Alvarez MD

## 2018-03-13 NOTE — MR AVS SNAPSHOT
After Visit Summary   3/13/2018    Kenyatta Donald    MRN: [id number]           Patient Information     Date Of Birth          1935        Visit Information        Provider Department      3/13/2018 10:15 AM Alverto Alvarez MD [facility]        Today's Diagnoses     Hypercholesterolemia        Chronic systolic congestive heart failure (H)        Benign essential hypertension        Mitral valve insufficiency, unspecified etiology        Chronic atrial fibrillation (H)        FH: mitral valve repair        Coronary artery disease involving native coronary artery of native heart without angina pectoris           Follow-ups after your visit        Your next 10 appointments already scheduled     Mar 15, 2018  2:00 PM CDT   Anticoagulation Visit with [facility]   [facility]    [address] [id number] [phone]            Mar 20, 2018  1:30 PM CDT   LAB with [facility]   [facility] ([facility])    [address] [place], [place] [address]  [facility] [id number] [phone]           Please do not eat 10-12 hours before your appointment if you are coming in fasting for labs on lipids, cholesterol, or glucose (sugar). This does not apply to pregnant women. Water, hot tea and black coffee (with nothing added) are okay. Do not drink other fluids, diet soda or chew gum.            Mar 20, 2018  2:30 PM CDT   Return Visit with CHYNA Hutchinson   [facility] ([facility])    [address] [place], [place] [address]  [facility] [id number] [phone]              Who to contact     If you have questions or need follow up information about today's clinic visit or your schedule please contact [facility] directly at [phone].  Normal or  "non-critical lab and imaging results will be communicated to you by MyChart, letter or phone within 4 business days after the clinic has received the results. If you do not hear from us within 7 days, please contact the clinic through [employer] or phone. If you have a critical or abnormal lab result, we will notify you by phone as soon as possible.  Submit refill requests through [employer] or call your pharmacy and they will forward the refill request to us. Please allow 3 business days for your refill to be completed.          Additional Information About Your Visit        [employer] Information     [employer] lets you send messages to your doctor, view your test results, renew your prescriptions, schedule appointments and more. To sign up, go to [email or web address] . Click on \"Log in\" on the left side of the screen, which will take you to the Welcome page. Then click on \"Sign up Now\" on the right side of the page.     You will be asked to enter the access code listed below, as well as some personal information. Please follow the directions to create your username and password.     Your access code is: [email or web address]  Expires: 2018  3:20 PM     Your access code will  in 90 days. If you need help or a new code, please call your [place] clinic or [phone].        Care EveryWhere ID     This is your Care EveryWhere ID. This could be used by other organizations to access your [place] medical records  [id number]        Your Vitals Were     Pulse Height Last Period BMI (Body Mass Index)          69 1.651 m (5' 5\") (LMP Unknown) 31.15 kg/m2         Blood Pressure from Last 3 Encounters:   18 130/70   18 134/60   18 150/70    Weight from Last 3 Encounters:   18 84.9 kg (187 lb 3.2 oz)   18 83.5 kg (184 lb)   18 85.3 kg (188 lb)              We Performed the Following     Palliative Care (referral)          Today's Medication Changes          These changes are accurate as of " 3/13/18 11:00 AM.  If you have any questions, ask your nurse or doctor.               These medicines have changed or have updated prescriptions.        Dose/Directions    warfarin 4 MG tablet   Commonly known as:  COUMADIN   This may have changed:  additional instructions   Used for:  Atrial fibrillation, unspecified type (H), History of pulmonary embolism        Dose:  4 mg   Take 1 tablet (4 mg) by mouth daily Taking 4 mg m,w,f & 4mg + 1 mg rest of week   Quantity:  135 tablet   Refills:  2                Primary Care Provider Office Phone # Fax #    Bess Mirna Lion [phone] [address] [facility] [address]        Equal Access to Services     MAGALYS KIMBALL : Hadii zonia shannon hadasho Soharsha, waaxda luqadaha, qaybta kaalmada adeanselmoyada, emma castellanos . So [facility] [phone].    ATENCIÓN: Si habla español, tiene a duenas disposición servicios gratuitos de asistencia lingüística. Llame al [phone].    We comply with applicable federal civil rights laws and [place] laws. We do not discriminate on the basis of race, color, national origin, age, disability, sex, sexual orientation, or gender identity.            Thank you!     Thank you for choosing [facility]  for your care. Our goal is always to provide you with excellent care. Hearing back from our patients is one way we can continue to improve our services. Please take a few minutes to complete the written survey that you may receive in the mail after your visit with us. Thank you!             Your Updated Medication List - Protect others around you: Learn how to safely use, store and throw away your medicines at www.disposemymeds.org.          This list is accurate as of 3/13/18 11:00 AM.  Always use your most recent med list.                   Brand Name Dispense Instructions for use Diagnosis    acetaminophen 325 MG tablet    TYLENOL    100 tablet     Take 2 tablets (650 mg) by mouth every 4 hours as needed for mild pain    Closed nondisplaced fracture of left patella, unspecified fracture morphology, initial encounter       albuterol 108 (90 BASE) MCG/ACT Inhaler    PROAIR HFA/PROVENTIL HFA/VENTOLIN HFA     Inhale 2 puffs into the lungs every 6 hours as needed for shortness of breath / dyspnea or wheezing        allopurinol 100 MG tablet    ZYLOPRIM    90 tablet    TAKE 1 TABLET BY MOUTH EVERY DAY    Gout       amLODIPine 5 MG tablet    NORVASC    180 tablet    Take 1 tablet (5 mg) by mouth 2 times daily    Benign essential hypertension       amoxicillin 500 MG capsule    AMOXIL    4 capsule    Take 4 capsules by mouth 30 minutes prior to dental work    Mitral valve insufficiency       ASPIRIN NOT PRESCRIBED    INTENTIONAL    1 each    Please choose reason not prescribed, below        cholecalciferol 1000 UNIT tablet    vitamin D3     Take 2,000 Units by mouth daily        furosemide 40 MG tablet    LASIX    90 tablet    TAKE 1 TABLET BY MOUTH DAILY    Acute on chronic diastolic congestive heart failure (H)       levothyroxine 100 MCG tablet    SYNTHROID/LEVOTHROID    90 tablet    TAKE 1 TABLET BY MOUTH DAILY    Acquired hypothyroidism       lisinopril 20 MG tablet    PRINIVIL/ZESTRIL    180 tablet    Take 1 tablet (20 mg) by mouth 2 times daily    Essential hypertension, benign       metoprolol succinate 50 MG 24 hr tablet    TOPROL-XL    135 tablet    Take 1.5 tablets (75 mg) by mouth daily    Coronary artery disease involving native coronary artery of native heart without angina pectoris       nystatin 975343 UNIT/GM Powd    MYCOSTATIN    60 g    Apply topically 3 times daily as needed    Intertrigo       * order for DME     1 each    Equipment being ordered: mastectomy bras & prostheses  S/po mastectomy of unknown side     C50.919    Malignant neoplasm of female breast, unspecified laterality, unspecified site of breast       * order for DME     1 each     Equipment being ordered: compression hose  BK 20-30mm  2 pair as insurance will pay    Lymphedema of both lower extremities       simvastatin 40 MG tablet    ZOCOR    45 tablet    Take 0.5 tablets (20 mg) by mouth At Bedtime    Mixed hyperlipidemia       traMADol 50 MG tablet    ULTRAM    15 tablet    Take 1 tablet (50 mg) by mouth every 6 hours as needed for moderate pain or severe pain    Acute midline low back pain without sciatica       warfarin 4 MG tablet    COUMADIN    135 tablet    Take 1 tablet (4 mg) by mouth daily Taking 4 mg m,w,f & 4mg + 1 mg rest of week    Atrial fibrillation, unspecified type (H), History of pulmonary embolism       * Notice:  This list has 2 medication(s) that are the same as other medications prescribed for you. Read the directions carefully, and ask your doctor or other care provider to review them with you.

## 2018-03-14 ENCOUNTER — TELEPHONE (OUTPATIENT)
Dept: FAMILY MEDICINE | Facility: CLINIC | Age: 83
End: 2018-03-14

## 2018-03-14 NOTE — TELEPHONE ENCOUNTER
OT 1x1, 2x5, and 1x2, for ADL's DME recommendation, activites, exercise, care giver  Education , and cognitive assess prn.    FYI only---verbal ok given on your behalf pending your approval- form to follow for signature

## 2018-03-15 ENCOUNTER — ANTICOAGULATION THERAPY VISIT (OUTPATIENT)
Dept: ANTICOAGULATION | Facility: CLINIC | Age: 83
End: 2018-03-15
Payer: COMMERCIAL

## 2018-03-15 DIAGNOSIS — Z79.01 LONG-TERM (CURRENT) USE OF ANTICOAGULANTS: ICD-10-CM

## 2018-03-15 LAB — INR POINT OF CARE: 2.9 (ref 0.86–1.14)

## 2018-03-15 PROCEDURE — 85610 PROTHROMBIN TIME: CPT | Mod: QW

## 2018-03-15 PROCEDURE — 36416 COLLJ CAPILLARY BLOOD SPEC: CPT

## 2018-03-15 NOTE — PROGRESS NOTES
ANTICOAGULATION FOLLOW-UP CLINIC VISIT    Patient Name:  Kenyatta Donald  Date:  3/15/2018  Contact Type:  Face to Face    SUBJECTIVE:     Patient Findings     Positives No Problem Findings           OBJECTIVE    INR Protime   Date Value Ref Range Status   03/15/2018 2.9 (A) 0.86 - 1.14 Final       ASSESSMENT / PLAN  INR assessment THER    Recheck INR In: 4 WEEKS    INR Location Clinic      Anticoagulation Summary as of 3/15/2018     INR goal 2.5-3.5   Today's INR 2.9   Maintenance plan 5 mg (4 mg x 1 and 1 mg x 1) on Mon, Wed, Fri; 4 mg (4 mg x 1) all other days   Full instructions 5 mg on Mon, Wed, Fri; 4 mg all other days   Weekly total 31 mg   No change documented Gena Dias, RN   Plan last modified Nancy Zavala RN (12/28/2017)   Next INR check 4/12/2018   Priority INR   Target end date     Indications   Long-term (current) use of anticoagulants [Z79.01] [Z79.01]  Mitral valve disorder s/po 1-9-15 remodeling percut  + clip  (Resolved) [I05.9]         Anticoagulation Episode Summary     INR check location     Preferred lab     Send INR reminders to [facility]    Comments       Anticoagulation Care Providers     Provider Role Specialty Phone number    Bess Lion MD [facility] [phone]            See the Encounter Report to view Anticoagulation Flowsheet and Dosing Calendar (Go to Encounters tab in chart review, and find the Anticoagulation Therapy Visit)        Gena Dias RN

## 2018-03-15 NOTE — MR AVS SNAPSHOT
Kenyatta Donald   3/15/2018 2:00 PM   Anticoagulation Therapy Visit    Description:  82 year old female   Provider:   ANTICOAGULATION CLINIC   Department:   Anti Coagulation           INR as of 3/15/2018     Today's INR 2.9      Anticoagulation Summary as of 3/15/2018     INR goal 2.5-3.5   Today's INR 2.9   Full instructions 5 mg on Mon, Wed, Fri; 4 mg all other days   Next INR check 4/12/2018    Indications   Long-term (current) use of anticoagulants [Z79.01] [Z79.01]  Mitral valve disorder s/po 1-9-15 remodeling percut  + clip  (Resolved) [I05.9]         Your next Anticoagulation Clinic appointment(s)     Mar 15, 2018  2:00 PM CDT   An[facility] with  [facility]    [address] [id number] [phone]            Apr 12, 2018  2:00 PM CDT   Anticoagulation Visit with  [facility] CLINIC   [facility]    [address] [place] [id number] [phone]              Contact Numbers     [facility]  Please call  [phone] to cancel and/or reschedule your appointment   Please call  [phone] with any problems or questions regarding your therapy.        March 2018 Details    Sun Mon Tue Wed Thu Fri Sat         1               2               3                 4               5               6               7               8               9               10                 11               12               13               14               15      4 mg   See details      16      5 mg         17      4 mg           18      4 mg         19      5 mg         20      4 mg         21      5 mg         22      4 mg         23      5 mg         24      4 mg           25      4 mg         26      5 mg         27      4 mg         28      5 mg         29      4 mg         30      5 mg         31      4 mg           Date Details   03/15 This INR check               How to take your warfarin dose     To take:  4 mg Take 1 of the 4 mg tablets.    To take:  5 mg Take 1 of the 4 mg tablets and 1 of the 1 mg tablets.           April 2018 Details    Sun Mon Tue Wed Thu Fri Sat     1      4 mg         2      5 mg         3      4 mg         4      5 mg         5      4 mg         6      5 mg         7      4 mg           8      4 mg         9      5 mg         10      4 mg         11      5 mg         12            13               14                 15               16               17               18               19               20               21                 22               23               24               25               26               27               28                 29               30                     Date Details   No additional details    Date of next INR:  4/12/2018         How to take your warfarin dose     To take:  4 mg Take 1 of the 4 mg tablets.    To take:  5 mg Take 1 of the 4 mg tablets and 1 of the 1 mg tablets.

## 2018-03-20 ENCOUNTER — TELEPHONE (OUTPATIENT)
Dept: FAMILY MEDICINE | Facility: CLINIC | Age: 83
End: 2018-03-20

## 2018-03-20 ENCOUNTER — OFFICE VISIT (OUTPATIENT)
Dept: CARDIOLOGY | Facility: CLINIC | Age: 83
End: 2018-03-20
Attending: INTERNAL MEDICINE
Payer: COMMERCIAL

## 2018-03-20 VITALS
HEIGHT: 65 IN | BODY MASS INDEX: 31.16 KG/M2 | SYSTOLIC BLOOD PRESSURE: 134 MMHG | OXYGEN SATURATION: 96 % | DIASTOLIC BLOOD PRESSURE: 80 MMHG | WEIGHT: 187 LBS | HEART RATE: 62 BPM

## 2018-03-20 DIAGNOSIS — I50.32 CHRONIC DIASTOLIC HEART FAILURE (H): ICD-10-CM

## 2018-03-20 DIAGNOSIS — I48.20 CHRONIC ATRIAL FIBRILLATION (H): ICD-10-CM

## 2018-03-20 DIAGNOSIS — Z82.49 FH: MITRAL VALVE REPAIR: ICD-10-CM

## 2018-03-20 DIAGNOSIS — I34.0 MITRAL VALVE INSUFFICIENCY, UNSPECIFIED ETIOLOGY: ICD-10-CM

## 2018-03-20 DIAGNOSIS — Z53.9 DIAGNOSIS NOT YET DEFINED: Primary | ICD-10-CM

## 2018-03-20 DIAGNOSIS — I25.10 CORONARY ARTERY DISEASE INVOLVING NATIVE CORONARY ARTERY OF NATIVE HEART WITHOUT ANGINA PECTORIS: ICD-10-CM

## 2018-03-20 DIAGNOSIS — I10 BENIGN ESSENTIAL HYPERTENSION: Primary | ICD-10-CM

## 2018-03-20 DIAGNOSIS — I10 BENIGN ESSENTIAL HYPERTENSION: ICD-10-CM

## 2018-03-20 DIAGNOSIS — E78.00 HYPERCHOLESTEROLEMIA: ICD-10-CM

## 2018-03-20 DIAGNOSIS — I50.22 CHRONIC SYSTOLIC CONGESTIVE HEART FAILURE (H): ICD-10-CM

## 2018-03-20 LAB
ANION GAP SERPL CALCULATED.3IONS-SCNC: 14.3 MMOL/L (ref 6–17)
BUN SERPL-MCNC: 24 MG/DL (ref 7–30)
CALCIUM SERPL-MCNC: 10 MG/DL (ref 8.5–10.5)
CHLORIDE SERPL-SCNC: 102 MMOL/L (ref 98–107)
CO2 SERPL-SCNC: 28 MMOL/L (ref 23–29)
CREAT SERPL-MCNC: 0.75 MG/DL (ref 0.7–1.3)
GFR SERPL CREATININE-BSD FRML MDRD: 74 ML/MIN/1.7M2
GLUCOSE SERPL-MCNC: 109 MG/DL (ref 70–105)
POTASSIUM SERPL-SCNC: 4.3 MMOL/L (ref 3.5–5.1)
SODIUM SERPL-SCNC: 140 MMOL/L (ref 136–145)

## 2018-03-20 PROCEDURE — G0179 MD RECERTIFICATION HHA PT: HCPCS | Performed by: FAMILY MEDICINE

## 2018-03-20 PROCEDURE — 80048 BASIC METABOLIC PNL TOTAL CA: CPT | Performed by: INTERNAL MEDICINE

## 2018-03-20 PROCEDURE — 99214 OFFICE O/P EST MOD 30 MIN: CPT | Performed by: PHYSICIAN ASSISTANT

## 2018-03-20 PROCEDURE — 36415 COLL VENOUS BLD VENIPUNCTURE: CPT | Performed by: INTERNAL MEDICINE

## 2018-03-20 NOTE — TELEPHONE ENCOUNTER
Our goal is to have forms completed within 72 hours, however some forms may require a visit or additional information.    What clinic location was the form placed at [facility] or [place].?     Who is the form from? Home Care  Where did the form come from? Faxed to clinic   The form was placed in the inbox of Bess Lion MD      Please fax to [phone]    Additional comments: face to face encounter    Call take on 3/20/2018 at 4:52 PM by Sunshine Doty

## 2018-03-20 NOTE — LETTER
3/20/2018    Bess Lion MD  [address]  [facility] [address]    RE: Kenyatta Poweringa       Dear Colleague,    I had the pleasure of seeing Kenyatta Donald in the [facility].      Cardiology Progress Note    Date of Service: 03/20/2018      Reason for visit: hospital follow up for congestive heart failure    Primary cardiologist: Dr. Anderson Guan       HPI:  Ms. 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 She has had intermittent issues controlling her blood pressure. In October at her visit with Dr. Guan she was moderately hypertensive, and her Toprol XL was increased to 75mg daily at that time. Plans were for follow up with him in 4-6 months for echo and Holter monitor. She was then seen by Patience Weathers NP in December, and her BP had improved slightly and renal function remained stable. However, she was having ongoing lower extremity edema and worsening leg weakness. She remained in afib but was rate controlled at that time. She was referred to the lymphedema clinic for further evaluation. She was then noted to have slow weight gain and edema, and she was directed to the ED for further evaluation. This prompted a hospitalization for diastolic heart failure. She was diuresed with IV lasix; echo at that time showed preserved LVEF of 55-60% which was unchanged, and there was normal LV wall motion. RV was also normal in terms of size and function, and no valvular abnormalities were identified. She was discharged home on an increased dose of lasix (40mg daily). I saw her in follow up shortly after and she had continued to do well. Her main concerns revolved around muscle weakness and sciatica pain, and no changes were made. When she returned to see Dr. Guan in  February, she again continued to do well, and she was asked to return for 2 month follow up.    Today her BP remains relatively well controlled. Her SBP is running 120-130 when home health checks her. Weight has remained stable, and she does not feel she is retaining any more fluid. She makes attempts at compression stockings, but finds them hard to put on; she is wearing them at our visit today. Her sciatica continues to bother her and she now has home PT/OT. In regard to cardiac symptoms she is doing well. She denies chest pain, palpitations, dizziness, or orthopnea. As above, she does not feel she is having any edema. She sleeps in a recliner chronically, mostly for comfort. Her labs today are unremarkable, with normal renal function and no electrolyte abnormalities. Overall, she feels she continues to do well and has no new concerns today.      ASSESSMENT/PLAN:    1. Chronic diastolic heart failure.   --Has remained at her baseline since recent hospitalization in Dec 2017. Continue 40mg daily of lasix; renal function has remained stable.    --May benefit from [facility] clinic if she has recurrence of heart failure, but for now remains stable.  Asked her to continue to weigh herself daily, use compression stockings and elevate her legs when possible.     --She may have underlying WALESKA but states she was unable to complete a sleep test in the past and declines repeat testing at this juncture.     2.  Chronic atrial fibrillation, history of tachy-adrianna syndrome.   --Rate controlled, continue current dose Toprol XL.    --Continue AC with warfarin. She has her INRs monitored in [place].     3. Mitral insufficiency, s/p mitral valve clip 2015.   --Mild gradient across valve leaflets, continues with routine echo surveillance.    --Continue with Abx prophylaxis for dental and surgical procedures per Dr. Guan.     4.  Nonobstructive CAD.   --Last angiogram 12/2014 with 40-50% stenosis in mid RCA and minimal disease  elsewhere. She remains free of chest pain.     --Not on ASA as she is on warfarin.   --Continue simvastatin 20mg daily, Last LDL 74, Feb 2018.     5. Hypertension.   --Relatively well controlled with SBP running 120-130s.    --Continue metoprolol, amlodipine, lisinopril without change along with diuretics as above.       Follow up plan: With Dr. Anderson Guan this summer as previously planned.       Orders this Visit:  No orders of the defined types were placed in this encounter.    No orders of the defined types were placed in this encounter.    There are no discontinued medications.        CURRENT MEDICATIONS:  Current Outpatient Prescriptions   Medication Sig Dispense Refill     lisinopril (PRINIVIL/ZESTRIL) 20 MG tablet Take 1 tablet (20 mg) by mouth 2 times daily 180 tablet 3     traMADol (ULTRAM) 50 MG tablet Take 1 tablet (50 mg) by mouth every 6 hours as needed for moderate pain or severe pain 15 tablet 0     levothyroxine (SYNTHROID/LEVOTHROID) 100 MCG tablet TAKE 1 TABLET BY MOUTH DAILY 90 tablet 1     furosemide (LASIX) 40 MG tablet TAKE 1 TABLET BY MOUTH DAILY 90 tablet 1     metoprolol (TOPROL-XL) 50 MG 24 hr tablet Take 1.5 tablets (75 mg) by mouth daily 135 tablet 1     order for DME Equipment being ordered: compression hose   BK 20-30mm   2 pair as insurance will pay 1 each 0     allopurinol (ZYLOPRIM) 100 MG tablet TAKE 1 TABLET BY MOUTH EVERY DAY 90 tablet 2     simvastatin (ZOCOR) 40 MG tablet Take 0.5 tablets (20 mg) by mouth At Bedtime 45 tablet 3     amoxicillin (AMOXIL) 500 MG capsule Take 4 capsules by mouth 30 minutes prior to dental work 4 capsule 4     warfarin (COUMADIN) 4 MG tablet Take 1 tablet (4 mg) by mouth daily Taking 4 mg m,w,f & 4mg + 1 mg rest of week (Patient taking differently: Take 4 mg by mouth daily Taking 5 mg m,w,f & 4mg rest of week) 135 tablet 2     amLODIPine (NORVASC) 5 MG tablet Take 1 tablet (5 mg) by mouth 2 times daily 180 tablet 3     cholecalciferol (VITAMIN D)  1000 UNIT tablet Take 2,000 Units by mouth daily        order for DME Equipment being ordered: mastectomy bras & prostheses   S/po mastectomy of unknown side     C50.919 1 each 0     nystatin (MYCOSTATIN) 530978 UNIT/GM POWD Apply topically 3 times daily as needed 60 g 1     acetaminophen (TYLENOL) 325 MG tablet Take 2 tablets (650 mg) by mouth every 4 hours as needed for mild pain 100 tablet      albuterol (PROAIR HFA, PROVENTIL HFA, VENTOLIN HFA) 108 (90 BASE) MCG/ACT inhaler Inhale 2 puffs into the lungs every 6 hours as needed for shortness of breath / dyspnea or wheezing       ASPIRIN NOT PRESCRIBED (INTENTIONAL) Please choose reason not prescribed, below (Patient not taking: Reported on 3/20/2018) 1 each 0       ALLERGIES   No Known Allergies    PAST MEDICAL HISTORY:  Past Medical History:   Diagnosis Date     Atrial fibrillation (H)      Benign essential hypertension      Juan Pablo-tachy syndrome (H)      Breast cancer (H)     s/p bilateral mastectomy     CHF (congestive heart failure) (H)      Coronary artery disease involving native coronary artery of native heart without angina pectoris     cardiac cath 2014: 40-50% mid RCA stenosis with minimal other disease     GERD (gastroesophageal reflux disease)      Hypercholesterolaemia      Hypothyroidism      Kidney stone      Mitral valve regurgitation     sp mitral clip 1/9/15     Need for SBE (subacute bacterial endocarditis) prophylaxis      Osteoporosis      Pulmonary embolism (H)      Sleep apnea      Spinal muscular atrophy type III (H)     Dr. Daily, [place] [facility]       PAST SURGICAL HISTORY:  Past Surgical History:   Procedure Laterality Date     ANESTHESIA OUT OF OR PERCUTANEOUS MITRAL VALVE REPAIR N/A 1/9/2015    Procedure: ANESTHESIA OUT OF OR PERCUTANEOUS MITRAL VALVE REPAIR;  Surgeon: Generic Anesthesia Provider;  Location: [facility] OR     GYN SURGERY      hysterectomy     MASTECTOMY      bilateral     ORTHOPEDIC SURGERY      knee replacement      PERCUTANEIOUS MITRAL VALVE REPAIR  2015       FAMILY HISTORY:  Family History   Problem Relation Age of Onset     Cancer - colorectal Mother      Alzheimer Disease Mother       age 78     DIABETES Daughter      Asthma Daughter      HEART DISEASE Father       age 60     Family History Negative Daughter      Unknown/Adopted Maternal Grandmother      Unknown/Adopted Maternal Grandfather      Unknown/Adopted Paternal Grandmother      Unknown/Adopted Paternal Grandfather      Coronary Artery Disease No family hx of      Hypertension No family hx of      Hyperlipidemia No family hx of      CEREBROVASCULAR DISEASE No family hx of      Breast Cancer No family hx of      Colon Cancer No family hx of      Prostate Cancer No family hx of      Other Cancer No family hx of      Depression No family hx of      Anxiety Disorder No family hx of      MENTAL ILLNESS No family hx of      Substance Abuse No family hx of      Anesthesia Reaction No family hx of      OSTEOPOROSIS No family hx of      Genetic Disorder No family hx of      Thyroid Disease No family hx of      Obesity No family hx of        SOCIAL HISTORY:  Social History     Social History     Marital status:      Spouse name: N/A     Number of children: 3     Years of education: N/A     Occupational History     Retired teacher      Social History Main Topics     Smoking status: Never Smoker     Smokeless tobacco: Never Used     Alcohol use No     Drug use: No     Sexual activity: No     Other Topics Concern     Parent/Sibling W/ Cabg, Mi Or Angioplasty Before 65f 55m? No     Caffeine Concern No     Sleep Concern No     Stress Concern No     Weight Concern Yes     weight loss     Special Diet No     low sodium     Exercise No     PT     Seat Belt Yes     Social History Narrative    , 3 children, lives in a Coop ([employer]), has a living will and wants to be DNR/DNI.   (last updated 2017)        Review of Systems:  Cardiovascular: negative  "for chest pain, palpitations, neg orthopnea, neg worsening LE edema.   Constitutional: negative for chills, sweats, fevers. Pos for muscle aches (chronic)  Resp: Negative for worsening DEWITT, cough, wheezing, hemoptysis, known lung dz.  HEENT: Negative for new visual changes, frequent headaches  Gastrointestinal: negative for abdominal pain, diarrhea, nausea, vomiting  Hematologic/lymphatic: pos for current systemic anticoagulation, hx of blood clots  Musculoskeletal: pos for chronic joint pain  Neurological: negative for focal weakness, LOC, seizures, syncope      Physical Exam:  Vitals: /80 (BP Location: Left arm, Patient Position: Chair, Cuff Size: Adult Large)  Pulse 62  Ht 1.651 m (5' 5\")  Wt 84.8 kg (187 lb)  LMP  (LMP Unknown)  SpO2 96%  BMI 31.12 kg/m2   Wt Readings from Last 4 Encounters:   03/20/18 84.8 kg (187 lb)   03/13/18 84.9 kg (187 lb 3.2 oz)   03/09/18 83.5 kg (184 lb)   02/27/18 85.3 kg (188 lb)       GEN:  In general, this is a well nourished  female in no acute distress on room air.  Patient ambulatory with use of rolling walker.   HEENT:  Pupils equal, round. Sclerae nonicteric. Clear oropharynx. Mucous membranes moist.  NECK: Supple, no masses appreciated. Trachea midline. No JVD while upright.  C/V:  Regular rate and rhythm, 1/6 PAUL LSB. No rub or gallop.   RESP: Respirations are unlabored. No use of accessory muscles. Clear to auscultation bilaterally without wheezing, rales, or rhonchi.  GI: Abdomen soft, nontender, nondistended.   EXTREM: Trace bilateral LE edema, has compression stocking on during visit. No cyanosis or clubbing.  NEURO: Alert and oriented, cooperative. Gait not formally assessed. No obvious focal deficits.   PSYCH: Normal affect.  SKIN: Warm and dry. No rashes or petechiae appreciated.       Recent Lab Results:    BMP RESULTS:  Lab Results   Component Value Date     03/20/2018    POTASSIUM 4.3 03/20/2018    CHLORIDE 102 03/20/2018    CO2 28 " 03/20/2018    ANIONGAP 14.3 03/20/2018     (H) 03/20/2018    BUN 24 03/20/2018    CR 0.75 03/20/2018    GFRESTIMATED 74 03/20/2018    GFRESTBLACK 90 03/20/2018    VICKIE 10.0 03/20/2018      Results for DARLENE FRIAS (MRN [id number]) as of 3/20/2018 14:33   Ref. Range 2/1/2018 12:27   ALT Latest Ref Range: 5 - 30 U/L <5 (L)   Cholesterol Latest Ref Range: <200 mg/dL 149   HDL Cholesterol Latest Ref Range: >49 mg/dL 52   LDL Cholesterol Calculated Latest Ref Range: <100 mg/dL 74   Non HDL Cholesterol Latest Ref Range: <130 mg/dL 97   Triglycerides Latest Ref Range: <150 mg/dL 113         New/Pertinent imaging results since last visit:     Echo 12/6/17  Interpretation Summary     The visual ejection fraction is estimated at 55-60%.  Normal left ventricular wall motion  The right ventricle is normal in size and function.  Mitral clip apparatus present  There is trace to mild mitral regurgitation.  There is mild mitral stenosis.  The mean mitral valve gradient is 5mmHg at HR around 70.  Compared to the prior study, EF unchanged, less MR and TR noted on present  study.  No hemodynamically significant valvular abnormalities. The study was  technically difficult.      Maddie Contreras PA-C  [facility]  Pager [phone]    Thank you for allowing me to participate in the care of your patient.    Sincerely,     CHYNA Hutchinson     [facility]

## 2018-03-20 NOTE — MR AVS SNAPSHOT
After Visit Summary   3/20/2018    Kenyatta Frias    MRN: [id number]           Patient Information     Date Of Birth          1935        Visit Information        Provider Department      3/20/2018 2:30 PM Maddie Contreras PA [facility]        Today's Diagnoses     Benign essential hypertension        Hypercholesterolemia        Chronic atrial fibrillation (H)        Mitral valve insufficiency, unspecified etiology        FH: mitral valve repair        Coronary artery disease involving native coronary artery of native heart without angina pectoris          Care Instructions    Visit Summary:    Today we discussed:   No medication changes.  Your labs look good today.     Test results:   Results for KENYATTA FRIAS (MRN [id number]) as of 3/20/2018 14:37   Ref. Range 3/20/2018 13:27   Sodium Latest Ref Range: 136 - 145 mmol/L 140   Potassium Latest Ref Range: 3.5 - 5.1 mmol/L 4.3   Chloride Latest Ref Range: 98 - 107 mmol/L 102   Carbon Dioxide Latest Ref Range: 23 - 29 mmol/L 28   Urea Nitrogen Latest Ref Range: 7 - 30 mg/dL 24   Creatinine Latest Ref Range: 0.70 - 1.30 mg/dL 0.75   GFR Estimate Latest Ref Range: >60 mL/min/1.7m2 74   GFR Estimate If Black Latest Ref Range: >60 mL/min/1.7m2 90   Calcium Latest Ref Range: 8.5 - 10.5 mg/dL 10.0   Anion Gap Latest Ref Range: 6 - 17 mmol/L 14.3       Follow up:   With Dr. Guan in June 2018 as planned.     Please call my nurse Serina at [phone] with any questions or concerns. Scheduling phone number: [phone] if needed.                 Follow-ups after your visit        Your next 10 appointments already scheduled     Apr 12, 2018  2:00 PM CDT   Anticoagulation Visit with  ANTICOAGULATION CLINIC   [facility] ([facility])    [address] [address] [phone]              Who to contact     If you have questions or need follow up  "information about today's clinic visit or your schedule please contact [facility] directly at [phone].  Normal or non-critical lab and imaging results will be communicated to you by TimeCasthart, letter or phone within 4 business days after the clinic has received the results. If you do not hear from us within 7 days, please contact the clinic through TimeCasthart or phone. If you have a critical or abnormal lab result, we will notify you by phone as soon as possible.  Submit refill requests through Snaptrip or call your pharmacy and they will forward the refill request to us. Please allow 3 business days for your refill to be completed.          Additional Information About Your Visit        TimeCastharLookAcross Information     Snaptrip lets you send messages to your doctor, view your test results, renew your prescriptions, schedule appointments and more. To sign up, go to [email or web address] . Click on \"Log in\" on the left side of the screen, which will take you to the Welcome page. Then click on \"Sign up Now\" on the right side of the page.     You will be asked to enter the access code listed below, as well as some personal information. Please follow the directions to create your username and password.     Your access code is: [email or web address]  Expires: 2018  3:20 PM     Your access code will  in 90 days. If you need help or a new code, please call your [facility] clinic or [phone].        Care EveryWhere ID     This is your Care EveryWhere ID. This could be used by other organizations to access your [facility] medical records  [id number]        Your Vitals Were     Pulse Height Last Period Pulse Oximetry BMI (Body Mass Index)       62 1.651 m (5' 5\") (LMP Unknown) 96% 31.12 kg/m2        Blood Pressure from Last 3 Encounters:   18 134/80   18 130/70   18 134/60    Weight from Last 3 Encounters:   18 84.8 kg (187 lb)   18 84.9 kg (187 lb 3.2 oz) "   03/09/18 83.5 kg (184 lb)              We Performed the Following     Follow-Up with Cardiac Advanced Practice Provider          Today's Medication Changes          These changes are accurate as of 3/20/18  2:40 PM.  If you have any questions, ask your nurse or doctor.               These medicines have changed or have updated prescriptions.        Dose/Directions    warfarin 4 MG tablet   Commonly known as:  COUMADIN   This may have changed:  additional instructions   Used for:  Atrial fibrillation, unspecified type (H), History of pulmonary embolism        Dose:  4 mg   Take 1 tablet (4 mg) by mouth daily Taking 4 mg m,w,f & 4mg + 1 mg rest of week   Quantity:  135 tablet   Refills:  2                Primary Care Provider Office Phone # Fax #    Bess Lion [phone] [address] [facility] [address]        Equal Access to Services     SUKUMAR KIMBALL : Hadii zonia shannon hadasho Soomaali, waaxda luqadaha, qaybta kaalmada adeegyada, emma castellanos . So [facility] [phone].    ATENCIÓN: Si habla español, tiene a duenas disposición servicios gratuitos de asistencia lingüística. Jenny al [phone].    We comply with applicable federal civil rights laws and [place] laws. We do not discriminate on the basis of race, color, national origin, age, disability, sex, sexual orientation, or gender identity.            Thank you!     Thank you for choosing [facility]  for your care. Our goal is always to provide you with excellent care. Hearing back from our patients is one way we can continue to improve our services. Please take a few minutes to complete the written survey that you may receive in the mail after your visit with us. Thank you!             Your Updated Medication List - Protect others around you: Learn how to safely use, store and throw away your medicines at www.disposemymeds.org.          This list is  accurate as of 3/20/18  2:40 PM.  Always use your most recent med list.                   Brand Name Dispense Instructions for use Diagnosis    acetaminophen 325 MG tablet    TYLENOL    100 tablet    Take 2 tablets (650 mg) by mouth every 4 hours as needed for mild pain    Closed nondisplaced fracture of left patella, unspecified fracture morphology, initial encounter       albuterol 108 (90 BASE) MCG/ACT Inhaler    PROAIR HFA/PROVENTIL HFA/VENTOLIN HFA     Inhale 2 puffs into the lungs every 6 hours as needed for shortness of breath / dyspnea or wheezing        allopurinol 100 MG tablet    ZYLOPRIM    90 tablet    TAKE 1 TABLET BY MOUTH EVERY DAY    Gout       amLODIPine 5 MG tablet    NORVASC    180 tablet    Take 1 tablet (5 mg) by mouth 2 times daily    Benign essential hypertension       amoxicillin 500 MG capsule    AMOXIL    4 capsule    Take 4 capsules by mouth 30 minutes prior to dental work    Mitral valve insufficiency       ASPIRIN NOT PRESCRIBED    INTENTIONAL    1 each    Please choose reason not prescribed, below        cholecalciferol 1000 UNIT tablet    vitamin D3     Take 2,000 Units by mouth daily        furosemide 40 MG tablet    LASIX    90 tablet    TAKE 1 TABLET BY MOUTH DAILY    Acute on chronic diastolic congestive heart failure (H)       levothyroxine 100 MCG tablet    SYNTHROID/LEVOTHROID    90 tablet    TAKE 1 TABLET BY MOUTH DAILY    Acquired hypothyroidism       lisinopril 20 MG tablet    PRINIVIL/ZESTRIL    180 tablet    Take 1 tablet (20 mg) by mouth 2 times daily    Essential hypertension, benign       metoprolol succinate 50 MG 24 hr tablet    TOPROL-XL    135 tablet    Take 1.5 tablets (75 mg) by mouth daily    Coronary artery disease involving native coronary artery of native heart without angina pectoris       nystatin 607556 UNIT/GM Powd    MYCOSTATIN    60 g    Apply topically 3 times daily as needed    Intertrigo       * order for DME     1 each    Equipment being ordered:  mastectomy bras & prostheses  S/po mastectomy of unknown side     C50.919    Malignant neoplasm of female breast, unspecified laterality, unspecified site of breast       * order for DME     1 each    Equipment being ordered: compression hose  BK 20-30mm  2 pair as insurance will pay    Lymphedema of both lower extremities       simvastatin 40 MG tablet    ZOCOR    45 tablet    Take 0.5 tablets (20 mg) by mouth At Bedtime    Mixed hyperlipidemia       traMADol 50 MG tablet    ULTRAM    15 tablet    Take 1 tablet (50 mg) by mouth every 6 hours as needed for moderate pain or severe pain    Acute midline low back pain without sciatica       warfarin 4 MG tablet    COUMADIN    135 tablet    Take 1 tablet (4 mg) by mouth daily Taking 4 mg m,w,f & 4mg + 1 mg rest of week    Atrial fibrillation, unspecified type (H), History of pulmonary embolism       * Notice:  This list has 2 medication(s) that are the same as other medications prescribed for you. Read the directions carefully, and ask your doctor or other care provider to review them with you.

## 2018-03-20 NOTE — PROGRESS NOTES
Cardiology Progress Note    Date of Service: 03/20/2018      Reason for visit: hospital follow up for congestive heart failure    Primary cardiologist: Dr. Anderson Guan       HPI:  Ms. 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 She has had intermittent issues controlling her blood pressure. In October at her visit with Dr. Guan she was moderately hypertensive, and her Toprol XL was increased to 75mg daily at that time. Plans were for follow up with him in 4-6 months for echo and Holter monitor. She was then seen by Patience Weathers NP in December, and her BP had improved slightly and renal function remained stable. However, she was having ongoing lower extremity edema and worsening leg weakness. She remained in afib but was rate controlled at that time. She was referred to the lymphedema clinic for further evaluation. She was then noted to have slow weight gain and edema, and she was directed to the ED for further evaluation. This prompted a hospitalization for diastolic heart failure. She was diuresed with IV lasix; echo at that time showed preserved LVEF of 55-60% which was unchanged, and there was normal LV wall motion. RV was also normal in terms of size and function, and no valvular abnormalities were identified. She was discharged home on an increased dose of lasix (40mg daily). I saw her in follow up shortly after and she had continued to do well. Her main concerns revolved around muscle weakness and sciatica pain, and no changes were made. When she returned to see Dr. Guan in February, she again continued to do well, and she was asked to return for 2 month follow up.    Today her BP remains relatively well controlled. Her SBP is running 120-130 when home health checks her. Weight has remained  stable, and she does not feel she is retaining any more fluid. She makes attempts at compression stockings, but finds them hard to put on; she is wearing them at our visit today. Her sciatica continues to bother her and she now has home PT/OT. In regard to cardiac symptoms she is doing well. She denies chest pain, palpitations, dizziness, or orthopnea. As above, she does not feel she is having any edema. She sleeps in a recliner chronically, mostly for comfort. Her labs today are unremarkable, with normal renal function and no electrolyte abnormalities. Overall, she feels she continues to do well and has no new concerns today.      ASSESSMENT/PLAN:    1. Chronic diastolic heart failure.   --Has remained at her baseline since recent hospitalization in Dec 2017. Continue 40mg daily of lasix; renal function has remained stable.    --May benefit from [facility] clinic if she has recurrence of heart failure, but for now remains stable.  Asked her to continue to weigh herself daily, use compression stockings and elevate her legs when possible.     --She may have underlying WALESKA but states she was unable to complete a sleep test in the past and declines repeat testing at this juncture.     2.  Chronic atrial fibrillation, history of tachy-adrianna syndrome.   --Rate controlled, continue current dose Toprol XL.    --Continue AC with warfarin. She has her INRs monitored in [place].     3. Mitral insufficiency, s/p mitral valve clip 2015.   --Mild gradient across valve leaflets, continues with routine echo surveillance.    --Continue with Abx prophylaxis for dental and surgical procedures per Dr. Guan.     4.  Nonobstructive CAD.   --Last angiogram 12/2014 with 40-50% stenosis in mid RCA and minimal disease elsewhere. She remains free of chest pain.     --Not on ASA as she is on warfarin.   --Continue simvastatin 20mg daily, Last LDL 74, Feb 2018.     5. Hypertension.   --Relatively well controlled with SBP running 120-130s.     --Continue metoprolol, amlodipine, lisinopril without change along with diuretics as above.       Follow up plan: With Dr. Anderson Guan this summer as previously planned.       Orders this Visit:  No orders of the defined types were placed in this encounter.    No orders of the defined types were placed in this encounter.    There are no discontinued medications.        CURRENT MEDICATIONS:  Current Outpatient Prescriptions   Medication Sig Dispense Refill     lisinopril (PRINIVIL/ZESTRIL) 20 MG tablet Take 1 tablet (20 mg) by mouth 2 times daily 180 tablet 3     traMADol (ULTRAM) 50 MG tablet Take 1 tablet (50 mg) by mouth every 6 hours as needed for moderate pain or severe pain 15 tablet 0     levothyroxine (SYNTHROID/LEVOTHROID) 100 MCG tablet TAKE 1 TABLET BY MOUTH DAILY 90 tablet 1     furosemide (LASIX) 40 MG tablet TAKE 1 TABLET BY MOUTH DAILY 90 tablet 1     metoprolol (TOPROL-XL) 50 MG 24 hr tablet Take 1.5 tablets (75 mg) by mouth daily 135 tablet 1     order for DME Equipment being ordered: compression hose   BK 20-30mm   2 pair as insurance will pay 1 each 0     allopurinol (ZYLOPRIM) 100 MG tablet TAKE 1 TABLET BY MOUTH EVERY DAY 90 tablet 2     simvastatin (ZOCOR) 40 MG tablet Take 0.5 tablets (20 mg) by mouth At Bedtime 45 tablet 3     amoxicillin (AMOXIL) 500 MG capsule Take 4 capsules by mouth 30 minutes prior to dental work 4 capsule 4     warfarin (COUMADIN) 4 MG tablet Take 1 tablet (4 mg) by mouth daily Taking 4 mg m,w,f & 4mg + 1 mg rest of week (Patient taking differently: Take 4 mg by mouth daily Taking 5 mg m,w,f & 4mg rest of week) 135 tablet 2     amLODIPine (NORVASC) 5 MG tablet Take 1 tablet (5 mg) by mouth 2 times daily 180 tablet 3     cholecalciferol (VITAMIN D) 1000 UNIT tablet Take 2,000 Units by mouth daily        order for DME Equipment being ordered: mastectomy bras & prostheses   S/po mastectomy of unknown side     C50.919 1 each 0     nystatin (MYCOSTATIN) 378861 UNIT/GM  POWD Apply topically 3 times daily as needed 60 g 1     acetaminophen (TYLENOL) 325 MG tablet Take 2 tablets (650 mg) by mouth every 4 hours as needed for mild pain 100 tablet      albuterol (PROAIR HFA, PROVENTIL HFA, VENTOLIN HFA) 108 (90 BASE) MCG/ACT inhaler Inhale 2 puffs into the lungs every 6 hours as needed for shortness of breath / dyspnea or wheezing       ASPIRIN NOT PRESCRIBED (INTENTIONAL) Please choose reason not prescribed, below (Patient not taking: Reported on 3/20/2018) 1 each 0       ALLERGIES   No Known Allergies    PAST MEDICAL HISTORY:  Past Medical History:   Diagnosis Date     Atrial fibrillation (H)      Benign essential hypertension      Juan Pablo-tachy syndrome (H)      Breast cancer (H)     s/p bilateral mastectomy     CHF (congestive heart failure) (H)      Coronary artery disease involving native coronary artery of native heart without angina pectoris     cardiac cath : 40-50% mid RCA stenosis with minimal other disease     GERD (gastroesophageal reflux disease)      Hypercholesterolaemia      Hypothyroidism      Kidney stone      Mitral valve regurgitation     sp mitral clip 1/9/15     Need for SBE (subacute bacterial endocarditis) prophylaxis      Osteoporosis      Pulmonary embolism (H)      Sleep apnea      Spinal muscular atrophy type III (H)     Dr. Daily, [facility]       PAST SURGICAL HISTORY:  Past Surgical History:   Procedure Laterality Date     ANESTHESIA OUT OF OR PERCUTANEOUS MITRAL VALVE REPAIR N/A 2015    Procedure: ANESTHESIA OUT OF OR PERCUTANEOUS MITRAL VALVE REPAIR;  Surgeon: Generic Anesthesia Provider;  Location: [facility] OR     GYN SURGERY      hysterectomy     MASTECTOMY      bilateral     ORTHOPEDIC SURGERY      knee replacement     PERCUTANEIOUS MITRAL VALVE REPAIR  2015       FAMILY HISTORY:  Family History   Problem Relation Age of Onset     Cancer - colorectal Mother      Alzheimer Disease Mother       age 78     DIABETES Daughter       Asthma Daughter      HEART DISEASE Father       age 60     Family History Negative Daughter      Unknown/Adopted Maternal Grandmother      Unknown/Adopted Maternal Grandfather      Unknown/Adopted Paternal Grandmother      Unknown/Adopted Paternal Grandfather      Coronary Artery Disease No family hx of      Hypertension No family hx of      Hyperlipidemia No family hx of      CEREBROVASCULAR DISEASE No family hx of      Breast Cancer No family hx of      Colon Cancer No family hx of      Prostate Cancer No family hx of      Other Cancer No family hx of      Depression No family hx of      Anxiety Disorder No family hx of      MENTAL ILLNESS No family hx of      Substance Abuse No family hx of      Anesthesia Reaction No family hx of      OSTEOPOROSIS No family hx of      Genetic Disorder No family hx of      Thyroid Disease No family hx of      Obesity No family hx of        SOCIAL HISTORY:  Social History     Social History     Marital status:      Spouse name: N/A     Number of children: 3     Years of education: N/A     Occupational History     Retired teacher      Social History Main Topics     Smoking status: Never Smoker     Smokeless tobacco: Never Used     Alcohol use No     Drug use: No     Sexual activity: No     Other Topics Concern     Parent/Sibling W/ Cabg, Mi Or Angioplasty Before 65f 55m? No     Caffeine Concern No     Sleep Concern No     Stress Concern No     Weight Concern Yes     weight loss     Special Diet No     low sodium     Exercise No     PT     Seat Belt Yes     Social History Narrative    , 3 children, lives in a Coop ([employer]), has a living will and wants to be DNR/DNI.   (last updated 2017)        Review of Systems:  Cardiovascular: negative for chest pain, palpitations, neg orthopnea, neg worsening LE edema.   Constitutional: negative for chills, sweats, fevers. Pos for muscle aches (chronic)  Resp: Negative for worsening DEWITT, cough, wheezing, hemoptysis,  "known lung dz.  HEENT: Negative for new visual changes, frequent headaches  Gastrointestinal: negative for abdominal pain, diarrhea, nausea, vomiting  Hematologic/lymphatic: pos for current systemic anticoagulation, hx of blood clots  Musculoskeletal: pos for chronic joint pain  Neurological: negative for focal weakness, LOC, seizures, syncope      Physical Exam:  Vitals: /80 (BP Location: Left arm, Patient Position: Chair, Cuff Size: Adult Large)  Pulse 62  Ht 1.651 m (5' 5\")  Wt 84.8 kg (187 lb)  LMP  (LMP Unknown)  SpO2 96%  BMI 31.12 kg/m2   Wt Readings from Last 4 Encounters:   03/20/18 84.8 kg (187 lb)   03/13/18 84.9 kg (187 lb 3.2 oz)   03/09/18 83.5 kg (184 lb)   02/27/18 85.3 kg (188 lb)       GEN:  In general, this is a well nourished  female in no acute distress on room air.  Patient ambulatory with use of rolling walker.   HEENT:  Pupils equal, round. Sclerae nonicteric. Clear oropharynx. Mucous membranes moist.  NECK: Supple, no masses appreciated. Trachea midline. No JVD while upright.  C/V:  Regular rate and rhythm, 1/6 PAUL LSB. No rub or gallop.   RESP: Respirations are unlabored. No use of accessory muscles. Clear to auscultation bilaterally without wheezing, rales, or rhonchi.  GI: Abdomen soft, nontender, nondistended.   EXTREM: Trace bilateral LE edema, has compression stocking on during visit. No cyanosis or clubbing.  NEURO: Alert and oriented, cooperative. Gait not formally assessed. No obvious focal deficits.   PSYCH: Normal affect.  SKIN: Warm and dry. No rashes or petechiae appreciated.       Recent Lab Results:    BMP RESULTS:  Lab Results   Component Value Date     03/20/2018    POTASSIUM 4.3 03/20/2018    CHLORIDE 102 03/20/2018    CO2 28 03/20/2018    ANIONGAP 14.3 03/20/2018     (H) 03/20/2018    BUN 24 03/20/2018    CR 0.75 03/20/2018    GFRESTIMATED 74 03/20/2018    GFRESTBLACK 90 03/20/2018    VICKIE 10.0 03/20/2018      Results for DARLENE FRIAS" (MRN [id number]) as of 3/20/2018 14:33   Ref. Range 2/1/2018 12:27   ALT Latest Ref Range: 5 - 30 U/L <5 (L)   Cholesterol Latest Ref Range: <200 mg/dL 149   HDL Cholesterol Latest Ref Range: >49 mg/dL 52   LDL Cholesterol Calculated Latest Ref Range: <100 mg/dL 74   Non HDL Cholesterol Latest Ref Range: <130 mg/dL 97   Triglycerides Latest Ref Range: <150 mg/dL 113         New/Pertinent imaging results since last visit:     Echo 12/6/17  Interpretation Summary     The visual ejection fraction is estimated at 55-60%.  Normal left ventricular wall motion  The right ventricle is normal in size and function.  Mitral clip apparatus present  There is trace to mild mitral regurgitation.  There is mild mitral stenosis.  The mean mitral valve gradient is 5mmHg at HR around 70.  Compared to the prior study, EF unchanged, less MR and TR noted on present  study.  No hemodynamically significant valvular abnormalities. The study was  technically difficult.        Maddie Contreras PA-C  [facility]  Pager [phone]

## 2018-03-20 NOTE — TELEPHONE ENCOUNTER
Our goal is to have forms completed within 72 hours, however some forms may require a visit or additional information.    What clinic location was the form placed at [facility] or [place].?     Who is the form from? Home Care  Where did the form come from? Faxed to clinic   The form was placed in the inbox of Bess Lion MD      Please fax to [phone]    Additional comments: plan of care 3/3/2018-5/1/2018    Call take on 3/20/2018 at 1:38 PM by Sunshine Doty

## 2018-03-20 NOTE — PATIENT INSTRUCTIONS
Visit Summary:    Today we discussed:   No medication changes.  Your labs look good today.     Test results:   Results for DARLENE FRIAS (MRN [id number]) as of 3/20/2018 14:37   Ref. Range 3/20/2018 13:27   Sodium Latest Ref Range: 136 - 145 mmol/L 140   Potassium Latest Ref Range: 3.5 - 5.1 mmol/L 4.3   Chloride Latest Ref Range: 98 - 107 mmol/L 102   Carbon Dioxide Latest Ref Range: 23 - 29 mmol/L 28   Urea Nitrogen Latest Ref Range: 7 - 30 mg/dL 24   Creatinine Latest Ref Range: 0.70 - 1.30 mg/dL 0.75   GFR Estimate Latest Ref Range: >60 mL/min/1.7m2 74   GFR Estimate If Black Latest Ref Range: >60 mL/min/1.7m2 90   Calcium Latest Ref Range: 8.5 - 10.5 mg/dL 10.0   Anion Gap Latest Ref Range: 6 - 17 mmol/L 14.3       Follow up:   With Dr. Guan in June 2018 as planned.     Please call my nurse Serina at [phone] with any questions or concerns. Scheduling phone number: [phone] if needed.

## 2018-03-21 NOTE — TELEPHONE ENCOUNTER
Form faxed back to [phone] with OV note from 3-9-18 per [facility] request.

## 2018-03-22 DIAGNOSIS — M54.50 ACUTE MIDLINE LOW BACK PAIN WITHOUT SCIATICA: ICD-10-CM

## 2018-03-22 NOTE — TELEPHONE ENCOUNTER
Call attempted to patient mailbox is full unable to leave message. Per chart review, 03/09/2018 OV reads-      I recommend that you use tylenol(acetaminophen) for pain. Use the acetaminophen ES  Which has 500mgm/tablet You can take up to 2 tablets 4 times a day as need for pain.  If this is not enough, you can add in ibuprofen or aleve(naprosyn) with 2 glasses of fluid and some food-to protect the stomach and esophagus. Please let us know if you are continuing to take ibuprofen or aleve, as we will need to periodically check your kidney function with a blood test.

## 2018-03-22 NOTE — TELEPHONE ENCOUNTER
Reason for Call:  Medication or medication refill:    Do you use a [facility]?  Name of the pharmacy and phone number for the current request:  [facility]    Name of the medication requested: tramadol    Other request: none    Can we leave a detailed message on this number? YES    Phone number patient can be reached at: Home number on file [phone] (home)    Best Time: any    Call taken on 3/22/2018 at 9:36 AM by MUSTAPHA HERRING

## 2018-03-22 NOTE — TELEPHONE ENCOUNTER
traMADol (ULTRAM) 50 MG tablet  Last Written Prescription Date:  03/05/2018  Last Fill Quantity: 15,   # refills: 0  Last Office Visit: 03/09/2018  Future Office visit:       Routing refill request to provider for review/approval because:  Drug not on the [facility], [facility] or [facility] refill protocol or controlled substance

## 2018-03-23 DIAGNOSIS — M54.50 ACUTE MIDLINE LOW BACK PAIN WITHOUT SCIATICA: ICD-10-CM

## 2018-03-23 RX ORDER — TRAMADOL HYDROCHLORIDE 50 MG/1
50 TABLET ORAL EVERY 6 HOURS PRN
Qty: 15 TABLET | Refills: 0 | Status: CANCELLED | OUTPATIENT
Start: 2018-03-23

## 2018-03-23 RX ORDER — TRAMADOL HYDROCHLORIDE 50 MG/1
50 TABLET ORAL EVERY 6 HOURS PRN
Qty: 60 TABLET | Refills: 0 | Status: SHIPPED | OUTPATIENT
Start: 2018-03-23 | End: 2018-08-21

## 2018-03-23 NOTE — TELEPHONE ENCOUNTER
Reason for Call:  Medication or medication refill:    Do you use a [facility]?  Name of the pharmacy and phone number for the current request:  [facility]     Name of the medication requested: traMADol (ULTRAM) 50 MG tablet    Other request: none    Can we leave a detailed message on this number? YES    Phone number patient can be reached at: Home number on file [phone] (home)    Best Time: any    Call taken on 3/23/2018 at 8:43 AM by LISSETTE ASCENCIO

## 2018-03-23 NOTE — TELEPHONE ENCOUNTER
Patient was called. Reviewed providers message below. Reports she has not tried ibuprofen but prefer tramadol refill. States she does not use daily but tramadol works better to manage her pain. Please advice on request.    RX [place] reviewed:  reviewed- no concerns- Pt last filled Rx 03/05/2018 #15 tablets.    [place]  [email or web address]

## 2018-04-02 DIAGNOSIS — I10 BENIGN ESSENTIAL HYPERTENSION: ICD-10-CM

## 2018-04-02 RX ORDER — AMLODIPINE BESYLATE 5 MG/1
5 TABLET ORAL
Qty: 180 TABLET | Refills: 3 | Status: SHIPPED | OUTPATIENT
Start: 2018-04-02 | End: 2019-04-03

## 2018-04-05 ENCOUNTER — PATIENT OUTREACH (OUTPATIENT)
Dept: CARE COORDINATION | Facility: CLINIC | Age: 83
End: 2018-04-05

## 2018-04-05 NOTE — PROGRESS NOTES
Clinic Care Coordination Contact    Situation: Patient chart reviewed by care coordinator.    Background: Patient with spinal muscular atrophy since age 44. Heart failure and chronic pain.    Assessment: Patient was seen by cardiology in March and is currently stable.      Patient lives a lone but has home PT and has PCA that comes in the am to get her day going and then again in the evening to assist with bedtime needs.      Plan/Recommendations: Clinic care coordinator will continue to follow.     Ana Benjamin RN, CCM - Care Coordinator     4/5/2018    2:04 PM  [phone]

## 2018-04-12 ENCOUNTER — ANTICOAGULATION THERAPY VISIT (OUTPATIENT)
Dept: ANTICOAGULATION | Facility: CLINIC | Age: 83
End: 2018-04-12
Payer: COMMERCIAL

## 2018-04-12 DIAGNOSIS — Z79.01 LONG-TERM (CURRENT) USE OF ANTICOAGULANTS: ICD-10-CM

## 2018-04-12 LAB — INR POINT OF CARE: 3 (ref 0.86–1.14)

## 2018-04-12 PROCEDURE — 36416 COLLJ CAPILLARY BLOOD SPEC: CPT

## 2018-04-12 PROCEDURE — 85610 PROTHROMBIN TIME: CPT | Mod: QW

## 2018-04-12 NOTE — MR AVS SNAPSHOT
Kenyatta Donald   4/12/2018 2:00 PM   Anticoagulation Therapy Visit    Description:  82 year old female   Provider:   ANTICOAGULATION CLINIC   Department:   Anti Coagulation           INR as of 4/12/2018     Today's INR 3.0      Anticoagulation Summary as of 4/12/2018     INR goal 2.5-3.5   Today's INR 3.0   Full instructions 5 mg on Mon, Wed, Fri; 4 mg all other days   Next INR check 5/10/2018    Indications   Long-term (current) use of anticoagulants [Z79.01] [Z79.01]  Mitral valve disorder s/po 1-9-15 remodeling percut  + clip  (Resolved) [I05.9]         Your next Anticoagulation Clinic appointment(s)     May 10, 2018  2:00 PM CDT   Anticoagulation Visit with  ANTICOAGULATION CLINIC   [facility]    [address] [address] [phone]              Contact Numbers     [facility]  Please call  [phone] to cancel and/or reschedule your appointment   Please call  [phone] with any problems or questions regarding your therapy.        April 2018 Details    Sun Mon Tue Wed Thu Fri Sat     1               2               3               4               5               6               7                 8               9               10               11               12      4 mg   See details      13      5 mg         14      4 mg           15      4 mg         16      5 mg         17      4 mg         18      5 mg         19      4 mg         20      5 mg         21      4 mg           22      4 mg         23      5 mg         24      4 mg         25      5 mg         26      4 mg         27      5 mg         28      4 mg           29      4 mg         30      5 mg               Date Details   04/12 This INR check               How to take your warfarin dose     To take:  4 mg Take 1 of the 4 mg tablets.    To take:  5 mg Take 1 of the 4 mg tablets and 1 of the 1 mg tablets.           May 2018 Details    Sun  Mon Tue Wed Thu Fri Sat       1      4 mg         2      5 mg         3      4 mg         4      5 mg         5      4 mg           6      4 mg         7      5 mg         8      4 mg         9      5 mg         10            11               12                 13               14               15               16               17               18               19                 20               21               22               23               24               25               26                 27               28               29               30               31                  Date Details   No additional details    Date of next INR:  5/10/2018         How to take your warfarin dose     To take:  4 mg Take 1 of the 4 mg tablets.    To take:  5 mg Take 1 of the 4 mg tablets and 1 of the 1 mg tablets.

## 2018-04-12 NOTE — PROGRESS NOTES
ANTICOAGULATION FOLLOW-UP CLINIC VISIT    Patient Name:  Kenyatta Donald  Date:  4/12/2018  Contact Type:  Face to Face    SUBJECTIVE:        OBJECTIVE    INR Protime   Date Value Ref Range Status   04/12/2018 3.0 (A) 0.86 - 1.14 Final       ASSESSMENT / PLAN  INR assessment THER    Recheck INR In: 4 WEEKS    INR Location Clinic      Anticoagulation Summary as of 4/12/2018     INR goal 2.5-3.5   Today's INR 3.0   Maintenance plan 5 mg (4 mg x 1 and 1 mg x 1) on Mon, Wed, Fri; 4 mg (4 mg x 1) all other days   Full instructions 5 mg on Mon, Wed, Fri; 4 mg all other days   Weekly total 31 mg   No change documented Monique Brito RN   Plan last modified Nancy Zavala RN (12/28/2017)   Next INR check 5/10/2018   Priority INR   Target end date     Indications   Long-term (current) use of anticoagulants [Z79.01] [Z79.01]  Mitral valve disorder s/po 1-9-15 remodeling percut  + clip  (Resolved) [I05.9]         Anticoagulation Episode Summary     INR check location     Preferred lab     Send INR reminders to  [facility]    Comments       Anticoagulation Care Providers     Provider Role Specialty Phone number    Bess Lion Mirna Pyle MD [facility] [phone]            See the Encounter Report to view Anticoagulation Flowsheet and Dosing Calendar (Go to Encounters tab in chart review, and find the Anticoagulation Therapy Visit)        Monique Brito, RN

## 2018-04-20 NOTE — PROGRESS NOTES
Outpatient Occupational Therapy Discharge Note     Patient: Kenyatta Donald  : 1935    DATA: Pt referred to this program for evaluation & treatment of chronic BLE lymphedema was evaluated by this writer 12.   On that date this writer contacted pt's cardiac MD, Dr. Guan, who recommended pt present to ED d/t rapid progression of BLE swelling up legs into buttocks.   On review of pt's notes in Epic today it appears as though pt's CHF is being medically managed and she is receiving home services.   Medicare certification  3.8.18.  ASSESSMENT: Unable to assess progress to goals, LLIS or G code.  PLAN: Discharge Edema Treatment.

## 2018-04-25 ENCOUNTER — TELEPHONE (OUTPATIENT)
Dept: FAMILY MEDICINE | Facility: CLINIC | Age: 83
End: 2018-04-25

## 2018-04-25 NOTE — TELEPHONE ENCOUNTER
Reason for Call:  Form, our goal is to have forms completed with 72 hours, however, some forms may require a visit or additional information.    Type of letter, form or note:  medical    Who is the form from?: Home care    Where did the form come from: form was faxed in    What clinic location was the form placed at?: [facility]    Where the form was placed: 's Box: Bess Lion MD    What number is listed as a contact on the form?: [phone]  Phone [phone]       Additional comments: kat at home OT summary/D/C report    Call taken on 4/25/2018 at 1:53 PM by Suzanna More

## 2018-04-26 ENCOUNTER — MEDICAL CORRESPONDENCE (OUTPATIENT)
Dept: HEALTH INFORMATION MANAGEMENT | Facility: CLINIC | Age: 83
End: 2018-04-26

## 2018-05-01 ENCOUNTER — TELEPHONE (OUTPATIENT)
Dept: FAMILY MEDICINE | Facility: CLINIC | Age: 83
End: 2018-05-01

## 2018-05-01 NOTE — TELEPHONE ENCOUNTER
Reason for Call:  Form, our goal is to have forms completed with 72 hours, however, some forms may require a visit or additional information.    Type of letter, form or note:  medical    Who is the form from?: Home care    Where did the form come from: form was faxed in    What clinic location was the form placed at?: [facility]    Where the form was placed: 's Box: Bess Lion MD    What number is listed as a contact on the form?: [phone]  Phone [phone]       Additional comments: kat at home  PT discharge summary report    Call taken on 5/1/2018 at 4:52 PM by Suzanna More

## 2018-05-02 ENCOUNTER — PATIENT OUTREACH (OUTPATIENT)
Dept: CARE COORDINATION | Facility: CLINIC | Age: 83
End: 2018-05-02

## 2018-05-02 NOTE — PROGRESS NOTES
Clinic Care Coordination Contact    Situation: Patient chart reviewed by care coordinator.    Background: 82 year old female with chronic pain due to spinal muscular atrophy.     Assessment: Home care has discharged patient. She remains stable in her current home.  Has INR's drawn at [facility].     Plan/Recommendations: Will close to clinic care coordination at this time. Re-open as needed.    Ana Benjamin RN, CCM - Care Coordinator     5/2/2018    1:43 PM  [phone]

## 2018-05-11 ENCOUNTER — ANTICOAGULATION THERAPY VISIT (OUTPATIENT)
Dept: ANTICOAGULATION | Facility: CLINIC | Age: 83
End: 2018-05-11
Payer: COMMERCIAL

## 2018-05-11 DIAGNOSIS — Z79.01 LONG-TERM (CURRENT) USE OF ANTICOAGULANTS: ICD-10-CM

## 2018-05-11 LAB — INR POINT OF CARE: 1.5 (ref 0.86–1.14)

## 2018-05-11 PROCEDURE — 36416 COLLJ CAPILLARY BLOOD SPEC: CPT

## 2018-05-11 PROCEDURE — 85610 PROTHROMBIN TIME: CPT | Mod: QW

## 2018-05-11 NOTE — MR AVS SNAPSHOT
Kenyattadennis Donald   5/11/2018 2:15 PM   Anticoagulation Therapy Visit    Description:  82 year old female   Provider:   ANTICOAGULATION CLINIC   Department:   Anti Coagulation           INR as of 5/11/2018     Today's INR 1.5!      Anticoagulation Summary as of 5/11/2018     INR goal 2.5-3.5   Today's INR 1.5!   Full instructions 5/11: 6 mg; 5/12: 5 mg; Otherwise 5 mg on Mon, Wed, Fri; 4 mg all other days   Next INR check 6/1/2018    Indications   Long-term (current) use of anticoagulants [Z79.01] [Z79.01]  Mitral valve disorder s/po 1-9-15 remodeling percut  + clip  (Resolved) [I05.9]         Your next Anticoagulation Clinic appointment(s)     May 31, 2018  1:45 PM CDT   Anticoagulation [facility] with  [facility] CL[facility]    [address] [address] [phone]              Contact Numbers     [facility]  Please call  [phone] to cancel and/or reschedule your appointment   Please call  [phone] with any problems or questions regarding your therapy.        May 2018 Details    Sun Mon Tue Wed Thu Fri Sat       1               2               3               4               5                 6               7               8               9               10               11      6 mg   See details      12      5 mg           13      4 mg         14      5 mg         15      4 mg         16      5 mg         17      4 mg         18      5 mg         19      4 mg           20      4 mg         21      5 mg         22      4 mg         23      5 mg         24      4 mg         25      5 mg         26      4 mg           27      4 mg         28      5 mg         29      4 mg         30      5 mg         31      4 mg            Date Details   05/11 This INR check               How to take your warfarin dose     To take:  4 mg Take 1 of the 4 mg tablets.    To take:  5 mg Take 1 of the 4 mg tablets and 1  of the 1 mg tablets.    To take:  6 mg Take 1 of the 4 mg tablets and 2 of the 1 mg tablets.           June 2018 Details    Sun Mon Tue Wed Thu Fri Sat          1            2                 3               4               5               6               7               8               9                 10               11               12               13               14               15               16                 17               18               19               20               21               22               23                 24               25               26               27               28               29               30                Date Details   No additional details    Date of next INR:  6/1/2018         How to take your warfarin dose     To take:  5 mg Take 1 of the 4 mg tablets and 1 of the 1 mg tablets.

## 2018-05-11 NOTE — PROGRESS NOTES
ANTICOAGULATION FOLLOW-UP CLINIC VISIT    Patient Name:  Kenyatta Donald  Date:  5/11/2018  Contact Type:  Face to Face    SUBJECTIVE:     Patient Findings     Positives Change in diet/appetite, Inflammation    Comments Increased greens           OBJECTIVE    INR Protime   Date Value Ref Range Status   05/11/2018 1.5 (A) 0.86 - 1.14 Final       ASSESSMENT / PLAN  INR assessment SUB    Recheck INR In: 3 WEEKS    INR Location Clinic      Anticoagulation Summary as of 5/11/2018     INR goal 2.5-3.5   Today's INR 1.5!   Maintenance plan 5 mg (4 mg x 1 and 1 mg x 1) on Mon, Wed, Fri; 4 mg (4 mg x 1) all other days   Full instructions 5/11: 6 mg; 5/12: 5 mg; Otherwise 5 mg on Mon, Wed, Fri; 4 mg all other days   Weekly total 31 mg   Plan last modified Nancy Zavala RN (12/28/2017)   Next INR check 6/1/2018   Priority INR   Target end date     Indications   Long-term (current) use of anticoagulants [Z79.01] [Z79.01]  Mitral valve disorder s/po 1-9-15 remodeling percut  + clip  (Resolved) [I05.9]         Anticoagulation Episode Summary     INR check location     Preferred lab     Send INR reminders to  [facility]    Comments       Anticoagulation Care Providers     Provider Role Specialty Phone number    Waqas Loinanahi Pyle MD [facility] [phone]            See the Encounter Report to view Anticoagulation Flowsheet and Dosing Calendar (Go to Encounters tab in chart review, and find the Anticoagulation Therapy Visit)        Gena Dias RN

## 2018-05-16 ENCOUNTER — TELEPHONE (OUTPATIENT)
Dept: FAMILY MEDICINE | Facility: CLINIC | Age: 83
End: 2018-05-16

## 2018-05-16 NOTE — TELEPHONE ENCOUNTER
Reason for Call:  Form, our goal is to have forms completed with 72 hours, however, some forms may require a visit or additional information.    Type of letter, form or note:  medical    Who is the form from?: Home care    Where did the form come from: form was faxed in    What clinic location was the form placed at?: [facility]    Where the form was placed: 's Box: Bess Lion MD    What number is listed as a contact on the form?: [phone]  Phone [phone]       Additional comments: [facility]  drug interaction     Call taken on 5/16/2018 at 1:54 PM by Suzanna More

## 2018-05-24 ENCOUNTER — OFFICE VISIT (OUTPATIENT)
Dept: FAMILY MEDICINE | Facility: CLINIC | Age: 83
End: 2018-05-24
Payer: COMMERCIAL

## 2018-05-24 VITALS
WEIGHT: 190 LBS | TEMPERATURE: 97.4 F | OXYGEN SATURATION: 97 % | HEIGHT: 65 IN | BODY MASS INDEX: 31.65 KG/M2 | SYSTOLIC BLOOD PRESSURE: 124 MMHG | HEART RATE: 90 BPM | RESPIRATION RATE: 12 BRPM | DIASTOLIC BLOOD PRESSURE: 72 MMHG

## 2018-05-24 DIAGNOSIS — I10 BENIGN ESSENTIAL HYPERTENSION: ICD-10-CM

## 2018-05-24 DIAGNOSIS — J30.2 CHRONIC SEASONAL ALLERGIC RHINITIS, UNSPECIFIED TRIGGER: ICD-10-CM

## 2018-05-24 DIAGNOSIS — R06.02 SHORTNESS OF BREATH: Primary | ICD-10-CM

## 2018-05-24 DIAGNOSIS — I50.23 ACUTE ON CHRONIC SYSTOLIC HEART FAILURE (H): ICD-10-CM

## 2018-05-24 DIAGNOSIS — G12.1 SPINAL MUSCULAR ATROPHY TYPE III (H): ICD-10-CM

## 2018-05-24 PROBLEM — I50.9 CHF (CONGESTIVE HEART FAILURE) (H): Status: RESOLVED | Noted: 2017-12-05 | Resolved: 2018-05-24

## 2018-05-24 PROBLEM — I50.42 CHRONIC COMBINED SYSTOLIC AND DIASTOLIC CONGESTIVE HEART FAILURE (H): Status: ACTIVE | Noted: 2018-05-24

## 2018-05-24 PROBLEM — I50.42 CHRONIC COMBINED SYSTOLIC AND DIASTOLIC CONGESTIVE HEART FAILURE (H): Status: RESOLVED | Noted: 2018-05-24 | Resolved: 2018-05-24

## 2018-05-24 LAB — NT-PROBNP SERPL-MCNC: 944 PG/ML (ref 0–450)

## 2018-05-24 PROCEDURE — 99214 OFFICE O/P EST MOD 30 MIN: CPT | Performed by: FAMILY MEDICINE

## 2018-05-24 PROCEDURE — 83880 ASSAY OF NATRIURETIC PEPTIDE: CPT | Performed by: FAMILY MEDICINE

## 2018-05-24 PROCEDURE — 36415 COLL VENOUS BLD VENIPUNCTURE: CPT | Performed by: FAMILY MEDICINE

## 2018-05-24 RX ORDER — ALENDRONATE SODIUM 70 MG/1
TABLET ORAL
Refills: 3 | COMMUNITY
Start: 2018-05-13 | End: 2019-06-01

## 2018-05-24 RX ORDER — ALBUTEROL SULFATE 90 UG/1
2 AEROSOL, METERED RESPIRATORY (INHALATION) EVERY 6 HOURS PRN
Qty: 1 INHALER | Refills: 3 | Status: SHIPPED | OUTPATIENT
Start: 2018-05-24 | End: 2018-09-11

## 2018-05-24 NOTE — PROGRESS NOTES
SUBJECTIVE:   Kenyatta Donald is a 82 year old female who presents to clinic today for the following health issues:    ALLERGIES      Duration: 05/22/18 with exposure to warm outside air that nite     Has had similar q spring when went to [facility]     Description:   Nasal congestion: no but throat congestion   Sneezing: no   Red, itchy eyes: no    Accompanying signs and symptoms: Trouble Breathing and Hard time clearing throat of mucus    History (similar episodes/allergy testing): None    Precipitating or alleviating factors: None    Therapies tried and outcome: Tylenol    Hypertension Follow-up      Outpatient blood pressures are not being checked.    Here < 140/80    Low Salt Diet: not monitoring salt      Heart Failure Follow-up      Symptoms:    Shortness of breath: Yes    Lower extremity edema: none     Chest pain: No    Using more pillows than normal: No    Cough at night: No    Weight:    Checking weight daily: No    Weight change: none    Cardiology visits, ER/UC, or hospital admissions since last visit: None and Cardiology Visit - q 6 mo     Medication side effects: none      MUSCULAR DYSTROPHY-progressive     -now more trouble with respiration / mm   -cannot lift arms, so needs help dressing   -hard to breathe, clear throat           Problem list and histories reviewed & adjusted, as indicated.  Additional history: as documented    Labs reviewed in EPIC    Reviewed and updated as needed this visit by clinical staff  Tobacco  Allergies  Meds  Problems       Reviewed and updated as needed this visit by Provider  Allergies  Meds  Problems         ROS:  CONSTITUTIONAL: NEGATIVE for fever, chills, change in weight  INTEGUMENTARY/SKIN: NEGATIVE for worrisome rashes, moles or lesions  EYES: NEGATIVE for vision changes or irritation  ENT/MOUTH: POSITIVE for , hoarse voice and postnasal drainage  RESP:POSITIVE for , cough-productive and dyspnea on exertion  BREAST: NEGATIVE for masses, tenderness or  "discharge  CV: NEGATIVE for chest pain, palpitations or peripheral edema  GI: NEGATIVE for nausea, abdominal pain, heartburn, or change in bowel habits  : NEGATIVE for frequency, dysuria, or hematuria  MUSCULOSKELETAL: NEGATIVE for significant arthralgias or myalgia  NEURO: NEGATIVE for weakness, dizziness or paresthesias  ENDOCRINE: NEGATIVE for temperature intolerance, skin/hair changes  HEME: NEGATIVE for bleeding problems  PSYCHIATRIC: NEGATIVE for changes in mood or affect    OBJECTIVE:     /72  Pulse 90  Temp 97.4  F (36.3  C) (Tympanic)  Resp 12  Ht 5' 4.5\" (1.638 m)  Wt 190 lb (86.2 kg)  LMP  (LMP Unknown)  SpO2 97%  Breastfeeding? No  BMI 32.11 kg/m2  Body mass index is 32.11 kg/(m^2).  GENERAL: healthy, alert, hard to clear airway and breath= distress, over weight, frail, elderly and fatigued-wheel chair confined   EYES: Eyes grossly normal to inspection, PERRL and conjunctivae and sclerae normal  NECK: no adenopathy, no asymmetry, masses, or scars and thyroid normal to palpation-POS  Sound of mucus in post pharynx  RESP: lungs clear to auscultation - no rales, rhonchi or wheezes  CV: regular rate and rhythm, normal S1 S2, no S3 or S4, no murmur, click or rub, no peripheral edema and peripheral pulses strong  MS: no gross musculoskeletal defects noted, no edema--POS  Weak resp mm and cannot elevate arms above shoulders   SKIN: no suspicious lesions or rashes  NEURO: Normal strength and tone, mentation intact and speech normal  PSYCH: mentation appears normal, affect normal/bright    Diagnostic Test Results:  none     ASSESSMENT/PLAN:               ICD-10-CM    1. Shortness of breath onset w exposure to nite air when warm on 5-22-18 & hx of same q spring up [facility]  R06.02    2. Chronic seasonal allergic rhinitis, unspecified trigger-spring  J30.2    3. Spinal muscular atrophy type III causing decreased ability of respiratory mm-onset 43y/o  G12.1    4. Acute on chronic systolic heart " failure (H) I50.23 albuterol (PROAIR HFA/PROVENTIL HFA/VENTOLIN HFA) 108 (90 Base) MCG/ACT Inhaler     BNP-N terminal pro   5. Benign essential hypertension I10        Patient Instructions   1.  Run a cold air vaporizer as much as possible. If you cannot,  boil water and breath the warm vapors 2-3 times a day to try to open up the sinuses take 2400mgm of guaifenesin per 24 hours   You can do this by taking  Mucinex plain blue  1200 mg  One tablet twice a day (This may come as 600mg/tablet and you need to take 2 tabs twice a day) or you could buy the cheaper  generic 400mgm / tab and take 2 tablets 3 x a day or 1 and 1/2 tablets 4 x a day . .Guaifenesin is  the major component of most cough syrups, because it makes the mucus less thick, and therefore it drains out better and you are less likely to cough from it dripping on the back of your throat.  Irrigate the  nose with plain water under the kitchen sink faucet or the shower.  Maureen pots, spray bottles, etc accumulate bacteria and are not recommended.   The tickle in the throat is also helped by gargling with vinegar and honey mixture, or pop or mouth wash as these coat the throat.  Please try to rinse teeth with water after using these .   Do not use sudafed or pseudephedrine as it dries the mucus up so it is harder to get it out, and it can raise your BP     2. No difference was  Noted by patients in a double blind study when given codeine, tylenol ( acetaminophen) or ibuprofen (all in identical pills). They felt no difference in pain relief. Since ibuprofen and the NSAIDs  causes kidney damage, esophageal damage with heartburn, and can increase the risk of esophageal and stomach cancer and ulcers,and colonic strictures. They also cause increased risk of heart attack .   I recommend that you use tylenol(acetaminophen) for pain. Use the acetaminophen ES  Which has 500mgm/tablet You can take up to 2 tablets 4 times a day as need for pain.  If this is not enough,  you can add in ibuprofen or aleve(naprosyn) with 2 glasses of fluid and some food-to protect the stomach and esophagus. Please let us know if you are continuing to take ibuprofen or aleve, as we will need to periodically check your kidney function with a blood test.      4. Consider antihistamines= antiallergens:fro least to most powerful and from least to most drowsy :   Loratadine, clariten, alavert        Loratadine, clariten, alavert 10mgm a day        Fexofenadine= allegra 180mgm a day                                                                                                                                                       Cetirizine=zyrtec  10mgm a day        Benadryl=diphenhydramine  25-50 mgm- only at bedtime-can be used together with one of the above taken during the day      Discussed all with pt  And the patient expresses understanding  That her main problem is the mucus that thickens, as she cannot clear it well andnow has a hx of spring allergies that are starting   I  Explained the treatment and the reason for it       Bess Lion MD  [facility]

## 2018-05-24 NOTE — PATIENT INSTRUCTIONS
1.  Run a cold air vaporizer as much as possible. If you cannot,  boil water and breath the warm vapors 2-3 times a day to try to open up the sinuses take 2400mgm of guaifenesin per 24 hours   You can do this by taking  Mucinex plain blue  1200 mg  One tablet twice a day (This may come as 600mg/tablet and you need to take 2 tabs twice a day) or you could buy the cheaper  generic 400mgm / tab and take 2 tablets 3 x a day or 1 and 1/2 tablets 4 x a day . .Guaifenesin is  the major component of most cough syrups, because it makes the mucus less thick, and therefore it drains out better and you are less likely to cough from it dripping on the back of your throat.  Irrigate the  nose with plain water under the kitchen sink faucet or the shower.  Maureen pots, spray bottles, etc accumulate bacteria and are not recommended.   The tickle in the throat is also helped by gargling with vinegar and honey mixture, or pop or mouth wash as these coat the throat.  Please try to rinse teeth with water after using these .   Do not use sudafed or pseudephedrine as it dries the mucus up so it is harder to get it out, and it can raise your BP     2. No difference was  Noted by patients in a double blind study when given codeine, tylenol ( acetaminophen) or ibuprofen (all in identical pills). They felt no difference in pain relief. Since ibuprofen and the NSAIDs  causes kidney damage, esophageal damage with heartburn, and can increase the risk of esophageal and stomach cancer and ulcers,and colonic strictures. They also cause increased risk of heart attack .   I recommend that you use tylenol(acetaminophen) for pain. Use the acetaminophen ES  Which has 500mgm/tablet You can take up to 2 tablets 4 times a day as need for pain.  If this is not enough, you can add in ibuprofen or aleve(naprosyn) with 2 glasses of fluid and some food-to protect the stomach and esophagus. Please let us know if you are continuing to take ibuprofen or aleve, as  we will need to periodically check your kidney function with a blood test.      4. Consider antihistamines= antiallergens:fro least to most powerful and from least to most drowsy :   Loratadine, clariten, alavert        Loratadine, clariten, alavert 10mgm a day        Fexofenadine= allegra 180mgm a day                                                                                                                                                       Cetirizine=zyrtec  10mgm a day        Benadryl=diphenhydramine  25-50 mgm- only at bedtime-can be used together with one of the above taken during the day

## 2018-05-24 NOTE — MR AVS SNAPSHOT
After Visit Summary   5/24/2018    Kenyatta Donald    MRN: [id number]           Patient Information     Date Of Birth          1935        Visit Information        Provider Department      5/24/2018 2:00 PM Bess Lion MD [facility]        Today's Diagnoses     Spinal muscular atrophy type III causing decreased ability of respiratory mm-onset 43y/o     -  1    Acute on chronic systolic heart failure (H)          Care Instructions    1.  Run a cold air vaporizer as much as possible. If you cannot,  boil water and breath the warm vapors 2-3 times a day to try to open up the sinuses take 2400mgm of guaifenesin per 24 hours   You can do this by taking  Mucinex plain blue  1200 mg  One tablet twice a day (This may come as 600mg/tablet and you need to take 2 tabs twice a day) or you could buy the cheaper  generic 400mgm / tab and take 2 tablets 3 x a day or 1 and 1/2 tablets 4 x a day . .Guaifenesin is  the major component of most cough syrups, because it makes the mucus less thick, and therefore it drains out better and you are less likely to cough from it dripping on the back of your throat.  Irrigate the  nose with plain water under the kitchen sink faucet or the shower.  Maureen pots, spray bottles, etc accumulate bacteria and are not recommended.   The tickle in the throat is also helped by gargling with vinegar and honey mixture, or pop or mouth wash as these coat the throat.  Please try to rinse teeth with water after using these .   Do not use sudafed or pseudephedrine as it dries the mucus up so it is harder to get it out, and it can raise your BP     2. No difference was  Noted by patients in a double blind study when given codeine, tylenol ( acetaminophen) or ibuprofen (all in identical pills). They felt no difference in pain relief. Since ibuprofen and the NSAIDs  causes kidney damage, esophageal damage with heartburn, and can increase the risk of  esophageal and stomach cancer and ulcers,and colonic strictures. They also cause increased risk of heart attack .   I recommend that you use tylenol(acetaminophen) for pain. Use the acetaminophen ES  Which has 500mgm/tablet You can take up to 2 tablets 4 times a day as need for pain.  If this is not enough, you can add in ibuprofen or aleve(naprosyn) with 2 glasses of fluid and some food-to protect the stomach and esophagus. Please let us know if you are continuing to take ibuprofen or aleve, as we will need to periodically check your kidney function with a blood test.      4. Consider antihistamines= antiallergens:fro least to most powerful and from least to most drowsy :   Loratadine, clariten, alavert        Loratadine, clariten, alavert 10mgm a day        Fexofenadine= allegra 180mgm a day                                                                                                                                                       Cetirizine=zyrtec  10mgm a day        Benadryl=diphenhydramine  25-50 mgm- only at bedtime-can be used together with one of the above taken during the day              Follow-ups after your visit        Follow-up notes from your care team     Return in about 3 months (around 8/24/2018).      Your next 10 appointments already scheduled     May 31, 2018  1:45 PM CDT   [facility]tico[facility]   [facility]    [address] [id number] [phone]            Jul 13, 2018  1:20 PM CDT   LAB with [facility]   [facility] [place] ([facility])    [address] [place] [id number] [phone]           Please do not eat 10-12 hours before your appointment if you are coming in fasting for labs on lipids, cholesterol, or glucose (sugar). This does not apply to pregnant women. Water, hot tea and black coffee (with  "nothing added) are okay. Do not drink other fluids, diet soda or chew gum.            Jul 13, 2018  2:15 PM CDT   Return Visit with Chris Guan MD   [facility] ([facility])    [facility] [facility] [address]  [place] [id number] [phone] OPT 2              Who to contact     If you have questions or need follow up information about today's clinic visit or your schedule please contact [facility] directly at [phone].  Normal or non-critical lab and imaging results will be communicated to you by MyChart, letter or phone within 4 business days after the clinic has received the results. If you do not hear from us within 7 days, please contact the clinic through MyChart or phone. If you have a critical or abnormal lab result, we will notify you by phone as soon as possible.  Submit refill requests through [employer] or call your pharmacy and they will forward the refill request to us. Please allow 3 business days for your refill to be completed.          Additional Information About Your Visit        Care EveryWhere ID     This is your Care EveryWhere ID. This could be used by other organizations to access your [place] medical records  [id number]        Your Vitals Were     Pulse Temperature Respirations Height Last Period Pulse Oximetry    90 97.4  F (36.3  C) (Tympanic) 12 5' 4.5\" (1.638 m) (LMP Unknown) 97%    Breastfeeding? BMI (Body Mass Index)                No 32.11 kg/m2           Blood Pressure from Last 3 Encounters:   05/24/18 124/72   03/20/18 134/80   03/13/18 130/70    Weight from Last 3 Encounters:   05/24/18 190 lb (86.2 kg)   03/20/18 187 lb (84.8 kg)   03/13/18 187 lb 3.2 oz (84.9 kg)              We Performed the Following     BNP-N terminal pro          Where to get your medicines      These medications were sent to [id number] - [place], [place] - [address] AT [facility]  [address] [address]     Phone:  [phone]     albuterol 108 (90 Base) MCG/ACT Inhaler          Primary Care Provider Office Phone # Fax #    Bess Mirna Lion [phone] [address]  [place] [address]        Goals        General    Medical (pt-stated)     Notes - Note created  4/5/2018  2:01 PM by Ana Benjamin RN    Goal: I do not want to be in the hospital.     Supportive: I will follow the Heart Failure Action Plan and seek medical attention quickly if I have increased symptoms of concern.        Pain Management (pt-stated)     Notes - Note created  4/5/2018  1:59 PM by Ana Benjamin RN    Goal : I want to have relief of pain in my back, shoulders and leg.     Supportive: I will work with PT in my home. I will use tylenol for pain.         Equal Access to Services     MAGALYS [facility] System[facility]ANDACE : Hadii zonia shannon hadasho Soomaali, waaxda luqadaha, qaybta kaalmada adeegyada, emma castellanos . So [facility] [phone].    ATENCIÓN: Si habla español, tiene a duenas disposición servicios gratuitos de asistencia lingüística. Llame al [phone].    We comply with applicable federal civil rights laws and [place] laws. We do not discriminate on the basis of race, color, national origin, age, disability, sex, sexual orientation, or gender identity.            Thank you!     Thank you for choosing [facility] JEAN PAUL  for your care. Our goal is always to provide you with excellent care. Hearing back from our patients is one way we can continue to improve our services. Please take a few minutes to complete the written survey that you may receive in the mail after your visit with us. Thank you!             Your Updated Medication List - Protect others around you: Learn how to safely use, store and throw away your medicines at www.disposemymeds.org.          This list is accurate as of 5/24/18  2:36 PM.  Always use your most  recent med list.                   Brand Name Dispense Instructions for use Diagnosis    acetaminophen 325 MG tablet    TYLENOL    100 tablet    Take 2 tablets (650 mg) by mouth every 4 hours as needed for mild pain    Closed nondisplaced fracture of left patella, unspecified fracture morphology, initial encounter       albuterol 108 (90 Base) MCG/ACT Inhaler    PROAIR HFA/PROVENTIL HFA/VENTOLIN HFA    1 Inhaler    Inhale 2 puffs into the lungs every 6 hours as needed for shortness of breath / dyspnea or wheezing    Acute on chronic systolic heart failure (H)       alendronate 70 MG tablet    FOSAMAX     TK 1 T PO Q 7 DAYS        allopurinol 100 MG tablet    ZYLOPRIM    90 tablet    TAKE 1 TABLET BY MOUTH EVERY DAY    Gout       amLODIPine 5 MG tablet    NORVASC    180 tablet    Take 1 tablet (5 mg) by mouth 2 times daily    Benign essential hypertension       amoxicillin 500 MG capsule    AMOXIL    4 capsule    Take 4 capsules by mouth 30 minutes prior to dental work    Mitral valve insufficiency       ASPIRIN NOT PRESCRIBED    INTENTIONAL    1 each    Please choose reason not prescribed, below        cholecalciferol 1000 UNIT tablet    vitamin D3     Take 2,000 Units by mouth daily        furosemide 40 MG tablet    LASIX    90 tablet    TAKE 1 TABLET BY MOUTH DAILY    Acute on chronic diastolic congestive heart failure (H)       levothyroxine 100 MCG tablet    SYNTHROID/LEVOTHROID    90 tablet    TAKE 1 TABLET BY MOUTH DAILY    Acquired hypothyroidism       lisinopril 20 MG tablet    PRINIVIL/ZESTRIL    180 tablet    Take 1 tablet (20 mg) by mouth 2 times daily    Essential hypertension, benign       metoprolol succinate 50 MG 24 hr tablet    TOPROL-XL    135 tablet    Take 1.5 tablets (75 mg) by mouth daily    Coronary artery disease involving native coronary artery of native heart without angina pectoris       nystatin [id number] UNIT/GM Powd    MYCOSTATIN    60 g    Apply topically 3 times daily as needed     Intertrigo       * order for DME     1 each    Equipment being ordered: mastectomy bras & prostheses  S/po mastectomy of unknown side     C50.919    Malignant neoplasm of female breast, unspecified laterality, unspecified site of breast       * order for DME     1 each    Equipment being ordered: compression hose  BK 20-30mm  2 pair as insurance will pay    Lymphedema of both lower extremities       simvastatin 40 MG tablet    ZOCOR    45 tablet    Take 0.5 tablets (20 mg) by mouth At Bedtime    Mixed hyperlipidemia       traMADol 50 MG tablet    ULTRAM    60 tablet    Take 1 tablet (50 mg) by mouth every 6 hours as needed for moderate pain or severe pain    Acute midline low back pain without sciatica       * warfarin 1 MG tablet    COUMADIN    40 tablet    3-15-18 INR orders=Full instructions5 mg on Mon, Wed, Fri; 4 mg all other days Weekly total31 mg    Atrial fibrillation (H)       * warfarin 4 MG tablet    COUMADIN    90 tablet    TAKE 1 TABLET BY MOUTH EVERY DAY    Atrial fibrillation, unspecified type (H), History of pulmonary embolism       * Notice:  This list has 4 medication(s) that are the same as other medications prescribed for you. Read the directions carefully, and ask your doctor or other care provider to review them with you.

## 2018-05-24 NOTE — NURSING NOTE
"Chief Complaint   Patient presents with     Allergies     /72  Pulse 90  Temp 97.4  F (36.3  C) (Tympanic)  Resp 12  Ht 5' 4.5\" (1.638 m)  Wt 190 lb (86.2 kg)  LMP  (LMP Unknown)  SpO2 97%  Breastfeeding? No  BMI 32.11 kg/m2 Estimated body mass index is 32.11 kg/(m^2) as calculated from the following:    Height as of this encounter: 5' 4.5\" (1.638 m).    Weight as of this encounter: 190 lb (86.2 kg).  BP completed using cuff size: regular   Maxine Titus CMA    Health Maintenance Due   Topic Date Due     OP ANNUAL INR REFERRAL  04/08/2016     Health Maintenance reviewed at today's visit patient asked to schedule/complete:   None, Health Maintenance up to date.    "

## 2018-05-29 ENCOUNTER — OFFICE VISIT (OUTPATIENT)
Dept: FAMILY MEDICINE | Facility: CLINIC | Age: 83
End: 2018-05-29
Payer: COMMERCIAL

## 2018-05-29 ENCOUNTER — RADIANT APPOINTMENT (OUTPATIENT)
Dept: GENERAL RADIOLOGY | Facility: CLINIC | Age: 83
End: 2018-05-29
Attending: FAMILY MEDICINE
Payer: COMMERCIAL

## 2018-05-29 ENCOUNTER — ANTICOAGULATION THERAPY VISIT (OUTPATIENT)
Dept: NURSING | Facility: CLINIC | Age: 83
End: 2018-05-29
Payer: COMMERCIAL

## 2018-05-29 VITALS
DIASTOLIC BLOOD PRESSURE: 68 MMHG | HEART RATE: 61 BPM | WEIGHT: 189 LBS | TEMPERATURE: 99.1 F | SYSTOLIC BLOOD PRESSURE: 116 MMHG | OXYGEN SATURATION: 97 % | RESPIRATION RATE: 18 BRPM | BODY MASS INDEX: 31.94 KG/M2

## 2018-05-29 DIAGNOSIS — J04.0 LARYNGITIS: ICD-10-CM

## 2018-05-29 DIAGNOSIS — R09.02 HYPOXIA: ICD-10-CM

## 2018-05-29 DIAGNOSIS — I50.23 ACUTE ON CHRONIC SYSTOLIC HEART FAILURE (H): ICD-10-CM

## 2018-05-29 DIAGNOSIS — Z79.01 LONG-TERM (CURRENT) USE OF ANTICOAGULANTS: ICD-10-CM

## 2018-05-29 DIAGNOSIS — D72.829 LEUKOCYTOSIS, UNSPECIFIED TYPE: ICD-10-CM

## 2018-05-29 DIAGNOSIS — J20.9 ACUTE BRONCHITIS, UNSPECIFIED ORGANISM: Primary | ICD-10-CM

## 2018-05-29 DIAGNOSIS — G12.1 SPINAL MUSCULAR ATROPHY TYPE III (H): ICD-10-CM

## 2018-05-29 DIAGNOSIS — J20.9 ACUTE BRONCHITIS, UNSPECIFIED ORGANISM: ICD-10-CM

## 2018-05-29 LAB
BASOPHILS # BLD AUTO: 0 10E9/L (ref 0–0.2)
BASOPHILS NFR BLD AUTO: 0.2 %
DIFFERENTIAL METHOD BLD: ABNORMAL
EOSINOPHIL # BLD AUTO: 0.1 10E9/L (ref 0–0.7)
EOSINOPHIL NFR BLD AUTO: 0.9 %
ERYTHROCYTE [DISTWIDTH] IN BLOOD BY AUTOMATED COUNT: 15.2 % (ref 10–15)
HCT VFR BLD AUTO: 37.1 % (ref 35–47)
HGB BLD-MCNC: 11.8 G/DL (ref 11.7–15.7)
INR POINT OF CARE: 1.3 (ref 0.86–1.14)
LYMPHOCYTES # BLD AUTO: 2.1 10E9/L (ref 0.8–5.3)
LYMPHOCYTES NFR BLD AUTO: 17.8 %
MCH RBC QN AUTO: 29.3 PG (ref 26.5–33)
MCHC RBC AUTO-ENTMCNC: 31.8 G/DL (ref 31.5–36.5)
MCV RBC AUTO: 92 FL (ref 78–100)
MONOCYTES # BLD AUTO: 1.1 10E9/L (ref 0–1.3)
MONOCYTES NFR BLD AUTO: 9.3 %
NEUTROPHILS # BLD AUTO: 8.4 10E9/L (ref 1.6–8.3)
NEUTROPHILS NFR BLD AUTO: 71.8 %
PLATELET # BLD AUTO: 259 10E9/L (ref 150–450)
RBC # BLD AUTO: 4.03 10E12/L (ref 3.8–5.2)
WBC # BLD AUTO: 11.7 10E9/L (ref 4–11)

## 2018-05-29 PROCEDURE — 36416 COLLJ CAPILLARY BLOOD SPEC: CPT

## 2018-05-29 PROCEDURE — 71046 X-RAY EXAM CHEST 2 VIEWS: CPT | Mod: FY

## 2018-05-29 PROCEDURE — 99207 ZZC NO CHARGE NURSE ONLY: CPT

## 2018-05-29 PROCEDURE — 85025 COMPLETE CBC W/AUTO DIFF WBC: CPT | Performed by: FAMILY MEDICINE

## 2018-05-29 PROCEDURE — 99214 OFFICE O/P EST MOD 30 MIN: CPT | Performed by: FAMILY MEDICINE

## 2018-05-29 PROCEDURE — 85610 PROTHROMBIN TIME: CPT | Mod: QW

## 2018-05-29 RX ORDER — CEPHALEXIN 500 MG/1
500 CAPSULE ORAL 4 TIMES DAILY
Qty: 40 CAPSULE | Refills: 0 | Status: SHIPPED | OUTPATIENT
Start: 2018-05-29 | End: 2018-07-13

## 2018-05-29 NOTE — PROGRESS NOTES
SUBJECTIVE:   Kenyatta Donald is a 82 year old female who presents to clinic today for the following health issues:      RESPIRATORY/Allergy SYMPTOMS      Duration: 1-2 weeks-continue    Description  nasal congestion, cough, fatigue/malaise and hoarse voice    Severity: moderate    Accompanying signs and symptoms: None    History (predisposing factors):  environmental allergies    Precipitating or alleviating factors: None    Therapies tried and outcome:  mucinex    Was clearing but now has laryngitis         ALLERGIES      Duration: 05/22/18 with exposure to warm outside air that nite     Has had similar q spring when went to [facility]     Description:   Nasal congestion: no but throat congestion   Sneezing: no   Red, itchy eyes: no    Accompanying signs and symptoms: Trouble Breathing and Hard time clearing throat of mucus    History (similar episodes/allergy testing): None    Precipitating or alleviating factors: None    Therapies tried and outcome: Tylenol    HYPOXIA     - from the poor resp mm from the m dystrophy   +THE respiratory infection and thickened mucus from allergies       Heart Failure Follow-up      Symptoms:    Shortness of breath: Yes    Lower extremity edema: none     Chest pain: No    Using more pillows than normal: No    Cough at night: No    Weight:    Checking weight daily: No    Weight change: none    Cardiology visits, ER/UC, or hospital admissions since last visit: None and Cardiology Visit - q 6 mo     Medication side effects: none      MUSCULAR DYSTROPHY-progressive     -now more trouble with respiration / mm   -cannot lift arms, so needs help dressing   -hard to breathe, clear throat           Problem list and histories reviewed & adjusted, as indicated.  Additional history: as documented    Labs reviewed in EPIC    Reviewed and updated as needed this visit by clinical staff  Tobacco  Allergies  Meds       Reviewed and updated as needed this visit by Provider          ROS:  CONSTITUTIONAL: NEGATIVE for fever, chills, change in weight  INTEGUMENTARY/SKIN: NEGATIVE for worrisome rashes, moles or lesions  EYES: NEGATIVE for vision changes or irritation  ENT/MOUTH: POSITIVE for , hoarse voice and postnasal drainage  RESP:POSITIVE for , cough-productive and dyspnea on exertion  BREAST: NEGATIVE for masses, tenderness or discharge  CV: NEGATIVE for chest pain, palpitations or peripheral edema  GI: NEGATIVE for nausea, abdominal pain, heartburn, or change in bowel habits  : NEGATIVE for frequency, dysuria, or hematuria  MUSCULOSKELETAL: NEGATIVE for significant arthralgias or myalgia  NEURO: NEGATIVE for weakness, dizziness or paresthesias  ENDOCRINE: NEGATIVE for temperature intolerance, skin/hair changes  HEME: NEGATIVE for bleeding problems  PSYCHIATRIC: NEGATIVE for changes in mood or affect    OBJECTIVE:     /68 (Cuff Size: Adult Large)  Pulse 61  Temp 99.1  F (37.3  C) (Tympanic)  Resp 18  Wt 189 lb (85.7 kg)  LMP  (LMP Unknown)  SpO2 97%  BMI 31.94 kg/m2  Body mass index is 31.94 kg/(m^2).  GENERAL: healthy, alert, hard to clear airway and breath= distress, over weight, frail, elderly and fatigued-wheel chair/walker  confined -canot rise without repeated rocking attempts   EYES: Eyes grossly normal to inspection, PERRL and conjunctivae and sclerae normal  NECK: no adenopathy, no asymmetry, masses, or scars and thyroid normal to palpation-POS  Sound of mucus in post pharynx  RESP: lungs clear to auscultation - no rales, rhonchi or wheezes  CV: regular rate and rhythm, normal S1 S2, no S3 or S4, no murmur, click or rub, no peripheral edema and peripheral pulses strong  MS: no gross musculoskeletal defects noted, no edema--POS  Weak resp mm and cannot elevate arms above shoulders   SKIN: no suspicious lesions or rashes  NEURO: Normal strength and tone, mentation intact and speech normal  PSYCH: mentation appears normal, affect normal/bright    Diagnostic Test  Results:  none     ASSESSMENT/PLAN:       ICD-10-CM    1. Acute bronchitis, unspecified organism J20.9 CBC with platelets differential     XR Chest 2 Views     cephALEXin (KEFLEX) 500 MG capsule   2. Leukocytosis, w a Lt shift  D72.829    3. Laryngitis J04.0    4. Hypoxia since at least 2010 from poor muscle tone of chest  R09.02 XR Chest 2 Views   5. Spinal muscular atrophy type III causing decreased ability of respiratory mm-onset 43y/o  G12.1    6. Acute on chronic systolic heart failure (H) I50.23 XR Chest 2 Views         Patient Instructions   1. Continue the :     Run a cold air vaporizer as much as possible. If you cannot,  boil water and breath the warm vapors 2-3 times a day to try to open up the sinuses take 2400mgm of guaifenesin per 24 hours   You can do this by taking  Mucinex plain blue  1200 mg  One tablet twice a day (This may come as 600mg/tablet and you need to take 2 tabs twice a day) or you could buy the cheaper  generic 400mgm / tab and take 2 tablets 3 x a day or 1 and 1/2 tablets 4 x a day . .Guaifenesin is  the major component of most cough syrups, because it makes the mucus less thick, and therefore it drains out better and you are less likely to cough from it dripping on the back of your throat.  Irrigate the  nose with plain water under the kitchen sink faucet or the shower.  Maureen pots, spray bottles, etc accumulate bacteria and are not recommended.   The tickle in the throat is also helped by gargling with vinegar and honey mixture, or pop or mouth wash as these coat the throat.  Please try to rinse teeth with water after using these .   Do not use sudafed or pseudephedrine as it dries the mucus up so it is harder to get it out, and it can raise your BP       2. Use the vaporizer yr round for your lungs to keep the mucus moist     3. Take the keflex 4 x a day every 6 hrs             Discussed all with pt  And the patient expresses understanding  That her main problem is the mucus that  thickens, as she cannot clear it well becaause of the poor mm from the m dystrohy of her disease   Even has trouble clearing her throat , which she has done to excess in an effort to clear the mucus from her lungs also has a hx of spring allergies that are starting   Now she has the laryngitis from trying to clear the mucus   Now with hi WBC and LT shift , will try abx, but emphasized she must cont to do all the above as before   I  Explained the treatment and the reason for it       Bess Lion MD  [facility]

## 2018-05-29 NOTE — MR AVS SNAPSHOT
Kenyatta Donald   5/29/2018 11:30 AM   Anticoagulation Therapy Visit    Description:  82 year old female   Provider:  [facility]   Department:  Bx Nurse           INR as of 5/29/2018     Today's INR 1.3!      Anticoagulation Summary as of 5/29/2018     INR goal 2.5-3.5   Today's INR 1.3!   Full warfarin instructions 5/29: 6 mg; Otherwise 5 mg on Mon, Wed, Fri; 4 mg all other days   Next INR check 6/6/2018    Indications   Long-term (current) use of anticoagulants [Z79.01] [Z79.01]  Mitral valve disorder s/po 1-9-15 remodeling percut  + clip  (Resolved) [I05.9]         Contact Numbers     [facility]  Please call  [phone] to cancel and/or reschedule your appointment   The direct line to the anticoagulant nurse is [phone] on Monday, Wednesday, and Friday. On Thursday, the anticoagulant nurse can be reached directly at [phone].         May 2018 Details    Sun Mon Tue Wed Thu Fri Sat       1               2               3               4               5                 6               7               8               9               10               11               12                 13               14               15               16               17               18               19                 20               21               22               23               24               25               26                 27               28               29      6 mg   See details      30      5 mg         31      4 mg            Date Details   05/29 This INR check               How to take your warfarin dose     To take:  4 mg Take 1 of the 4 mg tablets.    To take:  5 mg Take 1 of the 4 mg tablets and 1 of the 1 mg tablets.    To take:  6 mg Take 1 of the 4 mg tablets and 2 of the 1 mg tablets.           June 2018 Details    Sun Mon Tue Wed Thu Fri Sat          1      5 mg         2      4 mg           3      4 mg         4      5 mg         5      4 mg         6             7               8               9                 10               11               12               13               14               15               16                 17               18               19               20               21               22               23                 24               25               26               27               28               29               30                Date Details   No additional details    Date of next INR:  6/6/2018         How to take your warfarin dose     To take:  4 mg Take 1 of the 4 mg tablets.    To take:  5 mg Take 1 of the 4 mg tablets and 1 of the 1 mg tablets.

## 2018-05-29 NOTE — PATIENT INSTRUCTIONS
1. Continue the :     Run a cold air vaporizer as much as possible. If you cannot,  boil water and breath the warm vapors 2-3 times a day to try to open up the sinuses take 2400mgm of guaifenesin per 24 hours   You can do this by taking  Mucinex plain blue  1200 mg  One tablet twice a day (This may come as 600mg/tablet and you need to take 2 tabs twice a day) or you could buy the cheaper  generic 400mgm / tab and take 2 tablets 3 x a day or 1 and 1/2 tablets 4 x a day . .Guaifenesin is  the major component of most cough syrups, because it makes the mucus less thick, and therefore it drains out better and you are less likely to cough from it dripping on the back of your throat.  Irrigate the  nose with plain water under the kitchen sink faucet or the shower.  Maureen pots, spray bottles, etc accumulate bacteria and are not recommended.   The tickle in the throat is also helped by gargling with vinegar and honey mixture, or pop or mouth wash as these coat the throat.  Please try to rinse teeth with water after using these .   Do not use sudafed or pseudephedrine as it dries the mucus up so it is harder to get it out, and it can raise your BP       2. Use the vaporizer yr round for your lungs to keep the mucus moist     3. Take the keflex 4 x a day every 6 hrs

## 2018-05-29 NOTE — PROGRESS NOTES
ANTICOAGULATION FOLLOW-UP CLINIC VISIT    Patient Name:  Kenyatta Donald  Date:  5/29/2018  Contact Type:  Face to Face    SUBJECTIVE:     Patient Findings     Positives Other complaints (Some shortness of breath)           OBJECTIVE    INR Protime   Date Value Ref Range Status   05/29/2018 1.3 (A) 0.86 - 1.14 Final       ASSESSMENT / PLAN  INR assessment SUB    Recheck INR In: 8 DAYS    INR Location Clinic      Anticoagulation Summary as of 5/29/2018     INR goal 2.5-3.5   Today's INR 1.3!   Warfarin maintenance plan 5 mg (4 mg x 1 and 1 mg x 1) on Mon, Wed, Fri; 4 mg (4 mg x 1) all other days   Full warfarin instructions 5/29: 6 mg; Otherwise 5 mg on Mon, Wed, Fri; 4 mg all other days   Weekly warfarin total 31 mg   Plan last modified Nancy Zavala RN (12/28/2017)   Next INR check 6/6/2018   Priority INR   Target end date     Indications   Long-term (current) use of anticoagulants [Z79.01] [Z79.01]  Mitral valve disorder s/po 1-9-15 remodeling percut  + clip  (Resolved) [I05.9]         Anticoagulation Episode Summary     INR check location     Preferred lab     Send INR reminders to [facility]    Comments       Anticoagulation Care Providers     Provider Role Specialty Phone number    AmishaBess MD [facility] [phone]            See the Encounter Report to view Anticoagulation Flowsheet and Dosing Calendar (Go to Encounters tab in chart review, and find the Anticoagulation Therapy Visit)        Nancy Zavala, RN

## 2018-05-29 NOTE — MR AVS SNAPSHOT
After Visit Summary   5/29/2018    Kenyatta Donald    MRN: [id number]           Patient Information     Date Of Birth          1935        Visit Information        Provider Department      5/29/2018 1:20 PM Bess Lion MD [facility]        Today's Diagnoses     Acute bronchitis, unspecified organism    -  1    Leukocytosis, w a Lt shift         Spinal muscular atrophy type III causing decreased ability of respiratory mm-onset 45y/o         Hypoxia since at least 2010 from poor muscle tone of chest         Acute on chronic systolic heart failure (H)          Care Instructions    1. Continue the :     Run a cold air vaporizer as much as possible. If you cannot,  boil water and breath the warm vapors 2-3 times a day to try to open up the sinuses take 2400mgm of guaifenesin per 24 hours   You can do this by taking  Mucinex plain blue  1200 mg  One tablet twice a day (This may come as 600mg/tablet and you need to take 2 tabs twice a day) or you could buy the cheaper  generic 400mgm / tab and take 2 tablets 3 x a day or 1 and 1/2 tablets 4 x a day . .Guaifenesin is  the major component of most cough syrups, because it makes the mucus less thick, and therefore it drains out better and you are less likely to cough from it dripping on the back of your throat.  Irrigate the  nose with plain water under the kitchen sink faucet or the shower.  Maureen pots, spray bottles, etc accumulate bacteria and are not recommended.   The tickle in the throat is also helped by gargling with vinegar and honey mixture, or pop or mouth wash as these coat the throat.  Please try to rinse teeth with water after using these .   Do not use sudafed or pseudephedrine as it dries the mucus up so it is harder to get it out, and it can raise your BP       2. Use the vaporizer yr round for your lungs to keep the mucus moist     3. Take the keflex 4 x a day every 6 hrs           Follow-ups  after your visit        Your next 10 appointments already scheduled     Jun 06, 2018  1:30 PM CDT   Anticoagulation Visit with [facility]   [facility] ([facility])    [address]  [facility] [address]   [phone]            Jul 13, 2018  1:20 PM CDT   LAB with [facility]   [facility] ([facility])    [address] [place], [place] [address]  [facility] [id number] [phone]           Please do not eat 10-12 hours before your appointment if you are coming in fasting for labs on lipids, cholesterol, or glucose (sugar). This does not apply to pregnant women. Water, hot tea and black coffee (with nothing added) are okay. Do not drink other fluids, diet soda or chew gum.            Jul 13, 2018  2:15 PM CDT   Return Visit with Chris Guan MD   [facility] ([facility])    [address] [place], [place] [address]  [facility] [id number] [phone] OPT 2              Who to contact     If you have questions or need follow up information about today's clinic visit or your schedule please contact [facility] directly at [phone].  Normal or non-critical lab and imaging results will be communicated to you by MyChart, letter or phone within 4 business days after the clinic has received the results. If you do not hear from us within 7 days, please contact the clinic through MyChart or phone. If you have a critical or abnormal lab result, we will notify you by phone as soon as possible.  Submit refill requests through [employer] or call your pharmacy and they will forward the refill request to us. Please allow 3 business days for your refill to be completed.          Additional Information About Your Visit        Care EveryWhere ID     This is your Care EveryWhere ID. This could be used by other  organizations to access your [place] medical records  [id number]        Your Vitals Were     Pulse Temperature Respirations Last Period Pulse Oximetry BMI (Body Mass Index)    61 99.1  F (37.3  C) (Tympanic) 18 (LMP Unknown) 97% 31.94 kg/m2       Blood Pressure from Last 3 Encounters:   05/29/18 116/68   05/24/18 124/72   03/20/18 134/80    Weight from Last 3 Encounters:   05/29/18 189 lb (85.7 kg)   05/24/18 190 lb (86.2 kg)   03/20/18 187 lb (84.8 kg)              We Performed the Following     CBC with platelets differential          Today's Medication Changes          These changes are accurate as of 5/29/18  2:58 PM.  If you have any questions, ask your nurse or doctor.               Start taking these medicines.        Dose/Directions    cephALEXin 500 MG capsule   Commonly known as:  KEFLEX   Used for:  Acute bronchitis, unspecified organism   Started by:  Bess Lion MD        Dose:  500 mg   Take 1 capsule (500 mg) by mouth 4 times daily   Quantity:  40 capsule   Refills:  0            Where to get your medicines      These medications were sent to [id number] - [place], [place] - [address] AT [facility] [address]  [address], [facility] [address]     Phone:  [phone]     cephALEXin 500 MG capsule                Primary Care Provider Office Phone # Fax #    Bess Lion -[phone] [address]  [facility] [address]        Goals        General    Medical (pt-stated)     Notes - Note created  4/5/2018  2:01 PM by Ana Benjamin RN    Goal: I do not want to be in the hospital.     Supportive: I will follow the Heart Failure Action Plan and seek medical attention quickly if I have increased symptoms of concern.        Pain Management (pt-stated)     Notes - Note created  4/5/2018  1:59 PM by Ana Benjamin RN    Goal : I want to have relief of pain in my back, shoulders and leg.     Supportive: I will work with  PT in my home. I will use tylenol for pain.         Equal Access to Services     SUKUMAR KIMBALL : Hadii aad ku hadesmeshelley Soharsha, waaxda luqadaha, qaybta kaalmaleonides ferraro, emma warrenxanderjeremías akers. So [facility] [phone].    ATENCIÓN: Si habla español, tiene a duenas disposición servicios gratuitos de asistencia lingüística. [facility] [phone].    We comply with applicable federal civil rights laws and [place] laws. We do not discriminate on the basis of race, color, national origin, age, disability, sex, sexual orientation, or gender identity.            Thank you!     Thank you for choosing [facility]  for your care. Our goal is always to provide you with excellent care. Hearing back from our patients is one way we can continue to improve our services. Please take a few minutes to complete the written survey that you may receive in the mail after your visit with us. Thank you!             Your Updated Medication List - Protect others around you: Learn how to safely use, store and throw away your medicines at www.disposemymeds.org.          This list is accurate as of 5/29/18  2:58 PM.  Always use your most recent med list.                   Brand Name Dispense Instructions for use Diagnosis    acetaminophen 325 MG tablet    TYLENOL    100 tablet    Take 2 tablets (650 mg) by mouth every 4 hours as needed for mild pain    Closed nondisplaced fracture of left patella, unspecified fracture morphology, initial encounter       albuterol 108 (90 Base) MCG/ACT Inhaler    PROAIR HFA/PROVENTIL HFA/VENTOLIN HFA    1 Inhaler    Inhale 2 puffs into the lungs every 6 hours as needed for shortness of breath / dyspnea or wheezing    Acute on chronic systolic heart failure (H)       alendronate 70 MG tablet    FOSAMAX     TK 1 T PO Q 7 DAYS        allopurinol 100 MG tablet    ZYLOPRIM    90 tablet    TAKE 1 TABLET BY MOUTH EVERY DAY    Gout       amLODIPine 5 MG tablet    NORVASC     180 tablet    Take 1 tablet (5 mg) by mouth 2 times daily    Benign essential hypertension       amoxicillin 500 MG capsule    AMOXIL    4 capsule    Take 4 capsules by mouth 30 minutes prior to dental work    Mitral valve insufficiency       ASPIRIN NOT PRESCRIBED    INTENTIONAL    1 each    Please choose reason not prescribed, below        cephALEXin 500 MG capsule    KEFLEX    40 capsule    Take 1 capsule (500 mg) by mouth 4 times daily    Acute bronchitis, unspecified organism       cholecalciferol 1000 UNIT tablet    vitamin D3     Take 2,000 Units by mouth daily        furosemide 40 MG tablet    LASIX    90 tablet    TAKE 1 TABLET BY MOUTH DAILY    Acute on chronic diastolic congestive heart failure (H)       levothyroxine 100 MCG tablet    SYNTHROID/LEVOTHROID    90 tablet    TAKE 1 TABLET BY MOUTH DAILY    Acquired hypothyroidism       lisinopril 20 MG tablet    PRINIVIL/ZESTRIL    180 tablet    Take 1 tablet (20 mg) by mouth 2 times daily    Essential hypertension, benign       metoprolol succinate 50 MG 24 hr tablet    TOPROL-XL    135 tablet    Take 1.5 tablets (75 mg) by mouth daily    Coronary artery disease involving native coronary artery of native heart without angina pectoris       nystatin 217236 UNIT/GM Powd    MYCOSTATIN    60 g    Apply topically 3 times daily as needed    Intertrigo       * order for DME     1 each    Equipment being ordered: mastectomy bras & prostheses  S/po mastectomy of unknown side     C50.919    Malignant neoplasm of female breast, unspecified laterality, unspecified site of breast       * order for DME     1 each    Equipment being ordered: compression hose  BK 20-30mm  2 pair as insurance will pay    Lymphedema of both lower extremities       simvastatin 40 MG tablet    ZOCOR    45 tablet    Take 0.5 tablets (20 mg) by mouth At Bedtime    Mixed hyperlipidemia       traMADol 50 MG tablet    ULTRAM    60 tablet    Take 1 tablet (50 mg) by mouth every 6 hours as needed for  moderate pain or severe pain    Acute midline low back pain without sciatica       * warfarin 1 MG tablet    COUMADIN    40 tablet    3-15-18 INR orders=Full instructions5 mg on Mon, Wed, Fri; 4 mg all other days Weekly total31 mg    Atrial fibrillation (H)       * warfarin 4 MG tablet    COUMADIN    90 tablet    TAKE 1 TABLET BY MOUTH EVERY DAY    Atrial fibrillation, unspecified type (H), History of pulmonary embolism       * Notice:  This list has 4 medication(s) that are the same as other medications prescribed for you. Read the directions carefully, and ask your doctor or other care provider to review them with you.

## 2018-06-06 ENCOUNTER — ANTICOAGULATION THERAPY VISIT (OUTPATIENT)
Dept: NURSING | Facility: CLINIC | Age: 83
End: 2018-06-06
Payer: COMMERCIAL

## 2018-06-06 DIAGNOSIS — Z79.01 LONG-TERM (CURRENT) USE OF ANTICOAGULANTS: ICD-10-CM

## 2018-06-06 LAB — INR POINT OF CARE: 2 (ref 2–3)

## 2018-06-06 PROCEDURE — 36416 COLLJ CAPILLARY BLOOD SPEC: CPT

## 2018-06-06 PROCEDURE — 85610 PROTHROMBIN TIME: CPT | Mod: QW

## 2018-06-06 PROCEDURE — 99207 ZZC NO CHARGE NURSE ONLY: CPT

## 2018-06-06 NOTE — PROGRESS NOTES
ANTICOAGULATION FOLLOW-UP CLINIC VISIT    Patient Name:  Kenyatta Donald  Date:  6/6/2018  Contact Type:  Telephone    SUBJECTIVE:     Patient Findings     Positives Inflammation           OBJECTIVE    INR Protime   Date Value Ref Range Status   06/06/2018 2.0 2.0 - 3.0 Final       ASSESSMENT / PLAN  INR assessment SUB    Recheck INR In: 2 WEEKS    INR Location Clinic      Anticoagulation Summary as of 6/6/2018     INR goal 2.5-3.5   Today's INR 2.0!   Warfarin maintenance plan 5 mg (4 mg x 1 and 1 mg x 1) on Mon, Wed, Fri; 4 mg (4 mg x 1) all other days   Full warfarin instructions 6/6: 10 mg; Otherwise 5 mg on Mon, Wed, Fri; 4 mg all other days   Weekly warfarin total 31 mg   Plan last modified Nancy Zavala RN (12/28/2017)   Next INR check 6/20/2018   Priority INR   Target end date     Indications   Long-term (current) use of anticoagulants [Z79.01] [Z79.01]  Mitral valve disorder s/po 1-9-15 remodeling percut  + clip  (Resolved) [I05.9]         Anticoagulation Episode Summary     INR check location     Preferred lab     Send INR reminders to  [facility]    Comments       Anticoagulation Care Providers     Provider Role Specialty Phone number    Bess Lion Mirna Pyle MD [facility] [phone]            See the Encounter Report to view Anticoagulation Flowsheet and Dosing Calendar (Go to Encounters tab in chart review, and find the Anticoagulation Therapy Visit)        Lisa Draper RN

## 2018-06-06 NOTE — MR AVS SNAPSHOT
Kenyatta Donald   6/6/2018 1:30 PM   Anticoagulation Therapy Visit    Description:  82 year old female   Provider:  [facility]   Department:  Bx Nurse           INR as of 6/6/2018     Today's INR 2.0!      Anticoagulation Summary as of 6/6/2018     INR goal 2.5-3.5   Today's INR 2.0!   Full warfarin instructions 6/6: 10 mg; Otherwise 5 mg on Mon, Wed, Fri; 4 mg all other days   Next INR check 6/20/2018    Indications   Long-term (current) use of anticoagulants [Z79.01] [Z79.01]  Mitral valve disorder s/po 1-9-15 remodeling percut  + clip  (Resolved) [I05.9]         Your next Anticoagulation Clinic appointment(s)     Jun 20, 2018  1:30 PM CDT   Anticoagulation Visit with  ANTICOAGULATION CLINIC   [facility] ([facility])    [address] [place] [id number] [phone]              Contact Numbers     [facility]  Please call  [phone] to cancel and/or reschedule your appointment   The direct line to the anticoagulant nurse is [phone] on Monday, Wednesday, and Friday. On Thursday, the anticoagulant nurse can be reached directly at [phone].         June 2018 Details    Sun Mon Tue Wed Thu Fri Sat          1               2                 3               4               5               6      10 mg   See details      7      4 mg         8      5 mg         9      4 mg           10      4 mg         11      5 mg         12      4 mg         13      5 mg         14      4 mg         15      5 mg         16      4 mg           17      4 mg         18      5 mg         19      4 mg         20            21               22               23                 24               25               26               27               28               29               30                Date Details   06/06 This INR check       Date of next INR:  6/20/2018         How to take your warfarin dose     To  take:  4 mg Take 1 of the 4 mg tablets.    To take:  5 mg Take 1 of the 4 mg tablets and 1 of the 1 mg tablets.    To take:  10 mg Take 2 of the 4 mg tablets and 2 of the 1 mg tablets.

## 2018-06-07 ENCOUNTER — TELEPHONE (OUTPATIENT)
Dept: FAMILY MEDICINE | Facility: CLINIC | Age: 83
End: 2018-06-07

## 2018-06-07 ENCOUNTER — MEDICAL CORRESPONDENCE (OUTPATIENT)
Dept: HEALTH INFORMATION MANAGEMENT | Facility: CLINIC | Age: 83
End: 2018-06-07

## 2018-06-07 NOTE — TELEPHONE ENCOUNTER
Reason for Call:  Form, our goal is to have forms completed with 72 hours, however, some forms may require a visit or additional information.    Type of letter, form or note:  medical    Who is the form from?: Home care    Where did the form come from: form was faxed in    What clinic location was the form placed at?: [facility]    Where the form was placed: 's Box: Bess Lion MD    What number is listed as a contact on the form?: [phone]  Phone  [phone]       Additional comments: [facility] 5/10/18 to 8/8/18    Call taken on 6/7/2018 at 10:54 AM by Suzanna More

## 2018-06-20 ENCOUNTER — ANTICOAGULATION THERAPY VISIT (OUTPATIENT)
Dept: NURSING | Facility: CLINIC | Age: 83
End: 2018-06-20
Payer: COMMERCIAL

## 2018-06-20 LAB
INR POINT OF CARE: 1.9 (ref 2–3)
INR POINT OF CARE: 1.9 (ref 2–3)

## 2018-06-20 PROCEDURE — 85610 PROTHROMBIN TIME: CPT | Mod: QW

## 2018-06-20 PROCEDURE — 36416 COLLJ CAPILLARY BLOOD SPEC: CPT

## 2018-06-20 PROCEDURE — 99207 ZZC NO CHARGE NURSE ONLY: CPT

## 2018-06-20 NOTE — PROGRESS NOTES
ANTICOAGULATION FOLLOW-UP CLINIC VISIT    Patient Name:  Kenyatta Donald  Date:  6/20/2018  Contact Type:  Face to Face    SUBJECTIVE:     Patient Findings     Positives No Problem Findings           OBJECTIVE    INR Protime   Date Value Ref Range Status   06/20/2018 1.9 (A) 2.0 - 3.0 Final   06/20/2018 1.9 (A) 2.0 - 3.0 Final       ASSESSMENT / PLAN  INR assessment THER    Recheck INR In: 3 WEEKS    INR Location Clinic      Anticoagulation Summary as of 6/20/2018     INR goal 2.5-3.5   Today's INR 1.9!   Warfarin maintenance plan 4 mg (4 mg x 1) on Sun, Tue, Sat; 5 mg (4 mg x 1 and 1 mg x 1) all other days   Full warfarin instructions 4 mg on Sun, Tue, Sat; 5 mg all other days   Weekly warfarin total 32 mg   Plan last modified Lisa Draper RN (6/20/2018)   Next INR check 7/11/2018   Priority INR   Target end date     Indications   Long-term (current) use of anticoagulants [Z79.01] [Z79.01]  Mitral valve disorder s/po 1-9-15 remodeling percut  + clip  (Resolved) [I05.9]         Anticoagulation Episode Summary     INR check location     Preferred lab     Send INR reminders to [facility]    Comments       Anticoagulation Care Providers     Provider Role Specialty Phone number    Bess Lion Mirna Pyle MD [facility] [phone]            See the Encounter Report to view Anticoagulation Flowsheet and Dosing Calendar (Go to Encounters tab in chart review, and find the Anticoagulation Therapy Visit)        Lisa Draper RN

## 2018-06-20 NOTE — MR AVS SNAPSHOT
Kenyatta Donald   6/20/2018 1:30 PM   Anticoagulation Therapy Visit    Description:  82 year old female   Provider:  [facility]   Department:  Bx Nurse           INR as of 6/20/2018     Today's INR 1.9!      Anticoagulation Summary as of 6/20/2018     INR goal 2.5-3.5   Today's INR 1.9!   Full warfarin instructions 4 mg on Sun, Tue, Sat; 5 mg all other days   Next INR check 7/11/2018    Indications   Long-term (current) use of anticoagulants [Z79.01] [Z79.01]  Mitral valve disorder s/po 1-9-15 remodeling percut  + clip  (Resolved) [I05.9]         Your next Anticoagulation Clinic appointment(s)     Jul 11, 2018  1:15 PM CDT   Anticoagulation Visit with  ANTICOAGULATION CLINIC   [facility] ([facility])    [address] [place] [id number] [phone]              Contact Numbers     [facility]  Please call  [phone] to cancel and/or reschedule your appointment   The direct line to the anticoagulant nurse is [phone] on Monday, Wednesday, and Friday. On Thursday, the anticoagulant nurse can be reached directly at [phone].         June 2018 Details    Sun Mon Tue Wed Thu Fri Sat          1               2                 3               4               5               6               7               8               9                 10               11               12               13               14               15               16                 17               18               19               20      5 mg   See details      21      5 mg         22      5 mg         23      4 mg           24      4 mg         25      5 mg         26      4 mg         27      5 mg         28      5 mg         29      5 mg         30      4 mg          Date Details   06/20 This INR check               How to take your warfarin dose     To take:  4 mg Take 1 of the 4 mg tablets.    To take:  5 mg  Take 1 of the 4 mg tablets and 1 of the 1 mg tablets.           July 2018 Details    Sun Mon Tue Wed Thu Fri Sat     1      4 mg         2      5 mg         3      4 mg         4      5 mg         5      5 mg         6      5 mg         7      4 mg           8      4 mg         9      5 mg         10      4 mg         11            12               13               14                 15               16               17               18               19               20               21                 22               23               24               25               26               27               28                 29               30               31                    Date Details   No additional details    Date of next INR:  7/11/2018         How to take your warfarin dose     To take:  4 mg Take 1 of the 4 mg tablets.    To take:  5 mg Take 1 of the 4 mg tablets and 1 of the 1 mg tablets.

## 2018-06-22 DIAGNOSIS — M10.9 GOUT: ICD-10-CM

## 2018-06-22 RX ORDER — ALLOPURINOL 100 MG/1
TABLET ORAL
Qty: 90 TABLET | Refills: 0 | Status: SHIPPED | OUTPATIENT
Start: 2018-06-22 | End: 2018-09-19

## 2018-06-22 NOTE — TELEPHONE ENCOUNTER
"Requested Prescriptions   Pending Prescriptions Disp Refills     allopurinol (ZYLOPRIM) 100 MG tablet [Pharmacy Med Name: ALLOPURINOL 100MG TABLETS]  Last Written Prescription Date:  10/2/17  Last Fill Quantity: 90 tablet,  # refills: 2   Last Office Visit with [facility], [facility] or [facility] prescribing provider:  5/29/18   Future Office Visit:    Next 5 appointments (look out 90 days)     Jul 13, 2018  2:15 PM CDT   Return Visit with Chris Guan MD   [facility] ([facility])    [address] [place] [id number] [phone] OPT 2                  90 tablet 0     Sig: TAKE 1 TABLET BY MOUTH EVERY DAY    Gout Agents Protocol Passed    6/22/2018  3:12 AM       Passed - CBC on file in past 12 months    Recent Labs   Lab Test  05/29/18   1346   WBC  11.7*   RBC  4.03   HGB  11.8   HCT  37.1   PLT  259       For [facility] ONLY: BCII404 = WBC, NVTK268 = RBC         Passed - ALT on file in past 12 months    Recent Labs   Lab Test  02/01/18   1227   ALT  <5*            Passed - Has Uric Acid on file in past 12 months and value is less than 6    Recent Labs   Lab Test  08/25/17   1145   URIC  5.8     If level is 6mg/dL or greater, ok to refill one time and refer to provider.          Passed - Recent (12 mo) or future (30 days) visit within the authorizing provider's specialty    Patient had office visit in the last 12 months or has a visit in the next 30 days with authorizing provider or within the authorizing provider's specialty.  See \"Patient Info\" tab in inbasket, or \"Choose Columns\" in Meds & Orders section of the refill encounter.           Passed - Patient is age 18 or older       Passed - No active pregnancy on record       Passed - Normal serum creatinine on file in the past 12 months    Recent Labs   Lab Test  03/20/18   1327   CR  0.75            Passed - No positive pregnancy test in past year          "

## 2018-06-28 ENCOUNTER — PRE VISIT (OUTPATIENT)
Dept: CARDIOLOGY | Facility: CLINIC | Age: 83
End: 2018-06-28

## 2018-07-11 ENCOUNTER — ANTICOAGULATION THERAPY VISIT (OUTPATIENT)
Dept: NURSING | Facility: CLINIC | Age: 83
End: 2018-07-11
Payer: COMMERCIAL

## 2018-07-11 ENCOUNTER — ANTICOAGULATION THERAPY VISIT (OUTPATIENT)
Dept: ANTICOAGULATION | Facility: CLINIC | Age: 83
End: 2018-07-11

## 2018-07-11 DIAGNOSIS — I05.9 MITRAL VALVE DISORDER: Primary | ICD-10-CM

## 2018-07-11 DIAGNOSIS — Z79.01 LONG-TERM (CURRENT) USE OF ANTICOAGULANTS: ICD-10-CM

## 2018-07-11 LAB — INR POINT OF CARE: 2.3 (ref 0.86–1.14)

## 2018-07-11 PROCEDURE — 36416 COLLJ CAPILLARY BLOOD SPEC: CPT

## 2018-07-11 PROCEDURE — 85610 PROTHROMBIN TIME: CPT | Mod: QW

## 2018-07-11 PROCEDURE — 99207 ZZC NO CHARGE NURSE ONLY: CPT

## 2018-07-11 NOTE — PROGRESS NOTES
ANTICOAGULATION FOLLOW-UP CLINIC VISIT    Patient Name:  Kenyatta Donald  Date:  7/11/2018  Contact Type:  Face to Face    SUBJECTIVE:     Patient Findings     Positives No Problem Findings           OBJECTIVE    INR Protime   Date Value Ref Range Status   07/11/2018 2.3 (A) 0.86 - 1.14 Corrected       ASSESSMENT / PLAN  INR assessment THER    Recheck INR In: 4 WEEKS    INR Location Clinic      Anticoagulation Summary as of 7/11/2018     INR goal 2.5-3.5   Today's INR 2.3!   Warfarin maintenance plan 4 mg (4 mg x 1) on Sun; 5 mg (4 mg x 1 and 1 mg x 1) all other days   Full warfarin instructions 4 mg on Sun; 5 mg all other days   Weekly warfarin total 34 mg   Plan last modified Nancy Zavala RN (7/11/2018)   Next INR check 7/25/2018   Priority INR   Target end date     Indications   Long-term (current) use of anticoagulants [Z79.01] [Z79.01]  Mitral valve disorder s/po 1-9-15 remodeling percut  + clip  (Resolved) [I05.9]         Anticoagulation Episode Summary     INR check location     Preferred lab     Send INR reminders to [facility] INR/PROTIME    Comments       Anticoagulation Care Providers     Provider Role Specialty Phone number    Amisha Bessanahi Pyle MD [facility] [phone]            See the Encounter Report to view Anticoagulation Flowsheet and Dosing Calendar (Go to Encounters tab in chart review, and find the Anticoagulation Therapy Visit)        Nancy Zavala, RN

## 2018-07-11 NOTE — MR AVS SNAPSHOT
Kenyatta Donald   7/11/2018 1:15 PM   Anticoagulation Therapy Visit    Description:  82 year old female   Provider:  [facility]   Department:  Bx Nurse           INR as of 7/11/2018     Today's INR 2.0!      Anticoagulation Summary as of 7/11/2018     INR goal 2.5-3.5   Today's INR 2.0!   Full warfarin instructions 4 mg on Sun; 5 mg all other days   Next INR check 7/25/2018    Indications   Long-term (current) use of anticoagulants [Z79.01] [Z79.01]  Mitral valve disorder s/po 1-9-15 remodeling percut  + clip  (Resolved) [I05.9]         Your next Anticoagulation Clinic appointment(s)     Jul 25, 2018  1:15 PM CDT   Anticoagulation Visit with  ANTICOAGULATION CLINIC   [facility]    [address] [place] [id number] [phone]              Contact Numbers     [facility]  Please call  [phone] to cancel and/or reschedule your appointment   The direct line to the anticoagulant nurse is [phone] on Monday, Wednesday, and Friday. On Thursday, the anticoagulant nurse can be reached directly at [phone].         July 2018 Details    Sun Mon Tue Wed Thu Fri Sat     1               2               3               4               5               6               7                 8               9               10               11      5 mg   See details      12      5 mg         13      5 mg         14      5 mg           15      4 mg         16      5 mg         17      5 mg         18      5 mg         19      5 mg         20      5 mg         21      5 mg           22      4 mg         23      5 mg         24      5 mg         25            26               27               28                 29               30               31                    Date Details   07/11 This INR check       Date of next INR:  7/25/2018         How to take your warfarin dose     To take:  4 mg  Take 1 of the 4 mg tablets.    To take:  5 mg Take 1 of the 4 mg tablets and 1 of the 1 mg tablets.

## 2018-07-13 ENCOUNTER — OFFICE VISIT (OUTPATIENT)
Dept: CARDIOLOGY | Facility: CLINIC | Age: 83
End: 2018-07-13
Attending: INTERNAL MEDICINE
Payer: COMMERCIAL

## 2018-07-13 VITALS
SYSTOLIC BLOOD PRESSURE: 142 MMHG | BODY MASS INDEX: 32.37 KG/M2 | HEIGHT: 65 IN | HEART RATE: 76 BPM | WEIGHT: 194.3 LBS | DIASTOLIC BLOOD PRESSURE: 70 MMHG

## 2018-07-13 DIAGNOSIS — I25.10 CORONARY ARTERY DISEASE INVOLVING NATIVE CORONARY ARTERY OF NATIVE HEART WITHOUT ANGINA PECTORIS: ICD-10-CM

## 2018-07-13 DIAGNOSIS — I10 BENIGN ESSENTIAL HYPERTENSION: ICD-10-CM

## 2018-07-13 DIAGNOSIS — I48.20 CHRONIC ATRIAL FIBRILLATION (H): ICD-10-CM

## 2018-07-13 DIAGNOSIS — E78.00 HYPERCHOLESTEROLEMIA: ICD-10-CM

## 2018-07-13 DIAGNOSIS — I34.0 MITRAL VALVE INSUFFICIENCY, UNSPECIFIED ETIOLOGY: ICD-10-CM

## 2018-07-13 DIAGNOSIS — I50.22 CHRONIC SYSTOLIC CONGESTIVE HEART FAILURE (H): ICD-10-CM

## 2018-07-13 DIAGNOSIS — Z82.49 FH: MITRAL VALVE REPAIR: ICD-10-CM

## 2018-07-13 LAB
ALT SERPL W P-5'-P-CCNC: <5 U/L (ref 5–30)
ANION GAP SERPL CALCULATED.3IONS-SCNC: 16.9 MMOL/L (ref 6–17)
BUN SERPL-MCNC: 20 MG/DL (ref 7–30)
CALCIUM SERPL-MCNC: 10.7 MG/DL (ref 8.5–10.5)
CHLORIDE SERPL-SCNC: 102 MMOL/L (ref 98–107)
CHOLEST SERPL-MCNC: 156 MG/DL
CO2 SERPL-SCNC: 27 MMOL/L (ref 23–29)
CREAT SERPL-MCNC: 0.79 MG/DL (ref 0.7–1.3)
GFR SERPL CREATININE-BSD FRML MDRD: 70 ML/MIN/1.7M2
GLUCOSE SERPL-MCNC: 140 MG/DL (ref 70–105)
HDLC SERPL-MCNC: 48 MG/DL
LDLC SERPL CALC-MCNC: 78 MG/DL
NONHDLC SERPL-MCNC: 108 MG/DL
POTASSIUM SERPL-SCNC: 4.9 MMOL/L (ref 3.5–5.1)
SODIUM SERPL-SCNC: 141 MMOL/L (ref 136–145)
TRIGL SERPL-MCNC: 149 MG/DL

## 2018-07-13 PROCEDURE — 99214 OFFICE O/P EST MOD 30 MIN: CPT | Performed by: INTERNAL MEDICINE

## 2018-07-13 PROCEDURE — 84460 ALANINE AMINO (ALT) (SGPT): CPT | Performed by: INTERNAL MEDICINE

## 2018-07-13 PROCEDURE — 80048 BASIC METABOLIC PNL TOTAL CA: CPT | Performed by: INTERNAL MEDICINE

## 2018-07-13 PROCEDURE — 80061 LIPID PANEL: CPT | Performed by: INTERNAL MEDICINE

## 2018-07-13 PROCEDURE — 36415 COLL VENOUS BLD VENIPUNCTURE: CPT | Performed by: INTERNAL MEDICINE

## 2018-07-13 NOTE — LETTER
7/13/2018      Bess Lion MD  [address]  [facility] [address]      RE: Kenyatta Donald       Dear Colleague,    I had the pleasure of seeing Kenyatta Donald in the [facility].    Service Date: 07/13/2018      HISTORY OF PRESENT ILLNESS:  The patient is an 82-year-old overweight white female who presents to Cardiology Clinic for followup of atrial fibrillation and problematic hypertension and hypercholesterolemia.  There is no history of cigarette smoking, diabetes mellitus or family history of premature coronary disease.  She has been unaware of an irregular beat, but has also been diagnosed with atrial fibrillation treated with aggressive rate control and anticoagulation.      In the summer of 2015, she was hospitalized at [facility] because of volume overload consistent with a history of diastolic congestive heart failure, possibly related to atrial fibrillation with rapid ventricular response which responded nicely to diuretic therapy.  She has maintained a relatively stable weight.      In the fall of 2016, she injured her knee and required admission to a TCU.  She slowly improved, but she needs to use a walker most of the time.  She has also had significant proximal extremity muscle weakness of unclear etiology.  It is felt to be some form of muscular dystrophy.  She denies arleth chest pain, shortness of breath, dizziness, palpitations, nausea, vomiting, diaphoresis or syncope.  Denies PND, orthopnea, fever, chills or sweats.  She has a reasonable sleep and appetite pattern.        It should be noted that the patient did receive a mitral valve clip procedure for severe mitral insufficiency in 01/2015 with subsequent moderate mitral insufficiency noted as an improvement over previous moderately severe to severe insufficiency.      In early 2018, she had a Holter monitor which demonstrated a heart rate varying from , average rate of 73, with chronic atrial  fibrillation, longest pause up to 2.9 seconds without significant symptoms.  This is essentially unchanged from previous Holter monitor.  She has had evidence of bradydysrhythmia in the past.        Echocardiography performed earlier this year showed a normal-sized left ventricle with intact systolic function.  Ejection fraction 65%-70% with trace tricuspid insufficiency, mild to moderate elevation of right ventricular systolic pressure.  There is a mitral valve clip seen on the valve.  There was mild mitral stenosis, mean mitral valve gradient 6 mmHg.  There is mild to moderate mitral insufficiency present.  No significant change from previous study of 06/2017.      MEDICATIONS:   1.  Acetaminophen 650 mg every 6 hours as needed.   2.  Albuterol inhaler as needed.   3.  Fosamax 70 mg a week.   4.  Allopurinol 100 mg a day.   5.  Amlodipine 5 mg twice a day.   6.  Amoxicillin as needed.   7.  Vitamin D units a day.   8.  Furosemide 40 mg a day.   9.  Levothyroxine 100 mcg a day.   10.  Lisinopril 20 mg twice a day.   11.  Metoprolol XL 75 mg a day.   12.  Simvastatin 20 mg at bedtime.   13.  Warfarin as directed.   14.  Tramadol 50 mg every 6 hours as needed.      LABORATORY DATA:  Demonstrated a cholesterol of 156, HDL 48, LDL 78, triglyceride 149.  Sodium 141, potassium 4.9, BUN 20, creatinine 0.79.  Hemoglobin 11.8, platelet count 259,000.  ALT is less than 5.      The patient presents to Cardiology Clinic for followup of her paroxysmal atrial dysrhythmias, hypertension and mitral insufficiency status post mitral valve clip.      PHYSICAL EXAMINATION:   VITAL SIGNS:  Blood pressure 142/70 with a heart rate of 70-80.  Weight was 194 pounds, which is up 6 pounds from previous clinic visit.   NECK:  Without jugular venous distention, carotid bruit or palpable thyroid.   CHEST:  Essentially clear to percussion and auscultation with slight decreased breath sounds in the bases with isolated crackles.   CARDIAC:   Revealed an irregular rhythm, variable S1, 2/6 systolic murmur at the left sternal border radiating to the apex.  No diastolic murmur, rub or S3.   EXTREMITIES:  Showed 1 to 2+ edema without cyanosis or erythema.      CLINICAL IMPRESSION:   1.  Stable cardiac condition.   2.  Status post episode of diastolic congestive heart failure treated with diuretic therapy in 2015.   3.  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   4.  Mitral insufficiency, status post mitral valve clip with mild gradient and mild to moderate mitral insufficiency.   5.  Hypertension with borderline elevation of systolic pressures.   6.  Hyperlipidemia, virtually at goal.   7.  History of sleep apnea.   8.  History of breast cancer.   9.  Gastroesophageal reflux disease.   10.  History of osteoarthritis and knee replacement surgery.      DISCUSSION:  The patient has done reasonably well since last clinic visit.  She is restricted by osteoarthritis, degenerative joint disease as well as some type of myopathy.  Medications will be left unchanged at the present time.  Her echocardiogram appears to be stable.  She will need close followup of his serum lipids, basic metabolic panel and blood pressure.  She will be continued on anticoagulation for stroke prevention as well as antibiotic prophylaxis for dental and general surgical procedures.      RECOMMENDATIONS:   1.  Continue present medications.   2.  Close followup of serum lipids, basic metabolic panel and blood pressure with followup with Bre Razo in 2-3 months.   3.  Cautious anticoagulation.   4.  Antibiotic prophylaxis for dental and general surgical procedures.   5.  Diet and exercise program, avoiding caffeine and salt, maintaining nutrition and hydration.   6.  Echocardiogram in early 2019 to follow left ventricular function and mitral valve function.   7.  Routine medical followup.   8.  Cardiology followup in 6 months.      cc:      Bess  MD Amisha    [facility]   [address] [place] [place] [address]         MATT RABAGO MD, [facility]             D: 2018   T: 2018   MT: MONI      Name:     DARLENE FRIAS   MRN:      [id number]        Account:      [id number]   :      1935           Service Date: 2018      Document: [id number]       Outpatient Encounter Prescriptions as of 2018   Medication Sig Dispense Refill     acetaminophen (TYLENOL) 325 MG tablet Take 2 tablets (650 mg) by mouth every 4 hours as needed for mild pain 100 tablet      albuterol (PROAIR HFA/PROVENTIL HFA/VENTOLIN HFA) 108 (90 Base) MCG/ACT Inhaler Inhale 2 puffs into the lungs every 6 hours as needed for shortness of breath / dyspnea or wheezing 1 Inhaler 3     alendronate (FOSAMAX) 70 MG tablet TK 1 T PO Q 7 DAYS  3     allopurinol (ZYLOPRIM) 100 MG tablet TAKE 1 TABLET BY MOUTH EVERY DAY 90 tablet 0     amLODIPine (NORVASC) 5 MG tablet Take 1 tablet (5 mg) by mouth 2 times daily 180 tablet 3     amoxicillin (AMOXIL) 500 MG capsule Take 4 capsules by mouth 30 minutes prior to dental work 4 capsule 4     ASPIRIN NOT PRESCRIBED (INTENTIONAL) Please choose reason not prescribed, below 1 each 0     cholecalciferol (VITAMIN D) 1000 UNIT tablet Take 2,000 Units by mouth daily        levothyroxine (SYNTHROID/LEVOTHROID) 100 MCG tablet TAKE 1 TABLET BY MOUTH DAILY 90 tablet 1     lisinopril (PRINIVIL/ZESTRIL) 20 MG tablet Take 1 tablet (20 mg) by mouth 2 times daily 180 tablet 3     metoprolol (TOPROL-XL) 50 MG 24 hr tablet Take 1.5 tablets (75 mg) by mouth daily 135 tablet 1     nystatin (MYCOSTATIN) 747620 UNIT/GM POWD Apply topically 3 times daily as needed 60 g 1     order for DME Equipment being ordered: compression hose   BK 20-30mm   2 pair as insurance will pay 1 each 0     order for DME Equipment being ordered: mastectomy bras & prostheses   S/po mastectomy of unknown side     C50.919 1 each 0     simvastatin (ZOCOR) 40 MG  tablet Take 0.5 tablets (20 mg) by mouth At Bedtime 45 tablet 3     warfarin (COUMADIN) 1 MG tablet 3-15-18 INR orders=Full instructions 5 mg on Mon, Wed, Fri; 4 mg all other days  Weekly total 31 mg (Patient taking differently: 4 mg sun & 5 mg row) 40 tablet 1     warfarin (COUMADIN) 4 MG tablet TAKE 1 TABLET BY MOUTH EVERY DAY (Patient taking differently: 4 mg sun & 5 mg row) 90 tablet 1     [DISCONTINUED] furosemide (LASIX) 40 MG tablet TAKE 1 TABLET BY MOUTH DAILY 90 tablet 1     traMADol (ULTRAM) 50 MG tablet Take 1 tablet (50 mg) by mouth every 6 hours as needed for moderate pain or severe pain (Patient not taking: Reported on 7/13/2018) 60 tablet 0     [DISCONTINUED] cephALEXin (KEFLEX) 500 MG capsule Take 1 capsule (500 mg) by mouth 4 times daily 40 capsule 0     No facility-administered encounter medications on file as of 7/13/2018.        Again, thank you for allowing me to participate in the care of your patient.      Sincerely,    Chris Guan MD     [facility]

## 2018-07-13 NOTE — MR AVS SNAPSHOT
After Visit Summary   7/13/2018    Kenyatta Donald    MRN: [id number]           Patient Information     Date Of Birth          1935        Visit Information        Provider Department      7/13/2018 2:15 PM Chris Guan MD [facility]        Today's Diagnoses     Hypercholesterolemia        Chronic systolic congestive heart failure (H)        Benign essential hypertension        Mitral valve insufficiency, unspecified etiology        Chronic atrial fibrillation (H)        FH: mitral valve repair        Coronary artery disease involving native coronary artery of native heart without angina pectoris           Follow-ups after your visit        Additional Services     Follow-Up with Cardiac Advanced Practice Provider           Follow-Up with Cardiologist                 Your next 10 appointments already scheduled     Jul 25, 2018  1:15 PM CDT   [facility]tico[facility] with [facility]   [facility]    [address] [place] [address]   [phone]              Future tests that were ordered for you today     Open Future Orders        Priority Expected Expires Ordered    Basic metabolic panel Routine 1/9/2019 7/13/2019 7/13/2018    Lipid Profile Routine 1/9/2019 7/13/2019 7/13/2018    ALT Routine 1/9/2019 7/13/2019 7/13/2018    Echocardiogram Routine 1/9/2019 7/13/2019 7/13/2018    Holter Monitor 24 hour - Adult Routine 1/9/2019 7/13/2019 7/13/2018    Follow-Up with Cardiologist Routine 1/9/2019 7/13/2019 7/13/2018    Basic metabolic panel Routine 10/11/2018 7/13/2019 7/13/2018    Follow-Up with Cardiac Advanced Practice Provider Routine 10/11/2018 7/13/2019 7/13/2018            Who to contact     If you have questions or need follow up information about today's clinic visit or your schedule please contact [facility]  "HEART CARE   [place] directly at [phone].  Normal or non-critical lab and imaging results will be communicated to you by MyChart, letter or phone within 4 business days after the clinic has received the results. If you do not hear from us within 7 days, please contact the clinic through MyChart or phone. If you have a critical or abnormal lab result, we will notify you by phone as soon as possible.  Submit refill requests through [employer] or call your pharmacy and they will forward the refill request to us. Please allow 3 business days for your refill to be completed.          Additional Information About Your Visit        Care EveryWhere ID     This is your Care EveryWhere ID. This could be used by other organizations to access your [facility] medical records  [id number]        Your Vitals Were     Pulse Height Last Period BMI (Body Mass Index)          76 1.638 m (5' 4.5\") (LMP Unknown) 32.84 kg/m2         Blood Pressure from Last 3 Encounters:   07/13/18 142/70   05/29/18 116/68   05/24/18 124/72    Weight from Last 3 Encounters:   07/13/18 88.1 kg (194 lb 4.8 oz)   05/29/18 85.7 kg (189 lb)   05/24/18 86.2 kg (190 lb)              We Performed the Following     Follow-Up with Cardiologist          Today's Medication Changes          These changes are accurate as of 7/13/18  3:12 PM.  If you have any questions, ask your nurse or doctor.               These medicines have changed or have updated prescriptions.        Dose/Directions    * warfarin 1 MG tablet   Commonly known as:  COUMADIN   This may have changed:  additional instructions   Used for:  Atrial fibrillation (H)        3-15-18 INR orders=Full instructions5 mg on Mon, Wed, Fri; 4 mg all other days Weekly total31 mg   Quantity:  40 tablet   Refills:  1       * warfarin 4 MG tablet   Commonly known as:  COUMADIN   This may have changed:  See the new instructions.   Used for:  Atrial fibrillation, unspecified type (H), History of pulmonary embolism     "    TAKE 1 TABLET BY MOUTH EVERY DAY   Quantity:  90 tablet   Refills:  1       * Notice:  This list has 2 medication(s) that are the same as other medications prescribed for you. Read the directions carefully, and ask your doctor or other care provider to review them with you.             Primary Care Provider Office Phone # Fax #    Bess Mirna Lion [phone]       [address]  [facility] [address]        Goals        General    Medical (pt-stated)     Notes - Note created  4/5/2018  2:01 PM by Ana Benjamin, RN    Goal: I do not want to be in the hospital.     Supportive: I will follow the Heart Failure Action Plan and seek medical attention quickly if I have increased symptoms of concern.        Pain Management (pt-stated)     Notes - Note created  4/5/2018  1:59 PM by Ana Benjamin, RN    Goal : I want to have relief of pain in my back, shoulders and leg.     Supportive: I will work with PT in my home. I will use tylenol for pain.         Equal Access to Services     [facility]ALYS [facility] Hospital[facility] : Hadii zonia shannon hadasho Soharsha, waaxda luqadaha, qaybta kaalmada adeegyaleonides, emma castellanos . So [facility] [phone].    ATENCIÓN: Si habla español, tiene a duenas disposición servicios gratuitos de asistencia lingüística. Llame al [phone].    We comply with applicable federal civil rights laws and [place] laws. We do not discriminate on the basis of race, color, national origin, age, disability, sex, sexual orientation, or gender identity.            Thank you!     Thank you for choosing [facility]  for your care. Our goal is always to provide you with excellent care. Hearing back from our patients is one way we can continue to improve our services. Please take a few minutes to complete the written survey that you may receive in the mail after your visit with us. Thank you!             Your Updated Medication List - Protect others around  you: Learn how to safely use, store and throw away your medicines at www.disposemymeds.org.          This list is accurate as of 7/13/18  3:12 PM.  Always use your most recent med list.                   Brand Name Dispense Instructions for use Diagnosis    acetaminophen 325 MG tablet    TYLENOL    100 tablet    Take 2 tablets (650 mg) by mouth every 4 hours as needed for mild pain    Closed nondisplaced fracture of left patella, unspecified fracture morphology, initial encounter       albuterol 108 (90 Base) MCG/ACT Inhaler    PROAIR HFA/PROVENTIL HFA/VENTOLIN HFA    1 Inhaler    Inhale 2 puffs into the lungs every 6 hours as needed for shortness of breath / dyspnea or wheezing    Acute on chronic systolic heart failure (H)       alendronate 70 MG tablet    FOSAMAX     TK 1 T PO Q 7 DAYS        allopurinol 100 MG tablet    ZYLOPRIM    90 tablet    TAKE 1 TABLET BY MOUTH EVERY DAY    Gout       amLODIPine 5 MG tablet    NORVASC    180 tablet    Take 1 tablet (5 mg) by mouth 2 times daily    Benign essential hypertension       amoxicillin 500 MG capsule    AMOXIL    4 capsule    Take 4 capsules by mouth 30 minutes prior to dental work    Mitral valve insufficiency       ASPIRIN NOT PRESCRIBED    INTENTIONAL    1 each    Please choose reason not prescribed, below        cholecalciferol 1000 UNIT tablet    vitamin D3     Take 2,000 Units by mouth daily        furosemide 40 MG tablet    LASIX    90 tablet    TAKE 1 TABLET BY MOUTH DAILY    Acute on chronic diastolic congestive heart failure (H)       levothyroxine 100 MCG tablet    SYNTHROID/LEVOTHROID    90 tablet    TAKE 1 TABLET BY MOUTH DAILY    Acquired hypothyroidism       lisinopril 20 MG tablet    PRINIVIL/ZESTRIL    180 tablet    Take 1 tablet (20 mg) by mouth 2 times daily    Essential hypertension, benign       metoprolol succinate 50 MG 24 hr tablet    TOPROL-XL    135 tablet    Take 1.5 tablets (75 mg) by mouth daily    Coronary artery disease involving  native coronary artery of native heart without angina pectoris       nystatin 700692 UNIT/GM Powd    MYCOSTATIN    60 g    Apply topically 3 times daily as needed    Intertrigo       * order for DME     1 each    Equipment being ordered: mastectomy bras & prostheses  S/po mastectomy of unknown side     C50.919    Malignant neoplasm of female breast, unspecified laterality, unspecified site of breast       * order for DME     1 each    Equipment being ordered: compression hose  BK 20-30mm  2 pair as insurance will pay    Lymphedema of both lower extremities       simvastatin 40 MG tablet    ZOCOR    45 tablet    Take 0.5 tablets (20 mg) by mouth At Bedtime    Mixed hyperlipidemia       traMADol 50 MG tablet    ULTRAM    60 tablet    Take 1 tablet (50 mg) by mouth every 6 hours as needed for moderate pain or severe pain    Acute midline low back pain without sciatica       * warfarin 1 MG tablet    COUMADIN    40 tablet    3-15-18 INR orders=Full instructions5 mg on Mon, Wed, Fri; 4 mg all other days Weekly total31 mg    Atrial fibrillation (H)       * warfarin 4 MG tablet    COUMADIN    90 tablet    TAKE 1 TABLET BY MOUTH EVERY DAY    Atrial fibrillation, unspecified type (H), History of pulmonary embolism       * Notice:  This list has 4 medication(s) that are the same as other medications prescribed for you. Read the directions carefully, and ask your doctor or other care provider to review them with you.

## 2018-07-13 NOTE — LETTER
7/13/2018    Bess Lion MD  [address]  [facility] [address]    RE: Kenyatta Donald       Dear Colleague,    I had the pleasure of seeing Kenyatta Donlad in the [facility].    Service Date: 07/13/2018      HISTORY OF PRESENT ILLNESS:  The patient is an 82-year-old overweight white female who presents to Cardiology Clinic for followup of atrial fibrillation and problematic hypertension and hypercholesterolemia.  There is no history of cigarette smoking, diabetes mellitus or family history of premature coronary disease.  She has been unaware of an irregular beat, but has also been diagnosed with atrial fibrillation treated with aggressive rate control and anticoagulation.      In the summer of 2015, she was hospitalized at [facility] because of volume overload consistent with a history of diastolic congestive heart failure, possibly related to atrial fibrillation with rapid ventricular response which responded nicely to diuretic therapy.  She has maintained a relatively stable weight.      In the fall of 2016, she injured her knee and required admission to a TCU.  She slowly improved, but she needs to use a walker most of the time.  She has also had significant proximal extremity muscle weakness of unclear etiology.  It is felt to be some form of muscular dystrophy.  She denies arleth chest pain, shortness of breath, dizziness, palpitations, nausea, vomiting, diaphoresis or syncope.  Denies PND, orthopnea, fever, chills or sweats.  She has a reasonable sleep and appetite pattern.        It should be noted that the patient did receive a mitral valve clip procedure for severe mitral insufficiency in 01/2015 with subsequent moderate mitral insufficiency noted as an improvement over previous moderately severe to severe insufficiency.      In early 2018, she had a Holter monitor which demonstrated a heart rate varying from , average rate of 73, with chronic atrial  fibrillation, longest pause up to 2.9 seconds without significant symptoms.  This is essentially unchanged from previous Holter monitor.  She has had evidence of bradydysrhythmia in the past.        Echocardiography performed earlier this year showed a normal-sized left ventricle with intact systolic function.  Ejection fraction 65%-70% with trace tricuspid insufficiency, mild to moderate elevation of right ventricular systolic pressure.  There is a mitral valve clip seen on the valve.  There was mild mitral stenosis, mean mitral valve gradient 6 mmHg.  There is mild to moderate mitral insufficiency present.  No significant change from previous study of 06/2017.      MEDICATIONS:   1.  Acetaminophen 650 mg every 6 hours as needed.   2.  Albuterol inhaler as needed.   3.  Fosamax 70 mg a week.   4.  Allopurinol 100 mg a day.   5.  Amlodipine 5 mg twice a day.   6.  Amoxicillin as needed.   7.  Vitamin D units a day.   8.  Furosemide 40 mg a day.   9.  Levothyroxine 100 mcg a day.   10.  Lisinopril 20 mg twice a day.   11.  Metoprolol XL 75 mg a day.   12.  Simvastatin 20 mg at bedtime.   13.  Warfarin as directed.   14.  Tramadol 50 mg every 6 hours as needed.      LABORATORY DATA:  Demonstrated a cholesterol of 156, HDL 48, LDL 78, triglyceride 149.  Sodium 141, potassium 4.9, BUN 20, creatinine 0.79.  Hemoglobin 11.8, platelet count 259,000.  ALT is less than 5.      The patient presents to Cardiology Clinic for followup of her paroxysmal atrial dysrhythmias, hypertension and mitral insufficiency status post mitral valve clip.      PHYSICAL EXAMINATION:   VITAL SIGNS:  Blood pressure 142/70 with a heart rate of 70-80.  Weight was 194 pounds, which is up 6 pounds from previous clinic visit.   NECK:  Without jugular venous distention, carotid bruit or palpable thyroid.   CHEST:  Essentially clear to percussion and auscultation with slight decreased breath sounds in the bases with isolated crackles.   CARDIAC:   Revealed an irregular rhythm, variable S1, 2/6 systolic murmur at the left sternal border radiating to the apex.  No diastolic murmur, rub or S3.   EXTREMITIES:  Showed 1 to 2+ edema without cyanosis or erythema.      CLINICAL IMPRESSION:   1.  Stable cardiac condition.   2.  Status post episode of diastolic congestive heart failure treated with diuretic therapy in 2015.   3.  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   4.  Mitral insufficiency, status post mitral valve clip with mild gradient and mild to moderate mitral insufficiency.   5.  Hypertension with borderline elevation of systolic pressures.   6.  Hyperlipidemia, virtually at goal.   7.  History of sleep apnea.   8.  History of breast cancer.   9.  Gastroesophageal reflux disease.   10.  History of osteoarthritis and knee replacement surgery.      DISCUSSION:  The patient has done reasonably well since last clinic visit.  She is restricted by osteoarthritis, degenerative joint disease as well as some type of myopathy.  Medications will be left unchanged at the present time.  Her echocardiogram appears to be stable.  She will need close followup of his serum lipids, basic metabolic panel and blood pressure.  She will be continued on anticoagulation for stroke prevention as well as antibiotic prophylaxis for dental and general surgical procedures.      RECOMMENDATIONS:   1.  Continue present medications.   2.  Close followup of serum lipids, basic metabolic panel and blood pressure with followup with Bre Razo in 2-3 months.   3.  Cautious anticoagulation.   4.  Antibiotic prophylaxis for dental and general surgical procedures.   5.  Diet and exercise program, avoiding caffeine and salt, maintaining nutrition and hydration.   6.  Echocardiogram in early 2019 to follow left ventricular function and mitral valve function.   7.  Routine medical followup.   8.  Cardiology followup in 6 months.      cc:      Bess  MD Amisha    [facility]   [address]   [place] [address]         MATT GUAN MD, [facility]             D: 2018   T: 2018   MT: MONI      Name:     DARLENE FRIAS   MRN:      [id number]        Account:      [id number]   :      1935           Service Date: 2018      Document: [id number]       Thank you for allowing me to participate in the care of your patient.      Sincerely,     Matt Guan MD     [facility]    cc:   Matt Guan MD  [address]  [place] [address]

## 2018-07-13 NOTE — PROGRESS NOTES
Service Date: 07/13/2018      HISTORY OF PRESENT ILLNESS:  The patient is an 82-year-old overweight white female who presents to Cardiology Clinic for followup of atrial fibrillation and problematic hypertension and hypercholesterolemia.  There is no history of cigarette smoking, diabetes mellitus or family history of premature coronary disease.  She has been unaware of an irregular beat, but has also been diagnosed with atrial fibrillation treated with aggressive rate control and anticoagulation.      In the summer of 2015, she was hospitalized at [facility] because of volume overload consistent with a history of diastolic congestive heart failure, possibly related to atrial fibrillation with rapid ventricular response which responded nicely to diuretic therapy.  She has maintained a relatively stable weight.      In the fall of 2016, she injured her knee and required admission to a TCU.  She slowly improved, but she needs to use a walker most of the time.  She has also had significant proximal extremity muscle weakness of unclear etiology.  It is felt to be some form of muscular dystrophy.  She denies arleth chest pain, shortness of breath, dizziness, palpitations, nausea, vomiting, diaphoresis or syncope.  Denies PND, orthopnea, fever, chills or sweats.  She has a reasonable sleep and appetite pattern.        It should be noted that the patient did receive a mitral valve clip procedure for severe mitral insufficiency in 01/2015 with subsequent moderate mitral insufficiency noted as an improvement over previous moderately severe to severe insufficiency.      In early 2018, she had a Holter monitor which demonstrated a heart rate varying from , average rate of 73, with chronic atrial fibrillation, longest pause up to 2.9 seconds without significant symptoms.  This is essentially unchanged from previous Holter monitor.  She has had evidence of bradydysrhythmia in the past.        Echocardiography  performed earlier this year showed a normal-sized left ventricle with intact systolic function.  Ejection fraction 65%-70% with trace tricuspid insufficiency, mild to moderate elevation of right ventricular systolic pressure.  There is a mitral valve clip seen on the valve.  There was mild mitral stenosis, mean mitral valve gradient 6 mmHg.  There is mild to moderate mitral insufficiency present.  No significant change from previous study of 06/2017.      MEDICATIONS:   1.  Acetaminophen 650 mg every 6 hours as needed.   2.  Albuterol inhaler as needed.   3.  Fosamax 70 mg a week.   4.  Allopurinol 100 mg a day.   5.  Amlodipine 5 mg twice a day.   6.  Amoxicillin as needed.   7.  Vitamin D units a day.   8.  Furosemide 40 mg a day.   9.  Levothyroxine 100 mcg a day.   10.  Lisinopril 20 mg twice a day.   11.  Metoprolol XL 75 mg a day.   12.  Simvastatin 20 mg at bedtime.   13.  Warfarin as directed.   14.  Tramadol 50 mg every 6 hours as needed.      LABORATORY DATA:  Demonstrated a cholesterol of 156, HDL 48, LDL 78, triglyceride 149.  Sodium 141, potassium 4.9, BUN 20, creatinine 0.79.  Hemoglobin 11.8, platelet count 259,000.  ALT is less than 5.      The patient presents to Cardiology Clinic for followup of her paroxysmal atrial dysrhythmias, hypertension and mitral insufficiency status post mitral valve clip.      PHYSICAL EXAMINATION:   VITAL SIGNS:  Blood pressure 142/70 with a heart rate of 70-80.  Weight was 194 pounds, which is up 6 pounds from previous clinic visit.   NECK:  Without jugular venous distention, carotid bruit or palpable thyroid.   CHEST:  Essentially clear to percussion and auscultation with slight decreased breath sounds in the bases with isolated crackles.   CARDIAC:  Revealed an irregular rhythm, variable S1, 2/6 systolic murmur at the left sternal border radiating to the apex.  No diastolic murmur, rub or S3.   EXTREMITIES:  Showed 1 to 2+ edema without cyanosis or erythema.       CLINICAL IMPRESSION:   1.  Stable cardiac condition.   2.  Status post episode of diastolic congestive heart failure treated with diuretic therapy in 2015.   3.  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   4.  Mitral insufficiency, status post mitral valve clip with mild gradient and mild to moderate mitral insufficiency.   5.  Hypertension with borderline elevation of systolic pressures.   6.  Hyperlipidemia, virtually at goal.   7.  History of sleep apnea.   8.  History of breast cancer.   9.  Gastroesophageal reflux disease.   10.  History of osteoarthritis and knee replacement surgery.      DISCUSSION:  The patient has done reasonably well since last clinic visit.  She is restricted by osteoarthritis, degenerative joint disease as well as some type of myopathy.  Medications will be left unchanged at the present time.  Her echocardiogram appears to be stable.  She will need close followup of his serum lipids, basic metabolic panel and blood pressure.  She will be continued on anticoagulation for stroke prevention as well as antibiotic prophylaxis for dental and general surgical procedures.      RECOMMENDATIONS:   1.  Continue present medications.   2.  Close followup of serum lipids, basic metabolic panel and blood pressure with followup with Bre Razo in 2-3 months.   3.  Cautious anticoagulation.   4.  Antibiotic prophylaxis for dental and general surgical procedures.   5.  Diet and exercise program, avoiding caffeine and salt, maintaining nutrition and hydration.   6.  Echocardiogram in early 2019 to follow left ventricular function and mitral valve function.   7.  Routine medical followup.   8.  Cardiology followup in 6 months.      cc:      Bess Lion MD    [facility]   [address] [place] [address]         MATT RABAGO [facility], [facility]             D: 07/13/2018   T: 07/13/2018   MT: MONI      Name:     DARLENE FRIAS   MRN:       [id number]        Account:      [id number]   :      1935           Service Date: 2018      Document: [id number]

## 2018-07-25 ENCOUNTER — ANTICOAGULATION THERAPY VISIT (OUTPATIENT)
Dept: NURSING | Facility: CLINIC | Age: 83
End: 2018-07-25
Payer: COMMERCIAL

## 2018-07-25 LAB — INR POINT OF CARE: 4.9 (ref 2.5–3.5)

## 2018-07-25 PROCEDURE — 85610 PROTHROMBIN TIME: CPT | Mod: QW

## 2018-07-25 PROCEDURE — 36416 COLLJ CAPILLARY BLOOD SPEC: CPT

## 2018-07-25 PROCEDURE — 99207 ZZC NO CHARGE NURSE ONLY: CPT

## 2018-07-25 NOTE — PROGRESS NOTES
ANTICOAGULATION FOLLOW-UP CLINIC VISIT    Patient Name:  Kenyatta Donald  Date:  7/25/2018  Contact Type:  Face to Face    SUBJECTIVE:        OBJECTIVE    INR Protime   Date Value Ref Range Status   07/25/2018 4.9 (A) 2.5 - 3.5 Final       ASSESSMENT / PLAN  INR assessment SUPRA    Recheck INR In: 1 WEEK    INR Location Clinic      Anticoagulation Summary as of 7/25/2018     INR goal 2.5-3.5   Today's INR 4.9!   Warfarin maintenance plan 4 mg (4 mg x 1) on Sun; 5 mg (4 mg x 1 and 1 mg x 1) all other days   Full warfarin instructions 7/25: Hold; 7/26: 2 mg; Otherwise 4 mg on Sun; 5 mg all other days   Weekly warfarin total 34 mg   Plan last modified Nancy Zavala RN (7/11/2018)   Next INR check 8/3/2018   Priority INR   Target end date     Indications   Long-term (current) use of anticoagulants [Z79.01] [Z79.01]  Mitral valve disorder s/po 1-9-15 remodeling percut  + clip  (Resolved) [I05.9]         Anticoagulation Episode Summary     INR check location     Preferred lab     Send INR reminders to [facility] INR/PROTIME    Comments       Anticoagulation Care Providers     Provider Role Specialty Phone number    Bess Lion Mirna Pyle MD [facility] [phone]            See the Encounter Report to view Anticoagulation Flowsheet and Dosing Calendar (Go to Encounters tab in chart review, and find the Anticoagulation Therapy Visit)        Lisa Draper RN

## 2018-07-25 NOTE — MR AVS SNAPSHOT
Kenyatta Donald   7/25/2018 1:15 PM   Anticoagulation Therapy Visit    Description:  82 year old female   Provider:  [facility]   Department:  Bx Nurse           INR as of 7/25/2018     Today's INR 4.9!      Anticoagulation Summary as of 7/25/2018     INR goal 2.5-3.5   Today's INR 4.9!   Full warfarin instructions 7/25: Hold; 7/26: 2 mg; Otherwise 4 mg on Sun; 5 mg all other days   Next INR check 8/3/2018    Indications   Long-term (current) use of anticoagulants [Z79.01] [Z79.01]  Mitral valve disorder s/po 1-9-15 remodeling percut  + clip  (Resolved) [I05.9]         Your next Anticoagulation Clinic appointment(s)     Aug 03, 2018  2:00 PM CDT   Anticoagulation Visit with  ANTICOAGULATION CLINIC   [facility]    [address] [place] [address] [id number] [phone]              Contact Numbers     [facility]  Please call  [phone] to cancel and/or reschedule your appointment   The direct line to the anticoagulant nurse is [phone] on Monday, Wednesday, and Friday. On Thursday, the anticoagulant nurse can be reached directly at [phone].         July 2018 Details    Sun Mon Tue Wed Thu Fri Sat     1               2               3               4               5               6               7                 8               9               10               11               12               13               14                 15               16               17               18               19               20               21                 22               23               24               25      Hold   See details      26      2 mg         27      5 mg         28      5 mg           29      4 mg         30      5 mg         31      5 mg              Date Details   07/25 This INR check               How to take your warfarin dose     To take:  2 mg Take 2 of the 1 mg  tablets.    To take:  4 mg Take 1 of the 4 mg tablets.    To take:  5 mg Take 1 of the 4 mg tablets and 1 of the 1 mg tablets.    Hold Do not take your warfarin dose. See the Details table to the right for additional instructions.                August 2018 Details    Sun Mon Tue Wed Thu Fri Sat        1      5 mg         2      5 mg         3            4                 5               6               7               8               9               10               11                 12               13               14               15               16               17               18                 19               20               21               22               23               24               25                 26               27               28               29               30               31                 Date Details   No additional details    Date of next INR:  8/3/2018         How to take your warfarin dose     To take:  5 mg Take 1 of the 4 mg tablets and 1 of the 1 mg tablets.

## 2018-08-03 ENCOUNTER — ANTICOAGULATION THERAPY VISIT (OUTPATIENT)
Dept: NURSING | Facility: CLINIC | Age: 83
End: 2018-08-03
Payer: COMMERCIAL

## 2018-08-03 DIAGNOSIS — Z79.01 LONG-TERM (CURRENT) USE OF ANTICOAGULANTS: ICD-10-CM

## 2018-08-03 LAB — INR POINT OF CARE: 4.6 (ref 0.86–1.14)

## 2018-08-03 PROCEDURE — 99207 ZZC NO CHARGE NURSE ONLY: CPT

## 2018-08-03 PROCEDURE — 85610 PROTHROMBIN TIME: CPT | Mod: QW

## 2018-08-03 PROCEDURE — 36416 COLLJ CAPILLARY BLOOD SPEC: CPT

## 2018-08-03 NOTE — LETTER
[place], [place]    Please do INR's on Kenyatta Donald as needed.   1935.   Reason for anticoagulation: Chronic Atrial Fib.  Please Fax results to [facility]  ATT: INR Clinic [phone] Fax #      Thank you,        Dr Bess Lion    [phone]

## 2018-08-03 NOTE — PROGRESS NOTES
ANTICOAGULATION FOLLOW-UP CLINIC VISIT    Patient Name:  Kenyatta Donald  Date:  8/3/2018  Contact Type:  Face to Face    SUBJECTIVE:     Patient Findings     Positives Other complaints (left knee pain)           OBJECTIVE    INR Protime   Date Value Ref Range Status   08/03/2018 4.6 (A) 0.86 - 1.14 Final       ASSESSMENT / PLAN  INR assessment SUPRA    Recheck INR In: 1 WEEK    INR Location Clinic      Anticoagulation Summary as of 8/3/2018     INR goal 2.5-3.5   Today's INR 4.6!   Warfarin maintenance plan 6 mg (4 mg x 1 and 1 mg x 2) on Wed; 4 mg (4 mg x 1) all other days   Full warfarin instructions 8/3: Hold; 8/4: 4 mg; 8/6: 4 mg; Otherwise 6 mg on Wed; 4 mg all other days   Weekly warfarin total 30 mg   Plan last modified Nancy Zavala RN (8/3/2018)   Next INR check 8/10/2018   Priority INR   Target end date     Indications   Long-term (current) use of anticoagulants [Z79.01] [Z79.01]  Mitral valve disorder s/po 1-9-15 remodeling percut  + clip  (Resolved) [I05.9]         Anticoagulation Episode Summary     INR check location     Preferred lab     Send INR reminders to [facility] INR/PROTIME    Comments       Anticoagulation Care Providers     Provider Role Specialty Phone number    Bess Lion Mirna Pyle MD [facility] [phone]            See the Encounter Report to view Anticoagulation Flowsheet and Dosing Calendar (Go to Encounters tab in chart review, and find the Anticoagulation Therapy Visit)        Nancy Zavala, RN

## 2018-08-03 NOTE — MR AVS SNAPSHOT
Kenyatta Donald   8/3/2018 2:00 PM   Anticoagulation Therapy Visit    Description:  82 year old female   Provider:  [facility]   Department:  Bx Nurse           INR as of 8/3/2018     Today's INR 4.6!      Anticoagulation Summary as of 8/3/2018     INR goal 2.5-3.5   Today's INR 4.6!   Full warfarin instructions 8/3: Hold; 8/4: 4 mg; 8/6: 4 mg; Otherwise 6 mg on Wed; 4 mg all other days   Next INR check 8/10/2018    Indications   Long-term (current) use of anticoagulants [Z79.01] [Z79.01]  Mitral valve disorder s/po 1-9-15 remodeling percut  + clip  (Resolved) [I05.9]         Contact Numbers     [facility]  Please call  [phone] to cancel and/or reschedule your appointment   The direct line to the anticoagulant nurse is [phone] on Monday, Wednesday, and Friday. On Thursday, the anticoagulant nurse can be reached directly at [phone].         August 2018 Details    Sun Mon Tue Wed Thu Fri Sat        1               2               3      Hold   See details      4      4 mg           5      4 mg         6      4 mg         7      4 mg         8      6 mg         9      4 mg         10            11                 12               13               14               15               16               17               18                 19               20               21               22               23               24               25                 26               27               28               29               30               31                 Date Details   08/03 This INR check       Date of next INR:  8/10/2018         How to take your warfarin dose     To take:  4 mg Take 1 of the 4 mg tablets.    To take:  6 mg Take 1 of the 4 mg tablets and 2 of the 1 mg tablets.    Hold Do not take your warfarin dose. See the Details table to the right for additional instructions.

## 2018-08-04 ENCOUNTER — TELEPHONE (OUTPATIENT)
Dept: FAMILY MEDICINE | Facility: CLINIC | Age: 83
End: 2018-08-04

## 2018-08-04 NOTE — TELEPHONE ENCOUNTER
Clinic Action Needed:No/FYI only, follow up only as needed    Reason for Call: Kenyatta was returning call from Anticoagulaton ISIS Zavala.  Patient Kenyatta isn't sure why RN Kenyatta was calling, however she thinks it was about her going out of town to [place], [place] for two weeks.  Kenyatta will be in [place], [place] from August 5-18th.  She is to have an INR draw while there and she was trying to relay what clinic she would be going to.  However, patient Kenyatta didn't know the name of the clinic or have a phone number.  We looked it up together on line and she believes it is [facility].  There phone number is [phone].  If RN Kenyatta needs to speak to patient she can call her on her cell phone listed in chart.  Thank you.     Routed to: [facility] Patient Care Team 2 and ISIS Beebe RN  [facility]

## 2018-08-06 ENCOUNTER — TELEPHONE (OUTPATIENT)
Dept: FAMILY MEDICINE | Facility: CLINIC | Age: 83
End: 2018-08-06

## 2018-08-06 NOTE — TELEPHONE ENCOUNTER
Our goal is to have forms completed within 72 hours, however some forms may require a visit or additional information.    What clinic location was the form placed at [facility] or [facility].?     Who is the form from?   Where did the form come from? Faxed to clinic   The form was placed in the inbox of Bess Lion MD      Please fax to [phone]  Phone number: [phone]    Additional comments: [facility] drug interaction DR orders     Call take on 8/6/2018 at 10:14 AM by Suzanna More

## 2018-08-07 ENCOUNTER — MEDICAL CORRESPONDENCE (OUTPATIENT)
Dept: HEALTH INFORMATION MANAGEMENT | Facility: CLINIC | Age: 83
End: 2018-08-07

## 2018-08-09 DIAGNOSIS — M81.0 AGE-RELATED OSTEOPOROSIS WITHOUT CURRENT PATHOLOGICAL FRACTURE: ICD-10-CM

## 2018-08-10 ENCOUNTER — ANTICOAGULATION THERAPY VISIT (OUTPATIENT)
Dept: NURSING | Facility: CLINIC | Age: 83
End: 2018-08-10
Payer: COMMERCIAL

## 2018-08-10 DIAGNOSIS — Z79.01 LONG-TERM (CURRENT) USE OF ANTICOAGULANTS: ICD-10-CM

## 2018-08-10 LAB — INR POINT OF CARE: 2.4 (ref 0.86–1.14)

## 2018-08-10 PROCEDURE — 36416 COLLJ CAPILLARY BLOOD SPEC: CPT

## 2018-08-10 PROCEDURE — 99207 ZZC NO CHARGE NURSE ONLY: CPT

## 2018-08-10 PROCEDURE — 85610 PROTHROMBIN TIME: CPT | Mod: QW

## 2018-08-10 RX ORDER — ALENDRONATE SODIUM 70 MG/1
TABLET ORAL
Qty: 12 TABLET | Refills: 3 | Status: SHIPPED | OUTPATIENT
Start: 2018-08-10 | End: 2019-06-01

## 2018-08-10 NOTE — TELEPHONE ENCOUNTER
"Requested Prescriptions   Pending Prescriptions Disp Refills     alendronate (FOSAMAX) 70 MG tablet [Pharmacy Med Name: ALENDRONATE 70MG TABLETS]  Last Written Prescription Date:  8/9/2018  Last Fill Quantity: 4,  # refills: 0   Last office visit: 5/29/2018 with prescribing provider:  Dr TANK Lion   Future Office Visit:   Next 5 appointments (look out 90 days)     Oct 08, 2018  3:10 PM CDT   Return Visit with CHYNA Hutchinson   [facility] ([facility]    [address] [place] [address] [id number] [phone] OPT 2                  12 tablet 0     Sig: TAKE 1 TABLET BY MOUTH EVERY 7 DAYS    Bisphosphonates Failed    8/9/2018  4:15 PM       Failed - Dexa on file within past 2 years    Please review last Dexa result.          Passed - Recent (12 mo) or future (30 days) visit within the authorizing provider's specialty    Patient had office visit in the last 12 months or has a visit in the next 30 days with authorizing provider or within the authorizing provider's specialty.  See \"Patient Info\" tab in inbasket, or \"Choose Columns\" in Meds & Orders section of the refill encounter.           Passed - Patient is age 18 or older       Passed - Normal serum creatinine on file within past 12 months    Recent Labs   Lab Test  07/13/18   1322   CR  0.79               "

## 2018-08-10 NOTE — MR AVS SNAPSHOT
Kenyatta Donald   8/10/2018 3:15 PM   Anticoagulation Therapy Visit    Description:  82 year old female   Provider:  [facility]   Department:  Bx Nurse           INR as of 8/10/2018     Today's INR 2.4!      Anticoagulation Summary as of 8/10/2018     INR goal 2.5-3.5   Today's INR 2.4!   Full warfarin instructions 6 mg on Wed; 4 mg all other days   Next INR check 8/22/2018    Indications   Long-term (current) use of anticoagulants [Z79.01] [Z79.01]  Mitral valve disorder s/po 1-9-15 remodeling percut  + clip  (Resolved) [I05.9]         Description     Dosing called to Kenyatta.      Your next Anticoagulation Clinic appointment(s)     Aug 22, 2018  1:30 PM CDT   Anticoagulation Visit with  ANTICOAGULATION CLINIC   [facility] ([facility]    [address] [place] [address] [id number] [phone]              Contact Numbers     [facility]  Please call  [phone] to cancel and/or reschedule your appointment   The direct line to the anticoagulant nurse is [phone] on Monday, Wednesday, and Friday. On Thursday, the anticoagulant nurse can be reached directly at [phone].         August 2018 Details    Sun Mon Tue Wed Thu Fri Sat        1               2               3               4                 5               6               7               8               9               10      4 mg   See details      11      4 mg           12      4 mg         13      4 mg         14      4 mg         15      6 mg         16      4 mg         17      4 mg         18      4 mg           19      4 mg         20      4 mg         21      4 mg         22            23               24               25                 26               27               28               29               30               31                 Date Details   08/10 This INR check       Date of next INR:  8/22/2018         How to take  your warfarin dose     To take:  4 mg Take 1 of the 4 mg tablets.    To take:  6 mg Take 1 of the 4 mg tablets and 2 of the 1 mg tablets.

## 2018-08-10 NOTE — TELEPHONE ENCOUNTER
Patient wants 90 days supply, unable to give the patient 90 days per RN refill protocol, because is not up to date on her dexa scan.     Routing to provider to process request.

## 2018-08-10 NOTE — PROGRESS NOTES
ANTICOAGULATION FOLLOW-UP CLINIC VISIT    Patient Name:  Kenyatta Donald  Date:  8/10/2018  Contact Type:  Telephone    SUBJECTIVE:     Patient Findings     Positives Other complaints (knee pain)           OBJECTIVE    INR Protime   Date Value Ref Range Status   08/10/2018 2.4 (A) 0.86 - 1.14 Final       ASSESSMENT / PLAN  INR assessment THER    Recheck INR In: 2 WEEKS    INR Location Outside lab      Anticoagulation Summary as of 8/10/2018     INR goal 2.5-3.5   Today's INR 2.4!   Warfarin maintenance plan 6 mg (4 mg x 1 and 1 mg x 2) on Wed; 4 mg (4 mg x 1) all other days   Full warfarin instructions 6 mg on Wed; 4 mg all other days   Weekly warfarin total 30 mg   No change documented Nancy Zavala RN   Plan last modified Nancy Zavala RN (8/3/2018)   Next INR check 8/22/2018   Priority INR   Target end date     Indications   Long-term (current) use of anticoagulants [Z79.01] [Z79.01]  Mitral valve disorder s/po 1-9-15 remodeling percut  + clip  (Resolved) [I05.9]         Anticoagulation Episode Summary     INR check location     Preferred lab     Send INR reminders to [facility] INR/PROTIME    Comments       Anticoagulation Care Providers     Provider Role Specialty Phone number    Amisha Bessanahi Pyle MD [facility] [phone]            See the Encounter Report to view Anticoagulation Flowsheet and Dosing Calendar (Go to Encounters tab in chart review, and find the Anticoagulation Therapy Visit)        Nancy Zavala, RN

## 2018-08-21 ENCOUNTER — OFFICE VISIT (OUTPATIENT)
Dept: FAMILY MEDICINE | Facility: CLINIC | Age: 83
End: 2018-08-21
Payer: COMMERCIAL

## 2018-08-21 ENCOUNTER — ANTICOAGULATION THERAPY VISIT (OUTPATIENT)
Dept: NURSING | Facility: CLINIC | Age: 83
End: 2018-08-21

## 2018-08-21 VITALS
SYSTOLIC BLOOD PRESSURE: 130 MMHG | HEART RATE: 95 BPM | TEMPERATURE: 97.9 F | WEIGHT: 193 LBS | HEIGHT: 65 IN | DIASTOLIC BLOOD PRESSURE: 74 MMHG | BODY MASS INDEX: 32.15 KG/M2 | RESPIRATION RATE: 18 BRPM | OXYGEN SATURATION: 92 %

## 2018-08-21 DIAGNOSIS — M54.41 ACUTE RIGHT-SIDED LOW BACK PAIN WITH RIGHT-SIDED SCIATICA: ICD-10-CM

## 2018-08-21 DIAGNOSIS — Z79.01 ANTICOAGULATED: ICD-10-CM

## 2018-08-21 DIAGNOSIS — G89.29 CHRONIC PAIN OF RIGHT KNEE: Primary | ICD-10-CM

## 2018-08-21 DIAGNOSIS — Z85.3 HX: BREAST CANCER: ICD-10-CM

## 2018-08-21 DIAGNOSIS — E03.9 ACQUIRED HYPOTHYROIDISM: ICD-10-CM

## 2018-08-21 DIAGNOSIS — G71.11 MYOTONIC MUSCULAR DYSTROPHY (H): ICD-10-CM

## 2018-08-21 DIAGNOSIS — M25.561 CHRONIC PAIN OF RIGHT KNEE: Primary | ICD-10-CM

## 2018-08-21 LAB — INR POINT OF CARE: 1.7 (ref 2.5–3.5)

## 2018-08-21 PROCEDURE — 99207 ZZC NO CHARGE NURSE ONLY: CPT | Performed by: FAMILY MEDICINE

## 2018-08-21 PROCEDURE — 85610 PROTHROMBIN TIME: CPT | Mod: QW | Performed by: FAMILY MEDICINE

## 2018-08-21 PROCEDURE — 36416 COLLJ CAPILLARY BLOOD SPEC: CPT | Performed by: FAMILY MEDICINE

## 2018-08-21 PROCEDURE — 99214 OFFICE O/P EST MOD 30 MIN: CPT | Performed by: FAMILY MEDICINE

## 2018-08-21 RX ORDER — TRAMADOL HYDROCHLORIDE 50 MG/1
50 TABLET ORAL EVERY 6 HOURS PRN
Qty: 30 TABLET | Refills: 0 | Status: SHIPPED | OUTPATIENT
Start: 2018-08-21 | End: 2021-05-12

## 2018-08-21 NOTE — MR AVS SNAPSHOT
Kenyatta Donald   8/21/2018   Anticoagulation Therapy Visit    Description:  82 year old female   Provider:  Bess Lion MD   Department:  Bx Nurse           INR as of 8/21/2018     Today's INR 1.7!      Anticoagulation Summary as of 8/21/2018     INR goal 2.5-3.5   Today's INR 1.7!   Full warfarin instructions 8/22: 8 mg; Otherwise 6 mg on Wed; 4 mg all other days   Next INR check 9/5/2018    Indications   Long-term (current) use of anticoagulants [Z79.01] [Z79.01]  Mitral valve disorder s/po 1-9-15 remodeling percut  + clip  (Resolved) [I05.9]         Your next Anticoagulation Clinic appointment(s)     Sep 05, 2018  1:30 PM CDT   Anticoagulation [facility]    [address] [place] [address] [phone]              Contact Numbers     [facility]  Please call  [phone] to cancel and/or reschedule your appointment   The direct line to the anticoagulant nurse is [phone] on Monday, Wednesday, and Friday. On Thursday, the anticoagulant nurse can be reached directly at [phone].         August 2018 Details    Sun Mon Tue Wed Thu Fri Sat        1               2               3               4                 5               6               7               8               9               10               11                 12               13               14               15               16               17               18                 19               20               21      4 mg   See details      22      8 mg         23      4 mg         24      4 mg         25      4 mg           26      4 mg         27      4 mg         28      4 mg         29      6 mg         30      4 mg         31      4 mg           Date Details   08/21 This INR check               How to take your warfarin dose     To take:  4 mg Take 1 of the 4 mg  tablets.    To take:  6 mg Take 1 of the 4 mg tablets and 2 of the 1 mg tablets.    To take:  8 mg Take 2 of the 4 mg tablets.           September 2018 Details    Sun Mon Tue Wed Thu Fri Sat           1      4 mg           2      4 mg         3      4 mg         4      4 mg         5            6               7               8                 9               10               11               12               13               14               15                 16               17               18               19               20               21               22                 23               24               25               26               27               28               29                 30                      Date Details   No additional details    Date of next INR:  9/5/2018         How to take your warfarin dose     To take:  4 mg Take 1 of the 4 mg tablets.    To take:  6 mg Take 1 of the 4 mg tablets and 2 of the 1 mg tablets.

## 2018-08-21 NOTE — MR AVS SNAPSHOT
After Visit Summary   8/21/2018    Kenyatta Donald    MRN: [id number]           Patient Information     Date Of Birth          1935        Visit Information        Provider Department      8/21/2018 2:20 PM Bess Lion MD [facility]        Today's Diagnoses     Acute right-sided low back pain with right-sided sciatica    -  1      Care Instructions    1,Shingrex is a 2 shot series that prevents shingles 97% of the time, as opposed to the old shingles shot that only prevented it at 40-50%  It costs less for medicare at a pharmacy  You should get it starting at 50 yrs old     2. Go to [facility]     Go [facility] - go west to their ramp  To 2nd floor & go into urgicare           Follow-ups after your visit        Follow-up notes from your care team     Return in about 3 months (around 11/21/2018) for Routine Visit.      Your next 10 appointments already scheduled     Aug 22, 2018  1:30 PM CDT   Anticoagulation Visit with [facility]   [facility] ([facility])    [address]  [place] [id number]   [phone]            Oct 08, 2018  2:10 PM CDT   LAB with [facility]   [facility] ([facility]    [address] [place] [id number] [phone]           Please do not eat 10-12 hours before your appointment if you are coming in fasting for labs on lipids, cholesterol, or glucose (sugar). This does not apply to pregnant women. Water, hot tea and black coffee (with nothing added) are okay. Do not drink other fluids, diet soda or chew gum.            Oct 08, 2018  3:10 PM CDT   Return Visit with CHYNA Hutchinson   [facility] ([facility])    [address] [place] [id number] [phone] OPT 2              Who to  "contact     If you have questions or need follow up information about today's clinic visit or your schedule please contact [facility] directly at [phone].  Normal or non-critical lab and imaging results will be communicated to you by MyChart, letter or phone within 4 business days after the clinic has received the results. If you do not hear from us within 7 days, please contact the clinic through MyChart or phone. If you have a critical or abnormal lab result, we will notify you by phone as soon as possible.  Submit refill requests through [employer] or call your pharmacy and they will forward the refill request to us. Please allow 3 business days for your refill to be completed.          Additional Information About Your Visit        Care EveryWhere ID     This is your Care EveryWhere ID. This could be used by other organizations to access your [facility] medical records  [id number]        Your Vitals Were     Pulse Temperature Respirations Height Last Period Pulse Oximetry    95 97.9  F (36.6  C) (Tympanic) 18 5' 4.5\" (1.638 m) (LMP Unknown) 92%    Breastfeeding? BMI (Body Mass Index)                No 32.62 kg/m2           Blood Pressure from Last 3 Encounters:   08/21/18 130/74   07/13/18 142/70   05/29/18 116/68    Weight from Last 3 Encounters:   08/21/18 193 lb (87.5 kg)   07/13/18 194 lb 4.8 oz (88.1 kg)   05/29/18 189 lb (85.7 kg)              Today, you had the following     No orders found for display         Today's Medication Changes          These changes are accurate as of 8/21/18  2:39 PM.  If you have any questions, ask your nurse or doctor.               These medicines have changed or have updated prescriptions.        Dose/Directions    * warfarin 1 MG tablet   Commonly known as:  COUMADIN   This may have changed:    - how much to take  - additional instructions   Used for:  Atrial fibrillation (H)        3-15-18 INR orders=Full instructions5 mg on Mon, Wed, Fri; " 4 mg all other days Weekly total31 mg   Quantity:  40 tablet   Refills:  1       * warfarin 4 MG tablet   Commonly known as:  COUMADIN   This may have changed:  See the new instructions.   Used for:  Atrial fibrillation, unspecified type (H), History of pulmonary embolism        TAKE 1 TABLET BY MOUTH EVERY DAY   Quantity:  90 tablet   Refills:  1       * Notice:  This list has 2 medication(s) that are the same as other medications prescribed for you. Read the directions carefully, and ask your doctor or other care provider to review them with you.             Primary Care Provider Office Phone # Fax #    Bess Mirna Lion [phone]       [address] [facility] [address]        Goals        General    Medical (pt-stated)     Notes - Note created  4/5/2018  2:01 PM by Ana Benjamin, RN    Goal: I do not want to be in the hospital.     Supportive: I will follow the Heart Failure Action Plan and seek medical attention quickly if I have increased symptoms of concern.        Pain Management (pt-stated)     Notes - Note created  4/5/2018  1:59 PM by Ana Benjamin, RN    Goal : I want to have relief of pain in my back, shoulders and leg.     Supportive: I will work with PT in my home. I will use tylenol for pain.         Equal Access to Services     SUKUMAR KIMBALL AH: Hadii zonia shannon hadasho Sovalentinoali, waaxda luqadaha, qaybta kaalmada adeegyada, emma akers. So [facility] [phone].    ATENCIÓN: Si habla español, tiene a duenas disposición servicios gratuitos de asistencia lingüística. Llame al [phone].    We comply with applicable federal civil rights laws and [place] laws. We do not discriminate on the basis of race, color, national origin, age, disability, sex, sexual orientation, or gender identity.            Thank you!     Thank you for choosing [facility]  for your care. Our goal is always to provide you with excellent care.  Hearing back from our patients is one way we can continue to improve our services. Please take a few minutes to complete the written survey that you may receive in the mail after your visit with us. Thank you!             Your Updated Medication List - Protect others around you: Learn how to safely use, store and throw away your medicines at www.disposemymeds.org.          This list is accurate as of 8/21/18  2:39 PM.  Always use your most recent med list.                   Brand Name Dispense Instructions for use Diagnosis    acetaminophen 325 MG tablet    TYLENOL    100 tablet    Take 2 tablets (650 mg) by mouth every 4 hours as needed for mild pain    Closed nondisplaced fracture of left patella, unspecified fracture morphology, initial encounter       albuterol 108 (90 Base) MCG/ACT inhaler    PROAIR HFA/PROVENTIL HFA/VENTOLIN HFA    1 Inhaler    Inhale 2 puffs into the lungs every 6 hours as needed for shortness of breath / dyspnea or wheezing    Acute on chronic systolic heart failure (H)       * alendronate 70 MG tablet    FOSAMAX     TK 1 T PO Q 7 DAYS        * alendronate 70 MG tablet    FOSAMAX    12 tablet    TAKE 1 TABLET BY MOUTH EVERY 7 DAYS    Age-related osteoporosis without current pathological fracture       allopurinol 100 MG tablet    ZYLOPRIM    90 tablet    TAKE 1 TABLET BY MOUTH EVERY DAY    Gout       amLODIPine 5 MG tablet    NORVASC    180 tablet    Take 1 tablet (5 mg) by mouth 2 times daily    Benign essential hypertension       amoxicillin 500 MG capsule    AMOXIL    4 capsule    Take 4 capsules by mouth 30 minutes prior to dental work    Mitral valve insufficiency       ASPIRIN NOT PRESCRIBED    INTENTIONAL    1 each    Please choose reason not prescribed, below        cholecalciferol 1000 UNIT tablet    vitamin D3     Take 2,000 Units by mouth daily        furosemide 40 MG tablet    LASIX    90 tablet    TAKE 1 TABLET BY MOUTH DAILY    Acute on chronic diastolic congestive heart  failure (H)       levothyroxine 100 MCG tablet    SYNTHROID/LEVOTHROID    90 tablet    TAKE 1 TABLET BY MOUTH DAILY    Acquired hypothyroidism       lisinopril 20 MG tablet    PRINIVIL/ZESTRIL    180 tablet    Take 1 tablet (20 mg) by mouth 2 times daily    Essential hypertension, benign       metoprolol succinate 50 MG 24 hr tablet    TOPROL-XL    135 tablet    Take 1.5 tablets (75 mg) by mouth daily    Coronary artery disease involving native coronary artery of native heart without angina pectoris       nystatin 240923 UNIT/GM Powd    MYCOSTATIN    60 g    Apply topically 3 times daily as needed    Intertrigo       * order for DME     1 each    Equipment being ordered: mastectomy bras & prostheses  S/po mastectomy of unknown side     C50.919    Malignant neoplasm of female breast, unspecified laterality, unspecified site of breast       * order for DME     1 each    Equipment being ordered: compression hose  BK 20-30mm  2 pair as insurance will pay    Lymphedema of both lower extremities       simvastatin 40 MG tablet    ZOCOR    45 tablet    Take 0.5 tablets (20 mg) by mouth At Bedtime    Mixed hyperlipidemia       traMADol 50 MG tablet    ULTRAM    60 tablet    Take 1 tablet (50 mg) by mouth every 6 hours as needed for moderate pain or severe pain    Acute midline low back pain without sciatica       * warfarin 1 MG tablet    COUMADIN    40 tablet    3-15-18 INR orders=Full instructions5 mg on Mon, Wed, Fri; 4 mg all other days Weekly total31 mg    Atrial fibrillation (H)       * warfarin 4 MG tablet    COUMADIN    90 tablet    TAKE 1 TABLET BY MOUTH EVERY DAY    Atrial fibrillation, unspecified type (H), History of pulmonary embolism       * Notice:  This list has 6 medication(s) that are the same as other medications prescribed for you. Read the directions carefully, and ask your doctor or other care provider to review them with you.

## 2018-08-21 NOTE — NURSING NOTE
"Chief Complaint   Patient presents with     RECHECK     /74  Pulse 95  Temp 97.9  F (36.6  C) (Tympanic)  Resp 18  Ht 5' 4.5\" (1.638 m)  Wt 193 lb (87.5 kg)  LMP  (LMP Unknown)  SpO2 92%  Breastfeeding? No  BMI 32.62 kg/m2 Estimated body mass index is 32.62 kg/(m^2) as calculated from the following:    Height as of this encounter: 5' 4.5\" (1.638 m).    Weight as of this encounter: 193 lb (87.5 kg).  BP completed using cuff size: vita Titus CMA    Health Maintenance Due   Topic Date Due     OP ANNUAL INR REFERRAL  04/08/2016     TSH W/ FREE T4 REFLEX Q1 YEAR  08/25/2018     Health Maintenance reviewed at today's visit patient asked to schedule/complete:   None, Health Maintenance up to date.    "

## 2018-08-21 NOTE — PROGRESS NOTES
SUBJECTIVE:   Kenyatta Donald is a 82 year old female who presents to clinic today for the following health issues:    Musculoskeletal problem/pain: Progressive Muscular Dystrophy with increasing pain & Generalized ongoing Muscle Weakness  , анна in joints she uses to stand etc  ie the upper shoulder girdle and the back       Duration: Chronic & worsening     Description  Location: Shoulder and Upper Legs    Intensity:  severe    Accompanying signs and symptoms: none    History  Previous similar problem: no   Previous evaluation:  none    Precipitating or alleviating factors:  Trauma or overuse: no   Aggravating factors include: sitting, standing, walking and lifting    Therapies tried and outcome: rest/inactivity and acetaminophen but only 2 of the 325 mgm     LYPHEDEMA OF LEGS       Duration: chronic but worse lately as is becoming more & more sedentary as the m dystrophy progresses , weakening her but good this 1pm     Description (location/character/radiation): full to pitting lege     Intensity:  moderate    Accompanying signs and symptoms: ache     History (similar episodes/previous evaluation): None    Precipitating or alleviating factors: above    Therapies tried and outcome: None     Low Back Pain with Rt Sciatica      Duration: onset 12-16 and recurrent since         Specific cause: walking, related to m weakness     Description:   Location of pain: low back bilateral and middle of back bilateral  Character of pain: sharp, dull ache, stabbing and gnawing  Pain radiation:radiates into the right buttocks, radiates into the right leg and radiates into the right foot  New numbness or weakness in legs, not attributed to pain:  no     Intensity: Currently 4/10    History:   Pain interferes with job: Not applicable  History of back problems: no prior back problems  Any previous MRI or X-rays: None  Sees a specialist for back pain:  No  Therapies tried without relief: 0    Alleviating factors:   Improved by:  epidural about 8 mo ago       Precipitating factors:  Worsened by: Standing and pulling herself up to the walker     Functional and Psychosocial Screen (Harley STarT Back):      Not performed today      Accompanying Signs & Symptoms:  Risk of Fracture:  None  Risk of Cauda Equina:  None  Risk of Infection:  None  Risk of Cancer:  None  Risk of Ankylosing Spondylitis:  Onset at age <35, male, AND morning back stiffness. no           Amount of exercise or physical activity: None    Problems taking medications regularly: No    Medication side effects: none    Diet: regular (no restrictions)    Musculoskeletal problem/pain:RT KNee Pain with OA       Duration: worse for 3 mo     Description  Location: above     Intensity:  moderate, 6/10    Accompanying signs and symptoms: none    History  Previous similar problem: no   Previous evaluation:  none    Precipitating or alleviating factors:  Trauma or overuse: no   Aggravating factors include: standing and walking    Therapies tried and outcome: rest/inactivity    Hypothyroidism Follow-up      Since last visit, patient describes the following symptoms: Weight stable, no hair loss, no skin changes, no constipation, no loose stools      BREAST CANCER     -Lt in  1998   -Rt in 2000   -no recurrence     Problem list and histories reviewed & adjusted, as indicated.  Additional history: as documented    Labs reviewed in EPIC    Reviewed and updated as needed this visit by clinical staff  Tobacco  Allergies  Meds  Problems       Reviewed and updated as needed this visit by Provider  Allergies  Meds  Problems         ROS:  CONSTITUTIONAL: NEGATIVE for fever, chills, change in weight  INTEGUMENTARY/SKIN: NEGATIVE for worrisome rashes, moles or lesions  EYES: NEGATIVE for vision changes or irritation  ENT/MOUTH: NEGATIVE for ear, mouth and throat problems  RESP: NEGATIVE for significant cough or SOB  BREAST: NEGATIVE for masses, tenderness or discharge  CV: NEGATIVE for chest  "pain, palpitations or peripheral edema  GI: NEGATIVE for nausea, abdominal pain, heartburn, or change in bowel habits  : NEGATIVE for frequency, dysuria, or hematuria  MUSCULOSKELETAL:POSITIVE  for , joint stiffness RT knee  and muscle weakness general   NEURO: NEGATIVE for weakness, dizziness or paresthesias  ENDOCRINE: NEGATIVE for temperature intolerance, skin/hair changes  HEME: NEGATIVE for bleeding problems  PSYCHIATRIC: NEGATIVE for changes in mood or affect    OBJECTIVE:     /74  Pulse 95  Temp 97.9  F (36.6  C) (Tympanic)  Resp 18  Ht 5' 4.5\" (1.638 m)  Wt 193 lb (87.5 kg)  LMP  (LMP Unknown)  SpO2 92%  Breastfeeding? No  BMI 32.62 kg/m2  Body mass index is 32.62 kg/(m^2).  GENERAL: healthy, alert, no distress, over weight, frail and elderly  EYES: Eyes grossly normal to inspection, PERRL and conjunctivae and sclerae normal  RESP: lungs clear to auscultation - no rales, rhonchi or wheezes  MS: no gross musculoskeletal defects noted, no edema  SKIN: no suspicious lesions or rashes  NEURO: Normal strength and tone, mentation intact and speech normal--weak spastiv mm so needs to rock to get up from sitting and needs walker for support   PSYCH: mentation appears normal, affect normal/bright    Diagnostic Test Results:  Results for orders placed or performed in visit on 08/10/18   INR point of care   Result Value Ref Range    INR Protime 2.4 (A) 0.86 - 1.14       ASSESSMENT/PLAN:               ICD-10-CM    1. Chronic pain of right knee M25.561     G89.29    2. Acute right-sided low back pain with right-sided sciatica M54.41 traMADol (ULTRAM) 50 MG tablet   3. 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 Anticoagulated Z79.01 INR   5. HX: breast cancer LT--> RT in 1998 to 2000 Z85.3    6. Acquired hypothyroidism E03.9 TSH with free T4 reflex       Patient Instructions   1,Shingrex is a 2 shot series that prevents shingles 97% of the time, as opposed to the old shingles shot that only prevented it " at 40-50%  It costs less for medicare at a pharmacy  You should get it starting at 50 yrs old     2. Go to [facility]     [address] - go [address] to their ramp  To 2nd floor & go into urgicare     5. No difference was  Noted by patients in a double blind study when given codeine, tylenol ( acetaminophen) or ibuprofen (all in identical pills). They felt no difference in pain relief. Since ibuprofen and the NSAIDs  causes kidney damage, esophageal damage with heartburn, and can increase the risk of esophageal and stomach cancer and ulcers,and colonic strictures. They also cause increased risk of heart attack .   I recommend that you use tylenol(acetaminophen) for pain. Use the acetaminophen ES  Which has 500mgm/tablet You can take up to 2 tablets 4 times a day as need for pain.  If this is not enough, you can add in ibuprofen or aleve(naprosyn) with 2 glasses of fluid and some food-to protect the stomach and esophagus. Please let us know if you are continuing to take ibuprofen or aleve, as we will need to periodically check your kidney function with a blood test.        Bess Lion MD  [facility]    Discussed all with pt  And the patient expresses understanding    The Rt knee pain could be from OA  Or from the LBP with Rt sciatica she's had     Best to let ortho eval    Bess Lion MD

## 2018-08-21 NOTE — PROGRESS NOTES
ANTICOAGULATION FOLLOW-UP CLINIC VISIT    Patient Name:  Kenyatta Donald  Date:  8/21/2018  Contact Type: Face to faceSUBJECTIVE:     Patient Findings     Positives Inflammation           OBJECTIVE    INR Protime   Date Value Ref Range Status   08/21/2018 1.7 (A) 2.5 - 3.5 Final       ASSESSMENT / PLAN  INR assessment SUB    Recheck INR In: 2 WEEKS    INR Location Clinic      Anticoagulation Summary as of 8/21/2018     INR goal 2.5-3.5   Today's INR 1.7!   Warfarin maintenance plan 6 mg (4 mg x 1 and 1 mg x 2) on Wed; 4 mg (4 mg x 1) all other days   Full warfarin instructions 8/22: 8 mg; Otherwise 6 mg on Wed; 4 mg all other days   Weekly warfarin total 30 mg   Plan last modified Lisa Draper RN (8/21/2018)   Next INR check 9/5/2018   Priority INR   Target end date     Indications   Long-term (current) use of anticoagulants [Z79.01] [Z79.01]  Mitral valve disorder s/po 1-9-15 remodeling percut  + clip  (Resolved) [I05.9]         Anticoagulation Episode Summary     INR check location     Preferred lab     Send INR reminders to [facility] INR/PROTIME    Comments       Anticoagulation Care Providers     Provider Role Specialty Phone number    Bess Lion Mirna Pyle MD [facility] [phone]            See the Encounter Report to view Anticoagulation Flowsheet and Dosing Calendar (Go to Encounters tab in chart review, and find the Anticoagulation Therapy Visit)        Lisa Draper RN

## 2018-08-21 NOTE — PATIENT INSTRUCTIONS
1,Shingrex is a 2 shot series that prevents shingles 97% of the time, as opposed to the old shingles shot that only prevented it at 40-50%  It costs less for medicare at a pharmacy  You should get it starting at 50 yrs old     2. [facility]     Go [address] - go [address] to their ramp  To 2nd floor & go into urgicare     5. No difference was  Noted by patients in a double blind study when given codeine, tylenol ( acetaminophen) or ibuprofen (all in identical pills). They felt no difference in pain relief. Since ibuprofen and the NSAIDs  causes kidney damage, esophageal damage with heartburn, and can increase the risk of esophageal and stomach cancer and ulcers,and colonic strictures. They also cause increased risk of heart attack .   I recommend that you use tylenol(acetaminophen) for pain. Use the acetaminophen ES  Which has 500mgm/tablet You can take up to 2 tablets 4 times a day as need for pain.  If this is not enough, you can add in ibuprofen or aleve(naprosyn) with 2 glasses of fluid and some food-to protect the stomach and esophagus. Please let us know if you are continuing to take ibuprofen or aleve, as we will need to periodically check your kidney function with a blood test.

## 2018-08-31 DIAGNOSIS — E78.2 MIXED HYPERLIPIDEMIA: ICD-10-CM

## 2018-08-31 RX ORDER — SIMVASTATIN 40 MG
20 TABLET ORAL AT BEDTIME
Qty: 45 TABLET | Refills: 3 | Status: SHIPPED | OUTPATIENT
Start: 2018-08-31 | End: 2019-01-29

## 2018-09-05 ENCOUNTER — ANTICOAGULATION THERAPY VISIT (OUTPATIENT)
Dept: NURSING | Facility: CLINIC | Age: 83
End: 2018-09-05
Payer: COMMERCIAL

## 2018-09-05 LAB — INR POINT OF CARE: 3.6 (ref 2.5–3.5)

## 2018-09-05 PROCEDURE — 99207 ZZC NO CHARGE NURSE ONLY: CPT

## 2018-09-05 PROCEDURE — 85610 PROTHROMBIN TIME: CPT | Mod: QW

## 2018-09-05 PROCEDURE — 36416 COLLJ CAPILLARY BLOOD SPEC: CPT

## 2018-09-05 NOTE — PROGRESS NOTES
ANTICOAGULATION FOLLOW-UP CLINIC VISIT    Patient Name:  Kenyatta Donald  Date:  9/5/2018  Contact Type:  Face to Face    SUBJECTIVE:     Patient Findings     Positives Inflammation           OBJECTIVE    INR Protime   Date Value Ref Range Status   09/05/2018 3.6 (A) 2.5 - 3.5 Final       ASSESSMENT / PLAN  INR assessment THER    Recheck INR In: 2 WEEKS    INR Location Clinic      Anticoagulation Summary as of 9/5/2018     INR goal 2.5-3.5   Today's INR 3.6!   Warfarin maintenance plan 6 mg (4 mg x 1 and 1 mg x 2) on Wed; 4 mg (4 mg x 1) all other days   Full warfarin instructions 9/5: 4 mg; Otherwise 6 mg on Wed; 4 mg all other days   Weekly warfarin total 30 mg   Plan last modified Lisa Draper RN (8/21/2018)   Next INR check 9/17/2018   Priority INR   Target end date     Indications   Long-term (current) use of anticoagulants [Z79.01] [Z79.01]  Mitral valve disorder s/po 1-9-15 remodeling percut  + clip  (Resolved) [I05.9]         Anticoagulation Episode Summary     INR check location     Preferred lab     Send INR reminders to [facility] INR/PROTIME    Comments       Anticoagulation Care Providers     Provider Role Specialty Phone number    Bess Lion Mirna Pyle MD [facility] [phone]            See the Encounter Report to view Anticoagulation Flowsheet and Dosing Calendar (Go to Encounters tab in chart review, and find the Anticoagulation Therapy Visit)        Lisa Draper RN

## 2018-09-05 NOTE — MR AVS SNAPSHOT
Kenyatta Donald   9/5/2018 1:30 PM   Anticoagulation Therapy Visit    Description:  82 year old female   Provider:  [facility]   Department:  Bx Nurse           INR as of 9/5/2018     Today's INR 3.6!      Anticoagulation Summary as of 9/5/2018     INR goal 2.5-3.5   Today's INR 3.6!   Full warfarin instructions 9/5: 4 mg; Otherwise 6 mg on Wed; 4 mg all other days   Next INR check 9/17/2018    Indications   Long-term (current) use of anticoagulants [Z79.01] [Z79.01]  Mitral valve disorder s/po 1-9-15 remodeling percut  + clip  (Resolved) [I05.9]         Your next Anticoagulation Clinic appointment(s)     Sep 17, 2018  1:00 PM CDT   An[facility]agulation [facility]  [facility] CLIN[facility]    [address] [place] [id number] [phone]              Contact Numbers     [facility]  Please call  [phone] to cancel and/or reschedule your appointment   The direct line to the anticoagulant nurse is [phone] on Monday, Wednesday, and Friday. On Thursday, the anticoagulant nurse can be reached directly at [phone].         September 2018 Details    Sun Mon Tue Wed Thu Fri Sat           1                 2               3               4               5      4 mg   See details      6      4 mg         7      4 mg         8      4 mg           9      4 mg         10      4 mg         11      4 mg         12      6 mg         13      4 mg         14      4 mg         15      4 mg           16      4 mg         17            18               19               20               21               22                 23               24               25               26               27               28               29                 30                      Date Details   09/05 This INR check       Date of next INR:  9/17/2018         How to take your warfarin dose     To take:   4 mg Take 1 of the 4 mg tablets.    To take:  6 mg Take 1 of the 4 mg tablets and 2 of the 1 mg tablets.

## 2018-09-11 DIAGNOSIS — I50.23 ACUTE ON CHRONIC SYSTOLIC HEART FAILURE (H): ICD-10-CM

## 2018-09-11 NOTE — TELEPHONE ENCOUNTER
"Requested Prescriptions   Pending Prescriptions Disp Refills     VENTOLIN  (90 Base) MCG/ACT inhaler [Pharmacy Med Name: VENTOLIN HFA INH W/DOS CTR 200PUFFS]  Last Written Prescription Date:  5/24/18  Last Fill Quantity: 1,  # refills: 3   Last office visit: 8/21/2018 with prescribing provider:  Amisha   Future Office Visit:   Next 5 appointments (look out 90 days)     Oct 08, 2018  3:10 PM CDT   Return Visit with CHYNA Hutchinson   [facility] ([facility])    [address] [place] [id number] [phone] OPT 2                  18 g 0     Sig: INHALE 2 PUFFS INTO THE LUNGS EVERY 6 HOURS AS NEEDED FOR SHORTNESS OF BREATH OR DIFFICULT BREATHING OR WHEEZING    Asthma Maintenance Inhalers - Anticholinergics Passed    9/11/2018  3:14 AM       Passed - Patient is age 12 years or older       Passed - Recent (12 mo) or future (30 days) visit within the authorizing provider's specialty    Patient had office visit in the last 12 months or has a visit in the next 30 days with authorizing provider or within the authorizing provider's specialty.  See \"Patient Info\" tab in inbasket, or \"Choose Columns\" in Meds & Orders section of the refill encounter.              "

## 2018-09-13 RX ORDER — ALBUTEROL SULFATE 90 UG/1
AEROSOL, METERED RESPIRATORY (INHALATION)
Qty: 18 G | Refills: 1 | Status: SHIPPED | OUTPATIENT
Start: 2018-09-13 | End: 2018-10-30

## 2018-09-17 ENCOUNTER — ANTICOAGULATION THERAPY VISIT (OUTPATIENT)
Dept: NURSING | Facility: CLINIC | Age: 83
End: 2018-09-17
Payer: COMMERCIAL

## 2018-09-17 DIAGNOSIS — Z79.01 LONG-TERM (CURRENT) USE OF ANTICOAGULANTS: ICD-10-CM

## 2018-09-17 LAB — INR POINT OF CARE: 3.9 (ref 0.86–1.14)

## 2018-09-17 PROCEDURE — 36416 COLLJ CAPILLARY BLOOD SPEC: CPT

## 2018-09-17 PROCEDURE — 99207 ZZC NO CHARGE NURSE ONLY: CPT

## 2018-09-17 PROCEDURE — 85610 PROTHROMBIN TIME: CPT | Mod: QW

## 2018-09-17 NOTE — MR AVS SNAPSHOT
Kenyatta Donald   9/17/2018 1:00 PM   Anticoagulation Therapy Visit    Description:  82 year old female   Provider:  [facility]   Department:  Bx Nurse           INR as of 9/17/2018     Today's INR 3.9!      Anticoagulation Summary as of 9/17/2018     INR goal 2.5-3.5   Today's INR 3.9!   Full warfarin instructions 4 mg every day   Next INR check 10/1/2018    Indications   Long-term (current) use of anticoagulants [Z79.01] [Z79.01]  Mitral valve disorder s/po 1-9-15 remodeling percut  + clip  (Resolved) [I05.9]         Your next Anticoagulation Clinic appointment(s)     Oct 01, 2018  1:00 PM CDT   Anticoagulation Visit with  ANTICOAGULATION CLINIC   [facility]    [address] [place] [id number] [phone]              Contact Numbers     [facility]  Please call  [phone] to cancel and/or reschedule your appointment   The direct line to the anticoagulant nurse is [phone] on Monday, Wednesday, and Friday. On Thursday, the anticoagulant nurse can be reached directly at [phone].         September 2018 Details    Sun Mon Tue Wed Thu Fri Sat           1                 2               3               4               5               6               7               8                 9               10               11               12               13               14               15                 16               17      4 mg   See details      18      4 mg         19      4 mg         20      4 mg         21      4 mg         22      4 mg           23      4 mg         24      4 mg         25      4 mg         26      4 mg         27      4 mg         28      4 mg         29      4 mg           30      4 mg                Date Details   09/17 This INR check               How to take your warfarin dose     To take:  4 mg Take 1 of the 4 mg tablets.           October  2018 Details    Sun Mon Tue Wed Thu Fri Sat      1            2               3               4               5               6                 7               8               9               10               11               12               13                 14               15               16               17               18               19               20                 21               22               23               24               25               26               27                 28               29               30               31                   Date Details   No additional details    Date of next INR:  10/1/2018         How to take your warfarin dose     To take:  4 mg Take 1 of the 4 mg tablets.

## 2018-09-17 NOTE — PROGRESS NOTES
ANTICOAGULATION FOLLOW-UP CLINIC VISIT    Patient Name:  Kenyatta Donald  Date:  9/17/2018  Contact Type:  Face to Face    SUBJECTIVE:     Patient Findings     Positives Change in diet/appetite (less greens), No Problem Findings    Comments Other complaints - rt knee pain             OBJECTIVE    INR Protime   Date Value Ref Range Status   09/17/2018 3.9 (A) 0.86 - 1.14 Final       ASSESSMENT / PLAN  INR assessment SUPRA    Recheck INR In: 2 WEEKS    INR Location Clinic      Anticoagulation Summary as of 9/17/2018     INR goal 2.5-3.5   Today's INR 3.9!   Warfarin maintenance plan 4 mg (4 mg x 1) every day   Full warfarin instructions 4 mg every day   Weekly warfarin total 28 mg   Plan last modified Nancy Zavala RN (9/17/2018)   Next INR check 10/1/2018   Priority INR   Target end date     Indications   Long-term (current) use of anticoagulants [Z79.01] [Z79.01]  Mitral valve disorder s/po 1-9-15 remodeling percut  + clip  (Resolved) [I05.9]         Anticoagulation Episode Summary     INR check location     Preferred lab     Send INR reminders to [facility] INR/PROTIME    Comments       Anticoagulation Care Providers     Provider Role Specialty Phone number    Waqas Lionanahi Pyle MD [facility] [phone]            See the Encounter Report to view Anticoagulation Flowsheet and Dosing Calendar (Go to Encounters tab in chart review, and find the Anticoagulation Therapy Visit)        Nancy Zavala, RN

## 2018-09-19 DIAGNOSIS — M10.279 DRUG-INDUCED GOUT INVOLVING TOE, UNSPECIFIED CHRONICITY, UNSPECIFIED LATERALITY: Primary | ICD-10-CM

## 2018-09-19 RX ORDER — ALLOPURINOL 100 MG/1
TABLET ORAL
Qty: 90 TABLET | Refills: 0 | Status: SHIPPED | OUTPATIENT
Start: 2018-09-19 | End: 2018-12-23

## 2018-09-19 NOTE — TELEPHONE ENCOUNTER
"Requested Prescriptions   Pending Prescriptions Disp Refills     allopurinol (ZYLOPRIM) 100 MG tablet [Pharmacy Med Name: ALLOPURINOL 100MG TABLETS]  Last Written Prescription Date:  6/22/2018  Last Fill Quantity: 90 tablet,  # refills: 0   Last Office Visit  8/21/2018        with  [facility] or [facility] prescribing provider:     Future Office Visit:    Next 5 appointments (look out 90 days)     Oct 08, 2018  3:10 PM CDT   Return Visit with CHYNA Hutchinson   [facility] ([facility])    [address] [place] [id number] [phone] OPT 2                90 tablet 0     Sig: TAKE 1 TABLET BY MOUTH EVERY DAY    Gout Agents Protocol Failed    9/19/2018  3:14 AM       Failed - Has Uric Acid on file in past 12 months and value is less than 6    Recent Labs   Lab Test  08/25/17   1145   URIC  5.8     If level is 6mg/dL or greater, ok to refill one time and refer to provider.          Passed - CBC on file in past 12 months    Recent Labs   Lab Test  05/29/18   1346   WBC  11.7*   RBC  4.03   HGB  11.8   HCT  37.1   PLT  259       For [facility] ONLY: SLFD105 = WBC, OYGV719 = RBC         Passed - ALT on file in past 12 months    Recent Labs   Lab Test  07/13/18   1322   ALT  <5*            Passed - Recent (12 mo) or future (30 days) visit within the authorizing provider's specialty    Patient had office visit in the last 12 months or has a visit in the next 30 days with authorizing provider or within the authorizing provider's specialty.  See \"Patient Info\" tab in inbasket, or \"Choose Columns\" in Meds & Orders section of the refill encounter.           Passed - Patient is age 18 or older       Passed - No active pregnancy on record       Passed - Normal serum creatinine on file in the past 12 months    Recent Labs   Lab Test  07/13/18   1322   CR  0.79            Passed - No positive pregnancy test in past year          "

## 2018-10-01 ENCOUNTER — ANTICOAGULATION THERAPY VISIT (OUTPATIENT)
Dept: NURSING | Facility: CLINIC | Age: 83
End: 2018-10-01
Payer: COMMERCIAL

## 2018-10-01 LAB
INR POINT OF CARE: 4.2 (ref 0.86–1.14)
INR POINT OF CARE: 4.7 (ref 0.86–1.14)

## 2018-10-01 PROCEDURE — 36416 COLLJ CAPILLARY BLOOD SPEC: CPT

## 2018-10-01 PROCEDURE — 99207 ZZC NO CHARGE NURSE ONLY: CPT

## 2018-10-01 PROCEDURE — 85610 PROTHROMBIN TIME: CPT | Mod: QW

## 2018-10-01 PROCEDURE — 99207 ZZC NO CHARGE LOS: CPT

## 2018-10-01 NOTE — MR AVS SNAPSHOT
Kenyatta Donald   10/1/2018 1:00 PM   Anticoagulation Therapy Visit    Description:  82 year old female   Provider:  [facility]   Department:  Bx Nurse           INR as of 10/1/2018     Today's INR 4.2!      Anticoagulation Summary as of 10/1/2018     INR goal 2.5-3.5   Today's INR 4.2!   Full warfarin instructions 10/1: 2 mg; 10/4: 2 mg; 10/11: 2 mg; Otherwise 4 mg every day   Next INR check 10/15/2018    Indications   Long-term (current) use of anticoagulants [Z79.01] [Z79.01]  Mitral valve disorder s/po 1-9-15 remodeling percut  + clip  (Resolved) [I05.9]         Your next Anticoagulation Clinic appointment(s)     Oct 15, 2018  1:00 PM CDT   Anticoagulation Visit with  ANTICOAGULATION CLINIC   [facility] ([facility])    [address] [place] [id number] [phone]              Contact Numbers     [facility]  Please call  [phone] to cancel and/or reschedule your appointment   The direct line to the anticoagulant nurse is [phone] on Monday, Wednesday, and Friday. On Thursday, the anticoagulant nurse can be reached directly at [phone].         October 2018 Details    Sun Mon Tue Wed Thu Fri Sat      1      2 mg   See details      2      4 mg         3      4 mg         4      2 mg         5      4 mg         6      4 mg           7      4 mg         8      4 mg         9      4 mg         10      4 mg         11      2 mg         12      4 mg         13      4 mg           14      4 mg         15            16               17               18               19               20                 21               22               23               24               25               26               27                 28               29               30               31                   Date Details   10/01 This INR check       Date of next INR:  10/15/2018         How to take your  warfarin dose     To take:  2 mg Take 0.5 of a 4 mg tablet.    To take:  4 mg Take 1 of the 4 mg tablets.

## 2018-10-01 NOTE — PROGRESS NOTES
ANTICOAGULATION FOLLOW-UP CLINIC VISIT    Patient Name:  Kenyatta Donald  Date:  10/1/2018  Contact Type:  Face to Face    SUBJECTIVE:     Patient Findings     Positives No Problem Findings           OBJECTIVE    INR Protime   Date Value Ref Range Status   10/01/2018 4.7 (A) 0.86 - 1.14 Final   10/01/2018 4.2 (A) 0.86 - 1.14 Final       ASSESSMENT / PLAN  INR assessment SUPRA    Recheck INR In: 2 WEEKS    INR Location Clinic      Anticoagulation Summary as of 10/1/2018     INR goal 2.5-3.5   Today's INR 4.2!   Warfarin maintenance plan 4 mg (4 mg x 1) every day   Full warfarin instructions 10/1: 2 mg; 10/4: 2 mg; 10/11: 2 mg; Otherwise 4 mg every day   Weekly warfarin total 28 mg   Plan last modified Nancy Zavala RN (9/17/2018)   Next INR check 10/15/2018   Priority INR   Target end date     Indications   Long-term (current) use of anticoagulants [Z79.01] [Z79.01]  Mitral valve disorder s/po 1-9-15 remodeling percut  + clip  (Resolved) [I05.9]         Anticoagulation Episode Summary     INR check location     Preferred lab     Send INR reminders to [facility] INR/PROTIME    Comments       Anticoagulation Care Providers     Provider Role Specialty Phone number    AmishaBess MD [facility] [phone]            See the Encounter Report to view Anticoagulation Flowsheet and Dosing Calendar (Go to Encounters tab in chart review, and find the Anticoagulation Therapy Visit)        Nancy Zavala, RN

## 2018-10-02 DIAGNOSIS — I48.91 ATRIAL FIBRILLATION (H): ICD-10-CM

## 2018-10-02 NOTE — TELEPHONE ENCOUNTER
"Requested Prescriptions   Pending Prescriptions Disp Refills     warfarin (COUMADIN) 1 MG tablet [Pharmacy Med Name: WARFARIN SOD 1MG TABLETS (PINK)] 40 tablet 0      Last Written Prescription Date:  4/3/18  Last Fill Quantity: 40,  # refills: 1   Last office visit: 10/1/2018 with prescribing provider:     Future Office Visit:   Next 5 appointments (look out 90 days)     Oct 08, 2018  3:10 PM CDT   Return Visit with CHYNA Hutchinson   [facility] ([facility])    [address] [place] [id number] [phone] OPT 2                  Sig: TAKE 1 TABLET BY MOUTH WITH 4MG TABLET ON MONDAY, WEDNESDAY, AND FRIDAY    Vitamin K Antagonists Failed    10/2/2018  3:14 AM       Failed - INR is within goal in the past 6 weeks    Confirm INR is within goal in the past 6 weeks.     Recent Labs   Lab Test 10/01/18   INR  4.2*  4.7*                      Passed - Recent (12 mo) or future (30 days) visit within the authorizing provider's specialty    Patient had office visit in the last 12 months or has a visit in the next 30 days with authorizing provider or within the authorizing provider's specialty.  See \"Patient Info\" tab in inbasket, or \"Choose Columns\" in Meds & Orders section of the refill encounter.           Passed - Patient is 18 years of age or older       Passed - Patient is not pregnant       Passed - No positive pregnancy on file in past 12 months          "

## 2018-10-03 DIAGNOSIS — I48.91 ATRIAL FIBRILLATION (H): ICD-10-CM

## 2018-10-03 RX ORDER — WARFARIN SODIUM 1 MG/1
TABLET ORAL
Qty: 40 TABLET | Refills: 0 | Status: SHIPPED | OUTPATIENT
Start: 2018-10-03 | End: 2018-10-03

## 2018-10-04 NOTE — TELEPHONE ENCOUNTER
"Requested Prescriptions   Pending Prescriptions Disp Refills     warfarin (COUMADIN) 1 MG tablet [Pharmacy Med Name: WARFARIN SOD 1MG TABLETS (PINK)]  Last Written Prescription Date:  10/3/18  Last Fill Quantity: 40,  # refills: 0   Last office visit: 8/21/18 [place]  Future Office Visit:   Next 5 appointments (look out 90 days)     Oct 08, 2018  3:10 PM CDT   Return Visit with CHYNA Hutchinson   [facility] ([facility])    [address] [place] [id number] [phone] OPT 2                  327 tablet 0     Sig: TAKE 2 TABLETS BY MOUTH ON THURSDAY AND 4 TABLETS ALL OTHER DAYS.    Vitamin K Antagonists Failed    10/3/2018  5:18 PM       Failed - INR is within goal in the past 6 weeks    Confirm INR is within goal in the past 6 weeks.     Recent Labs   Lab Test 10/01/18   INR  4.2*  4.7*                      Passed - Recent (12 mo) or future (30 days) visit within the authorizing provider's specialty    Patient had office visit in the last 12 months or has a visit in the next 30 days with authorizing provider or within the authorizing provider's specialty.  See \"Patient Info\" tab in inbasket, or \"Choose Columns\" in Meds & Orders section of the refill encounter.           Passed - Patient is 18 years of age or older       Passed - Patient is not pregnant       Passed - No positive pregnancy on file in past 12 months          "

## 2018-10-05 RX ORDER — WARFARIN SODIUM 1 MG/1
TABLET ORAL
Qty: 327 TABLET | Refills: 0 | Status: SHIPPED | OUTPATIENT
Start: 2018-10-05 | End: 2019-06-11

## 2018-10-08 ENCOUNTER — OFFICE VISIT (OUTPATIENT)
Dept: CARDIOLOGY | Facility: CLINIC | Age: 83
End: 2018-10-08
Attending: INTERNAL MEDICINE
Payer: COMMERCIAL

## 2018-10-08 VITALS
SYSTOLIC BLOOD PRESSURE: 118 MMHG | BODY MASS INDEX: 32.36 KG/M2 | DIASTOLIC BLOOD PRESSURE: 56 MMHG | HEART RATE: 56 BPM | HEIGHT: 65 IN | WEIGHT: 194.2 LBS

## 2018-10-08 DIAGNOSIS — Z82.49 FH: MITRAL VALVE REPAIR: ICD-10-CM

## 2018-10-08 DIAGNOSIS — I34.0 MITRAL VALVE INSUFFICIENCY, UNSPECIFIED ETIOLOGY: ICD-10-CM

## 2018-10-08 DIAGNOSIS — I10 BENIGN ESSENTIAL HYPERTENSION: ICD-10-CM

## 2018-10-08 DIAGNOSIS — I50.22 CHRONIC SYSTOLIC CONGESTIVE HEART FAILURE (H): ICD-10-CM

## 2018-10-08 DIAGNOSIS — I48.20 CHRONIC ATRIAL FIBRILLATION (H): ICD-10-CM

## 2018-10-08 DIAGNOSIS — E78.00 HYPERCHOLESTEROLEMIA: ICD-10-CM

## 2018-10-08 DIAGNOSIS — I50.32 CHRONIC DIASTOLIC HEART FAILURE (H): Primary | ICD-10-CM

## 2018-10-08 DIAGNOSIS — I25.10 CORONARY ARTERY DISEASE INVOLVING NATIVE CORONARY ARTERY OF NATIVE HEART WITHOUT ANGINA PECTORIS: ICD-10-CM

## 2018-10-08 LAB
ANION GAP SERPL CALCULATED.3IONS-SCNC: 13.6 MMOL/L (ref 6–17)
BUN SERPL-MCNC: 46 MG/DL (ref 7–30)
CALCIUM SERPL-MCNC: 10.1 MG/DL (ref 8.5–10.5)
CHLORIDE SERPL-SCNC: 108 MMOL/L (ref 98–107)
CO2 SERPL-SCNC: 21 MMOL/L (ref 23–29)
CREAT SERPL-MCNC: 1 MG/DL (ref 0.7–1.3)
GFR SERPL CREATININE-BSD FRML MDRD: 53 ML/MIN/1.7M2
GLUCOSE SERPL-MCNC: 105 MG/DL (ref 70–105)
POTASSIUM SERPL-SCNC: 4.6 MMOL/L (ref 3.5–5.1)
SODIUM SERPL-SCNC: 138 MMOL/L (ref 136–145)

## 2018-10-08 PROCEDURE — 36415 COLL VENOUS BLD VENIPUNCTURE: CPT | Performed by: INTERNAL MEDICINE

## 2018-10-08 PROCEDURE — 99214 OFFICE O/P EST MOD 30 MIN: CPT | Performed by: PHYSICIAN ASSISTANT

## 2018-10-08 PROCEDURE — 80048 BASIC METABOLIC PNL TOTAL CA: CPT | Performed by: INTERNAL MEDICINE

## 2018-10-08 NOTE — PROGRESS NOTES
Cardiology Progress Note    Date of Service: 10/08/2018      Reason for visit: Follow up chronic diastolic heart failure.     Primary cardiologist: Dr. Anderson Guan       HPI:  Ms. 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 She has had intermittent issues controlling her blood pressure requiring adjustments of her medications. She has also had issues with lower extremity edema and worsening leg weakness and was hospitalized in Dec 2017 for diastolic heart failure. She was diuresed with IV lasix; echo at that time showed preserved LVEF of 55-60% with normal LV motion. RV was reported as normal size/function. Elevated LV filling pressures was suggested. She was discharged home on an increased dose of lasix and has done relatively well since that time.     Her main complaints over the last few visits have revolved around her sciatica and muscle weakness for which she receives PT. Her weight has remained stable and she had not had any new symptoms concerning for ischemia. Repeat echo Feb 2018 again showed preserved EF 65-70%, with elevated RVSP again with normal RV size and function. When she saw Dr. Roge alberts in July 2018 she continued to do well and no changes were made. She is back today for routine follow up. Main complaints continue to revolve around muscle weakness. BP is under excellent control at 118/56. She is not having any worsening DEWITT or orthopnea, and leg edema is stable as she wears her compression socks. She denies palpitations, exertional chest discomfort, or dizziness.       ASSESSMENT/PLAN:    1. Chronic diastolic heart failure.   --Last echo Feb 2018 with EF 65-70%, trace TR with elevated RVSP but normal RV size and function. She has mild to moderate MR.    --Has remained well  controlled with lasix 40mg daily since hospitalization in Dec 2017. Renal function has remained preserved. Asked her to continue to weigh herself and call with any worsening leg edema. Continue with compression socks and elevate feet in the evening.   --She may have underlying WALESKA but states she was unable to complete a sleep test in the past and declines repeat testing.    2.  Chronic atrial fibrillation, history of tachy-adrianna syndrome.   --Rate controlled, continue Toprol XL 75mg daily. She had a Holter in Feb 2018 with several pauses, but asymptomatic.   --Continue AC with warfarin. She has her INRs monitored in [place]; currently running a bit high, but being monitored closely.    3. Mitral insufficiency, s/p mitral valve clip 2015.   --Mean gradient across valve reported as 6.0mmHg per echo Feb 2018. MR reported 1-2+ though mentions difficult to quantify. Has an echo planned for Feb 2019.    --Continue with Abx prophylaxis for dental and surgical procedures per Dr. Guan.     4.  Nonobstructive CAD.   --Last angiogram 12/2014 with 40-50% stenosis in mid RCA and minimal disease elsewhere. She remains free of angina.   --Not on ASA as she is on warfarin.   --Continue simvastatin 20mg daily, Last LDL 78 July 2018.     5. Hypertension.   --Very well controlled currently. Continue Toprol XL, amlodipine, lisinopril without change along with diuretics as above.       Follow up plan:  In Jan 2019, with an echocardiogram, to establish with a new cardiologist as Dr. Guan has recently retired from our practice.       Orders this Visit:  No orders of the defined types were placed in this encounter.    No orders of the defined types were placed in this encounter.    There are no discontinued medications.        CURRENT MEDICATIONS:  Current Outpatient Prescriptions   Medication Sig Dispense Refill     acetaminophen (TYLENOL) 325 MG tablet Take 2 tablets (650 mg) by mouth every 4 hours as needed for mild pain 100  tablet      alendronate (FOSAMAX) 70 MG tablet TAKE 1 TABLET BY MOUTH EVERY 7 DAYS 4 tablet 0     alendronate (FOSAMAX) 70 MG tablet TAKE 1 TABLET BY MOUTH EVERY 7 DAYS 12 tablet 3     alendronate (FOSAMAX) 70 MG tablet TK 1 T PO Q 7 DAYS  3     allopurinol (ZYLOPRIM) 100 MG tablet TAKE 1 TABLET BY MOUTH EVERY DAY 90 tablet 0     amLODIPine (NORVASC) 5 MG tablet Take 1 tablet (5 mg) by mouth 2 times daily 180 tablet 3     cholecalciferol (VITAMIN D) 1000 UNIT tablet Take 2,000 Units by mouth daily        furosemide (LASIX) 40 MG tablet TAKE 1 TABLET BY MOUTH DAILY 90 tablet 0     levothyroxine (SYNTHROID/LEVOTHROID) 100 MCG tablet TAKE 1 TABLET BY MOUTH DAILY 90 tablet 2     lisinopril (PRINIVIL/ZESTRIL) 20 MG tablet Take 1 tablet (20 mg) by mouth 2 times daily 180 tablet 3     metoprolol (TOPROL-XL) 50 MG 24 hr tablet Take 1.5 tablets (75 mg) by mouth daily 135 tablet 1     nystatin (MYCOSTATIN) 324427 UNIT/GM POWD Apply topically 3 times daily as needed 60 g 1     order for DME Equipment being ordered: compression hose   BK 20-30mm   2 pair as insurance will pay 1 each 0     order for DME Equipment being ordered: mastectomy bras & prostheses   S/po mastectomy of unknown side     C50.919 1 each 0     simvastatin (ZOCOR) 40 MG tablet Take 0.5 tablets (20 mg) by mouth At Bedtime 45 tablet 3     traMADol (ULTRAM) 50 MG tablet Take 1 tablet (50 mg) by mouth every 6 hours as needed for moderate pain or severe pain 30 tablet 0     VENTOLIN  (90 Base) MCG/ACT inhaler INHALE 2 PUFFS INTO THE LUNGS EVERY 6 HOURS AS NEEDED FOR SHORTNESS OF BREATH OR DIFFICULT BREATHING OR WHEEZING 18 g 1     warfarin (COUMADIN) 1 MG tablet TAKE 2 TABLETS BY MOUTH ON THURSDAY AND 4 TABLETS ALL OTHER DAYS. 327 tablet 0     warfarin (COUMADIN) 4 MG tablet TAKE 1 TABLET BY MOUTH EVERY DAY (Patient taking differently: 2 mg thur and 4 mg row) 90 tablet 1     amoxicillin (AMOXIL) 500 MG capsule Take 4 capsules by mouth 30 minutes prior to  dental work (Patient not taking: Reported on 10/8/2018) 4 capsule 4     ASPIRIN NOT PRESCRIBED (INTENTIONAL) Please choose reason not prescribed, below (Patient not taking: Reported on 10/8/2018) 1 each 0       ALLERGIES   No Known Allergies    PAST MEDICAL HISTORY:  Past Medical History:   Diagnosis Date     Atrial fibrillation (H)      Benign essential hypertension      Juan Pablo-tachy syndrome (H)      Breast cancer (H)     s/p bilateral mastectomy     CHF (congestive heart failure) (H)      Coronary artery disease involving native coronary artery of native heart without angina pectoris     cardiac cath : 40-50% mid RCA stenosis with minimal other disease     GERD (gastroesophageal reflux disease)      Hypercholesterolaemia      Hypothyroidism      Kidney stone      Mitral valve regurgitation     sp mitral clip 1/9/15     Need for SBE (subacute bacterial endocarditis) prophylaxis      Osteoporosis      Pulmonary embolism (H)      Sleep apnea      Spinal muscular atrophy type III (H)     Dr. Daily, [facility]       PAST SURGICAL HISTORY:  Past Surgical History:   Procedure Laterality Date     ANESTHESIA OUT OF OR PERCUTANEOUS MITRAL VALVE REPAIR N/A 2015    Procedure: ANESTHESIA OUT OF OR PERCUTANEOUS MITRAL VALVE REPAIR;  Surgeon: Generic Anesthesia Provider;  Location: [facility] OR     GYN SURGERY      hysterectomy     MASTECTOMY      bilateral     ORTHOPEDIC SURGERY      knee replacement     PERCUTANEIOUS MITRAL VALVE REPAIR  2015       FAMILY HISTORY:  Family History   Problem Relation Age of Onset     Cancer - colorectal Mother      Alzheimer Disease Mother       age 78     Diabetes Daughter      Asthma Daughter      HEART DISEASE Father       age 60     Family History Negative Daughter      Unknown/Adopted Maternal Grandmother      Unknown/Adopted Maternal Grandfather      Unknown/Adopted Paternal Grandmother      Unknown/Adopted Paternal Grandfather      Coronary Artery Disease No  family hx of      Hypertension No family hx of      Hyperlipidemia No family hx of      Cerebrovascular Disease No family hx of      Breast Cancer No family hx of      Colon Cancer No family hx of      Prostate Cancer No family hx of      Other Cancer No family hx of      Depression No family hx of      Anxiety Disorder No family hx of      Mental Illness No family hx of      Substance Abuse No family hx of      Anesthesia Reaction No family hx of      Osteoporosis No family hx of      Genetic Disorder No family hx of      Thyroid Disease No family hx of      Obesity No family hx of        SOCIAL HISTORY:  Social History     Social History     Marital status:      Spouse name: N/A     Number of children: 3     Years of education: N/A     Occupational History     Retired teacher      Social History Main Topics     Smoking status: Never Smoker     Smokeless tobacco: Never Used     Alcohol use No     Drug use: No     Sexual activity: No     Other Topics Concern     Parent/Sibling W/ Cabg, Mi Or Angioplasty Before 65f 55m? No     Caffeine Concern No     Sleep Concern No     Stress Concern No     Weight Concern Yes     weight loss     Special Diet No     low sodium     Exercise No     PT     Seat Belt Yes     Social History Narrative    , 3 children, lives in a Coop ([employer]), has a living will and wants to be DNR/DNI.   (last updated 12/5/2017)        Review of Systems:  Cardiovascular: negative for chest pain, palpitations, neg orthopnea, neg worsening LE edema.   Constitutional: negative for chills, sweats, fevers. Pos for muscle aches (chronic)  Resp: Negative for worsening DEWITT, cough, wheezing, hemoptysis, known lung dz.  HEENT: Negative for new visual changes, frequent headaches  Gastrointestinal: negative for abdominal pain, diarrhea, nausea, vomiting  Hematologic/lymphatic: pos for current systemic anticoagulation, hx of blood clots  Musculoskeletal: pos for chronic joint pain.  Neurological:  "negative for focal weakness, LOC, seizures, syncope/presyncope.      Physical Exam:  Vitals: /56  Pulse 56  Ht 1.638 m (5' 4.5\")  Wt 88.1 kg (194 lb 3.2 oz)  LMP  (LMP Unknown)  BMI 32.82 kg/m2   Wt Readings from Last 4 Encounters:   10/08/18 88.1 kg (194 lb 3.2 oz)   08/21/18 87.5 kg (193 lb)   07/13/18 88.1 kg (194 lb 4.8 oz)   05/29/18 85.7 kg (189 lb)       GEN:  In general, this is a well nourished  female in no acute distress on room air.  Patient ambulatory with use of rolling walker.   HEENT:  Pupils equal, round. Sclerae nonicteric. Clear oropharynx. Mucous membranes moist.  NECK: Supple, no masses appreciated. Trachea midline. No JVD while upright.  C/V:  Regular rate and rhythm, 1/6 PAUL LSB. No rub or gallop.   RESP: Respirations are unlabored. No use of accessory muscles. Clear to auscultation bilaterally without wheezing, rales, or rhonchi.  GI: Abdomen soft, nontender, nondistended.   EXTREM: Trace bilateral LE edema, has compression stocking on. No cyanosis or clubbing.  NEURO: Alert and oriented, cooperative. Gait not formally assessed. No obvious focal deficits.   PSYCH: Normal affect.  SKIN: Warm and dry. No rashes or petechiae appreciated.       Recent Lab Results:    BMP RESULTS:  Lab Results   Component Value Date     10/08/2018    POTASSIUM 4.6 10/08/2018    CHLORIDE 108 (H) 10/08/2018    CO2 21 (L) 10/08/2018    ANIONGAP 13.6 10/08/2018     10/08/2018    BUN 46 (H) 10/08/2018    CR 1.00 10/08/2018    GFRESTIMATED 53 (L) 10/08/2018    GFRESTBLACK 64 10/08/2018    VICKIE 10.1 10/08/2018            New/Pertinent imaging results since last visit:   Echo Feb 2018  Interpretation Summary     The left ventricle is normal in size.  Left ventricular systolic function is normal.  The visual ejection fraction is estimated at 65-70%.  There is trace tricuspid regurgitation.  Right ventricular systolic pressure is elevated, consistent with moderate  pulmonary hypertension.  A " mitral clip is seen centrally on the valve.  There is mild mitral stenosis.  The mean mitral valve gradient is 6.0mmHg.  There is mild to moderate (1-2+) mitral regurgitation.  The mitral regurgiation is difficult to quantify due to significant acoustic  shadowing from the overlying mitral clip and thickened valve.     Results are similar to the previous study from 12/6/17. The MR may not have  been seen as well previously.          Maddie Contreras PA-C  [facility]  Pager [phone]

## 2018-10-08 NOTE — MR AVS SNAPSHOT
After Visit Summary   10/8/2018    Kenyatta Frias    MRN: [id number]           Patient Information     Date Of Birth          1935        Visit Information        Provider Department      10/8/2018 3:10 PM Maddie Contreras PA [facility]        Today's Diagnoses     Chronic diastolic heart failure (H)    -  1    Hypercholesterolemia        Chronic atrial fibrillation (H)        Benign essential hypertension        Mitral valve insufficiency, unspecified etiology        Coronary artery disease involving native coronary artery of native heart without angina pectoris          Care Instructions    Visit Summary:    Today we discussed:   Overall your blood pressure looks under good control.     Medication changes:    NONE    Test results:   Results for KENYATTA FRIAS (MRN [id number]) as of 10/8/2018 15:15   Ref. Range 10/8/2018 13:59   Sodium Latest Ref Range: 136 - 145 mmol/L 138   Potassium Latest Ref Range: 3.5 - 5.1 mmol/L 4.6   Chloride Latest Ref Range: 98 - 107 mmol/L 108    Carbon Dioxide Latest Ref Range: 23 - 29 mmol/L 21    Urea Nitrogen Latest Ref Range: 7 - 30 mg/dL 46    Creatinine Latest Ref Range: 0.70 - 1.30 mg/dL 1.00   GFR Estimate Latest Ref Range: >60 mL/min/1.7m2 53    GFR Estimate If Black Latest Ref Range: >60 mL/min/1.7m2 64   Calcium Latest Ref Range: 8.5 - 10.5 mg/dL 10.1   Anion Gap Latest Ref Range: 6 - 17 mmol/L 13.6       Follow up:   In Jan 2019 with echocardiogram, to establish with a new cardiologist.               Follow-ups after your visit        Your next 10 appointments already scheduled     Oct 15, 2018  1:00 PM CDT   Anticoagulation Visit with  ANTICOAGULATION CLINIC   [facility]    [address] [place] [id number] [phone]              Who to contact     If you have questions or need follow up information about  "today's clinic visit or your schedule please contact [facility] directly at [phone].  Normal or non-critical lab and imaging results will be communicated to you by MyChart, letter or phone within 4 business days after the clinic has received the results. If you do not hear from us within 7 days, please contact the clinic through MyChart or phone. If you have a critical or abnormal lab result, we will notify you by phone as soon as possible.  Submit refill requests through [employer] or call your pharmacy and they will forward the refill request to us. Please allow 3 business days for your refill to be completed.          Additional Information About Your Visit        Care EveryWhere ID     This is your Care EveryWhere ID. This could be used by other organizations to access your [facility] medical records  [id number]        Your Vitals Were     Pulse Height Last Period BMI (Body Mass Index)          56 1.638 m (5' 4.5\") (LMP Unknown) 32.82 kg/m2         Blood Pressure from Last 3 Encounters:   10/08/18 118/56   08/21/18 130/74   07/13/18 142/70    Weight from Last 3 Encounters:   10/08/18 88.1 kg (194 lb 3.2 oz)   08/21/18 87.5 kg (193 lb)   07/13/18 88.1 kg (194 lb 4.8 oz)              We Performed the Following     Follow-Up with Cardiac Advanced Practice Provider          Today's Medication Changes          These changes are accurate as of 10/8/18  3:21 PM.  If you have any questions, ask your nurse or doctor.               These medicines have changed or have updated prescriptions.        Dose/Directions    * warfarin 4 MG tablet   Commonly known as:  COUMADIN   This may have changed:  See the new instructions.   Used for:  Atrial fibrillation, unspecified type (H), History of pulmonary embolism        TAKE 1 TABLET BY MOUTH EVERY DAY   Quantity:  90 tablet   Refills:  1       * warfarin 1 MG tablet   Commonly known as:  COUMADIN   This may have changed:  Another " medication with the same name was changed. Make sure you understand how and when to take each.   Used for:  Atrial fibrillation (H)        TAKE 2 TABLETS BY MOUTH ON THURSDAY AND 4 TABLETS ALL OTHER DAYS.   Quantity:  327 tablet   Refills:  0       * Notice:  This list has 2 medication(s) that are the same as other medications prescribed for you. Read the directions carefully, and ask your doctor or other care provider to review them with you.             Primary Care Provider Office Phone # Fax #    Bess Mirna Lion [phone] [address] [facility] [address]        Goals        General    Medical (pt-stated)     Notes - Note created  4/5/2018  2:01 PM by Ana Benjamin RN    Goal: I do not want to be in the hospital.     Supportive: I will follow the Heart Failure Action Plan and seek medical attention quickly if I have increased symptoms of concern.        Pain Management (pt-stated)     Notes - Note created  4/5/2018  1:59 PM by Ana Benjamin RN    Goal : I want to have relief of pain in my back, shoulders and leg.     Supportive: I will work with PT in my home. I will use tylenol for pain.         Equal Access to Services     SUKUMAR KIMBALL : Hadii zonia shannon hadesmeo Soharsha, waaxda luqadaha, qaybta kaalmada adeanselmoyada, emma akers. So [facility] [phone].    ATENCIÓN: Si habla español, tiene a duenas disposición servicios gratuitos de asistencia lingüística. Llame al [phone].    We comply with applicable federal civil rights laws and [place] laws. We do not discriminate on the basis of race, color, national origin, age, disability, sex, sexual orientation, or gender identity.            Thank you!     Thank you for choosing [facility]  for your care. Our goal is always to provide you with excellent care. Hearing back from our patients is one way we can continue to improve our services. Please take a few  minutes to complete the written survey that you may receive in the mail after your visit with us. Thank you!             Your Updated Medication List - Protect others around you: Learn how to safely use, store and throw away your medicines at www.disposemymeds.org.          This list is accurate as of 10/8/18  3:21 PM.  Always use your most recent med list.                   Brand Name Dispense Instructions for use Diagnosis    acetaminophen 325 MG tablet    TYLENOL    100 tablet    Take 2 tablets (650 mg) by mouth every 4 hours as needed for mild pain    Closed nondisplaced fracture of left patella, unspecified fracture morphology, initial encounter       * alendronate 70 MG tablet    FOSAMAX     TK 1 T PO Q 7 DAYS        * alendronate 70 MG tablet    FOSAMAX    12 tablet    TAKE 1 TABLET BY MOUTH EVERY 7 DAYS    Age-related osteoporosis without current pathological fracture       * alendronate 70 MG tablet    FOSAMAX    4 tablet    TAKE 1 TABLET BY MOUTH EVERY 7 DAYS    Age-related osteoporosis without current pathological fracture       allopurinol 100 MG tablet    ZYLOPRIM    90 tablet    TAKE 1 TABLET BY MOUTH EVERY DAY    Drug-induced gout involving toe, unspecified chronicity, unspecified laterality       amLODIPine 5 MG tablet    NORVASC    180 tablet    Take 1 tablet (5 mg) by mouth 2 times daily    Benign essential hypertension       amoxicillin 500 MG capsule    AMOXIL    4 capsule    Take 4 capsules by mouth 30 minutes prior to dental work    Mitral valve insufficiency       ASPIRIN NOT PRESCRIBED    INTENTIONAL    1 each    Please choose reason not prescribed, below        cholecalciferol 1000 UNIT tablet    vitamin D3     Take 2,000 Units by mouth daily        furosemide 40 MG tablet    LASIX    90 tablet    TAKE 1 TABLET BY MOUTH DAILY    Acute on chronic diastolic congestive heart failure (H)       levothyroxine 100 MCG tablet    SYNTHROID/LEVOTHROID    90 tablet    TAKE 1 TABLET BY MOUTH DAILY     Acquired hypothyroidism       lisinopril 20 MG tablet    PRINIVIL/ZESTRIL    180 tablet    Take 1 tablet (20 mg) by mouth 2 times daily    Essential hypertension, benign       metoprolol succinate 50 MG 24 hr tablet    TOPROL-XL    135 tablet    Take 1.5 tablets (75 mg) by mouth daily    Coronary artery disease involving native coronary artery of native heart without angina pectoris       nystatin [id number] UNIT/GM Powd    MYCOSTATIN    60 g    Apply topically 3 times daily as needed    Intertrigo       * order for DME     1 each    Equipment being ordered: mastectomy bras & prostheses  S/po mastectomy of unknown side     C50.919    Malignant neoplasm of female breast, unspecified laterality, unspecified site of breast       * order for DME     1 each    Equipment being ordered: compression hose  BK 20-30mm  2 pair as insurance will pay    Lymphedema of both lower extremities       simvastatin 40 MG tablet    ZOCOR    45 tablet    Take 0.5 tablets (20 mg) by mouth At Bedtime    Mixed hyperlipidemia       traMADol 50 MG tablet    ULTRAM    30 tablet    Take 1 tablet (50 mg) by mouth every 6 hours as needed for moderate pain or severe pain    Acute right-sided low back pain with right-sided sciatica       VENTOLIN  (90 Base) MCG/ACT inhaler   Generic drug:  albuterol     18 g    INHALE 2 PUFFS INTO THE LUNGS EVERY 6 HOURS AS NEEDED FOR SHORTNESS OF BREATH OR DIFFICULT BREATHING OR WHEEZING    Acute on chronic systolic heart failure (H)       * warfarin 4 MG tablet    COUMADIN    90 tablet    TAKE 1 TABLET BY MOUTH EVERY DAY    Atrial fibrillation, unspecified type (H), History of pulmonary embolism       * warfarin 1 MG tablet    COUMADIN    327 tablet    TAKE 2 TABLETS BY MOUTH ON THURSDAY AND 4 TABLETS ALL OTHER DAYS.    Atrial fibrillation (H)       * Notice:  This list has 7 medication(s) that are the same as other medications prescribed for you. Read the directions carefully, and ask your doctor or other care  provider to review them with you.

## 2018-10-08 NOTE — PATIENT INSTRUCTIONS
Visit Summary:    Today we discussed:   Overall your blood pressure looks under good control.     Medication changes:    NONE    Test results:   Results for DARLENE FRIAS (MRN [id number]) as of 10/8/2018 15:15   Ref. Range 10/8/2018 13:59   Sodium Latest Ref Range: 136 - 145 mmol/L 138   Potassium Latest Ref Range: 3.5 - 5.1 mmol/L 4.6   Chloride Latest Ref Range: 98 - 107 mmol/L 108    Carbon Dioxide Latest Ref Range: 23 - 29 mmol/L 21    Urea Nitrogen Latest Ref Range: 7 - 30 mg/dL 46    Creatinine Latest Ref Range: 0.70 - 1.30 mg/dL 1.00   GFR Estimate Latest Ref Range: >60 mL/min/1.7m2 53    GFR Estimate If Black Latest Ref Range: >60 mL/min/1.7m2 64   Calcium Latest Ref Range: 8.5 - 10.5 mg/dL 10.1   Anion Gap Latest Ref Range: 6 - 17 mmol/L 13.6       Follow up:   In Jan 2019 with echocardiogram, to establish with a new cardiologist.

## 2018-10-08 NOTE — LETTER
10/8/2018    Bess Lion MD  [address]  [facility] [address]    RE: Kenyatta Poweringa       Dear Colleague,    I had the pleasure of seeing Kenyatta Donald in the [facility].      Cardiology Progress Note    Date of Service: 10/08/2018      Reason for visit: Follow up chronic diastolic heart failure.     Primary cardiologist: Dr. Anderson Guan       HPI:  Ms. 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 She has had intermittent issues controlling her blood pressure requiring adjustments of her medications. She has also had issues with lower extremity edema and worsening leg weakness and was hospitalized in Dec 2017 for diastolic heart failure. She was diuresed with IV lasix; echo at that time showed preserved LVEF of 55-60% with normal LV motion. RV was reported as normal size/function. Elevated LV filling pressures was suggested. She was discharged home on an increased dose of lasix and has done relatively well since that time.     Her main complaints over the last few visits have revolved around her sciatica and muscle weakness for which she receives PT. Her weight has remained stable and she had not had any new symptoms concerning for ischemia. Repeat echo Feb 2018 again showed preserved EF 65-70%, with elevated RVSP again with normal RV size and function. When she saw Dr. Guan last in July 2018 she continued to do well and no changes were made. She is back today for routine follow up. Main complaints continue to revolve around muscle weakness. BP is under excellent control at 118/56. She is not having any worsening DEWITT or orthopnea, and leg edema is stable as she wears her compression socks. She denies palpitations, exertional chest discomfort, or dizziness.        ASSESSMENT/PLAN:    1. Chronic diastolic heart failure.   --Last echo Feb 2018 with EF 65-70%, trace TR with elevated RVSP but normal RV size and function. She has mild to moderate MR.    --Has remained well controlled with lasix 40mg daily since hospitalization in Dec 2017. Renal function has remained preserved. Asked her to continue to weigh herself and call with any worsening leg edema. Continue with compression socks and elevate feet in the evening.   --She may have underlying WALESKA but states she was unable to complete a sleep test in the past and declines repeat testing.    2.  Chronic atrial fibrillation, history of tachy-adrianna syndrome.   --Rate controlled, continue Toprol XL 75mg daily. She had a Holter in Feb 2018 with several pauses, but asymptomatic.   --Continue AC with warfarin. She has her INRs monitored in [place]; currently running a bit high, but being monitored closely.    3. Mitral insufficiency, s/p mitral valve clip 2015.   --Mean gradient across valve reported as 6.0mmHg per echo Feb 2018. MR reported 1-2+ though mentions difficult to quantify. Has an echo planned for Feb 2019.    --Continue with Abx prophylaxis for dental and surgical procedures per Dr. Guan.     4.  Nonobstructive CAD.   --Last angiogram 12/2014 with 40-50% stenosis in mid RCA and minimal disease elsewhere. She remains free of angina.   --Not on ASA as she is on warfarin.   --Continue simvastatin 20mg daily, Last LDL 78 July 2018.     5. Hypertension.   --Very well controlled currently. Continue Toprol XL, amlodipine, lisinopril without change along with diuretics as above.       Follow up plan:  In Jan 2019, with an echocardiogram, to establish with a new cardiologist as Dr. Guan has recently retired from our practice.       Orders this Visit:  No orders of the defined types were placed in this encounter.    No orders of the defined types were placed in this encounter.    There are no discontinued  medications.        CURRENT MEDICATIONS:  Current Outpatient Prescriptions   Medication Sig Dispense Refill     acetaminophen (TYLENOL) 325 MG tablet Take 2 tablets (650 mg) by mouth every 4 hours as needed for mild pain 100 tablet      alendronate (FOSAMAX) 70 MG tablet TAKE 1 TABLET BY MOUTH EVERY 7 DAYS 4 tablet 0     alendronate (FOSAMAX) 70 MG tablet TAKE 1 TABLET BY MOUTH EVERY 7 DAYS 12 tablet 3     alendronate (FOSAMAX) 70 MG tablet TK 1 T PO Q 7 DAYS  3     allopurinol (ZYLOPRIM) 100 MG tablet TAKE 1 TABLET BY MOUTH EVERY DAY 90 tablet 0     amLODIPine (NORVASC) 5 MG tablet Take 1 tablet (5 mg) by mouth 2 times daily 180 tablet 3     cholecalciferol (VITAMIN D) 1000 UNIT tablet Take 2,000 Units by mouth daily        furosemide (LASIX) 40 MG tablet TAKE 1 TABLET BY MOUTH DAILY 90 tablet 0     levothyroxine (SYNTHROID/LEVOTHROID) 100 MCG tablet TAKE 1 TABLET BY MOUTH DAILY 90 tablet 2     lisinopril (PRINIVIL/ZESTRIL) 20 MG tablet Take 1 tablet (20 mg) by mouth 2 times daily 180 tablet 3     metoprolol (TOPROL-XL) 50 MG 24 hr tablet Take 1.5 tablets (75 mg) by mouth daily 135 tablet 1     nystatin (MYCOSTATIN) [id number] UNIT/GM POWD Apply topically 3 times daily as needed 60 g 1     order for DME Equipment being ordered: compression hose   BK 20-30mm   2 pair as insurance will pay 1 each 0     order for DME Equipment being ordered: mastectomy bras & prostheses   S/po mastectomy of unknown side     C50.919 1 each 0     simvastatin (ZOCOR) 40 MG tablet Take 0.5 tablets (20 mg) by mouth At Bedtime 45 tablet 3     traMADol (ULTRAM) 50 MG tablet Take 1 tablet (50 mg) by mouth every 6 hours as needed for moderate pain or severe pain 30 tablet 0     VENTOLIN  (90 Base) MCG/ACT inhaler INHALE 2 PUFFS INTO THE LUNGS EVERY 6 HOURS AS NEEDED FOR SHORTNESS OF BREATH OR DIFFICULT BREATHING OR WHEEZING 18 g 1     warfarin (COUMADIN) 1 MG tablet TAKE 2 TABLETS BY MOUTH ON THURSDAY AND 4 TABLETS ALL OTHER DAYS. 327  tablet 0     warfarin (COUMADIN) 4 MG tablet TAKE 1 TABLET BY MOUTH EVERY DAY (Patient taking differently: 2 mg thur and 4 mg row) 90 tablet 1     amoxicillin (AMOXIL) 500 MG capsule Take 4 capsules by mouth 30 minutes prior to dental work (Patient not taking: Reported on 10/8/2018) 4 capsule 4     ASPIRIN NOT PRESCRIBED (INTENTIONAL) Please choose reason not prescribed, below (Patient not taking: Reported on 10/8/2018) 1 each 0       ALLERGIES   No Known Allergies    PAST MEDICAL HISTORY:  Past Medical History:   Diagnosis Date     Atrial fibrillation (H)      Benign essential hypertension      Juan Pablo-tachy syndrome (H)      Breast cancer (H)     s/p bilateral mastectomy     CHF (congestive heart failure) (H)      Coronary artery disease involving native coronary artery of native heart without angina pectoris     cardiac cath : 40-50% mid RCA stenosis with minimal other disease     GERD (gastroesophageal reflux disease)      Hypercholesterolaemia      Hypothyroidism      Kidney stone      Mitral valve regurgitation     sp mitral clip 1/9/15     Need for SBE (subacute bacterial endocarditis) prophylaxis      Osteoporosis      Pulmonary embolism (H)      Sleep apnea      Spinal muscular atrophy type III (H)     Dr. Daily, [place] [facility]       PAST SURGICAL HISTORY:  Past Surgical History:   Procedure Laterality Date     ANESTHESIA OUT OF OR PERCUTANEOUS MITRAL VALVE REPAIR N/A 2015    Procedure: ANESTHESIA OUT OF OR PERCUTANEOUS MITRAL VALVE REPAIR;  Surgeon: Generic Anesthesia Provider;  Location: [facility] OR     GYN SURGERY      hysterectomy     MASTECTOMY      bilateral     ORTHOPEDIC SURGERY      knee replacement     PERCUTANEIOUS MITRAL VALVE REPAIR  2015       FAMILY HISTORY:  Family History   Problem Relation Age of Onset     Cancer - colorectal Mother      Alzheimer Disease Mother       age 78     Diabetes Daughter      Asthma Daughter      HEART DISEASE Father       age 60     Family  History Negative Daughter      Unknown/Adopted Maternal Grandmother      Unknown/Adopted Maternal Grandfather      Unknown/Adopted Paternal Grandmother      Unknown/Adopted Paternal Grandfather      Coronary Artery Disease No family hx of      Hypertension No family hx of      Hyperlipidemia No family hx of      Cerebrovascular Disease No family hx of      Breast Cancer No family hx of      Colon Cancer No family hx of      Prostate Cancer No family hx of      Other Cancer No family hx of      Depression No family hx of      Anxiety Disorder No family hx of      Mental Illness No family hx of      Substance Abuse No family hx of      Anesthesia Reaction No family hx of      Osteoporosis No family hx of      Genetic Disorder No family hx of      Thyroid Disease No family hx of      Obesity No family hx of        SOCIAL HISTORY:  Social History     Social History     Marital status:      Spouse name: N/A     Number of children: 3     Years of education: N/A     Occupational History     Retired teacher      Social History Main Topics     Smoking status: Never Smoker     Smokeless tobacco: Never Used     Alcohol use No     Drug use: No     Sexual activity: No     Other Topics Concern     Parent/Sibling W/ Cabg, Mi Or Angioplasty Before 65f 55m? No     Caffeine Concern No     Sleep Concern No     Stress Concern No     Weight Concern Yes     weight loss     Special Diet No     low sodium     Exercise No     PT     Seat Belt Yes     Social History Narrative    , 3 children, lives in a Coop ([employer]), has a living will and wants to be DNR/DNI.   (last updated 12/5/2017)        Review of Systems:  Cardiovascular: negative for chest pain, palpitations, neg orthopnea, neg worsening LE edema.   Constitutional: negative for chills, sweats, fevers. Pos for muscle aches (chronic)  Resp: Negative for worsening DEWITT, cough, wheezing, hemoptysis, known lung dz.  HEENT: Negative for new visual changes, frequent  "headaches  Gastrointestinal: negative for abdominal pain, diarrhea, nausea, vomiting  Hematologic/lymphatic: pos for current systemic anticoagulation, hx of blood clots  Musculoskeletal: pos for chronic joint pain.  Neurological: negative for focal weakness, LOC, seizures, syncope/presyncope.      Physical Exam:  Vitals: /56  Pulse 56  Ht 1.638 m (5' 4.5\")  Wt 88.1 kg (194 lb 3.2 oz)  LMP  (LMP Unknown)  BMI 32.82 kg/m2   Wt Readings from Last 4 Encounters:   10/08/18 88.1 kg (194 lb 3.2 oz)   08/21/18 87.5 kg (193 lb)   07/13/18 88.1 kg (194 lb 4.8 oz)   05/29/18 85.7 kg (189 lb)       GEN:  In general, this is a well nourished  female in no acute distress on room air.  Patient ambulatory with use of rolling walker.   HEENT:  Pupils equal, round. Sclerae nonicteric. Clear oropharynx. Mucous membranes moist.  NECK: Supple, no masses appreciated. Trachea midline. No JVD while upright.  C/V:  Regular rate and rhythm, 1/6 PAUL LSB. No rub or gallop.   RESP: Respirations are unlabored. No use of accessory muscles. Clear to auscultation bilaterally without wheezing, rales, or rhonchi.  GI: Abdomen soft, nontender, nondistended.   EXTREM: Trace bilateral LE edema, has compression stocking on. No cyanosis or clubbing.  NEURO: Alert and oriented, cooperative. Gait not formally assessed. No obvious focal deficits.   PSYCH: Normal affect.  SKIN: Warm and dry. No rashes or petechiae appreciated.       Recent Lab Results:    BMP RESULTS:  Lab Results   Component Value Date     10/08/2018    POTASSIUM 4.6 10/08/2018    CHLORIDE 108 (H) 10/08/2018    CO2 21 (L) 10/08/2018    ANIONGAP 13.6 10/08/2018     10/08/2018    BUN 46 (H) 10/08/2018    CR 1.00 10/08/2018    GFRESTIMATED 53 (L) 10/08/2018    GFRESTBLACK 64 10/08/2018    VICKIE 10.1 10/08/2018            New/Pertinent imaging results since last visit:   Echo Feb 2018  Interpretation Summary     The left ventricle is normal in size.  Left " ventricular systolic function is normal.  The visual ejection fraction is estimated at 65-70%.  There is trace tricuspid regurgitation.  Right ventricular systolic pressure is elevated, consistent with moderate  pulmonary hypertension.  A mitral clip is seen centrally on the valve.  There is mild mitral stenosis.  The mean mitral valve gradient is 6.0mmHg.  There is mild to moderate (1-2+) mitral regurgitation.  The mitral regurgiation is difficult to quantify due to significant acoustic  shadowing from the overlying mitral clip and thickened valve.     Results are similar to the previous study from 12/6/17. The MR may not have  been seen as well previously.          Maddie Contreras PA-C  [facility]  Pager [phone]      Thank you for allowing me to participate in the care of your patient.      Sincerely,     CHYNA Hutchinson     [facility]    cc:   Bess Lion MD  [address] [address]

## 2018-10-08 NOTE — LETTER
10/8/2018    Bess Lion MD  [address]  [facility] [address]    RE: Kenyatta Poweringa       Dear Colleague,    I had the pleasure of seeing Kenyatta Donald in the [facility].      Cardiology Progress Note    Date of Service: 10/08/2018      Reason for visit: Follow up chronic diastolic heart failure.     Primary cardiologist: Dr. Anderson Guan       HPI:  Ms. 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 She has had intermittent issues controlling her blood pressure requiring adjustments of her medications. She has also had issues with lower extremity edema and worsening leg weakness and was hospitalized in Dec 2017 for diastolic heart failure. She was diuresed with IV lasix; echo at that time showed preserved LVEF of 55-60% with normal LV motion. RV was reported as normal size/function. Elevated LV filling pressures was suggested. She was discharged home on an increased dose of lasix and has done relatively well since that time.     Her main complaints over the last few visits have revolved around her sciatica and muscle weakness for which she receives PT. Her weight has remained stable and she had not had any new symptoms concerning for ischemia. Repeat echo Feb 2018 again showed preserved EF 65-70%, with elevated RVSP again with normal RV size and function. When she saw Dr. Guan last in July 2018 she continued to do well and no changes were made. She is back today for routine follow up. Main complaints continue to revolve around muscle weakness. BP is under excellent control at 118/56. She is not having any worsening DEWITT or orthopnea, and leg edema is stable as she wears her compression socks. She denies palpitations, exertional chest discomfort, or dizziness.        ASSESSMENT/PLAN:    1. Chronic diastolic heart failure.   --Last echo Feb 2018 with EF 65-70%, trace TR with elevated RVSP but normal RV size and function. She has mild to moderate MR.    --Has remained well controlled with lasix 40mg daily since hospitalization in Dec 2017. Renal function has remained preserved. Asked her to continue to weigh herself and call with any worsening leg edema. Continue with compression socks and elevate feet in the evening.   --She may have underlying WALESKA but states she was unable to complete a sleep test in the past and declines repeat testing.    2.  Chronic atrial fibrillation, history of tachy-adrianna syndrome.   --Rate controlled, continue Toprol XL 75mg daily. She had a Holter in Feb 2018 with several pauses, but asymptomatic.   --Continue AC with warfarin. She has her INRs monitored in [place]; currently running a bit high, but being monitored closely.    3. Mitral insufficiency, s/p mitral valve clip 2015.   --Mean gradient across valve reported as 6.0mmHg per echo Feb 2018. MR reported 1-2+ though mentions difficult to quantify. Has an echo planned for Feb 2019.    --Continue with Abx prophylaxis for dental and surgical procedures per Dr. Guan.     4.  Nonobstructive CAD.   --Last angiogram 12/2014 with 40-50% stenosis in mid RCA and minimal disease elsewhere. She remains free of angina.   --Not on ASA as she is on warfarin.   --Continue simvastatin 20mg daily, Last LDL 78 July 2018.     5. Hypertension.   --Very well controlled currently. Continue Toprol XL, amlodipine, lisinopril without change along with diuretics as above.       Follow up plan:  In Jan 2019, with an echocardiogram, to establish with a new cardiologist as Dr. Guan has recently retired from our practice.       Orders this Visit:  No orders of the defined types were placed in this encounter.    No orders of the defined types were placed in this encounter.    There are no discontinued  medications.        CURRENT MEDICATIONS:  Current Outpatient Prescriptions   Medication Sig Dispense Refill     acetaminophen (TYLENOL) 325 MG tablet Take 2 tablets (650 mg) by mouth every 4 hours as needed for mild pain 100 tablet      alendronate (FOSAMAX) 70 MG tablet TAKE 1 TABLET BY MOUTH EVERY 7 DAYS 4 tablet 0     alendronate (FOSAMAX) 70 MG tablet TAKE 1 TABLET BY MOUTH EVERY 7 DAYS 12 tablet 3     alendronate (FOSAMAX) 70 MG tablet TK 1 T PO Q 7 DAYS  3     allopurinol (ZYLOPRIM) 100 MG tablet TAKE 1 TABLET BY MOUTH EVERY DAY 90 tablet 0     amLODIPine (NORVASC) 5 MG tablet Take 1 tablet (5 mg) by mouth 2 times daily 180 tablet 3     cholecalciferol (VITAMIN D) 1000 UNIT tablet Take 2,000 Units by mouth daily        furosemide (LASIX) 40 MG tablet TAKE 1 TABLET BY MOUTH DAILY 90 tablet 0     levothyroxine (SYNTHROID/LEVOTHROID) 100 MCG tablet TAKE 1 TABLET BY MOUTH DAILY 90 tablet 2     lisinopril (PRINIVIL/ZESTRIL) 20 MG tablet Take 1 tablet (20 mg) by mouth 2 times daily 180 tablet 3     metoprolol (TOPROL-XL) 50 MG 24 hr tablet Take 1.5 tablets (75 mg) by mouth daily 135 tablet 1     nystatin (MYCOSTATIN) 004134 UNIT/GM POWD Apply topically 3 times daily as needed 60 g 1     order for DME Equipment being ordered: compression hose   BK 20-30mm   2 pair as insurance will pay 1 each 0     order for DME Equipment being ordered: mastectomy bras & prostheses   S/po mastectomy of unknown side     C50.919 1 each 0     simvastatin (ZOCOR) 40 MG tablet Take 0.5 tablets (20 mg) by mouth At Bedtime 45 tablet 3     traMADol (ULTRAM) 50 MG tablet Take 1 tablet (50 mg) by mouth every 6 hours as needed for moderate pain or severe pain 30 tablet 0     VENTOLIN  (90 Base) MCG/ACT inhaler INHALE 2 PUFFS INTO THE LUNGS EVERY 6 HOURS AS NEEDED FOR SHORTNESS OF BREATH OR DIFFICULT BREATHING OR WHEEZING 18 g 1     warfarin (COUMADIN) 1 MG tablet TAKE 2 TABLETS BY MOUTH ON THURSDAY AND 4 TABLETS ALL OTHER DAYS. 327  tablet 0     warfarin (COUMADIN) 4 MG tablet TAKE 1 TABLET BY MOUTH EVERY DAY (Patient taking differently: 2 mg thur and 4 mg row) 90 tablet 1     amoxicillin (AMOXIL) 500 MG capsule Take 4 capsules by mouth 30 minutes prior to dental work (Patient not taking: Reported on 10/8/2018) 4 capsule 4     ASPIRIN NOT PRESCRIBED (INTENTIONAL) Please choose reason not prescribed, below (Patient not taking: Reported on 10/8/2018) 1 each 0       ALLERGIES   No Known Allergies    PAST MEDICAL HISTORY:  Past Medical History:   Diagnosis Date     Atrial fibrillation (H)      Benign essential hypertension      Juan Pablo-tachy syndrome (H)      Breast cancer (H)     s/p bilateral mastectomy     CHF (congestive heart failure) (H)      Coronary artery disease involving native coronary artery of native heart without angina pectoris     cardiac cath : 40-50% mid RCA stenosis with minimal other disease     GERD (gastroesophageal reflux disease)      Hypercholesterolaemia      Hypothyroidism      Kidney stone      Mitral valve regurgitation     sp mitral clip 1/9/15     Need for SBE (subacute bacterial endocarditis) prophylaxis      Osteoporosis      Pulmonary embolism (H)      Sleep apnea      Spinal muscular atrophy type III (H)     Dr. Daily, [facility]       PAST SURGICAL HISTORY:  Past Surgical History:   Procedure Laterality Date     ANESTHESIA OUT OF OR PERCUTANEOUS MITRAL VALVE REPAIR N/A 2015    Procedure: ANESTHESIA OUT OF OR PERCUTANEOUS MITRAL VALVE REPAIR;  Surgeon: Generic Anesthesia Provider;  Location: [facility] OR     GYN SURGERY      hysterectomy     MASTECTOMY      bilateral     ORTHOPEDIC SURGERY      knee replacement     PERCUTANEIOUS MITRAL VALVE REPAIR  2015       FAMILY HISTORY:  Family History   Problem Relation Age of Onset     Cancer - colorectal Mother      Alzheimer Disease Mother       age 78     Diabetes Daughter      Asthma Daughter      HEART DISEASE Father       age 60     Family  History Negative Daughter      Unknown/Adopted Maternal Grandmother      Unknown/Adopted Maternal Grandfather      Unknown/Adopted Paternal Grandmother      Unknown/Adopted Paternal Grandfather      Coronary Artery Disease No family hx of      Hypertension No family hx of      Hyperlipidemia No family hx of      Cerebrovascular Disease No family hx of      Breast Cancer No family hx of      Colon Cancer No family hx of      Prostate Cancer No family hx of      Other Cancer No family hx of      Depression No family hx of      Anxiety Disorder No family hx of      Mental Illness No family hx of      Substance Abuse No family hx of      Anesthesia Reaction No family hx of      Osteoporosis No family hx of      Genetic Disorder No family hx of      Thyroid Disease No family hx of      Obesity No family hx of        SOCIAL HISTORY:  Social History     Social History     Marital status:      Spouse name: N/A     Number of children: 3     Years of education: N/A     Occupational History     Retired teacher      Social History Main Topics     Smoking status: Never Smoker     Smokeless tobacco: Never Used     Alcohol use No     Drug use: No     Sexual activity: No     Other Topics Concern     Parent/Sibling W/ Cabg, Mi Or Angioplasty Before 65f 55m? No     Caffeine Concern No     Sleep Concern No     Stress Concern No     Weight Concern Yes     weight loss     Special Diet No     low sodium     Exercise No     PT     Seat Belt Yes     Social History Narrative    , 3 children, lives in a Coop ([employer]), has a living will and wants to be DNR/DNI.   (last updated 12/5/2017)        Review of Systems:  Cardiovascular: negative for chest pain, palpitations, neg orthopnea, neg worsening LE edema.   Constitutional: negative for chills, sweats, fevers. Pos for muscle aches (chronic)  Resp: Negative for worsening DEWITT, cough, wheezing, hemoptysis, known lung dz.  HEENT: Negative for new visual changes, frequent  "headaches  Gastrointestinal: negative for abdominal pain, diarrhea, nausea, vomiting  Hematologic/lymphatic: pos for current systemic anticoagulation, hx of blood clots  Musculoskeletal: pos for chronic joint pain.  Neurological: negative for focal weakness, LOC, seizures, syncope/presyncope.      Physical Exam:  Vitals: /56  Pulse 56  Ht 1.638 m (5' 4.5\")  Wt 88.1 kg (194 lb 3.2 oz)  LMP  (LMP Unknown)  BMI 32.82 kg/m2   Wt Readings from Last 4 Encounters:   10/08/18 88.1 kg (194 lb 3.2 oz)   08/21/18 87.5 kg (193 lb)   07/13/18 88.1 kg (194 lb 4.8 oz)   05/29/18 85.7 kg (189 lb)       GEN:  In general, this is a well nourished  female in no acute distress on room air.  Patient ambulatory with use of rolling walker.   HEENT:  Pupils equal, round. Sclerae nonicteric. Clear oropharynx. Mucous membranes moist.  NECK: Supple, no masses appreciated. Trachea midline. No JVD while upright.  C/V:  Regular rate and rhythm, 1/6 PAUL LSB. No rub or gallop.   RESP: Respirations are unlabored. No use of accessory muscles. Clear to auscultation bilaterally without wheezing, rales, or rhonchi.  GI: Abdomen soft, nontender, nondistended.   EXTREM: Trace bilateral LE edema, has compression stocking on. No cyanosis or clubbing.  NEURO: Alert and oriented, cooperative. Gait not formally assessed. No obvious focal deficits.   PSYCH: Normal affect.  SKIN: Warm and dry. No rashes or petechiae appreciated.       Recent Lab Results:    BMP RESULTS:  Lab Results   Component Value Date     10/08/2018    POTASSIUM 4.6 10/08/2018    CHLORIDE 108 (H) 10/08/2018    CO2 21 (L) 10/08/2018    ANIONGAP 13.6 10/08/2018     10/08/2018    BUN 46 (H) 10/08/2018    CR 1.00 10/08/2018    GFRESTIMATED 53 (L) 10/08/2018    GFRESTBLACK 64 10/08/2018    VICKIE 10.1 10/08/2018            New/Pertinent imaging results since last visit:   Echo Feb 2018  Interpretation Summary     The left ventricle is normal in size.  Left " ventricular systolic function is normal.  The visual ejection fraction is estimated at 65-70%.  There is trace tricuspid regurgitation.  Right ventricular systolic pressure is elevated, consistent with moderate  pulmonary hypertension.  A mitral clip is seen centrally on the valve.  There is mild mitral stenosis.  The mean mitral valve gradient is 6.0mmHg.  There is mild to moderate (1-2+) mitral regurgitation.  The mitral regurgiation is difficult to quantify due to significant acoustic  shadowing from the overlying mitral clip and thickened valve.     Results are similar to the previous study from 12/6/17. The MR may not have  been seen as well previously.          Maddie Contreras PA-C  [facility]  Pager [phone]      Thank you for allowing me to participate in the care of your patient.    Sincerely,     CHYNA Hutchinson     [facility]

## 2018-10-13 DIAGNOSIS — I50.33 ACUTE ON CHRONIC DIASTOLIC CONGESTIVE HEART FAILURE (H): ICD-10-CM

## 2018-10-13 NOTE — TELEPHONE ENCOUNTER
"Requested Prescriptions   Pending Prescriptions Disp Refills     furosemide (LASIX) 40 MG tablet [Pharmacy Med Name: FUROSEMIDE 40MG TABLETS]  Last Written Prescription Date:  07/16/2018  Last Fill Quantity: 90,  # refills: 0   Last office visit: 8/21/2018 with prescribing provider:   TANK RINALDI  Future Office Visit:     90 tablet 0     Sig: TAKE 1 TABLET BY MOUTH DAILY    Diuretics (Including Combos) Protocol Passed    10/13/2018  3:15 AM       Passed - Blood pressure under 140/90 in past 12 months    BP Readings from Last 3 Encounters:   10/08/18 118/56   08/21/18 130/74   07/13/18 142/70                Passed - Recent (12 mo) or future (30 days) visit within the authorizing provider's specialty    Patient had office visit in the last 12 months or has a visit in the next 30 days with authorizing provider or within the authorizing provider's specialty.  See \"Patient Info\" tab in inbasket, or \"Choose Columns\" in Meds & Orders section of the refill encounter.           Passed - Patient is age 18 or older       Passed - No active pregancy on record       Passed - Normal serum creatinine on file in past 12 months    Recent Labs   Lab Test  10/08/18   1359   CR  1.00             Passed - Normal serum potassium on file in past 12 months    Recent Labs   Lab Test  10/08/18   1359   POTASSIUM  4.6                   Passed - Normal serum sodium on file in past 12 months    Recent Labs   Lab Test  10/08/18   1359   NA  138             Passed - No positive pregnancy test in past 12 months          "

## 2018-10-15 ENCOUNTER — ANTICOAGULATION THERAPY VISIT (OUTPATIENT)
Dept: NURSING | Facility: CLINIC | Age: 83
End: 2018-10-15
Payer: COMMERCIAL

## 2018-10-15 LAB — INR POINT OF CARE: 4 (ref 0.86–1.14)

## 2018-10-15 PROCEDURE — 85610 PROTHROMBIN TIME: CPT | Mod: QW

## 2018-10-15 PROCEDURE — 99207 ZZC NO CHARGE NURSE ONLY: CPT

## 2018-10-15 PROCEDURE — 36416 COLLJ CAPILLARY BLOOD SPEC: CPT

## 2018-10-15 RX ORDER — FUROSEMIDE 40 MG
TABLET ORAL
Qty: 90 TABLET | Refills: 2 | Status: SHIPPED | OUTPATIENT
Start: 2018-10-15 | End: 2019-06-01

## 2018-10-15 NOTE — MR AVS SNAPSHOT
Kenyatta Donald   10/15/2018 1:00 PM   Anticoagulation Therapy Visit    Description:  82 year old female   Provider:  [facility]   Department:  Bx Nurse           INR as of 10/15/2018     Today's INR       Anticoagulation Summary as of 10/15/2018     INR goal 2.5-3.5   Today's INR    Full warfarin instructions 10/19: 2 mg; Otherwise 2 mg on Mon, Fri; 4 mg all other days   Next INR check 10/29/2018    Indications   Long-term (current) use of anticoagulants [Z79.01] [Z79.01]  Mitral valve disorder s/po 1-9-15 remodeling percut  + clip  (Resolved) [I05.9]         Your next Anticoagulation Clinic appointment(s)     Oct 29, 2018  1:00 PM CDT   An[facility] with  [facility]    [address] [place] [address] [phone]              Contact Numbers     [facility]  Please call  [phone] to cancel and/or reschedule your appointment   The direct line to the anticoagulant nurse is [phone] on Monday, Wednesday, and Friday. On Thursday, the anticoagulant nurse can be reached directly at [phone].         October 2018 Details    Sun Mon Tue Wed Thu Fri Sat      1               2               3               4               5               6                 7               8               9               10               11               12               13                 14               15      2 mg   See details      16      4 mg         17      4 mg         18      4 mg         19      2 mg         20      4 mg           21      4 mg         22      2 mg         23      4 mg         24      4 mg         25      4 mg         26      2 mg         27      4 mg           28      4 mg         29            30               31                   Date Details   10/15 This INR check       Date of next INR:  10/29/2018         How to take your warfarin  dose     To take:  2 mg Take 0.5 of a 4 mg tablet.    To take:  4 mg Take 1 of the 4 mg tablets.

## 2018-10-15 NOTE — PROGRESS NOTES
ANTICOAGULATION FOLLOW-UP CLINIC VISIT    Patient Name:  Kenyatta Donald  Date:  10/15/2018  Contact Type:  Face to Face    SUBJECTIVE:     Patient Findings     Positives No Problem Findings           OBJECTIVE    INR Protime   Date Value Ref Range Status   10/15/2018 4.0 (A) 0.86 - 1.14 Corrected       ASSESSMENT / PLAN  INR assessment SUPRA    Recheck INR In: 2 WEEKS    INR Location Clinic      Anticoagulation Summary as of 10/15/2018     INR goal 2.5-3.5   Today's INR 4.0!   Warfarin maintenance plan 2 mg (4 mg x 0.5) on Mon, Fri; 4 mg (4 mg x 1) all other days   Full warfarin instructions 10/19: 2 mg; Otherwise 2 mg on Mon, Fri; 4 mg all other days   Weekly warfarin total 24 mg   Plan last modified Nancy Zavala RN (10/15/2018)   Next INR check 10/29/2018   Priority INR   Target end date     Indications   Long-term (current) use of anticoagulants [Z79.01] [Z79.01]  Mitral valve disorder s/po 1-9-15 remodeling percut  + clip  (Resolved) [I05.9]         Anticoagulation Episode Summary     INR check location     Preferred lab     Send INR reminders to [facility] INR/PROTIME    Comments       Anticoagulation Care Providers     Provider Role Specialty Phone number    Waqas Lionanahi Pyle MD [facility] [phone]            See the Encounter Report to view Anticoagulation Flowsheet and Dosing Calendar (Go to Encounters tab in chart review, and find the Anticoagulation Therapy Visit)        Nancy Zavala, RN

## 2018-10-27 DIAGNOSIS — I48.91 ATRIAL FIBRILLATION, UNSPECIFIED TYPE (H): ICD-10-CM

## 2018-10-27 DIAGNOSIS — Z86.711 HISTORY OF PULMONARY EMBOLISM: ICD-10-CM

## 2018-10-29 ENCOUNTER — ANTICOAGULATION THERAPY VISIT (OUTPATIENT)
Dept: NURSING | Facility: CLINIC | Age: 83
End: 2018-10-29
Payer: COMMERCIAL

## 2018-10-29 LAB
INR POINT OF CARE: 2.9 (ref 0.86–1.14)
INR POINT OF CARE: 2.9 (ref 0.86–1.14)

## 2018-10-29 PROCEDURE — 85610 PROTHROMBIN TIME: CPT | Mod: QW

## 2018-10-29 PROCEDURE — 36416 COLLJ CAPILLARY BLOOD SPEC: CPT

## 2018-10-29 PROCEDURE — 99207 ZZC NO CHARGE NURSE ONLY: CPT

## 2018-10-29 NOTE — PROGRESS NOTES
ANTICOAGULATION FOLLOW-UP CLINIC VISIT    Patient Name:  Kenyatta Donald  Date:  10/29/2018  Contact Type:  Face to Face    SUBJECTIVE:     Patient Findings     Positives No Problem Findings           OBJECTIVE    INR Protime   Date Value Ref Range Status   10/29/2018 2.9 (A) 0.86 - 1.14 Final   10/29/2018 2.9 (A) 0.86 - 1.14 Final       ASSESSMENT / PLAN  INR assessment THER    Recheck INR In: 4 WEEKS    INR Location Clinic      Anticoagulation Summary as of 10/29/2018     INR goal 2.5-3.5   Today's INR 2.9   Warfarin maintenance plan 2 mg (4 mg x 0.5) on Mon, Fri; 4 mg (4 mg x 1) all other days   Full warfarin instructions 2 mg on Mon, Fri; 4 mg all other days   Weekly warfarin total 24 mg   No change documented Nancy Zavala RN   Plan last modified Nancy Zavala RN (10/15/2018)   Next INR check 11/26/2018   Priority INR   Target end date     Indications   Long-term (current) use of anticoagulants [Z79.01] [Z79.01]  Mitral valve disorder s/po 1-9-15 remodeling percut  + clip  (Resolved) [I05.9]         Anticoagulation Episode Summary     INR check location     Preferred lab     Send INR reminders to [facility] INR/PROTIME    Comments       Anticoagulation Care Providers     Provider Role Specialty Phone number    Waqas Lionanahi Pyle MD [facility] [phone]            See the Encounter Report to view Anticoagulation Flowsheet and Dosing Calendar (Go to Encounters tab in chart review, and find the Anticoagulation Therapy Visit)        Nancy Zavala RN

## 2018-10-29 NOTE — MR AVS SNAPSHOT
Kenyatta Donald   10/29/2018 1:00 PM   Anticoagulation Therapy Visit    Description:  82 year old female   Provider:  [facility]   Department:  Bx Nurse           INR as of 10/29/2018     Today's INR 2.9      Anticoagulation Summary as of 10/29/2018     INR goal 2.5-3.5   Today's INR 2.9   Full warfarin instructions 2 mg on Mon, Fri; 4 mg all other days   Next INR check 11/26/2018    Indications   Long-term (current) use of anticoagulants [Z79.01] [Z79.01]  Mitral valve disorder s/po 1-9-15 remodeling percut  + clip  (Resolved) [I05.9]         Your next Anticoagulation Clinic appointment(s)     Nov 26, 2018  1:15 PM CST   Anticoagulation [facility] with  [facility] CLINIC   [facility]    [address] [place] [address] [phone]              Contact Numbers     [facility]  Please call  [phone] to cancel and/or reschedule your appointment   The direct line to the anticoagulant nurse is [phone] on Monday, Wednesday, and Friday. On Thursday, the anticoagulant nurse can be reached directly at [phone].         October 2018 Details    Sun Mon Tue Wed Thu Fri Sat      1               2               3               4               5               6                 7               8               9               10               11               12               13                 14               15               16               17               18               19               20                 21               22               23               24               25               26               27                 28               29      2 mg   See details      30      4 mg         31      4 mg             Date Details   10/29 This INR check               How to take your warfarin dose     To take:  2 mg Take 0.5 of a 4 mg tablet.    To take:  4 mg Take 1 of the 4 mg  tablets.           November 2018 Details    Sun Mon Tue Wed Thu Fri Sat         1      4 mg         2      2 mg         3      4 mg           4      4 mg         5      2 mg         6      4 mg         7      4 mg         8      4 mg         9      2 mg         10      4 mg           11      4 mg         12      2 mg         13      4 mg         14      4 mg         15      4 mg         16      2 mg         17      4 mg           18      4 mg         19      2 mg         20      4 mg         21      4 mg         22      4 mg         23      2 mg         24      4 mg           25      4 mg         26            27               28               29               30                 Date Details   No additional details    Date of next INR:  11/26/2018         How to take your warfarin dose     To take:  2 mg Take 0.5 of a 4 mg tablet.    To take:  4 mg Take 1 of the 4 mg tablets.

## 2018-10-30 RX ORDER — WARFARIN SODIUM 4 MG/1
TABLET ORAL
Qty: 90 TABLET | Refills: 1 | Status: SHIPPED | OUTPATIENT
Start: 2018-10-30 | End: 2019-06-11

## 2018-11-09 ENCOUNTER — TELEPHONE (OUTPATIENT)
Dept: FAMILY MEDICINE | Facility: CLINIC | Age: 83
End: 2018-11-09

## 2018-11-09 NOTE — TELEPHONE ENCOUNTER
Our goal is to have forms completed within 72 hours, however some forms may require a visit or additional information.    What clinic location was the form placed at [facility] or [place].?     Who is the form from?   Where did the form come from? Faxed to clinic   The form was placed in the inbox of Bess Lion MD      Please fax to [phone]  Phone number: [phone]    Additional comments:                                         [facility] 5/10/18 to 8/8/18  Call take on 11/9/2018 at 4:19 PM by Suzanna More

## 2018-11-09 NOTE — TELEPHONE ENCOUNTER
Our goal is to have forms completed within 72 hours, however some forms may require a visit or additional information.    What clinic location was the form placed at [facility] or [place].?     Who is the form from?   Where did the form come from? Faxed to clinic   The form was placed in the inbox of Bess Lion MD      Please fax to [phone]  Phone number: [phone]    Additional comments: [facility] home care [facility] 8/9/18 to 11/7/18    Call take on 11/9/2018 at 4:18 PM by Suzanna More

## 2018-11-13 ENCOUNTER — MEDICAL CORRESPONDENCE (OUTPATIENT)
Dept: HEALTH INFORMATION MANAGEMENT | Facility: CLINIC | Age: 83
End: 2018-11-13

## 2018-11-23 ENCOUNTER — TELEPHONE (OUTPATIENT)
Dept: FAMILY MEDICINE | Facility: CLINIC | Age: 83
End: 2018-11-23

## 2018-11-23 NOTE — TELEPHONE ENCOUNTER
Our goal is to have forms completed within 72 hours, however some forms may require a visit or additional information.    What clinic location was the form placed at [facility] or [place].?     Who is the form from?   Where did the form come from? Faxed to clinic   The form was placed in the inbox of Bess Lion MD      Please fax to [phone]  Phone number: [phone]    Additional comments:                                         [facility] 11/8/18 to 2/6/19  Call take on 11/23/2018 at 3:36 PM by Suzanna More

## 2018-11-26 ENCOUNTER — ANTICOAGULATION THERAPY VISIT (OUTPATIENT)
Dept: NURSING | Facility: CLINIC | Age: 83
End: 2018-11-26
Payer: COMMERCIAL

## 2018-11-26 LAB — INR POINT OF CARE: 3.4 (ref 0.86–1.14)

## 2018-11-26 PROCEDURE — 85610 PROTHROMBIN TIME: CPT | Mod: QW

## 2018-11-26 PROCEDURE — 99207 ZZC NO CHARGE NURSE ONLY: CPT

## 2018-11-26 PROCEDURE — 36416 COLLJ CAPILLARY BLOOD SPEC: CPT

## 2018-11-26 NOTE — MR AVS SNAPSHOT
Kenyatta Donald   11/26/2018 1:15 PM   Anticoagulation Therapy Visit    Description:  82 year old female   Provider:  [facility]   Department:  Bx Nurse           INR as of 11/26/2018     Today's INR 3.4      Anticoagulation Summary as of 11/26/2018     INR goal 2.5-3.5   Today's INR 3.4   Full warfarin instructions 11/28: 2 mg; Otherwise 2 mg on Mon, Wed, Fri; 4 mg all other days   Next INR check 12/10/2018    Indications   Long-term (current) use of anticoagulants [Z79.01] [Z79.01]  Mitral valve disorder s/po 1-9-15 remodeling percut  + clip  (Resolved) [I05.9]         Your next Anticoagulation Clinic appointment(s)     Dec 10, 2018  1:15 PM CST   Anticoagulation Visit with  ANTICOAGULATION CLINIC   [facility] ([facility])    [address] [place] [id number] [phone]              Contact Numbers     [facility]  Please call  [phone] to cancel and/or reschedule your appointment   The direct line to the anticoagulant nurse is [phone] on Monday, Wednesday, and Friday. On Thursday, the anticoagulant nurse can be reached directly at [phone].         November 2018 Details    Sun Mon Tue Wed Thu Fri Sat         1               2               3                 4               5               6               7               8               9               10                 11               12               13               14               15               16               17                 18               19               20               21               22               23               24                 25               26      2 mg   See details      27      4 mg         28      2 mg         29      4 mg         30      2 mg           Date Details   11/26 This INR check               How to take your warfarin dose     To take:  2 mg Take 0.5 of a 4 mg tablet.    To take:  4  mg Take 1 of the 4 mg tablets.           December 2018 Details    Sun Mon Tue Wed Thu Fri Sat           1      4 mg           2      4 mg         3      2 mg         4      4 mg         5      2 mg         6      4 mg         7      2 mg         8      4 mg           9      4 mg         10            11               12               13               14               15                 16               17               18               19               20               21               22                 23               24               25               26               27               28               29                 30               31                     Date Details   No additional details    Date of next INR:  12/10/2018         How to take your warfarin dose     To take:  2 mg Take 0.5 of a 4 mg tablet.    To take:  4 mg Take 1 of the 4 mg tablets.

## 2018-11-26 NOTE — PROGRESS NOTES
ANTICOAGULATION FOLLOW-UP CLINIC VISIT    Patient Name:  Kenyatta Donald  Date:  11/26/2018  Contact Type:  Face to Face    SUBJECTIVE:     Patient Findings     Positives Other complaints (knee and shoulder pain)           OBJECTIVE    INR Protime   Date Value Ref Range Status   11/26/2018 3.4 (A) 0.86 - 1.14 Final       ASSESSMENT / PLAN  INR assessment SUPRA    Recheck INR In: 2 WEEKS    INR Location Clinic      Anticoagulation Summary as of 11/26/2018     INR goal 2.5-3.5   Today's INR 3.4   Warfarin maintenance plan 2 mg (4 mg x 0.5) on Mon, Wed, Fri; 4 mg (4 mg x 1) all other days   Full warfarin instructions 11/28: 2 mg; Otherwise 2 mg on Mon, Wed, Fri; 4 mg all other days   Weekly warfarin total 22 mg   Plan last modified Nancy Zavala RN (11/26/2018)   Next INR check 12/10/2018   Priority INR   Target end date     Indications   Long-term (current) use of anticoagulants [Z79.01] [Z79.01]  Mitral valve disorder s/po 1-9-15 remodeling percut  + clip  (Resolved) [I05.9]         Anticoagulation Episode Summary     INR check location     Preferred lab     Send INR reminders to [facility] INR/PROTIME    Comments       Anticoagulation Care Providers     Provider Role Specialty Phone number    Amisha Bess Pyle MD [facility] [phone]            See the Encounter Report to view Anticoagulation Flowsheet and Dosing Calendar (Go to Encounters tab in chart review, and find the Anticoagulation Therapy Visit)        Nancy Zavala RN

## 2018-11-27 ENCOUNTER — MEDICAL CORRESPONDENCE (OUTPATIENT)
Dept: HEALTH INFORMATION MANAGEMENT | Facility: CLINIC | Age: 83
End: 2018-11-27

## 2018-11-27 DIAGNOSIS — I25.10 CORONARY ARTERY DISEASE INVOLVING NATIVE CORONARY ARTERY OF NATIVE HEART WITHOUT ANGINA PECTORIS: ICD-10-CM

## 2018-11-27 RX ORDER — METOPROLOL SUCCINATE 50 MG/1
75 TABLET, EXTENDED RELEASE ORAL DAILY
Qty: 135 TABLET | Refills: 0 | Status: SHIPPED | OUTPATIENT
Start: 2018-11-27 | End: 2019-01-11

## 2018-11-28 ENCOUNTER — TELEPHONE (OUTPATIENT)
Dept: FAMILY MEDICINE | Facility: CLINIC | Age: 83
End: 2018-11-28

## 2018-11-28 NOTE — TELEPHONE ENCOUNTER
Our goal is to have forms completed within 72 hours, however some forms may require a visit or additional information.    What clinic location was the form placed at [facility] or [facility]     Who is the form from?   Where did the form come from? Faxed to clinic   The form was placed in the inbox of Bess Lion MD      Please fax to [phone]  Phone number: [phone]    Additional comments: [facility] home care [facility] 11/8/18 to 2/6/19    Call take on 11/28/2018 at 10:53 AM by Suzanna More

## 2018-11-29 ENCOUNTER — MEDICAL CORRESPONDENCE (OUTPATIENT)
Dept: HEALTH INFORMATION MANAGEMENT | Facility: CLINIC | Age: 83
End: 2018-11-29

## 2018-12-10 ENCOUNTER — ANTICOAGULATION THERAPY VISIT (OUTPATIENT)
Dept: NURSING | Facility: CLINIC | Age: 83
End: 2018-12-10
Payer: COMMERCIAL

## 2018-12-10 LAB — INR POINT OF CARE: 2.4 (ref 0.86–1.14)

## 2018-12-10 PROCEDURE — 85610 PROTHROMBIN TIME: CPT | Mod: QW

## 2018-12-10 PROCEDURE — 36416 COLLJ CAPILLARY BLOOD SPEC: CPT

## 2018-12-10 PROCEDURE — 99207 ZZC NO CHARGE NURSE ONLY: CPT

## 2018-12-10 NOTE — PROGRESS NOTES
ANTICOAGULATION FOLLOW-UP CLINIC VISIT    Patient Name:  Kenyatta Donald  Date:  12/10/2018  Contact Type:  Face to Face    SUBJECTIVE:     Patient Findings     Positives:   No Problem Findings           OBJECTIVE    INR Protime   Date Value Ref Range Status   12/10/2018 2.4 (A) 0.86 - 1.14 Final       ASSESSMENT / PLAN  INR assessment THER    Recheck INR In: 6 WEEKS    INR Location Clinic      Anticoagulation Summary  As of 12/10/2018    INR goal:   2.5-3.5   TTR:   45.0 % (11.7 mo)   INR used for dosin.4! (12/10/2018)   Warfarin maintenance plan:   2 mg (4 mg x 0.5) every Mon, Wed, Fri; 4 mg (4 mg x 1) all other days   Full warfarin instructions:   2 mg every Mon, Wed, Fri; 4 mg all other days   Weekly warfarin total:   22 mg   Plan last modified:   Nancy Zavala RN (2018)   Next INR check:   2019   Priority:   INR   Target end date:       Indications    Long-term (current) use of anticoagulants [Z79.01] [Z79.01]  Mitral valve disorder s/po 15 remodeling percut  + clip  (Resolved) [I05.9]             Anticoagulation Episode Summary     INR check location:       Preferred lab:       Send INR reminders to:   [facility] INR/PROTIME    Comments:         Anticoagulation Care Providers     Provider Role Specialty Phone number    AmishaBess MD [facility] [phone]            See the Encounter Report to view Anticoagulation Flowsheet and Dosing Calendar (Go to Encounters tab in chart review, and find the Anticoagulation Therapy Visit)        Nancy Zavala, RN

## 2018-12-23 DIAGNOSIS — M10.279 DRUG-INDUCED GOUT INVOLVING TOE, UNSPECIFIED CHRONICITY, UNSPECIFIED LATERALITY: ICD-10-CM

## 2018-12-26 NOTE — TELEPHONE ENCOUNTER
"Requested Prescriptions   Pending Prescriptions Disp Refills     allopurinol (ZYLOPRIM) 100 MG tablet [Pharmacy Med Name: ALLOPURINOL 100MG TABLETS]  Last Written Prescription Date:  9/19/2018  Last Fill Quantity: 90 tablet,  # refills: 0   Last office visit: 8/21/2018 with prescribing provider:  TANK Lion   Future Office Visit:     90 tablet 0     Sig: TAKE 1 TABLET BY MOUTH EVERY DAY    Gout Agents Protocol Failed - 12/23/2018  9:35 AM       Failed - Has Uric Acid on file in past 12 months and value is less than 6    Recent Labs   Lab Test 08/25/17  1145   URIC 5.8     If level is 6mg/dL or greater, ok to refill one time and refer to provider.          Passed - CBC on file in past 12 months    Recent Labs   Lab Test 05/29/18  1346   WBC 11.7*   RBC 4.03   HGB 11.8   HCT 37.1                   Passed - ALT on file in past 12 months    Recent Labs   Lab Test 07/13/18  1322   ALT <5*            Passed - Recent (12 mo) or future (30 days) visit within the authorizing provider's specialty    Patient had office visit in the last 12 months or has a visit in the next 30 days with authorizing provider or within the authorizing provider's specialty.  See \"Patient Info\" tab in inbasket, or \"Choose Columns\" in Meds & Orders section of the refill encounter.             Passed - Patient is age 18 or older       Passed - No active pregnancy on record       Passed - Normal serum creatinine on file in the past 12 months    Recent Labs   Lab Test 10/08/18  1359   CR 1.00            Passed - No positive pregnancy test in past year           "

## 2018-12-27 RX ORDER — ALLOPURINOL 100 MG/1
TABLET ORAL
Qty: 90 TABLET | Refills: 0 | Status: SHIPPED | OUTPATIENT
Start: 2018-12-27 | End: 2019-03-20

## 2018-12-27 NOTE — TELEPHONE ENCOUNTER
Routing refill request to provider for review/approval because:  Labs out of range:  CBC and ALT

## 2019-01-08 ENCOUNTER — HOSPITAL ENCOUNTER (OUTPATIENT)
Dept: CARDIOLOGY | Facility: CLINIC | Age: 84
Discharge: HOME | End: 2019-01-08
Attending: INTERNAL MEDICINE | Admitting: INTERNAL MEDICINE
Payer: COMMERCIAL

## 2019-01-08 ENCOUNTER — HOSPITAL ENCOUNTER (OUTPATIENT)
Dept: CARDIOLOGY | Facility: CLINIC | Age: 84
End: 2019-01-08
Attending: INTERNAL MEDICINE
Payer: COMMERCIAL

## 2019-01-08 DIAGNOSIS — I34.0 MITRAL VALVE INSUFFICIENCY, UNSPECIFIED ETIOLOGY: ICD-10-CM

## 2019-01-08 DIAGNOSIS — E78.00 HYPERCHOLESTEROLEMIA: ICD-10-CM

## 2019-01-08 DIAGNOSIS — I10 BENIGN ESSENTIAL HYPERTENSION: ICD-10-CM

## 2019-01-08 DIAGNOSIS — I25.10 CORONARY ARTERY DISEASE INVOLVING NATIVE CORONARY ARTERY OF NATIVE HEART WITHOUT ANGINA PECTORIS: ICD-10-CM

## 2019-01-08 DIAGNOSIS — Z82.49 FH: MITRAL VALVE REPAIR: ICD-10-CM

## 2019-01-08 DIAGNOSIS — I48.20 CHRONIC ATRIAL FIBRILLATION (H): ICD-10-CM

## 2019-01-08 DIAGNOSIS — I50.22 CHRONIC SYSTOLIC CONGESTIVE HEART FAILURE (H): ICD-10-CM

## 2019-01-08 LAB
ALT SERPL W P-5'-P-CCNC: <5 U/L (ref 5–30)
ANION GAP SERPL CALCULATED.3IONS-SCNC: 14.5 MMOL/L (ref 6–17)
BUN SERPL-MCNC: 42 MG/DL (ref 7–30)
CALCIUM SERPL-MCNC: 10.1 MG/DL (ref 8.5–10.5)
CHLORIDE SERPL-SCNC: 106 MMOL/L (ref 98–107)
CHOLEST SERPL-MCNC: 148 MG/DL
CO2 SERPL-SCNC: 21 MMOL/L (ref 23–29)
CREAT SERPL-MCNC: 1.15 MG/DL (ref 0.7–1.3)
GFR SERPL CREATININE-BSD FRML MDRD: 45 ML/MIN/{1.73_M2}
GLUCOSE SERPL-MCNC: 119 MG/DL (ref 70–105)
HDLC SERPL-MCNC: 48 MG/DL
LDLC SERPL CALC-MCNC: 67 MG/DL
NONHDLC SERPL-MCNC: 100 MG/DL
POTASSIUM SERPL-SCNC: 4.5 MMOL/L (ref 3.5–5.1)
SODIUM SERPL-SCNC: 137 MMOL/L (ref 136–145)
TRIGL SERPL-MCNC: 163 MG/DL

## 2019-01-08 PROCEDURE — 84460 ALANINE AMINO (ALT) (SGPT): CPT | Performed by: INTERNAL MEDICINE

## 2019-01-08 PROCEDURE — 93225 XTRNL ECG REC<48 HRS REC: CPT

## 2019-01-08 PROCEDURE — 93306 TTE W/DOPPLER COMPLETE: CPT

## 2019-01-08 PROCEDURE — 93306 TTE W/DOPPLER COMPLETE: CPT | Mod: 26 | Performed by: INTERNAL MEDICINE

## 2019-01-08 PROCEDURE — 36415 COLL VENOUS BLD VENIPUNCTURE: CPT | Performed by: INTERNAL MEDICINE

## 2019-01-08 PROCEDURE — 93227 XTRNL ECG REC<48 HR R&I: CPT | Performed by: INTERNAL MEDICINE

## 2019-01-08 PROCEDURE — 80048 BASIC METABOLIC PNL TOTAL CA: CPT | Performed by: INTERNAL MEDICINE

## 2019-01-08 PROCEDURE — 80061 LIPID PANEL: CPT | Performed by: INTERNAL MEDICINE

## 2019-01-10 ENCOUNTER — PRE VISIT (OUTPATIENT)
Dept: CARDIOLOGY | Facility: CLINIC | Age: 84
End: 2019-01-10
Payer: COMMERCIAL

## 2019-01-10 DIAGNOSIS — I49.5 BRADY-TACHY SYNDROME (H): Primary | ICD-10-CM

## 2019-01-10 PROCEDURE — 99207 ZZC NO CHARGE LOS: CPT | Performed by: INTERNAL MEDICINE

## 2019-01-10 NOTE — TELEPHONE ENCOUNTER
New Patient OV 1-29-19 with Dr. Lock. Patient formerly of Dr. Guan. Currently on warfarin.   Holter Monitor 1-8-19   Results:  principle rhythm was Atrial Fib illation. During this time  her average heart rate was 55 bpm. She had a minimum heart rate of 32 bpm at 12:38:49 (09-JAN) and a maximum heart rate of 109 bpm at 09:51:10  (09-JAN). The QRS duration measured .09 seconds, the QT interval .32-.47 seconds.  2. There were 551 pauses/R-R intervals greater than 2.0 seconds. The longest R-R interval was 3.39 seconds at 12:38:49 (09-JAN) .  3. There were 19 isolated ventricular ectopics.

## 2019-01-11 ENCOUNTER — TELEPHONE (OUTPATIENT)
Dept: CARDIOLOGY | Facility: CLINIC | Age: 84
End: 2019-01-11

## 2019-01-11 DIAGNOSIS — I25.10 CORONARY ARTERY DISEASE INVOLVING NATIVE CORONARY ARTERY OF NATIVE HEART WITHOUT ANGINA PECTORIS: ICD-10-CM

## 2019-01-11 DIAGNOSIS — I49.5 BRADY-TACHY SYNDROME (H): Primary | ICD-10-CM

## 2019-01-11 RX ORDER — METOPROLOL SUCCINATE 50 MG/1
50 TABLET, EXTENDED RELEASE ORAL DAILY
Qty: 1 TABLET | Refills: 0 | COMMUNITY
Start: 2019-01-11 | End: 2019-02-25

## 2019-01-11 NOTE — TELEPHONE ENCOUNTER
Reviewed Holter results with Dr. Lock. Recommendation is to decrease the metoprolol Succinate to 50 mg daily and repeat Holter in 1 week.   Echocardiogram to be done if one is not recent. Last echo was 1-9-19.   Patient called and agrees with decreasing the metoprolol to 50 mg daily and repeat holter in 1 week.

## 2019-01-11 NOTE — ADDENDUM NOTE
Addended by: LICHA HOOVER on: 1/11/2019 03:29 PM     Modules accepted: Orders, Level of Service, SmartSet

## 2019-01-17 ENCOUNTER — HOSPITAL ENCOUNTER (OUTPATIENT)
Dept: CARDIOLOGY | Facility: CLINIC | Age: 84
Discharge: HOME | End: 2019-01-17
Attending: INTERNAL MEDICINE | Admitting: INTERNAL MEDICINE
Payer: COMMERCIAL

## 2019-01-17 DIAGNOSIS — I49.5 BRADY-TACHY SYNDROME (H): ICD-10-CM

## 2019-01-17 PROCEDURE — 93227 XTRNL ECG REC<48 HR R&I: CPT | Performed by: INTERNAL MEDICINE

## 2019-01-17 PROCEDURE — 93225 XTRNL ECG REC<48 HRS REC: CPT

## 2019-01-21 ENCOUNTER — ANTICOAGULATION THERAPY VISIT (OUTPATIENT)
Dept: ANTICOAGULATION | Facility: CLINIC | Age: 84
End: 2019-01-21
Payer: COMMERCIAL

## 2019-01-21 LAB — INR POINT OF CARE: 2.2 (ref 0.86–1.14)

## 2019-01-21 PROCEDURE — 85610 PROTHROMBIN TIME: CPT | Mod: QW

## 2019-01-21 PROCEDURE — 36416 COLLJ CAPILLARY BLOOD SPEC: CPT

## 2019-01-21 NOTE — PROGRESS NOTES
ANTICOAGULATION FOLLOW-UP CLINIC VISIT    Patient Name:  Kenyatta Donald  Date:  2019  Contact Type:  Face to Face    SUBJECTIVE:     Patient Findings     Positives:   No Problem Findings           OBJECTIVE    INR Protime   Date Value Ref Range Status   2019 2.2 (A) 0.86 - 1.14 Final       ASSESSMENT / PLAN  INR assessment THER    Recheck INR In: 6 WEEKS    INR Location Clinic      Anticoagulation Summary  As of 2019    INR goal:   2.5-3.5   TTR:   40.2 % (1.1 y)   INR used for dosin.2! (2019)   Warfarin maintenance plan:   2 mg (4 mg x 0.5) every Wed, Sat; 4 mg (4 mg x 1) all other days   Full warfarin instructions:   : 4 mg; Otherwise 2 mg every Wed, Sat; 4 mg all other days   Weekly warfarin total:   24 mg   Plan last modified:   Monique Brito RN (2019)   Next INR check:   2019   Priority:   INR   Target end date:       Indications    Long-term (current) use of anticoagulants [Z79.01] [Z79.01]  Mitral valve disorder s/po 1-9-15 remodeling percut  + clip  (Resolved) [I05.9]             Anticoagulation Episode Summary     INR check location:       Preferred lab:       Send INR reminders to:   [facility] INR/PROTIME    Comments:         Anticoagulation Care Providers     Provider Role Specialty Phone number    VelasquezBess ngo MD [facility] [phone]            See the Encounter Report to view Anticoagulation Flowsheet and Dosing Calendar (Go to Encounters tab in chart review, and find the Anticoagulation Therapy Visit)        Monique Brito RN

## 2019-01-29 ENCOUNTER — TELEPHONE (OUTPATIENT)
Dept: CARDIOLOGY | Facility: CLINIC | Age: 84
End: 2019-01-29

## 2019-01-29 ENCOUNTER — OFFICE VISIT (OUTPATIENT)
Dept: CARDIOLOGY | Facility: CLINIC | Age: 84
End: 2019-01-29
Attending: INTERNAL MEDICINE
Payer: COMMERCIAL

## 2019-01-29 VITALS
HEART RATE: 76 BPM | OXYGEN SATURATION: 97 % | SYSTOLIC BLOOD PRESSURE: 110 MMHG | DIASTOLIC BLOOD PRESSURE: 60 MMHG | WEIGHT: 200.7 LBS | HEIGHT: 65 IN | BODY MASS INDEX: 33.44 KG/M2

## 2019-01-29 DIAGNOSIS — I48.20 CHRONIC ATRIAL FIBRILLATION (H): ICD-10-CM

## 2019-01-29 DIAGNOSIS — Z95.818 STATUS POST IMPLANTATION OF MITRAL VALVE LEAFLET CLIP: Primary | ICD-10-CM

## 2019-01-29 DIAGNOSIS — Z79.01 WARFARIN ANTICOAGULATION: ICD-10-CM

## 2019-01-29 DIAGNOSIS — Z98.890 STATUS POST IMPLANTATION OF MITRAL VALVE LEAFLET CLIP: Primary | ICD-10-CM

## 2019-01-29 DIAGNOSIS — I10 BENIGN ESSENTIAL HYPERTENSION: ICD-10-CM

## 2019-01-29 PROCEDURE — 93000 ELECTROCARDIOGRAM COMPLETE: CPT | Performed by: INTERNAL MEDICINE

## 2019-01-29 PROCEDURE — 99214 OFFICE O/P EST MOD 30 MIN: CPT | Performed by: INTERNAL MEDICINE

## 2019-01-29 ASSESSMENT — MIFFLIN-ST. JEOR: SCORE: 1363.31

## 2019-01-29 NOTE — LETTER
1/29/2019    eBss Lion MD  [address]  [facility] [address]    RE: Kenyatta Donald       Dear Colleague,    I had the pleasure of seeing Kenyatta Donald in the [facility].    Clinic visit note dictated. Dictation reference number - [id number]      REVIEW OF SYSTEMS:  A comprehensive 10-point review of systems was completed and the pertinent positives are documented in the history of present illness.    Skin:  Negative     Eyes:  Positive for glasses  ENT:  Negative    Respiratory:  Positive for shortness of breath(due to loss of muscle in chest)  Cardiovascular:    Positive for;edema  Gastroenterology: Negative    Genitourinary:  Negative    Musculoskeletal:  Positive for joint pain  Neurologic:  Positive for stroke;numbness or tingling of hands;numbness or tingling of feet  Psychiatric:  Negative    Heme/Lymph/Imm:  Negative    Endocrine:  Positive for thyroid disorder    CURRENT MEDICATIONS:  Current Outpatient Medications   Medication Sig Dispense Refill     acetaminophen (TYLENOL) 325 MG tablet Take 2 tablets (650 mg) by mouth every 4 hours as needed for mild pain 100 tablet      alendronate (FOSAMAX) 70 MG tablet TAKE 1 TABLET BY MOUTH EVERY 7 DAYS 12 tablet 3     allopurinol (ZYLOPRIM) 100 MG tablet TAKE 1 TABLET BY MOUTH EVERY DAY 90 tablet 0     amLODIPine (NORVASC) 5 MG tablet Take 1 tablet (5 mg) by mouth 2 times daily 180 tablet 3     amoxicillin (AMOXIL) 500 MG capsule Take 4 capsules by mouth 30 minutes prior to dental work 4 capsule 4     ASPIRIN NOT PRESCRIBED (INTENTIONAL) Please choose reason not prescribed, below 1 each 0     cholecalciferol (VITAMIN D) 1000 UNIT tablet Take 2,000 Units by mouth daily        furosemide (LASIX) 40 MG tablet TAKE 1 TABLET BY MOUTH DAILY 90 tablet 2     levothyroxine (SYNTHROID/LEVOTHROID) 100 MCG tablet TAKE 1 TABLET BY MOUTH DAILY 90 tablet 2     lisinopril (PRINIVIL/ZESTRIL) 20 MG tablet Take 1 tablet (20 mg) by mouth 2  times daily 180 tablet 3     metoprolol succinate ER (TOPROL-XL) 50 MG 24 hr tablet Take 1 tablet (50 mg) by mouth daily 1 tablet 0     nystatin (MYCOSTATIN) 935881 UNIT/GM POWD Apply topically 3 times daily as needed 60 g 1     order for DME Equipment being ordered: compression hose   BK 20-30mm   2 pair as insurance will pay 1 each 0     order for DME Equipment being ordered: mastectomy bras & prostheses   S/po mastectomy of unknown side     C50.919 1 each 0     traMADol (ULTRAM) 50 MG tablet Take 1 tablet (50 mg) by mouth every 6 hours as needed for moderate pain or severe pain 30 tablet 0     VENTOLIN  (90 Base) MCG/ACT inhaler INHALE 2 PUFFS INTO THE LUNGS EVERY 6 HOURS AS NEEDED FOR SHORTNESS OF BREATH OR DIFFICULT BREATHING OR WHEEZING 18 g 11     warfarin (COUMADIN) 1 MG tablet TAKE 2 TABLETS BY MOUTH ON THURSDAY AND 4 TABLETS ALL OTHER DAYS. (Patient taking differently: TAKE 2 TABLETS BY MOUTH ON mon, wed &* fri  AND 4 TABLETS ALL OTHER DAYS.) 327 tablet 0     warfarin (COUMADIN) 4 MG tablet TAKE 1 TABLET BY MOUTH EVERY DAY (Patient taking differently: 2 mg m,w,f, & 4 mg row) 90 tablet 1     alendronate (FOSAMAX) 70 MG tablet TAKE 1 TABLET BY MOUTH EVERY 7 DAYS (Patient not taking: Reported on 1/29/2019) 4 tablet 0     alendronate (FOSAMAX) 70 MG tablet TK 1 T PO Q 7 DAYS  3         ALLERGIES:  No Known Allergies    PAST MEDICAL HISTORY:    Past Medical History:   Diagnosis Date     Atrial fibrillation (H)      Benign essential hypertension      Juan Pablo-tachy syndrome (H)      Breast cancer (H)     s/p bilateral mastectomy     CHF (congestive heart failure) (H)      Coronary artery disease involving native coronary artery of native heart without angina pectoris     cardiac cath 2014: 40-50% mid RCA stenosis with minimal other disease     GERD (gastroesophageal reflux disease)      Hypercholesterolaemia      Hypothyroidism      Kidney stone      Mitral valve regurgitation     sp mitral clip 1/9/15      Need for SBE (subacute bacterial endocarditis) prophylaxis      Osteoporosis      Pulmonary embolism (H)      Sleep apnea      Spinal muscular atrophy type III (H)     Dr. Daily, [place] [facility]       PAST SURGICAL HISTORY:    Past Surgical History:   Procedure Laterality Date     ANESTHESIA OUT OF OR PERCUTANEOUS MITRAL VALVE REPAIR N/A 2015    Procedure: ANESTHESIA OUT OF OR PERCUTANEOUS MITRAL VALVE REPAIR;  Surgeon: Generic Anesthesia Provider;  Location: [facility] OR     GYN SURGERY      hysterectomy     MASTECTOMY      bilateral     ORTHOPEDIC SURGERY      knee replacement     PERCUTANEIOUS MITRAL VALVE REPAIR  2015       FAMILY HISTORY:    Family History   Problem Relation Age of Onset     Cancer - colorectal Mother      Alzheimer Disease Mother          age 78     Diabetes Daughter      Asthma Daughter      Heart Disease Father          age 60     Family History Negative Daughter      Unknown/Adopted Maternal Grandmother      Unknown/Adopted Maternal Grandfather      Unknown/Adopted Paternal Grandmother      Unknown/Adopted Paternal Grandfather      Coronary Artery Disease No family hx of      Hypertension No family hx of      Hyperlipidemia No family hx of      Cerebrovascular Disease No family hx of      Breast Cancer No family hx of      Colon Cancer No family hx of      Prostate Cancer No family hx of      Other Cancer No family hx of      Depression No family hx of      Anxiety Disorder No family hx of      Mental Illness No family hx of      Substance Abuse No family hx of      Anesthesia Reaction No family hx of      Osteoporosis No family hx of      Genetic Disorder No family hx of      Thyroid Disease No family hx of      Obesity No family hx of        SOCIAL HISTORY:    Social History     Socioeconomic History     Marital status:      Spouse name: None     Number of children: 3     Years of education: None     Highest education level: None   Social Needs     Financial  "resource strain: None     Food insecurity - worry: None     Food insecurity - inability: None     Transportation needs - medical: None     Transportation needs - non-medical: None   Occupational History     Occupation: Retired teacher   Tobacco Use     Smoking status: Never Smoker     Smokeless tobacco: Never Used   Substance and Sexual Activity     Alcohol use: No     Alcohol/week: 0.0 oz     Drug use: No     Sexual activity: No     Partners: Male   Other Topics Concern     Parent/sibling w/ CABG, MI or angioplasty before 65F 55M? No      Service Not Asked     Blood Transfusions Not Asked     Caffeine Concern No     Occupational Exposure Not Asked     Hobby Hazards Not Asked     Sleep Concern No     Stress Concern No     Weight Concern Yes     Comment: weight loss     Special Diet No     Comment: low sodium     Back Care Not Asked     Exercise No     Comment: PT     Bike Helmet Not Asked     Seat Belt Yes     Self-Exams Not Asked   Social History Narrative    , 3 children, lives in a Coop ([employer]), has a living will and wants to be DNR/DNI.   (last updated 12/5/2017)          Vitals: /60   Pulse 76   Ht 1.638 m (5' 4.5\")   Wt 91 kg (200 lb 11.2 oz)   LMP  (LMP Unknown)   SpO2 97%   BMI 33.92 kg/m     Wt Readings from Last 5 Encounters:   01/29/19 91 kg (200 lb 11.2 oz)   10/08/18 88.1 kg (194 lb 3.2 oz)   08/21/18 87.5 kg (193 lb)   07/13/18 88.1 kg (194 lb 4.8 oz)   05/29/18 85.7 kg (189 lb)       Thank you for allowing me to participate in the care of your patient.      Sincerely,     Brain Lock MD     [facility]    cc:   Chris Guan MD  [address]  [place] [address]        "

## 2019-01-29 NOTE — TELEPHONE ENCOUNTER
----- Message from Brain Lock MD sent at 1/29/2019  1:46 PM CST -----  Regarding: Liaison with INR clinic  Hello    I am just seeing this patient in clinic now.  Her goal INR should be 2.0-3.0, but reportedly her INR clinic has a down as between 2.5-3.5. Could you please liaise with them and readjust INR to the new goal of 2.0-3.0?    Let me know if you have any questions.    Thank you.  GAIL Nuñez spoke with INR team (Nancy) at PCP clinic to review Dr. Lock's recommendation to adjust INR goal to 2.0-3.0.

## 2019-01-29 NOTE — PROGRESS NOTES
Clinic visit note dictated. Dictation reference number - [id number]      REVIEW OF SYSTEMS:  A comprehensive 10-point review of systems was completed and the pertinent positives are documented in the history of present illness.    Skin:  Negative     Eyes:  Positive for glasses  ENT:  Negative    Respiratory:  Positive for shortness of breath(due to loss of muscle in chest)  Cardiovascular:    Positive for;edema  Gastroenterology: Negative    Genitourinary:  Negative    Musculoskeletal:  Positive for joint pain  Neurologic:  Positive for stroke;numbness or tingling of hands;numbness or tingling of feet  Psychiatric:  Negative    Heme/Lymph/Imm:  Negative    Endocrine:  Positive for thyroid disorder    CURRENT MEDICATIONS:  Current Outpatient Medications   Medication Sig Dispense Refill     acetaminophen (TYLENOL) 325 MG tablet Take 2 tablets (650 mg) by mouth every 4 hours as needed for mild pain 100 tablet      alendronate (FOSAMAX) 70 MG tablet TAKE 1 TABLET BY MOUTH EVERY 7 DAYS 12 tablet 3     allopurinol (ZYLOPRIM) 100 MG tablet TAKE 1 TABLET BY MOUTH EVERY DAY 90 tablet 0     amLODIPine (NORVASC) 5 MG tablet Take 1 tablet (5 mg) by mouth 2 times daily 180 tablet 3     amoxicillin (AMOXIL) 500 MG capsule Take 4 capsules by mouth 30 minutes prior to dental work 4 capsule 4     ASPIRIN NOT PRESCRIBED (INTENTIONAL) Please choose reason not prescribed, below 1 each 0     cholecalciferol (VITAMIN D) 1000 UNIT tablet Take 2,000 Units by mouth daily        furosemide (LASIX) 40 MG tablet TAKE 1 TABLET BY MOUTH DAILY 90 tablet 2     levothyroxine (SYNTHROID/LEVOTHROID) 100 MCG tablet TAKE 1 TABLET BY MOUTH DAILY 90 tablet 2     lisinopril (PRINIVIL/ZESTRIL) 20 MG tablet Take 1 tablet (20 mg) by mouth 2 times daily 180 tablet 3     metoprolol succinate ER (TOPROL-XL) 50 MG 24 hr tablet Take 1 tablet (50 mg) by mouth daily 1 tablet 0     nystatin (MYCOSTATIN) 775614 UNIT/GM POWD Apply topically 3 times daily as needed 60 g 1      order for DME Equipment being ordered: compression hose   BK 20-30mm   2 pair as insurance will pay 1 each 0     order for DME Equipment being ordered: mastectomy bras & prostheses   S/po mastectomy of unknown side     C50.919 1 each 0     traMADol (ULTRAM) 50 MG tablet Take 1 tablet (50 mg) by mouth every 6 hours as needed for moderate pain or severe pain 30 tablet 0     VENTOLIN  (90 Base) MCG/ACT inhaler INHALE 2 PUFFS INTO THE LUNGS EVERY 6 HOURS AS NEEDED FOR SHORTNESS OF BREATH OR DIFFICULT BREATHING OR WHEEZING 18 g 11     warfarin (COUMADIN) 1 MG tablet TAKE 2 TABLETS BY MOUTH ON THURSDAY AND 4 TABLETS ALL OTHER DAYS. (Patient taking differently: TAKE 2 TABLETS BY MOUTH ON mon, wed &* fri  AND 4 TABLETS ALL OTHER DAYS.) 327 tablet 0     warfarin (COUMADIN) 4 MG tablet TAKE 1 TABLET BY MOUTH EVERY DAY (Patient taking differently: 2 mg m,w,f, & 4 mg row) 90 tablet 1     alendronate (FOSAMAX) 70 MG tablet TAKE 1 TABLET BY MOUTH EVERY 7 DAYS (Patient not taking: Reported on 1/29/2019) 4 tablet 0     alendronate (FOSAMAX) 70 MG tablet TK 1 T PO Q 7 DAYS  3         ALLERGIES:  No Known Allergies    PAST MEDICAL HISTORY:    Past Medical History:   Diagnosis Date     Atrial fibrillation (H)      Benign essential hypertension      Juan Pablo-tachy syndrome (H)      Breast cancer (H)     s/p bilateral mastectomy     CHF (congestive heart failure) (H)      Coronary artery disease involving native coronary artery of native heart without angina pectoris     cardiac cath 2014: 40-50% mid RCA stenosis with minimal other disease     GERD (gastroesophageal reflux disease)      Hypercholesterolaemia      Hypothyroidism      Kidney stone      Mitral valve regurgitation     sp mitral clip 1/9/15     Need for SBE (subacute bacterial endocarditis) prophylaxis      Osteoporosis      Pulmonary embolism (H)      Sleep apnea      Spinal muscular atrophy type III (H)     Dr. Daily, [facility]       PAST SURGICAL  HISTORY:    Past Surgical History:   Procedure Laterality Date     ANESTHESIA OUT OF OR PERCUTANEOUS MITRAL VALVE REPAIR N/A 2015    Procedure: ANESTHESIA OUT OF OR PERCUTANEOUS MITRAL VALVE REPAIR;  Surgeon: Generic Anesthesia Provider;  Location: [facility] OR     GYN SURGERY      hysterectomy     MASTECTOMY      bilateral     ORTHOPEDIC SURGERY      knee replacement     PERCUTANEIOUS MITRAL VALVE REPAIR  2015       FAMILY HISTORY:    Family History   Problem Relation Age of Onset     Cancer - colorectal Mother      Alzheimer Disease Mother          age 78     Diabetes Daughter      Asthma Daughter      Heart Disease Father          age 60     Family History Negative Daughter      Unknown/Adopted Maternal Grandmother      Unknown/Adopted Maternal Grandfather      Unknown/Adopted Paternal Grandmother      Unknown/Adopted Paternal Grandfather      Coronary Artery Disease No family hx of      Hypertension No family hx of      Hyperlipidemia No family hx of      Cerebrovascular Disease No family hx of      Breast Cancer No family hx of      Colon Cancer No family hx of      Prostate Cancer No family hx of      Other Cancer No family hx of      Depression No family hx of      Anxiety Disorder No family hx of      Mental Illness No family hx of      Substance Abuse No family hx of      Anesthesia Reaction No family hx of      Osteoporosis No family hx of      Genetic Disorder No family hx of      Thyroid Disease No family hx of      Obesity No family hx of        SOCIAL HISTORY:    Social History     Socioeconomic History     Marital status:      Spouse name: None     Number of children: 3     Years of education: None     Highest education level: None   Social Needs     Financial resource strain: None     Food insecurity - worry: None     Food insecurity - inability: None     Transportation needs - medical: None     Transportation needs - non-medical: None   Occupational History     Occupation: Retired  "teacher   Tobacco Use     Smoking status: Never Smoker     Smokeless tobacco: Never Used   Substance and Sexual Activity     Alcohol use: No     Alcohol/week: 0.0 oz     Drug use: No     Sexual activity: No     Partners: Male   Other Topics Concern     Parent/sibling w/ CABG, MI or angioplasty before 65F 55M? No      Service Not Asked     Blood Transfusions Not Asked     Caffeine Concern No     Occupational Exposure Not Asked     Hobby Hazards Not Asked     Sleep Concern No     Stress Concern No     Weight Concern Yes     Comment: weight loss     Special Diet No     Comment: low sodium     Back Care Not Asked     Exercise No     Comment: PT     Bike Helmet Not Asked     Seat Belt Yes     Self-Exams Not Asked   Social History Narrative    , 3 children, lives in a Coop ([employer]), has a living will and wants to be DNR/DNI.   (last updated 12/5/2017)          Vitals: /60   Pulse 76   Ht 1.638 m (5' 4.5\")   Wt 91 kg (200 lb 11.2 oz)   LMP  (LMP Unknown)   SpO2 97%   BMI 33.92 kg/m    Wt Readings from Last 5 Encounters:   01/29/19 91 kg (200 lb 11.2 oz)   10/08/18 88.1 kg (194 lb 3.2 oz)   08/21/18 87.5 kg (193 lb)   07/13/18 88.1 kg (194 lb 4.8 oz)   05/29/18 85.7 kg (189 lb)       "

## 2019-01-29 NOTE — PROGRESS NOTES
Service Date: 01/29/2019      VISIT NOTE      PRIMARY CARE PROVIDER:  Bess Lion MD       CARDIOLOGY MICHAELA:  Maddie Contreras PA-C      REASON FOR VISIT:    Scheduled followup of chronic atrial fibrillation, prior history of percutaneous mitral valve repair, chronic diastolic heart failure.      HISTORY OF PRESENT ILLNESS:    The patient is an 83-year-old, obese (BMI 34 kg/m2), physically disabled (unspecified myopathy, see below),  lady who has previously followed with Dr. Chris Guan who has since retired.      The patient is known to have:   1.  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  When she was in her 40s, she was evaluated at the [facility].  She has significant muscle atrophy of her lower extremities and it recently reportedly has started to involve her chest wall muscles which intermittently make her feel short of breath.  She still lives at home and has home health carers.  She is able to stand and walk with the aid of her walker but finds it challenging to negotiate steps.   2.  Prior history of mitral regurgitation for which she underwent a percutaneous mitral valve repair (MitraClip procedure) in 01/2015.  Her most recent echocardiogram (I personally reviewed the images with the patient) shows stable MitraClip position with a residual mild-moderate mitral regurgitation that has been stable for years and a mean mitral diastolic gradient of 6.8 mmHg (heart rate 72 BPM).  Left ventricular size and systolic function are normal, LVEF 60%-65%, severe biatrial enlargement, normal right ventricular systolic function (TAPSE 1.9 cm).   3.  Chronic atrial fibrillation.  Recent Holter and ECG done today confirm chronic rate-controlled atrial fibrillation of 76 BPM.  No significant bradyarrhythmias.  She is on metoprolol succinate 50 mg daily and warfarin anticoagulation.  For some reason, she was told that her goal INR is  between 2.5 and 3.5.  Not sure why.  She also has a prior history of pulmonary embolus.   4.  Hypertension.  Previously, this has been labile but today it was 110/60 mmHg.  She is on a combination of amlodipine 5 mg b.i.d., furosemide 40 mg daily, lisinopril 20 mg daily and metoprolol succinate 50 mg daily.   5.  A history of chronic diastolic heart failure.  Her last heart failure hospitalization was in 12/2017.  Today, she is clinically euvolemic.   6.  Question of hyperlipidemia. Her most recent lipid panel shows a total cholesterol of 148, HDL 48, LDL 67, triglycerides 163.  She is on simvastatin 40 mg daily.  On review of her historical lipid panels, she has never had significant hyperlipidemia.  7.  Mild nonobstructive CAD.  On her coronary angiogram in 2014, she did have evidence of mild nonobstructive CAD in the right coronary artery but her left main, LAD and circumflex looked quite pristine.       PHYSICAL EXAMINATION:   VITAL SIGNS:  /60, pulse 76 per minute, BMI 33.9, sats 97% on room air.   CONSTITUTIONAL AND MUSCULOSKELETAL:  As described above, she has an apparent dystrophy  but is able to stand and walk with a walker.  She is mentally alert and oriented without conversational dyspnea.   RESPIRATORY:  Bilaterally clear to auscultation.   CARDIOVASCULAR:  Jugular venous pressure could not be assessed in the seated position (patient preference).  Apical impulse not palpable due to obesity.  Heart sounds are irregularly irregular. A 3/6 late systolic murmur.   EXTREMITIES:  Mild 1+ edema.      DIAGNOSES:   1.  History of status post percutaneous mitral valve repair (MitraClip 2014).   2.  Chronic atrial fibrillation, has been rate controlled on warfarin anticoagulation strategy.   3.  History of pulmonary embolus.   4.  Chronic diastolic heart failure, currently stable.   5.  Benign essential hypertension.   6.  History of slowly progressive unspecified myopathy.      ASSESSMENT AND PLAN:    The  patient's cardiovascular status is stable.  Her MitraClip repair is intact.  She does not have significant residual mitral regurgitation or mitral stenosis.  She has mild non-obstructive CAD without symptoms.  Although she has some subjective dyspnea, as she herself points out, this is due to her chest wall weakness.  Atrial fibrillation is rate controlled and appropriately anticoagulated.  Hypertension is well controlled.     1.  Her warfarin goal INR should be between 2.0-3.0.  My office will liaise with the INR Clinic.   2.  Stop simvastatin 40 mg daily.  This can potentially make her myopathy worse.  She does not have significantly elevated lipids and has never had a myocardial infarction or stroke.  On balance, I do not think a statin is absolutely indicated in her.   3.  No changes to any of the medications.   4.  I will plan on seeing her back in a year with an echocardiogram.         SCOTT SAVAGE MD             D: 2019   T: 2019   MT: JOSHUA      Name:     DARLENE FRIAS   MRN:      [id number]        Account:      [id number]   :      1935           Service Date: 2019      Document: [id number]

## 2019-01-29 NOTE — PATIENT INSTRUCTIONS
MEDICATION CHANGES:  1.  Stop simvastatin 40 mg daily.  2.  Your goal INR should be between 2.0 and 3.0.  My office will call and liaise with your INR clinic.  3.  No changes to any other medications.    FOLLOW-UP:  1.  In 9-12 months.    If you have any questions or concerns, please contact my nurses at [phone].

## 2019-01-29 NOTE — LETTER
1/29/2019      Bess Lion MD  [id number] [place]  [facility] [address]      RE: Kenyatta Donald       Dear Colleague,    I had the pleasure of seeing Kenyatta Donald in the [facility].    Service Date: 01/29/2019      VISIT NOTE      PRIMARY CARE PROVIDER:  Bess Lion MD       CARDIOLOGY MICHAELA:  Maddie Contreras PA-C      REASON FOR VISIT:  Scheduled followup of chronic atrial fibrillation, prior history of percutaneous mitral valve repair, chronic diastolic heart failure.      HISTORY OF PRESENT ILLNESS:  The patient is an 83-year-old, obese (BMI 34 kg/m2), physically disabled (unspecified myopathy, see below),  lady who has previously followed with Dr. Chris Guan who has since retired.      The patient is known to have:   1.  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  When she was in her 40s, she was evaluated at the [facility].  She has significant muscle atrophy of her lower extremities and it recently reportedly has started to involve her chest wall muscles which intermittently make her feel short of breath.  She still lives at home and has home health carers.  She is able to stand and walk with the aid of her walker but finds it challenging to negotiate steps.   2.  Prior history of mitral regurgitation for which she underwent a percutaneous mitral valve repair (MitraClip procedure) in 01/2015.  Her most recent echocardiogram (I personally reviewed the images with the patient) shows stable MitraClip position with a residual mild-moderate mitral regurgitation that has been stable for years and a mean mitral diastolic gradient of 6.8 mmHg (heart rate 72 BPM).  Left ventricular size and systolic function are normal, LVEF 60%-65%, severe biatrial enlargement, normal right ventricular systolic function (TAPSE 1.9 cm).   3.  Chronic atrial fibrillation.  Recent Holter and ECG done today confirm  chronic rate-controlled atrial fibrillation of 76 BPM.  No significant bradyarrhythmias.  She is on metoprolol succinate 50 mg daily and warfarin anticoagulation.  For some reason, she was told that her goal INR is between 2.5 and 3.5.  Not sure why.  She also has a prior history of pulmonary embolus.   4.  Hypertension.  Previously, this has been labile but today it was 110/60 mmHg.  She is on a combination of amlodipine 5 mg b.i.d., furosemide 40 mg daily, lisinopril 20 mg daily and metoprolol succinate 50 mg daily.   5.  A history of chronic diastolic heart failure.  Her last heart failure hospitalization was in 12/2017.  Today, she is clinically euvolemic.   6.  Question of hyperlipidemia.  Her highest total cholesterol has been 182 and LDL has been 102.  On her coronary angiogram in 2014, she did have evidence of mild nonobstructive CAD in the right coronary artery but her left main, LAD and circumflex looked quite pristine.  Her most recent lipid panel shows a total cholesterol of 148, HDL 48, LDL 67, triglycerides 163.  She is on simvastatin 40 mg daily.      PHYSICAL EXAMINATION:   VITAL SIGNS:  /60, pulse 76 per minute, BMI 33.9, sats 97% on room air.   CONSTITUTIONAL AND MUSCULOSKELETAL:  As described above, she has an apparent dystrophy  but is able to stand and walk with a walker.  She is mentally alert and oriented without conversational dyspnea.   RESPIRATORY:  Bilaterally clear to auscultation.   CARDIOVASCULAR:  Jugular venous pressure could not be assessed in the seated position (patient preference).  Apical impulse not palpable due to obesity.  Heart sounds are irregularly irregular. A 3/6 late systolic murmur.   EXTREMITIES:  Mild 1+ edema.      DIAGNOSES:   1.  History of status post percutaneous mitral valve repair (MitraClip 2014).   2.  Chronic atrial fibrillation, has been rate controlled on warfarin anticoagulation strategy.   3.  History of pulmonary embolus.   4.  Chronic diastolic  heart failure, currently stable.   5.  Benign essential hypertension.   6.  History of slowly progressive unspecified myopathy.      ASSESSMENT AND PLAN:  The patient's cardiovascular status is stable.  Her MitraClip repair is intact.  She does not have significant residual mitral regurgitation or mitral stenosis.  Although she has some subjective dyspnea, as she herself points out, this is due to her chest wall weakness.  Atrial fibrillation is rate controlled and appropriately anticoagulated.  Hypertension is well controlled.   1.  Her warfarin goal INR should be between 2.0-3.0.  My office will liaise with the INR Clinic.   2.  Stop simvastatin 40 mg daily.  This can potentially make her myopathy worse.  She does not have significantly elevated lipids and has never had a myocardial infarction or stroke.  On balance, I do not think a statin is absolutely indicated in her.   3.  No changes to any of the medications.   4.  I will plan on seeing her back in 9-12 months with a yearly echocardiogram.         SCOTT SAVAGE MD             D: 2019   T: 2019   MT: JOSHUA      Name:     DARLENE FRIAS   MRN:      [id number]        Account:      [id number]   :      1935           Service Date: 2019      Document: [id number]         Outpatient Encounter Medications as of 2019   Medication Sig Dispense Refill     acetaminophen (TYLENOL) 325 MG tablet Take 2 tablets (650 mg) by mouth every 4 hours as needed for mild pain 100 tablet      alendronate (FOSAMAX) 70 MG tablet TAKE 1 TABLET BY MOUTH EVERY 7 DAYS 12 tablet 3     allopurinol (ZYLOPRIM) 100 MG tablet TAKE 1 TABLET BY MOUTH EVERY DAY 90 tablet 0     amLODIPine (NORVASC) 5 MG tablet Take 1 tablet (5 mg) by mouth 2 times daily 180 tablet 3     amoxicillin (AMOXIL) 500 MG capsule Take 4 capsules by mouth 30 minutes prior to dental work 4 capsule 4     ASPIRIN NOT PRESCRIBED (INTENTIONAL) Please choose reason not prescribed, below 1 each 0      cholecalciferol (VITAMIN D) 1000 UNIT tablet Take 2,000 Units by mouth daily        furosemide (LASIX) 40 MG tablet TAKE 1 TABLET BY MOUTH DAILY 90 tablet 2     levothyroxine (SYNTHROID/LEVOTHROID) 100 MCG tablet TAKE 1 TABLET BY MOUTH DAILY 90 tablet 2     lisinopril (PRINIVIL/ZESTRIL) 20 MG tablet Take 1 tablet (20 mg) by mouth 2 times daily 180 tablet 3     metoprolol succinate ER (TOPROL-XL) 50 MG 24 hr tablet Take 1 tablet (50 mg) by mouth daily 1 tablet 0     nystatin (MYCOSTATIN) 596791 UNIT/GM POWD Apply topically 3 times daily as needed 60 g 1     order for DME Equipment being ordered: compression hose   BK 20-30mm   2 pair as insurance will pay 1 each 0     order for DME Equipment being ordered: mastectomy bras & prostheses   S/po mastectomy of unknown side     C50.919 1 each 0     traMADol (ULTRAM) 50 MG tablet Take 1 tablet (50 mg) by mouth every 6 hours as needed for moderate pain or severe pain 30 tablet 0     VENTOLIN  (90 Base) MCG/ACT inhaler INHALE 2 PUFFS INTO THE LUNGS EVERY 6 HOURS AS NEEDED FOR SHORTNESS OF BREATH OR DIFFICULT BREATHING OR WHEEZING 18 g 11     warfarin (COUMADIN) 1 MG tablet TAKE 2 TABLETS BY MOUTH ON THURSDAY AND 4 TABLETS ALL OTHER DAYS. (Patient taking differently: TAKE 2 TABLETS BY MOUTH ON mon, wed &* fri  AND 4 TABLETS ALL OTHER DAYS.) 327 tablet 0     warfarin (COUMADIN) 4 MG tablet TAKE 1 TABLET BY MOUTH EVERY DAY (Patient taking differently: 2 mg m,w,f, & 4 mg row) 90 tablet 1     alendronate (FOSAMAX) 70 MG tablet TAKE 1 TABLET BY MOUTH EVERY 7 DAYS (Patient not taking: Reported on 1/29/2019) 4 tablet 0     alendronate (FOSAMAX) 70 MG tablet TK 1 T PO Q 7 DAYS  3     [DISCONTINUED] simvastatin (ZOCOR) 40 MG tablet Take 0.5 tablets (20 mg) by mouth At Bedtime 45 tablet 3     No facility-administered encounter medications on file as of 1/29/2019.        Again, thank you for allowing me to participate in the care of your patient.      Sincerely,    Brain  Margo Lock MD     [facility]

## 2019-02-12 ENCOUNTER — MEDICAL CORRESPONDENCE (OUTPATIENT)
Dept: HEALTH INFORMATION MANAGEMENT | Facility: CLINIC | Age: 84
End: 2019-02-12

## 2019-02-12 ENCOUNTER — TELEPHONE (OUTPATIENT)
Dept: FAMILY MEDICINE | Facility: CLINIC | Age: 84
End: 2019-02-12

## 2019-02-12 NOTE — TELEPHONE ENCOUNTER
Our goal is to have forms completed within 72 hours, however some forms may require a visit or additional information.    What clinic location was the form placed at [facility] or [place].?     Who is the form from?   Where did the form come from? Faxed to clinic   The form was placed in the inbox of Bess Lion MD      Please fax to [phone]  Phone number: [phone]    Additional comments: [facility] drug interaction physician orders     Call take on 2/12/2019 at 11:09 AM by Suzanna More

## 2019-02-12 NOTE — TELEPHONE ENCOUNTER
Our goal is to have forms completed within 72 hours, however some forms may require a visit or additional information.    What clinic location was the form placed at [facility] or [place].?     Who is the form from?   Where did the form come from? Faxed to clinic   The form was placed in the inbox of Bess Lion MD      Please fax to [phone]  Phone number: [phone]    Additional comments: [facility] 2/7/19 to 5/7/19    Call take on 2/12/2019 at 11:20 AM by Suzanna More

## 2019-02-15 ENCOUNTER — TELEPHONE (OUTPATIENT)
Dept: FAMILY MEDICINE | Facility: CLINIC | Age: 84
End: 2019-02-15

## 2019-02-15 NOTE — TELEPHONE ENCOUNTER
Our goal is to have forms completed within 72 hours, however some forms may require a visit or additional information.    What clinic location was the form placed at [facility] or [place].?     Who is the form from?   Where did the form come from? Faxed to clinic   The form was placed in the inbox of Bess Lion MD      Please fax to [phone]  Phone number: [phone]    Additional comments: [facility] INR check orders     Call take on 2/15/2019 at 12:08 PM by Suzanna More

## 2019-02-20 DIAGNOSIS — I10 ESSENTIAL HYPERTENSION, BENIGN: ICD-10-CM

## 2019-02-20 RX ORDER — LISINOPRIL 20 MG/1
20 TABLET ORAL 2 TIMES DAILY
Qty: 180 TABLET | Refills: 3 | Status: SHIPPED | OUTPATIENT
Start: 2019-02-20 | End: 2020-02-18

## 2019-02-21 ENCOUNTER — TELEPHONE (OUTPATIENT)
Dept: FAMILY MEDICINE | Facility: CLINIC | Age: 84
End: 2019-02-21

## 2019-02-21 NOTE — TELEPHONE ENCOUNTER
Our goal is to have forms completed within 72 hours, however some forms may require a visit or additional information.    What clinic location was the form placed at [facility] or [place].?     Who is the form from?   Where did the form come from? Faxed to clinic   The form was placed in the inbox of Bess Lion MD      Please fax to [phone]  Phone number: [phone]    Additional comments: [facility] coumadin dosing    Call take on 2/21/2019 at 10:57 AM by Suzanna More

## 2019-02-25 DIAGNOSIS — I49.5 BRADY-TACHY SYNDROME (H): ICD-10-CM

## 2019-02-25 DIAGNOSIS — I25.10 CORONARY ARTERY DISEASE INVOLVING NATIVE CORONARY ARTERY OF NATIVE HEART WITHOUT ANGINA PECTORIS: ICD-10-CM

## 2019-02-25 RX ORDER — METOPROLOL SUCCINATE 50 MG/1
50 TABLET, EXTENDED RELEASE ORAL DAILY
Qty: 90 TABLET | Refills: 3 | Status: SHIPPED | OUTPATIENT
Start: 2019-02-25 | End: 2019-06-01

## 2019-03-20 DIAGNOSIS — M10.279 DRUG-INDUCED GOUT INVOLVING TOE, UNSPECIFIED CHRONICITY, UNSPECIFIED LATERALITY: ICD-10-CM

## 2019-03-20 NOTE — TELEPHONE ENCOUNTER
"Requested Prescriptions   Pending Prescriptions Disp Refills     allopurinol (ZYLOPRIM) 100 MG tablet [Pharmacy Med Name: ALLOPURINOL 100MG TABLETS]  Last Written Prescription Date:  12/27/2018  Last Fill Quantity: 90 tablet,  # refills: 0   Last office visit: 8/21/2018 with prescribing provider:  TANK Lion   Future Office Visit:     90 tablet 0     Sig: TAKE 1 TABLET BY MOUTH EVERY DAY    Gout Agents Protocol Failed - 3/20/2019 11:13 AM       Failed - Has Uric Acid on file in past 12 months and value is less than 6    Recent Labs   Lab Test 08/25/17  1145   URIC 5.8     If level is 6mg/dL or greater, ok to refill one time and refer to provider.          Passed - CBC on file in past 12 months    Recent Labs   Lab Test 05/29/18  1346   WBC 11.7*   RBC 4.03   HGB 11.8   HCT 37.1                   Passed - ALT on file in past 12 months    Recent Labs   Lab Test 01/08/19  1235   ALT <5*            Passed - Recent (12 mo) or future (30 days) visit within the authorizing provider's specialty    Patient had office visit in the last 12 months or has a visit in the next 30 days with authorizing provider or within the authorizing provider's specialty.  See \"Patient Info\" tab in inbasket, or \"Choose Columns\" in Meds & Orders section of the refill encounter.             Passed - Medication is active on med list       Passed - Patient is age 18 or older       Passed - No active pregnancy on record       Passed - Normal serum creatinine on file in the past 12 months    Recent Labs   Lab Test 01/08/19  1235   CR 1.15            Passed - No positive pregnancy test in past year           "

## 2019-03-21 RX ORDER — ALLOPURINOL 100 MG/1
TABLET ORAL
Qty: 90 TABLET | Refills: 0 | Status: SHIPPED | OUTPATIENT
Start: 2019-03-21 | End: 2019-06-01

## 2019-04-04 ENCOUNTER — TELEPHONE (OUTPATIENT)
Dept: FAMILY MEDICINE | Facility: CLINIC | Age: 84
End: 2019-04-04

## 2019-04-04 NOTE — TELEPHONE ENCOUNTER
Our goal is to have forms completed within 72 hours, however some forms may require a visit or additional information.    What clinic location was the form placed at [facility] or [place].?     Who is the form from?   Where did the form come from? Faxed to clinic   The form was placed in the inbox of Bess Lion MD      Please fax to [phone]  Phone number: [phone]    Additional comments: [facility]  SN INR 2.1 from 3/20/19    Call take on 4/4/2019 at 11:17 AM by Suzanna More

## 2019-04-23 NOTE — TELEPHONE ENCOUNTER
Our goal is to have forms completed within 72 hours, however some forms may require a visit or additional information.    What clinic location was the form placed at [facility] or [place].?     Who is the form from?   Where did the form come from? Faxed to clinic   The form was placed in the inbox of Bess Lion MD      Please fax to [phone]  Phone number: [phone]    Additional comments: [facility] SN INR 2.4 from 4/17/19    Call take on 4/23/2019 at 1:32 PM by Suzanna More

## 2019-05-01 DIAGNOSIS — E03.9 ACQUIRED HYPOTHYROIDISM: ICD-10-CM

## 2019-05-02 RX ORDER — LEVOTHYROXINE SODIUM 100 UG/1
TABLET ORAL
Qty: 90 TABLET | Refills: 0 | OUTPATIENT
Start: 2019-05-02

## 2019-05-07 ENCOUNTER — TELEPHONE (OUTPATIENT)
Dept: FAMILY MEDICINE | Facility: CLINIC | Age: 84
End: 2019-05-07

## 2019-05-07 NOTE — TELEPHONE ENCOUNTER
Our goal is to have forms completed within 72 hours, however some forms may require a visit or additional information.    What clinic location was the form placed at [facility] or [place].?     Who is the form from?   Where did the form come from? Faxed to clinic   The form was placed in the inbox of Bess Lion MD      Please fax to [phone]  Phone number: [phone]    Additional comments: [facility] drug interaction     Call take on 5/7/2019 at 2:32 PM by Suzanna More

## 2019-05-08 ENCOUNTER — TELEPHONE (OUTPATIENT)
Dept: FAMILY MEDICINE | Facility: CLINIC | Age: 84
End: 2019-05-08

## 2019-05-08 NOTE — TELEPHONE ENCOUNTER
Our goal is to have forms completed within 72 hours, however some forms may require a visit or additional information.    What clinic location was the form placed at [facility] or [place].?     Who is the form from?   Where did the form come from? Faxed to clinic   The form was placed in the inbox of Bess Lion MD      Please fax to [phone]  Phone number: [phone]    Additional comments: [facility] 5/8/19 to 7/6/19    Call take on 5/8/2019 at 2:52 PM by Suzanna More

## 2019-05-09 ENCOUNTER — MEDICAL CORRESPONDENCE (OUTPATIENT)
Dept: HEALTH INFORMATION MANAGEMENT | Facility: CLINIC | Age: 84
End: 2019-05-09

## 2019-05-15 ENCOUNTER — TELEPHONE (OUTPATIENT)
Dept: FAMILY MEDICINE | Facility: CLINIC | Age: 84
End: 2019-05-15

## 2019-05-15 DIAGNOSIS — B36.9 FUNGAL DERMATITIS: Primary | ICD-10-CM

## 2019-05-15 NOTE — TELEPHONE ENCOUNTER
Home care nurse Alma called with INR result of 2.4. Pt takes coumadin 2 mg Wednesday and Saturday and 4 mg all other days. Nurse is expecting a call for dosing. Routing message to INR nurse.

## 2019-05-15 NOTE — TELEPHONE ENCOUNTER
Reason for Call:  Other call back    Detailed comments: Alma, RN with [facility] called requesting a prescription for Nystatin powder.  It has been a couple of years since Kenyatta has had a refill.    Alma says Kenyatta needs this in abdominal folds.     Please call Alma for any questions.      Phone Number Patient can be reached at:  Alma: [phone]    Best Time: any    Can we leave a detailed message on this number? YES    Call taken on 5/15/2019 at 3:39 PM by Tania Morrissey

## 2019-05-16 RX ORDER — NYSTATIN 100000 [USP'U]/G
POWDER TOPICAL
Qty: 60 BOTTLE | Refills: 1 | Status: SHIPPED | OUTPATIENT
Start: 2019-05-16 | End: 2021-06-28 | Stop reason: DRUGHIGH

## 2019-05-20 ENCOUNTER — TELEPHONE (OUTPATIENT)
Dept: FAMILY MEDICINE | Facility: CLINIC | Age: 84
End: 2019-05-20

## 2019-05-20 NOTE — TELEPHONE ENCOUNTER
Our goal is to have forms completed within 72 hours, however some forms may require a visit or additional information.    What clinic location was the form placed at [facility] or [place].?     Who is the form from?   Where did the form come from? Faxed to clinic   The form was placed in the inbox of Bess Lion MD      Please fax to [phone]  Phone number: [phone]    Additional comments: [facility]  SN INR 2.3 from 5/15/19    Call take on 5/20/2019 at 3:47 PM by Suzanna More

## 2019-05-29 DIAGNOSIS — E03.9 ACQUIRED HYPOTHYROIDISM: ICD-10-CM

## 2019-05-29 NOTE — TELEPHONE ENCOUNTER
"Requested Prescriptions   Pending Prescriptions Disp Refills     levothyroxine (SYNTHROID/LEVOTHROID) 100 MCG tablet [Pharmacy Med Name:  Last Written Prescription Date:  8/3/2018  Last Fill Quantity: 90,  # refills: 2   Last office visit: 8/21/2018 with prescribing provider:  Dr TANK Lion   Future Office Visit:     LEVOTHYROXINE 0.100MG (100MCG) TAB] 90 tablet 0     Sig: TAKE 1 TABLET BY MOUTH DAILY       Thyroid Protocol Failed - 5/29/2019 10:44 AM        Failed - Normal TSH on file in past 12 months     Recent Labs   Lab Test 08/25/17  1145   TSH 0.58              Passed - Patient is 12 years or older        Passed - Recent (12 mo) or future (30 days) visit within the authorizing provider's specialty     Patient had office visit in the last 12 months or has a visit in the next 30 days with authorizing provider or within the authorizing provider's specialty.  See \"Patient Info\" tab in inbasket, or \"Choose Columns\" in Meds & Orders section of the refill encounter.              Passed - Medication is active on med list        Passed - No active pregnancy on record     If patient is pregnant or has had a positive pregnancy test, please check TSH.          Passed - No positive pregnancy test in past 12 months     If patient is pregnant or has had a positive pregnancy test, please check TSH.            "

## 2019-05-31 DIAGNOSIS — E03.9 ACQUIRED HYPOTHYROIDISM: ICD-10-CM

## 2019-05-31 RX ORDER — LEVOTHYROXINE SODIUM 100 UG/1
TABLET ORAL
Qty: 30 TABLET | Refills: 0 | Status: SHIPPED | OUTPATIENT
Start: 2019-05-31 | End: 2019-06-11

## 2019-05-31 RX ORDER — LEVOTHYROXINE SODIUM 100 UG/1
TABLET ORAL
Qty: 90 TABLET | Refills: 0 | OUTPATIENT
Start: 2019-05-31

## 2019-05-31 NOTE — TELEPHONE ENCOUNTER
Called patient to notify her that she needs an OV.....there was no answer and VMbox was full. Will attempt to call patient again.

## 2019-05-31 NOTE — TELEPHONE ENCOUNTER
"LEVOTHYROXINE 0.100MG (100MCG) TAB  Last Written Prescription Date:  05/31/2019  Last Fill Quantity: 30,  # refills: 0   Last office visit: 8/21/2018 with prescribing provider:  08/21/2018   Future Office Visit:    Requested Prescriptions   Pending Prescriptions Disp Refills     levothyroxine (SYNTHROID/LEVOTHROID) 100 MCG tablet [Pharmacy Med Name: LEVOTHYROXINE 0.100MG (100MCG) TAB] 90 tablet 0     Sig: TAKE 1 TABLET BY MOUTH DAILY       Thyroid Protocol Failed - 5/31/2019 10:40 AM        Failed - Normal TSH on file in past 12 months     Recent Labs   Lab Test 08/25/17  1145   TSH 0.58              Passed - Patient is 12 years or older        Passed - Recent (12 mo) or future (30 days) visit within the authorizing provider's specialty     Patient had office visit in the last 12 months or has a visit in the next 30 days with authorizing provider or within the authorizing provider's specialty.  See \"Patient Info\" tab in inbasket, or \"Choose Columns\" in Meds & Orders section of the refill encounter.              Passed - Medication is active on med list        Passed - No active pregnancy on record     If patient is pregnant or has had a positive pregnancy test, please check TSH.          Passed - No positive pregnancy test in past 12 months     If patient is pregnant or has had a positive pregnancy test, please check TSH.            "

## 2019-05-31 NOTE — TELEPHONE ENCOUNTER
Routing refill request to provider for review/approval because:  Pt is overdue for labs. Hannah given for 30 days. Pt is requesting 90 day supply.

## 2019-06-01 ENCOUNTER — OFFICE VISIT (OUTPATIENT)
Dept: FAMILY MEDICINE | Facility: CLINIC | Age: 84
End: 2019-06-01
Payer: COMMERCIAL

## 2019-06-01 VITALS
SYSTOLIC BLOOD PRESSURE: 120 MMHG | TEMPERATURE: 97.5 F | OXYGEN SATURATION: 95 % | DIASTOLIC BLOOD PRESSURE: 66 MMHG | HEART RATE: 73 BPM

## 2019-06-01 DIAGNOSIS — I10 BENIGN ESSENTIAL HYPERTENSION: ICD-10-CM

## 2019-06-01 DIAGNOSIS — G71.11 MYOTONIC MUSCULAR DYSTROPHY (H): ICD-10-CM

## 2019-06-01 DIAGNOSIS — N18.30 CKD (CHRONIC KIDNEY DISEASE) STAGE 3, GFR 30-59 ML/MIN (H): ICD-10-CM

## 2019-06-01 DIAGNOSIS — Z79.01 LONG TERM CURRENT USE OF ANTICOAGULANT THERAPY: ICD-10-CM

## 2019-06-01 DIAGNOSIS — G12.1 SPINAL MUSCULAR ATROPHY TYPE III (H): ICD-10-CM

## 2019-06-01 DIAGNOSIS — E66.09 CLASS 1 OBESITY DUE TO EXCESS CALORIES WITH SERIOUS COMORBIDITY AND BODY MASS INDEX (BMI) OF 33.0 TO 33.9 IN ADULT: ICD-10-CM

## 2019-06-01 DIAGNOSIS — I89.0 LYMPHEDEMA: ICD-10-CM

## 2019-06-01 DIAGNOSIS — M81.0 AGE-RELATED OSTEOPOROSIS WITHOUT CURRENT PATHOLOGICAL FRACTURE: ICD-10-CM

## 2019-06-01 DIAGNOSIS — I25.10 CORONARY ARTERY DISEASE INVOLVING NATIVE CORONARY ARTERY OF NATIVE HEART WITHOUT ANGINA PECTORIS: ICD-10-CM

## 2019-06-01 DIAGNOSIS — I50.23 ACUTE ON CHRONIC SYSTOLIC CONGESTIVE HEART FAILURE (H): ICD-10-CM

## 2019-06-01 DIAGNOSIS — R73.02 GLUCOSE INTOLERANCE (IMPAIRED GLUCOSE TOLERANCE): ICD-10-CM

## 2019-06-01 DIAGNOSIS — E66.811 CLASS 1 OBESITY DUE TO EXCESS CALORIES WITH SERIOUS COMORBIDITY AND BODY MASS INDEX (BMI) OF 33.0 TO 33.9 IN ADULT: ICD-10-CM

## 2019-06-01 DIAGNOSIS — M10.279 DRUG-INDUCED GOUT INVOLVING TOE, UNSPECIFIED CHRONICITY, UNSPECIFIED LATERALITY: ICD-10-CM

## 2019-06-01 DIAGNOSIS — E03.9 ACQUIRED HYPOTHYROIDISM: Primary | ICD-10-CM

## 2019-06-01 DIAGNOSIS — I48.20 CHRONIC ATRIAL FIBRILLATION (H): ICD-10-CM

## 2019-06-01 DIAGNOSIS — I50.33 ACUTE ON CHRONIC DIASTOLIC CONGESTIVE HEART FAILURE (H): ICD-10-CM

## 2019-06-01 LAB
BASOPHILS # BLD AUTO: 0 10E9/L (ref 0–0.2)
BASOPHILS NFR BLD AUTO: 0.4 %
DIFFERENTIAL METHOD BLD: NORMAL
EOSINOPHIL # BLD AUTO: 0.1 10E9/L (ref 0–0.7)
EOSINOPHIL NFR BLD AUTO: 1.1 %
ERYTHROCYTE [DISTWIDTH] IN BLOOD BY AUTOMATED COUNT: 14.7 % (ref 10–15)
HCT VFR BLD AUTO: 38.9 % (ref 35–47)
HGB BLD-MCNC: 12.4 G/DL (ref 11.7–15.7)
LYMPHOCYTES # BLD AUTO: 1.8 10E9/L (ref 0.8–5.3)
LYMPHOCYTES NFR BLD AUTO: 22.2 %
MCH RBC QN AUTO: 29.9 PG (ref 26.5–33)
MCHC RBC AUTO-ENTMCNC: 31.9 G/DL (ref 31.5–36.5)
MCV RBC AUTO: 94 FL (ref 78–100)
MONOCYTES # BLD AUTO: 0.7 10E9/L (ref 0–1.3)
MONOCYTES NFR BLD AUTO: 8.8 %
NEUTROPHILS # BLD AUTO: 5.5 10E9/L (ref 1.6–8.3)
NEUTROPHILS NFR BLD AUTO: 67.5 %
PLATELET # BLD AUTO: 254 10E9/L (ref 150–450)
RBC # BLD AUTO: 4.15 10E12/L (ref 3.8–5.2)
WBC # BLD AUTO: 8.1 10E9/L (ref 4–11)

## 2019-06-01 PROCEDURE — 99214 OFFICE O/P EST MOD 30 MIN: CPT | Performed by: FAMILY MEDICINE

## 2019-06-01 PROCEDURE — 80048 BASIC METABOLIC PNL TOTAL CA: CPT | Performed by: FAMILY MEDICINE

## 2019-06-01 PROCEDURE — 36415 COLL VENOUS BLD VENIPUNCTURE: CPT | Performed by: FAMILY MEDICINE

## 2019-06-01 PROCEDURE — 85025 COMPLETE CBC W/AUTO DIFF WBC: CPT | Performed by: FAMILY MEDICINE

## 2019-06-01 PROCEDURE — 84443 ASSAY THYROID STIM HORMONE: CPT | Performed by: FAMILY MEDICINE

## 2019-06-01 PROCEDURE — 84550 ASSAY OF BLOOD/URIC ACID: CPT | Performed by: FAMILY MEDICINE

## 2019-06-01 RX ORDER — METOPROLOL SUCCINATE 50 MG/1
50 TABLET, EXTENDED RELEASE ORAL DAILY
Qty: 90 TABLET | Refills: 1 | Status: SHIPPED | OUTPATIENT
Start: 2019-06-01 | End: 2020-01-22

## 2019-06-01 RX ORDER — FUROSEMIDE 40 MG
40 TABLET ORAL DAILY
Qty: 90 TABLET | Refills: 1 | Status: SHIPPED | OUTPATIENT
Start: 2019-06-01 | End: 2019-12-30

## 2019-06-01 RX ORDER — ALLOPURINOL 100 MG/1
100 TABLET ORAL DAILY
Qty: 90 TABLET | Refills: 3 | Status: SHIPPED | OUTPATIENT
Start: 2019-06-01 | End: 2020-05-11

## 2019-06-01 NOTE — LETTER
My Heart Failure Action Plan   Name: Kenyatta Donald    YOB: 1935   Date: 6/1/2019    My doctor: Bess Lion     [address] [address]  [phone]  My Diagnosis: Reduced (HFr)- EF:45% - 49%   My Exercise Goal: 30 minutes daily  .     My Weight Plan:   Wt Readings from Last 2 Encounters:   01/29/19 91 kg (200 lb 11.2 oz)   10/08/18 88.1 kg (194 lb 3.2 oz)     Weigh yourself daily using the same scale. If you gain more than 2 pounds in 24 hours or 5 pounds in a week -    My Diet Goal: No added salt    Emergency Room Visits:    Our goal is to improve your quality of life and help you avoid a visit to the emergency room or hospital.  If we work together, we can achieve this goal. But, if you feel you need to call 911 or go to the emergency room, please do so.  If you go to the emergency room, please bring your list of medicines and your daily weight chart with you.       GREEN ZONE     Doing well today    Weight gained is no more than 2 pounds a day or 5 pounds a week.    No swelling in feet, ankles, legs or stomach.    No more swelling than usual.    No more trouble breathing than usual.    No change in my sleep.    No other problems. Actions:    I am doing fine.  I will take my medicine, follow my diet, see my doctor, exercise, and watch for symptoms.           YELLOW ZONE         Having a bad day or flare up    Weight gain of more than 2 pounds in one day or 5 pounds in one week.    New swelling in ankle, leg, knee or thigh.    Bloating in belly, pants feel tighter.    Swelling in hands or face.    Coughing or trouble breathing while walking or talking.    Harder to breathe last night.    Have trouble sleeping, wake up short of breath.    Much more tired than usual.    Not eating.    Pain in my chest or bad leg cramps.    Feel weak or dizzy. Actions:    I need to take action and call my doctor or  nurse today.                 RED ZONE         Need medical care now    Weight gain of 5 pounds overnight.    Chest pain or pressure that does not go away.    Feel less alert.    Wheezing or have trouble breathing when at rest.    Cannot sleep lying down.    Cannot take my water pill.    Pass out or faint. Actions:    I need to call my doctor or nurse now!    Call 911 if I have chest pain or cannot breathe.

## 2019-06-01 NOTE — PATIENT INSTRUCTIONS
1.  Weight Loss Tips  1. Do not eat after 6 hrs before your expected bedtime  2. Have your heaviest meal for breakfast, a slightly lighter meal at lunch and a snack 6 hrs before bed  3. No sugar/calorie drinks except milk ie no fruit juice, pop, alcohol.  4. Drink milk 30min before meals to decrease your hunger. Also it is excellent as part of your last meal of the day snack  5. Drink lots of water  6. Increase fiber in diet: all bran cereal, salads, popcorn etc  7. Have only one small serving of fruit a day about 1/2 cup (as this is high in sugar)  8. EXERCISE is the bottom line. Without it, you will gain weight even on a low calorie diet. Best if done 2-3X a day as can    Being overweight contributes to high blood pressure and high cholesterol, both of which cause heart attacks, strokes and kidney failure, prediabetes and diabetes, arthritis, and liver disease     2. Shingrex is a 2 shot series that prevents shingles 97% of the time, as opposed to the old shingles shot that only prevented it at 40-50%  It costs less for medicare at a pharmacy  You should get it starting at 50 yrs old get the 2nd shot 5-6 mo after the first one    3. Keep the injured area above the level of the heart as much as possible to help decrease the pain and  the healing time . Put pillows on either side while sleeping to keep the arm or leg elevated     4. YOU NEED Pt/OT IN YOUR HOME     5. STOP THE  Alendronate

## 2019-06-01 NOTE — PROGRESS NOTES
Kenyatta Donald is a 83 year old female who presents to clinic today for the following health issues:              Musculoskeletal problem/pain: Progressive Muscular Dystrophy with increasing pain & Generalized ongoing Muscle Weakness  , анна in joints she uses to stand etc  ie the upper shoulder girdle and the back -worsening       Duration: Chronic & worsening     Description  Location: Shoulder and Upper Legs    Intensity:  severe    Accompanying signs and symptoms: none    History  Previous similar problem: no   Previous evaluation:  none    Precipitating or alleviating factors:  Trauma or overuse: no   Aggravating factors include: sitting, standing, walking and lifting    Therapies tried and outcome: rest/inactivity and acetaminophen but only 2 of the 325 mgm     Has no regular exercise and has trouble with ADLs--> needs PT/OT     LYPHEDEMA OF LEGS       Duration: chronic but worse lately as is becoming more & more sedentary as the m dystrophy progresses , weakening her but good this 1pm     Description (location/character/radiation): full to pitting lege     Intensity:  moderate    Accompanying signs and symptoms: ache     History (similar episodes/previous evaluation): None    Precipitating or alleviating factors: above    Therapies tried and outcome:support hose and has recliner that gets thelegs> heart level--> better to almost none      Glucose Intolerance;   Follow-up      How often are you checking your blood sugar? Not at all    What time of day are you checking your blood sugars (select all that apply)?      Have you had any blood sugars above 200?  No    Have you had any blood sugars below 70?  No    What symptoms do you notice when your blood sugar is low?  None    What concerns do you have today about your diabetes? None     Do you have any of these symptoms? (Select all that apply)  No numbness or tingling in feet.  No redness, sores or blisters on feet.  No complaints of excessive thirst.  No  reports of blurry vision.  No significant changes to weight.     Have you had a diabetic eye exam in the last 12 months? No    BP Readings from Last 2 Encounters:   06/01/19 120/66   01/29/19 110/60     Hemoglobin A1C (%)   Date Value   10/17/2014 5.9   08/23/2013 5.6     LDL Cholesterol Calculated (mg/dL)   Date Value   01/08/2019 67   07/13/2018 78       Diabetes Management Resources    Hypertension Follow-up      Do you check your blood pressure regularly outside of the clinic? No     Are you following a low salt diet? No    Are your blood pressures ever more than 140 on the top number (systolic) OR more   than 90 on the bottom number (diastolic), for example 140/90? No     Vascular Disease : A Fib on chronic anticoagulation    Are you having any of the following symptoms? (Select all that apply) No complaints of shortness of breath, chest pain or pressure.  No increased sweating or nausea with activity.  No left-sided neck or arm pain.  No complaints of pain in calves when walking 1-2 blocks.    How often do you take nitroglycerin? Never    Do you take an aspirin every day? No      Heart Failure-Systolic     Are you experiencing any shortness of breath? No    Are you experiencing any swelling in your legs or feet?  Better than usual with elevation    Are you using more pillows than usual? No    Do you cough at night?  No    Do you check your weight daily?  No    Have you had a weight change recently?  No    Are you having any of the following side effects from your medications? (Select all that apply)  The patient does not report symptoms of dizziness, fatigue, cough, swelling, or slow heart beat.    Since your last visit, how many times have you gone to the cardiologist, urgent care, emergency room, or hospital because of your heart failure?   None f/u with card     NONMORBID OBESITY    -BMI = 33.99  -conts to gain as is sedentary with her m dystrophy which is worsenning   -comorbid CKD, HTN , hypothyroid, CHF,  A Fib, gout     Chronic Kidney Disease:Stage 3  Follow-up      Current NSAID use?  No  -comorbid CKD, HTN , hypothyroid, CHF, A Fib, gout             Medication Followup of OSTEOPOROSIS      Taking Medication as prescribed: yes--fosamax for 6 yrs     Side Effects:  None    Medication Helping Symptoms:  yes     Gout  Duration: yrs  - since on lasix  Description   Location: big toe - bilateral  Joint Swelling: YES  Redness: YES  Pain intensity:  moderate  Accompanying signs and symptoms: None  History  Previous history of gout: no    Trauma to the area: no   Precipitating factors:   Alcohol usage: none  Diuretic use: YES- needed for CHF  Recent illness: no   Therapies tried and outcome: None               Amount of exercise or physical activity: None    Problems taking medications regularly: No    Medication side effects: none    Diet: regular (no restrictions)          Hypothyroidism Follow-up      Since last visit, patient describes the following symptoms: Weight stable, no hair loss, no skin changes, no constipation, no loose stools    On levothyroxine          BREAST CANCER     -Lt in  1998   -Rt in 2000   -no recurrence     Problem list and histories reviewed & adjusted, as indicated.  Additional history: as documented    Labs reviewed in EPIC    Reviewed and updated as needed this visit by clinical staff  Tobacco  Allergies  Meds  Problems  Med Hx  Surg Hx  Fam Hx  Soc Hx        Reviewed and updated as needed this visit by Provider  Tobacco  Allergies  Meds  Problems  Med Hx  Surg Hx  Fam Hx         ROS:  CONSTITUTIONAL: NEGATIVE for fever, chills, change in weight  INTEGUMENTARY/SKIN: NEGATIVE for worrisome rashes, moles or lesions  EYES: NEGATIVE for vision changes or irritation  ENT/MOUTH: NEGATIVE for ear, mouth and throat problems  RESP: NEGATIVE for significant cough or SOB  BREAST: NEGATIVE for masses, tenderness or discharge  CV: NEGATIVE for chest pain, palpitations or peripheral  edema  GI: NEGATIVE for nausea, abdominal pain, heartburn, or change in bowel habits  : NEGATIVE for frequency, dysuria, or hematuria  MUSCULOSKELETAL:POSITIVE  for , joint stiffness RT knee  and muscle weakness general   NEURO: NEGATIVE for weakness, dizziness or paresthesias  ENDOCRINE: NEGATIVE for temperature intolerance, skin/hair changes  HEME: NEGATIVE for bleeding problems  PSYCHIATRIC: NEGATIVE for changes in mood or affect    OBJECTIVE:     /66 (Cuff Size: Adult Large)   Pulse 73   Temp 97.5  F (36.4  C) (Tympanic)   LMP  (LMP Unknown)   SpO2 95%   There is no height or weight on file to calculate BMI.  GENERAL: healthy, alert, no distress, over weight, frail and elderly  EYES: Eyes grossly normal to inspection, PERRL and conjunctivae and sclerae normal  RESP: lungs clear to auscultation - no rales, rhonchi or wheezes  MS: no gross musculoskeletal defects noted, no edema  SKIN: no suspicious lesions or rashes  NEURO: Normal strength and tone, mentation intact and speech normal--weak spastiv mm so needs to rock to get up from sitting and needs walker for support   PSYCH: mentation appears normal, affect normal/bright    Diagnostic Test Results:  Results for orders placed or performed in visit on 06/01/19   CBC with platelets differential   Result Value Ref Range    WBC 8.1 4.0 - 11.0 10e9/L    RBC Count 4.15 3.8 - 5.2 10e12/L    Hemoglobin 12.4 11.7 - 15.7 g/dL    Hematocrit 38.9 35.0 - 47.0 %    MCV 94 78 - 100 fl    MCH 29.9 26.5 - 33.0 pg    MCHC 31.9 31.5 - 36.5 g/dL    RDW 14.7 10.0 - 15.0 %    Platelet Count 254 150 - 450 10e9/L    % Neutrophils 67.5 %    % Lymphocytes 22.2 %    % Monocytes 8.8 %    % Eosinophils 1.1 %    % Basophils 0.4 %    Absolute Neutrophil 5.5 1.6 - 8.3 10e9/L    Absolute Lymphocytes 1.8 0.8 - 5.3 10e9/L    Absolute Monocytes 0.7 0.0 - 1.3 10e9/L    Absolute Eosinophils 0.1 0.0 - 0.7 10e9/L    Absolute Basophils 0.0 0.0 - 0.2 10e9/L    Diff Method Automated Method         ASSESSMENT/PLAN:               ICD-10-CM    1. Acquired hypothyroidism E03.9 TSH with free T4 reflex   2. Age-related osteoporosis without current pathological fracture-issue of stopping fosamax after 6 yrs --5-19 M81.0 DX Hip/Pelvis/Spine   3. 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 Spinal muscular atrophy type III (H) G12.1 [facility]   5. Benign essential hypertension I10 CBC with platelets differential     Basic metabolic panel   6. CKD (chronic kidney disease) stage 3, GFR 30-59 ml/min (H) N18.3    7. Acute on chronic systolic congestive heart failure (H) I50.23    8. Chronic atrial fibrillation (H) I48.2    9. Long term current use of anticoagulant therapy Z79.01    10. Lymphedema of legs  I89.0    11. Acute on chronic diastolic congestive heart failure (H) I50.33 furosemide (LASIX) 40 MG tablet     HEART FAILURE ACTION PLAN   12. Coronary artery disease involving native coronary artery of native heart without angina pectoris I25.10 metoprolol succinate ER (TOPROL-XL) 50 MG 24 hr tablet   13. Drug-induced gout involving toe, unspecified chronicity, unspecified laterality M10.279 Uric acid     allopurinol (ZYLOPRIM) 100 MG tablet   14. Glucose intolerance (impaired glucose tolerance): HgbA1C= 5.5  R73.02    15. Class 1 obesity due to excess calories with serious comorbidity and body mass index (BMI) of 33.0 to 33.9 in adult E66.09     Z68.33        Patient Instructions   1.  Weight Loss Tips  1. Do not eat after 6 hrs before your expected bedtime  2. Have your heaviest meal for breakfast, a slightly lighter meal at lunch and a snack 6 hrs before bed  3. No sugar/calorie drinks except milk ie no fruit juice, pop, alcohol.  4. Drink milk 30min before meals to decrease your hunger. Also it is excellent as part of your last meal of the day snack  5. Drink lots of water  6. Increase fiber in diet: all  bran cereal, salads, popcorn etc  7. Have only one small serving of fruit a day about 1/2 cup (as this is high in sugar)  8. EXERCISE is the bottom line. Without it, you will gain weight even on a low calorie diet. Best if done 2-3X a day as can    Being overweight contributes to high blood pressure and high cholesterol, both of which cause heart attacks, strokes and kidney failure, prediabetes and diabetes, arthritis, and liver disease     2. Shingrex is a 2 shot series that prevents shingles 97% of the time, as opposed to the old shingles shot that only prevented it at 40-50%  It costs less for medicare at a pharmacy  You should get it starting at 50 yrs old get the 2nd shot 5-6 mo after the first one    3. Keep the injured area above the level of the heart as much as possible to help decrease the pain and  the healing time . Put pillows on either side while sleeping to keep the arm or leg elevated     4. YOU NEED Pt/OT IN YOUR HOME     5. STOP THE  Alendronate         Bess Lion MD  [facility]    Discussed all with pt  And the patient expresses understanding    The Rt knee pain could be from OA  Or from the LBP with Rt sciatica she's had     Best to let ortho eval    Bess Lion MD        Amount of exercise or physical activity: walking    Problems taking medications regularly: No    Medication side effects: none    Diet: regular (no restrictions)                Reviewed and updated as needed this visit by Provider  Tobacco  Allergies  Meds  Problems  Med Hx  Surg Hx  Fam Hx         Review of Systems   ROS COMP: CONSTITUTIONAL: NEGATIVE for fever, chills, change in weight  INTEGUMENTARY/SKIN: NEGATIVE for worrisome rashes, moles or lesions  EYES: NEGATIVE for vision changes or irritation  ENT/MOUTH: NEGATIVE for ear, mouth and throat problems  RESP: NEGATIVE for significant cough or SOB  BREAST: NEGATIVE for masses, tenderness or discharge  CV: NEGATIVE for chest  pain, palpitations or peripheral edema  GI: NEGATIVE for nausea, abdominal pain, heartburn, or change in bowel habits  : NEGATIVE for frequency, dysuria, or hematuria  MUSCULOSKELETAL:POSITIVE  for , back pain, joint stiffness  and muscle weakness   NEURO: NEGATIVE for weakness, dizziness or paresthesias  ENDOCRINE: NEGATIVE for temperature intolerance, skin/hair changes  HEME: NEGATIVE for bleeding problems  PSYCHIATRIC: NEGATIVE for changes in mood or affect      Objective    /66 (Cuff Size: Adult Large)   Pulse 73   Temp 97.5  F (36.4  C) (Tympanic)   LMP  (LMP Unknown)   SpO2 95%   There is no height or weight on file to calculate BMI.  Physical Exam   GENERAL: healthy, alert, no distress, obese, frail, elderly and cannot stand   EYES: Eyes grossly normal to inspection, PERRL and conjunctivae and sclerae normal  RESP: lungs clear to auscultation - no rales, rhonchi or wheezes  MS: no gross musculoskeletal defects noted, no edema  SKIN: no suspicious lesions or rashes  NEURO: weakness of all 4 extreems, sensory exam grossly normal, mentation intact, oriented times 3, speech normal and gait -cannot stand   PSYCH: mentation appears normal, affect normal/bright    Diagnostic Test Results:  Labs reviewed in Epic  Results for orders placed or performed in visit on 06/01/19   CBC with platelets differential   Result Value Ref Range    WBC 8.1 4.0 - 11.0 10e9/L    RBC Count 4.15 3.8 - 5.2 10e12/L    Hemoglobin 12.4 11.7 - 15.7 g/dL    Hematocrit 38.9 35.0 - 47.0 %    MCV 94 78 - 100 fl    MCH 29.9 26.5 - 33.0 pg    MCHC 31.9 31.5 - 36.5 g/dL    RDW 14.7 10.0 - 15.0 %    Platelet Count 254 150 - 450 10e9/L    % Neutrophils 67.5 %    % Lymphocytes 22.2 %    % Monocytes 8.8 %    % Eosinophils 1.1 %    % Basophils 0.4 %    Absolute Neutrophil 5.5 1.6 - 8.3 10e9/L    Absolute Lymphocytes 1.8 0.8 - 5.3 10e9/L    Absolute Monocytes 0.7 0.0 - 1.3 10e9/L    Absolute Eosinophils 0.1 0.0 - 0.7 10e9/L    Absolute  "Basophils 0.0 0.0 - 0.2 10e9/L    Diff Method Automated Method            Assessment & Plan       ICD-10-CM    1. Acquired hypothyroidism E03.9 TSH with free T4 reflex   2. Age-related osteoporosis without current pathological fracture-issue of stopping fosamax after 6 yrs --5-19 M81.0 DX Hip/Pelvis/Spine   3. 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 Spinal muscular atrophy type III (H) G12.1 [facility]   5. Benign essential hypertension I10 CBC with platelets differential     Basic metabolic panel   6. CKD (chronic kidney disease) stage 3, GFR 30-59 ml/min (H) N18.3    7. Acute on chronic systolic congestive heart failure (H) I50.23    8. Chronic atrial fibrillation (H) I48.2    9. Long term current use of anticoagulant therapy Z79.01    10. Lymphedema of legs  I89.0    11. Acute on chronic diastolic congestive heart failure (H) I50.33 furosemide (LASIX) 40 MG tablet     HEART FAILURE ACTION PLAN   12. Coronary artery disease involving native coronary artery of native heart without angina pectoris I25.10 metoprolol succinate ER (TOPROL-XL) 50 MG 24 hr tablet   13. Drug-induced gout involving toe, unspecified chronicity, unspecified laterality M10.279 Uric acid     allopurinol (ZYLOPRIM) 100 MG tablet   14. Glucose intolerance (impaired glucose tolerance): HgbA1C= 5.5  R73.02    15. Class 1 obesity due to excess calories with serious comorbidity and body mass index (BMI) of 33.0 to 33.9 in adult E66.09     Z68.33         BMI:   Estimated body mass index is 33.92 kg/m  as calculated from the following:    Height as of 1/29/19: 1.638 m (5' 4.5\").    Weight as of 1/29/19: 91 kg (200 lb 11.2 oz).   Weight management plan: Discussed healthy diet and exercise guidelines        Patient Instructions   1.  Weight Loss Tips  1. Do not eat after 6 hrs before your expected bedtime  2. Have your heaviest meal for breakfast, " a slightly lighter meal at lunch and a snack 6 hrs before bed  3. No sugar/calorie drinks except milk ie no fruit juice, pop, alcohol.  4. Drink milk 30min before meals to decrease your hunger. Also it is excellent as part of your last meal of the day snack  5. Drink lots of water  6. Increase fiber in diet: all bran cereal, salads, popcorn etc  7. Have only one small serving of fruit a day about 1/2 cup (as this is high in sugar)  8. EXERCISE is the bottom line. Without it, you will gain weight even on a low calorie diet. Best if done 2-3X a day as can    Being overweight contributes to high blood pressure and high cholesterol, both of which cause heart attacks, strokes and kidney failure, prediabetes and diabetes, arthritis, and liver disease     2. Shingrex is a 2 shot series that prevents shingles 97% of the time, as opposed to the old shingles shot that only prevented it at 40-50%  It costs less for medicare at a pharmacy  You should get it starting at 50 yrs old get the 2nd shot 5-6 mo after the first one    3. Keep the injured area above the level of the heart as much as possible to help decrease the pain and  the healing time . Put pillows on either side while sleeping to keep the arm or leg elevated     4. YOU NEED Pt/OT IN YOUR HOME     5. STOP THE  Alendronate         Return in about 6 months (around 12/1/2019) for BP Recheck, impaired glucose, Physical Exam.    Bess Lion MD  [facility]

## 2019-06-01 NOTE — LETTER
June 7, 2019      Kenyatta Mirna Odilon  [address]   [facility] [id number]        Dear ,    We are writing to inform you of your test results.    All of your lab results are normal, except as noted below.     The following are explanations of some of our lab tests     THIS DOES NOT MEAN THAT YOU HAD ALL OF THESE DONE     Please continue on the same medications unless   a change is noted above     These are some general explanations for tests:     Hgb is the blood iron level   WBC means White Blood Cells   Platelets are small blood cells that help with forming the blood clots along with other blood factors.   Electrolytes ar   e Sodium, Potassium, Calcium, Magnesium, Phosphorus.   Liver tests are: AST, ALT, Bilirubin, Alkaline Phosphatase.   Kidney tests are Creatinine, GFR.###shows aging of kidneys ###     HDL Cholesterol - is the good cholesterol and it is good to have it high.   LDL cholesterol is the bad ch   olesterol and it is good to have it low.   It is recommended to have LDL less than 130 for people with hypertension and to have it less than 100 for people with heart disease, diabetes and chronic kidney disease.   Triglycerides are another type of lipid a   nd can also cause heart disease so should be kept low.   Thyroid tests are TSH, T4, T3   Glucose is sugar##a little bit high###     A1c is a test that gives us an idea about how well was controlled the diabetes for the last 3 months.     ###your uric acid is a little high #### we can simply rechek it in 3 mo or so if you are not having gouty joint problems   You are only on 100mgm of allopurinol, so we could increase this to 2 tablets ie 200mgm a day     The allopurinol is mainly to keep you from getting gout because you are on a water pill     Resulted Orders   TSH with free T4 reflex   Result Value Ref Range    TSH 1.04 0.40 - 4.00 mU/L   CBC with platelets differential   Result Value Ref Range    WBC 8.1 4.0 - 11.0 10e9/L     RBC Count 4.15 3.8 - 5.2 10e12/L    Hemoglobin 12.4 11.7 - 15.7 g/dL    Hematocrit 38.9 35.0 - 47.0 %    MCV 94 78 - 100 fl    MCH 29.9 26.5 - 33.0 pg    MCHC 31.9 31.5 - 36.5 g/dL    RDW 14.7 10.0 - 15.0 %    Platelet Count 254 150 - 450 10e9/L    % Neutrophils 67.5 %    % Lymphocytes 22.2 %    % Monocytes 8.8 %    % Eosinophils 1.1 %    % Basophils 0.4 %    Absolute Neutrophil 5.5 1.6 - 8.3 10e9/L    Absolute Lymphocytes 1.8 0.8 - 5.3 10e9/L    Absolute Monocytes 0.7 0.0 - 1.3 10e9/L    Absolute Eosinophils 0.1 0.0 - 0.7 10e9/L    Absolute Basophils 0.0 0.0 - 0.2 10e9/L    Diff Method Automated Method    Basic metabolic panel   Result Value Ref Range    Sodium 140 133 - 144 mmol/L    Potassium 4.3 3.4 - 5.3 mmol/L    Chloride 107 94 - 109 mmol/L    Carbon Dioxide 22 20 - 32 mmol/L    Anion Gap 11 3 - 14 mmol/L    Glucose 110 (H) 70 - 99 mg/dL    Urea Nitrogen 35 (H) 7 - 30 mg/dL    Creatinine 0.83 0.52 - 1.04 mg/dL    GFR Estimate 65 >60 mL/min/[1.73_m2]      Comment:      Non  GFR Calc  Starting 12/18/2018, serum creatinine based estimated GFR (eGFR) will be   calculated using the Chronic Kidney Disease Epidemiology Collaboration   (CKD-EPI) equation.      GFR Estimate If Black 76 >60 mL/min/[1.73_m2]      Comment:       GFR Calc  Starting 12/18/2018, serum creatinine based estimated GFR (eGFR) will be   calculated using the Chronic Kidney Disease Epidemiology Collaboration   (CKD-EPI) equation.      Calcium 9.8 8.5 - 10.1 mg/dL   Uric acid   Result Value Ref Range    Uric Acid 6.5 (H) 2.6 - 6.0 mg/dL       If you have any questions or concerns, please call the clinic at the number listed above.       Sincerely,        Bess Lion MD

## 2019-06-03 LAB
ANION GAP SERPL CALCULATED.3IONS-SCNC: 11 MMOL/L (ref 3–14)
BUN SERPL-MCNC: 35 MG/DL (ref 7–30)
CALCIUM SERPL-MCNC: 9.8 MG/DL (ref 8.5–10.1)
CHLORIDE SERPL-SCNC: 107 MMOL/L (ref 94–109)
CO2 SERPL-SCNC: 22 MMOL/L (ref 20–32)
CREAT SERPL-MCNC: 0.83 MG/DL (ref 0.52–1.04)
GFR SERPL CREATININE-BSD FRML MDRD: 65 ML/MIN/{1.73_M2}
GLUCOSE SERPL-MCNC: 110 MG/DL (ref 70–99)
POTASSIUM SERPL-SCNC: 4.3 MMOL/L (ref 3.4–5.3)
SODIUM SERPL-SCNC: 140 MMOL/L (ref 133–144)
TSH SERPL DL<=0.005 MIU/L-ACNC: 1.04 MU/L (ref 0.4–4)
URATE SERPL-MCNC: 6.5 MG/DL (ref 2.6–6)

## 2019-06-07 NOTE — RESULT ENCOUNTER NOTE
Please see attached lab results  All of your lab results are normal, except as noted below.    The following are explanations of some of our lab tests    THIS DOES NOT MEAN THAT YOU HAD ALL OF THESE DONE    Please continue on the same medications unless   a change is noted above    These are some general explanations for tests:    Hgb is the blood iron level  WBC means White Blood Cells  Platelets are small blood cells that help with forming the blood clots along with other blood factors.  Electrolytes ar  e Sodium, Potassium, Calcium, Magnesium, Phosphorus.  Liver tests are: AST, ALT, Bilirubin, Alkaline Phosphatase.  Kidney tests are Creatinine, GFR.###shows aging of kidneys ###    HDL Cholesterol - is the good cholesterol and it is good to have it high.  LDL cholesterol is the bad ch  olesterol and it is good to have it low.  It is recommended to have LDL less than 130 for people with hypertension and to have it less than 100 for people with heart disease, diabetes and chronic kidney disease.  Triglycerides are another type of lipid a  nd can also cause heart disease so should be kept low.   Thyroid tests are TSH, T4, T3  Glucose is sugar##a little bit high###    A1c is a test that gives us an idea about how well was controlled the diabetes for the last 3 months.     ###your uric acid is a little high #### we can simply rechek it in 3 mo or so if you are not having gouty joint problems   You are only on 100mgm of allopurinol, so we could increase this to 2 tablets ie 200mgm a day    The allopurinol is mainly to keep you from getting gout because you are on a water pill

## 2019-06-11 ENCOUNTER — ANTICOAGULATION THERAPY VISIT (OUTPATIENT)
Dept: FAMILY MEDICINE | Facility: CLINIC | Age: 84
End: 2019-06-11
Payer: COMMERCIAL

## 2019-06-11 DIAGNOSIS — Z86.711 HISTORY OF PULMONARY EMBOLISM: ICD-10-CM

## 2019-06-11 DIAGNOSIS — E03.9 ACQUIRED HYPOTHYROIDISM: ICD-10-CM

## 2019-06-11 DIAGNOSIS — I48.91 ATRIAL FIBRILLATION, UNSPECIFIED TYPE (H): ICD-10-CM

## 2019-06-11 LAB — INR PPP: 1.9 (ref 0.9–1.1)

## 2019-06-11 PROCEDURE — 99207 ZZC NO CHARGE NURSE ONLY: CPT | Performed by: FAMILY MEDICINE

## 2019-06-11 RX ORDER — WARFARIN SODIUM 4 MG/1
TABLET ORAL
Qty: 90 TABLET | Refills: 1 | Status: SHIPPED | OUTPATIENT
Start: 2019-06-11 | End: 2020-07-23

## 2019-06-11 NOTE — PROGRESS NOTES
Call back from [facility] to verify dosing, since discrepancy on some papers Kenyatta had with previous dosing.      Verified 2mg Wed & Sat, 4mg Rest of Week.  Updated RX sent to [facility], also.    Macy Maier, PharmD BCACP  Anticoagulation Clinical Pharmacist

## 2019-06-11 NOTE — PROGRESS NOTES
ANTICOAGULATION FOLLOW-UP CLINIC VISIT    Patient Name:  Kenyatta Donald  Date:  2019  Contact Type:  Telephone/ISIS Lundberg    SUBJECTIVE:  Patient Findings         Clinical Outcomes     Negatives:   Major bleeding event, Thromboembolic event, Anticoagulation-related hospital admission, Anticoagulation-related ED visit, Anticoagulation-related fatality           OBJECTIVE    INR   Date Value Ref Range Status   2019 1.9 (A) 0.9 - 1.1 Final       ASSESSMENT / PLAN  INR assessment SUB    Recheck INR In: 2 WEEKS    INR Location [facility]R      Anticoagulation Summary  As of 2019    INR goal:   2.0-3.0   TTR:   55.0 % (1.5 y)   INR used for dosin.9! (2019)   Warfarin maintenance plan:   2 mg (4 mg x 0.5) every Wed, Sat; 4 mg (4 mg x 1) all other days   Full warfarin instructions:   2 mg every Wed, Sat; 4 mg all other days   Weekly warfarin total:   24 mg   No change documented:   Macy Maier RPH   Plan last modified:   Monique Brito RN (2019)   Next INR check:   2019   Priority:   INR   Target end date:       Indications    Long-term (current) use of anticoagulants [Z79.01] [Z79.01]  Mitral valve disorder s/po 19-15 remodeling percut  + clip  (Resolved) [I05.9]             Anticoagulation Episode Summary     INR check location:       Preferred lab:       Send INR reminders to:   [facility] INR/[facility]IME    Comments:   INR range changed by cardiology       Anticoagulation Care Providers     Provider Role Specialty Phone number    Bess Lion Mirna Pyle MD [facility] [phone]            See the Encounter Report to view Anticoagulation Flowsheet and Dosing Calendar (Go to Encounters tab in chart review, and find the Anticoagulation Therapy Visit)    Will adjust dose next check if INR still subtherapeutic.  No S/S clots when asked, no identifiable reason for low INR.    Macy Maier [facility]

## 2019-06-12 ENCOUNTER — TELEPHONE (OUTPATIENT)
Dept: FAMILY MEDICINE | Facility: CLINIC | Age: 84
End: 2019-06-12

## 2019-06-12 DIAGNOSIS — I48.20 CHRONIC ATRIAL FIBRILLATION (H): Primary | ICD-10-CM

## 2019-06-13 RX ORDER — WARFARIN SODIUM 4 MG/1
4 TABLET ORAL DAILY
Qty: 90 TABLET | Refills: 3 | Status: SHIPPED | OUTPATIENT
Start: 2019-06-13 | End: 2019-12-12

## 2019-06-14 RX ORDER — LEVOTHYROXINE SODIUM 100 UG/1
TABLET ORAL
Qty: 90 TABLET | Refills: 3 | Status: SHIPPED | OUTPATIENT
Start: 2019-06-14 | End: 2020-05-11

## 2019-06-17 ENCOUNTER — TELEPHONE (OUTPATIENT)
Dept: FAMILY MEDICINE | Facility: CLINIC | Age: 84
End: 2019-06-17

## 2019-06-17 NOTE — TELEPHONE ENCOUNTER
Our goal is to have forms completed within 72 hours, however some forms may require a visit or additional information.    What clinic location was the form placed at [facility] or [place].?     Who is the form from?   Where did the form come from? Faxed to clinic   The form was placed in the inbox of Bess Lion MD      Please fax to [phone]  Phone number: [phone]    Additional comments: [facility] DC'd medications    Call take on 6/17/2019 at 1:18 PM by Suzanna More

## 2019-06-18 ENCOUNTER — MEDICAL CORRESPONDENCE (OUTPATIENT)
Dept: HEALTH INFORMATION MANAGEMENT | Facility: CLINIC | Age: 84
End: 2019-06-18

## 2019-06-25 ENCOUNTER — TELEPHONE (OUTPATIENT)
Dept: FAMILY MEDICINE | Facility: CLINIC | Age: 84
End: 2019-06-25

## 2019-06-25 DIAGNOSIS — Z79.01 LONG TERM CURRENT USE OF ANTICOAGULANT THERAPY: ICD-10-CM

## 2019-06-25 NOTE — TELEPHONE ENCOUNTER
[facility]IS Mar[facility] called.  Requesting orders to check INR tomorrow 6/25 instead of today.  Verbal orders given.  No other questions or concerns at this time.

## 2019-06-26 ENCOUNTER — TELEPHONE (OUTPATIENT)
Dept: FAMILY MEDICINE | Facility: CLINIC | Age: 84
End: 2019-06-26

## 2019-06-26 DIAGNOSIS — I50.33 ACUTE ON CHRONIC DIASTOLIC CONGESTIVE HEART FAILURE (H): ICD-10-CM

## 2019-06-26 DIAGNOSIS — Z79.01 LONG TERM CURRENT USE OF ANTICOAGULANT THERAPY: ICD-10-CM

## 2019-06-26 LAB — INR PPP: 2 (ref 0.8–1.1)

## 2019-06-26 RX ORDER — FUROSEMIDE 40 MG
TABLET ORAL
Qty: 90 TABLET | Refills: 0 | OUTPATIENT
Start: 2019-06-26

## 2019-06-26 NOTE — TELEPHONE ENCOUNTER
ANTICOAGULATION  MANAGEMENT- Home Care/Care Facility Result    Assessment     Today's INR result of 2.0 is Therapeutic. Goal INR of 2.0-3.0    Additional findings: no changes    Plan:     Spoke with Alma home care nurse and  discussed INR result and instructed:       Warfarin Dosing Instructions: CONTINUE YOUR CURRENT DOSE        Next INR to be drawn: 2 weeks        Alma verbalizes understanding and agrees to warfarin dosing plan.   ?

## 2019-06-26 NOTE — TELEPHONE ENCOUNTER
"Requested Prescriptions   Pending Prescriptions Disp Refills     furosemide (LASIX) 40 MG tablet [Pharmacy Med Name: FUROSEMIDE 40MG TABLETS] 90 tablet 0     Sig: TAKE 1 TABLET BY MOUTH DAILY   Last Written Prescription Date:  06/01/2019  Last Fill Quantity: 90,  # refills: 1   Last office visit: 6/1/2019 with prescribing provider:  Dr. Loin   Future Office Visit:      Diuretics (Including Combos) Protocol Passed - 6/26/2019  1:05 PM        Passed - Blood pressure under 140/90 in past 12 months     BP Readings from Last 3 Encounters:   06/01/19 120/66   01/29/19 110/60   10/08/18 118/56                 Passed - Recent (12 mo) or future (30 days) visit within the authorizing provider's specialty     Patient had office visit in the last 12 months or has a visit in the next 30 days with authorizing provider or within the authorizing provider's specialty.  See \"Patient Info\" tab in inbasket, or \"Choose Columns\" in Meds & Orders section of the refill encounter.              Passed - Medication is active on med list        Passed - Patient is age 18 or older        Passed - No active pregancy on record        Passed - Normal serum creatinine on file in past 12 months     Recent Labs   Lab Test 06/01/19  1202   CR 0.83              Passed - Normal serum potassium on file in past 12 months     Recent Labs   Lab Test 06/01/19  1202   POTASSIUM 4.3                    Passed - Normal serum sodium on file in past 12 months     Recent Labs   Lab Test 06/01/19  1202                 Passed - No positive pregnancy test in past 12 months        "

## 2019-06-28 ENCOUNTER — TELEPHONE (OUTPATIENT)
Dept: FAMILY MEDICINE | Facility: CLINIC | Age: 84
End: 2019-06-28

## 2019-06-28 NOTE — TELEPHONE ENCOUNTER
Our goal is to have forms completed within 72 hours, however some forms may require a visit or additional information.    What clinic location was the form placed at [facility] or [place].?     Who is the form from?   Where did the form come from? Faxed to clinic   The form was placed in the inbox of Bess Lion MD      Please fax to [phone]  Phone number: [phone]    Additional comments: [facility] INR 2.0 from 6/26/19    Call take on 6/28/2019 at 3:42 PM by Suzanna More

## 2019-07-01 ENCOUNTER — TELEPHONE (OUTPATIENT)
Dept: FAMILY MEDICINE | Facility: CLINIC | Age: 84
End: 2019-07-01

## 2019-07-01 NOTE — TELEPHONE ENCOUNTER
Our goal is to have forms completed within 72 hours, however some forms may require a visit or additional information.    What clinic location was the form placed at [facility] or [place].?     Who is the form from?   Where did the form come from? Faxed to clinic   The form was placed in the inbox of Bess Lion MD      Please fax to [phone]  Phone number: [phone]    Additional comments: [facility] OK for fingerstick INR  6/26/19    Call take on 7/1/2019 at 2:46 PM by Suzanna More

## 2019-07-10 ENCOUNTER — TELEPHONE (OUTPATIENT)
Dept: FAMILY MEDICINE | Facility: CLINIC | Age: 84
End: 2019-07-10

## 2019-07-10 DIAGNOSIS — Z79.01 LONG TERM CURRENT USE OF ANTICOAGULANT THERAPY: ICD-10-CM

## 2019-07-10 LAB — INR PPP: 2.1 (ref 0.8–1.1)

## 2019-07-10 NOTE — TELEPHONE ENCOUNTER
ANTICOAGULATION MANAGEMENT     Patient Name:  Kenyatta Donald  Date:  7/10/2019  Contact Type:  Telephone/ Alma, home care nurse    SUBJECTIVE:  Missed doses: No     Medication changes:   No     S/S of bleeding or thromboembolism:  No     New Injury or illness:   No     Changes in diet or alcohol consumption:  No     Upcoming surgery, procedure or cardioversion:  No    Additional findings: none     OBJECTIVE    INR   Date Value Ref Range Status   07/10/2019 2.1 (A) 0.8 - 1.1 Final       ASSESSMENT / PLAN      Anticoagulation Summary  As of 7/10/2019    INR goal:   2.0-3.0   TTR:   54.7 % (1.5 y)   INR used for dosin.1 (7/10/2019)   Warfarin maintenance plan:   2 mg (4 mg x 0.5) every Wed, Sat; 4 mg (4 mg x 1) all other days   Full warfarin instructions:   2 mg every Wed, Sat; 4 mg all other days   Weekly warfarin total:   24 mg   Plan last modified:   Monique Brito RN (2019)   Next INR check:   2019   Priority:   INR   Target end date:       Indications    Long-term (current) use of anticoagulants [Z79.01] [Z79.01]  Mitral valve disorder s/po 1-9-15 remodeling percut  + clip  (Resolved) [I05.9]             Anticoagulation Episode Summary     INR check location:       Preferred lab:       Send INR reminders to:   [facility]    Comments:   INR range changed by cardiology       Anticoagulation Care Providers     Provider Role Specialty Phone number    Bess Lion Mirna Pyle MD [facility] [phone]            Today's INR result of 2.1 is Therapeutic. Goal INR of 2.0-3.0        Warfarin Dosing Instructions: Continue 2 mg every Wed, Sat; 4 mg all other days      Instructed patient to follow up no later than: 2 weeks    Education provided: No    Alma verbalizes understanding and agrees to warfarin dosing plan.    Instructed to call the Anticoagulation Clinic for any changes, questions or concerns. (#[phone])         ?

## 2019-07-17 ENCOUNTER — TELEPHONE (OUTPATIENT)
Dept: FAMILY MEDICINE | Facility: CLINIC | Age: 84
End: 2019-07-17

## 2019-07-17 NOTE — TELEPHONE ENCOUNTER
Our goal is to have forms completed within 72 hours, however some forms may require a visit or additional information.    What clinic location was the form placed at [facility] or [place].?     Who is the form from?   Where did the form come from? Faxed to clinic   The form was placed in the inbox of Bess Lion MD      Please fax to [phone]  Phone number: [phone]    Additional comments: [facility] med changes     Call take on 7/17/2019 at 10:40 AM by Suzanna More

## 2019-07-24 ENCOUNTER — TELEPHONE (OUTPATIENT)
Dept: FAMILY MEDICINE | Facility: CLINIC | Age: 84
End: 2019-07-24

## 2019-07-24 DIAGNOSIS — Z79.01 LONG TERM CURRENT USE OF ANTICOAGULANT THERAPY: ICD-10-CM

## 2019-07-24 LAB — INR PPP: 2.5 (ref 0.8–1.1)

## 2019-07-24 NOTE — TELEPHONE ENCOUNTER
ANTICOAGULATION MANAGEMENT     Patient Name:  Kenyatta Donald  Date:  2019  Contact Type:  Telephone/ [facility] Nurse    SUBJECTIVE:  Missed doses: No     Medication changes:   No     S/S of bleeding or thromboembolism:  No     New Injury or illness:   No     Changes in diet or alcohol consumption:  No     Upcoming surgery, procedure or cardioversion:  No    Additional findings: None     OBJECTIVE    INR   Date Value Ref Range Status   2019 2.5 (A) 0.8 - 1.1 Final       ASSESSMENT / PLAN      Anticoagulation Summary  As of 2019    INR goal:   2.0-3.0   TTR:   55.8 % (1.6 y)   INR used for dosin.5 (2019)   Warfarin maintenance plan:   2 mg (4 mg x 0.5) every Wed, Sat; 4 mg (4 mg x 1) all other days   Full warfarin instructions:   2 mg every Wed, Sat; 4 mg all other days   Weekly warfarin total:   24 mg   No change documented:   Carmita Montenegro RN   Plan last modified:   Monique Brito RN (2019)   Next INR check:   2019   Priority:   INR   Target end date:       Indications    Long-term (current) use of anticoagulants [Z79.01] [Z79.01]  Mitral valve disorder s/po 1-9-15 remodeling percut  + clip  (Resolved) [I05.9]             Anticoagulation Episode Summary     INR check location:       Preferred lab:       Send INR reminders to:   [facility]    Comments:   INR range changed by cardiology       Anticoagulation Care Providers     Provider Role Specialty Phone number    Bess Lion Mirna Pyle MD [facility] [phone]            Today's INR result of 2.5 is Therapeutic. Goal INR of 2.0-3.0        Warfarin Dosing Instructions: 2 mg Wed, Sat; 4 mg all other days of the week.      Instructed patient to follow up no later than: 2 weeks    Education provided: [facility] , verbalizes understanding and agrees to warfarin dosing plan.    Instructed to call the Anticoagulation Clinic for any changes, questions  or concerns. (#[phone])     Carmita Montenegro RN  Anticoagulation Nurse - [facility]    ?

## 2019-07-31 ENCOUNTER — TELEPHONE (OUTPATIENT)
Dept: FAMILY MEDICINE | Facility: CLINIC | Age: 84
End: 2019-07-31

## 2019-07-31 NOTE — TELEPHONE ENCOUNTER
Our goal is to have forms completed within 72 hours, however some forms may require a visit or additional information.    What clinic location was the form placed at [facility] or [place].?     Who is the form from?   Where did the form come from? Faxed to clinic   The form was placed in the inbox of Bess Lion MD      Please fax to [phone]  Phone number: [phone]    Additional comments: [facility] INR 2.5 from 7/24/19    Call take on 7/31/2019 at 4:08 PM by Suzanna More

## 2019-08-08 ENCOUNTER — MEDICAL CORRESPONDENCE (OUTPATIENT)
Dept: HEALTH INFORMATION MANAGEMENT | Facility: CLINIC | Age: 84
End: 2019-08-08

## 2019-08-08 ENCOUNTER — TELEPHONE (OUTPATIENT)
Dept: FAMILY MEDICINE | Facility: CLINIC | Age: 84
End: 2019-08-08

## 2019-08-08 LAB — INR PPP: 2.5 (ref 0.8–1.1)

## 2019-08-08 NOTE — TELEPHONE ENCOUNTER
ANTICOAGULATION MANAGEMENT     Patient Name:  Kenyatta Donald  Date:  8/8/2019  Contact Type:  Telephone/ left message for Alma home care nurse    SUBJECTIVE:  Missed doses: No     Medication changes:   No     S/S of bleeding or thromboembolism:  No     New Injury or illness:   No     Changes in diet or alcohol consumption:  No     Upcoming surgery, procedure or cardioversion:  No    Additional findings: no     OBJECTIVE    INR   Date Value Ref Range Status   07/24/2019 2.5 (A) 0.8 - 1.1 Final       ASSESSMENT / PLAN      Anticoagulation Summary  As of 8/8/2019    INR goal:   2.0-3.0   TTR:   55.8 % (1.6 y)   INR used for dosing:      Warfarin maintenance plan:   2 mg (4 mg x 0.5) every Wed, Sat; 4 mg (4 mg x 1) all other days   Full warfarin instructions:   2 mg every Wed, Sat; 4 mg all other days   Weekly warfarin total:   24 mg   Plan last modified:   Monique Brito RN (1/21/2019)   Next INR check:   8/29/2019   Priority:   INR   Target end date:       Indications    Long-term (current) use of anticoagulants [Z79.01] [Z79.01]  Mitral valve disorder s/po 1-9-15 remodeling percut  + clip  (Resolved) [I05.9]             Anticoagulation Episode Summary     INR check location:       Preferred lab:       Send INR reminders to:   [facility]    Comments:   INR range changed by cardiology 1/29/209      Anticoagulation Care Providers     Provider Role Specialty Phone number    VelasquezBess ngo MD [facility] [phone]            Today's INR result of 2.5 is Therapeutic. Goal INR of 2.0-3.0        Warfarin Dosing Instructions: CONTINUE YOUR CURRENT DOSE      Instructed patient to follow up no later than: 3 weeks    Education provided: No    Instructed to call the Anticoagulation Clinic for any changes, questions or concerns. (#[phone])         ?

## 2019-08-08 NOTE — TELEPHONE ENCOUNTER
A voicemail was left by Alma, home care nurse.  She sees patient every 2 weeks and was requesting to either check pt in 2 weeks or 4 weeks.      Called Alma back, advised that recheck in 4 weeks is okay.  Anticoagulation calendar updated.

## 2019-08-12 DIAGNOSIS — M81.0 AGE-RELATED OSTEOPOROSIS WITHOUT CURRENT PATHOLOGICAL FRACTURE: ICD-10-CM

## 2019-08-12 NOTE — TELEPHONE ENCOUNTER
"Requested Prescriptions   Pending Prescriptions Disp Refills     alendronate (FOSAMAX) 70 MG tablet [Pharmacy Med Name: ALENDRONATE 70MG TABLETS]  DISCONTINUED  Last Written Prescription Date:  8/10/2018 END: 6/1/2019  Last Fill Quantity: 12 tablet,  # refills: 3   Last office visit: 6/1/2019 with prescribing provider:  TANK Lion   Future Office Visit:     12 tablet 0     Sig: TAKE 1 TABLET BY MOUTH EVERY 7 DAYS       Bisphosphonates Failed - 8/12/2019  6:47 AM        Failed - Dexa on file within past 2 years     Please review last Dexa result.           Failed - Medication is active on med list        Passed - Recent (12 mo) or future (30 days) visit within the authorizing provider's specialty     Patient had office visit in the last 12 months or has a visit in the next 30 days with authorizing provider or within the authorizing provider's specialty.  See \"Patient Info\" tab in inbasket, or \"Choose Columns\" in Meds & Orders section of the refill encounter.              Passed - Patient is age 18 or older        Passed - Normal serum creatinine on file within past 12 months     Recent Labs   Lab Test 06/01/19  1202   CR 0.83                "

## 2019-08-13 RX ORDER — ALENDRONATE SODIUM 70 MG/1
TABLET ORAL
Qty: 12 TABLET | Refills: 0 | OUTPATIENT
Start: 2019-08-13

## 2019-08-13 NOTE — TELEPHONE ENCOUNTER
Routing refill request to provider for review/approval because:  Medication discontinued at OV 6/1/2019

## 2019-08-14 ENCOUNTER — TELEPHONE (OUTPATIENT)
Dept: FAMILY MEDICINE | Facility: CLINIC | Age: 84
End: 2019-08-14

## 2019-08-14 NOTE — TELEPHONE ENCOUNTER
Our goal is to have forms completed within 72 hours, however some forms may require a visit or additional information.    What clinic location was the form placed at [facility] or [place].?     Who is the form from?   Where did the form come from? Faxed to clinic   The form was placed in the inbox of Bess Lion MD      Please fax to [phone]  Phone number: [phone]    Additional comments: [facility] drug interaction    Call take on 8/14/2019 at 11:32 AM by Suzanna More

## 2019-08-14 NOTE — TELEPHONE ENCOUNTER
Our goal is to have forms completed within 72 hours, however some forms may require a visit or additional information.    What clinic location was the form placed at [facility] or [place].?     Who is the form from?   Where did the form come from? Faxed to clinic   The form was placed in the inbox of Bess Lion MD      Please fax to [phone]  Phone number: [phone]    Additional comments: [facility] INR 2.5 from 8/8/19  //  late recert / reasess visit completed today 8/8/19    Call take on 8/14/2019 at 4:24 PM by Suzanna More

## 2019-08-19 ENCOUNTER — TELEPHONE (OUTPATIENT)
Dept: FAMILY MEDICINE | Facility: CLINIC | Age: 84
End: 2019-08-19

## 2019-08-19 NOTE — TELEPHONE ENCOUNTER
Our goal is to have forms completed within 72 hours, however some forms may require a visit or additional information.    What clinic location was the form placed at [facility] or [place].?     Who is the form from?   Where did the form come from? Faxed to clinic   The form was placed in the inbox of Bess Lion MD      Please fax to [phone]  Phone number: [phone]    Additional comments: [facility] home care [facility] 8/7/19 to 11/4/19    Call take on 8/19/2019 at 10:56 AM by Suzanna More

## 2019-08-22 ENCOUNTER — MEDICAL CORRESPONDENCE (OUTPATIENT)
Dept: HEALTH INFORMATION MANAGEMENT | Facility: CLINIC | Age: 84
End: 2019-08-22

## 2019-09-04 ENCOUNTER — TELEPHONE (OUTPATIENT)
Dept: FAMILY MEDICINE | Facility: CLINIC | Age: 84
End: 2019-09-04

## 2019-09-04 DIAGNOSIS — Z79.01 LONG TERM CURRENT USE OF ANTICOAGULANT THERAPY: ICD-10-CM

## 2019-09-04 LAB — INR PPP: 2.4 (ref 0.8–1.1)

## 2019-09-04 NOTE — TELEPHONE ENCOUNTER
ANTICOAGULATION MANAGEMENT     Patient Name:  Kenyatta Donald  Date:  2019    ASSESSMENT /SUBJECTIVE:      Today's INR result of 2.4 is Therapeutic. Goal INR of 2.0-3.0      Warfarin dose taken: Warfarin taken as previously instructed    Diet: No new diet changes affecting INR    Medication changes/ interactions: No new medications/supplements affecting INR    Previous INR Therapeutic     S/S of bleeding or thromboembolism No    New injury or illness:  No    Upcoming surgery, procedure or cardioversion:  No    Additional findings: none      Plan    Spoke with Alma home care nurse, regarding INR result and instructed       Warfarin Dosing Instructions:Continue your current warfarin dose of 2 mg every Wed, Sat; 4 mg all other days    Instructed patient to follow up no later than: 4 weeks    Education provided: Yes; notify clinic of any medication/diet changes      Alma verbalizes understanding and agrees to warfarin dosing plan.    Instructed to call the Anticoagulation Clinic for any changes, questions or concerns. (#[phone])        OBJECTIVE    INR   Date Value Ref Range Status   2019 2.4 (A) 0.8 - 1.1 Final             Anticoagulation Summary  As of 2019    INR goal:   2.0-3.0   TTR:   58.8 % (1.7 y)   INR used for dosin.4 (2019)   Warfarin maintenance plan:   2 mg (4 mg x 0.5) every Wed, Sat; 4 mg (4 mg x 1) all other days   Full warfarin instructions:   2 mg every Wed, Sat; 4 mg all other days   Weekly warfarin total:   24 mg   Plan last modified:   Monique Brito RN (2019)   Next INR check:   10/2/2019   Priority:   INR   Target end date:       Indications    Long-term (current) use of anticoagulants [Z79.01] [Z79.01]  Mitral valve disorder s/po 15 remodeling percut  + clip  (Resolved) [I05.9]             Anticoagulation Episode Summary     INR check location:       Preferred lab:       Send INR reminders to:   [facility]    Comments:   INR range  changed by cardiology 1/29/209      Anticoagulation Care Providers     Provider Role Specialty Phone number    Bess Lion MD [facility] [phone]

## 2019-09-12 ENCOUNTER — TELEPHONE (OUTPATIENT)
Dept: FAMILY MEDICINE | Facility: CLINIC | Age: 84
End: 2019-09-12

## 2019-09-12 NOTE — TELEPHONE ENCOUNTER
Our goal is to have forms completed within 72 hours, however some forms may require a visit or additional information.    What clinic location was the form placed at [facility] or [place].?     Who is the form from? Home Care  Where did the form come from? Faxed to clinic   The form was placed in the inbox of Bess Lion MD      Please fax to [phone]    Additional comments: [facility] orders INR orders 9/4/2019    Call take on 9/12/2019 at 11:03 AM by Sunshine Doty CMA

## 2019-09-18 ENCOUNTER — TELEPHONE (OUTPATIENT)
Dept: FAMILY MEDICINE | Facility: CLINIC | Age: 84
End: 2019-09-18

## 2019-09-18 DIAGNOSIS — J30.2 CHRONIC SEASONAL ALLERGIC RHINITIS: Primary | ICD-10-CM

## 2019-09-18 NOTE — TELEPHONE ENCOUNTER
Reason for Call:  Other prescription  Detailed comments: Alma from [facility] called if Dr TANK Lion could prescribe something for patient allergy, please call Alma  Phone Number Patient can be reached at: Other phone number:  [phone]  Best Time: any  Can we leave a detailed message on this number? YES  Call taken on 9/18/2019 at 3:20 PM by ELIO NIETO

## 2019-09-18 NOTE — TELEPHONE ENCOUNTER
Home care nurse Alma reports pt has postnasal drip due to possible seasonal allergies. Pt constatly tries to clear her throat. No fever or cough. Alma would like to know if provider recommends pt take OTC zyrtec or would consider sending rx for Flonase. Please advice.

## 2019-09-19 RX ORDER — FLUTICASONE PROPIONATE 50 MCG
1 SPRAY, SUSPENSION (ML) NASAL DAILY
Qty: 15.8 ML | Refills: 3 | Status: SHIPPED | OUTPATIENT
Start: 2019-09-19 | End: 2019-09-25

## 2019-09-25 ENCOUNTER — TELEPHONE (OUTPATIENT)
Dept: FAMILY MEDICINE | Facility: CLINIC | Age: 84
End: 2019-09-25

## 2019-09-25 DIAGNOSIS — J30.2 CHRONIC SEASONAL ALLERGIC RHINITIS: ICD-10-CM

## 2019-09-25 RX ORDER — FLUTICASONE PROPIONATE 50 MCG
1 SPRAY, SUSPENSION (ML) NASAL DAILY
Qty: 15.8 ML | Refills: 3 | Status: SHIPPED | OUTPATIENT
Start: 2019-09-25 | End: 2020-10-13

## 2019-09-25 NOTE — TELEPHONE ENCOUNTER
Pt called requesting Rx for Flonase to [facility] pharmacy. Per chart review, Rx was sent to [place] [facility] pharmacy. Rx was cancelled at [facility] and resent to [facility] as requested.

## 2019-09-25 NOTE — TELEPHONE ENCOUNTER
Reason for Call:  Other call back    Detailed comments: The patient called and stated that her home care nurse had called to discuss getting a prescription from Dr. Bess Lion  for a throat spray to help break up mucus that she is having problems with. She stated it was supposed to be sent over to her pharmacy but the pharmacy has not gotten it. There was a prescription for a nasal spray that was recently sent over and she already picked that up and it has not been helping as much as she had hoped. She would like a call back to discuss this as there is not documentation of any throat spray being prescribed.     Phone Number Patient can be reached at: Home number on file [phone] (home)    Best Time: Qny    Can we leave a detailed message on this number? Not Applicable    Call taken on 9/25/2019 at 10:35 AM by Libby Sow

## 2019-10-01 ENCOUNTER — TELEPHONE (OUTPATIENT)
Dept: FAMILY MEDICINE | Facility: CLINIC | Age: 84
End: 2019-10-01

## 2019-10-01 DIAGNOSIS — Z79.01 LONG TERM CURRENT USE OF ANTICOAGULANT THERAPY: ICD-10-CM

## 2019-10-01 LAB — INR PPP: 2.8 (ref 0.8–1.1)

## 2019-10-01 NOTE — TELEPHONE ENCOUNTER
ANTICOAGULATION MANAGEMENT     Patient Name:  Kenyatta Donald  Date:  10/1/2019    ASSESSMENT /SUBJECTIVE:    Today's INR result of 2.8 is Therapeutic. Goal INR of 2.0-3.0      Warfarin dose taken: Warfarin taken as previously instructed    Diet: No new diet changes affecting INR    Medication changes/ interactions: No new medications/supplements affecting INR    Previous INR Therapeutic     S/S of bleeding or thromboembolism No    New injury or illness:  No    Upcoming surgery, procedure or cardioversion:  Yes: cataract surgery    Additional findings: none          Plan    Spoke with Alma home care nurse regarding INR result and instructed       Warfarin Dosing Instructions:Continue your current warfarin dose 2 mg every Wed, Sat; 4 mg all other days    Instructed patient to follow up no later than: 4 weeks    Education provided: No      Alma verbalizes understanding and agrees to warfarin dosing plan.    Instructed to call the Anticoagulation Clinic for any changes, questions or concerns. (#[phone])        OBJECTIVE    INR   Date Value Ref Range Status   10/01/2019 2.8 (A) 0.8 - 1.1 Final             Anticoagulation Summary  As of 10/1/2019    INR goal:   2.0-3.0   TTR:   60.5 % (1.8 y)   INR used for dosin.8 (10/1/2019)   Warfarin maintenance plan:   2 mg (4 mg x 0.5) every Wed, Sat; 4 mg (4 mg x 1) all other days   Full warfarin instructions:   2 mg every Wed, Sat; 4 mg all other days   Weekly warfarin total:   24 mg   Plan last modified:   Monique Brito RN (2019)   Next INR check:   10/29/2019   Priority:   INR   Target end date:       Indications    Long-term (current) use of anticoagulants [Z79.01] [Z79.01]  Mitral valve disorder s/po 1-9-15 remodeling percut  + clip  (Resolved) [I05.9]             Anticoagulation Episode Summary     INR check location:       Preferred lab:       [facility]    Comments:   INR range changed by cardiology        Anticoagulation Care Providers     Provider Role Specialty Phone number    Bess Lion MD [facility] [phone]

## 2019-10-03 ENCOUNTER — OFFICE VISIT (OUTPATIENT)
Dept: FAMILY MEDICINE | Facility: CLINIC | Age: 84
End: 2019-10-03
Payer: COMMERCIAL

## 2019-10-03 VITALS
HEART RATE: 64 BPM | TEMPERATURE: 96.8 F | BODY MASS INDEX: 33.49 KG/M2 | WEIGHT: 201 LBS | HEIGHT: 65 IN | RESPIRATION RATE: 18 BRPM | DIASTOLIC BLOOD PRESSURE: 70 MMHG | SYSTOLIC BLOOD PRESSURE: 110 MMHG | OXYGEN SATURATION: 94 %

## 2019-10-03 DIAGNOSIS — Z86.711 HISTORY OF PULMONARY EMBOLISM: ICD-10-CM

## 2019-10-03 DIAGNOSIS — I50.33 ACUTE ON CHRONIC DIASTOLIC CONGESTIVE HEART FAILURE (H): ICD-10-CM

## 2019-10-03 DIAGNOSIS — Z79.01 CHRONIC ANTICOAGULATION: ICD-10-CM

## 2019-10-03 DIAGNOSIS — M10.279 DRUG-INDUCED GOUT INVOLVING TOE, UNSPECIFIED CHRONICITY, UNSPECIFIED LATERALITY: ICD-10-CM

## 2019-10-03 DIAGNOSIS — N18.30 CKD (CHRONIC KIDNEY DISEASE) STAGE 3, GFR 30-59 ML/MIN (H): ICD-10-CM

## 2019-10-03 DIAGNOSIS — H25.9 AGE-RELATED CATARACT OF BOTH EYES, UNSPECIFIED AGE-RELATED CATARACT TYPE: ICD-10-CM

## 2019-10-03 DIAGNOSIS — Z01.818 PREOP GENERAL PHYSICAL EXAM: Primary | ICD-10-CM

## 2019-10-03 DIAGNOSIS — I48.20 CHRONIC ATRIAL FIBRILLATION (H): ICD-10-CM

## 2019-10-03 DIAGNOSIS — I10 BENIGN ESSENTIAL HYPERTENSION: ICD-10-CM

## 2019-10-03 LAB
BASOPHILS # BLD AUTO: 0 10E9/L (ref 0–0.2)
BASOPHILS NFR BLD AUTO: 0.2 %
DIFFERENTIAL METHOD BLD: ABNORMAL
EOSINOPHIL # BLD AUTO: 0.1 10E9/L (ref 0–0.7)
EOSINOPHIL NFR BLD AUTO: 0.8 %
ERYTHROCYTE [DISTWIDTH] IN BLOOD BY AUTOMATED COUNT: 15.8 % (ref 10–15)
HCT VFR BLD AUTO: 41 % (ref 35–47)
HGB BLD-MCNC: 13.1 G/DL (ref 11.7–15.7)
LYMPHOCYTES # BLD AUTO: 1.9 10E9/L (ref 0.8–5.3)
LYMPHOCYTES NFR BLD AUTO: 17.7 %
MCH RBC QN AUTO: 29.7 PG (ref 26.5–33)
MCHC RBC AUTO-ENTMCNC: 32 G/DL (ref 31.5–36.5)
MCV RBC AUTO: 93 FL (ref 78–100)
MONOCYTES # BLD AUTO: 0.8 10E9/L (ref 0–1.3)
MONOCYTES NFR BLD AUTO: 7.5 %
NEUTROPHILS # BLD AUTO: 8 10E9/L (ref 1.6–8.3)
NEUTROPHILS NFR BLD AUTO: 73.8 %
PLATELET # BLD AUTO: 256 10E9/L (ref 150–450)
RBC # BLD AUTO: 4.41 10E12/L (ref 3.8–5.2)
WBC # BLD AUTO: 10.9 10E9/L (ref 4–11)

## 2019-10-03 PROCEDURE — 85025 COMPLETE CBC W/AUTO DIFF WBC: CPT | Performed by: FAMILY MEDICINE

## 2019-10-03 PROCEDURE — 84132 ASSAY OF SERUM POTASSIUM: CPT | Performed by: FAMILY MEDICINE

## 2019-10-03 PROCEDURE — 36415 COLL VENOUS BLD VENIPUNCTURE: CPT | Performed by: FAMILY MEDICINE

## 2019-10-03 PROCEDURE — 99214 OFFICE O/P EST MOD 30 MIN: CPT | Mod: 25 | Performed by: FAMILY MEDICINE

## 2019-10-03 PROCEDURE — 93000 ELECTROCARDIOGRAM COMPLETE: CPT | Performed by: FAMILY MEDICINE

## 2019-10-03 RX ORDER — CYCLOSPORINE 0.5 MG/ML
1 EMULSION OPHTHALMIC
COMMUNITY
End: 2021-05-12

## 2019-10-03 RX ORDER — SIMVASTATIN 40 MG
TABLET ORAL
Refills: 3 | COMMUNITY
Start: 2018-12-03 | End: 2021-05-12

## 2019-10-03 RX ORDER — ALENDRONATE SODIUM 70 MG/1
TABLET ORAL
Refills: 3 | COMMUNITY
Start: 2019-05-21 | End: 2021-05-12

## 2019-10-03 RX ORDER — CLOPIDOGREL BISULFATE 75 MG/1
75 TABLET ORAL
COMMUNITY
End: 2020-04-07

## 2019-10-03 ASSESSMENT — MIFFLIN-ST. JEOR: SCORE: 1364.67

## 2019-10-03 NOTE — NURSING NOTE
"Chief Complaint   Patient presents with     Pre-Op Exam     /70   Pulse 64   Temp 96.8  F (36  C) (Tympanic)   Resp 18   Ht 1.638 m (5' 4.5\")   Wt 91.2 kg (201 lb)   LMP  (LMP Unknown)   SpO2 94%   Breastfeeding? No   BMI 33.97 kg/m   Estimated body mass index is 33.97 kg/m  as calculated from the following:    Height as of this encounter: 1.638 m (5' 4.5\").    Weight as of this encounter: 91.2 kg (201 lb).  BP completed using cuff size: regular   Maxine Titus CMA    Health Maintenance Due   Topic Date Due     URINE DRUG SCREEN  1935     ZOSTER IMMUNIZATION (1 of 2) 12/08/1986     MEDICARE ANNUAL WELLNESS VISIT  12/08/2001     OP ANNUAL INR REFERRAL  04/08/2016     FALL RISK ASSESSMENT  08/21/2019     Health Maintenance reviewed at today's visit patient asked to schedule/complete:   Routine Health Visit: Patient agrees to schedule  Immunizations:  Patient agrees to schedule    "

## 2019-10-03 NOTE — PATIENT INSTRUCTIONS
Before Your Surgery      Call your surgeon if there is any change in your health. This includes signs of a cold or flu (such as a sore throat, runny nose, cough, rash or fever).    Do not smoke, drink alcohol or take over the counter medicine (unless your surgeon or primary care doctor tells you to) for the 24 hours before and after surgery.    If you take prescribed drugs: Follow your doctor s orders about which medicines to take and which to stop until after surgery.    Eating and drinking prior to surgery: follow the instructions from your surgeon    Take a shower or bath the night before surgery. Use the soap your surgeon gave you to gently clean your skin. If you do not have soap from your surgeon, use your regular soap. Do not shave or scrub the surgery site.  Wear clean pajamas and have clean sheets on your bed.     1. Please stop fish oil,  aspirin, any NSAIDs ( incuding aleve advil, ibuprofen, etc--use tylenol= acetaminophen instead)  vitamin D, and multivitamins for 7 days prior to and 7 days after surgery     2. Shingrex is a 2 shot series that prevents shingles 97% of the time, as opposed to the old shingles shot that only prevented it at 40-50%  It costs less for medicare at a pharmacy  You should get it starting at 50 yrs old get the 2nd shot 5-6 mo after the first one    3. . Schedule your mammo at [facility]  At [address] at [phone]      3.  Weight Loss Tips  1. Do not eat after 6 hrs before your expected bedtime  2. Have your heaviest meal for breakfast, a slightly lighter meal at lunch and a snack 6 hrs before bed  3. No sugar/calorie drinks except milk ie no fruit juice, pop, alcohol.  4. Drink milk 30min before meals to decrease your hunger. Also it is excellent as part of your last meal of the day snack  5. Drink lots of water  6. Increase fiber in diet: all bran cereal, salads, popcorn etc  7. Have only one small serving of fruit a day about 1/2 cup (as this is high in  sugar)  8. EXERCISE is the bottom line. Without it, you will gain weight even on a low calorie diet. Best if done 2-3X a day as can    Being overweight contributes to high blood pressure and high cholesterol, both of which cause heart attacks, strokes and kidney failure, prediabetes and diabetes, arthritis, and liver disease     6. Continue on the plavix

## 2019-10-03 NOTE — PROGRESS NOTES
[facility] [address]  [address]  [facility] [address]  [phone]  Dept: [phone]    PRE-OP EVALUATION:  Today's date: 10/3/2019    Kenyatta Donald (: 1935) presents for pre-operative evaluation assessment as requested by Dr. TREVIÑO.  She requires evaluation and anesthesia risk assessment prior to undergoing surgery/procedure for treatment of Both Eyes .    Proposed Surgery/ Procedure: Cataract Repair  Date of Surgery/ Procedure: 10/18/2019  Time of Surgery/ Procedure: [facility]/Surgical Facility: [facility] [place]  Fax number for surgical facility: [phone]  Primary Physician: Bess Lion  Type of Anesthesia Anticipated: to be determined    Patient has a Health Care Directive or Living Will:  YES     1. NO - Do you have a history of heart attack, stroke, stent, bypass or surgery on an artery in the head, neck, heart or legs?  2. NO - Do you ever have any pain or discomfort in your chest?  3. NO - Do you have a history of  Heart Failure?  4. NO - Are you troubled by shortness of breath when: walking on the level, up a slight hill or at night?  5. NO - Do you currently have a cold, bronchitis or other respiratory infection?  6. NO - Do you have a cough, shortness of breath or wheezing?  7. NO - Do you sometimes get pains in the calves of your legs when you walk?  8. NO - Do you or anyone in your family have previous history of blood clots?  9. NO - Do you or does anyone in your family have a serious bleeding problem such as prolonged bleeding following surgeries or cuts?  10. NO - Have you ever had problems with anemia or been told to take iron pills?  11. NO - Have you had any abnormal blood loss such as black, tarry or bloody stools, or abnormal vaginal bleeding?  12. NO - Have you ever had a blood transfusion?  13. NO - Have you or any of your relatives ever had problems with anesthesia?  14. NO - Do you have sleep  apnea, excessive snoring or daytime drowsiness?  15. NO - Do you have any prosthetic heart valves?  16. NO - Do you have prosthetic joints?  17. NO - Is there any chance that you may be pregnant?      HPI:     HPI related to upcoming procedure:   Eye(s) Problem: Bilat Catarracts   Onset: over the yrs     Description:   Location: bilateral  Pain: no  Redness: no    Accompanying Signs & Symptoms:  Discharge/mattering: no  Swelling: no  Visual changes: YES  Fever: no  Nasal Congestion: no  Bothered by bright lights: YES    History:   Trauma: no   Foreign body exposure: no    Precipitating factors:   Wearing contacts: no    Alleviating factors:  Improved by: 0    Therapies Tried and outcome: 0         CHF - Patient has a longstanding history of moderate-severe CHF. Exacerbating conditions include . Currently the patient's condition is same. Current treatment regimen includes diuretic. The patient denies chest pain, edema, orthopnea, SOB or recent weight gain. Last Echocardiogram today , EKG .      A Fib --on plavix     HYPERTENSION - Patient has longstanding history of HTN , currently denies any symptoms referable to elevated blood pressure. Specifically denies chest pain, palpitations, dyspnea, orthopnea, PND or peripheral edema. Blood pressure readings have been in normal range. Current medication regimen is as listed below. Patient denies any side effects of medication.       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     Priority: Medium     Chronic seasonal allergic rhinitis, unspecified trigger-spring  05/24/2018     Priority: Medium     Cramp of  limbs both UE & LE in am's since 45y/o but worse pain since 2-17 12/12/2017     Priority: Medium     Chronic atrial fibrillation 11/11/2017     Priority: Medium     Age-related osteoporosis without current pathological fracture 09/12/2017     Priority: Medium     * * * SBE PROPHYLAXIS * * * 06/27/2017     Priority: Medium     Acute right-sided low back pain with right-sided sciatica 01/16/2017     Priority: Medium     Drug-induced gout involving toe, unspecified chronicity, unspecified laterality 01/13/2017     Priority: Medium     Acute bilateral low back pain with right-sided sciatica onset end of 12-16 01/13/2017     Priority: Medium     Coronary artery disease involving native coronary artery of native heart without angina pectoris      Priority: Medium     cardiac cath 2014: 40-50% mid RCA stenosis with minimal other disease       History of pulmonary embolism 08/11/2016     Priority: Medium     DOI 7-25-16 08/03/2016     Priority: Medium     Painful respiration-ant chest wall from increased congestion w URI  04/11/2016     Priority: Medium     Hypoxia since at least 2010 from poor muscle tone of chest  04/11/2016     Priority: Medium     Chest pain 04/02/2016     Priority: Medium     Benign essential hypertension 03/07/2016     Priority: Medium     Chronic anticoagulation 03/04/2016     Priority: Medium     Hypercholesterolemia 02/22/2016     Priority: Medium     Acute on chronic systolic heart failure (H) 02/22/2016     Priority: Medium     Acquired hypothyroidism 11/16/2015     Priority: Medium     Need for SBE (subacute bacterial endocarditis) prophylaxis      Priority: Medium     Benign neoplasm of skin 04/16/2015     Priority: Medium     Problem list name updated by automated process. Provider to review       Risk for falls 01/13/2015     Priority: Medium     Long term current use of anticoagulant therapy 01/13/2015     Priority: Medium     Problem list name updated by automated process. Provider to  review       Mitral valve regurgitation severe -- S/P Mitral Clip 1/2015 12/29/2014     Priority: Medium     sp mitral clip 1/9/15       Osteoporosis      Priority: Medium     Juan Pablo-tachy syndrome (H)      Priority: Medium     Spinal muscular atrophy type III causing decreased ability of respiratory mm-onset 43y/o       Priority: Medium     Dr. Daily, [facility]       Sleep apnea      Priority: Medium     Glucose intolerance (impaired glucose tolerance): HgbA1C= 5.5  08/23/2013     Priority: Medium     Dysphagia 08/23/2013     Priority: Medium     Imo Update utility       GERD (gastroesophageal reflux disease) for 10 + yrs ---2000 08/23/2013     Priority: Medium     Anticoagulated 08/23/2013     Priority: Medium     Pain in thoracic spine 08/01/2012     Priority: Medium     Cervicalgia 08/01/2012     Priority: Medium     Pain in joint, pelvic region and thigh 04/20/2009     Priority: Medium      Past Medical History:   Diagnosis Date     Atrial fibrillation (H)      Benign essential hypertension      Juan Pablo-tachy syndrome (H)      Breast cancer (H)     s/p bilateral mastectomy     CHF (congestive heart failure) (H)      Coronary artery disease involving native coronary artery of native heart without angina pectoris     cardiac cath 2014: 40-50% mid RCA stenosis with minimal other disease     GERD (gastroesophageal reflux disease)      Hypercholesterolaemia      Hypothyroidism      Kidney stone      Mitral valve regurgitation     sp mitral clip 1/9/15     Need for SBE (subacute bacterial endocarditis) prophylaxis      Osteoporosis      Pulmonary embolism (H)      Sleep apnea      Spinal muscular atrophy type III (H)     Dr. Daily, [facility]     Past Surgical History:   Procedure Laterality Date     ANESTHESIA OUT OF OR PERCUTANEOUS MITRAL VALVE REPAIR N/A 1/9/2015    Procedure: ANESTHESIA OUT OF OR PERCUTANEOUS MITRAL VALVE REPAIR;  Surgeon: Generic Anesthesia Provider;  Location:   OR     GYN SURGERY      hysterectomy     MASTECTOMY      bilateral     ORTHOPEDIC SURGERY      knee replacement     PERCUTANEIOUS MITRAL VALVE REPAIR  1/9/2015     Current Outpatient Medications   Medication Sig Dispense Refill     acetaminophen (TYLENOL) 325 MG tablet Take 2 tablets (650 mg) by mouth every 4 hours as needed for mild pain 100 tablet      alendronate (FOSAMAX) 70 MG tablet TK 1 T PO Q 7 DAYS  3     allopurinol (ZYLOPRIM) 100 MG tablet Take 1 tablet (100 mg) by mouth daily 90 tablet 3     amLODIPine (NORVASC) 5 MG tablet TAKE 1 TABLET(5 MG) BY MOUTH TWICE DAILY 180 tablet 2     amoxicillin (AMOXIL) 500 MG capsule Take 4 capsules by mouth 30 minutes prior to dental work 4 capsule 4     ASPIRIN NOT PRESCRIBED (INTENTIONAL) Please choose reason not prescribed, below 1 each 0     cholecalciferol (VITAMIN D) 1000 UNIT tablet Take 2,000 Units by mouth daily        clopidogrel (PLAVIX) 75 MG tablet Take 75 mg by mouth       cycloSPORINE (RESTASIS) 0.05 % ophthalmic emulsion 1 drop       fluticasone (FLONASE) 50 MCG/ACT nasal spray Spray 1 spray into both nostrils daily 15.8 mL 3     furosemide (LASIX) 40 MG tablet Take 1 tablet (40 mg) by mouth daily 90 tablet 1     levothyroxine (SYNTHROID/LEVOTHROID) 100 MCG tablet TAKE 1 TABLET BY MOUTH DAILY 90 tablet 3     lisinopril (PRINIVIL/ZESTRIL) 20 MG tablet Take 1 tablet (20 mg) by mouth 2 times daily 180 tablet 3     metoprolol succinate ER (TOPROL-XL) 50 MG 24 hr tablet Take 1 tablet (50 mg) by mouth daily 90 tablet 1     nystatin (MYCOSTATIN) 857551 UNIT/GM external powder Apply topically 2 times daily 60 Bottle 1     nystatin (MYCOSTATIN) 699420 UNIT/GM POWD Apply topically 3 times daily as needed 60 g 1     order for DME Equipment being ordered: compression hose   BK 20-30mm   2 pair as insurance will pay 1 each 0     order for DME Equipment being ordered: mastectomy bras & prostheses   S/po mastectomy of unknown side     C50.919 1 each 0     simvastatin  "(ZOCOR) 40 MG tablet   3     traMADol (ULTRAM) 50 MG tablet Take 1 tablet (50 mg) by mouth every 6 hours as needed for moderate pain or severe pain 30 tablet 0     VENTOLIN  (90 Base) MCG/ACT inhaler INHALE 2 PUFFS INTO THE LUNGS EVERY 6 HOURS AS NEEDED FOR SHORTNESS OF BREATH OR DIFFICULT BREATHING OR WHEEZING 18 g 11     warfarin (COUMADIN) 4 MG tablet Take 1 tablet (4 mg) by mouth daily 90 tablet 3     warfarin (COUMADIN) 4 MG tablet Take 2mg Wed & Sat, 4mg all other days or as directed by INR clinic 90 tablet 1     OTC products: None, except as noted above    No Known Allergies   Latex Allergy: NO    Social History     Tobacco Use     Smoking status: Never Smoker     Smokeless tobacco: Never Used   Substance Use Topics     Alcohol use: No     Alcohol/week: 0.0 standard drinks     History   Drug Use No       REVIEW OF SYSTEMS:   CONSTITUTIONAL: NEGATIVE for fever, chills, change in weight-wheel chair bound   INTEGUMENTARY/SKIN: NEGATIVE for worrisome rashes, moles or lesions  EYES: NEGATIVE for vision changes or irritation--decreased vision   ENT/MOUTH: NEGATIVE for ear, mouth and throat problems  RESP: NEGATIVE for significant cough or SOB  BREAST: NEGATIVE for masses, tenderness or discharge  CV: NEGATIVE for chest pain, palpitations or peripheral edema--POS  irreg heart   GI: NEGATIVE for nausea, abdominal pain, heartburn, or change in bowel habits  : NEGATIVE for frequency, dysuria, or hematuria  MUSCULOSKELETAL: NEGATIVE for significant arthralgias or myalgiaPOS spastic and weak   NEURO: NEGATIVE for weakness, dizziness or paresthesias  ENDOCRINE: NEGATIVE for temperature intolerance, skin/hair changes  HEME: NEGATIVE for bleeding problems  PSYCHIATRIC: NEGATIVE for changes in mood or affect    EXAM:   /70   Pulse 64   Temp 96.8  F (36  C) (Tympanic)   Resp 18   Ht 1.638 m (5' 4.5\")   Wt 91.2 kg (201 lb)   LMP  (LMP Unknown)   SpO2 94%   Breastfeeding? No   BMI 33.97 kg/m      GENERAL " APPEARANCE: healthy, alert and no distress;-wheel chair bound      EYES: EOMI, PERRL     NECK: no adenopathy, no asymmetry, masses, or scars and thyroid normal to palpation     RESP: lungs clear to auscultation - no rales, rhonchi or wheezes     CV: regular rates and rhythm, normal S1 S2, no S3 or S4 and no murmur, click or rub     ABDOMEN:  soft, nontender, no HSM or masses and bowel sounds normal     MS: extremities normal- no gross deformities noted, no evidence of inflammation in joints, FROM in all extremities. POS spastic and weak      SKIN: no suspicious lesions or rashes     NEURO: Normal strength and tone, sensory exam grossly normal, mentation intact and speech normal     PSYCH: mentation appears normal. and affect normal/bright     LYMPHATICS: No cervical adenopathy    DIAGNOSTICS:     EKG: appears normal, NSR, atrial fibrillation, rate 78, normal axis, normal intervals, no acute ST/T changes c/w ischemia, no LVH by voltage criteria, unchanged from previous tracings 12-17, 1-19   Labs Resulted Today:   Results for orders placed or performed in visit on 10/03/19   CBC with platelets differential   Result Value Ref Range    WBC 10.9 4.0 - 11.0 10e9/L    RBC Count 4.41 3.8 - 5.2 10e12/L    Hemoglobin 13.1 11.7 - 15.7 g/dL    Hematocrit 41.0 35.0 - 47.0 %    MCV 93 78 - 100 fl    MCH 29.7 26.5 - 33.0 pg    MCHC 32.0 31.5 - 36.5 g/dL    RDW 15.8 (H) 10.0 - 15.0 %    Platelet Count 256 150 - 450 10e9/L    % Neutrophils 73.8 %    % Lymphocytes 17.7 %    % Monocytes 7.5 %    % Eosinophils 0.8 %    % Basophils 0.2 %    Absolute Neutrophil 8.0 1.6 - 8.3 10e9/L    Absolute Lymphocytes 1.9 0.8 - 5.3 10e9/L    Absolute Monocytes 0.8 0.0 - 1.3 10e9/L    Absolute Eosinophils 0.1 0.0 - 0.7 10e9/L    Absolute Basophils 0.0 0.0 - 0.2 10e9/L    Diff Method Automated Method        Recent Labs   Lab Test 10/01/19 09/04/19  06/01/19  1202  01/08/19  1235  05/29/18  1346  10/17/14  0950  08/23/13  0957   [facility]  --   --   --  12.4   --   --   --  11.8   < > 13.1  --  11.8   PLT  --   --   --  254  --   --   --  259   < > 225  --   --    INR 2.8* 2.4*   < >  --    < >  --    < >  --    < >  --    < > 1.94*   NA  --   --   --  140  --  137   < >  --    < >  --    < > 142   POTASSIUM  --   --   --  4.3  --  4.5   < >  --    < >  --    < > 4.3   CR  --   --   --  0.83  --  1.15   < >  --    < >  --    < > 0.80   A1C  --   --   --   --   --   --   --   --   --  5.9  --  5.6    < > = values in this interval not displayed.        IMPRESSION:   Reason for surgery/procedure: vision problems   Diagnosis/reason for consult: medical review       The proposed surgical procedure is considered LOW risk.    REVISED CARDIAC RISK INDEX  The patient has the following serious cardiovascular risks for perioperative complications such as (MI, PE, VFib and 3  AV Block):  No serious cardiac risks  INTERPRETATION: 0 risks: Class I (very low risk - 0.4% complication rate)    The patient has the following additional risks for perioperative complications:  No identified additional risks      ICD-10-CM    1. Preop general physical exam Z01.818 CBC with platelets differential     EKG 12-lead complete w/read - Clinics   2. Age-related cataract of both eyes, unspecified age-related cataract type: Rt then Lt  H25.9    3. Chronic atrial fibrillation I48.20 EKG 12-lead complete w/read - Clinics   4. History of pulmonary embolism Z86.711    5. Chronic anticoagulation--plavix  Z79.01    6. CKD (chronic kidney disease) stage 3, GFR 30-59 ml/min (H) N18.3 Potassium   7. Drug-induced gout involving toe, unspecified chronicity, unspecified laterality M10.279    8. Acute on chronic diastolic congestive heart failure (H) I50.33    9. Benign essential hypertension I10        RECOMMENDATIONS:       Patient Instructions     Before Your Surgery      Call your surgeon if there is any change in your health. This includes signs of a cold or flu (such as a sore throat, runny nose, cough, rash  or fever).    Do not smoke, drink alcohol or take over the counter medicine (unless your surgeon or primary care doctor tells you to) for the 24 hours before and after surgery.    If you take prescribed drugs: Follow your doctor s orders about which medicines to take and which to stop until after surgery.    Eating and drinking prior to surgery: follow the instructions from your surgeon    Take a shower or bath the night before surgery. Use the soap your surgeon gave you to gently clean your skin. If you do not have soap from your surgeon, use your regular soap. Do not shave or scrub the surgery site.  Wear clean pajamas and have clean sheets on your bed.     1. Please stop fish oil,  aspirin, any NSAIDs ( incuding aleve advil, ibuprofen, etc--use tylenol= acetaminophen instead)  vitamin D, and multivitamins for 7 days prior to and 7 days after surgery     2. Shingrex is a 2 shot series that prevents shingles 97% of the time, as opposed to the old shingles shot that only prevented it at 40-50%  It costs less for medicare at a pharmacy  You should get it starting at 50 yrs old get the 2nd shot 5-6 mo after the first one    3. . Schedule your mammo at [facility]  At [address] at [phone]      3.  Weight Loss Tips  1. Do not eat after 6 hrs before your expected bedtime  2. Have your heaviest meal for breakfast, a slightly lighter meal at lunch and a snack 6 hrs before bed  3. No sugar/calorie drinks except milk ie no fruit juice, pop, alcohol.  4. Drink milk 30min before meals to decrease your hunger. Also it is excellent as part of your last meal of the day snack  5. Drink lots of water  6. Increase fiber in diet: all bran cereal, salads, popcorn etc  7. Have only one small serving of fruit a day about 1/2 cup (as this is high in sugar)  8. EXERCISE is the bottom line. Without it, you will gain weight even on a low calorie diet. Best if done 2-3X a day as can    Being overweight contributes to  high blood pressure and high cholesterol, both of which cause heart attacks, strokes and kidney failure, prediabetes and diabetes, arthritis, and liver disease     6. Continue on the plavix         RTC 6 mo   --Patient is to take all scheduled medications on the day of surgery EXCEPT for modifications listed below.    APPROVAL GIVEN to proceed with proposed procedure, without further diagnostic evaluation       Signed Electronically by: Bess Lion MD    Copy of this evaluation report is provided to requesting physician.    [facility] Preop Guidelines    Revised Cardiac Risk Index

## 2019-10-04 ENCOUNTER — TELEPHONE (OUTPATIENT)
Dept: FAMILY MEDICINE | Facility: CLINIC | Age: 84
End: 2019-10-04

## 2019-10-04 LAB — POTASSIUM SERPL-SCNC: 4.4 MMOL/L (ref 3.4–5.3)

## 2019-10-04 NOTE — TELEPHONE ENCOUNTER
Our goal is to have forms completed within 72 hours, however some forms may require a visit or additional information.    What clinic location was the form placed at [facility] or [place].?     Who is the form from?   Where did the form come from? Faxed to clinic   The form was placed in the inbox of Bess Lion MD      Please fax to [phone]  Phone number: [phone]    Additional comments: [facility] 2.8 from 10/1/19    Call take on 10/4/2019 at 11:45 AM by Suzanna More

## 2019-10-08 ENCOUNTER — MEDICAL CORRESPONDENCE (OUTPATIENT)
Dept: HEALTH INFORMATION MANAGEMENT | Facility: CLINIC | Age: 84
End: 2019-10-08

## 2019-10-16 DIAGNOSIS — I48.91 ATRIAL FIBRILLATION (H): ICD-10-CM

## 2019-10-16 NOTE — TELEPHONE ENCOUNTER
"Requested Prescriptions   Pending Prescriptions Disp Refills     warfarin ANTICOAGULANT (COUMADIN) 1 MG tablet [Pharmacy Med Name: WARFARIN SOD 1MG TABLETS (PINK)]  DISCONTINUED  Last Written Prescription Date:  10/5/2018 END: 6/11/2019  Last Fill Quantity: 327 tablet,  # refills: 0   Last office visit: 10/3/2019 with prescribing provider:  TANK Lion   Future Office Visit:   Next 5 appointments (look out 90 days)    Dec 03, 2019  3:45 PM CST  Return Visit with Brain Lock MD  [facility] ([facility] [[email or web address]) [address] [place], [place] [address]  [facility] [address]  [phone] OPT 2          327 tablet 0     Sig: TAKE 2 TABLETS BY MOUTH ON THURSDAYS, AND TAKE 4 TABLETS ON THE OTHER 6 DAYS OF THE WEEK.       Vitamin K Antagonists Failed - 10/16/2019  2:04 PM        Failed - INR is within goal in the past 6 weeks     Confirm INR is within goal in the past 6 weeks.     Recent Labs   Lab Test 10/01/19   INR 2.8*                       Failed - Medication is active on med list        Passed - Recent (12 mo) or future (30 days) visit within the authorizing provider's specialty     Patient has had an office visit with the authorizing provider or a provider within the authorizing providers department within the previous 12 mos or has a future within next 30 days. See \"Patient Info\" tab in inbasket, or \"Choose Columns\" in Meds & Orders section of the refill encounter.              Passed - Patient is 18 years of age or older        Passed - Patient is not pregnant        Passed - No positive pregnancy on file in past 12 months           "

## 2019-10-21 RX ORDER — WARFARIN SODIUM 1 MG/1
TABLET ORAL
Qty: 290 TABLET | Refills: 0 | Status: SHIPPED | OUTPATIENT
Start: 2019-10-21 | End: 2019-10-23

## 2019-10-21 NOTE — TELEPHONE ENCOUNTER
Left message with home care nurse to verify which dose of coumadin patient has on hand.  Last RX was sent for 4 MG tablets.  When spoke with patient she is unsure what she is taking since home care does set up her pills.  Will wait to hear back from home care.    Monique Brito RN  Anticoagulation nurse-[facility]  [phone]

## 2019-10-22 ENCOUNTER — TELEPHONE (OUTPATIENT)
Dept: FAMILY MEDICINE | Facility: CLINIC | Age: 84
End: 2019-10-22
Payer: COMMERCIAL

## 2019-10-22 DIAGNOSIS — Z53.9 ERRONEOUS ENCOUNTER--DISREGARD: Primary | ICD-10-CM

## 2019-10-22 NOTE — TELEPHONE ENCOUNTER
Home care RN Alma returned the call.    Patient had both 1mg tablets and 4mg tablets at home and they were trying to use up her 1mg tablets (she had a lot). The 1mg tablets just .  They were planning to use 4mg tablets only going forward.    Advised that ACN would call pharmacy to confirm if patient picked up the 1mg refill that was sent. Called pharmacy, on hold x 7 minutes and could wait no longer.     Spoke to Alma, she will use whatever tablets has on hand for dosing. If patient has not picked up the 1mg tablets and instead would like the 4mg tabs refilled she will let us know.    No other questions or concerns at this time.

## 2019-10-23 DIAGNOSIS — I48.91 ATRIAL FIBRILLATION (H): ICD-10-CM

## 2019-10-23 RX ORDER — WARFARIN SODIUM 1 MG/1
TABLET ORAL
Qty: 310 TABLET | Refills: 0 | Status: SHIPPED | OUTPATIENT
Start: 2019-10-23 | End: 2019-12-10

## 2019-10-23 NOTE — TELEPHONE ENCOUNTER
Anticoagulation Monitoring Return Date Recheck   Latest Ref Rng & Units     10/1/2019 10/29/2019      Anticoagulation Monitoring Instructions   Latest Ref Rng & Units    10/1/2019 2 mg every Wed, Sat; 4 mg all other days   Prescription approved per [facility] Refill Protocol.  Carmita Montenegro, RN  Anticoagulation Nurse - [facility]

## 2019-10-23 NOTE — TELEPHONE ENCOUNTER
"Requested Prescriptions   Pending Prescriptions Disp Refills     warfarin ANTICOAGULANT (COUMADIN) 1 MG tablet [Pharmacy Med Name: WARFARIN SOD 1MG TABLETS (PINK)]  Last Written Prescription Date:  10/21/2019  Last Fill Quantity: 290 tablet,  # refills: 0   Last office visit: 10/3/2019 with prescribing provider:  TANK Lion   Future Office Visit:   Next 5 appointments (look out 90 days)    Dec 03, 2019  3:45 PM CST  Return Visit with Brain Lock MD  [facility] ([facility]) [address] [place] [id number] [phone] OPT 2          310 tablet 0     Sig: TAKE 2 TABLETS BY MOUTH EVERY WEDNESDAY AND SATURDAY, AND 4 TABLETS ALL OTHER DAYS OR AS DIRECTED PER INR CLINIC       Vitamin K Antagonists Failed - 10/23/2019  6:32 AM        Failed - INR is within goal in the past 6 weeks     Confirm INR is within goal in the past 6 weeks.     Recent Labs   Lab Test 10/01/19   INR 2.8*                       Passed - Recent (12 mo) or future (30 days) visit within the authorizing provider's specialty     Patient has had an office visit with the authorizing provider or a provider within the authorizing providers department within the previous 12 mos or has a future within next 30 days. See \"Patient Info\" tab in inbasket, or \"Choose Columns\" in Meds & Orders section of the refill encounter.              Passed - Medication is active on med list        Passed - Patient is 18 years of age or older        Passed - Patient is not pregnant        Passed - No positive pregnancy on file in past 12 months           "

## 2019-10-30 ENCOUNTER — TELEPHONE (OUTPATIENT)
Dept: FAMILY MEDICINE | Facility: CLINIC | Age: 84
End: 2019-10-30

## 2019-10-30 DIAGNOSIS — Z79.01 CHRONIC ANTICOAGULATION: ICD-10-CM

## 2019-10-30 LAB — INR PPP: 2.6 (ref 0.8–1.1)

## 2019-10-30 NOTE — TELEPHONE ENCOUNTER
ANTICOAGULATION MANAGEMENT     Patient Name:  Kenyatta Donald  Date:  10/30/2019    ASSESSMENT /SUBJECTIVE:      Today's INR result of 2.6 is therapeutic. Goal INR of 2.0-3.0      Warfarin dose taken: Warfarin taken as previously instructed    Diet: No new diet changes affecting INR    Medication changes/ interactions: No new medications/supplements affecting INR    Previous INR: Therapeutic     S/S of bleeding or thromboembolism: No    New injury or illness:  No    Upcoming surgery, procedure or cardioversion:  No    Additional findings: None      PLAN:    [facility] with [facility] Nurse regarding INR result and instructed:     Warfarin Dosing Instructions: Continue your current warfarin dose    Instructed patient to follow up no later than: 4 weeks    Education provided: No      Loretta Nurse verbalizes understanding and agrees to warfarin dosing plan.    Instructed to call the Anticoagulation Clinic for any changes, questions or concerns. (#[phone])        OBJECTIVE:  INR   Date Value Ref Range Status   10/30/2019 2.6 (A) 0.8 - 1.1 Final             Anticoagulation Summary  As of 10/30/2019    INR goal:   2.0-3.0   TTR:   62.2 % (1.8 y)   INR used for dosin.6 (10/30/2019)   Warfarin maintenance plan:   2 mg (4 mg x 0.5) every Wed, Sat; 4 mg (4 mg x 1) all other days   Full warfarin instructions:   2 mg every Wed, Sat; 4 mg all other days   Weekly warfarin total:   24 mg   Plan last modified:   Monique Brito RN (2019)   Next INR check:   2019   Priority:   INR   Target end date:       Indications    Chronic anticoagulation [Z79.01]  Mitral valve disorder s/po 1-9-15 remodeling percut  + clip  (Resolved) [I05.9]             Anticoagulation Episode Summary     INR check location:       Preferred lab:       [facility]    Comments:   INR range changed by cardiology       Anticoagulation Care Providers     Provider Role Specialty Phone  number    Bess Lion MD [facility] [phone]

## 2019-11-01 ENCOUNTER — TELEPHONE (OUTPATIENT)
Dept: FAMILY MEDICINE | Facility: CLINIC | Age: 84
End: 2019-11-01

## 2019-11-01 NOTE — TELEPHONE ENCOUNTER
Our goal is to have forms completed within 72 hours, however some forms may require a visit or additional information.    What clinic location was the form placed at [facility] or [place].?     Who is the form from?   Where did the form come from? Faxed to clinic   The form was placed in the inbox of Bess Lion MD      Please fax to [phone]  Phone number: [phone]    Additional comments: [facility] INR 2.6 from 10/30/19    Call take on 11/1/2019 at 3:43 PM by Suzanna More

## 2019-11-05 ENCOUNTER — TELEPHONE (OUTPATIENT)
Dept: FAMILY MEDICINE | Facility: CLINIC | Age: 84
End: 2019-11-05

## 2019-11-05 NOTE — TELEPHONE ENCOUNTER
Our goal is to have forms completed within 72 hours, however some forms may require a visit or additional information.    What clinic location was the form placed at [facility] or [place].?     Who is the form from?   Where did the form come from? Faxed to clinic   The form was placed in the inbox of Bess Lion MD      Please fax to [phone]  Phone number: [phone]    Additional comments: [facility] drug interaction physician order allopurinol & warfarin sodium     Call take on 11/5/2019 at 4:38 PM by Suzanna More

## 2019-11-06 ENCOUNTER — TELEPHONE (OUTPATIENT)
Dept: FAMILY MEDICINE | Facility: CLINIC | Age: 84
End: 2019-11-06

## 2019-11-06 NOTE — TELEPHONE ENCOUNTER
Our goal is to have forms completed within 72 hours, however some forms may require a visit or additional information.    What clinic location was the form placed at [facility] or [place].?     Who is the form from?   Where did the form come from? Faxed to clinic   The form was placed in the inbox of Bess Lion MD      Please fax to [phone]  Phone number: [phone]    Additional comments: [facility]- Plan of care (cert period 11/05/2019-02/02/2020)    Call take on 11/6/2019 at 11:12 AM by Rosa Kent

## 2019-11-07 ENCOUNTER — MEDICAL CORRESPONDENCE (OUTPATIENT)
Dept: HEALTH INFORMATION MANAGEMENT | Facility: CLINIC | Age: 84
End: 2019-11-07

## 2019-11-13 ENCOUNTER — TELEPHONE (OUTPATIENT)
Dept: FAMILY MEDICINE | Facility: CLINIC | Age: 84
End: 2019-11-13

## 2019-11-13 NOTE — TELEPHONE ENCOUNTER
Drug Change Request    Drug not covered by pt plan. The preferred alternative is Jantoven tab. Please call/fax the pharmacy to change medication along with strength, directions, quantity, and refills.

## 2019-11-14 NOTE — TELEPHONE ENCOUNTER
When you put jantoven in coumadin , warfarin comes up so they just need to follow the order of 10-23-19 as cannot put in jantoven-its the same

## 2019-11-26 ENCOUNTER — TELEPHONE (OUTPATIENT)
Dept: FAMILY MEDICINE | Facility: CLINIC | Age: 84
End: 2019-11-26

## 2019-11-26 NOTE — TELEPHONE ENCOUNTER
Phone message to [facility] asking them to do an INR on Kenyatta tomorrow at their visit and call it in to [facility] @ [phone].

## 2019-11-27 ENCOUNTER — TELEPHONE (OUTPATIENT)
Dept: FAMILY MEDICINE | Facility: CLINIC | Age: 84
End: 2019-11-27

## 2019-11-27 DIAGNOSIS — Z79.01 CHRONIC ANTICOAGULATION: ICD-10-CM

## 2019-11-27 LAB — INR PPP: 2.5

## 2019-11-27 NOTE — TELEPHONE ENCOUNTER
ANTICOAGULATION MANAGEMENT     Patient Name:  Kenyatta Donald  Date:  2019    ASSESSMENT /SUBJECTIVE:      Today's INR result of 2.5 is therapeutic. Goal INR of 2.0-3.0      Warfarin dose taken: Warfarin taken as previously instructed    Diet: No new diet changes affecting INR    Medication changes/ interactions: No new medications/supplements affecting INR    Previous INR: Therapeutic     S/S of bleeding or thromboembolism: No    New injury or illness:  No    Upcoming surgery, procedure or cardioversion:  No    Additional findings: none      PLAN:    Spoke with ISIS Marquez regarding INR result and instructed:     Warfarin Dosing Instructions: Continue your current warfarin dose    Instructed patient to follow up no later than: 4 weeks    Education provided: No      ISIS Marquez verbalizes understanding and agrees to warfarin dosing plan.    Instructed to call the Anticoagulation Clinic for any changes, questions or concerns. (#[phone])        OBJECTIVE:  INR   Date Value Ref Range Status   2019 2.5  Final             Anticoagulation Summary  As of 2019    INR goal:   2.0-3.0   TTR:   82.5 % (1 y)   INR used for dosin.5 (2019)   Warfarin maintenance plan:   2 mg (4 mg x 0.5) every Wed, Sat; 4 mg (4 mg x 1) all other days   Full warfarin instructions:   2 mg every Wed, Sat; 4 mg all other days   Weekly warfarin total:   24 mg   Plan last modified:   Monique Brito RN (2019)   Next INR check:      Priority:   INR   Target end date:       Indications    Chronic anticoagulation [Z79.01]  Mitral valve disorder s/po 1-15 remodeling percut  + clip  (Resolved) [I05.9]             Anticoagulation Episode Summary     INR check location:       Preferred lab:       [facility]    Comments:   INR range changed by cardiology       Anticoagulation Care Providers     Provider Role Specialty Phone number    Bess Lion MD  [facility] [phone]

## 2019-12-02 ENCOUNTER — TELEPHONE (OUTPATIENT)
Dept: FAMILY MEDICINE | Facility: CLINIC | Age: 84
End: 2019-12-02

## 2019-12-02 NOTE — TELEPHONE ENCOUNTER
Our goal is to have forms completed within 72 hours, however some forms may require a visit or additional information.    What clinic location was the form placed at [facility] or [place].?     Who is the form from?   Where did the form come from? Faxed to clinic   The form was placed in the inbox of Bess Lion MD      Please fax to [phone]  Phone number: [phone]    Additional comments: [facility]  recheck INR on 12/26/19    Call take on 12/2/2019 at 4:18 PM by Suzanna More

## 2019-12-03 ENCOUNTER — MEDICAL CORRESPONDENCE (OUTPATIENT)
Dept: HEALTH INFORMATION MANAGEMENT | Facility: CLINIC | Age: 84
End: 2019-12-03

## 2019-12-10 DIAGNOSIS — I48.91 ATRIAL FIBRILLATION (H): ICD-10-CM

## 2019-12-10 NOTE — TELEPHONE ENCOUNTER
"Requested Prescriptions   Pending Prescriptions Disp Refills     warfarin ANTICOAGULANT (COUMADIN) 1 MG tablet [Pharmacy Med Name: WARFARIN SOD 1MG TABLETS (PINK)]  Last Written Prescription Date:  10/23/2019  Last Fill Quantity: 310 tablet,  # refills: 0   Last office visit: 10/3/2019 with prescribing provider:  TANK Lion   Future Office Visit:   Next 5 appointments (look out 90 days)    Jan 22, 2020 10:45 AM CST  Return Visit with Brain Lock MD  [facility] ([facility]) [address] [place] [id number] [phone] OPT 2          310 tablet 0     Sig: TAKE 2 TABLETS BY MOUTH EVERY WEDNESDAY AND SATURDAY, AND 4 TABLETS ALL OTHER DAYS OR AS DIRECTED BY INR CLINIC       Vitamin K Antagonists Failed - 12/10/2019  6:42 AM        Failed - INR is within goal in the past 6 weeks     Confirm INR is within goal in the past 6 weeks.     Recent Labs   Lab Test 11/27/19   INR 2.5                       Passed - Recent (12 mo) or future (30 days) visit within the authorizing provider's specialty     Patient has had an office visit with the authorizing provider or a provider within the authorizing providers department within the previous 12 mos or has a future within next 30 days. See \"Patient Info\" tab in inbasket, or \"Choose Columns\" in Meds & Orders section of the refill encounter.              Passed - Medication is active on med list        Passed - Patient is 18 years of age or older        Passed - Patient is not pregnant        Passed - No positive pregnancy on file in past 12 months           "

## 2019-12-11 NOTE — TELEPHONE ENCOUNTER
Routing refill request to provider for review/approval because:  Drug not on the [facility] refill protocol     Routed to INR team

## 2019-12-12 DIAGNOSIS — I48.20 CHRONIC ATRIAL FIBRILLATION (H): ICD-10-CM

## 2019-12-12 RX ORDER — WARFARIN SODIUM 1 MG/1
TABLET ORAL
Qty: 310 TABLET | Refills: 0 | Status: SHIPPED | OUTPATIENT
Start: 2019-12-12 | End: 2020-07-23

## 2019-12-12 NOTE — TELEPHONE ENCOUNTER
Anticoagulation Monitoring Return Date Recheck   Latest Ref Rng & Units     11/27/2019 12/26/2019      Anticoagulation Monitoring Instructions   Latest Ref Rng & Units    11/27/2019 2 mg every Wed, Sat; 4 mg all other days   Prescription approved per [facility] Refill Protocol.  Carmita Montenegro, RN  Anticoagulation Nurse - [facility]

## 2019-12-12 NOTE — TELEPHONE ENCOUNTER
"Requested Prescriptions   Pending Prescriptions Disp Refills     warfarin ANTICOAGULANT (COUMADIN) 4 MG tablet [Pharmacy Med Name: WARFARIN SOD 4MG TABLETS (BLUE)]  Last Written Prescription Date:  6-13-19  Last Fill Quantity: 90tab,  # refills: 3   Last office visit: 10/3/2019 with prescribing provider:  era FU   Future Office Visit:   Next 5 appointments (look out 90 days)    Jan 22, 2020 10:45 AM CST  Return Visit with Brain Lock MD  [facility] ([facility]) [address] [place] [id number] [phone] OPT 2          90 tablet 0     Sig: TAKE 1 TABLET BY MOUTH DAILY AS DIRECTED       Vitamin K Antagonists Failed - 12/12/2019  3:32 PM        Failed - INR is within goal in the past 6 weeks     Confirm INR is within goal in the past 6 weeks.     Recent Labs   Lab Test 11/27/19   INR 2.5                       Passed - Recent (12 mo) or future (30 days) visit within the authorizing provider's specialty     Patient has had an office visit with the authorizing provider or a provider within the authorizing providers department within the previous 12 mos or has a future within next 30 days. See \"Patient Info\" tab in inbasket, or \"Choose Columns\" in Meds & Orders section of the refill encounter.              Passed - Medication is active on med list        Passed - Patient is 18 years of age or older        Passed - Patient is not pregnant        Passed - No positive pregnancy on file in past 12 months          "

## 2019-12-13 RX ORDER — WARFARIN SODIUM 4 MG/1
TABLET ORAL
Qty: 90 TABLET | Refills: 1 | Status: SHIPPED | OUTPATIENT
Start: 2019-12-13 | End: 2020-07-23

## 2019-12-18 ENCOUNTER — DOCUMENTATION ONLY (OUTPATIENT)
Dept: CARDIOLOGY | Facility: CLINIC | Age: 84
End: 2019-12-18

## 2019-12-18 ENCOUNTER — HOSPITAL ENCOUNTER (OUTPATIENT)
Dept: CARDIOLOGY | Facility: CLINIC | Age: 84
Discharge: HOME | End: 2019-12-18
Attending: INTERNAL MEDICINE | Admitting: INTERNAL MEDICINE
Payer: COMMERCIAL

## 2019-12-18 DIAGNOSIS — Z95.818 STATUS POST IMPLANTATION OF MITRAL VALVE LEAFLET CLIP: ICD-10-CM

## 2019-12-18 DIAGNOSIS — I48.20 CHRONIC ATRIAL FIBRILLATION (H): ICD-10-CM

## 2019-12-18 DIAGNOSIS — Z79.01 WARFARIN ANTICOAGULATION: ICD-10-CM

## 2019-12-18 DIAGNOSIS — E03.9 ACQUIRED HYPOTHYROIDISM: ICD-10-CM

## 2019-12-18 DIAGNOSIS — Z98.890 STATUS POST IMPLANTATION OF MITRAL VALVE LEAFLET CLIP: ICD-10-CM

## 2019-12-18 DIAGNOSIS — I10 BENIGN ESSENTIAL HYPERTENSION: ICD-10-CM

## 2019-12-18 LAB
ANION GAP SERPL CALCULATED.3IONS-SCNC: 13.5 MMOL/L (ref 6–17)
BASOPHILS # BLD AUTO: 0 10E9/L (ref 0–0.2)
BASOPHILS NFR BLD AUTO: 0.2 %
BUN SERPL-MCNC: 31 MG/DL (ref 7–30)
CALCIUM SERPL-MCNC: 10.8 MG/DL (ref 8.5–10.5)
CHLORIDE SERPL-SCNC: 103 MMOL/L (ref 98–107)
CHOLEST SERPL-MCNC: 209 MG/DL
CO2 SERPL-SCNC: 26 MMOL/L (ref 23–29)
CREAT SERPL-MCNC: 0.86 MG/DL (ref 0.7–1.3)
DIFFERENTIAL METHOD BLD: ABNORMAL
EOSINOPHIL # BLD AUTO: 0.1 10E9/L (ref 0–0.7)
EOSINOPHIL NFR BLD AUTO: 0.8 %
ERYTHROCYTE [DISTWIDTH] IN BLOOD BY AUTOMATED COUNT: 15.3 % (ref 10–15)
GFR SERPL CREATININE-BSD FRML MDRD: 63 ML/MIN/{1.73_M2}
GLUCOSE SERPL-MCNC: 131 MG/DL (ref 70–105)
HCT VFR BLD AUTO: 40.7 % (ref 35–47)
HDLC SERPL-MCNC: 54 MG/DL
HGB BLD-MCNC: 12.6 G/DL (ref 11.7–15.7)
IMM GRANULOCYTES # BLD: 0 10E9/L (ref 0–0.4)
IMM GRANULOCYTES NFR BLD: 0.2 %
LDLC SERPL CALC-MCNC: 129 MG/DL
LYMPHOCYTES # BLD AUTO: 1.8 10E9/L (ref 0.8–5.3)
LYMPHOCYTES NFR BLD AUTO: 15.8 %
MCH RBC QN AUTO: 28.8 PG (ref 26.5–33)
MCHC RBC AUTO-ENTMCNC: 31 G/DL (ref 31.5–36.5)
MCV RBC AUTO: 93 FL (ref 78–100)
MONOCYTES # BLD AUTO: 0.7 10E9/L (ref 0–1.3)
MONOCYTES NFR BLD AUTO: 6.2 %
NEUTROPHILS # BLD AUTO: 9 10E9/L (ref 1.6–8.3)
NEUTROPHILS NFR BLD AUTO: 76.8 %
NONHDLC SERPL-MCNC: 155 MG/DL
NRBC # BLD AUTO: 0 10*3/UL
NRBC BLD AUTO-RTO: 0 /100
NT-PROBNP SERPL-MCNC: 789 PG/ML (ref 0–450)
PLATELET # BLD AUTO: 268 10E9/L (ref 150–450)
POTASSIUM SERPL-SCNC: 4.5 MMOL/L (ref 3.5–5.1)
RBC # BLD AUTO: 4.37 10E12/L (ref 3.8–5.2)
SODIUM SERPL-SCNC: 138 MMOL/L (ref 136–145)
TRIGL SERPL-MCNC: 130 MG/DL
TSH SERPL DL<=0.005 MIU/L-ACNC: 1.04 MU/L (ref 0.4–4)
WBC # BLD AUTO: 11.7 10E9/L (ref 4–11)

## 2019-12-18 PROCEDURE — 85025 COMPLETE CBC W/AUTO DIFF WBC: CPT | Performed by: INTERNAL MEDICINE

## 2019-12-18 PROCEDURE — 84443 ASSAY THYROID STIM HORMONE: CPT | Performed by: INTERNAL MEDICINE

## 2019-12-18 PROCEDURE — 93306 TTE W/DOPPLER COMPLETE: CPT

## 2019-12-18 PROCEDURE — 80048 BASIC METABOLIC PNL TOTAL CA: CPT | Performed by: INTERNAL MEDICINE

## 2019-12-18 PROCEDURE — 93306 TTE W/DOPPLER COMPLETE: CPT | Mod: 26 | Performed by: INTERNAL MEDICINE

## 2019-12-18 PROCEDURE — 36415 COLL VENOUS BLD VENIPUNCTURE: CPT | Performed by: INTERNAL MEDICINE

## 2019-12-18 PROCEDURE — 83880 ASSAY OF NATRIURETIC PEPTIDE: CPT | Performed by: INTERNAL MEDICINE

## 2019-12-18 PROCEDURE — 80061 LIPID PANEL: CPT | Performed by: INTERNAL MEDICINE

## 2019-12-18 NOTE — PROGRESS NOTES
Echo and labwork 12/18/19 noted. Ordered for annual follow up and to see Dr. Lock on 1/22/2020 (this was rescheduled).    Echo:   1. Normal biventricular size and function. Left ventricular ejection fraction  of 55-60%. No segmental wall motion abnormalities noted.  2. A mitral clip is seen. There is severely restricted motion of the posterior  leaflet. There is mod to severe (3-4+) mitral regurgitation (posteriorly  directed). There is moderate to severe mitral stenosis. The mean MV gradient  is 10 mmHg at HR of 80 bpm.  3, The left atrium is severely dilated. The right atrium is severely dilated.  4. There is mild to moderate (1-2+) tricuspid regurgitation.  5. Right ventricular systolic pressure is elevated, consistent with moderate  pulmonary hypertension based on a RA pressure of 3 mmHg.  Compared to the prior study mitral regurgitation, mitral stenosis are worse  and pulmonary pressures is higher.  Consider further evaluation with a AGA if clinically indicated.    Labs updated, lipids have increased but patient stopped statin due to co-morbidity of muscular dystrophy.    Will message Dr. Lock to review prior to OV.

## 2019-12-19 NOTE — TELEPHONE ENCOUNTER
Reviewed patient's pre-clinic visit echocardiogram.  It shows that she now has moderate to moderately severe mitral regurgitation (she is status post mitral valve repair).  This is a new.    RECOMMENDATIONS:  1.  I am scheduled to see her in January 22, 2019.  I would like to DrCindy and examine her before confirming next steps.  But at the very least, she will require a transesophageal echocardiogram to ensure that the 2 MitraClip positions are satisfactory.  2.  I will request my nursing team to schedule a transesophageal echocardiogram to be done by myself, on a date after her clinic visit, so I can obtain informed consent).    Dr. ALIA Lock MD [facility]  Cardiology

## 2019-12-20 NOTE — PROGRESS NOTES
Brain Lock MD  [facility] Heart Team 2 15 hours ago (4:54 PM)         Reviewed patient's pre-clinic visit echocardiogram.  It shows that she now has moderate to moderately severe mitral regurgitation (she is status post mitral valve repair).  This is a new.     RECOMMENDATIONS:  1.  I am scheduled to see her in January 22, 2019.  I would like to  and examine her before confirming next steps.  But at the very least, she will require a transesophageal echocardiogram to ensure that the 2 MitraClip positions are satisfactory.  2.  I will request my nursing team to schedule a transesophageal echocardiogram to be done by myself, on a date after her clinic visit, so I can obtain informed consent).     Dr. ALIA Lock MD [facility]  Cardiology          Called patient to review Dr Lock's message for new mod-severe MR. Reviewed with patient that Dr Lock will discuss the echo in more detail at her appointment on 1/22/19 and will discuss next steps at that time. Pt verbalized understanding, agreeable to plan, no further questions. Pt to call back in the interim with any questions or concerns.

## 2019-12-26 ENCOUNTER — TELEPHONE (OUTPATIENT)
Dept: FAMILY MEDICINE | Facility: CLINIC | Age: 84
End: 2019-12-26

## 2019-12-26 DIAGNOSIS — Z79.01 CHRONIC ANTICOAGULATION: ICD-10-CM

## 2019-12-26 LAB — INR PPP: 2.7 (ref 0.9–1.1)

## 2019-12-26 NOTE — TELEPHONE ENCOUNTER
ANTICOAGULATION MANAGEMENT     Patient Name:  Kenyatta Donald  Date:  2019    ASSESSMENT /SUBJECTIVE:      Today's INR result of 2.7 is therapeutic. Goal INR of 2.0-3.0      Warfarin dose taken: Warfarin taken as previously instructed    Diet: No new diet changes affecting INR    Medication changes/ interactions: No new medications/supplements affecting INR    Previous INR: Therapeutic     S/S of bleeding or thromboembolism: No    New injury or illness:  No    Upcoming surgery, procedure or cardioversion:  No    Additional findings: None      PLAN:    [facility] regarding [facility]R result and instructed:     Warfarin Dosing Instructions: Continue your current warfarin dose  2 mg every Wed/Sat and 4 mg all other days  Instructed patient to follow up no later than: 4 weeks    Education provided: No      [facility] verbalizes understanding and agrees to warfarin dosing plan.    Instructed to call the Anticoagulation Clinic for any changes, questions or concerns. (#[phone])        OBJECTIVE:  INR   Date Value Ref Range Status   2019 2.7 (A) 0.90 - 1.10 Final             Anticoagulation Summary  As of 2019    INR goal:   2.0-3.0   TTR:   87.3 % (1 y)   INR used for dosin.7 (2019)   Warfarin maintenance plan:   2 mg (4 mg x 0.5) every Wed, Sat; 4 mg (4 mg x 1) all other days   Full warfarin instructions:   2 mg every Wed, Sat; 4 mg all other days   Weekly warfarin total:   24 mg   Plan last modified:   Monique Brito RN (2019)   Next INR check:      Priority:   INR   Target end date:       Indications    Chronic anticoagulation [Z79.01]  Mitral valve disorder s/po 1-9-15 remodeling percut  + clip  (Resolved) [I05.9]             Anticoagulation Episode Summary     INR check location:       Preferred lab:       Send INR reminders to:   [facility]    Comments:   INR range changed by cardiology       Anticoagulation  Care Providers     Provider Role Specialty Phone number    Bess Lion MD [facility] [phone]

## 2019-12-28 DIAGNOSIS — I10 BENIGN ESSENTIAL HYPERTENSION: ICD-10-CM

## 2019-12-28 DIAGNOSIS — I50.33 ACUTE ON CHRONIC DIASTOLIC CONGESTIVE HEART FAILURE (H): ICD-10-CM

## 2019-12-30 RX ORDER — FUROSEMIDE 40 MG
TABLET ORAL
Qty: 90 TABLET | Refills: 2 | Status: SHIPPED | OUTPATIENT
Start: 2019-12-30 | End: 2020-08-10

## 2019-12-30 RX ORDER — AMLODIPINE BESYLATE 5 MG/1
TABLET ORAL
Qty: 180 TABLET | Refills: 2 | Status: SHIPPED | OUTPATIENT
Start: 2019-12-30 | End: 2020-08-19

## 2019-12-30 NOTE — TELEPHONE ENCOUNTER
Prescription approved per [facility] Refill Protocol for 12 months of refills since last appointment, which was 10/3/2019.

## 2019-12-30 NOTE — TELEPHONE ENCOUNTER
"Requested Prescriptions   Pending Prescriptions Disp Refills     amLODIPine (NORVASC) 5 MG tablet [Pharmacy Med Name: AMLODIPINE BESYLATE 5MG TABLETS]  Last Written Prescription Date:  4/4/2019  Last Fill Quantity: 180 tablet,  # refills: 2   Last office visit: 10/3/2019 with prescribing provider:  TANK Lion   Future Office Visit:   Next 5 appointments (look out 90 days)    Jan 22, 2020 10:45 AM CST  Return Visit with Brain Lock MD  [facility] ([facility]) [address] [place] [id number] [phone] OPT 2          180 tablet 2     Sig: TAKE 1 TABLET BY MOUTH TWICE DAILY       Calcium Channel Blockers Protocol  Passed - 12/28/2019  3:15 AM        Passed - Blood pressure under 140/90 in past 12 months     BP Readings from Last 3 Encounters:   10/03/19 110/70   06/01/19 120/66   01/29/19 110/60                 Passed - Recent (12 mo) or future (30 days) visit within the authorizing provider's specialty     Patient has had an office visit with the authorizing provider or a provider within the authorizing providers department within the previous 12 mos or has a future within next 30 days. See \"Patient Info\" tab in inbasket, or \"Choose Columns\" in Meds & Orders section of the refill encounter.              Passed - Medication is active on med list        Passed - Patient is age 18 or older        Passed - No active pregnancy on record        Passed - Normal serum creatinine on file in past 12 months     Recent Labs   Lab Test 12/18/19  0928   CR 0.86             Passed - No positive pregnancy test in past 12 months        furosemide (LASIX) 40 MG tablet [Pharmacy Med Name: FUROSEMIDE 40MG TABLETS]  Last Written Prescription Date:  6/1/2019  Last Fill Quantity: 90 tablet,  # refills: 1   Last office visit: 10/3/2019 with prescribing provider:  TANK Lion   Future Office Visit:   Next 5 appointments (look out 90 days)    Jan 22, 2020 10:45 AM " "CST  Return Visit with Brain Lock MD  [facility] ([facility]) [id number]  [place] [id number] [phone] OPT 2          90 tablet 1     Sig: TAKE 1 TABLET BY MOUTH DAILY       Diuretics (Including Combos) Protocol Passed - 12/28/2019  3:15 AM        Passed - Blood pressure under 140/90 in past 12 months     BP Readings from Last 3 Encounters:   10/03/19 110/70   06/01/19 120/66   01/29/19 110/60                 Passed - Recent (12 mo) or future (30 days) visit within the authorizing provider's specialty     Patient has had an office visit with the authorizing provider or a provider within the authorizing providers department within the previous 12 mos or has a future within next 30 days. See \"Patient Info\" tab in inbasket, or \"Choose Columns\" in Meds & Orders section of the refill encounter.              Passed - Medication is active on med list        Passed - Patient is age 18 or older        Passed - No active pregancy on record        Passed - Normal serum creatinine on file in past 12 months     Recent Labs   Lab Test 12/18/19  0928   CR 0.86              Passed - Normal serum potassium on file in past 12 months     Recent Labs   Lab Test 12/18/19  0928   POTASSIUM 4.5                    Passed - Normal serum sodium on file in past 12 months     Recent Labs   Lab Test 12/18/19  0928                 Passed - No positive pregnancy test in past 12 months           "

## 2020-01-03 ENCOUNTER — TELEPHONE (OUTPATIENT)
Dept: FAMILY MEDICINE | Facility: CLINIC | Age: 85
End: 2020-01-03

## 2020-01-03 NOTE — TELEPHONE ENCOUNTER
Our goal is to have forms completed within 72 hours, however some forms may require a visit or additional information.    What clinic location was the form placed at [facility] or [place].?     Who is the form from?   Where did the form come from? Faxed to clinic   The form was placed in the inbox of Bess Lion MD      Please fax to [phone]  Phone number: [phone]    Additional comments: [facility]  SN INR 2.7 from 12/26/19    Call take on 1/3/2020 at 11:55 AM by Suzanna More

## 2020-01-06 ENCOUNTER — TELEPHONE (OUTPATIENT)
Dept: FAMILY MEDICINE | Facility: CLINIC | Age: 85
End: 2020-01-06

## 2020-01-06 DIAGNOSIS — I48.20 CHRONIC ATRIAL FIBRILLATION (H): ICD-10-CM

## 2020-01-06 RX ORDER — WARFARIN SODIUM 4 MG/1
TABLET ORAL
Qty: 90 TABLET | Refills: 1 | Status: SHIPPED | OUTPATIENT
Start: 2020-01-06 | End: 2020-07-23

## 2020-01-06 NOTE — TELEPHONE ENCOUNTER
Susanne, since both are generic for the medication.  Jantoven is just one generic brand name.  Rx sent to specify that

## 2020-01-06 NOTE — TELEPHONE ENCOUNTER
warfarin ANTICOAGULANT (COUMADIN) 4 MG tablet is not covered by insurance please send a new rx for jantoven tab mg as this is the preferred alternative

## 2020-01-21 ENCOUNTER — TELEPHONE (OUTPATIENT)
Dept: FAMILY MEDICINE | Facility: CLINIC | Age: 85
End: 2020-01-21

## 2020-01-21 DIAGNOSIS — Z79.01 CHRONIC ANTICOAGULATION: ICD-10-CM

## 2020-01-21 LAB — INR PPP: 2.1 (ref 0.9–1.1)

## 2020-01-21 NOTE — TELEPHONE ENCOUNTER
ANTICOAGULATION MANAGEMENT     Patient Name:  Kenyatta Donald  Date:  2020    ASSESSMENT /SUBJECTIVE:      Today's INR result of 2.1 is therapeutic. Goal INR of 2.0-3.0      Warfarin dose taken: Warfarin taken as previously instructed    Diet: No new diet changes affecting INR    Medication changes/ interactions: No new medications/supplements affecting INR    Previous INR: Therapeutic     S/S of bleeding or thromboembolism: No    New injury or illness:  No    Upcoming surgery, procedure or cardioversion:  No    Additional findings: None      PLAN:    [facility] with [facility] regarding INR result and instructed:     Warfarin Dosing Instructions: Continue your current warfarin dose of 2 mg on Wed/Sat and 4 mg all other days    Instructed patient to follow up no later than: 4 weeks  Orders given to  Homecare nurse/facility to recheck    Education provided: tereza [facility] care verbalizes understanding and agrees to warfarin dosing plan.    Instructed to call the Anticoagulation Clinic for any changes, questions or concerns. (#[phone])        OBJECTIVE:  INR   Date Value Ref Range Status   2020 2.1 (A) 0.90 - 1.10 Final             Anticoagulation Summary  As of 2020    INR goal:   2.0-3.0   TTR:   94.4 % (1 y)   INR used for dosin.1 (2020)   Warfarin maintenance plan:   2 mg (4 mg x 0.5) every Wed, Sat; 4 mg (4 mg x 1) all other days   Full warfarin instructions:   2 mg every Wed, Sat; 4 mg all other days   Weekly warfarin total:   24 mg   Plan last modified:   Monique Brito RN (2019)   Next INR check:   2020   Priority:   INR   Target end date:       Indications    Chronic anticoagulation [Z79.01]  Mitral valve disorder s/po 1-9-15 remodeling percut  + clip  (Resolved) [I05.9]             Anticoagulation Episode Summary     INR check location:       Preferred lab:       Send INR reminders to:   [facility]    Comments:   INR range changed by  cardiology 1/29/209      Anticoagulation Care Providers     Provider Role Specialty Phone number    Bess Lion MD [facility] [phone]

## 2020-01-22 ENCOUNTER — OFFICE VISIT (OUTPATIENT)
Dept: CARDIOLOGY | Facility: CLINIC | Age: 85
End: 2020-01-22
Payer: COMMERCIAL

## 2020-01-22 VITALS
BODY MASS INDEX: 37.39 KG/M2 | HEIGHT: 65 IN | SYSTOLIC BLOOD PRESSURE: 136 MMHG | DIASTOLIC BLOOD PRESSURE: 82 MMHG | WEIGHT: 224.4 LBS | OXYGEN SATURATION: 97 % | HEART RATE: 84 BPM

## 2020-01-22 DIAGNOSIS — Z98.890 S/P MITRAL VALVE REPAIR: ICD-10-CM

## 2020-01-22 DIAGNOSIS — Z99.3 WHEELCHAIR BOUND: ICD-10-CM

## 2020-01-22 DIAGNOSIS — I50.32 CHRONIC DIASTOLIC HEART FAILURE (H): ICD-10-CM

## 2020-01-22 DIAGNOSIS — Z79.01 WARFARIN ANTICOAGULATION: ICD-10-CM

## 2020-01-22 DIAGNOSIS — I34.2 NONRHEUMATIC MITRAL VALVE STENOSIS WITH INSUFFICIENCY: Primary | ICD-10-CM

## 2020-01-22 DIAGNOSIS — G12.9 SPINAL MUSCULAR ATROPHY (H): ICD-10-CM

## 2020-01-22 DIAGNOSIS — I34.0 NONRHEUMATIC MITRAL VALVE STENOSIS WITH INSUFFICIENCY: Primary | ICD-10-CM

## 2020-01-22 DIAGNOSIS — I10 ESSENTIAL HYPERTENSION: ICD-10-CM

## 2020-01-22 DIAGNOSIS — I48.21 PERMANENT ATRIAL FIBRILLATION (H): ICD-10-CM

## 2020-01-22 PROBLEM — M81.0 AGE-RELATED OSTEOPOROSIS WITHOUT CURRENT PATHOLOGICAL FRACTURE: Status: RESOLVED | Noted: 2017-09-12 | Resolved: 2020-01-22

## 2020-01-22 PROBLEM — R25.2 CRAMP OF LIMB: Status: RESOLVED | Noted: 2017-12-12 | Resolved: 2020-01-22

## 2020-01-22 PROBLEM — M54.41 ACUTE BILATERAL LOW BACK PAIN WITH RIGHT-SIDED SCIATICA: Status: RESOLVED | Noted: 2017-01-13 | Resolved: 2020-01-22

## 2020-01-22 PROBLEM — M54.41 ACUTE RIGHT-SIDED LOW BACK PAIN WITH RIGHT-SIDED SCIATICA: Status: RESOLVED | Noted: 2017-01-16 | Resolved: 2020-01-22

## 2020-01-22 PROBLEM — M10.279: Status: RESOLVED | Noted: 2017-01-13 | Resolved: 2020-01-22

## 2020-01-22 PROBLEM — N18.30 CKD (CHRONIC KIDNEY DISEASE) STAGE 3, GFR 30-59 ML/MIN (H): Status: RESOLVED | Noted: 2019-06-01 | Resolved: 2020-01-22

## 2020-01-22 PROCEDURE — 99214 OFFICE O/P EST MOD 30 MIN: CPT | Performed by: INTERNAL MEDICINE

## 2020-01-22 PROCEDURE — 93000 ELECTROCARDIOGRAM COMPLETE: CPT | Performed by: INTERNAL MEDICINE

## 2020-01-22 RX ORDER — METOPROLOL SUCCINATE 50 MG/1
50 TABLET, EXTENDED RELEASE ORAL 2 TIMES DAILY
Qty: 200 TABLET | Refills: 4 | COMMUNITY
Start: 2020-01-22 | End: 2020-03-18

## 2020-01-22 RX ORDER — FUROSEMIDE 20 MG
20 TABLET ORAL DAILY
Qty: 100 TABLET | Refills: 3 | Status: SHIPPED | OUTPATIENT
Start: 2020-01-22 | End: 2020-08-10

## 2020-01-22 ASSESSMENT — MIFFLIN-ST. JEOR: SCORE: 1465.81

## 2020-01-22 NOTE — PATIENT INSTRUCTIONS
MEDICATION CHANGES:  1.  I am increasing metoprolol XL from 50 mg daily to 50 mg 2 times daily.  2.  Increase furosemide from 40 mg daily to 60 mg daily.  3.  New prescriptions completed.  4.  No changes to any other medications.    FOLLOW-UP:  1.  I will plan on seeing you in 2 to 3 months.  Before your visit, you will need a heart monitor and blood tests.    If you have any questions or concerns, please contact my nurses at [phone].

## 2020-01-22 NOTE — LETTER
1/22/2020    Bess Lion MD  [address]  [facility] [address]    RE: Kenyatta Donald       Dear Colleague,    I had the pleasure of seeing Kenyatta Donald in the [facility].    Clinic visit note dictated. Dictation reference number - [id number]            REVIEW OF SYSTEMS:  A comprehensive 10-point review of systems was completed and the pertinent positives are documented in the history of present illness.    Skin:  Negative rash;bruising   Eyes:  Positive for glasses  ENT:  Negative    Respiratory:  Negative (due to loss of muscle in chest)  Cardiovascular:    Positive for;edema  Gastroenterology: Negative hemorrhoids  Genitourinary:  Negative hematuria  Musculoskeletal:  Positive for joint pain;muscular weakness(sitting in a push walker)  Neurologic:  Positive for stroke;numbness or tingling of hands;numbness or tingling of feet  Psychiatric:  Negative    Heme/Lymph/Imm:  Negative weight loss  Endocrine:  Positive for thyroid disorder    CURRENT MEDICATIONS:  Current Outpatient Medications   Medication Sig Dispense Refill     allopurinol (ZYLOPRIM) 100 MG tablet Take 1 tablet (100 mg) by mouth daily 90 tablet 3     amLODIPine (NORVASC) 5 MG tablet TAKE 1 TABLET BY MOUTH TWICE DAILY 180 tablet 2     cholecalciferol (VITAMIN D) 1000 UNIT tablet Take 2,000 Units by mouth daily        fluticasone (FLONASE) 50 MCG/ACT nasal spray Spray 1 spray into both nostrils daily 15.8 mL 3     furosemide (LASIX) 20 MG tablet Take 1 tablet (20 mg) by mouth daily Take a total of 60 mg in the morning. 100 tablet 3     furosemide (LASIX) 40 MG tablet TAKE 1 TABLET BY MOUTH DAILY 90 tablet 2     levothyroxine (SYNTHROID/LEVOTHROID) 100 MCG tablet TAKE 1 TABLET BY MOUTH DAILY 90 tablet 3     lisinopril (PRINIVIL/ZESTRIL) 20 MG tablet Take 1 tablet (20 mg) by mouth 2 times daily 180 tablet 3     metoprolol succinate ER (TOPROL-XL) 50 MG 24 hr tablet Take 1 tablet (50 mg) by mouth 2 times daily 200  tablet 4     nystatin (MYCOSTATIN) 054240 UNIT/GM POWD Apply topically 3 times daily as needed 60 g 1     VENTOLIN  (90 Base) MCG/ACT inhaler INHALE 2 PUFFS INTO THE LUNGS EVERY 6 HOURS AS NEEDED FOR SHORTNESS OF BREATH OR DIFFICULT BREATHING OR WHEEZING 18 g 11     warfarin (COUMADIN) 4 MG tablet Take 2mg Wed & Sat, 4mg all other days or as directed by INR clinic 90 tablet 1     acetaminophen (TYLENOL) 325 MG tablet Take 2 tablets (650 mg) by mouth every 4 hours as needed for mild pain 100 tablet      alendronate (FOSAMAX) 70 MG tablet TK 1 T PO Q 7 DAYS  3     amoxicillin (AMOXIL) 500 MG capsule Take 4 capsules by mouth 30 minutes prior to dental work 4 capsule 4     ASPIRIN NOT PRESCRIBED (INTENTIONAL) Please choose reason not prescribed, below 1 each 0     clopidogrel (PLAVIX) 75 MG tablet Take 75 mg by mouth       cycloSPORINE (RESTASIS) 0.05 % ophthalmic emulsion 1 drop       nystatin (MYCOSTATIN) 852424 UNIT/GM external powder Apply topically 2 times daily (Patient not taking: Reported on 1/22/2020) 60 Bottle 1     order for DME Equipment being ordered: compression hose   BK 20-30mm   2 pair as insurance will pay 1 each 0     order for DME Equipment being ordered: mastectomy bras & prostheses   S/po mastectomy of unknown side     C50.919 1 each 0     simvastatin (ZOCOR) 40 MG tablet   3     traMADol (ULTRAM) 50 MG tablet Take 1 tablet (50 mg) by mouth every 6 hours as needed for moderate pain or severe pain (Patient not taking: Reported on 1/22/2020) 30 tablet 0     warfarin ANTICOAGULANT (COUMADIN) 1 MG tablet TAKE 2 TABLETS BY MOUTH EVERY WEDNESDAY AND SATURDAY, AND 4 TABLETS ALL OTHER DAYS OR AS DIRECTED BY INR CLINIC 310 tablet 0     warfarin ANTICOAGULANT (COUMADIN) 4 MG tablet Take 1/2 tablet Wed/Sat and 1 tablet all other days as directed by the anticoagulation clinic 90 tablet 1     warfarin ANTICOAGULANT (COUMADIN) 4 MG tablet Take 1/2 tablet Wed/Sat and 1 tablet all other days as directed by  the anticoagulation clinic 90 tablet 1         ALLERGIES:  No Known Allergies    PAST MEDICAL HISTORY:    Past Medical History:   Diagnosis Date     Atrial fibrillation (H)      Benign essential hypertension      Juan Pablo-tachy syndrome (H)      Breast cancer (H)     s/p bilateral mastectomy     CHF (congestive heart failure) (H)      Coronary artery disease involving native coronary artery of native heart without angina pectoris     cardiac cath 2014: 40-50% mid RCA stenosis with minimal other disease     GERD (gastroesophageal reflux disease)      Hypercholesterolaemia      Hypothyroidism      Kidney stone      Mitral valve regurgitation     sp mitral clip 1/9/15     Need for SBE (subacute bacterial endocarditis) prophylaxis      Osteoporosis      Pulmonary embolism (H)      Sleep apnea      Spinal muscular atrophy type III (H)     Dr. Daily, [facility]       PAST SURGICAL HISTORY:    Past Surgical History:   Procedure Laterality Date     ANESTHESIA OUT OF OR PERCUTANEOUS MITRAL VALVE REPAIR N/A 2015    Procedure: ANESTHESIA OUT OF OR PERCUTANEOUS MITRAL VALVE REPAIR;  Surgeon: Generic Anesthesia Provider;  Location: [facility] OR     GYN SURGERY      hysterectomy     MASTECTOMY      bilateral     ORTHOPEDIC SURGERY      knee replacement     PERCUTANEIOUS MITRAL VALVE REPAIR  2015       FAMILY HISTORY:    Family History   Problem Relation Age of Onset     Cancer - colorectal Mother      Alzheimer Disease Mother          age 78     Diabetes Daughter      Asthma Daughter      Heart Disease Father          age 60     Family History Negative Daughter      Unknown/Adopted Maternal Grandmother      Unknown/Adopted Maternal Grandfather      Unknown/Adopted Paternal Grandmother      Unknown/Adopted Paternal Grandfather      Coronary Artery Disease No family hx of      Hypertension No family hx of      Hyperlipidemia No family hx of      Cerebrovascular Disease No family hx of      Breast Cancer No  family hx of      Colon Cancer No family hx of      Prostate Cancer No family hx of      Other Cancer No family hx of      Depression No family hx of      Anxiety Disorder No family hx of      Mental Illness No family hx of      Substance Abuse No family hx of      Anesthesia Reaction No family hx of      Osteoporosis No family hx of      Genetic Disorder No family hx of      Thyroid Disease No family hx of      Obesity No family hx of        SOCIAL HISTORY:    Social History     Socioeconomic History     Marital status:      Spouse name: None     Number of children: 3     Years of education: None     Highest education level: None   Occupational History     Occupation: Retired teacher   Social Needs     Financial resource strain: None     Food insecurity:     Worry: None     Inability: None     Transportation needs:     Medical: None     Non-medical: None   Tobacco Use     Smoking status: Never Smoker     Smokeless tobacco: Never Used   Substance and Sexual Activity     Alcohol use: No     Alcohol/week: 0.0 standard drinks     Drug use: No     Sexual activity: Never     Partners: Male   Lifestyle     Physical activity:     Days per week: None     Minutes per session: None     Stress: None   Relationships     Social connections:     Talks on phone: None     Gets together: None     Attends Worship service: None     Active member of club or organization: None     Attends meetings of clubs or organizations: None     Relationship status: None     Intimate partner violence:     Fear of current or ex partner: None     Emotionally abused: None     Physically abused: None     Forced sexual activity: None   Other Topics Concern     Parent/sibling w/ CABG, MI or angioplasty before 65F 55M? No      Service Not Asked     Blood Transfusions Not Asked     Caffeine Concern No     Occupational Exposure Not Asked     Hobby Hazards Not Asked     Sleep Concern No     Stress Concern No     Weight Concern Yes      "Comment: weight loss     Special Diet No     Comment: low sodium     Back Care Not Asked     Exercise No     Comment: PT     Bike Helmet Not Asked     Seat Belt Yes     Self-Exams Not Asked   Social History Narrative    , 3 children, lives in a Coop ([employer]), has a living will and wants to be DNR/DNI.   (last updated 12/5/2017)        PHYSICAL EXAM:    Vitals: /82   Pulse 84   Ht 1.638 m (5' 4.5\")   Wt 101.8 kg (224 lb 6.4 oz)   LMP  (LMP Unknown)   SpO2 97%   BMI 37.92 kg/m     Wt Readings from Last 5 Encounters:   01/22/20 101.8 kg (224 lb 6.4 oz)   10/03/19 91.2 kg (201 lb)   01/29/19 91 kg (200 lb 11.2 oz)   10/08/18 88.1 kg (194 lb 3.2 oz)   08/21/18 87.5 kg (193 lb)           Encounter Diagnoses   Name Primary?     Nonrheumatic mitral valve stenosis with insufficiency Yes     S/P mitral valve repair      Chronic diastolic heart failure (H)      Permanent atrial fibrillation      Warfarin anticoagulation      Essential hypertension      Spinal muscular atrophy (H)      Wheelchair bound        Orders Placed This Encounter   Procedures     Basic metabolic panel     Follow-Up with Cardiologist     EKG 12-lead complete w/read - Clinics (performed today)     Zio Patch Holter Adult Pediatric Greater than 48 hrs               Thank you for allowing me to participate in the care of your patient.      Sincerely,     Brain Lock MD     [facility]    cc:   No referring provider defined for this encounter.        "

## 2020-01-22 NOTE — PROGRESS NOTES
Clinic visit note dictated. Dictation reference number - [id number]            REVIEW OF SYSTEMS:  A comprehensive 10-point review of systems was completed and the pertinent positives are documented in the history of present illness.    Skin:  Negative rash;bruising   Eyes:  Positive for glasses  ENT:  Negative    Respiratory:  Negative (due to loss of muscle in chest)  Cardiovascular:    Positive for;edema  Gastroenterology: Negative hemorrhoids  Genitourinary:  Negative hematuria  Musculoskeletal:  Positive for joint pain;muscular weakness(sitting in a push walker)  Neurologic:  Positive for stroke;numbness or tingling of hands;numbness or tingling of feet  Psychiatric:  Negative    Heme/Lymph/Imm:  Negative weight loss  Endocrine:  Positive for thyroid disorder    CURRENT MEDICATIONS:  Current Outpatient Medications   Medication Sig Dispense Refill     allopurinol (ZYLOPRIM) 100 MG tablet Take 1 tablet (100 mg) by mouth daily 90 tablet 3     amLODIPine (NORVASC) 5 MG tablet TAKE 1 TABLET BY MOUTH TWICE DAILY 180 tablet 2     cholecalciferol (VITAMIN D) 1000 UNIT tablet Take 2,000 Units by mouth daily        fluticasone (FLONASE) 50 MCG/ACT nasal spray Spray 1 spray into both nostrils daily 15.8 mL 3     furosemide (LASIX) 20 MG tablet Take 1 tablet (20 mg) by mouth daily Take a total of 60 mg in the morning. 100 tablet 3     furosemide (LASIX) 40 MG tablet TAKE 1 TABLET BY MOUTH DAILY 90 tablet 2     levothyroxine (SYNTHROID/LEVOTHROID) 100 MCG tablet TAKE 1 TABLET BY MOUTH DAILY 90 tablet 3     lisinopril (PRINIVIL/ZESTRIL) 20 MG tablet Take 1 tablet (20 mg) by mouth 2 times daily 180 tablet 3     metoprolol succinate ER (TOPROL-XL) 50 MG 24 hr tablet Take 1 tablet (50 mg) by mouth 2 times daily 200 tablet 4     nystatin (MYCOSTATIN) 952114 UNIT/GM POWD Apply topically 3 times daily as needed 60 g 1     VENTOLIN  (90 Base) MCG/ACT inhaler INHALE 2 PUFFS INTO THE LUNGS EVERY 6 HOURS AS NEEDED FOR SHORTNESS OF  BREATH OR DIFFICULT BREATHING OR WHEEZING 18 g 11     warfarin (COUMADIN) 4 MG tablet Take 2mg Wed & Sat, 4mg all other days or as directed by INR clinic 90 tablet 1     acetaminophen (TYLENOL) 325 MG tablet Take 2 tablets (650 mg) by mouth every 4 hours as needed for mild pain 100 tablet      alendronate (FOSAMAX) 70 MG tablet TK 1 T PO Q 7 DAYS  3     amoxicillin (AMOXIL) 500 MG capsule Take 4 capsules by mouth 30 minutes prior to dental work 4 capsule 4     ASPIRIN NOT PRESCRIBED (INTENTIONAL) Please choose reason not prescribed, below 1 each 0     clopidogrel (PLAVIX) 75 MG tablet Take 75 mg by mouth       cycloSPORINE (RESTASIS) 0.05 % ophthalmic emulsion 1 drop       nystatin (MYCOSTATIN) 735960 UNIT/GM external powder Apply topically 2 times daily (Patient not taking: Reported on 1/22/2020) 60 Bottle 1     order for DME Equipment being ordered: compression hose   BK 20-30mm   2 pair as insurance will pay 1 each 0     order for DME Equipment being ordered: mastectomy bras & prostheses   S/po mastectomy of unknown side     C50.919 1 each 0     simvastatin (ZOCOR) 40 MG tablet   3     traMADol (ULTRAM) 50 MG tablet Take 1 tablet (50 mg) by mouth every 6 hours as needed for moderate pain or severe pain (Patient not taking: Reported on 1/22/2020) 30 tablet 0     warfarin ANTICOAGULANT (COUMADIN) 1 MG tablet TAKE 2 TABLETS BY MOUTH EVERY WEDNESDAY AND SATURDAY, AND 4 TABLETS ALL OTHER DAYS OR AS DIRECTED BY INR CLINIC 310 tablet 0     warfarin ANTICOAGULANT (COUMADIN) 4 MG tablet Take 1/2 tablet Wed/Sat and 1 tablet all other days as directed by the anticoagulation clinic 90 tablet 1     warfarin ANTICOAGULANT (COUMADIN) 4 MG tablet Take 1/2 tablet Wed/Sat and 1 tablet all other days as directed by the anticoagulation clinic 90 tablet 1         ALLERGIES:  No Known Allergies    PAST MEDICAL HISTORY:    Past Medical History:   Diagnosis Date     Atrial fibrillation (H)      Benign essential hypertension       Juan Pablo-tachy syndrome (H)      Breast cancer (H)     s/p bilateral mastectomy     CHF (congestive heart failure) (H)      Coronary artery disease involving native coronary artery of native heart without angina pectoris     cardiac cath 2014: 40-50% mid RCA stenosis with minimal other disease     GERD (gastroesophageal reflux disease)      Hypercholesterolaemia      Hypothyroidism      Kidney stone      Mitral valve regurgitation     sp mitral clip 1/9/15     Need for SBE (subacute bacterial endocarditis) prophylaxis      Osteoporosis      Pulmonary embolism (H)      Sleep apnea      Spinal muscular atrophy type III (H)     Dr. Daily, [facility]       PAST SURGICAL HISTORY:    Past Surgical History:   Procedure Laterality Date     ANESTHESIA OUT OF OR PERCUTANEOUS MITRAL VALVE REPAIR N/A 2015    Procedure: ANESTHESIA OUT OF OR PERCUTANEOUS MITRAL VALVE REPAIR;  Surgeon: Generic Anesthesia Provider;  Location: [facility] OR     GYN SURGERY      hysterectomy     MASTECTOMY      bilateral     ORTHOPEDIC SURGERY      knee replacement     PERCUTANEIOUS MITRAL VALVE REPAIR  2015       FAMILY HISTORY:    Family History   Problem Relation Age of Onset     Cancer - colorectal Mother      Alzheimer Disease Mother          age 78     Diabetes Daughter      Asthma Daughter      Heart Disease Father          age 60     Family History Negative Daughter      Unknown/Adopted Maternal Grandmother      Unknown/Adopted Maternal Grandfather      Unknown/Adopted Paternal Grandmother      Unknown/Adopted Paternal Grandfather      Coronary Artery Disease No family hx of      Hypertension No family hx of      Hyperlipidemia No family hx of      Cerebrovascular Disease No family hx of      Breast Cancer No family hx of      Colon Cancer No family hx of      Prostate Cancer No family hx of      Other Cancer No family hx of      Depression No family hx of      Anxiety Disorder No family hx of      Mental Illness No  family hx of      Substance Abuse No family hx of      Anesthesia Reaction No family hx of      Osteoporosis No family hx of      Genetic Disorder No family hx of      Thyroid Disease No family hx of      Obesity No family hx of        SOCIAL HISTORY:    Social History     Socioeconomic History     Marital status:      Spouse name: None     Number of children: 3     Years of education: None     Highest education level: None   Occupational History     Occupation: Retired teacher   Social Needs     Financial resource strain: None     Food insecurity:     Worry: None     Inability: None     Transportation needs:     Medical: None     Non-medical: None   Tobacco Use     Smoking status: Never Smoker     Smokeless tobacco: Never Used   Substance and Sexual Activity     Alcohol use: No     Alcohol/week: 0.0 standard drinks     Drug use: No     Sexual activity: Never     Partners: Male   Lifestyle     Physical activity:     Days per week: None     Minutes per session: None     Stress: None   Relationships     Social connections:     Talks on phone: None     Gets together: None     Attends Restorationist service: None     Active member of club or organization: None     Attends meetings of clubs or organizations: None     Relationship status: None     Intimate partner violence:     Fear of current or ex partner: None     Emotionally abused: None     Physically abused: None     Forced sexual activity: None   Other Topics Concern     Parent/sibling w/ CABG, MI or angioplasty before 65F 55M? No      Service Not Asked     Blood Transfusions Not Asked     Caffeine Concern No     Occupational Exposure Not Asked     Hobby Hazards Not Asked     Sleep Concern No     Stress Concern No     Weight Concern Yes     Comment: weight loss     Special Diet No     Comment: low sodium     Back Care Not Asked     Exercise No     Comment: PT     Bike Helmet Not Asked     Seat Belt Yes     Self-Exams Not Asked   Social History Narrative  "   , 3 children, lives in a Coop ([employer]), has a living will and wants to be DNR/DNI.   (last updated 12/5/2017)        PHYSICAL EXAM:    Vitals: /82   Pulse 84   Ht 1.638 m (5' 4.5\")   Wt 101.8 kg (224 lb 6.4 oz)   LMP  (LMP Unknown)   SpO2 97%   BMI 37.92 kg/m    Wt Readings from Last 5 Encounters:   01/22/20 101.8 kg (224 lb 6.4 oz)   10/03/19 91.2 kg (201 lb)   01/29/19 91 kg (200 lb 11.2 oz)   10/08/18 88.1 kg (194 lb 3.2 oz)   08/21/18 87.5 kg (193 lb)           Encounter Diagnoses   Name Primary?     Nonrheumatic mitral valve stenosis with insufficiency Yes     S/P mitral valve repair      Chronic diastolic heart failure (H)      Permanent atrial fibrillation      Warfarin anticoagulation      Essential hypertension      Spinal muscular atrophy (H)      Wheelchair bound        Orders Placed This Encounter   Procedures     Basic metabolic panel     Follow-Up with Cardiologist     EKG 12-lead complete w/read - Clinics (performed today)     Zio Patch Holter Adult Pediatric Greater than 48 hrs             "

## 2020-01-22 NOTE — LETTER
1/22/2020      Bess Lion MD  [address]  [facility] [address]      RE: Kenyatta Donald       Dear Colleague,    I had the pleasure of seeing Kenyatta Donald in the [facility].    Service Date: 01/22/2020      LOCATION:  [facility], [facility].      PRIMARY CARE AND REFERRING PROVIDER:  Dr. Bess Lion, [place] [address].      REASON FOR VISIT:   1.  Scheduled followup of chronic atrial fibrillation, prior history of percutaneous mitral valve repair with residual mitral regurgitation, chronic diastolic heart failure.      HISTORY OF PRESENT ILLNESS:     Kenyatta Donald is an 84-year-old  lady.  She is physically disabled from spinal muscular atrophy and is wheelchair-bound.  She was accompanied by her personal care assistant.  She has a nurse who visits once a week to set up her medications.      Her medical history is known for:   1.  Spinal muscular dystrophy causing her to be wheelchair-bound, diagnosed in the 40s at the [facility].  More recently it has involved her chest wall muscles, which intermittently makes her short of breath.  Still lives at home and has multiple home health carers including a patient care assistant who accompanied her today.   2.  History of percutaneous mitral valve repair/MitraClip procedure in 01/2015 for severe mitral valve regurgitation.  She has residual mild to moderate mitral regurgitation.  LVEF 60%-65%.   3.  Chronic atrial fibrillation.  Rate control and warfarin anticoagulation strategy with stable INRs and no bleeding issues.   4.  Hypertension.  /82 mmHg on a combination of amlodipine 5 mg 2 times daily, furosemide 60 mg daily, lisinopril 20 mg 2 times daily, Toprol-XL 50 mg b.i.d.   5.  Chronic diastolic heart failure.  Last hospitalization was in 12/2017.   6.  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      Kenyatta does not have any significant interval symptoms, but  this has to be viewed in the context of her extremely physically limited lifestyle.  BP today is 136/82 mmHg and atrial fibrillation is rate controlled at 84 BPM.      DIAGNOSTIC DATA:       Labs -- Sodium 138, potassium 4.5, BUN 31, creatinine 0.86, hemoglobin 12.6, TSH 1.0, INR 2.1.      ECG done today shows chronic atrial fibrillation with negative T waves in V1-V2, rate 87 BPM.      Recent transthoracic echocardiogram.  Reviewed images with the patient.  MitraClip noted.  Moderate posteriorly directed mitral valve regurgitation.  Moderate mitral valve stenosis with a mean diastolic gradient of 10 mmHg at a heart rate of 80-90 BPM.  Severe biatrial enlargement.  Mild to moderate tricuspid valve regurgitation.  Normal left ventricular size, LVEF 55%-60%.  Right ventricle is normal in size and systolic function.  Estimated RVSP is 45 mmHg plus right atrial pressure.      PHYSICAL EXAMINATION:  /82, pulse 84 per minute, AFib.  JVP is difficult to assess as I examined the patient in the wheelchair, as it was highly challenging for her to get onto the bed.  In fact, she sleeps in a chair recliner at home. Her heart sounds are irregularly irregular, there is a soft pansystolic murmur of mitral regurgitation, but I could not hear a diastolic murmur of mitral stenosis.  Lungs are clear.  She has bilateral lower extremity edema that is chronic and no more than 1-2+.      DIAGNOSES, ASSESSMENT:   1.  Status post mitral valve repair with residual moderate mitral regurgitation and moderate mitral stenosis.  Given Kenyatta's extreme physical immobility and lack of symptoms at that level, my goal would be to manage this medically rather than subject her to additional procedures.  For now, I am going to add a beta blocker to minimize the physiologic impact of mitral stenosis at slower heart rates and a diuretic.   2.  Chronic diastolic heart failure.  She has no evidence of decompensation from this.  The diuretic will help.    3.  Chronic atrial fibrillation.  Continue rate control and anticoagulation strategy.      PLAN:    1.  Increase furosemide from 40 mg daily to 60 mg daily.   2.  Increase metoprolol XL from 50 mg daily to b.i.d.   3.  New prescriptions completed.   4.  Follow up in 2-3 months with a 3-day Zio Patch heart monitor and a blood test.      Total time 35 minutes, greater than 50% spent in counseling and coordination of care.         SCOTT SAVAGE MD             D: 2020   T: 2020   MT: [place]      Name:     DARLENE FRIAS   MRN:      [id number]        Account:      [id number]   :      1935           Service Date: 2020      Document: [id number]           Outpatient Encounter Medications as of 2020   Medication Sig Dispense Refill     allopurinol (ZYLOPRIM) 100 MG tablet Take 1 tablet (100 mg) by mouth daily 90 tablet 3     amLODIPine (NORVASC) 5 MG tablet TAKE 1 TABLET BY MOUTH TWICE DAILY 180 tablet 2     cholecalciferol (VITAMIN D) 1000 UNIT tablet Take 2,000 Units by mouth daily        fluticasone (FLONASE) 50 MCG/ACT nasal spray Spray 1 spray into both nostrils daily 15.8 mL 3     furosemide (LASIX) 20 MG tablet Take 1 tablet (20 mg) by mouth daily Take a total of 60 mg in the morning. 100 tablet 3     furosemide (LASIX) 40 MG tablet TAKE 1 TABLET BY MOUTH DAILY 90 tablet 2     levothyroxine (SYNTHROID/LEVOTHROID) 100 MCG tablet TAKE 1 TABLET BY MOUTH DAILY 90 tablet 3     lisinopril (PRINIVIL/ZESTRIL) 20 MG tablet Take 1 tablet (20 mg) by mouth 2 times daily 180 tablet 3     metoprolol succinate ER (TOPROL-XL) 50 MG 24 hr tablet Take 1 tablet (50 mg) by mouth 2 times daily 200 tablet 4     nystatin (MYCOSTATIN) 870143 UNIT/GM POWD Apply topically 3 times daily as needed 60 g 1     VENTOLIN  (90 Base) MCG/ACT inhaler INHALE 2 PUFFS INTO THE LUNGS EVERY 6 HOURS AS NEEDED FOR SHORTNESS OF BREATH OR DIFFICULT BREATHING OR WHEEZING 18 g 11     warfarin (COUMADIN) 4 MG tablet Take  2mg Wed & Sat, 4mg all other days or as directed by INR clinic 90 tablet 1     acetaminophen (TYLENOL) 325 MG tablet Take 2 tablets (650 mg) by mouth every 4 hours as needed for mild pain 100 tablet      alendronate (FOSAMAX) 70 MG tablet TK 1 T PO Q 7 DAYS  3     amoxicillin (AMOXIL) 500 MG capsule Take 4 capsules by mouth 30 minutes prior to dental work 4 capsule 4     ASPIRIN NOT PRESCRIBED (INTENTIONAL) Please choose reason not prescribed, below 1 each 0     clopidogrel (PLAVIX) 75 MG tablet Take 75 mg by mouth       cycloSPORINE (RESTASIS) 0.05 % ophthalmic emulsion 1 drop       nystatin (MYCOSTATIN) 402274 UNIT/GM external powder Apply topically 2 times daily (Patient not taking: Reported on 1/22/2020) 60 Bottle 1     order for DME Equipment being ordered: compression hose   BK 20-30mm   2 pair as insurance will pay 1 each 0     order for DME Equipment being ordered: mastectomy bras & prostheses   S/po mastectomy of unknown side     C50.919 1 each 0     simvastatin (ZOCOR) 40 MG tablet   3     traMADol (ULTRAM) 50 MG tablet Take 1 tablet (50 mg) by mouth every 6 hours as needed for moderate pain or severe pain (Patient not taking: Reported on 1/22/2020) 30 tablet 0     warfarin ANTICOAGULANT (COUMADIN) 1 MG tablet TAKE 2 TABLETS BY MOUTH EVERY WEDNESDAY AND SATURDAY, AND 4 TABLETS ALL OTHER DAYS OR AS DIRECTED BY INR CLINIC 310 tablet 0     warfarin ANTICOAGULANT (COUMADIN) 4 MG tablet Take 1/2 tablet Wed/Sat and 1 tablet all other days as directed by the anticoagulation clinic 90 tablet 1     warfarin ANTICOAGULANT (COUMADIN) 4 MG tablet Take 1/2 tablet Wed/Sat and 1 tablet all other days as directed by the anticoagulation clinic 90 tablet 1     [DISCONTINUED] metoprolol succinate ER (TOPROL-XL) 50 MG 24 hr tablet Take 1 tablet (50 mg) by mouth daily 90 tablet 1     No facility-administered encounter medications on file as of 1/22/2020.        Again, thank you for allowing me to participate in the care of  your patient.      Sincerely,    Brain Lock MD     [facility]

## 2020-01-23 ENCOUNTER — TELEPHONE (OUTPATIENT)
Dept: FAMILY MEDICINE | Facility: CLINIC | Age: 85
End: 2020-01-23

## 2020-01-23 ENCOUNTER — MEDICAL CORRESPONDENCE (OUTPATIENT)
Dept: HEALTH INFORMATION MANAGEMENT | Facility: CLINIC | Age: 85
End: 2020-01-23

## 2020-01-23 NOTE — TELEPHONE ENCOUNTER
Our goal is to have forms completed within 72 hours, however some forms may require a visit or additional information.    What clinic location was the form placed at [facility] or [place].?     Who is the form from?   Where did the form come from? Faxed to clinic   The form was placed in the inbox of Bess Loin MD      Please fax to [phone]  Phone number: [phone]    Additional comments: [facility]  drug interaction physician order    Call take on 1/23/2020 at 5:18 PM by Suzanna More

## 2020-01-23 NOTE — PROGRESS NOTES
Service Date: 01/22/2020      LOCATION:  [facility], [facility].      PRIMARY CARE AND REFERRING PROVIDER:  Dr. Bess Lion, [place] [address].      REASON FOR VISIT:   1.  Scheduled followup of chronic atrial fibrillation, prior history of percutaneous mitral valve repair with residual mitral regurgitation, chronic diastolic heart failure.      HISTORY OF PRESENT ILLNESS:     Kenyatta Donald is an 84-year-old  lady.  She is physically disabled from spinal muscular atrophy and is wheelchair-bound.  She was accompanied by her personal care assistant.  She has a nurse who visits once a week to set up her medications.      Her medical history is known for:   1.  Spinal muscular dystrophy causing her to be wheelchair-bound, diagnosed in the 40s at the [facility].  More recently it has involved her chest wall muscles, which intermittently makes her short of breath.  Still lives at home and has multiple home health carers including a patient care assistant who accompanied her today.   2.  History of percutaneous mitral valve repair/MitraClip procedure in 01/2015 for severe mitral valve regurgitation.  She has residual mild to moderate mitral regurgitation.  LVEF 60%-65%.   3.  Chronic atrial fibrillation.  Rate control and warfarin anticoagulation strategy with stable INRs and no bleeding issues.   4.  Hypertension.  /82 mmHg on a combination of amlodipine 5 mg 2 times daily, furosemide 60 mg daily, lisinopril 20 mg 2 times daily, Toprol-XL 50 mg b.i.d.   5.  Chronic diastolic heart failure.  Last hospitalization was in 12/2017.   6.  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      Kenyatta does not have any significant interval symptoms, but this has to be viewed in the context of her extremely physically limited lifestyle.  BP today is 136/82 mmHg and atrial fibrillation is rate controlled at 84 BPM.      DIAGNOSTIC DATA:       Labs -- Sodium 138, potassium 4.5,  BUN 31, creatinine 0.86, hemoglobin 12.6, TSH 1.0, INR 2.1.      ECG done today shows chronic atrial fibrillation with negative T waves in V1-V2, rate 87 BPM.      Recent transthoracic echocardiogram.  Reviewed images with the patient.  MitraClip noted.  Moderate posteriorly directed mitral valve regurgitation.  Moderate mitral valve stenosis with a mean diastolic gradient of 10 mmHg at a heart rate of 80-90 BPM.  Severe biatrial enlargement.  Mild to moderate tricuspid valve regurgitation.  Normal left ventricular size, LVEF 55%-60%.  Right ventricle is normal in size and systolic function.  Estimated RVSP is 45 mmHg plus right atrial pressure.      PHYSICAL EXAMINATION:  /82, pulse 84 per minute, AFib.  JVP is difficult to assess as I examined the patient in the wheelchair, as it was highly challenging for her to get onto the bed.  In fact, she sleeps in a chair recliner at home. Her heart sounds are irregularly irregular, there is a soft pansystolic murmur of mitral regurgitation, but I could not hear a diastolic murmur of mitral stenosis.  Lungs are clear.  She has bilateral lower extremity edema that is chronic and no more than 1-2+.      DIAGNOSES, ASSESSMENT:   1.  Status post mitral valve repair with residual moderate mitral regurgitation and moderate mitral stenosis.  Given Kenyatta's extreme physical immobility and lack of symptoms at that level, my goal would be to manage this medically rather than subject her to additional procedures.  For now, I am going to add a beta blocker to minimize the physiologic impact of mitral stenosis at slower heart rates and a diuretic.   2.  Chronic diastolic heart failure.  She has no evidence of decompensation from this.  The diuretic will help.   3.  Chronic atrial fibrillation.  Continue rate control and anticoagulation strategy.      PLAN:    1.  Increase furosemide from 40 mg daily to 60 mg daily.   2.  Increase metoprolol XL from 50 mg daily to b.i.d.   3.  New  prescriptions completed.   4.  Follow up in 2-3 months with a 3-day Zio Patch heart monitor and a blood test.      Total time 35 minutes, greater than 50% spent in counseling and coordination of care.         SCOTT SAVAGE MD             D: 2020   T: 2020   MT: [place]      Name:     DARLENE FRIAS   MRN:      [id number]        Account:      [id number]   :      1935           Service Date: 2020      Document: [id number]

## 2020-01-28 ENCOUNTER — TELEPHONE (OUTPATIENT)
Dept: FAMILY MEDICINE | Facility: CLINIC | Age: 85
End: 2020-01-28

## 2020-01-28 ENCOUNTER — MEDICAL CORRESPONDENCE (OUTPATIENT)
Dept: HEALTH INFORMATION MANAGEMENT | Facility: CLINIC | Age: 85
End: 2020-01-28

## 2020-01-28 NOTE — TELEPHONE ENCOUNTER
Our goal is to have forms completed within 72 hours, however some forms may require a visit or additional information.    What clinic location was the form placed at [facility] or [place].?     Who is the form from?   Where did the form come from? Faxed to clinic   The form was placed in the inbox of Bess Lion MD      Please fax to [phone]  Phone number: [phone]    Additional comments: [facility] 2/3/20 to 5/2/20    Call take on 1/28/2020 at 5:18 PM by Suzanna More

## 2020-01-29 ENCOUNTER — TELEPHONE (OUTPATIENT)
Dept: FAMILY MEDICINE | Facility: CLINIC | Age: 85
End: 2020-01-29

## 2020-01-29 NOTE — TELEPHONE ENCOUNTER
Our goal is to have forms completed within 72 hours, however some forms may require a visit or additional information.    What clinic location was the form placed at [facility] or [place].?     Who is the form from?   Where did the form come from? Faxed to clinic   The form was placed in the inbox of Bess Lion MD      Please fax to [phone]  Phone number: [phone]    Additional comments: [facility]- Phys[facility] orders (D/t fingerstick INR result of 2.1, orders to remain at current dose of Coumadin and recheck INR on 02/19/2020)    Call take on 1/29/2020 at 3:59 PM by Rosa Kent

## 2020-01-30 ENCOUNTER — MEDICAL CORRESPONDENCE (OUTPATIENT)
Dept: HEALTH INFORMATION MANAGEMENT | Facility: CLINIC | Age: 85
End: 2020-01-30

## 2020-02-17 ENCOUNTER — HOSPITAL ENCOUNTER (OUTPATIENT)
Dept: CARDIOLOGY | Facility: CLINIC | Age: 85
Discharge: HOME | End: 2020-02-17
Attending: INTERNAL MEDICINE | Admitting: INTERNAL MEDICINE
Payer: COMMERCIAL

## 2020-02-17 DIAGNOSIS — I34.0 NONRHEUMATIC MITRAL VALVE STENOSIS WITH INSUFFICIENCY: ICD-10-CM

## 2020-02-17 DIAGNOSIS — I48.21 PERMANENT ATRIAL FIBRILLATION (H): ICD-10-CM

## 2020-02-17 DIAGNOSIS — I34.2 NONRHEUMATIC MITRAL VALVE STENOSIS WITH INSUFFICIENCY: ICD-10-CM

## 2020-02-17 DIAGNOSIS — G12.9 SPINAL MUSCULAR ATROPHY (H): ICD-10-CM

## 2020-02-17 DIAGNOSIS — I10 ESSENTIAL HYPERTENSION: ICD-10-CM

## 2020-02-17 DIAGNOSIS — Z79.01 WARFARIN ANTICOAGULATION: ICD-10-CM

## 2020-02-17 DIAGNOSIS — Z98.890 S/P MITRAL VALVE REPAIR: ICD-10-CM

## 2020-02-17 DIAGNOSIS — Z99.3 WHEELCHAIR BOUND: ICD-10-CM

## 2020-02-17 LAB
ANION GAP SERPL CALCULATED.3IONS-SCNC: 13.8 MMOL/L (ref 6–17)
BUN SERPL-MCNC: 28 MG/DL (ref 7–30)
CALCIUM SERPL-MCNC: 10.2 MG/DL (ref 8.5–10.5)
CHLORIDE SERPL-SCNC: 104 MMOL/L (ref 98–107)
CO2 SERPL-SCNC: 28 MMOL/L (ref 23–29)
CREAT SERPL-MCNC: 0.84 MG/DL (ref 0.7–1.3)
GFR SERPL CREATININE-BSD FRML MDRD: 65 ML/MIN/{1.73_M2}
GLUCOSE SERPL-MCNC: 136 MG/DL (ref 70–105)
POTASSIUM SERPL-SCNC: 3.8 MMOL/L (ref 3.5–5.1)
SODIUM SERPL-SCNC: 142 MMOL/L (ref 136–145)

## 2020-02-17 PROCEDURE — 0296T ZIO PATCH HOLTER ADULT PEDIATRIC GREATER THAN 48 HRS: CPT

## 2020-02-17 PROCEDURE — 0298T ZIO PATCH HOLTER ADULT PEDIATRIC GREATER THAN 48 HRS: CPT | Performed by: INTERNAL MEDICINE

## 2020-02-17 PROCEDURE — 80048 BASIC METABOLIC PNL TOTAL CA: CPT | Performed by: INTERNAL MEDICINE

## 2020-02-17 PROCEDURE — 36415 COLL VENOUS BLD VENIPUNCTURE: CPT | Performed by: INTERNAL MEDICINE

## 2020-02-18 DIAGNOSIS — I10 ESSENTIAL HYPERTENSION, BENIGN: ICD-10-CM

## 2020-02-18 RX ORDER — LISINOPRIL 20 MG/1
20 TABLET ORAL 2 TIMES DAILY
Qty: 180 TABLET | Refills: 3 | Status: SHIPPED | OUTPATIENT
Start: 2020-02-18 | End: 2021-01-29

## 2020-02-19 ENCOUNTER — TELEPHONE (OUTPATIENT)
Dept: FAMILY MEDICINE | Facility: CLINIC | Age: 85
End: 2020-02-19

## 2020-02-19 DIAGNOSIS — Z79.01 CHRONIC ANTICOAGULATION: ICD-10-CM

## 2020-02-19 LAB — INR PPP: 3.5 (ref 0.9–1.1)

## 2020-02-19 NOTE — TELEPHONE ENCOUNTER
ANTICOAGULATION MANAGEMENT     Patient Name:  Kenyatta Donald  Date:  2/19/2020    ASSESSMENT /SUBJECTIVE:      Today's INR result of 3.5 is supratherapeutic. Goal INR of 2.0-3.0      Warfarin dose taken: Warfarin taken as previously instructed    Diet: No new diet changes affecting INR    Medication changes/ interactions: No new medications/supplements affecting INR    Previous INR: Therapeutic     S/S of bleeding or thromboembolism: No    New injury or illness:  No    Upcoming surgery, procedure or cardioversion:  No    Additional findings: None      PLAN:    [facility] with [facility] nurse regarding INR result and instructed:     Warfarin Dosing Instructions: hold tonight then continue your current warfarin dose of 2 mg wed/sat and 4 mg all other day    Instructed patient to follow up no later than: 2 weeks  Orders given to  Homecare nurse/facility to recheck    Education provided: Yes: monitor for s/s of bleeding      [facility] nurse verbalizes understanding and agrees to warfarin dosing plan.    Instructed to call the Anticoagulation Clinic for any changes, questions or concerns. (#[phone])        OBJECTIVE:  INR   Date Value Ref Range Status   02/19/2020 3.5 (A) 0.90 - 1.10 Final             Anticoagulation Summary  As of 2/19/2020    INR goal:   2.0-3.0   TTR:   91.6 % (1 y)   INR used for dosing:   3.5! (2/19/2020)   Warfarin maintenance plan:   2 mg (4 mg x 0.5) every Wed, Sat; 4 mg (4 mg x 1) all other days   Full warfarin instructions:   2/19: Hold; Otherwise 2 mg every Wed, Sat; 4 mg all other days   Weekly warfarin total:   24 mg   Plan last modified:   Monique Brito RN (1/21/2019)   Next INR check:   3/4/2020   Priority:   INR   Target end date:       Indications    Chronic anticoagulation [Z79.01]  Mitral valve disorder s/po 1-9-15 remodeling percut  + clip  (Resolved) [I05.9]             Anticoagulation Episode Summary     INR check location:       Preferred lab:        Send INR reminders to:   [facility]    Comments:   INR range changed by cardiology 1/29/209      Anticoagulation Care Providers     Provider Role Specialty Phone number    AayushlucilaBess ngo MD [facility] [phone]

## 2020-02-20 ENCOUNTER — TELEPHONE (OUTPATIENT)
Dept: FAMILY MEDICINE | Facility: CLINIC | Age: 85
End: 2020-02-20

## 2020-02-20 NOTE — TELEPHONE ENCOUNTER
Our goal is to have forms completed within 72 hours, however some forms may require a visit or additional information.    What clinic location was the form placed at [facility] or [place].?     Who is the form from?   Where did the form come from? Faxed to clinic   The form was placed in the inbox of Bess Lion MD      Please fax to [phone]  Phone number: [phone]    Additional comments: [facility]  INR 3.5 from 2/19/2020    Call take on 2/20/2020 at 1:57 PM by Suzanna More

## 2020-02-28 ENCOUNTER — TELEPHONE (OUTPATIENT)
Dept: CARDIOLOGY | Facility: CLINIC | Age: 85
End: 2020-02-28

## 2020-02-28 NOTE — TELEPHONE ENCOUNTER
ZIO Patch results noted shown persistent A Fib with multiple pauses up to 3.4 sec.  Patient on Metoprolol succinate 50 mg twice daily.    Results reviewed with Dr. Lock.   Recommendation : OV as planned 4-7-2020. Test will be reviewed at that time.

## 2020-03-04 ENCOUNTER — TELEPHONE (OUTPATIENT)
Dept: FAMILY MEDICINE | Facility: CLINIC | Age: 85
End: 2020-03-04

## 2020-03-04 DIAGNOSIS — Z79.01 CHRONIC ANTICOAGULATION: ICD-10-CM

## 2020-03-04 LAB — INR PPP: 3.9 (ref 0.9–1.1)

## 2020-03-04 NOTE — TELEPHONE ENCOUNTER
ANTICOAGULATION MANAGEMENT     Patient Name:  Kenyatta Donald  Date:  3/4/2020    ASSESSMENT /SUBJECTIVE:      Today's INR result of 3.9 is supratherapeutic. Goal INR of 2.0-3.0      Warfarin dose taken: Warfarin taken as previously instructed    Diet: No new diet changes affecting INR    Medication changes/ interactions: No interaction expected between furosemide and metoprolol increase and warfarin    Previous INR: Supratherapeutic     S/S of bleeding or thromboembolism: No    New injury or illness:  No    Upcoming surgery, procedure or cardioversion:  No    Additional findings: none      PLAN:    Spoke with [facility] regarding INR result and instructed:     Warfarin Dosing Instructions: hold tonight then change your warfarin dose to 2 mg MWF and 4 mg all other days    Instructed patient to follow up no later than: 1 week  Orders given to  Homecare nurse/facility to recheck    Education provided: Yes: monitor for bleeding      [facility], home care verbalizes understanding and agrees to warfarin dosing plan.    Instructed to call the Anticoagulation Clinic for any changes, questions or concerns. (#[phone])        OBJECTIVE:  INR   Date Value Ref Range Status   03/04/2020 3.9 (A) 0.90 - 1.10 Final             Anticoagulation Summary  As of 3/4/2020    INR goal:   2.0-3.0   TTR:   87.7 % (1 y)   INR used for dosing:   3.9! (3/4/2020)   Warfarin maintenance plan:   2 mg (4 mg x 0.5) every Mon, Wed, Sat; 4 mg (4 mg x 1) all other days   Full warfarin instructions:   3/4: Hold; Otherwise 2 mg every Mon, Wed, Sat; 4 mg all other days   Weekly warfarin total:   22 mg   Plan last modified:   Radha Linares RN (3/4/2020)   Next INR check:   3/11/2020   Priority:   INR   Target end date:       Indications    Chronic anticoagulation [Z79.01]  Mitral valve disorder s/po 1-9-15 remodeling percut  + clip  (Resolved) [I05.9]             Anticoagulation Episode Summary     INR check location:       Preferred lab:        Send INR reminders to:   [facility]    Comments:   INR range changed by cardiology 1/29/209      Anticoagulation Care Providers     Provider Role Specialty Phone number    AayushlucilaBess ngo MD [facility] [phone]

## 2020-03-06 ENCOUNTER — TELEPHONE (OUTPATIENT)
Dept: FAMILY MEDICINE | Facility: CLINIC | Age: 85
End: 2020-03-06

## 2020-03-06 ENCOUNTER — MEDICAL CORRESPONDENCE (OUTPATIENT)
Dept: HEALTH INFORMATION MANAGEMENT | Facility: CLINIC | Age: 85
End: 2020-03-06

## 2020-03-06 NOTE — TELEPHONE ENCOUNTER
Our goal is to have forms completed within 72 hours, however some forms may require a visit or additional information.    What clinic location was the form placed at [facility] or [place].?     Who is the form from?   Where did the form come from? Faxed to clinic   The form was placed in the inbox of Bess Lion MD      Please fax to [phone]  Phone number: [phone]    Additional comments: [facility]  SN INR 3.9 from 3/4/2020    Call take on 3/6/2020 at 3:24 PM by Suzanna More

## 2020-03-11 ENCOUNTER — TELEPHONE (OUTPATIENT)
Dept: FAMILY MEDICINE | Facility: CLINIC | Age: 85
End: 2020-03-11

## 2020-03-11 DIAGNOSIS — Z79.01 CHRONIC ANTICOAGULATION: ICD-10-CM

## 2020-03-11 LAB — INR PPP: 2.8 (ref 0.9–1.1)

## 2020-03-11 NOTE — TELEPHONE ENCOUNTER
ANTICOAGULATION MANAGEMENT     Patient Name:  Kenyatta Donald  Date:  3/11/2020    ASSESSMENT /SUBJECTIVE:      Today's INR result of 2.8 is therapeutic. Goal INR of 2.0-3.0      Warfarin dose taken: Warfarin recently held as instructed which may be affecting INR    Diet: No new diet changes affecting INR    Medication changes/ interactions: No new medications/supplements affecting INR    Previous INR: Supratherapeutic     S/S of bleeding or thromboembolism: No    New injury or illness:  Yes: Pt has had mild cold/URI sx for 1 week, no fever    Upcoming surgery, procedure or cardioversion:  No    Additional findings: None      PLAN:    [facility] regarding INR result and instructed:     Warfarin Dosing Instructions: Continue your current warfarin dose of 2mg Mon/Wed/Sat; 4mg all other days    Instructed patient to follow up no later than: 1 week  Orders given to  Homecare nurse/facility to recheck    Education provided: [facility]quez verbalizes understanding and agrees to warfarin dosing plan.    Instructed to call the Anticoagulation Clinic for any changes, questions or concerns. (#[phone])        OBJECTIVE:  INR   Date Value Ref Range Status   2020 2.8 (A) 0.90 - 1.10 Final             Anticoagulation Summary  As of 3/11/2020    INR goal:   2.0-3.0   TTR:   86.2 % (1 y)   INR used for dosin.8 (3/11/2020)   Warfarin maintenance plan:   2 mg (4 mg x 0.5) every Mon, Wed, Sat; 4 mg (4 mg x 1) all other days   Full warfarin instructions:   2 mg every Mon, Wed, Sat; 4 mg all other days   Weekly warfarin total:   22 mg   Plan last modified:   Radha Linares, RN (3/4/2020)   Next INR check:   3/18/2020   Priority:   High   Target end date:       Indications    Chronic anticoagulation [Z79.01]  Mitral valve disorder s/po -15 remodeling percut  + clip  (Resolved) [I05.9]             Anticoagulation Episode Summary     INR check location:       Preferred lab:       Send INR  reminders to:   [facility]    Comments:   INR range changed by cardiology 1/29/209      Anticoagulation Care Providers     Provider Role Specialty Phone number    Bess Lion MD [facility] [phone]

## 2020-03-18 ENCOUNTER — TELEPHONE (OUTPATIENT)
Dept: FAMILY MEDICINE | Facility: CLINIC | Age: 85
End: 2020-03-18

## 2020-03-18 DIAGNOSIS — Z79.01 CHRONIC ANTICOAGULATION: ICD-10-CM

## 2020-03-18 DIAGNOSIS — I10 BENIGN ESSENTIAL HYPERTENSION: Primary | ICD-10-CM

## 2020-03-18 LAB — INR PPP: 2.9 (ref 0.9–1.1)

## 2020-03-18 RX ORDER — METOPROLOL SUCCINATE 50 MG/1
50 TABLET, EXTENDED RELEASE ORAL 2 TIMES DAILY
Qty: 200 TABLET | Refills: 4 | Status: SHIPPED | OUTPATIENT
Start: 2020-03-18 | End: 2021-05-12

## 2020-03-18 NOTE — TELEPHONE ENCOUNTER
Reason for Call:  Medication or medication refill:    Do you use a [facility]?  Name of the pharmacy and phone number for the current request:  [facility] [address] [place] - [phone]    Name of the medication requested: metoprolol succinate ER (TOPROL-XL) 50 MG 24 hr tablet    Other request: Dosage was recently increased   She has enough for about 2 weeks   But she will need the refill a bit early this time    Can we leave a detailed message on this number? YES    Phone number patient can be reached at: Other phone number:      Best Time: [phone]    Call taken on 3/18/2020 at 4:04 PM by THERON BANKS

## 2020-03-18 NOTE — TELEPHONE ENCOUNTER
"Requested Prescriptions   Pending Prescriptions Disp Refills     metoprolol succinate ER (TOPROL-XL) 50 MG 24 hr tablet  Last Written Prescription Date:  1/22/2020  Last Fill Quantity: 200 tablet,  # refills: 4   Last office visit: 10/3/2019 with prescribing provider:  TANK Lion   Future Office Visit:   Next 5 appointments (look out 90 days)    Apr 07, 2020 10:45 AM CDT  Return Visit with Brain Lock MD  [facility] ([facility]) [address] [place] [id number] [phone] OPT 2          200 tablet 4     Sig: Take 1 tablet (50 mg) by mouth 2 times daily       Beta-Blockers Protocol Passed - 3/18/2020  4:12 PM        Passed - Blood pressure under 140/90 in past 12 months     BP Readings from Last 3 Encounters:   01/22/20 136/82   10/03/19 110/70   06/01/19 120/66                 Passed - Patient is age 6 or older        Passed - Recent (12 mo) or future (30 days) visit within the authorizing provider's specialty     Patient has had an office visit with the authorizing provider or a provider within the authorizing providers department within the previous 12 mos or has a future within next 30 days. See \"Patient Info\" tab in inbasket, or \"Choose Columns\" in Meds & Orders section of the refill encounter.              Passed - Medication is active on med list              "

## 2020-03-18 NOTE — TELEPHONE ENCOUNTER
ANTICOAGULATION MANAGEMENT     Patient Name:  Kenyatta Donald  Date:  3/18/2020    ASSESSMENT /SUBJECTIVE:      Today's INR result of 2.9 is therapeutic. Goal INR of 2.0-3.0      Warfarin dose taken: Warfarin taken as previously instructed    Diet: No new diet changes affecting INR    Medication changes/ interactions: No new medications/supplements affecting INR    Previous INR: Therapeutic     S/S of bleeding or thromboembolism: No    New injury or illness:  No    Upcoming surgery, procedure or cardioversion:  No    Additional findings: None      PLAN:    [facility] with [facility] nurse regarding INR result and instructed:     Warfarin Dosing Instructions: Continue your current warfarin dose    Instructed patient to follow up no later than: 2 weeks  Orders given to  Homecare nurse/facility to recheck    Education provided: No      [facility], home care verbalizes understanding and agrees to warfarin dosing plan.    Instructed to call the Anticoagulation Clinic for any changes, questions or concerns. (#[phone])        OBJECTIVE:  INR   Date Value Ref Range Status   2020 2.9 (A) 0.90 - 1.10 Final             Anticoagulation Summary  As of 3/18/2020    INR goal:   2.0-3.0   TTR:   86.2 % (1 y)   INR used for dosin.9 (3/18/2020)   Warfarin maintenance plan:   2 mg (4 mg x 0.5) every Mon, Wed, Sat; 4 mg (4 mg x 1) all other days   Full warfarin instructions:   2 mg every Mon, Wed, Sat; 4 mg all other days   Weekly warfarin total:   22 mg   Plan last modified:   Radha Linares RN (3/4/2020)   Next INR check:   2020   Priority:   High   Target end date:       Indications    Chronic anticoagulation [Z79.01]  Mitral valve disorder s/po 1-9-15 remodeling percut  + clip  (Resolved) [I05.9]             Anticoagulation Episode Summary     INR check location:       Preferred lab:       [facility]    Comments:   INR range changed by cardiology        Anticoagulation Care Providers     Provider Role Specialty Phone number    Bess Lion MD [facility] [phone]

## 2020-03-19 ENCOUNTER — TELEPHONE (OUTPATIENT)
Dept: FAMILY MEDICINE | Facility: CLINIC | Age: 85
End: 2020-03-19

## 2020-03-19 DIAGNOSIS — I10 BENIGN ESSENTIAL HYPERTENSION: Primary | ICD-10-CM

## 2020-03-19 RX ORDER — METOPROLOL TARTRATE 50 MG
50 TABLET ORAL 2 TIMES DAILY
Qty: 180 TABLET | Refills: 0 | Status: SHIPPED | OUTPATIENT
Start: 2020-03-19 | End: 2020-04-07

## 2020-03-19 NOTE — TELEPHONE ENCOUNTER
metoprolol succinate ER (TOPROL-XL) 50 MG 24 hr tablet  Is not covered please send an alternative:  Metoprolol tar tab mg, atenolol tab mg, acebutolol cap mg

## 2020-03-20 DIAGNOSIS — Z79.01 CHRONIC ANTICOAGULATION: ICD-10-CM

## 2020-03-20 DIAGNOSIS — I48.20 CHRONIC ATRIAL FIBRILLATION (H): Primary | ICD-10-CM

## 2020-03-20 DIAGNOSIS — Z86.711 HISTORY OF PULMONARY EMBOLISM: ICD-10-CM

## 2020-03-23 ENCOUNTER — TELEPHONE (OUTPATIENT)
Dept: FAMILY MEDICINE | Facility: CLINIC | Age: 85
End: 2020-03-23

## 2020-03-23 NOTE — TELEPHONE ENCOUNTER
Our goal is to have forms completed within 72 hours, however some forms may require a visit or additional information.    What clinic location was the form placed at [facility] or [place].?     Who is the form from?   Where did the form come from? Faxed to clinic   The form was placed in the inbox of Bess Lion MD      Please fax to [phone]  Phone number: [phone]    Additional comments: [facility]  SN INR 2.9 from 3/18/20  //  SN INR 2.8 from 3/11/20    Call take on 3/23/2020 at 10:33 AM by Suzanna More

## 2020-04-01 ENCOUNTER — TELEPHONE (OUTPATIENT)
Dept: FAMILY MEDICINE | Facility: CLINIC | Age: 85
End: 2020-04-01

## 2020-04-01 DIAGNOSIS — Z79.01 CHRONIC ANTICOAGULATION: ICD-10-CM

## 2020-04-01 LAB — INR PPP: 3.7 (ref 0.9–1.1)

## 2020-04-01 NOTE — TELEPHONE ENCOUNTER
ANTICOAGULATION MANAGEMENT     Patient Name:  Kenyatta Donald  Date:  4/1/2020    ASSESSMENT /SUBJECTIVE:      Today's INR result of 3.7 is supratherapeutic. Goal INR of 2.0-3.0      Warfarin dose taken: Warfarin taken as previously instructed    Diet: No new diet changes affecting INR    Medication changes/ interactions: No new medications/supplements affecting INR    Previous INR: Therapeutic     S/S of bleeding or thromboembolism: No    New injury or illness:  No    Upcoming surgery, procedure or cardioversion:  No    Additional findings: None      PLAN:    [facility] with [facility] nurse regarding INR result and instructed:     Warfarin Dosing Instructions: Hold tonight then change your warfarin dose to 4 mg sun/tues/thurs and 2 mg all other days    Instructed patient to follow up no later than: 2 weeks  Orders given to  Homecare nurse/facility to recheck    Education provided: Yes: monitor for bleeding      [facility] nurse verbalizes understanding and agrees to warfarin dosing plan.    Instructed to call the Anticoagulation Clinic for any changes, questions or concerns. (#[phone])        OBJECTIVE:  INR   Date Value Ref Range Status   04/01/2020 3.7 (A) 0.90 - 1.10 Final             Anticoagulation Summary  As of 4/1/2020    INR goal:   2.0-3.0   TTR:   82.8 % (1 y)   INR used for dosing:   3.7! (4/1/2020)   Warfarin maintenance plan:   4 mg (4 mg x 1) every Sun, Tue, Thu; 2 mg (4 mg x 0.5) all other days   Full warfarin instructions:   4/1: Hold; Otherwise 4 mg every Sun, Tue, Thu; 2 mg all other days   Weekly warfarin total:   20 mg   Plan last modified:   Radha Linares RN (4/1/2020)   Next INR check:   4/15/2020   Priority:   High   Target end date:       Indications    Chronic anticoagulation [Z79.01]  Mitral valve disorder s/po 1-9-15 remodeling percut  + clip  (Resolved) [I05.9]             Anticoagulation Episode Summary     INR check location:       Preferred lab:       Send INR  reminders to:   DAVID CASAS [facility] [place]    Comments:   INR range changed by cardiology 1/29/209      Anticoagulation Care Providers     Provider Role Specialty Phone number    Bess Lion MD [facility] [phone]

## 2020-04-07 ENCOUNTER — VIRTUAL VISIT (OUTPATIENT)
Dept: CARDIOLOGY | Facility: CLINIC | Age: 85
End: 2020-04-07
Attending: INTERNAL MEDICINE
Payer: COMMERCIAL

## 2020-04-07 DIAGNOSIS — Z95.818 STATUS POST IMPLANTATION OF MITRAL VALVE LEAFLET CLIP: ICD-10-CM

## 2020-04-07 DIAGNOSIS — Z98.890 STATUS POST IMPLANTATION OF MITRAL VALVE LEAFLET CLIP: ICD-10-CM

## 2020-04-07 DIAGNOSIS — I48.20 CHRONIC ATRIAL FIBRILLATION (H): ICD-10-CM

## 2020-04-07 DIAGNOSIS — I10 BENIGN ESSENTIAL HYPERTENSION: ICD-10-CM

## 2020-04-07 DIAGNOSIS — I34.2 NONRHEUMATIC MITRAL VALVE STENOSIS WITH INSUFFICIENCY: Primary | ICD-10-CM

## 2020-04-07 DIAGNOSIS — Z99.3 WHEELCHAIR BOUND: ICD-10-CM

## 2020-04-07 DIAGNOSIS — Z79.01 WARFARIN ANTICOAGULATION: ICD-10-CM

## 2020-04-07 DIAGNOSIS — G12.9 SPINAL MUSCULAR ATROPHY (H): ICD-10-CM

## 2020-04-07 DIAGNOSIS — I34.0 NONRHEUMATIC MITRAL VALVE STENOSIS WITH INSUFFICIENCY: Primary | ICD-10-CM

## 2020-04-07 PROCEDURE — 99214 OFFICE O/P EST MOD 30 MIN: CPT | Mod: TEL | Performed by: INTERNAL MEDICINE

## 2020-04-07 RX ORDER — FUROSEMIDE 40 MG
40 TABLET ORAL EVERY MORNING
Qty: 100 TABLET | Refills: 4 | Status: SHIPPED | OUTPATIENT
Start: 2020-04-07 | End: 2020-07-24

## 2020-04-07 RX ORDER — FUROSEMIDE 20 MG
20 TABLET ORAL EVERY MORNING
Qty: 100 TABLET | Refills: 4 | COMMUNITY
Start: 2020-04-07 | End: 2021-01-26

## 2020-04-07 NOTE — PROGRESS NOTES
Note Date: 04/07/2020      VIRTUAL NOTE      LOCATION:  [facility], [facility]      PRIMARY CARE PROVIDER:  Bess Lion MD      REASON FOR VISIT:    Scheduled followup of mitral valve disease, status post MitraClip, chronic atrial fibrillation, chronic diastolic heart failure and to review results of heart monitor.      HISTORY OF PRESENT ILLNESS:    Kenyatta Donald is a very pleasant 84-year-old  lady, who is known to me from previous visits.  It was my pleasure to converse with her and her personal care assistant via the telephone today.  Kenyatta is a physically disabled and wheelchair bound due to spinal muscular dystrophy.  She has a personal care assistant who sees her every day for 90 minutes and her nurse who visits 2 times a month to set up her medications.      Kenyatta's medical history is significant for:   1.  Spinal muscular dystrophy.  Diagnosed in her 40s at [facility].  Wheelchair bound.  Daily personal care assistant and a nurse who sets up medications every 2 weeks.  She still lives in her own home.  More recently, the muscular dystrophy has affected her chest walls which intermittently makes her short of breath, but she has not had any significant respiratory compromise.   2.  History of severe mitral valve regurgitation for which she underwent a percutaneous MitraClip procedure in 01/2014.  She has residual moderate mitral regurgitation and moderate mitral stenosis.  LVEF is preserved at 60%-65%.   3.  Chronic atrial fibrillation.  Rate controlled with metoprolol XL 50 mg 2 times daily and warfarin anticoagulation (goal INR 2.0-3.0).  Checks her INR every 2 weeks.  No bleeding issues.  No falls.   4.  Chronic diastolic heart failure.  Her last hospitalization in 12/2017.  On furosemide 60 mg daily.  Weight stable.   5.  Hypertension.  Well-controlled on a combination of amlodipine 5 mg b.i.d., lisinopril 20 mg b.i.d., Toprol-XL 50 mg b.i.d., furosemide  60 mg daily.   6.  Mild nonobstructive coronary artery disease on angiogram in 2014.      I spoke to Kenyatta and her PCA.  They both report that her clinical status is stable.  No lower extremity edema, unusual dyspnea, tolerating medications, nurse checks her blood pressure every 2 weeks and it is stable.  The patient's weight today on her home monitor was 196 pounds, which is actually lower than her previous recorded weights here of 200 pounds.  I note that I had increased her furosemide from 40 mg to 60 mg on her previous visit.      DIAGNOSTIC DATA:  Labs: Her last labs show sodium of 142, potassium 3.8, BUN 28, creatinine 0.8 with an estimated GFR of 65, hemoglobin 12.6, therapeutic INRs.  Normal TSH.      VITAL SIGNS: Vital signs not obtained as virtual visit.  The patient states her recent nurse found BP normal.  Weight 196 pounds, height 5 feet 5 inches.      Recent 3-day Zio Patch heart monitor shows persistent atrial fibrillation that is well rate controlled with an average heart rate of 55 BPM and no significant pauses.      ASSESSMENT:   1.  Status post percutaneous MitraClip repair with residual moderate mitral regurgitation and moderate mitral stenosis.  The patient is asymptomatic, atrial fibrillation well rate-controlled and given that she is wheelchair bound, our goal is to manage this medically rather than subject her to additional procedures.   2.  Chronic diastolic heart failure with LVEF of 60%-65%.  Stable.  Last heart failure hospitalization was in 12/2017.  Tolerating 60 mg of furosemide.   3.  Chronic atrial fibrillation.  Appropriately rate-controlled on Toprol-XL 50 mg b.i.d. and warfarin anticoagulated.   4.  Spinal muscular dystrophy.  Wheelchair bound, lives at home, has excellent medical health.      PLAN:   1.  No changes to medications today.   2.  COVID-19 restrictions currently in place.  The patient is doing well.   3.  We will plan on seeing her in 6 months in my clinic with a  pre-visit echocardiogram.      It was my pleasure to chat with Kenyatta.      cc:   Bess Lion MD   [facility] [place] [facility]   [address] [place] [address]         [facility] YOSHI SAVAGE MD             D: 2020   T: 2020   MT: al      Name:     KENYATTA FRIAS   MRN:      [id number]        Account:      [id number]   :      1935           Note Date: 2020      Document: [id number]

## 2020-04-07 NOTE — PROGRESS NOTES
"Kenyatta Donald is a 84 year old female who is being evaluated via a billable telephone visit.      The patient has been notified of following:     \"This telephone visit will be conducted via a call between you and your physician/provider. We have found that certain health care needs can be provided without the need for a physical exam.  This service lets us provide the care you need with a short phone conversation.  If a prescription is necessary we can send it directly to your pharmacy.  If lab work is needed we can place an order for that and you can then stop by our lab to have the test done at a later time.    If during the course of the call the physician/provider feels a telephone visit is not appropriate, you will not be charged for this service.\"     Patient has given verbal consent for Telephone visit?  Yes    Kenyatta Donald complains of    Chief Complaint   Patient presents with     FU Cardiac testing     heart monitor       I have reviewed and updated the patient's Past Medical History, Social History, Family History and Medication List.    ALLERGIES  Patient has no known allergies.      Patient reported vital signs.   She was unable to assess bp and pulse.  Wt. 195.8 lb   Ht. 5'5\"  Reginald Gaines Lpn    Additional provider notes: See DICTATION ID [id number]    Phone call duration: 12 minutes    Brain Lock MD    "

## 2020-04-07 NOTE — PATIENT INSTRUCTIONS
MEDICATION CHANGES:  1. Stop Clopidogrel 75 mg after you run out of current prescription.  2. Continue warfarin with a goal INR of 2.0 to 3.0.

## 2020-04-15 ENCOUNTER — TELEPHONE (OUTPATIENT)
Dept: FAMILY MEDICINE | Facility: CLINIC | Age: 85
End: 2020-04-15

## 2020-04-15 DIAGNOSIS — Z79.01 CHRONIC ANTICOAGULATION: ICD-10-CM

## 2020-04-15 LAB — INR PPP: 2.5 (ref 0.9–1.1)

## 2020-04-15 NOTE — TELEPHONE ENCOUNTER
ANTICOAGULATION MANAGEMENT     Patient Name:  Kenyatta Donald  Date:  4/15/2020    ASSESSMENT /SUBJECTIVE:    Today's INR result of 2.5 is therapeutic. Goal INR of 2.0-3.0      Warfarin dose taken: Warfarin taken as previously instructed    Diet: No new diet changes affecting INR    Medication changes/ interactions: No interaction expected between metropolol and warfarin - will stop today, D/C'ed per cardiology    Previous INR: Supratherapeutic     S/S of bleeding or thromboembolism: No    New injury or illness: No    Upcoming surgery, procedure or cardioversion: No    Additional findings: None      PLAN:    [facility] with [facility] nurse, regarding INR result and instructed:     Warfarin Dosing Instructions: Continue your current warfarin dose 4 mg every Sun, Tue, Thu; 2 mg all other days    Instructed patient to follow up no later than: 2 weeks  Orders given to  Homecare nurse/facility to recheck    Education provided: Target INR goal and significance of current INR result and No interaction anticipated between warfarin and metropolol      [facility] verbalizes understanding and agrees to warfarin dosing plan.    Instructed to call the Anticoagulation Clinic for any changes, questions or concerns. (#[phone])        OBJECTIVE:  INR   Date Value Ref Range Status   04/15/2020 2.5 (A) 0.90 - 1.10 Final             Anticoagulation Summary  As of 4/15/2020    INR goal:   2.0-3.0   TTR:   80.6 % (1 y)   INR used for dosin.5 (4/15/2020)   Warfarin maintenance plan:   4 mg (4 mg x 1) every Sun, Tue, Thu; 2 mg (4 mg x 0.5) all other days   Full warfarin instructions:   4 mg every Sun, Tue, Thu; 2 mg all other days   Weekly warfarin total:   20 mg   Plan last modified:   Radha Linares RN (2020)   Next INR check:   2020   Priority:   High   Target end date:       Indications    Chronic anticoagulation [Z79.01]  Mitral valve disorder s/po 1-9-15 remodeling percut  + clip  (Resolved) [I05.9]              Anticoagulation Episode Summary     INR check location:       Preferred lab:       Send INR reminders to:   DAVID JUARES    Comments:   INR range changed by cardiology 1/29/209      Anticoagulation Care Providers     Provider Role Specialty Phone number    Bess Lion MD [facility] [phone]

## 2020-04-17 ENCOUNTER — TELEPHONE (OUTPATIENT)
Dept: FAMILY MEDICINE | Facility: CLINIC | Age: 85
End: 2020-04-17

## 2020-04-17 NOTE — TELEPHONE ENCOUNTER
Reason for Call:  Form, our goal is to have forms completed with 72 hours, however, some forms may require a visit or additional information.    Type of letter, form or note:  medical    Who is the form from?: Home care    Where did the form come from: form was faxed in    What clinic location was the form placed at?: [facility]    Where the form was placed: Given to physician    What number is listed as a contact on the form?: (F): [phone], (P): [phone]       Additional comments: [facility]- Physicians orders (D/t fingerstick INR 2.5, Coumadin to remain at current dose)    Call taken on 4/17/2020 at 12:10 PM by Rosa Kent

## 2020-04-20 ENCOUNTER — MEDICAL CORRESPONDENCE (OUTPATIENT)
Dept: HEALTH INFORMATION MANAGEMENT | Facility: CLINIC | Age: 85
End: 2020-04-20

## 2020-04-22 ENCOUNTER — TELEPHONE (OUTPATIENT)
Dept: FAMILY MEDICINE | Facility: CLINIC | Age: 85
End: 2020-04-22

## 2020-04-22 NOTE — TELEPHONE ENCOUNTER
Reason for Call:  Form, our goal is to have forms completed with 72 hours, however, some forms may require a visit or additional information.    Type of letter, form or note:  medical    Who is the form from?: Home care    Where did the form come from: form was faxed in    What clinic location was the form placed at?: [place], [place]  Providers name Bess Lion MD  [place] [facility] [place]    What number is listed as a contact on the form?: [phone]  Phone Number        Additional comments: [facility] SN INR 3.7 from 4/1/20    Call taken on 4/22/2020 at 3:27 PM by Suzanna More

## 2020-04-29 ENCOUNTER — TELEPHONE (OUTPATIENT)
Dept: FAMILY MEDICINE | Facility: CLINIC | Age: 85
End: 2020-04-29

## 2020-04-29 DIAGNOSIS — Z79.01 CHRONIC ANTICOAGULATION: ICD-10-CM

## 2020-04-29 LAB — INR PPP: 2.5 (ref 0.9–1.1)

## 2020-04-29 NOTE — TELEPHONE ENCOUNTER
ANTICOAGULATION MANAGEMENT     Patient Name:  Kenyatta Donald  Date:  2020    ASSESSMENT /SUBJECTIVE:    Today's INR result of 2.5 is therapeutic. Goal INR of 2.0-3.0      Warfarin dose taken: Warfarin taken as previously instructed    Diet: No new diet changes affecting INR    Medication changes/ interactions: No new medications/supplements affecting INR    Previous INR: Therapeutic     S/S of bleeding or thromboembolism: No    New injury or illness: No    Upcoming surgery, procedure or cardioversion: No    Additional findings: None      PLAN:    [facility] with [facility] regarding INR result and instructed:     Warfarin Dosing Instructions: Continue your current warfarin dose 4 mg Sun, Tue, Thu, 2 mg all other days    Instructed patient to follow up no later than: 2 weeks  Orders given to  Homecare nurse/facility to recheck    Education provided: None required      [facility], Nurse verbalizes understanding and agrees to warfarin dosing plan.    Instructed to call the Anticoagulation Clinic for any changes, questions or concerns. (#[phone])        OBJECTIVE:  INR   Date Value Ref Range Status   2020 2.5 (A) 0.90 - 1.10 Final             Anticoagulation Summary  As of 2020    INR goal:   2.0-3.0   TTR:   80.6 % (1 y)   INR used for dosin.5 (2020)   Warfarin maintenance plan:   4 mg (4 mg x 1) every Sun, Tue, Thu; 2 mg (4 mg x 0.5) all other days   Full warfarin instructions:   4 mg every Sun, Tue, Thu; 2 mg all other days   Weekly warfarin total:   20 mg   No change documented:   Carmita Montenegro RN   Plan last modified:   Radha Linares RN (2020)   Next INR check:   2020   Priority:   High   Target end date:       Indications    Chronic anticoagulation [Z79.01]  Mitral valve disorder s/po 1-9-15 remodeling percut  + clip  (Resolved) [I05.9]             Anticoagulation Episode Summary     INR check location:       Preferred lab:       Send INR reminders to:   [facility]     Comments:   INR range changed by cardiology 1/29/209      Anticoagulation Care Providers     Provider Role Specialty Phone number    Waqas Lionanahi Pyle MD [facility] [phone]

## 2020-04-30 ENCOUNTER — TELEPHONE (OUTPATIENT)
Dept: FAMILY MEDICINE | Facility: CLINIC | Age: 85
End: 2020-04-30

## 2020-04-30 NOTE — TELEPHONE ENCOUNTER
/Reason for Call:  Form, our goal is to have forms completed with 72 hours, however, some forms may require a visit or additional information.    Type of letter, form or note:  medical    Who is the form from?: Home care    Where did the form come from: form was faxed in    What clinic location was the form placed at?: [facility]    Where the form was placed: Given to physician    What number is listed as a contact on the form?: [phone] (P) [phone]       Additional comments: [facility]: INR 2.5 recheck in 2 weeks    Call taken on 4/30/2020 at 9:35 AM by Libby Toledo

## 2020-05-06 ENCOUNTER — MEDICAL CORRESPONDENCE (OUTPATIENT)
Dept: HEALTH INFORMATION MANAGEMENT | Facility: CLINIC | Age: 85
End: 2020-05-06

## 2020-05-06 ENCOUNTER — TELEPHONE (OUTPATIENT)
Dept: FAMILY MEDICINE | Facility: CLINIC | Age: 85
End: 2020-05-06

## 2020-05-06 NOTE — TELEPHONE ENCOUNTER
Reason for Call:  Form, our goal is to have forms completed with 72 hours, however, some forms may require a visit or additional information.    Type of letter, form or note:  medical    Who is the form from?: Home care    Where did the form come from: form was faxed in    What clinic location was the form placed at?: [place], [place]  Providers name Bess Lion MD  Where the form was placed: [facility]    What number is listed as a contact on the form?: [phone]  Phone Number [phone]       Additional comments: [facility]   drug interaction physician order     Call taken on 5/6/2020 at 11:31 AM by Suzanna More

## 2020-05-09 DIAGNOSIS — E03.9 ACQUIRED HYPOTHYROIDISM: ICD-10-CM

## 2020-05-09 DIAGNOSIS — M10.279 DRUG-INDUCED GOUT INVOLVING TOE, UNSPECIFIED CHRONICITY, UNSPECIFIED LATERALITY: ICD-10-CM

## 2020-05-11 RX ORDER — LEVOTHYROXINE SODIUM 100 UG/1
TABLET ORAL
Qty: 90 TABLET | Refills: 0 | Status: SHIPPED | OUTPATIENT
Start: 2020-05-11 | End: 2020-08-19

## 2020-05-11 RX ORDER — ALLOPURINOL 100 MG/1
TABLET ORAL
Qty: 90 TABLET | Refills: 0 | Status: SHIPPED | OUTPATIENT
Start: 2020-05-11 | End: 2020-08-19

## 2020-05-11 NOTE — TELEPHONE ENCOUNTER
Routing refill request to provider for review/approval because:  Labs out of range:  Uric acid  Labs not current:  ALT

## 2020-05-12 ENCOUNTER — ANTICOAGULATION THERAPY VISIT (OUTPATIENT)
Dept: NURSING | Facility: CLINIC | Age: 85
End: 2020-05-12
Payer: COMMERCIAL

## 2020-05-12 ENCOUNTER — TELEPHONE (OUTPATIENT)
Dept: FAMILY MEDICINE | Facility: CLINIC | Age: 85
End: 2020-05-12

## 2020-05-12 DIAGNOSIS — Z79.01 LONG TERM CURRENT USE OF ANTICOAGULANT THERAPY: Primary | ICD-10-CM

## 2020-05-12 DIAGNOSIS — Z86.711 HISTORY OF PULMONARY EMBOLISM: ICD-10-CM

## 2020-05-12 DIAGNOSIS — I48.20 CHRONIC ATRIAL FIBRILLATION (H): ICD-10-CM

## 2020-05-12 DIAGNOSIS — Z79.01 CHRONIC ANTICOAGULATION: ICD-10-CM

## 2020-05-12 LAB — INR PPP: 2.3 (ref 0.9–1.1)

## 2020-05-12 PROCEDURE — 99207 ZZC NO CHARGE NURSE ONLY: CPT | Performed by: FAMILY MEDICINE

## 2020-05-12 NOTE — TELEPHONE ENCOUNTER
ANTICOAGULATION MANAGEMENT    Kenyatta Donald due for review and annual renewal of referral to anticoagulation monitoring.      Warfarin indication(s) Atrial Fibrillation, HX PE   INR goal 2.0-3.0   Current duration of therapy Indefinite/long term therapy   Date of last office visit 10/01/2019       Please advise if Kenyatta Donald's anticoagulation management referral can be renewed for next year.     Has the patient previously taken warfarin? yes  If yes, for what indication? Afib, PE    Does the patient have any of the following indications for a higher range of 2.5-3.5:    Mitral position mechanical valve? no    Du-Shiley, Ball and Cage or Monoleaflet valve (regardless of position) no    Other (if yes, please explain) no      Macy Maier RPH

## 2020-05-12 NOTE — PROGRESS NOTES
ANTICOAGULATION FOLLOW-UP CLINIC VISIT    Patient Name:  Kenyatta Donald  Date:  2020  Contact Type:  Telephone/ ISIS Marquez    SUBJECTIVE:  Patient Findings     Comments:   Assessed for S/S bleeding, clotting, medication, diet, health, activity and alcohol changes.          Clinical Outcomes     Negatives:   Major bleeding event, Thromboembolic event, Anticoagulation-related hospital admission, Anticoagulation-related ED visit, Anticoagulation-related fatality    Comments:   Assessed for S/S bleeding, clotting, medication, diet, health, activity and alcohol changes.             OBJECTIVE    Recent labs: (last 7 days)     20   INR 2.3*       ASSESSMENT / PLAN  INR assessment THER    Recheck INR In: 2 WEEKS    INR Location [facility] INR      Anticoagulation Summary  As of 2020    INR goal:   2.0-3.0   TTR:   80.6 % (1 y)   INR used for dosin.3 (2020)   Warfarin maintenance plan:   4 mg (4 mg x 1) every Sun, Tue, Thu; 2 mg (4 mg x 0.5) all other days   Full warfarin instructions:   4 mg every Sun, Tue, Thu; 2 mg all other days   Weekly warfarin total:   20 mg   No change documented:   Macy Maier [facility]   Plan last modified:   Radha Linares RN (2020)   Next INR check:   2020   Priority:   High   Target end date:       Indications    Chronic anticoagulation [Z79.01]  Mitral valve disorder s/po 1-9-15 remodeling percut  + clip  (Resolved) [I05.9]             Anticoagulation Episode Summary     INR check location:       Preferred lab:       Send INR reminders to:   DAVID JUARES    Comments:   INR range changed by cardiology       Anticoagulation Care Providers     Provider Role Specialty Phone number    Bess Lion MD [facility] [phone]            See the Encounter Report to view Anticoagulation Flowsheet and Dosing Calendar (Go to Encounters tab in chart review, and find the Anticoagulation Therapy Visit)    OK to  continue current dose, recheck in 2 weeks since stable.    Macy Maier RP

## 2020-05-13 ENCOUNTER — MEDICAL CORRESPONDENCE (OUTPATIENT)
Dept: HEALTH INFORMATION MANAGEMENT | Facility: CLINIC | Age: 85
End: 2020-05-13

## 2020-05-13 ENCOUNTER — TELEPHONE (OUTPATIENT)
Dept: FAMILY MEDICINE | Facility: CLINIC | Age: 85
End: 2020-05-13

## 2020-05-13 NOTE — TELEPHONE ENCOUNTER
Reason for Call:  Form, our goal is to have forms completed with 72 hours, however, some forms may require a visit or additional information.    Type of letter, form or note:  medical    Who is the form from?: Home care    Where did the form come from: form was faxed in    What clinic location was the form placed at?: [facility]    Where the form was placed: Given to physician    What number is listed as a contact on the form?: [phone] (P) [phone]       Additional comments: [facility]: INR 2.5; [facility] POC 5/8/19-7/6/19    Call taken on 5/13/2020 at 12:03 PM by Libby Toledo

## 2020-05-13 NOTE — TELEPHONE ENCOUNTER
Reason for Call:  Form, our goal is to have forms completed with 72 hours, however, some forms may require a visit or additional information.    Type of letter, form or note:  Home Health Certification    Who is the form from?: Home care    Where did the form come from: form was faxed in    What clinic location was the form placed at?: [facility]    Where the form was placed: Given to physician    What number is listed as a contact on the form?: [phone] (P) [phone]       Additional comments[facility]; [facility] POC 5/3/2020-7/31/2020; INR recheck 12/26/19    Call taken on 5/13/2020 at 10:05 AM by Libby Toledo

## 2020-05-14 ENCOUNTER — MEDICAL CORRESPONDENCE (OUTPATIENT)
Dept: HEALTH INFORMATION MANAGEMENT | Facility: CLINIC | Age: 85
End: 2020-05-14

## 2020-05-15 ENCOUNTER — TELEPHONE (OUTPATIENT)
Dept: FAMILY MEDICINE | Facility: CLINIC | Age: 85
End: 2020-05-15

## 2020-05-15 NOTE — TELEPHONE ENCOUNTER
Reason for Call:  Form, our goal is to have forms completed with 72 hours, however, some forms may require a visit or additional information.    Type of letter, form or note:  medical    Who is the form from?: Home care    Where did the form come from: form was faxed in    What clinic location was the form placed at?: [place], [place]  Providers name Bess Lion MD  Where the form was placed: [facility]    What number is listed as a contact on the form?: [phone]  Phone Number        Additional comments: [facility] INR 2.3 from 5/12/20    Call taken on 5/15/2020 at 11:50 AM by Suzanna More

## 2020-05-27 ENCOUNTER — ANTICOAGULATION THERAPY VISIT (OUTPATIENT)
Dept: FAMILY MEDICINE | Facility: CLINIC | Age: 85
End: 2020-05-27

## 2020-05-27 DIAGNOSIS — I48.20 CHRONIC ATRIAL FIBRILLATION (H): ICD-10-CM

## 2020-05-27 DIAGNOSIS — Z79.01 CHRONIC ANTICOAGULATION: ICD-10-CM

## 2020-05-27 DIAGNOSIS — Z79.01 LONG TERM CURRENT USE OF ANTICOAGULANT THERAPY: ICD-10-CM

## 2020-05-27 DIAGNOSIS — Z86.711 HISTORY OF PULMONARY EMBOLISM: ICD-10-CM

## 2020-05-27 LAB — INR PPP: 1.7 (ref 0.9–1.1)

## 2020-05-27 NOTE — PROGRESS NOTES
ANTICOAGULATION MANAGEMENT     Patient Name:  Kenyatta Donald  Date:  2020    ASSESSMENT /SUBJECTIVE:    Today's INR result of 1.7 is subtherapeutic. Goal INR of 2.0-3.0      Warfarin dose taken: Warfarin taken as previously instructed    Diet: No new diet changes affecting INR    Medication changes/ interactions: No new medications/supplements affecting INR    Previous INR: Therapeutic     S/S of bleeding or thromboembolism: No    New injury or illness: No    Upcoming surgery, procedure or cardioversion: No    Additional findings: Diarrhea x2 over the weekend      PLAN:    Spoke with [facility], home care, regarding INR result and instructed:     Warfarin Dosing Instructions: 4mg tonight then continue your current warfarin dose of 4 mg on /tues/thurs and 2 mg all other days    Instructed patient to follow up no later than: 2 weeks  Orders given to  Homecare nurse/facility to recheck    Education provided: None required      [facility], home care,  verbalizes understanding and agrees to warfarin dosing plan.    Instructed to call the Anticoagulation Clinic for any changes, questions or concerns. (#[phone])        OBJECTIVE:  INR   Date Value Ref Range Status   2020 1.7 (A) 0.90 - 1.10 Final         No question data found.  Anticoagulation Summary  As of 2020    INR goal:   2.0-3.0   TTR:   78.5 % (1 y)   INR used for dosin.7! (2020)   Warfarin maintenance plan:   4 mg (4 mg x 1) every Sun, Tue, Thu; 2 mg (4 mg x 0.5) all other days   Full warfarin instructions:   4 mg every Sun, Tue, Thu; 2 mg all other days   Weekly warfarin total:   20 mg   Plan last modified:   Radha Linares RN (2020)   Next INR check:   6/10/2020   Priority:   High   Target end date:   Indefinite    Indications    Chronic anticoagulation [Z79.01]  Mitral valve disorder s/po 115 remodeling percut  + clip  (Resolved) [I05.9]  Chronic atrial fibrillation [I48.20]  History of pulmonary embolism [Z86.711]  Long  term current use of anticoagulant therapy [Z79.01]             Anticoagulation Episode Summary     INR check location:       Preferred lab:       Send INR reminders to:   [facility]    Comments:   INR range changed by cardiology 1/29/209      Anticoagulation Care Providers     Provider Role Specialty Phone number    Bess Lion MD [facility] [phone]

## 2020-05-29 ENCOUNTER — TELEPHONE (OUTPATIENT)
Dept: FAMILY MEDICINE | Facility: CLINIC | Age: 85
End: 2020-05-29

## 2020-05-29 ENCOUNTER — MEDICAL CORRESPONDENCE (OUTPATIENT)
Dept: HEALTH INFORMATION MANAGEMENT | Facility: CLINIC | Age: 85
End: 2020-05-29

## 2020-05-29 NOTE — TELEPHONE ENCOUNTER
Reason for Call:  Form, our goal is to have forms completed with 72 hours, however, some forms may require a visit or additional information.    Type of letter, form or note:  medical    Who is the form from?: Home care    Where did the form come from: form was faxed in    What clinic location was the form placed at?: [place], [place]  Providers name Andry Damico MD  Where the form was placed: [facility]    What number is listed as a contact on the form?: [phone]   Phone Number: [phone]       Additional comments: [facility]: INR 1.7     Call taken on 5/29/2020 at 11:42 AM by Libby Toledo

## 2020-06-10 ENCOUNTER — ANTICOAGULATION THERAPY VISIT (OUTPATIENT)
Dept: FAMILY MEDICINE | Facility: CLINIC | Age: 85
End: 2020-06-10

## 2020-06-10 DIAGNOSIS — Z86.711 HISTORY OF PULMONARY EMBOLISM: ICD-10-CM

## 2020-06-10 DIAGNOSIS — Z79.01 CHRONIC ANTICOAGULATION: ICD-10-CM

## 2020-06-10 DIAGNOSIS — Z79.01 LONG TERM CURRENT USE OF ANTICOAGULANT THERAPY: ICD-10-CM

## 2020-06-10 DIAGNOSIS — I48.20 CHRONIC ATRIAL FIBRILLATION (H): ICD-10-CM

## 2020-06-10 LAB — INR PPP: 1.9 (ref 0.9–1.1)

## 2020-06-10 NOTE — PROGRESS NOTES
ANTICOAGULATION MANAGEMENT     Patient Name:  Kenyatta Donald  Date:  6/10/2020    ASSESSMENT /SUBJECTIVE:    Today's INR result of 1.9 is subtherapeutic. Goal INR of 2.0-3.0      Warfarin dose taken: Warfarin taken as previously instructed    Diet: No new diet changes affecting INR    Medication changes/ interactions: No new medications/supplements affecting INR    Previous INR: Subtherapeutic     S/S of bleeding or thromboembolism: No    New injury or illness: No    Upcoming surgery, procedure or cardioversion: No    Additional findings: None      PLAN:    [facility] with home care nurse Alma regarding INR result and instructed:     Warfarin Dosing Instructions: Change your warfarin dose to 2mg mon/thurs/sat and 4mg rest of week (10% change)    Instructed patient to follow up no later than: 2 weeks  Orders given to  Homecare nurse/facility to recheck    Education provided: Importance of therapeutic range      Alma verbalizes understanding and agrees to warfarin dosing plan.    Instructed to call the Anticoagulation Clinic for any changes, questions or concerns. (#[phone])        OBJECTIVE:  INR   Date Value Ref Range Status   06/10/2020 1.9 (A) 0.90 - 1.10 Final         No question data found.  Anticoagulation Summary  As of 6/10/2020    INR goal:   2.0-3.0   TTR:   76.2 % (1 y)   INR used for dosin.9! (6/10/2020)   Warfarin maintenance plan:   2 mg (4 mg x 0.5) every Mon, Thu, Sat; 4 mg (4 mg x 1) all other days   Full warfarin instructions:   2 mg every Mon, Thu, Sat; 4 mg all other days   Weekly warfarin total:   22 mg   Plan last modified:   Anjana Werner RN (6/10/2020)   Next INR check:   2020   Priority:   High   Target end date:   Indefinite    Indications    Chronic anticoagulation [Z79.01]  Mitral valve disorder s/po 1-9-15 remodeling percut  + clip  (Resolved) [I05.9]  Chronic atrial fibrillation [I48.20]  History of pulmonary embolism [Z86.711]  Long term current use of  anticoagulant therapy [Z79.01]             Anticoagulation Episode Summary     INR check location:       Preferred lab:       Send INR reminders to:   [facility]    Comments:   INR range changed by cardiology 1/29/209      Anticoagulation Care Providers     Provider Role Specialty Phone number    Bess Lion MD Referring [facility] [phone]

## 2020-06-11 ENCOUNTER — TELEPHONE (OUTPATIENT)
Dept: FAMILY MEDICINE | Facility: CLINIC | Age: 85
End: 2020-06-11

## 2020-06-11 ENCOUNTER — MEDICAL CORRESPONDENCE (OUTPATIENT)
Dept: HEALTH INFORMATION MANAGEMENT | Facility: CLINIC | Age: 85
End: 2020-06-11

## 2020-06-11 NOTE — TELEPHONE ENCOUNTER
Reason for Call:  Form, our goal is to have forms completed with 72 hours, however, some forms may require a visit or additional information.    Type of letter, form or note:  Home Health Certification    Who is the form from?: Home care    Where did the form come from: form was faxed in    What clinic location was the form placed at?: [facility]    Where the form was placed: [facility] Box/Folder    What number is listed as a contact on the form?: [phone] (P) [phone]       Additional comments: [facility]: [facility] POC 5/3/20-7/31/20    Call taken on 6/11/2020 at 10:34 AM by Libby Toledo

## 2020-06-12 ENCOUNTER — TELEPHONE (OUTPATIENT)
Dept: FAMILY MEDICINE | Facility: CLINIC | Age: 85
End: 2020-06-12

## 2020-06-12 NOTE — TELEPHONE ENCOUNTER
Reason for Call:  Form, our goal is to have forms completed with 72 hours, however, some forms may require a visit or additional information.    Type of letter, form or note:  medical    Who is the form from?: Home care    Where did the form come from: form was faxed in    What clinic location was the form placed at?: [facility]    Where the form was placed: [facility] inbox    What number is listed as a contact on the form?: [phone] (P) [phone]       Additional comments: [facility]: INR 1.9    Call taken on 6/12/2020 at 1:06 PM by Libby Toledo

## 2020-06-24 ENCOUNTER — ANTICOAGULATION THERAPY VISIT (OUTPATIENT)
Dept: FAMILY MEDICINE | Facility: CLINIC | Age: 85
End: 2020-06-24

## 2020-06-24 DIAGNOSIS — Z79.01 CHRONIC ANTICOAGULATION: ICD-10-CM

## 2020-06-24 DIAGNOSIS — Z79.01 LONG TERM CURRENT USE OF ANTICOAGULANT THERAPY: ICD-10-CM

## 2020-06-24 DIAGNOSIS — I48.20 CHRONIC ATRIAL FIBRILLATION (H): ICD-10-CM

## 2020-06-24 DIAGNOSIS — Z86.711 HISTORY OF PULMONARY EMBOLISM: ICD-10-CM

## 2020-06-24 LAB — INR PPP: 1.8 (ref 0.9–1.1)

## 2020-06-24 NOTE — PROGRESS NOTES
ANTICOAGULATION MANAGEMENT     Patient Name:  Kenyatta Donald  Date:  2020    ASSESSMENT /SUBJECTIVE:    Today's INR result of 1.8 is subtherapeutic. Goal INR of 2.0-3.0      Warfarin dose taken: Warfarin taken as previously instructed    Diet: No new diet changes affecting INR    Medication changes/ interactions: No new medications/supplements affecting INR    Previous INR: Subtherapeutic     S/S of bleeding or thromboembolism: No    New injury or illness: No    Upcoming surgery, procedure or cardioversion: No    Additional findings: None      PLAN:    [facility] with [facility], regarding INR result and instructed:     Warfarin Dosing Instructions: Change your warfarin dose to 2 mg on mon/thur and 4 mg all other days    Instructed patient to follow up no later than: 2 weeks  Orders given to  Homecare nurse/facility to recheck    Education provided: None required      [facility] home care nurse, verbalizes understanding and agrees to warfarin dosing plan.    Instructed to call the Anticoagulation Clinic for any changes, questions or concerns. (#[phone])        OBJECTIVE:  INR   Date Value Ref Range Status   2020 1.8 (A) 0.90 - 1.10 Final         No question data found.  Anticoagulation Summary  As of 2020    INR goal:   2.0-3.0   TTR:   76.2 % (1 y)   INR used for dosin.8! (2020)   Warfarin maintenance plan:   2 mg (4 mg x 0.5) every Mon, Thu; 4 mg (4 mg x 1) all other days   Full warfarin instructions:   2 mg every Mon, Thu; 4 mg all other days   Weekly warfarin total:   24 mg   Plan last modified:   Radha Linares RN (2020)   Next INR check:   2020   Priority:   High   Target end date:   Indefinite    Indications    Chronic anticoagulation [Z79.01]  Mitral valve disorder s/po 1-9-15 remodeling percut  + clip  (Resolved) [I05.9]  Chronic atrial fibrillation (H) [I48.20]  History of pulmonary embolism [Z86.711]  Long term current use of anticoagulant therapy [Z79.01]              Anticoagulation Episode Summary     INR check location:       Preferred lab:       Send INR reminders to:   [facility]    Comments:   INR range changed by cardiology 1/29/209      Anticoagulation Care Providers     Provider Role Specialty Phone number    Bess Lion MD Referring [facility] [phone]

## 2020-06-26 ENCOUNTER — MEDICAL CORRESPONDENCE (OUTPATIENT)
Dept: HEALTH INFORMATION MANAGEMENT | Facility: CLINIC | Age: 85
End: 2020-06-26

## 2020-06-26 ENCOUNTER — TELEPHONE (OUTPATIENT)
Dept: FAMILY MEDICINE | Facility: CLINIC | Age: 85
End: 2020-06-26

## 2020-06-26 NOTE — TELEPHONE ENCOUNTER
Reason for Call:  Form, our goal is to have forms completed with 72 hours, however, some forms may require a visit or additional information.    Type of letter, form or note:  medical    Who is the form from?: Home care    Where did the form come from: form was faxed in    What clinic location was the form placed at?: [facility]    Where the form was placed: [facility] Box/Folder    What number is listed as a contact on the form?: [phone] (P) [phone]       Additional comments: [facility]: INR    Call taken on 6/26/2020 at 12:39 PM by Libby Toledo

## 2020-07-08 ENCOUNTER — ANTICOAGULATION THERAPY VISIT (OUTPATIENT)
Dept: FAMILY MEDICINE | Facility: CLINIC | Age: 85
End: 2020-07-08

## 2020-07-08 DIAGNOSIS — I48.20 CHRONIC ATRIAL FIBRILLATION (H): ICD-10-CM

## 2020-07-08 DIAGNOSIS — Z79.01 LONG TERM CURRENT USE OF ANTICOAGULANT THERAPY: ICD-10-CM

## 2020-07-08 DIAGNOSIS — Z79.01 CHRONIC ANTICOAGULATION: ICD-10-CM

## 2020-07-08 DIAGNOSIS — Z86.711 HISTORY OF PULMONARY EMBOLISM: ICD-10-CM

## 2020-07-08 LAB — INR PPP: 1.8 (ref 0.9–1.1)

## 2020-07-08 NOTE — PROGRESS NOTES
ANTICOAGULATION MANAGEMENT     Patient Name:  Kenyatta Donald  Date:  2020    ASSESSMENT /SUBJECTIVE:    Today's INR result of 1.8 is subtherapeutic. Goal INR of 2.0-3.0      Warfarin dose taken: Warfarin taken as previously instructed    Diet: No new diet changes affecting INR    Medication changes/ interactions: No new medications/supplements affecting INR    Previous INR: Subtherapeutic     S/S of bleeding or thromboembolism: No    New injury or illness: No    Upcoming surgery, procedure or cardioversion: No    Additional findings: None      PLAN:    [facility] with [facility] home care nurse, regarding INR result and instructed:     Warfarin Dosing Instructions: Change your warfarin dose to 2 mg on  and 4 mg all other days    Instructed patient to follow up no later than: 2 weeks  Orders given to  Homecare nurse/facility to recheck    Education provided: None required    [facility] home care nurse,   verbalizes understanding and agrees to warfarin dosing plan.    Instructed to call the Anticoagulation Clinic for any changes, questions or concerns. (#[phone])        OBJECTIVE:  INR   Date Value Ref Range Status   2020 1.8 (A) 0.90 - 1.10 Final         No question data found.  Anticoagulation Summary  As of 2020    INR goal:   2.0-3.0   TTR:   72.6 % (1 y)   INR used for dosin.8! (2020)   Warfarin maintenance plan:   2 mg (4 mg x 0.5) every Mon; 4 mg (4 mg x 1) all other days   Full warfarin instructions:   2 mg every Mon; 4 mg all other days   Weekly warfarin total:   26 mg   Plan last modified:   Radha Linares RN (2020)   Next INR check:      Priority:   High   Target end date:   Indefinite    Indications    Chronic anticoagulation [Z79.01]  Mitral valve disorder s/po 115 remodeling percut  + clip  (Resolved) [I05.9]  Chronic atrial fibrillation (H) [I48.20]  History of pulmonary embolism [Z86.711]  Long term current use of anticoagulant therapy [Z79.01]              Anticoagulation Episode Summary     INR check location:       Preferred lab:       Send INR reminders to:   [facility]    Comments:   INR range changed by cardiology 1/29/209      Anticoagulation Care Providers     Provider Role Specialty Phone number    Bess Lion MD Referring [facility] [phone]

## 2020-07-13 ENCOUNTER — MEDICAL CORRESPONDENCE (OUTPATIENT)
Dept: HEALTH INFORMATION MANAGEMENT | Facility: CLINIC | Age: 85
End: 2020-07-13

## 2020-07-13 ENCOUNTER — TELEPHONE (OUTPATIENT)
Dept: FAMILY MEDICINE | Facility: CLINIC | Age: 85
End: 2020-07-13

## 2020-07-13 NOTE — TELEPHONE ENCOUNTER
Reason for Call:  Form, our goal is to have forms completed with 72 hours, however, some forms may require a visit or additional information.    Type of letter, form or note:  medical    Who is the form from?: Home care    Where did the form come from: form was faxed in    What clinic location was the form placed at?: [place], [place]  Providers name Bess Lion MD  Where the form was placed: [facility]    What number is listed as a contact on the form?: [phone]   Phone Number        Additional comments: [facility]  SN INR 1.8 from 7/8/20     Call taken on 7/13/2020 at 1:35 PM by Suzanna More

## 2020-07-22 ENCOUNTER — ANTICOAGULATION THERAPY VISIT (OUTPATIENT)
Dept: FAMILY MEDICINE | Facility: CLINIC | Age: 85
End: 2020-07-22

## 2020-07-22 ENCOUNTER — MEDICAL CORRESPONDENCE (OUTPATIENT)
Dept: HEALTH INFORMATION MANAGEMENT | Facility: CLINIC | Age: 85
End: 2020-07-22

## 2020-07-22 DIAGNOSIS — Z79.01 LONG TERM CURRENT USE OF ANTICOAGULANT THERAPY: ICD-10-CM

## 2020-07-22 DIAGNOSIS — I48.20 CHRONIC ATRIAL FIBRILLATION (H): ICD-10-CM

## 2020-07-22 DIAGNOSIS — Z86.711 HISTORY OF PULMONARY EMBOLISM: ICD-10-CM

## 2020-07-22 DIAGNOSIS — Z79.01 CHRONIC ANTICOAGULATION: ICD-10-CM

## 2020-07-22 LAB — INR PPP: 2.4 (ref 0.9–1.1)

## 2020-07-22 NOTE — PROGRESS NOTES
ANTICOAGULATION MANAGEMENT     Patient Name:  Kenyatta Donald  Date:  2020    ASSESSMENT /SUBJECTIVE:    Today's INR result of 2.4 is therapeutic. Goal INR of 2.0-3.0      Warfarin dose taken: Warfarin taken as previously instructed    Diet: No new diet changes affecting INR    Medication changes/ interactions: No new medications/supplements affecting INR    Previous INR: Subtherapeutic     S/S of bleeding or thromboembolism: No    New injury or illness: No    Upcoming surgery, procedure or cardioversion: No    Additional findings: None      PLAN:    [facility] with Litia/Home care nurse regarding INR result and instructed:     Warfarin Dosing Instructions: Continue your current warfarin dose 2 mg Mon, 4 mg all other days    Instructed patient to follow up no later than: 2 weeks  Orders given to  Homecare nurse/facility to recheck    Education provided: Monitoring for bleeding signs and symptoms and Monitoring for clotting signs and symptoms      [place]ren[place], Nurse verbalizes understanding and agrees to warfarin dosing plan.    Instructed to call the Anticoagulation Clinic for any changes, questions or concerns. (#[phone])        Carmita Montenegro RN      OBJECTIVE:  Recent labs: (last 7 days)     20   INR 2.4*         No question data found.  Anticoagulation Summary  As of 2020    INR goal:   2.0-3.0   TTR:   71.3 % (1 y)   INR used for dosin.4 (2020)   Warfarin maintenance plan:   2 mg (4 mg x 0.5) every Mon; 4 mg (4 mg x 1) all other days   Full warfarin instructions:   2 mg every Mon; 4 mg all other days   Weekly warfarin total:   26 mg   No change documented:   Carmita Montenegro RN   Plan last modified:   Radha Linares RN (2020)   Next INR check:   2020   Priority:   High   Target end date:   Indefinite    Indications    Chronic anticoagulation [Z79.01]  Mitral valve disorder s/po 1-9-15 remodeling percut  + clip  (Resolved) [I05.9]  Chronic atrial fibrillation (H) [I48.20]  History  of pulmonary embolism [Z86.711]  Long term current use of anticoagulant therapy [Z79.01]             Anticoagulation Episode Summary     INR check location:       Preferred lab:       Send INR reminders to:   [facility]    Comments:   INR range changed by cardiology 1/29/209      Anticoagulation Care Providers     Provider Role Specialty Phone number    Bess Lion MD [facility] [phone]

## 2020-07-23 ENCOUNTER — ANTICOAGULATION THERAPY VISIT (OUTPATIENT)
Dept: FAMILY MEDICINE | Facility: CLINIC | Age: 85
End: 2020-07-23

## 2020-07-23 DIAGNOSIS — I48.20 CHRONIC ATRIAL FIBRILLATION (H): ICD-10-CM

## 2020-07-23 DIAGNOSIS — Z86.711 HISTORY OF PULMONARY EMBOLISM: ICD-10-CM

## 2020-07-23 DIAGNOSIS — Z79.01 LONG TERM CURRENT USE OF ANTICOAGULANT THERAPY: ICD-10-CM

## 2020-07-23 DIAGNOSIS — Z79.01 CHRONIC ANTICOAGULATION: ICD-10-CM

## 2020-07-23 RX ORDER — WARFARIN SODIUM 2 MG/1
TABLET ORAL
Qty: 180 TABLET | Refills: 0 | Status: SHIPPED | OUTPATIENT
Start: 2020-07-23 | End: 2020-08-10

## 2020-07-23 RX ORDER — WARFARIN SODIUM 2 MG/1
TABLET ORAL
Qty: 30 TABLET | Refills: 1 | Status: SHIPPED | OUTPATIENT
Start: 2020-07-23 | End: 2020-07-23

## 2020-07-23 RX ORDER — WARFARIN SODIUM 4 MG/1
TABLET ORAL
Qty: 90 TABLET | Refills: 1 | Status: SHIPPED | OUTPATIENT
Start: 2020-07-23 | End: 2020-08-19

## 2020-07-23 NOTE — PROGRESS NOTES
[facility] home care nurse calling for refill of 2 mg coumadin tablets.    Prescription approved per [facility] Refill Protocol.

## 2020-07-23 NOTE — TELEPHONE ENCOUNTER
Anticoagulation Monitoring Return Date Recheck   Latest Ref Rng & Units     7/23/2020 8/5/2020      Anticoagulation Monitoring Instructions   Latest Ref Rng & Units    7/23/2020 2 mg every Mon; 4 mg all other days   Prescription approved per [facility] Refill Protocol.  Carmita Montenegro, RN  Anticoagulation Nurse - [facility]

## 2020-07-24 ENCOUNTER — TELEPHONE (OUTPATIENT)
Dept: FAMILY MEDICINE | Facility: CLINIC | Age: 85
End: 2020-07-24

## 2020-07-24 ENCOUNTER — MEDICAL CORRESPONDENCE (OUTPATIENT)
Dept: HEALTH INFORMATION MANAGEMENT | Facility: CLINIC | Age: 85
End: 2020-07-24

## 2020-07-24 DIAGNOSIS — I34.2 NONRHEUMATIC MITRAL VALVE STENOSIS WITH INSUFFICIENCY: ICD-10-CM

## 2020-07-24 DIAGNOSIS — I34.0 NONRHEUMATIC MITRAL VALVE STENOSIS WITH INSUFFICIENCY: ICD-10-CM

## 2020-07-24 RX ORDER — FUROSEMIDE 20 MG
TABLET ORAL
Qty: 270 TABLET | Refills: 3 | Status: SHIPPED | OUTPATIENT
Start: 2020-07-24 | End: 2021-04-13 | Stop reason: DRUGHIGH

## 2020-07-24 NOTE — TELEPHONE ENCOUNTER
Reason for Call:  Form, our goal is to have forms completed with 72 hours, however, some forms may require a visit or additional information.    Type of letter, form or note:  medical    Who is the form from?: Home care    Where did the form come from: form was faxed in    What clinic location was the form placed at?: [facility]    Where the form was placed: Given to physician    What number is listed as a contact on the form?: (F): [phone], (P): [phone]       Additional comments: [facility]- Drug interaction physician order (order date 07/23/20)    Call taken on 7/24/2020 at 11:01 AM by Rosa Kent

## 2020-08-05 ENCOUNTER — ANTICOAGULATION THERAPY VISIT (OUTPATIENT)
Dept: FAMILY MEDICINE | Facility: CLINIC | Age: 85
End: 2020-08-05

## 2020-08-05 DIAGNOSIS — Z86.711 HISTORY OF PULMONARY EMBOLISM: ICD-10-CM

## 2020-08-05 DIAGNOSIS — I48.20 CHRONIC ATRIAL FIBRILLATION (H): ICD-10-CM

## 2020-08-05 DIAGNOSIS — Z79.01 LONG TERM CURRENT USE OF ANTICOAGULANT THERAPY: ICD-10-CM

## 2020-08-05 DIAGNOSIS — Z79.01 CHRONIC ANTICOAGULATION: ICD-10-CM

## 2020-08-05 NOTE — PROGRESS NOTES
[facility] with Home care calling to report that patient is unable to be seen today, requesting VO to recheck tomorrow.  VO given for 8/6/20 recheck as patient was therapeutic last check and no concerns/changes reported.    Radha Lniares RN

## 2020-08-06 ENCOUNTER — ANTICOAGULATION THERAPY VISIT (OUTPATIENT)
Dept: FAMILY MEDICINE | Facility: CLINIC | Age: 85
End: 2020-08-06

## 2020-08-06 DIAGNOSIS — I48.20 CHRONIC ATRIAL FIBRILLATION (H): ICD-10-CM

## 2020-08-06 DIAGNOSIS — Z79.01 CHRONIC ANTICOAGULATION: ICD-10-CM

## 2020-08-06 DIAGNOSIS — Z79.01 LONG TERM CURRENT USE OF ANTICOAGULANT THERAPY: ICD-10-CM

## 2020-08-06 DIAGNOSIS — Z86.711 HISTORY OF PULMONARY EMBOLISM: ICD-10-CM

## 2020-08-06 LAB — INR PPP: 2 (ref 0.9–1.1)

## 2020-08-06 NOTE — PROGRESS NOTES
ANTICOAGULATION MANAGEMENT     Patient Name:  Kenyatta Donald  Date:  2020    ASSESSMENT /SUBJECTIVE:    Today's INR result of 2.0 is therapeutic. Goal INR of 2.0-3.0      Warfarin dose taken: Warfarin taken as previously instructed    Diet: No new diet changes affecting INR    Medication changes/ interactions: No new medications/supplements affecting INR    Previous INR: Therapeutic     S/S of bleeding or thromboembolism: No    New injury or illness: No    Upcoming surgery, procedure or cardioversion: No    Additional findings: None      PLAN:    [facility] with [facility], home care, regarding INR result and instructed:     Warfarin Dosing Instructions: Continue your current warfarin dose 2 mg on  and 4 mg all other days    Instructed patient to follow up no later than: 2 weeks  Orders given to  Homecare nurse/facility to recheck    Education provided: None required      [facility], home care, verbalizes understanding and agrees to warfarin dosing plan.    Instructed to call the Anticoagulation Clinic for any changes, questions or concerns. (#[phone])        Radha Linares RN      OBJECTIVE:  Recent labs: (last 7 days)     20   INR 2.0*         No question data found.  Anticoagulation Summary  As of 2020    INR goal:   2.0-3.0   TTR:   71.3 % (1 y)   INR used for dosin.0 (2020)   Warfarin maintenance plan:   2 mg (2 mg x 1) every Mon; 4 mg (4 mg x 1) all other days   Full warfarin instructions:   2 mg every Mon; 4 mg all other days   Weekly warfarin total:   26 mg   Plan last modified:   Carmita Montenegro RN (2020)   Next INR check:   2020   Priority:   High   Target end date:   Indefinite    Indications    Chronic anticoagulation [Z79.01]  Mitral valve disorder s/po 19-15 remodeling percut  + clip  (Resolved) [I05.9]  Chronic atrial fibrillation (H) [I48.20]  History of pulmonary embolism [Z86.711]  Long term current use of anticoagulant therapy [Z79.01]             Anticoagulation  Episode Summary     INR check location:       Preferred lab:       Send INR reminders to:   [facility]    Comments:   INR range changed by cardiology 1/29/209      Anticoagulation Care Providers     Provider Role Specialty Phone number    Bess Lion MD Referring [facility] [phone]

## 2020-08-07 DIAGNOSIS — E03.9 ACQUIRED HYPOTHYROIDISM: ICD-10-CM

## 2020-08-07 DIAGNOSIS — M10.279 DRUG-INDUCED GOUT INVOLVING TOE, UNSPECIFIED CHRONICITY, UNSPECIFIED LATERALITY: ICD-10-CM

## 2020-08-07 DIAGNOSIS — I50.33 ACUTE ON CHRONIC DIASTOLIC CONGESTIVE HEART FAILURE (H): ICD-10-CM

## 2020-08-07 DIAGNOSIS — I10 BENIGN ESSENTIAL HYPERTENSION: ICD-10-CM

## 2020-08-07 RX ORDER — AMLODIPINE BESYLATE 5 MG/1
TABLET ORAL
Qty: 180 TABLET | Refills: 2 | OUTPATIENT
Start: 2020-08-07

## 2020-08-07 RX ORDER — FUROSEMIDE 40 MG
TABLET ORAL
Qty: 90 TABLET | Refills: 2 | OUTPATIENT
Start: 2020-08-07

## 2020-08-07 RX ORDER — ALLOPURINOL 100 MG/1
TABLET ORAL
Qty: 90 TABLET | Refills: 0 | OUTPATIENT
Start: 2020-08-07

## 2020-08-07 RX ORDER — LEVOTHYROXINE SODIUM 100 UG/1
TABLET ORAL
Qty: 90 TABLET | Refills: 0 | OUTPATIENT
Start: 2020-08-07

## 2020-08-07 NOTE — TELEPHONE ENCOUNTER
Patient Contact    Called patient and scheduled establishing care appt with Cornelius Malin. She has enough medications to last until her appointment.     Rx declined for: furosemide, allopurinol, amlodipine, levothyroxine - to be addressed during OV.

## 2020-08-10 ENCOUNTER — MEDICAL CORRESPONDENCE (OUTPATIENT)
Dept: HEALTH INFORMATION MANAGEMENT | Facility: CLINIC | Age: 85
End: 2020-08-10

## 2020-08-10 ENCOUNTER — TELEPHONE (OUTPATIENT)
Dept: FAMILY MEDICINE | Facility: CLINIC | Age: 85
End: 2020-08-10

## 2020-08-10 ENCOUNTER — VIRTUAL VISIT (OUTPATIENT)
Dept: FAMILY MEDICINE | Facility: CLINIC | Age: 85
End: 2020-08-10
Payer: COMMERCIAL

## 2020-08-10 DIAGNOSIS — Z79.01 CHRONIC ANTICOAGULATION: Primary | ICD-10-CM

## 2020-08-10 DIAGNOSIS — I48.20 CHRONIC ATRIAL FIBRILLATION (H): ICD-10-CM

## 2020-08-10 DIAGNOSIS — M10.279 DRUG-INDUCED GOUT INVOLVING TOE, UNSPECIFIED CHRONICITY, UNSPECIFIED LATERALITY: ICD-10-CM

## 2020-08-10 PROCEDURE — 99213 OFFICE O/P EST LOW 20 MIN: CPT | Mod: 95 | Performed by: PHYSICIAN ASSISTANT

## 2020-08-10 ASSESSMENT — ENCOUNTER SYMPTOMS
CONSTITUTIONAL NEGATIVE: 1
GASTROINTESTINAL NEGATIVE: 1
MUSCULOSKELETAL NEGATIVE: 1
NEUROLOGICAL NEGATIVE: 1
EYES NEGATIVE: 1
PSYCHIATRIC NEGATIVE: 1
CARDIOVASCULAR NEGATIVE: 1
RESPIRATORY NEGATIVE: 1

## 2020-08-10 NOTE — TELEPHONE ENCOUNTER
Reason for Call:  Form, our goal is to have forms completed with 72 hours, however, some forms may require a visit or additional information.    Type of letter, form or note:  medical    Who is the form from?: Home care    Where did the form come from: form was faxed in    What clinic location was the form placed at?: [facility]    Where the form was placed: [facility] Box/Folder    What number is listed as a contact on the form?: [phone] (P) [phone]       Additional comments: [facility]: SN INR; INR; HHC POC 8/1/2020-10/29/2020    Call taken on 8/10/2020 at 10:46 AM by Libby Toledo

## 2020-08-10 NOTE — PROGRESS NOTES
"Kenyatta Donald is a 84 year old female who is being evaluated via a billable telephone visit.      The patient has been notified of following:     \"This telephone visit will be conducted via a call between you and your physician/provider. We have found that certain health care needs can be provided without the need for a physical exam.  This service lets us provide the care you need with a short phone conversation.  If a prescription is necessary we can send it directly to your pharmacy.  If lab work is needed we can place an order for that and you can then stop by our lab to have the test done at a later time.    Telephone visits are billed at different rates depending on your insurance coverage. During this emergency period, for some insurers they may be billed the same as an in-person visit.  Please reach out to your insurance provider with any questions.    If during the course of the call the physician/provider feels a telephone visit is not appropriate, you will not be charged for this service.\"    Patient has given verbal consent for Telephone visit?  Yes    What phone number would you like to be contacted at? [phone]    How would you like to obtain your AVS? Mail a copy    Subjective     Kenyatta Donald is a 84 year old female who presents via phone visit today for the following health issues:    HPI    New Patient/Transfer of Care    [facility] home care - RN come twice a month  She informs patient when she needs refills    Patient does not need any medications refilled at this time.   INR drawn every two weeks - RN calls it in    No new concerns.   Lives in assisted living - no COVID-19 cases in her building.             Patient Active Problem List   Diagnosis     Pain in joint, pelvic region and thigh     Pain in thoracic spine     Cervicalgia     Osteoporosis     Spinal muscular atrophy type III causing decreased ability of respiratory mm-onset 43y/o      Long term current use of anticoagulant therapy "     Benign neoplasm of skin     Need for SBE (subacute bacterial endocarditis) prophylaxis     Acquired hypothyroidism     Chronic anticoagulation     Benign essential hypertension     DOI 16     History of pulmonary embolism     Chronic atrial fibrillation (H)     Chronic seasonal allergic rhinitis, unspecified trigger-spring      HX: breast cancer LT--> RT in  to      Myotonic muscular dystrophy (H)     Age-related cataract of both eyes, unspecified age-related cataract type: Rt then Lt      Past Surgical History:   Procedure Laterality Date     ANESTHESIA OUT OF OR PERCUTANEOUS MITRAL VALVE REPAIR N/A 2015    Procedure: ANESTHESIA OUT OF OR PERCUTANEOUS MITRAL VALVE REPAIR;  Surgeon: Generic Anesthesia Provider;  Location: [facility] OR     GYN SURGERY      hysterectomy     MASTECTOMY      bilateral     ORTHOPEDIC SURGERY      knee replacement     PERCUTANEIOUS MITRAL VALVE REPAIR  2015       Social History     Tobacco Use     Smoking status: Never Smoker     Smokeless tobacco: Never Used   Substance Use Topics     Alcohol use: No     Alcohol/week: 0.0 standard drinks     Family History   Problem Relation Age of Onset     Cancer - colorectal Mother      Alzheimer Disease Mother          age 78     Diabetes Daughter      Asthma Daughter      Heart Disease Father          age 60     Family History Negative Daughter      Unknown/Adopted Maternal Grandmother      Unknown/Adopted Maternal Grandfather      Unknown/Adopted Paternal Grandmother      Unknown/Adopted Paternal Grandfather      Coronary Artery Disease No family hx of      Hypertension No family hx of      Hyperlipidemia No family hx of      Cerebrovascular Disease No family hx of      Breast Cancer No family hx of      Colon Cancer No family hx of      Prostate Cancer No family hx of      Other Cancer No family hx of      Depression No family hx of      Anxiety Disorder No family hx of      Mental Illness No family hx of      Substance  Abuse No family hx of      Anesthesia Reaction No family hx of      Osteoporosis No family hx of      Genetic Disorder No family hx of      Thyroid Disease No family hx of      Obesity No family hx of          Current Outpatient Medications   Medication Sig Dispense Refill     acetaminophen (TYLENOL) 325 MG tablet Take 2 tablets (650 mg) by mouth every 4 hours as needed for mild pain 100 tablet      alendronate (FOSAMAX) 70 MG tablet TK 1 T PO Q 7 DAYS  3     allopurinol (ZYLOPRIM) 100 MG tablet TAKE 1 TABLET(100 MG) BY MOUTH DAILY 90 tablet 0     amLODIPine (NORVASC) 5 MG tablet TAKE 1 TABLET BY MOUTH TWICE DAILY 180 tablet 2     cholecalciferol (VITAMIN D) 1000 UNIT tablet Take 2,000 Units by mouth daily        fluticasone (FLONASE) 50 MCG/ACT nasal spray Spray 1 spray into both nostrils daily 15.8 mL 3     furosemide (LASIX) 20 MG tablet Take  60 mg (3 tabs) daily in the morning. 270 tablet 3     levothyroxine (SYNTHROID/LEVOTHROID) 100 MCG tablet TAKE 1 TABLET BY MOUTH DAILY 90 tablet 0     lisinopril (ZESTRIL) 20 MG tablet Take 1 tablet (20 mg) by mouth 2 times daily 180 tablet 3     metoprolol succinate ER (TOPROL-XL) 50 MG 24 hr tablet Take 1 tablet (50 mg) by mouth 2 times daily 200 tablet 4     nystatin (MYCOSTATIN) 039864 UNIT/GM external powder Apply topically 2 times daily 60 Bottle 1     simvastatin (ZOCOR) 40 MG tablet   3     traMADol (ULTRAM) 50 MG tablet Take 1 tablet (50 mg) by mouth every 6 hours as needed for moderate pain or severe pain 30 tablet 0     VENTOLIN  (90 Base) MCG/ACT inhaler INHALE 2 PUFFS INTO THE LUNGS EVERY 6 HOURS AS NEEDED FOR SHORTNESS OF BREATH OR DIFFICULT BREATHING OR WHEEZING 18 g 11     warfarin ANTICOAGULANT (COUMADIN) 4 MG tablet TAKE 1/2 TABLET on Monday AND TAKE 1 TABLET DAILY ON ALL OTHER DAYS AS DIRECTED BY THE ANTICOAGULATION CLINIC 90 tablet 1     ASPIRIN NOT PRESCRIBED (INTENTIONAL) Please choose reason not prescribed, below (Patient not taking: Reported  on 4/7/2020) 1 each 0     cycloSPORINE (RESTASIS) 0.05 % ophthalmic emulsion 1 drop       furosemide (LASIX) 20 MG tablet Take 1 tablet (20 mg) by mouth every morning 100 tablet 4     No Known Allergies    Reviewed and updated as needed this visit by Provider         Review of Systems   Constitutional: Negative.    HENT: Negative.    Eyes: Negative.    Respiratory: Negative.    Cardiovascular: Negative.    Gastrointestinal: Negative.    Genitourinary: Negative.    Musculoskeletal: Negative.    Skin: Negative.    Neurological: Negative.    Psychiatric/Behavioral: Negative.              Objective   Reported vitals:  LMP  (LMP Unknown)    healthy, alert and no distress  PSYCH: Alert and oriented times 3; coherent speech, normal   rate and volume, able to articulate logical thoughts, able   to abstract reason, no tangential thoughts, no hallucinations   or delusions  Her affect is normal  RESP: No cough, no audible wheezing, able to talk in full sentences  Remainder of exam unable to be completed due to telephone visits    Diagnostic Test Results:  See orders        Assessment/Plan:      ICD-10-CM    1. Chronic anticoagulation  Z79.01    2. Chronic atrial fibrillation (H)  I48.20    3. Drug-induced gout involving toe, unspecified chronicity, unspecified laterality  M10.279       No changes to medications or labs ordered today  We briefly reviewed her medications.       Return in about 4 months (around 12/10/2020) for Preventive Care Visit.      Phone call duration:  7 minutes    Cornelius Malin PA-C

## 2020-08-19 ENCOUNTER — ANTICOAGULATION THERAPY VISIT (OUTPATIENT)
Dept: PHARMACY | Facility: CLINIC | Age: 85
End: 2020-08-19

## 2020-08-19 DIAGNOSIS — I48.20 CHRONIC ATRIAL FIBRILLATION (H): ICD-10-CM

## 2020-08-19 DIAGNOSIS — Z79.01 LONG TERM CURRENT USE OF ANTICOAGULANT THERAPY: ICD-10-CM

## 2020-08-19 DIAGNOSIS — Z86.711 HISTORY OF PULMONARY EMBOLISM: ICD-10-CM

## 2020-08-19 DIAGNOSIS — Z79.01 CHRONIC ANTICOAGULATION: ICD-10-CM

## 2020-08-19 LAB — INR PPP: 2.2 (ref 0.9–1.1)

## 2020-08-19 RX ORDER — WARFARIN SODIUM 1 MG/1
TABLET ORAL
Qty: 35 TABLET | Refills: 1 | Status: SHIPPED | OUTPATIENT
Start: 2020-08-19 | End: 2020-08-20

## 2020-08-19 RX ORDER — WARFARIN SODIUM 4 MG/1
2-4 TABLET ORAL DAILY
Qty: 90 TABLET | Refills: 1 | Status: SHIPPED | OUTPATIENT
Start: 2020-08-19 | End: 2021-05-12

## 2020-08-19 NOTE — PROGRESS NOTES
Alma, nurse, calling back to report that pt needs her warfarin refilled.  1mg and 4 mg tablets.  These were both sent to pharmacy of choice.

## 2020-08-19 NOTE — PROGRESS NOTES
ANTICOAGULATION MANAGEMENT     Patient Name:  Kenyatta Donald  Date:  2020    ASSESSMENT /SUBJECTIVE:    Today's INR result of 2.2 is therapeutic. Goal INR of 2.0-3.0      Warfarin dose taken: Warfarin taken as previously instructed    Diet: No new diet changes affecting INR    Medication changes/ interactions: No new medications/supplements affecting INR    Previous INR: Therapeutic     S/S of bleeding or thromboembolism: No    New injury or illness: No    Upcoming surgery, procedure or cardioversion: No    Additional findings: None      PLAN:    [facility] with [facility] home care RN, regarding INR result and instructed:     Warfarin Dosing Instructions: Continue your current warfarin dose 2 mg M and 4 mg ROW    Instructed patient to follow up no later than: 2 weeks (homecare there every 2 weeks, if therapeutic next recheck ok to recheck in 4 weeks.)  Orders given to  Homecare nurse/facility to recheck    Education provided: None required      Alma verbalizes understanding and agrees to warfarin dosing plan.    Instructed to call the Anticoagulation Clinic for any changes, questions or concerns. (#[phone])        Anjana Hassan RN      OBJECTIVE:  Recent labs: (last 7 days)     20   INR 2.2*         No question data found.  Anticoagulation Summary  As of 2020    INR goal:   2.0-3.0   TTR:   71.3 % (1 y)   INR used for dosin.2 (2020)   Warfarin maintenance plan:   2 mg (2 mg x 1) every Mon; 4 mg (4 mg x 1) all other days   Full warfarin instructions:   2 mg every Mon; 4 mg all other days   Weekly warfarin total:   26 mg   No change documented:   Anjana Hassan RN   Plan last modified:   Carmita Montenegro RN (2020)   Next INR check:   2020   Priority:   High   Target end date:   Indefinite    Indications    Chronic anticoagulation [Z79.01]  Mitral valve disorder s/po 15 remodeling percut  + clip  (Resolved) [I05.9]  Chronic atrial fibrillation (H) [I48.20]  History of  pulmonary embolism [Z86.711]  Long term current use of anticoagulant therapy [Z79.01]             Anticoagulation Episode Summary     INR check location:       Preferred lab:       Send INR reminders to:   DAVID JUARES    Comments:   INR range changed by cardiology 1/29/209      Anticoagulation Care Providers     Provider Role Specialty Phone number    Bess Lion MD [facility] [phone]

## 2020-08-20 RX ORDER — WARFARIN SODIUM 1 MG/1
TABLET ORAL
Qty: 40 TABLET | Refills: 1 | Status: SHIPPED | OUTPATIENT
Start: 2020-08-20 | End: 2021-05-12

## 2020-09-02 ENCOUNTER — ANTICOAGULATION THERAPY VISIT (OUTPATIENT)
Dept: FAMILY MEDICINE | Facility: CLINIC | Age: 85
End: 2020-09-02

## 2020-09-02 DIAGNOSIS — Z79.01 CHRONIC ANTICOAGULATION: ICD-10-CM

## 2020-09-02 DIAGNOSIS — Z79.01 LONG TERM CURRENT USE OF ANTICOAGULANT THERAPY: ICD-10-CM

## 2020-09-02 DIAGNOSIS — I48.20 CHRONIC ATRIAL FIBRILLATION (H): ICD-10-CM

## 2020-09-02 DIAGNOSIS — Z86.711 HISTORY OF PULMONARY EMBOLISM: ICD-10-CM

## 2020-09-02 LAB — INR PPP: 2.3 (ref 0.9–1.1)

## 2020-09-02 NOTE — PROGRESS NOTES
ANTICOAGULATION MANAGEMENT     Patient Name:  Kenyatta Donald  Date:  2020    ASSESSMENT /SUBJECTIVE:    Today's INR result of 2.3 is therapeutic. Goal INR of 2.0-3.0      Warfarin dose taken: Warfarin taken as previously instructed    Diet: No new diet changes affecting INR    Medication changes/ interactions: No new medications/supplements affecting INR    Previous INR: Therapeutic     S/S of bleeding or thromboembolism: No    New injury or illness: No    Upcoming surgery, procedure or cardioversion: No    Additional findings: None      PLAN:    [facility] with Nurse Brenden regarding INR result and instructed:     Warfarin Dosing Instructions: Continue your current warfarin dose 2 mg Mon, 4 mg all other days    Instructed patient to follow up no later than: 2 weeks  Orders given to  Homecare nurse/facility to recheck    Education provided: Monitoring for bleeding signs and symptoms and Monitoring for clotting signs and symptoms      Nurse Brenden verbalizes understanding and agrees to warfarin dosing plan.    Instructed to call the Anticoagulation Clinic for any changes, questions or concerns. (#[phone])        Carmita Montenegro RN      OBJECTIVE:  Recent labs: (last 7 days)     20   INR 2.3*         No question data found.  Anticoagulation Summary  As of 2020    INR goal:   2.0-3.0   TTR:   71.3 % (1 y)   INR used for dosin.3 (2020)   Warfarin maintenance plan:   2 mg (1 mg x 2) every Mon; 4 mg (4 mg x 1) all other days   Full warfarin instructions:   2 mg every Mon; 4 mg all other days   Weekly warfarin total:   26 mg   No change documented:   Carmita Montenegro RN   Plan last modified:   Viktoriya Schuster RN (2020)   Next INR check:   2020   Priority:   High   Target end date:   Indefinite    Indications    Chronic anticoagulation [Z79.01]  Mitral valve disorder s/po 1-9-15 remodeling percut  + clip  (Resolved) [I05.9]  Chronic atrial fibrillation (H) [I48.20]  History of pulmonary  embolism [Z86.711]  Long term current use of anticoagulant therapy [Z79.01]             Anticoagulation Episode Summary     INR check location:       Preferred lab:       Send INR reminders to:   DAVID JUARES    Comments:   INR range changed by cardiology 1/29/209      Anticoagulation Care Providers     Provider Role Specialty Phone number    Bess Lion MD [facility] [phone]

## 2020-09-16 LAB — INR PPP: 1.9 (ref 0.9–1.1)

## 2020-09-17 ENCOUNTER — ANTICOAGULATION THERAPY VISIT (OUTPATIENT)
Dept: FAMILY MEDICINE | Facility: CLINIC | Age: 85
End: 2020-09-17

## 2020-09-17 DIAGNOSIS — Z79.01 LONG TERM CURRENT USE OF ANTICOAGULANT THERAPY: ICD-10-CM

## 2020-09-17 DIAGNOSIS — Z86.711 HISTORY OF PULMONARY EMBOLISM: ICD-10-CM

## 2020-09-17 DIAGNOSIS — Z79.01 CHRONIC ANTICOAGULATION: ICD-10-CM

## 2020-09-17 DIAGNOSIS — I48.20 CHRONIC ATRIAL FIBRILLATION (H): ICD-10-CM

## 2020-09-18 NOTE — PROGRESS NOTES
ANTICOAGULATION MANAGEMENT     Patient Name:  Kenyatta Donald  Date:  2020    ASSESSMENT /SUBJECTIVE:    Today's INR result of 1.9 is subtherapeutic. Goal INR of 2.0-3.0      Warfarin dose taken: Warfarin taken as previously instructed    Diet: No new diet changes affecting INR    Medication changes/ interactions: No new medications/supplements affecting INR    Previous INR: Therapeutic     S/S of bleeding or thromboembolism: No    New injury or illness: No    Upcoming surgery, procedure or cardioversion: No    Additional findings: None      PLAN:    [facility] with HC RN regarding INR result and instructed:     Warfarin Dosing Instructions: Continue your current warfarin dose 2 mg on Monday; 4 mg ROW    Instructed patient to follow up no later than: 2 weeks  Orders given to  Homecare nurse/facility to recheck    Education provided: None required      HC RN verbalizes understanding and agrees to warfarin dosing plan.    Instructed to call the Anticoagulation Clinic for any changes, questions or concerns. (#[phone])        Tamika Castellano, [facility]      OBJECTIVE:  Recent labs: (last 7 days)     20   INR 1.9*         No question data found.  Anticoagulation Summary  As of 2020    INR goal:   2.0-3.0   TTR:   70.2 % (1 y)   INR used for dosin.9! (2020)   Warfarin maintenance plan:   2 mg (1 mg x 2) every Mon; 4 mg (4 mg x 1) all other days   Full warfarin instructions:   2 mg every Mon; 4 mg all other days   Weekly warfarin total:   26 mg   Plan last modified:   Giuliana Gutierrez RN (2020)   Next INR check:   2020   Priority:   High   Target end date:   Indefinite    Indications    Chronic anticoagulation [Z79.01]  Mitral valve disorder s/po 1-9-15 remodeling percut  + clip  (Resolved) [I05.9]  Chronic atrial fibrillation (H) [I48.20]  History of pulmonary embolism [Z86.711]  Long term current use of anticoagulant therapy [Z79.01]             Anticoagulation Episode Summary      INR check location:       Preferred lab:       [facility]    Comments:   INR range changed by cardiology 1/29/209      Anticoagulation Care Providers     Provider Role Specialty Phone number    Bess Lion MD Referring [facility] [phone]

## 2020-09-30 ENCOUNTER — ANTICOAGULATION THERAPY VISIT (OUTPATIENT)
Dept: FAMILY MEDICINE | Facility: CLINIC | Age: 85
End: 2020-09-30

## 2020-09-30 DIAGNOSIS — Z79.01 CHRONIC ANTICOAGULATION: ICD-10-CM

## 2020-09-30 DIAGNOSIS — Z79.01 LONG TERM CURRENT USE OF ANTICOAGULANT THERAPY: ICD-10-CM

## 2020-09-30 DIAGNOSIS — I48.20 CHRONIC ATRIAL FIBRILLATION (H): ICD-10-CM

## 2020-09-30 DIAGNOSIS — Z86.711 HISTORY OF PULMONARY EMBOLISM: ICD-10-CM

## 2020-09-30 LAB — INR PPP: 2.3 (ref 0.9–1.1)

## 2020-09-30 NOTE — PROGRESS NOTES
ANTICOAGULATION MANAGEMENT     Patient Name:  Kenyatta Donald  Date:  2020    ASSESSMENT /SUBJECTIVE:    Today's INR result of 2.3 is therapeutic. Goal INR of 2.0-3.0      Warfarin dose taken: Warfarin taken as instructed    Diet: No new diet changes affecting INR    Medication changes/ interactions: No new medications/supplements affecting INR    Previous INR: Subtherapeutic     S/S of bleeding or thromboembolism: No    New injury or illness: No    Upcoming surgery, procedure or cardioversion: No    Additional findings: None      PLAN:    Telephone call with home care nurse [facility], regarding INR result and instructed:     Warfarin Dosing Instructions: Continue your current warfarin dose 2 mg on monday and 4 mg all other days    Instructed patient to follow up no later than: 2 weeks  Orders given to  Homecare nurse/facility to recheck    Education provided: None required      Tamika, home care, verbalizes understanding and agrees to warfarin dosing plan.    Instructed to call the Anticoagulation Clinic for any changes, questions or concerns. (#[phone])        Radha Linares RN      OBJECTIVE:  Recent labs: (last 7 days)     20   INR 2.3*         No question data found.  Anticoagulation Summary  As of 2020    INR goal:   2.0-3.0   TTR:   69.4 % (1 y)   INR used for dosin.3 (2020)   Warfarin maintenance plan:   2 mg (1 mg x 2) every Mon; 4 mg (4 mg x 1) all other days   Full warfarin instructions:   2 mg every Mon; 4 mg all other days   Weekly warfarin total:   26 mg   Plan last modified:   Giuliana Gutierrez RN (2020)   Next INR check:   10/14/2020   Priority:   High   Target end date:   Indefinite    Indications    Chronic anticoagulation [Z79.01]  Mitral valve disorder s/po 15 remodeling percut  + clip  (Resolved) [I05.9]  Chronic atrial fibrillation (H) [I48.20]  History of pulmonary embolism [Z86.711]  Long term current use of anticoagulant therapy  [Z79.01]             Anticoagulation Episode Summary     INR check location:       Preferred lab:       Send INR reminders to:   DAVID JUARES    Comments:   INR range changed by cardiology 1/29/209      Anticoagulation Care Providers     Provider Role Specialty Phone number    Bess Lion MD Referring [facility] [phone]

## 2020-10-06 ENCOUNTER — TELEPHONE (OUTPATIENT)
Dept: FAMILY MEDICINE | Facility: CLINIC | Age: 85
End: 2020-10-06

## 2020-10-06 NOTE — TELEPHONE ENCOUNTER
Reason for Call:  Form, our goal is to have forms completed with 72 hours, however, some forms may require a visit or additional information.    Type of letter, form or note:  medical    Who is the form from?: Home care    Where did the form come from: form was faxed in    What clinic location was the form placed at?: [facility]    Where the form was placed: DOF Box/Folder    What number is listed as a contact on the form?: [phone] (P) [phone]       Additional comments: [facility]: INR 2.3(9/2); INR 1.9(9/16); INR 2.3(9/30)    Call taken on 10/6/2020 at 3:23 PM by Libby Toledo

## 2020-10-07 ENCOUNTER — MEDICAL CORRESPONDENCE (OUTPATIENT)
Dept: HEALTH INFORMATION MANAGEMENT | Facility: CLINIC | Age: 85
End: 2020-10-07

## 2020-10-12 DIAGNOSIS — J30.2 CHRONIC SEASONAL ALLERGIC RHINITIS: ICD-10-CM

## 2020-10-13 ENCOUNTER — TELEPHONE (OUTPATIENT)
Dept: FAMILY MEDICINE | Facility: CLINIC | Age: 85
End: 2020-10-13

## 2020-10-13 RX ORDER — FLUTICASONE PROPIONATE 50 MCG
1 SPRAY, SUSPENSION (ML) NASAL DAILY
Qty: 16 ML | Refills: 1 | Status: SHIPPED | OUTPATIENT
Start: 2020-10-13 | End: 2021-05-12

## 2020-10-13 NOTE — TELEPHONE ENCOUNTER
Reason for Call:  Form, our goal is to have forms completed with 72 hours, however, some forms may require a visit or additional information.    Type of letter, form or note:  medical    Who is the form from?: Home care    Where did the form come from: form was faxed in    What clinic location was the form placed at?: [facility]    Where the form was placed: DOF Box/Folder    What number is listed as a contact on the form?: [phone] (P) [phone]       Additional comments: [facility]: INR 2.2 (8/19)    Call taken on 10/13/2020 at 10:28 AM by Libby Toledo

## 2020-10-14 ENCOUNTER — ANTICOAGULATION THERAPY VISIT (OUTPATIENT)
Dept: NURSING | Facility: CLINIC | Age: 85
End: 2020-10-14

## 2020-10-14 DIAGNOSIS — Z86.711 HISTORY OF PULMONARY EMBOLISM: ICD-10-CM

## 2020-10-14 DIAGNOSIS — I48.20 CHRONIC ATRIAL FIBRILLATION (H): ICD-10-CM

## 2020-10-14 DIAGNOSIS — Z79.01 LONG TERM CURRENT USE OF ANTICOAGULANT THERAPY: ICD-10-CM

## 2020-10-14 DIAGNOSIS — Z79.01 CHRONIC ANTICOAGULATION: ICD-10-CM

## 2020-10-14 LAB — INR PPP: 2.4 (ref 0.9–1.1)

## 2020-10-14 NOTE — PROGRESS NOTES
ANTICOAGULATION MANAGEMENT     Patient Name:  Kenyatta Donald  Date:  10/14/2020    ASSESSMENT /SUBJECTIVE:    Today's INR result of 2.4 is therapeutic. Goal INR of 2.0-3.0      Warfarin dose taken: Warfarin taken as instructed    Diet: No new diet changes affecting INR    Medication changes/ interactions: No new medications/supplements affecting INR    Previous INR: Therapeutic     S/S of bleeding or thromboembolism: No    New injury or illness: No    Upcoming surgery, procedure or cardioversion: No    Additional findings: None      PLAN:    Telephone call with home care nurse Alma regarding INR result and instructed:     Warfarin Dosing Instructions: Continue your current warfarin dose 2 mg M; 4 mg ROW    Instructed patient to follow up no later than: 2 weeks  Orders given to  Homecare nurse/facility to recheck    Education provided: None required      Alma verbalizes understanding and agrees to warfarin dosing plan.    Instructed to call the Anticoagulation Clinic for any changes, questions or concerns. (#[phone])        Tamika Castellano, [facility]      OBJECTIVE:  Recent labs: (last 7 days)     10/14/20   INR 2.4*         No question data found.  Anticoagulation Summary  As of 10/14/2020    INR goal:  2.0-3.0   TTR:  69.4 % (1 y)   INR used for dosin.4 (10/14/2020)   Warfarin maintenance plan:  2 mg (1 mg x 2) every Mon; 4 mg (4 mg x 1) all other days   Full warfarin instructions:  2 mg every Mon; 4 mg all other days   Weekly warfarin total:  26 mg   Plan last modified:  Giuliana Gutierrez RN (2020)   Next INR check:  10/28/2020   Priority:  High   Target end date:  Indefinite    Indications    Chronic anticoagulation [Z79.01]  Mitral valve disorder s/po 1-9-15 remodeling percut  + clip  (Resolved) [I05.9]  Chronic atrial fibrillation (H) [I48.20]  History of pulmonary embolism [Z86.711]  Long term current use of anticoagulant therapy [Z79.01]             Anticoagulation Episode Summary     INR  check location:      Preferred lab:      Send INR reminders to:  [facility]    Comments:  INR range changed by cardiology 1/29/209      Anticoagulation Care Providers     Provider Role Specialty Phone number    Bess Lion MD Referring [facility] [phone]

## 2020-10-21 ENCOUNTER — TELEPHONE (OUTPATIENT)
Dept: FAMILY MEDICINE | Facility: CLINIC | Age: 85
End: 2020-10-21

## 2020-10-21 NOTE — TELEPHONE ENCOUNTER
Reason for Call:  Form, our goal is to have forms completed with 72 hours, however, some forms may require a visit or additional information.    Type of letter, form or note:  medical    Who is the form from?: Home care    Where did the form come from: form was faxed in    What clinic location was the form placed at?: [facility]    Where the form was placed: DOF Box/Folder    What number is listed as a contact on the form?: [phone] (P) [phone]       Additional comments: [facility]: INR 2.4(10/14)    Call taken on 10/21/2020 at 10:34 AM by Libby Toledo

## 2020-10-28 ENCOUNTER — ANTICOAGULATION THERAPY VISIT (OUTPATIENT)
Dept: NURSING | Facility: CLINIC | Age: 85
End: 2020-10-28

## 2020-10-28 DIAGNOSIS — I48.20 CHRONIC ATRIAL FIBRILLATION (H): ICD-10-CM

## 2020-10-28 DIAGNOSIS — Z79.01 LONG TERM CURRENT USE OF ANTICOAGULANT THERAPY: ICD-10-CM

## 2020-10-28 DIAGNOSIS — Z86.711 HISTORY OF PULMONARY EMBOLISM: ICD-10-CM

## 2020-10-28 DIAGNOSIS — Z79.01 CHRONIC ANTICOAGULATION: ICD-10-CM

## 2020-10-28 LAB — INR PPP: 2.7 (ref 0.9–1.1)

## 2020-10-28 NOTE — PROGRESS NOTES
ANTICOAGULATION MANAGEMENT     Patient Name:  Kenyatta Donald  Date:  10/28/2020    ASSESSMENT /SUBJECTIVE:    Today's INR result of 2.7 is therapeutic. Goal INR of 2.0-3.0      Warfarin dose taken: Warfarin taken as instructed    Diet: No new diet changes affecting INR    Medication changes/ interactions: No new medications/supplements affecting INR    Previous INR: Therapeutic     S/S of bleeding or thromboembolism: No    New injury or illness: No    Upcoming surgery, procedure or cardioversion: No    Additional findings: None      PLAN:    Telephone call with home care nurse Alma regarding INR result and instructed:     Warfarin Dosing Instructions: Continue your current warfarin dose 2 mg Mon and 4mg AOD    Instructed patient to follow up no later than: 2 weeks  Orders given to  Homecare nurse/facility to recheck    Education provided: None required      Alma verbalizes understanding and agrees to warfarin dosing plan.    Instructed to call the Anticoagulation Clinic for any changes, questions or concerns. (#[phone])        Monique Brito RN      OBJECTIVE:  Recent labs: (last 7 days)     10/28/20   INR 2.7*         No question data found.  Anticoagulation Summary  As of 10/28/2020    INR goal:  2.0-3.0   TTR:  69.4 % (1 y)   INR used for dosin.7 (10/28/2020)   Warfarin maintenance plan:  2 mg (1 mg x 2) every Mon; 4 mg (4 mg x 1) all other days   Full warfarin instructions:  2 mg every Mon; 4 mg all other days   Weekly warfarin total:  26 mg   No change documented:  Monique Brito RN   Plan last modified:  Giuliana Gutierrez RN (2020)   Next INR check:  2020   Priority:  Maintenance   Target end date:  Indefinite    Indications    Chronic anticoagulation [Z79.01]  Mitral valve disorder s/po 15 remodeling percut  + clip  (Resolved) [I05.9]  Chronic atrial fibrillation (H) [I48.20]  History of pulmonary embolism [Z86.711]  Long term current use of anticoagulant therapy  [Z79.01]             Anticoagulation Episode Summary     INR check location:      Preferred lab:      Send INR reminders to:  [facility] AUGUSTO JUARES    Comments:  INR range changed by cardiology 1/29/209      Anticoagulation Care Providers     Provider Role Specialty Phone number    Bess Lion MD Referring Family Medicine [phone]

## 2020-11-04 ENCOUNTER — TELEPHONE (OUTPATIENT)
Dept: FAMILY MEDICINE | Facility: CLINIC | Age: 85
End: 2020-11-04

## 2020-11-04 NOTE — TELEPHONE ENCOUNTER
Reason for Call:  Form, our goal is to have forms completed with 72 hours, however, some forms may require a visit or additional information.    Type of letter, form or note:  medical    Who is the form from?: Home care    Where did the form come from: form was faxed in    What clinic location was the form placed at?: [facility]    Where the form was placed: Given to physician    What number is listed as a contact on the form?: (F): [phone], (P): [phone]       Additional comments: [facility]- Physician orders (date 10/28/2020)    Call taken on 11/4/2020 at 11:37 AM by Rosa Kent

## 2020-11-09 ENCOUNTER — TELEPHONE (OUTPATIENT)
Dept: FAMILY MEDICINE | Facility: CLINIC | Age: 85
End: 2020-11-09

## 2020-11-09 NOTE — TELEPHONE ENCOUNTER
Panel Management Review      Patient has the following on her problem list: None      Composite cancer screening  Chart review shows that this patient is due/due soon for the following None  Summary:    Patient is due/failing the following:   PHYSICAL in December    Action needed:   Patient needs office visit for wellness exam in dECEMBER.    Type of outreach:    Sent letter.    Questions for provider review:    None                                                                                                                                    Heidy Martinez CMA

## 2020-11-09 NOTE — LETTER
November 9, 2020    Kenyatta Donald  [address]   [place] [address]    Dear Iesha You cares about your health and your health plan.  I have reviewed your medical conditions, medication list and lab results, and am making recommendations based on this review to better manage your health.    You are in particular need of attention regarding:  -Wellness (Physical) Visit  in December    I am recommending that you:     -schedule a WELLNESS (Physical) APPOINTMENT with me.   I will check fasting labs the same day - nothing to eat except water and meds for 8-10 hours prior.      Please call us at the [place] location:  [phone] or use [employer] to address the above recommendations.     Thank you for trusting [facility].  We appreciate the opportunity to serve you and look forward to supporting your healthcare in the future.    If you have (or plan to have) any of these tests done at a facility other than a [facility] or a [facility], please have the results sent to the [facility] location noted above.      Best Regards,    CHYNA Tolentino

## 2020-11-11 ENCOUNTER — ANTICOAGULATION THERAPY VISIT (OUTPATIENT)
Dept: FAMILY MEDICINE | Facility: CLINIC | Age: 85
End: 2020-11-11

## 2020-11-11 DIAGNOSIS — Z86.711 HISTORY OF PULMONARY EMBOLISM: ICD-10-CM

## 2020-11-11 DIAGNOSIS — Z79.01 CHRONIC ANTICOAGULATION: ICD-10-CM

## 2020-11-11 DIAGNOSIS — I48.20 CHRONIC ATRIAL FIBRILLATION (H): ICD-10-CM

## 2020-11-11 DIAGNOSIS — Z79.01 LONG TERM CURRENT USE OF ANTICOAGULANT THERAPY: ICD-10-CM

## 2020-11-11 LAB — INR PPP: 2.5 (ref 0.9–1.1)

## 2020-11-11 NOTE — PROGRESS NOTES
ANTICOAGULATION MANAGEMENT     Patient Name:  Kenyatta Donald  Date:  2020    ASSESSMENT /SUBJECTIVE:    Today's INR result of 2.5 is therapeutic. Goal INR of 2.0-3.0      Warfarin dose taken: Warfarin taken as instructed    Diet: No new diet changes affecting INR    Medication changes/ interactions: No new medications/supplements affecting INR    Previous INR: Therapeutic     S/S of bleeding or thromboembolism: No    New injury or illness: No    Upcoming surgery, procedure or cardioversion: No    Additional findings: None      PLAN:    Telephone call with home care nurse Alma regarding INR result and instructed:     Warfarin Dosing Instructions: Continue your current warfarin dose 2 mg on  and 4 mg all othe rdays    Instructed patient to follow up no later than: 4 weeks  Orders given to  Homecare nurse/facility to recheck    Education provided: None required      Alma, home care, verbalizes understanding and agrees to warfarin dosing plan.    Instructed to call the Anticoagulation Clinic for any changes, questions or concerns. (#[phone])        Radha Linares RN      OBJECTIVE:  Recent labs: (last 7 days)     20   INR 2.5*         No question data found.  Anticoagulation Summary  As of 2020    INR goal:  2.0-3.0   TTR:  69.4 % (1 y)   INR used for dosin.5 (2020)   Warfarin maintenance plan:  2 mg (1 mg x 2) every Mon; 4 mg (4 mg x 1) all other days   Full warfarin instructions:  2 mg every Mon; 4 mg all other days   Weekly warfarin total:  26 mg   No change documented:  Radha Linares RN   Plan last modified:  Giuliana Gutierrez RN (2020)   Next INR check:  2020   Priority:  Maintenance   Target end date:  Indefinite    Indications    Chronic anticoagulation [Z79.01]  Mitral valve disorder s/po 115 remodeling percut  + clip  (Resolved) [I05.9]  Chronic atrial fibrillation (H) [I48.20]  History of pulmonary embolism [Z86.711]  Long term current use of  anticoagulant therapy [Z79.01]             Anticoagulation Episode Summary     INR check location:      Preferred lab:      Send INR reminders to:  DAVID REAVES    Comments:  INR range changed by cardiology 1/29/209      Anticoagulation Care Providers     Provider Role Specialty Phone number    Bess Lion MD Referring Family Medicine [phone]

## 2020-11-13 ENCOUNTER — TELEPHONE (OUTPATIENT)
Dept: FAMILY MEDICINE | Facility: CLINIC | Age: 85
End: 2020-11-13

## 2020-11-16 ENCOUNTER — MEDICAL CORRESPONDENCE (OUTPATIENT)
Dept: HEALTH INFORMATION MANAGEMENT | Facility: CLINIC | Age: 85
End: 2020-11-16

## 2020-11-16 ENCOUNTER — TELEPHONE (OUTPATIENT)
Dept: FAMILY MEDICINE | Facility: CLINIC | Age: 85
End: 2020-11-16

## 2020-11-16 NOTE — TELEPHONE ENCOUNTER
Reason for Call:  Form, our goal is to have forms completed with 72 hours, however, some forms may require a visit or additional information.    Type of letter, form or note:  medical    Who is the form from?: Home care    Where did the form come from: form was faxed in    What clinic location was the form placed at?: [facility]  Providers name CHYNA Torres  Where the form was placed: DOF    What number is listed as a contact on the form?: [phone]  Phone Number [phone]       Additional comments: [facility] INR 2.5 from 11/11/20     Call taken on 11/16/2020 at 2:51 PM by Suzanna More

## 2020-11-25 ENCOUNTER — TELEPHONE (OUTPATIENT)
Dept: NURSING | Facility: CLINIC | Age: 85
End: 2020-11-25

## 2020-11-25 DIAGNOSIS — G12.1 SPINAL MUSCULAR ATROPHY TYPE III (H): ICD-10-CM

## 2020-11-25 DIAGNOSIS — Z79.01 LONG TERM CURRENT USE OF ANTICOAGULANT THERAPY: ICD-10-CM

## 2020-11-25 DIAGNOSIS — Z86.711 HISTORY OF PULMONARY EMBOLISM: ICD-10-CM

## 2020-11-25 DIAGNOSIS — I48.20 CHRONIC ATRIAL FIBRILLATION (H): Primary | ICD-10-CM

## 2020-11-25 NOTE — TELEPHONE ENCOUNTER
ANTICOAGULATION MANAGEMENT      Kenyatta Donald due for annual renewal of referral to anticoagulation monitoring. Order pended for your review and signature.      ANTICOAGULATION SUMMARY      Warfarin indication(s)     Atrial fibrillation  PE    Heart valve present?  NO       Current goal range   INR: 2.0-3.0     Goal appropriate for indication? Yes, INR 2-3 appropriate for hx of DVT, PE, hypercoagulable state, Afib, LVAD, or bileaflet AVR without risk factors     Current duration of therapy Indefinite/long term therapy   Time in Therapeutic Range (TTR)  (Goal > 60%) 69.4 %       Office visit with referring provider's group within last year yes on 1/22/2020 with cardiology  Due for PCP visit 12/2020       Macy Maier formerly [facility]

## 2020-12-09 ENCOUNTER — ANTICOAGULATION THERAPY VISIT (OUTPATIENT)
Dept: NURSING | Facility: CLINIC | Age: 85
End: 2020-12-09

## 2020-12-09 DIAGNOSIS — Z86.711 HISTORY OF PULMONARY EMBOLISM: ICD-10-CM

## 2020-12-09 DIAGNOSIS — I48.20 CHRONIC ATRIAL FIBRILLATION (H): ICD-10-CM

## 2020-12-09 DIAGNOSIS — Z79.01 CHRONIC ANTICOAGULATION: ICD-10-CM

## 2020-12-09 DIAGNOSIS — Z79.01 LONG TERM CURRENT USE OF ANTICOAGULANT THERAPY: ICD-10-CM

## 2020-12-09 LAB — INR PPP: 3.3 (ref 0.9–1.1)

## 2020-12-09 NOTE — PROGRESS NOTES
ANTICOAGULATION MANAGEMENT     Patient Name:  Kenyatta Donald  Date:  12/9/2020    ASSESSMENT /SUBJECTIVE:    Today's INR result of 3.3is supratherapeutic. Goal INR of 2.0-3.0      Warfarin dose taken: Warfarin taken as instructed    Diet: No new diet changes affecting INR    Medication changes/ interactions: No new medications/supplements affecting INR    Previous INR: Therapeutic     S/S of bleeding or thromboembolism: No    New injury or illness: No    Upcoming surgery, procedure or cardioversion: No    Additional findings: None      PLAN:    Telephone call with home care nurse [facility] Marquez regarding INR result and instructed:     Warfarin Dosing Instructions: Continue your current warfarin dose 2mg every Mon; 4mg all other days of the week.     Instructed patient to follow up no later than: 2 weeks  Orders given to  Homecare nurse/facility to recheck    Education provided: None required      Kenyatta verbalizes understanding and agrees to warfarin dosing plan.    Instructed to call the Anticoagulation Clinic for any changes, questions or concerns. (#[phone])        Gregor Schuler RN      OBJECTIVE:  Recent labs: (last 7 days)     12/09/20   INR 3.3*         No question data found.  Anticoagulation Summary  As of 12/9/2020    INR goal:  2.0-3.0   TTR:  66.5 % (1 y)   Prior goal:  2.0-3.0   INR used for dosing:  3.3 (12/9/2020)   Warfarin maintenance plan:  2 mg (1 mg x 2) every Mon; 4 mg (4 mg x 1) all other days   Full warfarin instructions:  2 mg every Mon; 4 mg all other days   Weekly warfarin total:  26 mg   Plan last modified:  Giuliana Gutierrez RN (9/18/2020)   Next INR check:     Priority:  Maintenance   Target end date:  Indefinite    Indications    Chronic anticoagulation [Z79.01]  Mitral valve disorder s/po 1-9-15 remodeling percut  + clip  (Resolved) [I05.9]  Chronic atrial fibrillation (H) [I48.20]  History of pulmonary embolism [Z86.711]  Long term current use of anticoagulant therapy [Z79.01]              Anticoagulation Episode Summary     INR check location:      Preferred lab:      Send INR reminders to:  DAVID REAVES    Comments:  INR range changed by cardiology 1/29/209      Anticoagulation Care Providers     Provider Role Specialty Phone number    Bess Lion MD Referring Family Medicine [phone]    Christina Malin PA-C Referring [facility] [phone]

## 2020-12-23 ENCOUNTER — VIRTUAL VISIT (OUTPATIENT)
Dept: INTERNAL MEDICINE | Facility: CLINIC | Age: 85
End: 2020-12-23
Payer: COMMERCIAL

## 2020-12-23 ENCOUNTER — TELEPHONE (OUTPATIENT)
Dept: INTERNAL MEDICINE | Facility: CLINIC | Age: 85
End: 2020-12-23

## 2020-12-23 DIAGNOSIS — Z86.711 HISTORY OF PULMONARY EMBOLISM: ICD-10-CM

## 2020-12-23 DIAGNOSIS — R09.81 NASAL CONGESTION WITH RHINORRHEA: Primary | ICD-10-CM

## 2020-12-23 DIAGNOSIS — Z79.01 CHRONIC ANTICOAGULATION: ICD-10-CM

## 2020-12-23 DIAGNOSIS — J34.89 NASAL CONGESTION WITH RHINORRHEA: Primary | ICD-10-CM

## 2020-12-23 DIAGNOSIS — Z79.01 LONG TERM CURRENT USE OF ANTICOAGULANT THERAPY: ICD-10-CM

## 2020-12-23 DIAGNOSIS — I48.20 CHRONIC ATRIAL FIBRILLATION (H): ICD-10-CM

## 2020-12-23 LAB — INR PPP: 3.9 (ref 0.9–1.1)

## 2020-12-23 PROCEDURE — 99213 OFFICE O/P EST LOW 20 MIN: CPT | Mod: 95 | Performed by: FAMILY MEDICINE

## 2020-12-23 NOTE — TELEPHONE ENCOUNTER
ANTICOAGULATION MANAGEMENT     Patient Name:  Kenyatta Donald  Date:  12/23/2020    ASSESSMENT /SUBJECTIVE:    Today's INR result of 3.9 is supratherapeutic. Goal INR of 2.0-3.0      Warfarin dose taken: Warfarin taken as instructed    Diet: No new diet changes affecting INR    Medication changes/ interactions: Robitussin DM    Previous INR: Supratherapeutic     S/S of bleeding or thromboembolism: No    New injury or illness: Yes: nasal drainage    Upcoming surgery, procedure or cardioversion: No    Additional findings: None      PLAN:    Telephone call with home care nurse Alma regarding INR result and instructed:     Warfarin Dosing Instructions: Hold tonight then change your warfarin dose to 2 mg on monday/thursdays and 4 mg all other days  . (7.7 % change)    Instructed patient to follow up no later than: 2 weeks  Orders given to  Homecare nurse/facility to recheck    Education provided: Monitoring for bleeding signs and symptoms      Alma, home care, verbalizes understanding and agrees to warfarin dosing plan.    Instructed to call the Anticoagulation Clinic for any changes, questions or concerns. (#[phone])        Radha Linares RN      OBJECTIVE:  Recent labs: (last 7 days)     12/23/20   INR 3.9*             Anticoagulation Summary  As of 12/23/2020    INR goal:  2.0-3.0   TTR:  62.7 % (1 y)   INR used for dosing:  3.9 (12/23/2020)   Warfarin maintenance plan:  2 mg (1 mg x 2) every Mon; 4 mg (4 mg x 1) all other days   Full warfarin instructions:  2 mg every Mon; 4 mg all other days   Weekly warfarin total:  26 mg   Plan last modified:  Giuliana Gutierrez RN (9/18/2020)   Next INR check:     Priority:  High   Target end date:  Indefinite    Indications    Chronic anticoagulation [Z79.01]  Mitral valve disorder s/po 1-9-15 remodeling percut  + clip  (Resolved) [I05.9]  Chronic atrial fibrillation (H) [I48.20]  History of pulmonary embolism [Z86.711]  Long term current use of anticoagulant therapy  [Z79.01]             Anticoagulation Episode Summary     INR check location:      Preferred lab:      Send INR reminders to:  DAVID REAVES    Comments:  INR range changed by cardiology 1/29/209      Anticoagulation Care Providers     Provider Role Specialty Phone number    Bess Lion MD Referring Family Medicine [phone]    Christina Malin PA-C Referring [facility] [phone]

## 2020-12-23 NOTE — PATIENT INSTRUCTIONS
The patient has a visiting nurse that stops by to help her with things. She also has someone who goes to the pharmacy for her to  medications. We discussed starting one of the nasal steroid sprays 1 spray in each nostril twice daily. After 3 to 4 weeks if that has worked well she can back off to 1 spray in each nostril daily. She will follow-up in 3 to 4 weeks if not improving.

## 2020-12-23 NOTE — TELEPHONE ENCOUNTER
Alma RN with [facility]  calling, [phone] ( she is currently in patient home so please advise asap of any dose adjustments)    Today INR 3.9    No missed doses    FYI, patient has been taking Robitussin DM at least once a day. No other changes in diet. No bleeding or bruising. No new medication    Aaliyah PICHARDON, RN, PHN

## 2020-12-23 NOTE — PROGRESS NOTES
"Kenyatta Donald is a 85 year old female who is being evaluated via a billable telephone visit.      The patient has been notified of following:     \"This telephone visit will be conducted via a call between you and your physician/provider. We have found that certain health care needs can be provided without the need for a physical exam.  This service lets us provide the care you need with a short phone conversation.  If a prescription is necessary we can send it directly to your pharmacy.  If lab work is needed we can place an order for that and you can then stop by our lab to have the test done at a later time.    Telephone visits are billed at different rates depending on your insurance coverage. During this emergency period, for some insurers they may be billed the same as an in-person visit.  Please reach out to your insurance provider with any questions.    If during the course of the call the physician/provider feels a telephone visit is not appropriate, you will not be charged for this service.\"    Patient has given verbal consent for Telephone visit?  Yes    What phone number would you like to be contacted at? [phone]    How would you like to obtain your AVS? Mail a copy    Subjective     Kenyatta Donald is a 85 year old female who presents via phone visit today for the following health issues:    HPI     Patient states she has a lot of phlegm in her throat every day more in the morning and before she goes to bed. Patient has been using OTC Mucous relief medicine but it has not been working. Patient states she has been having the problem for over a year.           Review of Systems   Constitutional, HEENT, cardiovascular, pulmonary, gi and gu systems are negative, except as otherwise noted.       Objective          Vitals:  No vitals were obtained today due to virtual visit.    healthy, alert and no distress  PSYCH: Alert and oriented times 3; coherent speech, normal   rate and volume, able to " "articulate logical thoughts, able   to abstract reason, no tangential thoughts, no hallucinations   or delusions  Her affect is normal  RESP: No cough, no audible wheezing, able to talk in full sentences  Remainder of exam unable to be completed due to telephone visits            Assessment/Plan:    Assessment & Plan     Nasal congestion with rhinorrhea         BMI:   Estimated body mass index is 37.92 kg/m  as calculated from the following:    Height as of 1/22/20: 1.638 m (5' 4.5\").    Weight as of 1/22/20: 101.8 kg (224 lb 6.4 oz).            Patient Instructions   The patient has a visiting nurse that stops by to help her with things. She also has someone who goes to the pharmacy for her to  medications. We discussed starting one of the nasal steroid sprays 1 spray in each nostril twice daily. After 3 to 4 weeks if that has worked well she can back off to 1 spray in each nostril daily. She will follow-up in 3 to 4 weeks if not improving.      Return in about 4 weeks (around 1/20/2021) for If symptoms persist.    Andry Damico MD  [facility]    Phone call duration:  7 minutes                "

## 2021-01-06 ENCOUNTER — ANTICOAGULATION THERAPY VISIT (OUTPATIENT)
Dept: FAMILY MEDICINE | Facility: CLINIC | Age: 86
End: 2021-01-06

## 2021-01-06 DIAGNOSIS — Z79.01 LONG TERM CURRENT USE OF ANTICOAGULANT THERAPY: ICD-10-CM

## 2021-01-06 DIAGNOSIS — Z79.01 CHRONIC ANTICOAGULATION: ICD-10-CM

## 2021-01-06 DIAGNOSIS — I48.20 CHRONIC ATRIAL FIBRILLATION (H): ICD-10-CM

## 2021-01-06 DIAGNOSIS — Z86.711 HISTORY OF PULMONARY EMBOLISM: ICD-10-CM

## 2021-01-06 LAB — INR PPP: 2.8 (ref 0.9–1.1)

## 2021-01-06 NOTE — PROGRESS NOTES
ANTICOAGULATION MANAGEMENT     Patient Name:  Kenyatta Donald  Date:  2021    ASSESSMENT /SUBJECTIVE:    Today's INR result of 2.8 is therapeutic. Goal INR of 2.0-3.0      Warfarin dose taken: Warfarin taken as instructed    Diet: No new diet changes affecting INR    Medication changes/ interactions: No new medications/supplements affecting INR    Previous INR: Supratherapeutic     S/S of bleeding or thromboembolism: No    New injury or illness: No    Upcoming surgery, procedure or cardioversion: No    Additional findings: None      PLAN:    Telephone call with home care nurse Alma regarding INR result and instructed:     Warfarin Dosing Instructions: Continue your current warfarin dose 2 mg on mon/thur and 4 mg all other days    Instructed patient to follow up no later than: 2 weeks  Orders given to  Homecare nurse/facility to recheck    Education provided: None required      Alma, home care, verbalizes understanding and agrees to warfarin dosing plan.    Instructed to call the Anticoagulation Clinic for any changes, questions or concerns. (#[phone])        Radha Linares RN      OBJECTIVE:  Recent labs: (last 7 days)     21   INR 2.8*         No question data found.  Anticoagulation Summary  As of 2021    INR goal:  2.0-3.0   TTR:  59.6 % (1 y)   INR used for dosin.8 (2021)   Warfarin maintenance plan:  2 mg (1 mg x 2) every Mon, Thu; 4 mg (4 mg x 1) all other days   Full warfarin instructions:  2 mg every Mon, Thu; 4 mg all other days   Weekly warfarin total:  24 mg   Plan last modified:  Radha Linares RN (2020)   Next INR check:  2021   Priority:  High   Target end date:  Indefinite    Indications    Chronic anticoagulation [Z79.01]  Mitral valve disorder s/po 1-9-15 remodeling percut  + clip  (Resolved) [I05.9]  Chronic atrial fibrillation (H) [I48.20]  History of pulmonary embolism [Z86.711]  Long term current use of anticoagulant therapy [Z79.01]              Anticoagulation Episode Summary     INR check location:      Preferred lab:      Send INR reminders to:  DAVID REAVES    Comments:  INR range changed by cardiology 1/29/209      Anticoagulation Care Providers     Provider Role Specialty Phone number    Bess Lion MD Referring Family Medicine [phone]    Christina Malin PA-C Referring [facility] [phone]

## 2021-01-20 ENCOUNTER — ANTICOAGULATION THERAPY VISIT (OUTPATIENT)
Dept: FAMILY MEDICINE | Facility: CLINIC | Age: 86
End: 2021-01-20

## 2021-01-20 DIAGNOSIS — I48.20 CHRONIC ATRIAL FIBRILLATION (H): ICD-10-CM

## 2021-01-20 DIAGNOSIS — Z79.01 CHRONIC ANTICOAGULATION: ICD-10-CM

## 2021-01-20 DIAGNOSIS — Z86.711 HISTORY OF PULMONARY EMBOLISM: ICD-10-CM

## 2021-01-20 DIAGNOSIS — Z79.01 LONG TERM CURRENT USE OF ANTICOAGULANT THERAPY: ICD-10-CM

## 2021-01-20 LAB — INR PPP: 3.7 (ref 0.9–1.1)

## 2021-01-20 NOTE — PROGRESS NOTES
ANTICOAGULATION MANAGEMENT     Patient Name:  Kenyatta Donald  Date:  1/20/2021    ASSESSMENT /SUBJECTIVE:    Today's INR result of 3.7 is supratherapeutic. Goal INR of 2.0-3.0      Warfarin dose taken: Warfarin taken as instructed    Diet: No new diet changes affecting INR    Medication changes/ interactions: Started robatussin and flonase but these should not effect INR per up to date.    Previous INR: Therapeutic     S/S of bleeding or thromboembolism: No    New injury or illness: No    Upcoming surgery, procedure or cardioversion: No    Additional findings: None      PLAN:    Telephone call with home care nurse Alma regarding INR result and instructed:     Warfarin Dosing Instructions: Change your warfarin dose to 2mg every Mon, Wed, Fri; 4mg all other days  . (8.3 % change)    Instructed patient to follow up no later than: 2 weeks  Orders given to  Homecare nurse/facility to recheck    Education provided: Target INR goal and significance of current INR result      Alma verbalizes understanding and agrees to warfarin dosing plan.    Instructed to call the Anticoagulation Clinic for any changes, questions or concerns. (#[phone])        Eric Villegas RN      OBJECTIVE:  Recent labs: (last 7 days)     01/20/21   INR 3.7*         No question data found.  Anticoagulation Summary  As of 1/20/2021    INR goal:  2.0-3.0   TTR:  56.6 % (1 y)   INR used for dosing:  3.7 (1/20/2021)   Warfarin maintenance plan:  2 mg (1 mg x 2) every Mon, Wed, Fri; 4 mg (4 mg x 1) all other days   Full warfarin instructions:  2 mg every Mon, Wed, Fri; 4 mg all other days   Weekly warfarin total:  22 mg   Plan last modified:  Eric Villegas, [facility] (1/20/2021)   Next INR check:  2/3/2021   Priority:  High   Target end date:  Indefinite    Indications    Chronic anticoagulation [Z79.01]  Mitral valve disorder s/po 1-9-15 remodeling percut  + clip  (Resolved) [I05.9]  Chronic atrial fibrillation (H) [I48.20]  History of pulmonary embolism  [Z86.711]  Long term current use of anticoagulant therapy [Z79.01]             Anticoagulation Episode Summary     INR check location:      Preferred lab:      Send INR reminders to:  DAVID REAVES    Comments:  INR range changed by cardiology 1/29/209      Anticoagulation Care Providers     Provider Role Specialty Phone number    Bess Lion MD Referring Family Medicine [phone]    Christina Malin PA-C Referring Family Medicine [phone]

## 2021-01-22 DIAGNOSIS — R60.9 EDEMA, UNSPECIFIED TYPE: Primary | ICD-10-CM

## 2021-01-26 ENCOUNTER — MEDICAL CORRESPONDENCE (OUTPATIENT)
Dept: HEALTH INFORMATION MANAGEMENT | Facility: CLINIC | Age: 86
End: 2021-01-26

## 2021-01-26 RX ORDER — FUROSEMIDE 20 MG
20 TABLET ORAL EVERY MORNING
Qty: 30 TABLET | Refills: 0 | Status: SHIPPED | OUTPATIENT
Start: 2021-01-26 | End: 2021-03-31

## 2021-01-26 NOTE — TELEPHONE ENCOUNTER
Routing refill request to provider for review/approval because:    Diuretics (Including Combos) Protocol [facility]rge5301/22/2021 04:36 PM   Blood pressure under 140/90 in past 12 months

## 2021-01-27 ENCOUNTER — MEDICAL CORRESPONDENCE (OUTPATIENT)
Dept: HEALTH INFORMATION MANAGEMENT | Facility: CLINIC | Age: 86
End: 2021-01-27

## 2021-01-29 DIAGNOSIS — M10.279 DRUG-INDUCED GOUT INVOLVING TOE, UNSPECIFIED CHRONICITY, UNSPECIFIED LATERALITY: ICD-10-CM

## 2021-01-29 DIAGNOSIS — I10 ESSENTIAL HYPERTENSION, BENIGN: ICD-10-CM

## 2021-01-29 RX ORDER — LISINOPRIL 20 MG/1
20 TABLET ORAL 2 TIMES DAILY
Qty: 180 TABLET | Refills: 0 | Status: SHIPPED | OUTPATIENT
Start: 2021-01-29 | End: 2021-04-29

## 2021-01-29 RX ORDER — ALLOPURINOL 100 MG/1
100 TABLET ORAL DAILY
Qty: 90 TABLET | Refills: 0 | Status: SHIPPED | OUTPATIENT
Start: 2021-01-29 | End: 2021-04-29

## 2021-01-29 NOTE — TELEPHONE ENCOUNTER
Routing refill request to provider for review/approval because:  Labs out of range:  CBC, ALT, Uric Acid

## 2021-01-29 NOTE — TELEPHONE ENCOUNTER
This patient is overdue for an office visit.  Please help this patient to schedule either a video,phone,or in person visit with a new provider.

## 2021-02-03 ENCOUNTER — ANTICOAGULATION THERAPY VISIT (OUTPATIENT)
Dept: FAMILY MEDICINE | Facility: CLINIC | Age: 86
End: 2021-02-03

## 2021-02-03 DIAGNOSIS — Z79.01 LONG TERM CURRENT USE OF ANTICOAGULANT THERAPY: ICD-10-CM

## 2021-02-03 DIAGNOSIS — Z86.711 HISTORY OF PULMONARY EMBOLISM: ICD-10-CM

## 2021-02-03 DIAGNOSIS — Z79.01 CHRONIC ANTICOAGULATION: ICD-10-CM

## 2021-02-03 DIAGNOSIS — I48.20 CHRONIC ATRIAL FIBRILLATION (H): ICD-10-CM

## 2021-02-03 LAB — INR PPP: 3.5 (ref 0.9–1.1)

## 2021-02-04 ENCOUNTER — VIRTUAL VISIT (OUTPATIENT)
Dept: INTERNAL MEDICINE | Facility: CLINIC | Age: 86
End: 2021-02-04
Payer: COMMERCIAL

## 2021-02-04 DIAGNOSIS — R05.9 COUGH: ICD-10-CM

## 2021-02-04 DIAGNOSIS — I10 BENIGN ESSENTIAL HYPERTENSION: ICD-10-CM

## 2021-02-04 DIAGNOSIS — J34.89 NASAL CONGESTION WITH RHINORRHEA: Primary | ICD-10-CM

## 2021-02-04 DIAGNOSIS — R09.81 NASAL CONGESTION WITH RHINORRHEA: Primary | ICD-10-CM

## 2021-02-04 PROCEDURE — 99213 OFFICE O/P EST LOW 20 MIN: CPT | Mod: 95 | Performed by: FAMILY MEDICINE

## 2021-02-04 NOTE — PROGRESS NOTES
Kenyatta is a 85 year old who is being evaluated via a billable telephone -visit.      What phone number would you like to be contacted at? [phone]  How would you like to obtain your AVS? Mail a copy  Assessment & Plan     Nasal congestion with rhinorrhea      Benign essential hypertension      Cough                  15 minutes spent on the date of the encounter doing chart review, history and exam, documentation and further activities as noted above           Patient Instructions   The patient is going to increase her Robitussin-DM up to every 4 hours as needed.  She has been using that twice a day with some good effect.  She is long overdue for her annual wellness exam and will schedule that at her convenience in the near future.  It will give us an opportunity to check her chest at that time also.      Return in about 4 weeks (around 3/4/2021) for wellness exam.    Andry Damico MD  [facility]    Francisco You is a 85 year old who presents to clinic today for the following health issues   -  HPI       Concern - Cough  Onset: Since Decmeber  Description: Cough with productive phlegm  Intensity: none  Progression of Symptoms:  constant  Accompanying Signs & Symptoms: Coughing up white mucus  Previous history of similar problem: none  Precipitating factors:        Worsened by: none  Alleviating factors:        Improved by: none  Therapies tried and outcome: Mucinex (wal-tussin) with some relief. Takes it in the am and pm. Nasal spray to help with runny nose.     Hypertension Follow-up      Do you check your blood pressure regularly outside of the clinic? No     Are you following a low salt diet? Yes    Are your blood pressures ever more than 140 on the top number (systolic) OR more   than 90 on the bottom number (diastolic), for example 140/90? n/a    Hypothyroidism Follow-up      Since last visit, patient describes the following symptoms: Weight stable, no hair loss,  no skin changes, no constipation, no loose stools        Review of Systems   Constitutional, HEENT, cardiovascular, pulmonary, gi and gu systems are negative, except as otherwise noted.      Objective           Vitals:  No vitals were obtained today due to virtual visit.    Physical Exam   healthy, alert and no distress  PSYCH: Alert and oriented times 3; coherent speech, normal   rate and volume, able to articulate logical thoughts, able   to abstract reason, no tangential thoughts, no hallucinations   or delusions  Her affect is normal  RESP: No cough, no audible wheezing, able to talk in full sentences  Remainder of exam unable to be completed due to telephone visits                Phone call duration: 15 minutes  [

## 2021-02-04 NOTE — PATIENT INSTRUCTIONS
The patient is going to increase her Robitussin-DM up to every 4 hours as needed.  She has been using that twice a day with some good effect.  She is long overdue for her annual wellness exam and will schedule that at her convenience in the near future.  It will give us an opportunity to check her chest at that time also.

## 2021-02-17 ENCOUNTER — TELEPHONE (OUTPATIENT)
Dept: CARDIOLOGY | Facility: CLINIC | Age: 86
End: 2021-02-17

## 2021-02-17 ENCOUNTER — ANTICOAGULATION THERAPY VISIT (OUTPATIENT)
Dept: FAMILY MEDICINE | Facility: CLINIC | Age: 86
End: 2021-02-17

## 2021-02-17 DIAGNOSIS — Z79.01 CHRONIC ANTICOAGULATION: ICD-10-CM

## 2021-02-17 DIAGNOSIS — I48.20 CHRONIC ATRIAL FIBRILLATION (H): ICD-10-CM

## 2021-02-17 DIAGNOSIS — Z79.01 LONG TERM CURRENT USE OF ANTICOAGULANT THERAPY: ICD-10-CM

## 2021-02-17 DIAGNOSIS — Z86.711 HISTORY OF PULMONARY EMBOLISM: ICD-10-CM

## 2021-02-17 DIAGNOSIS — E78.5 HYPERLIPIDEMIA LDL GOAL <100: Primary | ICD-10-CM

## 2021-02-17 LAB — INR PPP: 2.8 (ref 0.9–1.1)

## 2021-02-17 NOTE — TELEPHONE ENCOUNTER
Message from scheduling:  This pt called she wanted virtual appt with Ashvin alexandre for April schedule. Pt also declined to schedule ECHO and lab she has a hard time with movement and coming to appt physically wants to wait to do test during the summer or next year. Informed pt I was not able to medically determine if it's ok to wait on test and will have a RN call her.     She also has she has a home care RN that comes see her unsure if they can draw labs though. Sintia [phone]     Thank you,   Josh     Patient scheduled for a virtual visit on 4/13/2021.    Spoke with patient, she is struggling to leave her home due to the weather and her weak legs. She uses a walker but is afraid to go out on the ice.  Patient states she would like to wait for her echo until warmer weather. She hopes  parking is open then as she uses a wheelchair for long distances.  Patient is not sure if her homecare program offers lab draws. She has an RN in her home every 2 weeks to fill her med box, check vitals, and check her INR.  She has additional help in the evenings to dress and the CMAs do her grocery errands also.    Attempted to call [facility] / Sintia @ [phone] to see if labs can be drawn in the patient's home. She is due for lipids, cbc and bmp.  Left message for RN to call back with approval and fax # to send orders. Timing to be based on the 2 week appointment cycle already in place.

## 2021-02-17 NOTE — PROGRESS NOTES
ANTICOAGULATION MANAGEMENT     Patient Name:  Kenyatta Donald  Date:  2021    ASSESSMENT /SUBJECTIVE:    Today's INR result of 2.8 is therapeutic. Goal INR of 2.0-3.0      Warfarin dose taken: Warfarin taken as instructed    Diet: No new diet changes affecting INR    Medication changes/ interactions: No new medications/supplements affecting INR    Previous INR: Supratherapeutic     S/S of bleeding or thromboembolism: No    New injury or illness: Recent fall but pt did not hit her head. Minor bruising on hip.    Upcoming surgery, procedure or cardioversion: No    Additional findings: None      PLAN:    Telephone call with home care nurse Sintia regarding INR result and instructed:     Warfarin Dosing Instructions: Continue your current warfarin dose 4mg every Sun, Tue, Thu; 2 mg all other days    Instructed patient to follow up no later than: 2 weeks  Orders given to  Homecare nurse/facility to recheck    Education provided: Target INR goal and significance of current INR result      Sintia verbalizes understanding and agrees to warfarin dosing plan.    Instructed to call the Anticoagulation Clinic for any changes, questions or concerns. (#[phone])        Eric Villegas RN      OBJECTIVE:  Recent labs: (last 7 days)     21   INR 2.8*         No question data found.  Anticoagulation Summary  As of 2021    INR goal:  2.0-3.0   TTR:  52.6 % (1 y)   INR used for dosin.8 (2021)   Warfarin maintenance plan:  4 mg (4 mg x 1) every Sun, Tue, Thu; 2 mg (1 mg x 2) all other days   Full warfarin instructions:  4 mg every Sun, Tue, Thu; 2 mg all other days   Weekly warfarin total:  20 mg   No change documented:  Eric Villegas, RN   Plan last modified:  Radha Linares RN (2/3/2021)   Next INR check:  3/3/2021   Priority:  High   Target end date:  Indefinite    Indications    Chronic anticoagulation [Z79.01]  Mitral valve disorder s/po 1-9-15 remodeling percut  + clip  (Resolved) [I05.9]  Chronic  atrial fibrillation (H) [I48.20]  History of pulmonary embolism [Z86.711]  Long term current use of anticoagulant therapy [Z79.01]             Anticoagulation Episode Summary     INR check location:      Preferred lab:      [facility]    Comments:  INR range changed by cardiology 1/29/209      Anticoagulation Care Providers     Provider Role Specialty Phone number    Bess Lion MD Referring Family Medicine [phone]    Christina Malin PA-C Referring Family Medicine [phone]

## 2021-02-19 NOTE — TELEPHONE ENCOUNTER
Spoke with patient and she provided a contact number for the [facility] offices: [phone]. Called [facility] to see if we can send orders for in-home lab draw as patient struggles to leave her home.    Left a message for staff requesting a call back to see if we can fax orders for flp, cbc, bmp to be drawn in-home. Patient is seen every 2 weeks and OV is in April. Requested a call back to discuss

## 2021-02-22 NOTE — TELEPHONE ENCOUNTER
Spoke with [facility] staff and RNs are allowed to draw labs for home health care. Their fax is [phone].    Left a message for the nursing supervisor to confirm dates and times of visits planned before April.    1035 spoke with [facility] RN, Sintia. She is not available to help patient with her visit on 4/13/2021. She is going to reschedule the visit so she can be on-site for the video call.  Faxed orders for cbc, bmp and flp to [facility] to be scheduled for 3/17/2021 at 9:00 AM.    Called patient with update and reminded her to be fasting for lab work on 3/17/2021.

## 2021-03-03 ENCOUNTER — ANTICOAGULATION THERAPY VISIT (OUTPATIENT)
Dept: NURSING | Facility: CLINIC | Age: 86
End: 2021-03-03

## 2021-03-03 DIAGNOSIS — Z79.01 CHRONIC ANTICOAGULATION: ICD-10-CM

## 2021-03-03 DIAGNOSIS — I48.20 CHRONIC ATRIAL FIBRILLATION (H): ICD-10-CM

## 2021-03-03 DIAGNOSIS — Z79.01 LONG TERM CURRENT USE OF ANTICOAGULANT THERAPY: ICD-10-CM

## 2021-03-03 DIAGNOSIS — Z86.711 HISTORY OF PULMONARY EMBOLISM: ICD-10-CM

## 2021-03-03 LAB — INR PPP: 2.3 (ref 0.9–1.1)

## 2021-03-03 NOTE — PROGRESS NOTES
2.3ANTICOAGULATION MANAGEMENT     Patient Name:  Kenyatta Donald  Date:  3/3/2021    ASSESSMENT /SUBJECTIVE:    Today's INR result of 2.3 is therapeutic. Goal INR of 2.0-3.0      Warfarin dose taken: Warfarin taken as instructed    Diet: No new diet changes affecting INR    Medication changes/ interactions: No new medications/supplements affecting INR    Previous INR: Therapeutic     S/S of bleeding or thromboembolism: No    New injury or illness: No    Upcoming surgery, procedure or cardioversion: No    Additional findings: None      PLAN:    Telephone call with home care nurse Giuliana regarding INR result and instructed:     Warfarin Dosing Instructions: Continue your current warfarin dose 4 mg Sun T TH; 2 mg ROW    Instructed patient to follow up no later than: 2 weeks  Orders given to  Homecare nurse/facility to recheck    Education provided: None required      Giuliana verbalizes understanding and agrees to warfarin dosing plan.    Instructed to call the Anticoagulation Clinic for any changes, questions or concerns. (#[phone])        Tamika Castellano RN      OBJECTIVE:  Recent labs: (last 7 days)     21   INR 2.3*         INR assessment THER    Recheck INR In: 2 WEEKS    INR Location [facility]      Anticoagulation Summary  As of 3/3/2021    INR goal:  2.0-3.0   TTR:  56.4 % (1 y)   INR used for dosin.3 (3/3/2021)   Warfarin maintenance plan:  4 mg (4 mg x 1) every Sun, Tue, Thu; 2 mg (1 mg x 2) all other days   Full warfarin instructions:  4 mg every Sun, Tue, Thu; 2 mg all other days   Weekly warfarin total:  20 mg   Plan last modified:  Radha Linares RN (2/3/2021)   Next INR check:  3/17/2021   Priority:  High   Target end date:  Indefinite    Indications    Chronic anticoagulation [Z79.01]  Mitral valve disorder s/po 1-9-15 remodeling percut  + clip  (Resolved) [I05.9]  Chronic atrial fibrillation (H) [I48.20]  History of pulmonary embolism [Z86.711]  Long term current use of anticoagulant  therapy [Z79.01]             Anticoagulation Episode Summary     INR check location:      Preferred lab:      Send INR reminders to:  DAVID REAVES    Comments:  INR range changed by cardiology 1/29/209      Anticoagulation Care Providers     Provider Role Specialty Phone number    Bess Lion MD Referring Family Medicine [phone]    Christina Malin PA-C Referring [facility] Medicine [phone]

## 2021-03-17 ENCOUNTER — ANTICOAGULATION THERAPY VISIT (OUTPATIENT)
Dept: INTERNAL MEDICINE | Facility: CLINIC | Age: 86
End: 2021-03-17
Payer: COMMERCIAL

## 2021-03-17 ENCOUNTER — TELEPHONE (OUTPATIENT)
Dept: CARDIOLOGY | Facility: CLINIC | Age: 86
End: 2021-03-17

## 2021-03-17 DIAGNOSIS — Z79.01 LONG TERM CURRENT USE OF ANTICOAGULANT THERAPY: ICD-10-CM

## 2021-03-17 DIAGNOSIS — Z86.711 HISTORY OF PULMONARY EMBOLISM: ICD-10-CM

## 2021-03-17 DIAGNOSIS — I48.20 CHRONIC ATRIAL FIBRILLATION (H): ICD-10-CM

## 2021-03-17 DIAGNOSIS — Z79.01 CHRONIC ANTICOAGULATION: ICD-10-CM

## 2021-03-17 LAB
ANION GAP SERPL CALCULATED.3IONS-SCNC: 6 MMOL/L (ref 3–14)
BASOPHILS # BLD AUTO: 0.1 10E9/L (ref 0–0.2)
BASOPHILS NFR BLD AUTO: 0.4 %
BUN SERPL-MCNC: 34 MG/DL (ref 7–30)
CALCIUM SERPL-MCNC: 11 MG/DL (ref 8.5–10.1)
CHLORIDE SERPL-SCNC: 104 MMOL/L (ref 94–109)
CHOLEST SERPL-MCNC: 176 MG/DL
CO2 SERPL-SCNC: 27 MMOL/L (ref 20–32)
CREAT SERPL-MCNC: 1.11 MG/DL (ref 0.52–1.04)
DIFFERENTIAL METHOD BLD: ABNORMAL
EOSINOPHIL # BLD AUTO: 0.1 10E9/L (ref 0–0.7)
EOSINOPHIL NFR BLD AUTO: 0.6 %
ERYTHROCYTE [DISTWIDTH] IN BLOOD BY AUTOMATED COUNT: 16.7 % (ref 10–15)
GFR SERPL CREATININE-BSD FRML MDRD: 45 ML/MIN/{1.73_M2}
GLUCOSE SERPL-MCNC: 103 MG/DL (ref 70–99)
HCT VFR BLD AUTO: 40.6 % (ref 35–47)
HDLC SERPL-MCNC: 46 MG/DL
HGB BLD-MCNC: 12.2 G/DL (ref 11.7–15.7)
IMM GRANULOCYTES # BLD: 0.1 10E9/L (ref 0–0.4)
IMM GRANULOCYTES NFR BLD: 0.5 %
INR PPP: 2.2 (ref 0.9–1.1)
LDLC SERPL CALC-MCNC: 99 MG/DL
LYMPHOCYTES # BLD AUTO: 1.8 10E9/L (ref 0.8–5.3)
LYMPHOCYTES NFR BLD AUTO: 12 %
MCH RBC QN AUTO: 27.5 PG (ref 26.5–33)
MCHC RBC AUTO-ENTMCNC: 30 G/DL (ref 31.5–36.5)
MCV RBC AUTO: 92 FL (ref 78–100)
MONOCYTES # BLD AUTO: 0.9 10E9/L (ref 0–1.3)
MONOCYTES NFR BLD AUTO: 6.1 %
NEUTROPHILS # BLD AUTO: 11.7 10E9/L (ref 1.6–8.3)
NEUTROPHILS NFR BLD AUTO: 80.4 %
NONHDLC SERPL-MCNC: 130 MG/DL
NRBC # BLD AUTO: 0 10*3/UL
NRBC BLD AUTO-RTO: 0 /100
PLATELET # BLD AUTO: 351 10E9/L (ref 150–450)
POTASSIUM SERPL-SCNC: 4.1 MMOL/L (ref 3.4–5.3)
RBC # BLD AUTO: 4.43 10E12/L (ref 3.8–5.2)
SODIUM SERPL-SCNC: 137 MMOL/L (ref 133–144)
TRIGL SERPL-MCNC: 155 MG/DL
WBC # BLD AUTO: 14.6 10E9/L (ref 4–11)

## 2021-03-17 PROCEDURE — 80061 LIPID PANEL: CPT | Performed by: INTERNAL MEDICINE

## 2021-03-17 PROCEDURE — 85025 COMPLETE CBC W/AUTO DIFF WBC: CPT | Performed by: INTERNAL MEDICINE

## 2021-03-17 PROCEDURE — 80048 BASIC METABOLIC PNL TOTAL CA: CPT | Performed by: INTERNAL MEDICINE

## 2021-03-17 PROCEDURE — 99207 PR NO CHARGE NURSE ONLY: CPT | Performed by: FAMILY MEDICINE

## 2021-03-17 NOTE — PROGRESS NOTES
ANTICOAGULATION FOLLOW-UP CLINIC VISIT    Patient Name:  Kenyatta Donald  Date:  3/17/2021  Contact Type:  Telephone    SUBJECTIVE:  Patient Findings     Comments:  ISIS Patricio from [facility] calling ([phone]). The patient was assessed for diet, medication, and activity level changes, missed or extra doses, bruising or bleeding, with no problem findings. Reviewed maintenance warfarin dosing with patient. Patient will remain on the same dose until next INR check. No other questions or concerns. Scheduled next lab-only INR in 2 weeks.  Viktoriya DAUGHERTY RN  Anticoagulation Team          Clinical Outcomes     Negatives:  Major bleeding event, Thromboembolic event, Anticoagulation-related hospital admission, Anticoagulation-related ED visit, Anticoagulation-related fatality    Comments:  ISIS Patricio from [facility] calling ([phone]). The patient was assessed for diet, medication, and activity level changes, missed or extra doses, bruising or bleeding, with no problem findings. Reviewed maintenance warfarin dosing with patient. Patient will remain on the same dose until next INR check. No other questions or concerns. Scheduled next lab-only INR in 2 weeks.  Viktoriya DAUGHERTY RN  Anticoagulation Team             OBJECTIVE    Recent labs: (last 7 days)     21   INR 2.2*       ASSESSMENT / PLAN  INR assessment THER    Recheck INR In: 2 WEEKS    INR Location [facility]      Anticoagulation Summary  As of 3/17/2021    INR goal:  2.0-3.0   TTR:  58.2 % (1 y)   INR used for dosin.2 (3/17/2021)   Warfarin maintenance plan:  4 mg (4 mg x 1) every Sun, Tue, Thu; 2 mg (1 mg x 2) all other days   Full warfarin instructions:  4 mg every Sun, Tue, Thu; 2 mg all other days   Weekly warfarin total:  20 mg   No change documented:  Viktoriya Hernandez RN   Plan last modified:  Radha Linares RN (2/3/2021)   Next INR check:  3/31/2021   Priority:  Maintenance   Target end date:  Indefinite    Indications    Chronic  anticoagulation [Z79.01]  Mitral valve disorder s/po 1-9-15 remodeling percut  + clip  (Resolved) [I05.9]  Chronic atrial fibrillation (H) [I48.20]  History of pulmonary embolism [Z86.711]  Long term current use of anticoagulant therapy [Z79.01]             Anticoagulation Episode Summary     INR check location:      Preferred lab:      Send INR reminders to:  [facility]    Comments:  INR range changed by cardiology 1/29/209      Anticoagulation Care Providers     Provider Role Specialty Phone number    Bess Lion MD Referring Family Medicine [phone]    Christina Malin PA-C Referring [facility] Medicine [phone]            See the Encounter Report to view Anticoagulation Flowsheet and Dosing Calendar (Go to Encounters tab in chart review, and find the Anticoagulation Therapy Visit)    Viktoriya Hernandez RN

## 2021-03-17 NOTE — TELEPHONE ENCOUNTER
Per Dr. Lock's reply  Will discuss at visit.    Patient called and message reviewed. Patient reminded of upcoming day, time and location.

## 2021-03-31 ENCOUNTER — ANTICOAGULATION THERAPY VISIT (OUTPATIENT)
Dept: INTERNAL MEDICINE | Facility: CLINIC | Age: 86
End: 2021-03-31

## 2021-03-31 DIAGNOSIS — Z79.01 CHRONIC ANTICOAGULATION: ICD-10-CM

## 2021-03-31 DIAGNOSIS — Z86.711 HISTORY OF PULMONARY EMBOLISM: ICD-10-CM

## 2021-03-31 DIAGNOSIS — R60.9 EDEMA, UNSPECIFIED TYPE: ICD-10-CM

## 2021-03-31 DIAGNOSIS — I48.20 CHRONIC ATRIAL FIBRILLATION (H): ICD-10-CM

## 2021-03-31 DIAGNOSIS — Z79.01 LONG TERM CURRENT USE OF ANTICOAGULANT THERAPY: ICD-10-CM

## 2021-03-31 LAB — INR PPP: 2.9 (ref 0.9–1.1)

## 2021-03-31 RX ORDER — FUROSEMIDE 20 MG
TABLET ORAL
Qty: 270 TABLET | Refills: 0 | Status: SHIPPED | OUTPATIENT
Start: 2021-03-31 | End: 2021-06-24

## 2021-03-31 NOTE — TELEPHONE ENCOUNTER
Routing refill request to provider for review/approval because:  Labs not current:  Cr    Three rx on file, please clarify dose

## 2021-03-31 NOTE — LETTER
[facility]  [address] [address]  [phone]      4/1/2021       Kenyatta Donald  [address]   [facility] [address]        Dear Kenyatta,  Your are overdue for an Annual Visit with Dr. Andry Damico. Your medications have been refilled, but will not be approved for renewal until you have seen Dr. Damico for your Annual Visit.    Please contact the number listed above or schedule via [employer].   If you have already scheduled an appointment, please disregard the request.     Sincerely,      Andry Damico MD/Macy Andrews, [facility]  Internal Medicine

## 2021-03-31 NOTE — PROGRESS NOTES
ANTICOAGULATION MANAGEMENT     Patient Name:  Kenyatta Donald  Date:  3/31/2021    ASSESSMENT /SUBJECTIVE:    Today's INR result of 2.9 is therapeutic. Goal INR of 2.0-3.0      Warfarin dose taken: Warfarin taken as instructed    Diet: No new diet changes affecting INR    Medication changes/ interactions: No new medications/supplements affecting INR    Previous INR: Therapeutic     S/S of bleeding or thromboembolism: No    New injury or illness: No    Upcoming surgery, procedure or cardioversion: No    Additional findings: None      PLAN:    Telephone call with home care nurse Sintia regarding INR result and instructed:     Warfarin Dosing Instructions: Continue your current warfarin dose 4mg every Sun, Tue, Thu; 2mg all other days    Instructed patient to follow up no later than: 4 weeks  Orders given to  Homecare nurse/facility to recheck    Education provided: Target INR goal and significance of current INR result      Kenyatta verbalizes understanding and agrees to warfarin dosing plan.    Instructed to call the Anticoagulation Clinic for any changes, questions or concerns. (#[phone])        Eric Villegas RN      OBJECTIVE:  Recent labs: (last 7 days)     21   INR 2.9*         No question data found.  Anticoagulation Summary  As of 3/31/2021    INR goal:  2.0-3.0   TTR:  61.3 % (1 y)   INR used for dosin.9 (3/31/2021)   Warfarin maintenance plan:  4 mg (4 mg x 1) every Sun, Tue, Thu; 2 mg (1 mg x 2) all other days   Full warfarin instructions:  4 mg every Sun, Tue, Thu; 2 mg all other days   Weekly warfarin total:  20 mg   No change documented:  Eric Villegas RN   Plan last modified:  Radha Linares RN (2/3/2021)   Next INR check:  2021   Priority:  Maintenance   Target end date:  Indefinite    Indications    Chronic anticoagulation [Z79.01]  Mitral valve disorder s/po 15 remodeling percut  + clip  (Resolved) [I05.9]  Chronic atrial fibrillation (H) [I48.20]  History of pulmonary embolism  [Z86.711]  Long term current use of anticoagulant therapy [Z79.01]             Anticoagulation Episode Summary     INR check location:      Preferred lab:      Send INR reminders to:  DAVID REAVES    Comments:  INR range changed by cardiology 1/29/209      Anticoagulation Care Providers     Provider Role Specialty Phone number    Bess Lion MD Referring Family Medicine [phone]    Christina Malin PA-C Referring Family Medicine [phone]

## 2021-04-13 DIAGNOSIS — I48.91 A-FIB (H): ICD-10-CM

## 2021-04-13 DIAGNOSIS — I34.2 NONRHEUMATIC MITRAL (VALVE) STENOSIS: Primary | ICD-10-CM

## 2021-04-13 DIAGNOSIS — I50.9 CHF (CONGESTIVE HEART FAILURE) (H): ICD-10-CM

## 2021-04-14 NOTE — TELEPHONE ENCOUNTER
ALLOPURINOL 100MG TABLETS    Last Written Prescription Date: 06/19/2017  Last Fill Quantity: 90, # refills: 0  Last Office Visit with [facility], [facility], [facility] or [facility] prescribing provider:  01/13/2017   Next 5 appointments (look out 90 days)     Aug 11, 2017  2:30 PM CDT   Return Visit with REBEKAH Nicolas CNP   [facility] PHYSICIANS [facility] AT [place] ([facility])    [address] [place], [place] [id number]  [facility] [id number] [phone]                   Uric Acid   Date Value Ref Range Status   04/16/2015 5.6 2.5 - 7.5 mg/dL Final   ]  Creatinine   Date Value Ref Range Status   05/15/2017 0.73 0.70 - 1.30 mg/dL Final   ]  Lab Results   Component Value Date    WBC 8.8 07/26/2016     Lab Results   Component Value Date    RBC 4.22 07/26/2016     Lab Results   Component Value Date    HGB 12.4 07/26/2016     Lab Results   Component Value Date    HCT 37.4 07/26/2016     No components found for: MCT  Lab Results   Component Value Date    MCV 89 07/26/2016     Lab Results   Component Value Date    MCH 29.4 07/26/2016     Lab Results   Component Value Date    MCHC 33.2 07/26/2016     Lab Results   Component Value Date    RDW 14.9 07/26/2016     Lab Results   Component Value Date     07/26/2016     Lab Results   Component Value Date    AST 19 01/09/2015     Lab Results   Component Value Date    ALT <5 05/15/2017        Repair Performed By Another Provider Text (Leave Blank If You Do Not Want): After the tissue was excised the defect was repaired by another provider.

## 2021-04-28 ENCOUNTER — ANTICOAGULATION THERAPY VISIT (OUTPATIENT)
Dept: INTERNAL MEDICINE | Facility: CLINIC | Age: 86
End: 2021-04-28

## 2021-04-28 ENCOUNTER — MEDICAL CORRESPONDENCE (OUTPATIENT)
Dept: HEALTH INFORMATION MANAGEMENT | Facility: CLINIC | Age: 86
End: 2021-04-28

## 2021-04-28 DIAGNOSIS — Z86.711 HISTORY OF PULMONARY EMBOLISM: ICD-10-CM

## 2021-04-28 DIAGNOSIS — I48.20 CHRONIC ATRIAL FIBRILLATION (H): ICD-10-CM

## 2021-04-28 DIAGNOSIS — Z79.01 CHRONIC ANTICOAGULATION: ICD-10-CM

## 2021-04-28 DIAGNOSIS — Z79.01 LONG TERM CURRENT USE OF ANTICOAGULANT THERAPY: ICD-10-CM

## 2021-04-28 DIAGNOSIS — E03.9 ACQUIRED HYPOTHYROIDISM: ICD-10-CM

## 2021-04-28 DIAGNOSIS — I10 BENIGN ESSENTIAL HYPERTENSION: ICD-10-CM

## 2021-04-28 DIAGNOSIS — M10.279 DRUG-INDUCED GOUT INVOLVING TOE, UNSPECIFIED CHRONICITY, UNSPECIFIED LATERALITY: ICD-10-CM

## 2021-04-28 LAB — INR PPP: 2.5 (ref 0.9–1.1)

## 2021-04-28 NOTE — TELEPHONE ENCOUNTER
Routing refill request to provider for review/approval because:  Labs out of range:  Uric acid, alt, CR

## 2021-04-28 NOTE — PROGRESS NOTES
ANTICOAGULATION MANAGEMENT     Patient Name:  Kenyatta Donald  Date:  2021    ASSESSMENT /SUBJECTIVE:    Today's INR result of 2.5 is therapeutic. Goal INR of 2.0-3.0      Warfarin dose taken: Warfarin taken as instructed    Diet: No new diet changes affecting INR    Medication changes/ interactions: No new medications/supplements affecting INR    Previous INR: Therapeutic     S/S of bleeding or thromboembolism: No    New injury or illness: No    Upcoming surgery, procedure or cardioversion: No    Additional findings: None      PLAN:    Telephone call with GRACIE Patricio RN regarding INR result and instructed:     Warfarin Dosing Instructions: Continue your current warfarin dose 4 mg every Sun, Tue, Thu; 2 mg all other days    Instructed patient to follow up no later than: 4 weeks  Orders given to  Homecare nurse/facility to recheck    Education provided: Please call back if any changes to your diet, medications or how you've been taking warfarin, Monitoring for bleeding signs and symptoms, Monitoring for clotting signs and symptoms and Importance of notifying clinic for changes in medications; a sooner lab recheck maybe needed.      GRACIE Patricio RN verbalizes understanding and agrees to warfarin dosing plan.    Instructed to call the Anticoagulation Clinic for any changes, questions or concerns. (#[phone])        Makenzie Bear RN      OBJECTIVE:  Recent labs: (last 7 days)     21   INR 2.5*         No question data found.  Anticoagulation Summary  As of 2021    INR goal:  2.0-3.0   TTR:  63.8 % (1 y)   INR used for dosin.5 (2021)   Warfarin maintenance plan:  4 mg (4 mg x 1) every Sun, Tue, Thu; 2 mg (1 mg x 2) all other days   Full warfarin instructions:  4 mg every Sun, Tue, Thu; 2 mg all other days   Weekly warfarin total:  20 mg   Plan last modified:  Radha Linares RN (2/3/2021)   Next INR check:     Priority:  Maintenance   Target end date:  Indefinite    Indications    Chronic  anticoagulation [Z79.01]  Mitral valve disorder s/po 1-9-15 remodeling percut  + clip  (Resolved) [I05.9]  Chronic atrial fibrillation (H) [I48.20]  History of pulmonary embolism [Z86.711]  Long term current use of anticoagulant therapy [Z79.01]             Anticoagulation Episode Summary     INR check location:      Preferred lab:      [facility]    Comments:  INR range changed by cardiology 1/29/209      Anticoagulation Care Providers     Provider Role Specialty Phone number    Bess Lion MD Referring Family Medicine [phone]    Christina Malin PA-C Referring Family Medicine [phone]

## 2021-04-29 DIAGNOSIS — I10 ESSENTIAL HYPERTENSION, BENIGN: ICD-10-CM

## 2021-04-29 RX ORDER — ALLOPURINOL 100 MG/1
100 TABLET ORAL DAILY
Qty: 90 TABLET | Refills: 0 | Status: SHIPPED | OUTPATIENT
Start: 2021-04-29 | End: 2021-05-12

## 2021-04-29 RX ORDER — LISINOPRIL 20 MG/1
20 TABLET ORAL 2 TIMES DAILY
Qty: 180 TABLET | Refills: 0 | Status: SHIPPED | OUTPATIENT
Start: 2021-04-29 | End: 2021-06-24

## 2021-04-29 RX ORDER — LEVOTHYROXINE SODIUM 100 UG/1
100 TABLET ORAL DAILY
Qty: 90 TABLET | Refills: 0 | Status: SHIPPED | OUTPATIENT
Start: 2021-04-29 | End: 2021-05-12

## 2021-04-29 RX ORDER — AMLODIPINE BESYLATE 5 MG/1
5 TABLET ORAL 2 TIMES DAILY
Qty: 180 TABLET | Refills: 0 | Status: SHIPPED | OUTPATIENT
Start: 2021-04-29 | End: 2021-06-28 | Stop reason: DRUGHIGH

## 2021-04-29 NOTE — TELEPHONE ENCOUNTER
Routing refill request to provider for review/approval because:  Labs not current:  TSH, CR    BP not at goal    Please advise of ok with refilling or wait to address at upcoming appointment

## 2021-05-05 ENCOUNTER — MEDICAL CORRESPONDENCE (OUTPATIENT)
Dept: HEALTH INFORMATION MANAGEMENT | Facility: CLINIC | Age: 86
End: 2021-05-05

## 2021-05-06 ENCOUNTER — HOSPITAL ENCOUNTER (OUTPATIENT)
Dept: CARDIOLOGY | Facility: CLINIC | Age: 86
Discharge: HOME | End: 2021-05-06
Attending: INTERNAL MEDICINE | Admitting: INTERNAL MEDICINE
Payer: COMMERCIAL

## 2021-05-06 DIAGNOSIS — I50.9 CHF (CONGESTIVE HEART FAILURE) (H): ICD-10-CM

## 2021-05-06 DIAGNOSIS — I34.2 NONRHEUMATIC MITRAL (VALVE) STENOSIS: ICD-10-CM

## 2021-05-06 DIAGNOSIS — I48.91 A-FIB (H): ICD-10-CM

## 2021-05-06 PROCEDURE — 93306 TTE W/DOPPLER COMPLETE: CPT | Mod: 26 | Performed by: INTERNAL MEDICINE

## 2021-05-06 PROCEDURE — 93306 TTE W/DOPPLER COMPLETE: CPT

## 2021-05-12 ENCOUNTER — TELEPHONE (OUTPATIENT)
Dept: INTERNAL MEDICINE | Facility: CLINIC | Age: 86
End: 2021-05-12

## 2021-05-12 ENCOUNTER — VIRTUAL VISIT (OUTPATIENT)
Dept: INTERNAL MEDICINE | Facility: CLINIC | Age: 86
End: 2021-05-12
Payer: COMMERCIAL

## 2021-05-12 DIAGNOSIS — I48.20 CHRONIC ATRIAL FIBRILLATION (H): ICD-10-CM

## 2021-05-12 DIAGNOSIS — R26.9 ABNORMAL GAIT: ICD-10-CM

## 2021-05-12 DIAGNOSIS — E03.9 ACQUIRED HYPOTHYROIDISM: ICD-10-CM

## 2021-05-12 DIAGNOSIS — I10 BENIGN ESSENTIAL HYPERTENSION: ICD-10-CM

## 2021-05-12 DIAGNOSIS — R60.9 EDEMA, UNSPECIFIED TYPE: ICD-10-CM

## 2021-05-12 DIAGNOSIS — M10.279 DRUG-INDUCED GOUT INVOLVING TOE, UNSPECIFIED CHRONICITY, UNSPECIFIED LATERALITY: ICD-10-CM

## 2021-05-12 DIAGNOSIS — R29.6 FALLS FREQUENTLY: ICD-10-CM

## 2021-05-12 DIAGNOSIS — I50.23 ACUTE ON CHRONIC SYSTOLIC HEART FAILURE (H): ICD-10-CM

## 2021-05-12 DIAGNOSIS — J30.2 CHRONIC SEASONAL ALLERGIC RHINITIS: ICD-10-CM

## 2021-05-12 DIAGNOSIS — G12.1 SPINAL MUSCULAR ATROPHY TYPE III (H): ICD-10-CM

## 2021-05-12 DIAGNOSIS — G71.11 MYOTONIC MUSCULAR DYSTROPHY (H): Primary | ICD-10-CM

## 2021-05-12 PROCEDURE — 99214 OFFICE O/P EST MOD 30 MIN: CPT | Mod: 95 | Performed by: INTERNAL MEDICINE

## 2021-05-12 RX ORDER — LEVOTHYROXINE SODIUM 100 UG/1
100 TABLET ORAL DAILY
Qty: 90 TABLET | Refills: 3 | Status: SHIPPED | OUTPATIENT
Start: 2021-05-12

## 2021-05-12 RX ORDER — WARFARIN SODIUM 4 MG/1
2-4 TABLET ORAL DAILY
Qty: 90 TABLET | Refills: 0 | Status: SHIPPED | OUTPATIENT
Start: 2021-05-12 | End: 2021-05-26

## 2021-05-12 RX ORDER — WARFARIN SODIUM 1 MG/1
TABLET ORAL
Qty: 40 TABLET | Refills: 1 | Status: SHIPPED | OUTPATIENT
Start: 2021-05-12 | End: 2021-05-13

## 2021-05-12 RX ORDER — FLUTICASONE PROPIONATE 50 MCG
1-2 SPRAY, SUSPENSION (ML) NASAL AT BEDTIME
COMMUNITY
Start: 2021-05-12

## 2021-05-12 RX ORDER — ALLOPURINOL 100 MG/1
100 TABLET ORAL DAILY
Qty: 90 TABLET | Refills: 3 | Status: SHIPPED | OUTPATIENT
Start: 2021-05-12

## 2021-05-12 NOTE — TELEPHONE ENCOUNTER
Pt called neurology clinic and since it was over 4 years she needs a referral. [facility] is the clinic.

## 2021-05-12 NOTE — TELEPHONE ENCOUNTER
I already placed a referral order.     I am not sure how they get it.   Print it out and send it to them if needed.     [facility]   multiple locations in the [facility] area including [facility] phone number [phone]

## 2021-05-12 NOTE — TELEPHONE ENCOUNTER
Faxed referral over to the [place] location at [phone] and patient informed.    HALEIGH Parker

## 2021-05-12 NOTE — PROGRESS NOTES
Kenyatta is a 85 year old who is being evaluated via a billable telephone visit.      What phone number would you like to be contacted at? [phone]  How would you like to obtain your AVS? 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  She reports that she is increasingly weak and unsteady in her gait.  Has increasing problems getting up out of chairs periods  She uses a walker and a cane.  Home care nurses notice decrease strength in general in the proximal muscles of the lower extremities.  They are requesting physical therapy and Occupational Therapy evaluations and treatments.  This is reasonable.  She was referred to neurology clinic in December 2017 but never attended the appointment that I can see.  I strongly recommend consultation with neurology clinic to evaluate her current status and to see where she is at with the idea of implications in future health needs.  Plan: NEUROLOGY ADULT REFERRAL            (G12.1) Spinal muscular atrophy type III causing decreased ability of respiratory mm-onset 43y/o   Comment: As above longstanding history of spinal muscle atrophy is document the chart causing problems intermittently over the last 40 years.  She reports slow decrease in proximal muscle strength of lower extremities causing abnormal gait and occasional falls.  She has not been seen by neurologist for over 10 years that I can see.  Physical therapy and Occupational Therapy to evaluate as requested by the home care nurses.  Continue cane continue walker.  Fall risk reduction is incredibly important.  Strongly recommend consultation with neurology clinic.  Plan: NEUROLOGY ADULT REFERRAL            (I48.20) Chronic atrial fibrillation (H)  Comment: This condition is currently controlled on the current medical regimen.  Continue current therapy.   Followed by anticoagulation clinic on  regular basis.  Plan: warfarin ANTICOAGULANT (COUMADIN) 1 MG tablet,         warfarin ANTICOAGULANT (COUMADIN) 4 MG tablet            (I50.23) Acute on chronic systolic heart failure (H)  Comment: No current signs or symptoms of heart failure, weight remains stable.  Has follow-up in 4 weeks at the cardiology clinic.  Plan:     (I10) Benign essential hypertension  Comment: This condition is currently controlled on the current medical regimen.  Continue current therapy.   Plan:     (M10.279) Drug-induced gout involving toe, unspecified chronicity, unspecified laterality  Comment: This condition is currently controlled on the current medical regimen.  Continue current therapy.   Due for uric acid recheck at the next time labs are drawn.  Plan: allopurinol (ZYLOPRIM) 100 MG tablet            (R60.9) Edema, unspecified type  Comment reportedly stable.  Plan:     (E03.9) Acquired hypothyroidism  Comment: Dose has been stable for years, recheck thyroid labs at the next opportunity for labs.  Plan: levothyroxine (SYNTHROID/LEVOTHROID) 100 MCG         tablet            (J30.2) Chronic seasonal allergic rhinitis, unspecified trigger-spring   Comment: Stable, using over-the-counter steroid nasal spray with fair relief.  Plan: fluticasone (FLONASE) 50 MCG/ACT nasal spray            (R29.6) Falls frequently  Comment: Has had a couple falls in the past year, has abnormal gait and proximal muscle weakness given her history of muscular dystrophy and spinal muscle atrophy.  Neurology consultation is ordered above to evaluate where she is at and any other treatment that may or may not be available.  Continue fall risk reduction.  Use gait assist device either walker or cane as indicated.  I strongly recommend home physical therapy and Occupational Therapy evaluations to maximize safety minimize falls as recommend by home care nursing.  Plan: NEUROLOGY ADULT REFERRAL            (R26.9) Abnormal gait  Comment: As above  Plan:  NEUROLOGY ADULT REFERRAL               Review of prior external note(s) from - Cardiology Clinic             No follow-ups on file.    Melecio De La O MD  [facility]          Francisco You is a 85 year old who presents for the following health issues  accompanied by her in home visiting RNs who were in her house with her on this call:      Last actual in-person clinic visit with any provider was January 2020 in the cardiology clinic.    Numerous virtual visits with cardiology clinic and primary care clinic noted in the chart.      HPI        Hypertension Follow-up      Do you check your blood pressure regularly outside of the clinic? Yes , home care nurse checks it every 2 weeks    Are you following a low salt diet? Yes    Are your blood pressures ever more than 140 on the top number (systolic) OR more   than 90 on the bottom number (diastolic), for example 140/90? Yes, top number sometimes over 140, today's reading was 113/53, pulse 85    BP Readings from Last 6 Encounters:   01/22/20 136/82   10/03/19 110/70   06/01/19 120/66   01/29/19 110/60   10/08/18 118/56   08/21/18 130/74         2.  Has history of atrial fibrillation.    No palpitations, no dizziness, no dyspnea on exertion, no shortness of breath.  No racing heart, no fast heart rates.    Taking medications as ordered, no side effects reported.   Patient is taking anticoagulation according to direction, with no reported side effects.     3.  Home care nursing reports a couple falls in the past year.  She reports a history of muscular dystrophy and spinal muscle atrophy dating back a few decades.  She reports slow decrease in proximal muscle strength as a result of these issues over the past several years.  No focal neurological weakness beyond the proximal muscles.  Home care nurse is requesting home care physical therapy and Occupational Therapy evaluations to help gait evaluation and reduce fall risk.  There  is very little information contained in the current medical record regarding the history of muscular dystrophy and spinal atrophy.  A neurology consultation was ordered December 2017 by her former primary MD,  She never went to this appointment and I see no records of a neurology visit in the last several years.    4.  bilateral lower extremity edema, currently stable.    5.  Has known history of CHF.  No new symptoms to report.  Denies new or worsened shortness of breath, dyspnea on exertion, orthopnea, and excessive lower extremity edema.   Taking medications as ordered.     Reviewed weights in chart:  Wt Readings from Last 10 Encounters:   01/22/20 101.8 kg (224 lb 6.4 oz)   10/03/19 91.2 kg (201 lb)   01/29/19 91 kg (200 lb 11.2 oz)   10/08/18 88.1 kg (194 lb 3.2 oz)   08/21/18 87.5 kg (193 lb)   07/13/18 88.1 kg (194 lb 4.8 oz)   05/29/18 85.7 kg (189 lb)   05/24/18 86.2 kg (190 lb)   03/20/18 84.8 kg (187 lb)   03/13/18 84.9 kg (187 lb 3.2 oz)        **I reviewed the information recorded in the patient's EPIC chart (including but not limited to medical history, surgical history, family history, problem list, medication list, and allergy list) and updated the information as indicated based on the patients reported information.           Review of Systems   Constitutional, HEENT, cardiovascular, pulmonary, gi and gu systems are negative, except as otherwise noted.    Neuro: Patient in home care nursing report mild decrease in proximal muscle lower extremity strength over the past few years.      Objective    Vitals - Patient Reported  Systolic (Patient Reported): 113  Diastolic (Patient Reported): 53  Pulse (Patient Reported): 85      Vitals:  No vitals were obtained today due to virtual visit.    Physical Exam   healthy, alert and no distress  PSYCH: Alert and oriented times 3; coherent speech, normal   rate and volume, able to articulate logical thoughts, able   to abstract reason, no tangential thoughts, no  hallucinations   or delusions  Her affect is normal  RESP: No cough, no audible wheezing, able to talk in full sentences  Remainder of exam unable to be completed due to telephone visits                Phone call duration: 31:30 minutes

## 2021-05-13 RX ORDER — WARFARIN SODIUM 1 MG/1
TABLET ORAL
Qty: 102 TABLET | Refills: 1 | Status: SHIPPED | OUTPATIENT
Start: 2021-05-13

## 2021-05-18 ENCOUNTER — APPOINTMENT (OUTPATIENT)
Dept: GENERAL RADIOLOGY | Facility: CLINIC | Age: 86
DRG: 177 | End: 2021-05-18
Attending: EMERGENCY MEDICINE
Payer: COMMERCIAL

## 2021-05-18 ENCOUNTER — HOSPITAL ENCOUNTER (INPATIENT)
Facility: CLINIC | Age: 86
LOS: 7 days | Discharge: NURSING, REHAB OR LONG-TERM CARE | DRG: 177 | End: 2021-05-25
Attending: EMERGENCY MEDICINE | Admitting: INTERNAL MEDICINE
Payer: COMMERCIAL

## 2021-05-18 ENCOUNTER — TRANSFERRED RECORDS (OUTPATIENT)
Dept: HEALTH INFORMATION MANAGEMENT | Facility: CLINIC | Age: 86
End: 2021-05-18

## 2021-05-18 ENCOUNTER — APPOINTMENT (OUTPATIENT)
Dept: CT IMAGING | Facility: CLINIC | Age: 86
DRG: 177 | End: 2021-05-18
Attending: EMERGENCY MEDICINE
Payer: COMMERCIAL

## 2021-05-18 DIAGNOSIS — I50.30 DIASTOLIC HEART FAILURE (H): Primary | ICD-10-CM

## 2021-05-18 DIAGNOSIS — R09.02 HYPOXIA: ICD-10-CM

## 2021-05-18 DIAGNOSIS — J18.9 PNEUMONIA OF BOTH LUNGS DUE TO INFECTIOUS ORGANISM, UNSPECIFIED PART OF LUNG: ICD-10-CM

## 2021-05-18 DIAGNOSIS — R41.0 CONFUSION: ICD-10-CM

## 2021-05-18 LAB
ALBUMIN SERPL-MCNC: 3 G/DL (ref 3.4–5)
ALBUMIN UR-MCNC: NEGATIVE MG/DL
ALP SERPL-CCNC: 82 U/L (ref 40–150)
ALT SERPL W P-5'-P-CCNC: 14 U/L (ref 0–50)
ANION GAP SERPL CALCULATED.3IONS-SCNC: 3 MMOL/L (ref 3–14)
APPEARANCE UR: CLEAR
AST SERPL W P-5'-P-CCNC: 13 U/L (ref 0–45)
BASOPHILS # BLD AUTO: 0.1 10E9/L (ref 0–0.2)
BASOPHILS NFR BLD AUTO: 0.7 %
BILIRUB SERPL-MCNC: 0.6 MG/DL (ref 0.2–1.3)
BILIRUB UR QL STRIP: NEGATIVE
BUN SERPL-MCNC: 25 MG/DL (ref 7–30)
CALCIUM SERPL-MCNC: 10.7 MG/DL (ref 8.5–10.1)
CHLORIDE SERPL-SCNC: 107 MMOL/L (ref 94–109)
CO2 SERPL-SCNC: 31 MMOL/L (ref 20–32)
COLOR UR AUTO: ABNORMAL
CREAT SERPL-MCNC: 0.94 MG/DL (ref 0.52–1.04)
DIFFERENTIAL METHOD BLD: ABNORMAL
EOSINOPHIL # BLD AUTO: 0.4 10E9/L (ref 0–0.7)
EOSINOPHIL NFR BLD AUTO: 3.9 %
ERYTHROCYTE [DISTWIDTH] IN BLOOD BY AUTOMATED COUNT: 16.2 % (ref 10–15)
GFR SERPL CREATININE-BSD FRML MDRD: 55 ML/MIN/{1.73_M2}
GLUCOSE SERPL-MCNC: 115 MG/DL (ref 70–99)
GLUCOSE UR STRIP-MCNC: NEGATIVE MG/DL
HCT VFR BLD AUTO: 36.3 % (ref 35–47)
HGB BLD-MCNC: 11 G/DL (ref 11.7–15.7)
HGB UR QL STRIP: NEGATIVE
HYALINE CASTS #/AREA URNS LPF: 57 /LPF (ref 0–2)
IMM GRANULOCYTES # BLD: 0.1 10E9/L (ref 0–0.4)
IMM GRANULOCYTES NFR BLD: 0.5 %
INR PPP: 2.26 (ref 0.86–1.14)
KETONES UR STRIP-MCNC: NEGATIVE MG/DL
LABORATORY COMMENT REPORT: NORMAL
LEUKOCYTE ESTERASE UR QL STRIP: NEGATIVE
LYMPHOCYTES # BLD AUTO: 1.6 10E9/L (ref 0.8–5.3)
LYMPHOCYTES NFR BLD AUTO: 14.6 %
MCH RBC QN AUTO: 27.7 PG (ref 26.5–33)
MCHC RBC AUTO-ENTMCNC: 30.3 G/DL (ref 31.5–36.5)
MCV RBC AUTO: 91 FL (ref 78–100)
MONOCYTES # BLD AUTO: 0.8 10E9/L (ref 0–1.3)
MONOCYTES NFR BLD AUTO: 6.7 %
MUCOUS THREADS #/AREA URNS LPF: PRESENT /LPF
NEUTROPHILS # BLD AUTO: 8.3 10E9/L (ref 1.6–8.3)
NEUTROPHILS NFR BLD AUTO: 73.6 %
NITRATE UR QL: NEGATIVE
NRBC # BLD AUTO: 0 10*3/UL
NRBC BLD AUTO-RTO: 0 /100
NT-PROBNP SERPL-MCNC: 2029 PG/ML (ref 0–1800)
PH UR STRIP: 5 PH (ref 5–7)
PLATELET # BLD AUTO: 274 10E9/L (ref 150–450)
POTASSIUM SERPL-SCNC: 3.9 MMOL/L (ref 3.4–5.3)
PROCALCITONIN SERPL-MCNC: 0.07 NG/ML
PROT SERPL-MCNC: 7.7 G/DL (ref 6.8–8.8)
RBC # BLD AUTO: 3.97 10E12/L (ref 3.8–5.2)
RBC #/AREA URNS AUTO: 2 /HPF (ref 0–2)
SARS-COV-2 RNA RESP QL NAA+PROBE: NEGATIVE
SODIUM SERPL-SCNC: 141 MMOL/L (ref 133–144)
SOURCE: ABNORMAL
SP GR UR STRIP: 1.01 (ref 1–1.03)
SPECIMEN SOURCE: NORMAL
SQUAMOUS #/AREA URNS AUTO: 2 /HPF (ref 0–1)
UROBILINOGEN UR STRIP-MCNC: 0 MG/DL (ref 0–2)
WBC # BLD AUTO: 11.3 10E9/L (ref 4–11)
WBC #/AREA URNS AUTO: 1 /HPF (ref 0–5)

## 2021-05-18 PROCEDURE — 96368 THER/DIAG CONCURRENT INF: CPT

## 2021-05-18 PROCEDURE — 96375 TX/PRO/DX INJ NEW DRUG ADDON: CPT

## 2021-05-18 PROCEDURE — 83880 ASSAY OF NATRIURETIC PEPTIDE: CPT | Performed by: EMERGENCY MEDICINE

## 2021-05-18 PROCEDURE — 85025 COMPLETE CBC W/AUTO DIFF WBC: CPT | Performed by: EMERGENCY MEDICINE

## 2021-05-18 PROCEDURE — 71046 X-RAY EXAM CHEST 2 VIEWS: CPT

## 2021-05-18 PROCEDURE — C9803 HOPD COVID-19 SPEC COLLECT: HCPCS

## 2021-05-18 PROCEDURE — 96365 THER/PROPH/DIAG IV INF INIT: CPT

## 2021-05-18 PROCEDURE — 85610 PROTHROMBIN TIME: CPT | Performed by: EMERGENCY MEDICINE

## 2021-05-18 PROCEDURE — 81001 URINALYSIS AUTO W/SCOPE: CPT | Performed by: EMERGENCY MEDICINE

## 2021-05-18 PROCEDURE — 70450 CT HEAD/BRAIN W/O DYE: CPT

## 2021-05-18 PROCEDURE — 250N000013 HC RX MED GY IP 250 OP 250 PS 637: Performed by: INTERNAL MEDICINE

## 2021-05-18 PROCEDURE — 80053 COMPREHEN METABOLIC PANEL: CPT | Performed by: EMERGENCY MEDICINE

## 2021-05-18 PROCEDURE — 84145 PROCALCITONIN (PCT): CPT | Performed by: EMERGENCY MEDICINE

## 2021-05-18 PROCEDURE — 87635 SARS-COV-2 COVID-19 AMP PRB: CPT | Performed by: EMERGENCY MEDICINE

## 2021-05-18 PROCEDURE — 250N000011 HC RX IP 250 OP 636: Performed by: EMERGENCY MEDICINE

## 2021-05-18 PROCEDURE — 99223 1ST HOSP IP/OBS HIGH 75: CPT | Mod: AI | Performed by: INTERNAL MEDICINE

## 2021-05-18 PROCEDURE — 120N000001 HC R&B MED SURG/OB

## 2021-05-18 PROCEDURE — 99285 EMERGENCY DEPT VISIT HI MDM: CPT | Mod: 25

## 2021-05-18 RX ORDER — CEFTRIAXONE 2 G/1
2 INJECTION, POWDER, FOR SOLUTION INTRAMUSCULAR; INTRAVENOUS ONCE
Status: COMPLETED | OUTPATIENT
Start: 2021-05-18 | End: 2021-05-18

## 2021-05-18 RX ORDER — LIDOCAINE 40 MG/G
CREAM TOPICAL
Status: DISCONTINUED | OUTPATIENT
Start: 2021-05-18 | End: 2021-05-25 | Stop reason: HOSPADM

## 2021-05-18 RX ORDER — ALLOPURINOL 100 MG/1
100 TABLET ORAL DAILY
Status: DISCONTINUED | OUTPATIENT
Start: 2021-05-19 | End: 2021-05-25 | Stop reason: HOSPADM

## 2021-05-18 RX ORDER — ONDANSETRON 2 MG/ML
4 INJECTION INTRAMUSCULAR; INTRAVENOUS EVERY 6 HOURS PRN
Status: DISCONTINUED | OUTPATIENT
Start: 2021-05-18 | End: 2021-05-25 | Stop reason: HOSPADM

## 2021-05-18 RX ORDER — BISACODYL 10 MG
10 SUPPOSITORY, RECTAL RECTAL DAILY PRN
Status: DISCONTINUED | OUTPATIENT
Start: 2021-05-18 | End: 2021-05-25 | Stop reason: HOSPADM

## 2021-05-18 RX ORDER — AMOXICILLIN 250 MG
1 CAPSULE ORAL 2 TIMES DAILY PRN
Status: DISCONTINUED | OUTPATIENT
Start: 2021-05-18 | End: 2021-05-25 | Stop reason: HOSPADM

## 2021-05-18 RX ORDER — AMLODIPINE BESYLATE 5 MG/1
5 TABLET ORAL 2 TIMES DAILY
Status: DISCONTINUED | OUTPATIENT
Start: 2021-05-18 | End: 2021-05-25 | Stop reason: HOSPADM

## 2021-05-18 RX ORDER — LEVOTHYROXINE SODIUM 100 UG/1
100 TABLET ORAL DAILY
Status: DISCONTINUED | OUTPATIENT
Start: 2021-05-19 | End: 2021-05-25 | Stop reason: HOSPADM

## 2021-05-18 RX ORDER — AMOXICILLIN 250 MG
2 CAPSULE ORAL 2 TIMES DAILY PRN
Status: DISCONTINUED | OUTPATIENT
Start: 2021-05-18 | End: 2021-05-25 | Stop reason: HOSPADM

## 2021-05-18 RX ORDER — AZITHROMYCIN 250 MG/1
250 TABLET, FILM COATED ORAL EVERY 24 HOURS
Status: COMPLETED | OUTPATIENT
Start: 2021-05-19 | End: 2021-05-22

## 2021-05-18 RX ORDER — LISINOPRIL 20 MG/1
20 TABLET ORAL 2 TIMES DAILY
Status: DISCONTINUED | OUTPATIENT
Start: 2021-05-18 | End: 2021-05-25 | Stop reason: HOSPADM

## 2021-05-18 RX ORDER — CEFTRIAXONE 1 G/1
1 INJECTION, POWDER, FOR SOLUTION INTRAMUSCULAR; INTRAVENOUS EVERY 24 HOURS
Status: DISCONTINUED | OUTPATIENT
Start: 2021-05-19 | End: 2021-05-21

## 2021-05-18 RX ORDER — FLUTICASONE PROPIONATE 50 MCG
1-2 SPRAY, SUSPENSION (ML) NASAL AT BEDTIME
Status: DISCONTINUED | OUTPATIENT
Start: 2021-05-18 | End: 2021-05-25 | Stop reason: HOSPADM

## 2021-05-18 RX ORDER — SODIUM CHLORIDE, SODIUM LACTATE, POTASSIUM CHLORIDE, CALCIUM CHLORIDE 600; 310; 30; 20 MG/100ML; MG/100ML; MG/100ML; MG/100ML
INJECTION, SOLUTION INTRAVENOUS CONTINUOUS
Status: DISCONTINUED | OUTPATIENT
Start: 2021-05-18 | End: 2021-05-19

## 2021-05-18 RX ORDER — DEXAMETHASONE SODIUM PHOSPHATE 10 MG/ML
6 INJECTION, SOLUTION INTRAMUSCULAR; INTRAVENOUS ONCE
Status: COMPLETED | OUTPATIENT
Start: 2021-05-18 | End: 2021-05-18

## 2021-05-18 RX ORDER — ONDANSETRON 4 MG/1
4 TABLET, ORALLY DISINTEGRATING ORAL EVERY 6 HOURS PRN
Status: DISCONTINUED | OUTPATIENT
Start: 2021-05-18 | End: 2021-05-25 | Stop reason: HOSPADM

## 2021-05-18 RX ORDER — PROCHLORPERAZINE MALEATE 5 MG
5 TABLET ORAL EVERY 6 HOURS PRN
Status: DISCONTINUED | OUTPATIENT
Start: 2021-05-18 | End: 2021-05-25 | Stop reason: HOSPADM

## 2021-05-18 RX ORDER — PROCHLORPERAZINE 25 MG
12.5 SUPPOSITORY, RECTAL RECTAL EVERY 12 HOURS PRN
Status: DISCONTINUED | OUTPATIENT
Start: 2021-05-18 | End: 2021-05-25 | Stop reason: HOSPADM

## 2021-05-18 RX ORDER — POLYETHYLENE GLYCOL 3350 17 G/17G
17 POWDER, FOR SOLUTION ORAL DAILY PRN
Status: DISCONTINUED | OUTPATIENT
Start: 2021-05-18 | End: 2021-05-25 | Stop reason: HOSPADM

## 2021-05-18 RX ORDER — AZITHROMYCIN 500 MG/1
500 INJECTION, POWDER, LYOPHILIZED, FOR SOLUTION INTRAVENOUS ONCE
Status: COMPLETED | OUTPATIENT
Start: 2021-05-18 | End: 2021-05-18

## 2021-05-18 RX ORDER — ACETAMINOPHEN 325 MG/1
650 TABLET ORAL EVERY 4 HOURS PRN
Status: DISCONTINUED | OUTPATIENT
Start: 2021-05-18 | End: 2021-05-25 | Stop reason: HOSPADM

## 2021-05-18 RX ORDER — ACETAMINOPHEN 650 MG/1
650 SUPPOSITORY RECTAL EVERY 4 HOURS PRN
Status: DISCONTINUED | OUTPATIENT
Start: 2021-05-18 | End: 2021-05-25 | Stop reason: HOSPADM

## 2021-05-18 RX ADMIN — CEFTRIAXONE SODIUM 2 G: 2 INJECTION, POWDER, FOR SOLUTION INTRAMUSCULAR; INTRAVENOUS at 19:10

## 2021-05-18 RX ADMIN — AZITHROMYCIN MONOHYDRATE 500 MG: 500 INJECTION, POWDER, LYOPHILIZED, FOR SOLUTION INTRAVENOUS at 19:56

## 2021-05-18 RX ADMIN — AMLODIPINE BESYLATE 5 MG: 5 TABLET ORAL at 23:05

## 2021-05-18 RX ADMIN — DEXAMETHASONE SODIUM PHOSPHATE 6 MG: 10 INJECTION, SOLUTION INTRAMUSCULAR; INTRAVENOUS at 19:09

## 2021-05-18 RX ADMIN — LISINOPRIL 20 MG: 20 TABLET ORAL at 23:05

## 2021-05-18 ASSESSMENT — ACTIVITIES OF DAILY LIVING (ADL)
DIFFICULTY_COMMUNICATING: NO
HEARING_DIFFICULTY_OR_DEAF: NO
TOILETING_ASSISTANCE: TOILETING DIFFICULTY, ASSISTANCE 1 PERSON
DRESSING/BATHING: DRESSING DIFFICULTY, ASSISTANCE 1 PERSON;BATHING DIFFICULTY, ASSISTANCE 1 PERSON
WEAR_GLASSES_OR_BLIND: NO
DOING_ERRANDS_INDEPENDENTLY_DIFFICULTY: YES
CONCENTRATING,_REMEMBERING_OR_MAKING_DECISIONS_DIFFICULTY: YES
DIFFICULTY_EATING/SWALLOWING: NO
DRESSING/BATHING_DIFFICULTY: YES
EQUIPMENT_CURRENTLY_USED_AT_HOME: WALKER, STANDARD
FALL_HISTORY_WITHIN_LAST_SIX_MONTHS: YES
WALKING_OR_CLIMBING_STAIRS_DIFFICULTY: YES
WALKING_OR_CLIMBING_STAIRS: AMBULATION DIFFICULTY, DEPENDENT;STAIR CLIMBING DIFFICULTY, DEPENDENT;TRANSFERRING DIFFICULTY, DEPENDENT
TOILETING_ISSUES: YES

## 2021-05-18 ASSESSMENT — ENCOUNTER SYMPTOMS
COUGH: 0
WOUND: 1
CONFUSION: 1
DIFFICULTY URINATING: 0
ACTIVITY CHANGE: 0
NAUSEA: 0
DIARRHEA: 1
SHORTNESS OF BREATH: 0
HEADACHES: 0
FEVER: 0
CONSTIPATION: 0
VOMITING: 0

## 2021-05-18 ASSESSMENT — MIFFLIN-ST. JEOR: SCORE: 1448.79

## 2021-05-18 NOTE — ED NOTES
Healing pressure wound noted to coccyx area. Wound approximately quarter sized. Wound appears to be healing well. No drainage, redness, etc.

## 2021-05-18 NOTE — ED PROVIDER NOTES
History   Chief Complaint:  Altered Mental Status      The history is provided by the patient and a caregiver.      Kenyatta Donald is a 85 year old female with history of myotonic muscular dystrophy, breast cancer, chronic atrial fibrillation and is on coumadin who presents with altered mental status. The patient notes that she has a PCA as she lives alone in her apartment. The PCA notes that around 0230 the patient called her in the morning and asked her where she was. The patient states that she notes that she normally does not call her in the middle of the night. PCA was concerned for her mental status and was sent in to the ED. She denies headache, chest pain, shortness of breath, diarrhea, urinary issues, nausea, emesis, cough, appetite change. She notes that she is set to have her second vaccine on Friday. She notes that she has a rash which is new. The patient also notes that she has a sore on her buttocks that her PCA has bandaged.     Here, PCA notes that she was confused and was stating that she was alone all day but the PCA notes that she was there until 11pm. She states that she still seems confused and is concerned for a UTI. She denies fever, or recent falls or bumps on the head. She does report that she has had some diarrhea.     Review of Systems   Unable to perform ROS: Mental status change   Constitutional: Negative for activity change and fever.   Respiratory: Negative for cough and shortness of breath.    Cardiovascular: Negative for chest pain.   Gastrointestinal: Positive for diarrhea. Negative for constipation, nausea and vomiting.   Genitourinary: Negative for difficulty urinating.   Skin: Positive for rash and wound (of buttocks).   Neurological: Negative for headaches.   Psychiatric/Behavioral: Positive for confusion.         Allergies:  No known drug allergies     Medications:  Allopurinol   Amlodipine   Lasix   Levothyroxine   Lisinopril   Nystatin   Warfarin   Aspirin     Past Medical  "History:    Hypertension   Juan Pablo-tachy syndrome   Breast cancer   Chronic atrial fibrillation   Diastolic heart failure   GERD  Hyperlipidemia  Kidney stone   Mitral valve regurgitation   Subacute bacterial endocarditis  Osteoporosis   Pulmonary embolism   Sleep apnea   Spinal muscular atrophy type 3    Past Surgical History:    Mastectomy   Percutaneous mitral valve repair   Knee replacement   Hysterectomy     Family History:    Cancer colorectal, mother   Alzheimer disease, mother   Diabetes   Heart disease    Social History:  Smoking status: never smoker  Alcohol use: no  Drug use: no  PCP: Andry Damico  Presents to the ED alone    Physical Exam     Patient Vitals for the past 24 hrs:   BP Temp Temp src Pulse Resp SpO2 Height Weight   05/18/21 1830 -- -- -- 73 -- 91 % -- --   05/18/21 1730 -- -- -- 75 -- 94 % -- --   05/18/21 1715 -- -- -- -- -- 94 % -- --   05/18/21 1700 118/54 -- -- 70 -- 95 % -- --   05/18/21 1600 137/60 -- -- 73 -- 96 % -- --   05/18/21 1549 -- -- -- -- -- 92 % -- --   05/18/21 1535 -- -- -- -- -- 91 % -- --   05/18/21 1530 -- -- -- -- -- 92 % -- --   05/18/21 1519 (!) 144/67 98.3  F (36.8  C) Oral 98 16 95 % 1.651 m (5' 5\") 99.8 kg (220 lb)       Physical Exam  Constitutional: Elderly white female, sitting. No respiratory distress.   HENT: No signs of trauma.   Eyes: EOM are normal. Pupils are equal, round, and reactive to light.   Neck: Normal range of motion. No JVD present. No tracheal deviation present. No cervical adenopathy.  Cardiovascular: Irregularly irregular.  Exam reveals no gallop and no friction rub.    2/6 systolic murmur left sternal boarder   Pulmonary/Chest: Bilateral breath sounds normal. No wheezes, rhonchi or rales.  Abdominal: Soft. No tenderness. No rebound or guarding. 2+ femoral pulse.  Musculoskeletal: No edema. No tenderness.   Lymphadenopathy: No lymphadenopathy.   Neurological: Alert and oriented to person, place, and time. Normal strength. Coordination " normal. Fluent speech no facial asymmetry. Sensation intact to touch   Skin: Skin is warm and dry.  3 cm linear decubitus ulcer mid sacral. no significant redness, swelling, or drainage.     Emergency Department Course     Imaging:  Chest  XR:  Moderate to severe interstitial and groundglass   infiltrates. No definite effusion. The cardiac silhouette is enlarged   similar to previous. Pulmonary vasculature is obscured.     Reading per radiology     Head CT:  1. No evidence of acute intracranial hemorrhage, mass, or herniation.   2. Diffuse parenchymal volume loss and white matter changes likely due   to chronic microvascular ischemic disease.     Reading per radiology    Laboratory:  CBC: WBC 11.3, HGB 11.0,    CMP: Glucose 115, GFR 55, Calcium 10., Albumin 3.0, Creatinine: 0.94  INR: 2.26   UA: WBC: 1, RBC: 2, Squamous Epithelial: 2, Mucous: Present, Hyaline Casts: 57  Symptomatic SARS COVID-19 virus Swab PCR: pending      Emergency Department Course:  Reviewed:  I reviewed the patient's nursing notes, vitals, past medical records, Care Everywhere.     Assessments:  1529 I performed an assessment and examination of the patient as noted above.      1820 Findings and plan explained to the Patient who consents to admission. Discussed the patient with Dr. Sams, who will admit the patient to a inpatient bed for further monitoring, evaluation, and treatment.     Consults:  1916  I spoke to Dr. Sams of the hospitalist service who accepts the patient for admission.     Interventions:  1909 Dexamethasone 6 mg, IV  1910 Rocephin, 1 g, IV injection   Azithromycin, 500 mg, IV    Disposition:  The patient was admitted to the hospital.      Impression & Plan   Medical Decision Making:  Kenyatta Donald is a 85 year old female with a history of PE, heart failure, coronary disease, who is currently on coumadin who presents because she became confused during the night. Her PCA was called at 2 am and the patient was  confused when she went over and the patient was brought in. The patient here actually seems alert. She is in no distress however her sat's off oxygen, which she is not on at home, drop down to 87%. I heard no obvious infiltrates on exam, but chest xray shows bilateral ground glass interstitial infiltrates. CT of the head and other blood work was unremarkable and she has been immunized from COVID. Given this finding, however and the low oxygen, we will give her antibiotics, one dose of decadron and admit her for further evaluation and treatment.     Covid-19  Kenyatta Donald was evaluated during a global COVID-19 pandemic, which necessitated consideration that the patient might be at risk for infection with the SARS-CoV-2 virus that causes COVID-19.   Applicable protocols for evaluation were followed during the patient's care.   COVID-19 was considered as part of the patient's evaluation. The plan for testing is:  a test was obtained during this visit.    Diagnosis:    ICD-10-CM    1. Pneumonia of both lungs due to infectious organism, unspecified part of lung  J18.9 Symptomatic SARS-CoV-2 COVID-19 Virus (Coronavirus) by PCR   2. Hypoxia  R09.02    3. Confusion  R41.0        Scribe Disclosure:  I, Nicci Shay, am serving as a scribe at 3:29 PM on 5/18/2021 to document services personally performed by Jaylen Dhaliwal MD based on my observations and the provider's statements to me.           Jaylen Dhaliwal MD  05/18/21 2025

## 2021-05-18 NOTE — ED NOTES
Bed: ED24  Expected date:   Expected time:   Means of arrival:   [facility] 85[facility] U[facility] confused eta 1510

## 2021-05-18 NOTE — LETTER
May 18, 2021      To Whom It May Concern:      Khushbu Rossi was in our Emergency Department as a caretaker to a patientt of ours. Due to this patients mental status, Khushbu was needed to stay with this patient for several hours. Please do not hesitate to call if any follow up information is needed.   Sincerely,        Reynaldo Aguero RN

## 2021-05-19 LAB
ANION GAP SERPL CALCULATED.3IONS-SCNC: 5 MMOL/L (ref 3–14)
BUN SERPL-MCNC: 23 MG/DL (ref 7–30)
CALCIUM SERPL-MCNC: 9.9 MG/DL (ref 8.5–10.1)
CHLORIDE SERPL-SCNC: 109 MMOL/L (ref 94–109)
CO2 SERPL-SCNC: 28 MMOL/L (ref 20–32)
CREAT SERPL-MCNC: 0.78 MG/DL (ref 0.52–1.04)
ERYTHROCYTE [DISTWIDTH] IN BLOOD BY AUTOMATED COUNT: 16.2 % (ref 10–15)
GFR SERPL CREATININE-BSD FRML MDRD: 69 ML/MIN/{1.73_M2}
GLUCOSE SERPL-MCNC: 139 MG/DL (ref 70–99)
HCT VFR BLD AUTO: 34.3 % (ref 35–47)
HGB BLD-MCNC: 10.2 G/DL (ref 11.7–15.7)
INR PPP: 1.73 (ref 0.86–1.14)
MCH RBC QN AUTO: 27.6 PG (ref 26.5–33)
MCHC RBC AUTO-ENTMCNC: 29.7 G/DL (ref 31.5–36.5)
MCV RBC AUTO: 93 FL (ref 78–100)
PLATELET # BLD AUTO: 244 10E9/L (ref 150–450)
POTASSIUM SERPL-SCNC: 4.3 MMOL/L (ref 3.4–5.3)
RBC # BLD AUTO: 3.7 10E12/L (ref 3.8–5.2)
SODIUM SERPL-SCNC: 142 MMOL/L (ref 133–144)
WBC # BLD AUTO: 7.2 10E9/L (ref 4–11)

## 2021-05-19 PROCEDURE — 120N000001 HC R&B MED SURG/OB

## 2021-05-19 PROCEDURE — 250N000013 HC RX MED GY IP 250 OP 250 PS 637: Performed by: STUDENT IN AN ORGANIZED HEALTH CARE EDUCATION/TRAINING PROGRAM

## 2021-05-19 PROCEDURE — 80048 BASIC METABOLIC PNL TOTAL CA: CPT | Performed by: INTERNAL MEDICINE

## 2021-05-19 PROCEDURE — 250N000013 HC RX MED GY IP 250 OP 250 PS 637: Performed by: INTERNAL MEDICINE

## 2021-05-19 PROCEDURE — 250N000011 HC RX IP 250 OP 636: Performed by: INTERNAL MEDICINE

## 2021-05-19 PROCEDURE — 99233 SBSQ HOSP IP/OBS HIGH 50: CPT | Performed by: INTERNAL MEDICINE

## 2021-05-19 PROCEDURE — 250N000013 HC RX MED GY IP 250 OP 250 PS 637

## 2021-05-19 PROCEDURE — 250N000012 HC RX MED GY IP 250 OP 636 PS 637: Performed by: INTERNAL MEDICINE

## 2021-05-19 PROCEDURE — 85610 PROTHROMBIN TIME: CPT | Performed by: INTERNAL MEDICINE

## 2021-05-19 PROCEDURE — 85027 COMPLETE CBC AUTOMATED: CPT | Performed by: INTERNAL MEDICINE

## 2021-05-19 PROCEDURE — 258N000003 HC RX IP 258 OP 636: Performed by: INTERNAL MEDICINE

## 2021-05-19 PROCEDURE — 36415 COLL VENOUS BLD VENIPUNCTURE: CPT | Performed by: INTERNAL MEDICINE

## 2021-05-19 RX ORDER — ALBUTEROL SULFATE 90 UG/1
2 AEROSOL, METERED RESPIRATORY (INHALATION) EVERY 6 HOURS PRN
Status: DISCONTINUED | OUTPATIENT
Start: 2021-05-19 | End: 2021-05-25 | Stop reason: HOSPADM

## 2021-05-19 RX ORDER — WARFARIN SODIUM 4 MG/1
4 TABLET ORAL
Status: COMPLETED | OUTPATIENT
Start: 2021-05-19 | End: 2021-05-19

## 2021-05-19 RX ORDER — PREDNISONE 20 MG/1
40 TABLET ORAL DAILY
Status: DISCONTINUED | OUTPATIENT
Start: 2021-05-19 | End: 2021-05-25 | Stop reason: HOSPADM

## 2021-05-19 RX ADMIN — FLUTICASONE PROPIONATE 2 SPRAY: 50 SPRAY, METERED NASAL at 21:52

## 2021-05-19 RX ADMIN — SODIUM CHLORIDE, POTASSIUM CHLORIDE, SODIUM LACTATE AND CALCIUM CHLORIDE: 600; 310; 30; 20 INJECTION, SOLUTION INTRAVENOUS at 02:20

## 2021-05-19 RX ADMIN — AMLODIPINE BESYLATE 5 MG: 5 TABLET ORAL at 09:13

## 2021-05-19 RX ADMIN — ALBUTEROL SULFATE 2 PUFF: 90 AEROSOL, METERED RESPIRATORY (INHALATION) at 21:53

## 2021-05-19 RX ADMIN — LISINOPRIL 20 MG: 20 TABLET ORAL at 09:13

## 2021-05-19 RX ADMIN — AMLODIPINE BESYLATE 5 MG: 5 TABLET ORAL at 20:38

## 2021-05-19 RX ADMIN — ALLOPURINOL 100 MG: 100 TABLET ORAL at 09:13

## 2021-05-19 RX ADMIN — GUAIFENESIN 5 ML: 100 SOLUTION ORAL at 19:56

## 2021-05-19 RX ADMIN — CEFTRIAXONE SODIUM 1 G: 1 INJECTION, POWDER, FOR SOLUTION INTRAMUSCULAR; INTRAVENOUS at 19:57

## 2021-05-19 RX ADMIN — LEVOTHYROXINE SODIUM 100 MCG: 100 TABLET ORAL at 09:13

## 2021-05-19 RX ADMIN — AZITHROMYCIN MONOHYDRATE 250 MG: 250 TABLET ORAL at 17:47

## 2021-05-19 RX ADMIN — WARFARIN SODIUM 4 MG: 4 TABLET ORAL at 17:47

## 2021-05-19 RX ADMIN — PREDNISONE 40 MG: 20 TABLET ORAL at 17:47

## 2021-05-19 RX ADMIN — LISINOPRIL 20 MG: 20 TABLET ORAL at 20:38

## 2021-05-19 ASSESSMENT — ACTIVITIES OF DAILY LIVING (ADL)
ADLS_ACUITY_SCORE: 20
ADLS_ACUITY_SCORE: 17
ADLS_ACUITY_SCORE: 17

## 2021-05-19 NOTE — PLAN OF CARE
Summary:     DATE & TIME: 05/19/, 1435-8000  Cognitive Concerns/ Orientation : Alert to self only.   BEHAVIOR & AGGRESSION TOOL COLOR: Green.   CIWA SCORE: N/A  ABNL VS/O2: VSS on 6L oxymask ex slight HTN  MOBILITY: Total cares, attempted to ambulate over night but was unable.   PAIN MANAGMENT: Denies pain  DIET: 2gm Na  BOWEL/BLADDER: Incontinent of bladder at times, no BM this shift. Bladder scanned for 700, was encouraged to void and was able to void 300mL. PVR of 400.   ABNL LAB/BG: Hgb of 10.2, INR of 1.73  DRAIN/DEVICES: PIV infusing LR at 100mL/hr  TELEMETRY RHYTHM: N/A  SKIN: WDL ex pressure wound to coccyx and scattered bruising. Mepilex in place.  TESTS/PROCEDURES: N/A  D/C DAY/GOALS/PLACE: Pending.   OTHER IMPORTANT INFO: First COVID test negative, wanting to retest in 48-72 hours. COVID precautions maintained.

## 2021-05-19 NOTE — PROVIDER NOTIFICATION
MD Notification    Notified Person: MD    Notified Person Name: Shayla    Notification Date/Time: 1535 5/19/21    Notification Interaction: page    Purpose of Notification: pt has 400mL PVR after urinating 300mL- can we get straight cath orders?    Orders Received: Straight cath orders entered >450mL    Comments:

## 2021-05-19 NOTE — PLAN OF CARE
DATE & TIME: 5/18/21 2200-2330  Cognitive Concerns/ Orientation : A&Ox2-3, disoriented to time/situation   BEHAVIOR & AGGRESSION TOOL COLOR: green  CIWA SCORE: n/a   ABNL VS/O2: VSS on 3L NC ex slight HTN  MOBILITY: total- tried to stand and pivot with 2 assist, was unable.  PAIN MANAGMENT: denies pain  DIET: 2gm Na  BOWEL/BLADDER: incontinent of bladder at times- external catheter in place, no BM this shift  ABNL LAB/BG: BNP 2029  DRAIN/DEVICES: PIV infusing LR at 100mL/hr  TELEMETRY RHYTHM: n/a  SKIN: WDL ex pressure wound to coccyx and scattered bruising. Full skin assessment not completed  TESTS/PROCEDURES: nothing since arrival to inpatient unit  D/C DAY/GOALS/PLACE: pending progress  OTHER IMPORTANT INFO: Covid negative- needs to be reswabbed. Will continue special precautions.

## 2021-05-19 NOTE — PROVIDER NOTIFICATION
MD Notification    Notified Person: MD    Notified Person Name: Shayla    Notification Date/Time: 1555 5/19    Notification Interaction: page    Purpose of Notification: Pt says she has phlegm in her throat, can she get some cough syrup?    Orders Received: PRN robitussin ordered    Comments:

## 2021-05-19 NOTE — PROGRESS NOTES
Admission    Patient arrives to room 622-2 via cart from ED.  Care plan note: Pt A&Ox3, disoriented to situation. Pt unable to stand and pivot from cart to bed, used air mattress to transfer pt to bed.    Inpatient nursing criteria listed below were met:    PCD's Documented: Yes  Skin issues/needs documented :No  Isolation education started/completed Yes  Patient allergies verified with patient: Yes  Verified completion of [place] Risk Assessment Tool:  Yes  Verified completion of Guardianship screening tool: Yes  Fall Prevention: Care plan updated, Education given and documented Yes  Care Plan initiated: Yes  Home medications documented in belongings flowsheet: NA  Patient belongings documented in belongings flowsheet: Yes  Reminder note (belongings/ medications) placed in discharge instructions:No  Admission profile/ required documentation complete: No  Bedside Report Letter given and explained to patient Yes  Visitor Designated? NA  If patient is a 72 hour hold/Commitment are belongings removed from room and locked up? NA

## 2021-05-19 NOTE — PLAN OF CARE
Summary:     DATE & TIME: 5/18/21 2902-9752  Cognitive Concerns/ Orientation : Alert to self disoriented to time/situation and place  BEHAVIOR & AGGRESSION TOOL COLOR: green  CIWA SCORE: n/a   ABNL VS/O2: VSS on 6L oxymask ex slight HTN  MOBILITY: total- tried to stand and pivot with 2 assist, was unable.  PAIN MANAGMENT: Denies pain  DIET: 2gm Na  BOWEL/BLADDER: Incontinent of bladder at times, no BM this shift  ABNL LAB/BG: BNP 2029  DRAIN/DEVICES: PIV infusing LR at 100mL/hr  TELEMETRY RHYTHM: n/a  SKIN: WDL ex pressure wound to coccyx and scattered bruising. TESTS/PROCEDURES: nothing since arrival to inpatient unit  D/C DAY/GOALS/PLACE: pending progress  OTHER IMPORTANT INFO: Covid negative- needs to be reswabbed. Will continue special precautions.

## 2021-05-19 NOTE — ED NOTES
[facility]  ED Nurse Handoff Report    ED Chief complaint: Altered Mental Status (pt lives alone in apartment and has a pca.  pt called PCA at 0200 and wondered where she was.  PCA arrived at original time and felt pt was confused and sent to ed)      ED Diagnosis:   Final diagnoses:   Pneumonia of both lungs due to infectious organism, unspecified part of lung   Hypoxia   Confusion       Code Status: Full Code    Allergies: No Known Allergies    Patient Story: Pt presents to ER due to concerns of PCA. PCA states that pt called her at 0230 this morning believing it was 1430 in the afternoon. Pt mild confused which is abnormal when compared to baseline.   Focused Assessment:  On Room Air, O2 sats dropped to 87%. Pt does not where oxygen at base. Xray shows Moderate to severe interstitial and groundglass  infiltrates.  Treatments and/or interventions provided: Zithromax, Rocephin, Decadron  Patient's response to treatments and/or interventions: No change in condition    To be done/followed up on inpatient unit:  See inpatient orders    Does this patient have any cognitive concerns?: Disoriented to time    Activity level - Baseline/Home:  Walker  Activity Level - Current:   Stand with assist x2    Patient's Preferred language: English   Needed?: No    Isolation: None and COVID r/o and special precautions  Infection: Not Applicable  COVID r/o and special precautions  Patient tested for COVID 19 prior to admission: YES  Bariatric?: No    Vital Signs:   Vitals:    05/18/21 1700 05/18/21 1715 05/18/21 1730 05/18/21 1830   BP: 118/54      Pulse: 70  75 73   Resp:       Temp:       TempSrc:       SpO2: 95% 94% 94% 91%   Weight:       Height:           Cardiac Rhythm:     Was the PSS-3 completed:   Yes  What interventions are required if any?               Family Comments: PCA at bedside  OBS brochure/video discussed/provided to patient/family: N/A          For the majority of the shift this  patient's behavior was Green.   Behavioral interventions performed were None.    ED NURSE PHONE NUMBER: [phone]

## 2021-05-19 NOTE — PHARMACY-ANTICOAGULATION SERVICE
"Clinical Pharmacy - Warfarin Dosing Consult     Pharmacy has been consulted to manage this patient s warfarin therapy.  Indication: Atrial Fibrillation  Therapy Goal: INR 2-3  Warfarin Prior to Admission: Yes  Warfarin PTA Regimen: Per anticoagulation note on 4/28/21, \"4 mg every Sun, Tue, Thu; 2 mg all other days\"  Significant drug interactions: started on azithromycin and also ceftriaxone  Recent documented change in oral intake/nutrition: Unknown    INR   Date Value Ref Range Status   05/18/2021 2.26 (H) 0.86 - 1.14 Final   04/28/2021 2.5 (A) 0.90 - 1.10 Final       Recommend warfarin 0 mg tonight (patient had dose already PTA).  Pharmacy will monitor Kenyatta Donald daily and order warfarin doses to achieve specified goal.      Please contact pharmacy as soon as possible if the warfarin needs to be held for a procedure or if the warfarin goals change.      Geena Chris, PharmD    "

## 2021-05-19 NOTE — PROGRESS NOTES
RECEIVING UNIT ED HANDOFF REVIEW    ED Nurse Handoff Report was reviewed by: Linda Barillas RN on May 18, 2021 at 8:46 PM

## 2021-05-19 NOTE — PROGRESS NOTES
[facility]    HOSPITALIST PROGRESS NOTE :   --------------------------------------------------    Date of Admission:  5/18/2021    Cumulative Summary: Kenyatta Donald is a 85 year old female past medical history sniffing and for essential hypertension, history of breast cancer s/p bilateral mastectomy, chronic atrial fibrillation on long-term warfarin, diastolic heart failure, hypothyroidism, mild coronary artery disease, mitral valve regurgitation, sleep apnea and a spinal muscular atrophy type III who was admitted on May 18 when she presented with acute hypoxic respiratory failure and bilateral pneumonia.    Assessment & Plan     Bilateral pneumonia   Acute hypoxic respiratory failure  Presents primarily with confusion. CXR with moderate to severe bilateral interstitial and ground glass infiltrates concerning for COVID19, though initial test negative. Mild leukocytosis with WBC 11.3. Hx of CHF, though laid flat in room with no increase in dyspnea and not particularly edematous, with labs more suggestive of dehydration (significant hyaline casts on UA, mild hypercalcemia).    -- Continue COVID19 precautions, will retest tomorrow morning   -- Continue to wean patient off oxygen   -- Continue ceftriaxone IV and azithromycin PO  --Patient continues to be hypoxic, will order prednisone 40 mg p.o. daily for 3 days which seems to be helpful and hypoxia associated with community-acquired pneumonia.  --Patient is unable to provide clear history, according to patient she has received first dose of COVID-19 vaccination but I am unsure at this point about accuracy of information as patient was having significant trouble in providing any information.  -- repeat portable chest x-ray tomorrow morning.     Acute encephalopathy  Mild hypercalcemia   Suspected secondary to pneumonia above, possibly with untreated hypoxia contributing. Head CT negative. UA unremarkable. Mild hypercalcemia (corrects to 11.5)  and dehydration may be contributing. No focal neurologic deficits on exam.   Patient was alert and awake, was oriented to self, told me that she has 3 daughters 2 of them live in [place].  She seems to be improved as compared to yesterday.    -- Re-orient as needed  -- Maintain normal day/night, sleep wake cycles  -- Minimize sedating/altering medications as able  -- Hydrate gently overnight   -- Treat separate conditions as detailed above/below   -- will update family tomorrow morning      Spinal muscular atrophy type III  Has not recently followed by neurology.  Functional status declining at home per review of primary care notes  -- PT consulted     Urinary retention;   -- will order straight cath if post void residual is more than 450 ml , if continues to have issues then will order mac an start patient on flomax     Chronic diastolic congestive heart failure   Severe mitral regurgitation s/p mitral clip   Last EF 65% with moderate mitral regurgitation 5/2021. Follows with [facility] cardiology. Appears on hypovolemic side on admission.   -- Daily weights  -- Hold furosemide   -- Off fluids for now      Atrial fibrillation  INR therapeutic on admission  -- Continue warfarin with pharmacy to dose     History of pulmonary embolism  -- Continue warfarin with pharmacy to dose      Hypothyroidism  -- Continue prior to admission levothyroxine     Hypertension  -- Continue prior to admission amlodipine, lisinopril      Gout  -- Continue prior to admission allopurinol    Diet: Combination Diet 2 gm NA Diet    Mac Catheter: not present  DVT Prophylaxis: Warfarin  Code Status: No CPR- Do NOT Intubate    The patient's care was discussed with the Bedside Nurse and Patient.    Disposition Plan   Expected discharge: 1-2 days, might need rehab    More than 35 min of the time was spend in care today, more than 50% of the time was spent in patient care coordination and counseling.    Ana Lilia Mcguire MD, FACP  Text Page (7am -  6pm)    ----------------------------------------------------------------------------------------------------------------------    Interval History   Patient care was assumed this morning, patient was seen and examined, currently in Covid 19 isolation, continues to require 6 L of oxygen with facemask, denying any chest pain, denying any productive cough, denying any fever or chills.  Seems to be a poor historian, alert and awake, told me she has 3 daughters, does not remember exactly where she lives but told me that she lives in a senior apartment by herself.  Told me that she thinks that she has got first dose of COVID-19 vaccination.    -Data reviewed today: I reviewed all new labs and imaging results over the last 24 hours.    I personally reviewed chest x-ray concerning for moderate to severe interstitial and groundglass infiltrates.    Physical Exam   Temp: 97.7  F (36.5  C) Temp src: Oral BP: 138/70 Pulse: 95   Resp: 24 SpO2: 93 % O2 Device: Oxymask Oxygen Delivery: 6 LPM  Vitals:    05/18/21 1519   Weight: 99.8 kg (220 lb)     Vital Signs with Ranges  Temp:  [97.5  F (36.4  C)-98.3  F (36.8  C)] 97.7  F (36.5  C)  Pulse:  [70-98] 95  Resp:  [16-24] 24  BP: (118-167)/(54-79) 138/70  SpO2:  [91 %-96 %] 93 %  No intake/output data recorded.    GENERAL: Alert , awake and oriented. NAD. Conversational, appropriate.   HEENT: Normocephalic. EOMI. No icterus or injection. Nares normal.   LUNGS: Clear to auscultation. No dyspnea at rest.   HEART: Regular rate. Extremities perfused.   ABDOMEN: Soft, nontender, and nondistended. Positive bowel sounds.   EXTREMITIES: No LE edema noted.   NEUROLOGIC: Moves extremities x4 on command. No acute focal neurologic abnormalities noted.     Medications     - MEDICATION INSTRUCTIONS -       Warfarin Therapy Reminder         allopurinol  100 mg Oral Daily     amLODIPine  5 mg Oral BID     azithromycin  250 mg Oral Q24H     cefTRIAXone  1 g Intravenous Q24H     fluticasone  1-2 spray  Both Nostrils At Bedtime     levothyroxine  100 mcg Oral Daily     lisinopril  20 mg Oral BID     sodium chloride (PF)  3 mL Intracatheter Q8H       Data   Recent Labs   Lab 05/18/21  1600 05/18/21  1532   WBC  --  11.3*   HGB  --  11.0*   MCV  --  91   PLT  --  274   INR 2.26*  --    NA  --  141   POTASSIUM  --  3.9   CHLORIDE  --  107   CO2  --  31   BUN  --  25   CR  --  0.94   ANIONGAP  --  3   VICKIE  --  10.7*   GLC  --  115*   ALBUMIN  --  3.0*   PROTTOTAL  --  7.7   BILITOTAL  --  0.6   ALKPHOS  --  82   ALT  --  14   AST  --  13       Imaging:   Recent Results (from the past 24 hour(s))   Chest XR,  PA & LAT    Narrative    CHEST TWO VIEWS 5/18/2021 5:32 PM     HISTORY: Weak, confused.    COMPARISON: May 29, 2018       Impression    IMPRESSION: Moderate to severe interstitial and groundglass  infiltrates. No definite effusion. The cardiac silhouette is enlarged  similar to previous. Pulmonary vasculature is obscured.    SARAH SAMAYOA MD   Head CT w/o contrast    Narrative    CT SCAN OF THE HEAD WITHOUT CONTRAST   5/18/2021 6:08 PM     HISTORY: Confusion. Altered mental status not otherwise specified, of  unknown etiology.    TECHNIQUE:  Axial images of the head and coronal reformations without  IV contrast material. Radiation dose for this scan was reduced using  automated exposure control, adjustment of the mA and/or kV according  to patient size, or iterative reconstruction technique.    COMPARISON: None.    FINDINGS: There is no evidence of intracranial hemorrhage, mass, acute  infarct or anomaly. The ventricles are normal in size, shape and  configuration. Mild diffuse parenchymal volume loss. Mild patchy  periventricular white matter hypodensities which are nonspecific, but  likely related to chronic microvascular ischemic disease. Scattered  intracranial atherosclerotic calcifications primarily involving the  carotid siphons and vertebral arteries.     The visualized portions of the sinuses and mastoids  appear normal.  Hyperostosis frontalis interna. The bony calvarium and bones of the  skull base appear intact.       Impression    IMPRESSION:     1. No evidence of acute intracranial hemorrhage, mass, or herniation.  2. Diffuse parenchymal volume loss and white matter changes likely due  to chronic microvascular ischemic disease.      SARAH HENDERSON MD

## 2021-05-19 NOTE — PHARMACY-ADMISSION MEDICATION HISTORY
"Admission medication history interview status for the 5/18/2021  admission is complete. See EPIC admission navigator for prior to admission medications     Medication history source reliability:Patients friend Khushbu clarified med rec.    Actions taken by pharmacist (provider contacted, etc):Referenced Surescripts and interviewed patient     Additional medication history information not noted on PTA med list :None    Medication reconciliation/reorder completed by provider prior to medication history? No    Time spent in this activity: 20\"    Prior to Admission medications    Medication Sig Last Dose Taking? Auth Provider   allopurinol (ZYLOPRIM) 100 MG tablet Take 1 tablet (100 mg) by mouth daily  Patient taking differently: Take 100 mg by mouth every morning  5/18/2021 at am Yes Melecio De La O MD   amLODIPine (NORVASC) 5 MG tablet Take 1 tablet (5 mg) by mouth 2 times daily LAST REFILL WITHOUT AN APPOINTMENT 5/18/2021 at am Yes Melecio De La O MD   cholecalciferol (VITAMIN D) 1000 UNIT tablet Take 2,000 Units by mouth every morning  5/18/2021 at am Yes Reported, Patient   fluticasone (FLONASE) 50 MCG/ACT nasal spray Spray 1-2 sprays into both nostrils At Bedtime  5/17/2021 at hs Yes Melecio De La O MD   furosemide (LASIX) 20 MG tablet TAKE 1 TABLET(20 MG) BY MOUTH EVERY MORNING  Patient taking differently: Take 20 mg by mouth every morning  5/18/2021 at am Yes Andry Damico MD   levothyroxine (SYNTHROID/LEVOTHROID) 100 MCG tablet Take 1 tablet (100 mcg) by mouth daily 5/18/2021 at am Yes Melecio De La O MD   lisinopril (ZESTRIL) 20 MG tablet Take 1 tablet (20 mg) by mouth 2 times daily Needs an appointment for refills. 5/18/2021 at am Yes Brain Lock MD   warfarin ANTICOAGULANT (COUMADIN) 4 MG tablet Take 0.5-1 tablets (2-4 mg) by mouth daily Adjust dose per INR result as directed by anticoagulation clinic  Patient taking differently: Take 2-4 mg by mouth " "daily Per anticoagulation note on 4/28/21, \"4 mg every Sun, Tue, Thu; 2 mg all other days\" 5/18/2021 at am Yes Melecio De La O MD   acetaminophen (TYLENOL) 325 MG tablet Take 2 tablets (650 mg) by mouth every 4 hours as needed for mild pain prn  Srinath Syed MD   ASPIRIN NOT PRESCRIBED (INTENTIONAL) Please choose reason not prescribed, below   Bess Lion MD   nystatin (MYCOSTATIN) 789295 UNIT/GM external powder Apply topically 2 times daily  Patient taking differently: Apply topically 2 times daily as needed (groin rash)  prn  Bess Lion MD   warfarin ANTICOAGULANT (COUMADIN) 1 MG tablet TAKE 2 TABLETS BY MOUTH EVERY MONDAY, WEDNESDAY, FRIDAY, SATURDAY. DOSE MAY CHANGE PER INR RESULT AS DIRECTED  Patient taking differently: Take 2-4 mg by mouth See Admin Instructions Per anticoagulation note on 4/28/21, \"4 mg every Sun, Tue, Thu; 2 mg all other days\" 5/17/21 at am  Melecio De La O MD         "

## 2021-05-19 NOTE — H&P
[facility]    History and Physical - Hospitalist Service       Date of Admission:  5/18/2021    Assessment & Plan   Kenyatta Donald is a 85 year old female admitted on 5/18/2021.      Bilateral pneumonia   Acute hypoxic respiratory failure  Presents primarily with confusion. CXR with moderate to severe bilateral interstitial and ground glass infiltrates concerning for COVID19, though initial test negative. Mild leukocytosis with WBC 11.3. Hx of CHF, though laid flat in room with no increase in dyspnea and not particularly edematous, with labs more suggestive of dehydration (significant hyaline casts on UA, mild hypercalcemia).  - Continue COVID19 precautions, consider re-testing in 48-72 hours  - Add on procalcitonin, NtpBNP  - Continue ceftriaxone IV and azithromycin PO    Acute encephalopathy  Mild hypercalcemia   Suspected secondary to pneumonia above, possibly with untreated hypoxia contributing. Head CT negative. UA unremarkable. Mild hypercalcemia (corrects to 11.5) and dehydration may be contributing. No focal neurologic deficits on exam.   - Re-orient as needed  - Maintain normal day/night, sleep wake cycles  - Minimize sedating/altering medications as able  - Hydrate gently overnight   - Treat separate conditions as detailed above/below     Spinal muscular atrophy type III  Has not recently followed by neurology.  Functional status declining at home per review of primary care notes  - PT consulted     Chronic diastolic congestive heart failure   Severe mitral regurgitation s/p mitral clip   Last EF 65% with moderate mitral regurgitation 5/2021. Follows with [facility] cardiology. Appears on hypovolemic side on admission.   - Daily weights  - Hold furosemide in favor of fluids as above     Atrial fibrillation  INR therapeutic on admission  - Continue warfarin with pharmacy to dose    History of pulmonary embolism  - Continue warfarin with pharmacy to dose     Hypothyroidism  -  Continue prior to admission levothyroxine    Hypertension  - Continue prior to admission amlodipine, lisinopril     Gout  - Continue prior to admission allopurinol       Diet:   2 gram sodium restricted diet   DVT Prophylaxis: Warfarin  Bunch Catheter: not present  Code Status:   DNR/DNI - Per previous POLST     Disposition Plan   Admit to inpatient. Anticipate greater than or equal to 2 midnights prior to discharge.     Entered: Esvin Sams MD 05/18/2021, 9:39 PM     The patient's care was discussed with the Patient.    Esvin Sams MD  [facility]    ______________________________________________________________________    Chief Complaint   Confusion for 1 day    History of Present Illness   Kenyatta Donald is a 85 year old female who presents with the above chief complaint.    History is obtained from discussion with Emergency Department provider and review of medical record. PCA not currently available to provide additional information. History limited from the patient due to her confusion. The patient called her PCA in the early hours the morning of presentation asking where she was. Her PCA was not set to work yet, so this questions truck her as odd and due to concern over her mental state called EMS to bring her into the Emergency Department. The patient presently denies any chest pain, cough, shortness of breath, abdominal pain, n/v, body aches. Per review of medical record, her primary care provider has been concerned about her declining functional status at home related to her chronic neurologic condition.    In the Emergency Department, the patient was seen by Dr. Reid Dhaliwal, with whom I discussed the patient's presenting symptoms and emergency department course.  Initial vital signs were a temperature of 98.3F, HR 98, /67, RR 16, SpO2 95% on 2L. Work-up included head CT negative for acute changes, CXR with moderate to severe interstitial and groundglass  infiltrates, obscured pulmonary vasculature. Hospitalists were contacted for admission to the hospital.     Review of Systems    Complete 10 point review of systems assessed and negative except as noted in HPI.    Past Medical History    I have reviewed this patient's medical history and updated it with pertinent information if needed.   Past Medical History:   Diagnosis Date     Benign essential hypertension      Juan Pablo-tachy syndrome (H)      Breast cancer (H)     s/p bilateral mastectomy     Chronic a-fib (H)     On longterm warfarin anticoagulation.     Diastolic heart failure (H)      GERD (gastroesophageal reflux disease)      Hyperlipidemia LDL goal <100      Hypothyroidism      Kidney stone      Mild coronary artery disease     cardiac cath 2014: 40-50% mid RCA stenosis with minimal other disease     Mitral valve regurgitation     sp mitral clip 1/9/15     Need for SBE (subacute bacterial endocarditis) prophylaxis      Osteoporosis      Pulmonary embolism (H)      Sleep apnea      Spinal muscular atrophy type III (H)     Dr. Daily, [facility]       Past Surgical History   I have reviewed this patient's surgical history and updated it with pertinent information if needed.  Past Surgical History:   Procedure Laterality Date     ANESTHESIA OUT OF OR PERCUTANEOUS MITRAL VALVE REPAIR N/A 1/9/2015    Procedure: ANESTHESIA OUT OF OR PERCUTANEOUS MITRAL VALVE REPAIR;  Surgeon: Generic Anesthesia Provider;  Location: [facility] OR     GYN SURGERY      hysterectomy     MASTECTOMY      bilateral     ORTHOPEDIC SURGERY      knee replacement     PERCUTANEIOUS MITRAL VALVE REPAIR  1/9/2015         Social History   I have reviewed this patient's social history and updated it with pertinent information if needed.  Social History     Tobacco Use     Smoking status: Never Smoker     Smokeless tobacco: Never Used   Substance Use Topics     Alcohol use: No     Alcohol/week: 0.0 standard drinks     Drug use: No     Lives  alone, has a PCA she is close with.    Family History   I have reviewed this patient's family history and updated it with pertinent information if needed.   Family History   Problem Relation Age of Onset     Cancer - colorectal Mother      Alzheimer Disease Mother          age 78     Diabetes Daughter      Asthma Daughter      Heart Disease Father          age 60     Family History Negative Daughter      Unknown/Adopted Maternal Grandmother      Unknown/Adopted Maternal Grandfather      Unknown/Adopted Paternal Grandmother      Unknown/Adopted Paternal Grandfather      Coronary Artery Disease No family hx of      Hypertension No family hx of      Hyperlipidemia No family hx of      Cerebrovascular Disease No family hx of      Breast Cancer No family hx of      Colon Cancer No family hx of      Prostate Cancer No family hx of      Other Cancer No family hx of      Depression No family hx of      Anxiety Disorder No family hx of      Mental Illness No family hx of      Substance Abuse No family hx of      Anesthesia Reaction No family hx of      Osteoporosis No family hx of      Genetic Disorder No family hx of      Thyroid Disease No family hx of      Obesity No family hx of        Prior to Admission Medications   Prior to Admission Medications   Prescriptions Last Dose Informant Patient Reported? Taking?   ASPIRIN NOT PRESCRIBED (INTENTIONAL)  Friend No No   Sig: Please choose reason not prescribed, below   acetaminophen (TYLENOL) 325 MG tablet prn Friend No No   Sig: Take 2 tablets (650 mg) by mouth every 4 hours as needed for mild pain   allopurinol (ZYLOPRIM) 100 MG tablet 2021 at am Friend No Yes   Sig: Take 1 tablet (100 mg) by mouth daily   Patient taking differently: Take 100 mg by mouth every morning    amLODIPine (NORVASC) 5 MG tablet 2021 at am Friend No Yes   Sig: Take 1 tablet (5 mg) by mouth 2 times daily LAST REFILL WITHOUT AN APPOINTMENT   cholecalciferol (VITAMIN D) 1000 UNIT tablet  "5/18/2021 at am Friend Yes Yes   Sig: Take 2,000 Units by mouth every morning    fluticasone (FLONASE) 50 MCG/ACT nasal spray 5/17/2021 at hs Friend Yes Yes   Sig: Spray 1-2 sprays into both nostrils At Bedtime    furosemide (LASIX) 20 MG tablet 5/18/2021 at am Friend No Yes   Sig: TAKE 1 TABLET(20 MG) BY MOUTH EVERY MORNING   Patient taking differently: Take 20 mg by mouth every morning    levothyroxine (SYNTHROID/LEVOTHROID) 100 MCG tablet 5/18/2021 at am Friend No Yes   Sig: Take 1 tablet (100 mcg) by mouth daily   lisinopril (ZESTRIL) 20 MG tablet 5/18/2021 at am Friend No Yes   Sig: Take 1 tablet (20 mg) by mouth 2 times daily Needs an appointment for refills.   nystatin (MYCOSTATIN) 383941 UNIT/GM external powder prn Friend No No   Sig: Apply topically 2 times daily   Patient taking differently: Apply topically 2 times daily as needed (groin rash)    warfarin ANTICOAGULANT (COUMADIN) 1 MG tablet 5/17/21 at am Friend No No   Sig: TAKE 2 TABLETS BY MOUTH EVERY MONDAY, WEDNESDAY, FRIDAY, SATURDAY. DOSE MAY CHANGE PER INR RESULT AS DIRECTED   Patient taking differently: Take 2-4 mg by mouth See Admin Instructions Per anticoagulation note on 4/28/21, \"4 mg every Sun, Tue, Thu; 2 mg all other days\"   warfarin ANTICOAGULANT (COUMADIN) 4 MG tablet 5/18/2021 at am Friend No Yes   Sig: Take 0.5-1 tablets (2-4 mg) by mouth daily Adjust dose per INR result as directed by anticoagulation clinic   Patient taking differently: Take 2-4 mg by mouth daily Per anticoagulation note on 4/28/21, \"4 mg every Sun, Tue, Thu; 2 mg all other days\"      Facility-Administered Medications: None     Allergies   No Known Allergies    Physical Exam   Vital Signs: Temp: 98.3  F (36.8  C) Temp src: Oral BP: 118/54 Pulse: 73   Resp: 16 SpO2: 91 % O2 Device: Nasal cannula Oxygen Delivery: 3 LPM  Weight: 220 lbs 0 oz    Constitutional: Well-appearing, NAD  Eyes: PERRL, EOMI  HENT: Oropharynx clear, MMM  Respiratory: Clear to auscultation " bilaterally, good air movement, normal effort   Cardiovascular: Irregular. II/VI systolic murmur at apex. No peripheral edema.    GI: Soft, non-tender, non-distended. No rebound tenderness or guarding.    Skin: Warm, dry, no apparent rashes  Neurologic: Alert. Oriented to person, place. Not able to state date or clear sequence of events leading to hospitalization. Face symmetric. Tongue midline. Moving extremities equally and on command.   Psychiatric: Slightly anxious affect, cooperative      Data   Data reviewed today: I reviewed all medications, new labs and imaging results over the last 24 hours. I personally reviewed no images or EKG's today.    Recent Labs   Lab 05/18/21  1600 05/18/21  1532   WBC  --  11.3*   HGB  --  11.0*   MCV  --  91   PLT  --  274   INR 2.26*  --    NA  --  141   POTASSIUM  --  3.9   CHLORIDE  --  107   CO2  --  31   BUN  --  25   CR  --  0.94   ANIONGAP  --  3   VICKIE  --  10.7*   GLC  --  115*   ALBUMIN  --  3.0*   PROTTOTAL  --  7.7   BILITOTAL  --  0.6   ALKPHOS  --  82   ALT  --  14   AST  --  13       Recent Results (from the past 24 hour(s))   Chest XR,  PA & LAT    Narrative    CHEST TWO VIEWS 5/18/2021 5:32 PM     HISTORY: Weak, confused.    COMPARISON: May 29, 2018       Impression    IMPRESSION: Moderate to severe interstitial and groundglass  infiltrates. No definite effusion. The cardiac silhouette is enlarged  similar to previous. Pulmonary vasculature is obscured.    SARAH SAMAYOA MD   Head CT w/o contrast    Narrative    CT SCAN OF THE HEAD WITHOUT CONTRAST   5/18/2021 6:08 PM     HISTORY: Confusion. Altered mental status not otherwise specified, of  unknown etiology.    TECHNIQUE:  Axial images of the head and coronal reformations without  IV contrast material. Radiation dose for this scan was reduced using  automated exposure control, adjustment of the mA and/or kV according  to patient size, or iterative reconstruction technique.    COMPARISON: None.    FINDINGS: There  is no evidence of intracranial hemorrhage, mass, acute  infarct or anomaly. The ventricles are normal in size, shape and  configuration. Mild diffuse parenchymal volume loss. Mild patchy  periventricular white matter hypodensities which are nonspecific, but  likely related to chronic microvascular ischemic disease. Scattered  intracranial atherosclerotic calcifications primarily involving the  carotid siphons and vertebral arteries.     The visualized portions of the sinuses and mastoids appear normal.  Hyperostosis frontalis interna. The bony calvarium and bones of the  skull base appear intact.       Impression    IMPRESSION:     1. No evidence of acute intracranial hemorrhage, mass, or herniation.  2. Diffuse parenchymal volume loss and white matter changes likely due  to chronic microvascular ischemic disease.

## 2021-05-20 ENCOUNTER — APPOINTMENT (OUTPATIENT)
Dept: SPEECH THERAPY | Facility: CLINIC | Age: 86
DRG: 177 | End: 2021-05-20
Payer: COMMERCIAL

## 2021-05-20 ENCOUNTER — APPOINTMENT (OUTPATIENT)
Dept: SPEECH THERAPY | Facility: CLINIC | Age: 86
DRG: 177 | End: 2021-05-20
Attending: INTERNAL MEDICINE
Payer: COMMERCIAL

## 2021-05-20 ENCOUNTER — APPOINTMENT (OUTPATIENT)
Dept: PHYSICAL THERAPY | Facility: CLINIC | Age: 86
DRG: 177 | End: 2021-05-20
Attending: INTERNAL MEDICINE
Payer: COMMERCIAL

## 2021-05-20 ENCOUNTER — APPOINTMENT (OUTPATIENT)
Dept: GENERAL RADIOLOGY | Facility: CLINIC | Age: 86
DRG: 177 | End: 2021-05-20
Attending: INTERNAL MEDICINE
Payer: COMMERCIAL

## 2021-05-20 LAB
BASOPHILS # BLD AUTO: 0 10E9/L (ref 0–0.2)
BASOPHILS NFR BLD AUTO: 0.1 %
DIFFERENTIAL METHOD BLD: ABNORMAL
EOSINOPHIL # BLD AUTO: 0 10E9/L (ref 0–0.7)
EOSINOPHIL NFR BLD AUTO: 0 %
ERYTHROCYTE [DISTWIDTH] IN BLOOD BY AUTOMATED COUNT: 16.2 % (ref 10–15)
HCT VFR BLD AUTO: 37.6 % (ref 35–47)
HGB BLD-MCNC: 10.8 G/DL (ref 11.7–15.7)
IMM GRANULOCYTES # BLD: 0.1 10E9/L (ref 0–0.4)
IMM GRANULOCYTES NFR BLD: 0.6 %
INR PPP: 1.61 (ref 0.86–1.14)
LABORATORY COMMENT REPORT: NORMAL
LYMPHOCYTES # BLD AUTO: 1.2 10E9/L (ref 0.8–5.3)
LYMPHOCYTES NFR BLD AUTO: 11.6 %
MCH RBC QN AUTO: 26.7 PG (ref 26.5–33)
MCHC RBC AUTO-ENTMCNC: 28.7 G/DL (ref 31.5–36.5)
MCV RBC AUTO: 93 FL (ref 78–100)
MONOCYTES # BLD AUTO: 0.5 10E9/L (ref 0–1.3)
MONOCYTES NFR BLD AUTO: 5.2 %
NEUTROPHILS # BLD AUTO: 8.2 10E9/L (ref 1.6–8.3)
NEUTROPHILS NFR BLD AUTO: 82.5 %
NRBC # BLD AUTO: 0 10*3/UL
NRBC BLD AUTO-RTO: 0 /100
PLATELET # BLD AUTO: 283 10E9/L (ref 150–450)
POTASSIUM SERPL-SCNC: 4.2 MMOL/L (ref 3.4–5.3)
RBC # BLD AUTO: 4.04 10E12/L (ref 3.8–5.2)
SARS-COV-2 RNA RESP QL NAA+PROBE: NEGATIVE
SPECIMEN SOURCE: NORMAL
WBC # BLD AUTO: 10 10E9/L (ref 4–11)

## 2021-05-20 PROCEDURE — 250N000013 HC RX MED GY IP 250 OP 250 PS 637: Performed by: INTERNAL MEDICINE

## 2021-05-20 PROCEDURE — 92611 MOTION FLUOROSCOPY/SWALLOW: CPT | Mod: GN | Performed by: SPEECH-LANGUAGE PATHOLOGIST

## 2021-05-20 PROCEDURE — 85610 PROTHROMBIN TIME: CPT | Performed by: INTERNAL MEDICINE

## 2021-05-20 PROCEDURE — 97110 THERAPEUTIC EXERCISES: CPT | Mod: GP

## 2021-05-20 PROCEDURE — 97161 PT EVAL LOW COMPLEX 20 MIN: CPT | Mod: GP

## 2021-05-20 PROCEDURE — 250N000011 HC RX IP 250 OP 636: Performed by: INTERNAL MEDICINE

## 2021-05-20 PROCEDURE — 92526 ORAL FUNCTION THERAPY: CPT | Mod: GN | Performed by: SPEECH-LANGUAGE PATHOLOGIST

## 2021-05-20 PROCEDURE — 71045 X-RAY EXAM CHEST 1 VIEW: CPT

## 2021-05-20 PROCEDURE — 84132 ASSAY OF SERUM POTASSIUM: CPT | Performed by: INTERNAL MEDICINE

## 2021-05-20 PROCEDURE — 250N000009 HC RX 250: Performed by: INTERNAL MEDICINE

## 2021-05-20 PROCEDURE — 120N000001 HC R&B MED SURG/OB

## 2021-05-20 PROCEDURE — 87635 SARS-COV-2 COVID-19 AMP PRB: CPT | Performed by: INTERNAL MEDICINE

## 2021-05-20 PROCEDURE — 92610 EVALUATE SWALLOWING FUNCTION: CPT | Mod: GN | Performed by: SPEECH-LANGUAGE PATHOLOGIST

## 2021-05-20 PROCEDURE — 250N000012 HC RX MED GY IP 250 OP 636 PS 637: Performed by: INTERNAL MEDICINE

## 2021-05-20 PROCEDURE — 36415 COLL VENOUS BLD VENIPUNCTURE: CPT | Performed by: INTERNAL MEDICINE

## 2021-05-20 PROCEDURE — 74230 X-RAY XM SWLNG FUNCJ C+: CPT

## 2021-05-20 PROCEDURE — 99233 SBSQ HOSP IP/OBS HIGH 50: CPT | Performed by: INTERNAL MEDICINE

## 2021-05-20 PROCEDURE — 97530 THERAPEUTIC ACTIVITIES: CPT | Mod: GP

## 2021-05-20 PROCEDURE — 85025 COMPLETE CBC W/AUTO DIFF WBC: CPT | Performed by: INTERNAL MEDICINE

## 2021-05-20 RX ORDER — WARFARIN SODIUM 2 MG/1
2 TABLET ORAL
Status: COMPLETED | OUTPATIENT
Start: 2021-05-20 | End: 2021-05-20

## 2021-05-20 RX ORDER — ALBUTEROL SULFATE 0.83 MG/ML
2.5 SOLUTION RESPIRATORY (INHALATION) EVERY 6 HOURS PRN
Status: DISCONTINUED | OUTPATIENT
Start: 2021-05-20 | End: 2021-05-25 | Stop reason: HOSPADM

## 2021-05-20 RX ORDER — TAMSULOSIN HYDROCHLORIDE 0.4 MG/1
0.4 CAPSULE ORAL DAILY
Status: DISCONTINUED | OUTPATIENT
Start: 2021-05-20 | End: 2021-05-25 | Stop reason: HOSPADM

## 2021-05-20 RX ADMIN — GUAIFENESIN 5 ML: 100 SOLUTION ORAL at 09:33

## 2021-05-20 RX ADMIN — PREDNISONE 40 MG: 20 TABLET ORAL at 09:10

## 2021-05-20 RX ADMIN — WARFARIN SODIUM 2 MG: 2 TABLET ORAL at 17:57

## 2021-05-20 RX ADMIN — AZITHROMYCIN MONOHYDRATE 250 MG: 250 TABLET ORAL at 17:57

## 2021-05-20 RX ADMIN — LEVOTHYROXINE SODIUM 100 MCG: 100 TABLET ORAL at 06:45

## 2021-05-20 RX ADMIN — LISINOPRIL 20 MG: 20 TABLET ORAL at 09:10

## 2021-05-20 RX ADMIN — IPRATROPIUM BROMIDE AND ALBUTEROL 1 PUFF: 20; 100 SPRAY, METERED RESPIRATORY (INHALATION) at 09:08

## 2021-05-20 RX ADMIN — IPRATROPIUM BROMIDE AND ALBUTEROL 1 PUFF: 20; 100 SPRAY, METERED RESPIRATORY (INHALATION) at 19:23

## 2021-05-20 RX ADMIN — AMLODIPINE BESYLATE 5 MG: 5 TABLET ORAL at 21:21

## 2021-05-20 RX ADMIN — ALBUTEROL SULFATE 2 PUFF: 90 AEROSOL, METERED RESPIRATORY (INHALATION) at 09:06

## 2021-05-20 RX ADMIN — IPRATROPIUM BROMIDE AND ALBUTEROL 1 PUFF: 20; 100 SPRAY, METERED RESPIRATORY (INHALATION) at 09:33

## 2021-05-20 RX ADMIN — TAMSULOSIN HYDROCHLORIDE 0.4 MG: 0.4 CAPSULE ORAL at 13:09

## 2021-05-20 RX ADMIN — ALLOPURINOL 100 MG: 100 TABLET ORAL at 09:10

## 2021-05-20 RX ADMIN — ACETAMINOPHEN 650 MG: 325 TABLET, FILM COATED ORAL at 21:21

## 2021-05-20 RX ADMIN — FLUTICASONE PROPIONATE 2 SPRAY: 50 SPRAY, METERED NASAL at 21:25

## 2021-05-20 RX ADMIN — LISINOPRIL 20 MG: 20 TABLET ORAL at 21:21

## 2021-05-20 RX ADMIN — CEFTRIAXONE SODIUM 1 G: 1 INJECTION, POWDER, FOR SOLUTION INTRAMUSCULAR; INTRAVENOUS at 19:24

## 2021-05-20 RX ADMIN — AMLODIPINE BESYLATE 5 MG: 5 TABLET ORAL at 09:10

## 2021-05-20 ASSESSMENT — MIFFLIN-ST. JEOR: SCORE: 1448.34

## 2021-05-20 ASSESSMENT — ACTIVITIES OF DAILY LIVING (ADL)
ADLS_ACUITY_SCORE: 21
DEPENDENT_IADLS:: LAUNDRY;MEAL PREPARATION
ADLS_ACUITY_SCORE: 21
ADLS_ACUITY_SCORE: 23

## 2021-05-20 NOTE — PLAN OF CARE
"Summary:     DATE & TIME: 5/19/21  5410-1005  Cognitive Concerns/ Orientation : Alert to self  and place, disoriented to time/situation   BEHAVIOR & AGGRESSION TOOL COLOR: green  ABNL VS/O2: VSS on 6L NC ex slight HTN. Desats with activity and lying flat.   MOBILITY: Total cares, Turn and repo q2h.  PAIN MANAGMENT: Denies pain  DIET: 2gm Na- did not eat dinner because she said she \"feels like her throat narrows from phlegm\"  BOWEL/BLADDER: Bladder scanned for 460mL after being unable to urinate. Straight cathed for 450mL. Purewick in place in case she needs to urinate. No BM this shift.  ABNL LAB/BG: no new abnormal labs.  DRAIN/DEVICES: PIV SL  TELEMETRY RHYTHM: n/a  SKIN: WDL ex pressure wound to coccyx, scab on left side of inner groin, and scattered bruising.   TESTS/PROCEDURES: nothing today  D/C DAY/GOALS/PLACE: pending progress  OTHER IMPORTANT INFO: Covid negative- order to be reswabbed today in  AM. LS diminished in bases.Cough syrup given last shift per pt request for phlegm in throat, unable to cough up, this night shift pt wants to sleep saying she is tired.    "

## 2021-05-20 NOTE — PROGRESS NOTES
[facility]    HOSPITALIST PROGRESS NOTE :   --------------------------------------------------    Date of Admission:  5/18/2021    Cumulative Summary: Kenyatta Donald is a 85 year old female past medical history sniffing and for essential hypertension, history of breast cancer s/p bilateral mastectomy, chronic atrial fibrillation on long-term warfarin, diastolic heart failure, hypothyroidism, mild coronary artery disease, mitral valve regurgitation, sleep apnea and a spinal muscular atrophy type III who was admitted on May 18 when she presented with acute hypoxic respiratory failure and bilateral pneumonia.    Assessment & Plan     Bilateral consolidative pneumonia , most likely community-acquired, unknown organism, most likely bacterial:  Acute hypoxic respiratory failure:  COVID-19 infection ruled out:    Presents primarily with confusion. CXR with moderate to severe bilateral interstitial and ground glass infiltrates concerning for COVID19, patient initial swab came back negative.  This morning second COVID-19 PCR was sent which has come back negative.  Most likely her pneumonia is secondary to bacterial infection.  Presented with mild leukocytosis which has improved now, she does have history of congestive heart failure, does not seem to be fluid overloaded, labs are more suggestive of dehydration with significant hyaline cast and UA and mild hypercalcemia..  Patient continues to require 6 L of oxygen, discussed with nursing regarding start titrating her down as her oxygen saturation is close to 96%.    --Continue to monitor patient closely.  --We will discontinue COVID-19 precautions.  --Continue to wean patient off of oxygen.  --Continue patient on IV ceftriaxone and oral azithromycin.  --Patient is complaining of some phlegm stuck in the back of her throat although she is denying any odynophagia or trouble in speaking or's sore throat.  --We will ask speech therapy to evaluate  patient.  --Continue patient on prednisone 40 mg for the next 3 to 5 days as patient was hypoxic with associated community-acquired pneumonia  --Patient remains confused and is unable to provide detailed history, does not remember exactly where she was living, told me that she has got her first COVID-19 vaccination and is due for her second dose on Friday.  --Repeated chest x-ray this morning, result is reviewed, showing increased opacification in the left lower hemithorax with obscured left hemidiaphragm likely due to infiltrate/consolidation or left pleural effusion this appears increased compared to previous exam.  Mild patchy right mid and lower lung infiltrate may be slightly increased in the lower lung.  Aortic calcification was also noticed.  --Start patient on a scheduled Combivent inhaler 4 times daily.  --We will also order albuterol inhaler treatment as needed as needed for shortness of breath and wheezing.     Acute encephalopathy  Possible underlying dementia  Mild hypercalcemia   Suspected secondary to pneumonia above, possibly with untreated hypoxia contributing. Head CT negative. UA unremarkable. Mild hypercalcemia (corrects to 11.5) and dehydration may be contributing. No focal neurologic deficits on exam.   Patient was alert and awake, was oriented to self, told me that she has 3 daughters 2 of them live in [place].  She seems to be improved as compared to yesterday.    -- Re-orient as needed  -- Maintain normal day/night, sleep wake cycles  -- Minimize sedating/altering medications as able  -- Treat separate conditions as detailed above/below   -- Patient would like Khushbu to be updated who seems to be patient caregiver, I do not see her number in the chart , tried calling her daughter Claudette but was not able to get hold of her.     Spinal muscular atrophy type III  Has not recently followed by neurology.  Functional status declining at home per review of primary care notes  -- PT consulted ,  patient might need some rehab.    Urinary retention;   -- Patient has required straight cath three times, will order mac catheter  -- will also start patient on Flomax  -- UA was checked on admission      Chronic diastolic congestive heart failure   Severe mitral regurgitation s/p mitral clip   Last EF 65% with moderate mitral regurgitation 5/2021. Follows with [facility] cardiology. Appears on hypovolemic side on admission.     -- Daily weights  -- Still looks dry ,   -- Off fluids for now , weight has been stable , will start her back on lasix tomorrow or when her oral intake improves      Atrial fibrillation  INR sub therapeutic this morning   -- Continue warfarin with pharmacy to dose      History of pulmonary embolism  -- Continue warfarin with pharmacy to dose      Hypothyroidism  -- Continue prior to admission levothyroxine     Hypertension  -- Continue prior to admission amlodipine, lisinopril      Gout  -- Continue prior to admission allopurinol    Diet: Combination Diet 2 gm NA Diet    Mac Catheter: not present  DVT Prophylaxis: Warfarin  Code Status: No CPR- Do NOT Intubate    The patient's care was discussed with the Bedside Nurse and Patient.    Disposition Plan   Expected discharge: 1-2 days, might need rehab  , patient has been seen by physical therapy and seems to be needing TCU  I will also ask OT to do cognitive evaluation   Will post  and care coordinator to help with the discharge planning , as above I was not able to get hold of her daughter today , will call again tomorrow   More than 35 min of the time was spend in care today, more than 50% of the time was spent in patient care coordination and counseling.      Ana Lilia Mcguire MD, FACP  Text Page (7am - 6pm)    ----------------------------------------------------------------------------------------------------------------------    Interval History    Patient was seen and examined, sitting in bed, looks slightly clinically better,  still wearing 5 to 6 L of oxygen but her oxygenation is now up to 94% as compared to lower 90s today.  She is denying any significant worsening of productive cough, discussed with her her chest x-ray results, she remains baseline confused although she is able to answer some of the questions about how she is feeling, she does not remember where she lives.  Once again she told me that her daughters are in [place] and I should be contacting Khushbu.  Although in the chart her daughter resides in [place] and no contact number for lead is available in the chart.Patient is denying any chest pain, fever or chills.    -Data reviewed today: I reviewed all new labs and imaging results over the last 24 hours.    I personally reviewed chest x-ray concerning for moderate to severe interstitial and groundglass infiltrates.    Physical Exam   Temp: 97.6  F (36.4  C) Temp src: Axillary BP: 126/66 Pulse: 83   Resp: 24 SpO2: 94 % O2 Device: Nasal cannula Oxygen Delivery: 5 LPM  Vitals:    05/18/21 1519 05/20/21 0530   Weight: 99.8 kg (220 lb) 99.7 kg (219 lb 14.4 oz)     Vital Signs with Ranges  Temp:  [97.6  F (36.4  C)-98.3  F (36.8  C)] 97.6  F (36.4  C)  Pulse:  [59-83] 83  Resp:  [18-24] 24  BP: (126-156)/(54-89) 126/66  SpO2:  [92 %-96 %] 94 %  I/O last 3 completed shifts:  In: 240 [P.O.:240]  Out: 1200 [Urine:1200]    GENERAL: Alert , awake and oriented. NAD. Conversational, appropriate.   HEENT: Normocephalic. EOMI. No icterus or injection. Nares normal.   LUNGS: Clear to auscultation. No dyspnea at rest. Few rhonchi in bases with slight decreased breath sounds   HEART: Regular rate. Extremities perfused.   ABDOMEN: Soft, nontender, and nondistended. Positive bowel sounds.   EXTREMITIES: No LE edema noted.   NEUROLOGIC: Moves extremities x4 on command. No acute focal neurologic abnormalities noted.     Medications     - MEDICATION INSTRUCTIONS -       Warfarin Therapy Reminder         allopurinol  100 mg Oral Daily      amLODIPine  5 mg Oral BID     azithromycin  250 mg Oral Q24H     cefTRIAXone  1 g Intravenous Q24H     fluticasone  1-2 spray Both Nostrils At Bedtime     ipratropium-albuterol  1 puff Inhalation 4x daily     levothyroxine  100 mcg Oral Daily     lisinopril  20 mg Oral BID     predniSONE  40 mg Oral Daily     sodium chloride (PF)  3 mL Intracatheter Q8H       Data   Recent Labs   Lab 05/20/21  0914 05/19/21  1152 05/18/21  1600 05/18/21  1532   WBC 10.0 7.2  --  11.3*   HGB 10.8* 10.2*  --  11.0*   MCV 93 93  --  91    244  --  274   INR 1.61* 1.73* 2.26*  --    NA  --  142  --  141   POTASSIUM 4.2 4.3  --  3.9   CHLORIDE  --  109  --  107   CO2  --  28  --  31   BUN  --  23  --  25   CR  --  0.78  --  0.94   ANIONGAP  --  5  --  3   VICKIE  --  9.9  --  10.7*   GLC  --  139*  --  115*   ALBUMIN  --   --   --  3.0*   PROTTOTAL  --   --   --  7.7   BILITOTAL  --   --   --  0.6   ALKPHOS  --   --   --  82   ALT  --   --   --  14   AST  --   --   --  13       Imaging:   Recent Results (from the past 24 hour(s))   XR Chest Port 1 View    Narrative    EXAM: XR CHEST PORT 1 VIEW  LOCATION: [facility]  DATE/TIME: 5/20/2021 5:35 AM    INDICATION: Follow-up pneumonia.  COMPARISON: 05/18/2021.      Impression    IMPRESSION: Cardiac enlargement. Pulmonary vascularity within normal limits. Increased opacification left lower hemithorax with obscured left hemidiaphragm likely due to infiltrate/consolidation and/or left pleural effusion. This appears increased   compared to previous with less opacification seen in the left lower lung and today's exam. Clinical correlation. Mild patchy right mid and lower lung infiltrate may be slightly increased in the lower lung. Previously seen infiltrate in the left lung is   improved. Aortic calcification. Degenerative changes both shoulders. Small metallic device projects over the heart. Remainder unremarkable.

## 2021-05-20 NOTE — PROGRESS NOTES
MD Notification    Notified Person: MD    Notified Person Name: Mary    Notification Date/Time: 2015 5/16/21    Notification Interaction: page    Purpose of Notification: pt saying she is short of breath, can she have an albuterol inhaler?    Orders Received: PRN albuterol inhaler ordered    Comments:

## 2021-05-20 NOTE — PLAN OF CARE
Summary:     DATE & TIME: 5/20/21 8841-5636  Cognitive Concerns/ Orientation : Alert to self and place (not specific hospital), disoriented to time/situation   BEHAVIOR & AGGRESSION TOOL COLOR: green  ABNL VS/O2: VSS on 5-6L NC   MOBILITY: Total cares, Turn and repo q2h.  PT worked with patient; unable to stand, has chronic LLE weakness; will require ceiling lift (baseline is walking with a walker and a lift recliner).    PAIN MANAGMENT: Denies pain  DIET: 2gm Na; appetite fair; DD2 thin liquids  BOWEL/BLADDER: No urine output (previous shifts straight cath) mac catheter inserted at 1315 with yellow clear urine returned.  ABNL LAB/BG: negative Covid swab today; INR 1.61; Hgb 10.8  DRAIN/DEVICES: PIV SL  TELEMETRY RHYTHM: n/a  SKIN: WDL ex pressure wound to coccyx, scab on left side of inner groin, and scattered bruising. Blanchable redness to perineum.  TESTS/PROCEDURES: SLP eval, PT eval; video swallow  D/C DAY/GOALS/PLACE: pending progress; likely to TCU  OTHER IMPORTANT INFO: requires meds with applesauce; did chew the Flomax after given.  SLP recommend GI to see patient regarding swallowing with irregular esophagus.

## 2021-05-20 NOTE — PLAN OF CARE
"DATE & TIME: 5/19/21  4591-8721  Cognitive Concerns/ Orientation : Alert to self and place, disoriented to time/situation   BEHAVIOR & AGGRESSION TOOL COLOR: green  ABNL VS/O2: VSS on 6L oxymask ex slight HTN. Desats with activity and lying flat.   MOBILITY: Total cares, Turn and repo q2h.  PAIN MANAGMENT: Denies pain  DIET: 2gm Na- did not eat dinner because she said she \"feels like her throat narrows from phlegm\"  BOWEL/BLADDER: Bladder scanned for 825mL after being unable to urinate. Straight cathed for 750mL. Bladder scanned at end of shift for 169mL. Purewick in place in case she needs to urinate. No BM this shift.  ABNL LAB/BG: no new abnormal labs.  DRAIN/DEVICES: PIV SL  TELEMETRY RHYTHM: n/a  SKIN: WDL ex pressure wound to coccyx, scab on left side of inner groin, and scattered bruising.   TESTS/PROCEDURES: nothing today  D/C DAY/GOALS/PLACE: pending progress  OTHER IMPORTANT INFO: Covid negative- order to be reswabbed tomorrow AM. LS diminished in bases.Cough syrup given per pt request for phlegm in throat, unable to cough up.        "

## 2021-05-20 NOTE — PROGRESS NOTES
"Video Swallow Study   05/20/21 4913   General Information   Onset of Illness/Injury or Date of Surgery 05/18/21   Referring Physician Dr. Mcguire   Patient/Family Therapy Goal Statement (SLP) determine cause of swallowing/breathing issues   Pertinent History of Current Problem \"Kenyatta Donald is a 85 year old female past medical history sniffing and for essential hypertension, history of breast cancer s/p bilateral mastectomy, chronic atrial fibrillation on long-term warfarin, diastolic heart failure, hypothyroidism, mild coronary artery disease, mitral valve regurgitation, sleep apnea and a spinal muscular atrophy type III who was admitted on May 18 when she presented with acute hypoxic respiratory failure and bilateral pneumonia.\" Pt denied any history of SLP services. Reported globus sensation and spasms where she can't breathe, but feels this only happens with phelgm and not with foods/liquids.   General Observations Pt alert with improved attention/cognition in quiet space   Past History of Dysphagia Pt reported chronic issues with thick phlegm and globus sensation   Type of Evaluation   Type of Evaluation Swallow Evaluation   General Swallowing Observations   Current Diet/Method of Nutritional Intake (General Swallowing Observations, NIS) dysphagia level 1 (pureed);thin liquids   Respiratory Support (General Swallowing Observations) nasal cannula   Comment, Secretions/Suctioning 6L   Swallowing Evaluation Videofluoroscopic swallow study (VFSS)   VFSS Evaluation   VFSS Textures Trialed Thin Liquids;Purees;Solid   VFSS Eval: Thin Liquid Texture Trial   Mode of Presentation, Thin Liquid cup;straw;self-fed   Preparatory Phase WFL   Oral Phase, Thin Liquid WFL   Pharyngeal Phase, Thin Liquid Residue in valleculae   Rosenbek's Penetration Aspiration Scale: Thin Liquid Trial Results 1 - no aspiration, contrast does not enter airway   Diagnostic Statement no penetration or aspiration; mild tongue base and vallecular " residue   VFSS Evaluation: Puree Solid Texture Trial   Mode of Presentation, Puree spoon;self-fed   Preparatory Phase WFL   Oral Phase, Puree WFL   Pharyngeal Phase, Puree WFL   Rosenbek's Penetration Aspiration Scale: Puree Food Trial Results 1 - no aspiration, contrast does not enter airway   Diagnostic Statement WFL   VFSS Evaluation: Solid Food Texture Trial   Mode of Presentation, Solid self-fed   Preparatory Phase WFL   Oral Phase, Solid WFL   Pharyngeal Phase, Solid Residue in valleculae   Rosenbek's Penetration Aspiration Scale: Solid Food Trial Results 1 - no aspiration, contrast does not enter airway   Diagnostic Statement Mild residue; no penetration or aspiration   Swallowing Recommendations   Diet Consistency Recommendations dysphagia level 2 (mechanically altered);thin liquids   Supervision Level for Intake close supervision needed   Mode of Delivery Recommendations bolus size, small;slow rate of intake;food moistened   Swallowing Maneuver Recommendations alternate food and liquid intake;double dry swallow   Monitoring/Assistance Required (Eating/Swallowing) stop eating activities when fatigue is present   Recommended Feeding/Eating Techniques (Swallow Eval) maintain upright sitting position for eating;maintain upright posture during/after eating for 30 minutes;minimize distractions during oral intake;provide 6 smaller meals throughout day   Medication Administration Recommendations, Swallowing (SLP) whole or crushed as tolerated   Instrumental Assessment Recommendations VFSS (videofluroscopic swallowing study)   Comment, Swallowing Recommendations SLP: Pt presents with mild oropharyngeal dysphagia and suspected esophageal dysphagia on Video Swallow Study. Prior to PO intake, abnormality noted at the c5 level (image attached). Thin liquids, puree solids and regular solids trialed. Functional oral phase, good timing, good epiglottic inversion, and adequate pharyngeal constriction. No penetration or  aspiration throughout the study. Mild tongue base residue on gulping thin liquids. Pt reported globus sensation after thin liquid gulping with esophageal stasis and regurgitation observed (still image attached). Pt taking several swallows independently and acknowledged globus sensation in chest and throat. Images reviewed with pt. Pt reported likely chronic esophageal dysphagia that she has not pursued work up. Pt agreeable to consider GI consult if MD in agreement. Could be completed in the OP setting. Pt verbalized agreement with: dysphagia diet level 2 and thin liquids with strict GERD precautions. Will plan short term follow up for diet tolerance and training swallow strategies.    General Therapy Interventions   Planned Therapy Interventions Dysphagia Treatment   Dysphagia treatment Modified diet education;Instruction of safe swallow strategies   SLP Therapy Assessment/Plan   Criteria for Skilled Therapeutic Interventions Met (SLP Eval) yes   SLP Diagnosis Mild pharyngeal dysphagia   Rehab Potential (SLP Eval) good, to achieve stated therapy goals   Therapy Frequency (SLP Eval) 5 times/wk   Predicted Duration of Therapy Intervention (SLP Eval) 1 week   Therapy Plan Review/Discharge Plan (SLP)   Therapy Plan Review (SLP) evaluation/treatment results reviewed;patient;care plan/treatment goals reviewed;current/potential barriers reviewed;participants voiced agreement with care plan;participants included   Demonstrates Need for Referral to Another Service (SLP)   (GI)   SLP Discharge Planning    SLP Discharge Recommendation (DC Rec) Transitional Care Facility   SLP Rationale for DC Rec Pt likely near baseline swallow function; consider OP GI consult   SLP Brief overview of current status  DD2 and thin liquids with GERD precautions    Total Evaluation Time   Total Evaluation Time (Minutes) 20

## 2021-05-20 NOTE — CONSULTS
Care Management Initial Consult    General Information  Assessment completed with: Patient, Children,    Type of CM/SW Visit: Offer D/C Planning    Primary Care Provider verified and updated as needed: Yes   Readmission within the last 30 days: no previous admission in last 30 days      Reason for Consult: decision-making, discharge planning  Advance Care Planning:            Communication Assessment  Patient's communication style: spoken language (English or Bilingual)    Hearing Difficulty or Deaf: no   Wear Glasses or Blind: no    Cognitive  Cognitive/Neuro/Behavioral: .WDL except, orientation  Level of Consciousness: confused  Arousal Level: opens eyes spontaneously  Orientation: place, time  Mood/Behavior: calm, cooperative  Best Language: 0 - No aphasia  Speech: clear    Living Environment:   People in home: alone     Current living Arrangements: independent living facility      Able to return to prior arrangements: no       Family/Social Support:  Care provided by: homecare agency  Provides care for:    Marital Status:   None          Description of Support System:           Current Resources:   Patient receiving home care services:       Community Resources:    Equipment currently used at home: walker, rolling  Supplies currently used at home:      Employment/Financial:  Employment Status: retired        Financial Concerns: No concerns identified   Referral to Financial Counselor: No       Lifestyle & Psychosocial Needs:        Socioeconomic History     Marital status:      Spouse name: Not on file     Number of children: 3     Years of education: Not on file     Highest education level: Not on file   Occupational History     Occupation: Retired teacher     Tobacco Use     Smoking status: Never Smoker     Smokeless tobacco: Never Used   Substance and Sexual Activity     Alcohol use: No     Alcohol/week: 0.0 standard drinks     Drug use: No     Sexual activity: Never     Partners: Male        Functional Status:  Prior to admission patient needed assistance:   Dependent ADLs:: Ambulation-walker, Dressing, Transfers  Dependent IADLs:: Laundry, Meal Preparation       Mental Health Status:  Mental Health Status: No Current Concerns       Chemical Dependency Status: NA                Values/Beliefs:  Spiritual, Cultural Beliefs, Oriental orthodox Practices, Values that affect care:                 Additional Information:  Spoke with patients daughter Kika regarding plan of care. Discussed that patient presently is forgetful and does not recall friends/PCA  Hours. Daughter states patient has a friend who is also her caregiver for 2-3 hrs per day. Patient receives assistance in the morning for grooming and breakfast. Patient stating she is able to walk with a walker with great difficulty,she is occasionally incontinent once PCA leaves.Patient received assistance in the evening for couple hours. Discussed with Kika that therapy is recommending TCU at this time. Kika is in agreement.  Writer discussed options and patient states she would like to go to anyplace in [place] that has good ratings.  SW to work with patient tomorrow regarding choices.  Brielle Balbuena RN  Inpatient Care Coordinator  [facility]  #  [phone]

## 2021-05-20 NOTE — PLAN OF CARE
OT: Orders received. Chart reviewed and discussed with care team. Patient admitted to [facility] 5/18 with bilateral pneumonia & acute hypoxic respiratory failure, acute encephalopathy. Spoke with PT, pt currently requires A of 2 with recommendation for TCU, per likely in the next 1-2 days. PT will cont working with patient. Defer OT to TCU or next level of care. OT orders completed.

## 2021-05-20 NOTE — PROGRESS NOTES
"   05/20/21 1011   Quick Adds   Type of Visit Initial PT Evaluation   Living Environment   People in home alone   Current Living Arrangements   (Independent senior apartment per pt)   Living Environment Comments Pt reports she has a friend Khushbu (her PCA?) that helps out. Khushbu helps with transportation, errands/ buying groceries, helps with bathing (sponge bath sitting on toilet seat), getting dressed. Pt reports she is ind with toileting   Self-Care   Usual Activity Tolerance moderate   Current Activity Tolerance poor   Equipment Currently Used at Home walker, rolling  (FWW)   Activity/Exercise/Self-Care Comment Patient reports she uses walker to ambulate within her apartment Patient reports she has a wheelchair also but doesn't use it much, states she can walk from a parking lot into a building with her walker typically.   Disability/Function   Dressing/Bathing Difficulty yes   Dressing/Bathing bathing difficulty, assistance 1 person;dressing difficulty, assistance 1 person  (sponge bathes seated on toilet)   Fall history within last six months   (\"I can't tell ya\")   General Information   Onset of Illness/Injury or Date of Surgery 05/18/21   Referring Physician Esvin Sams MD   Patient/Family Therapy Goals Statement (PT) not stated   Pertinent History of Current Problem (include personal factors and/or comorbidities that impact the POC) Patient admitted to [facility] 5/18 with bilateral pneumonia & acute hypoxic respiratory failure, acute encephalopathy. PMH significant for SMA type 3 with declining functional status at home per chart. See chart for full PMH.   Existing Precautions/Restrictions fall   Cognition   Orientation Status (Cognition) oriented to;person;place   Affect/Mental Status (Cognition) confused   Follows Commands (Cognition) follows one-step commands;over 90% accuracy;delayed response/completion   Cognitive Status Comments Not oriented to time- unable to state day/month/yr, thinks season is " "Fall, unable to name President (states \"the one I want but I can't think of the name\"); pt aware she is confused & frustrated by it   Pain Assessment   Patient Currently in Pain No  (denies pain)   Integumentary/Edema   Integumentary/Edema Comments Some small spots/discoloration on B LEs   Posture    Posture Forward head position;Protracted shoulders   Range of Motion (ROM)   ROM Comment B UE & LE AROM limited due to weakness. LE PROM WNL with seated assessment   Strength   Manual Muscle Testing Quick Adds Deficits observed during functional mobility   Strength Comments Patient not able to lift either LE off surface of bed, not able to perform SAQ. Minimal hip flexion against gravity in seated, Able to extend knee <50% of ROM in seated. Hip flexion & knee extension <3/5; pt reports her L LE is weaker than R at baseline but currently R & L appear equally weak   Bed Mobility   Comment (Bed Mobility) Supine>sit with Kayy for LEs, HOB significantly elevated, pt able to mobilize trunk without assist   Transfers   Transfer Safety Comments Attempted sit>stand from EOB with MaxA of PT anteriorly, able to minimally clear bottom from bed; attempted with MaxAx2 and Giuliana Stedy from elevated bed, not able to stand, only able to clear bottom from bed a few inches    Gait/Stairs (Locomotion)   Comment (Gait/Stairs) Not able to stand or ambulate currently   Balance   Balance Comments Fair + sitting balance; pt able to sit EOB with SBA-S;    Sensory Examination   Sensory Perception Comments denies numbness/tingling   Clinical Impression   Criteria for Skilled Therapeutic Intervention yes, treatment indicated   PT Diagnosis (PT) difficulty ambulating   Influenced by the following impairments profound LE weakness & general weakness, decreased activity tolerance   Functional limitations due to impairments difficulty with bed mobility, transfers, ambulation   Clinical Presentation Stable/Uncomplicated   Clinical Presentation Rationale " clinical judgement   Clinical Decision Making (Complexity) low complexity   Therapy Frequency (PT) 5x/week   Predicted Duration of Therapy Intervention (days/wks) 4 days   Planned Therapy Interventions (PT) balance training;bed mobility training;cryotherapy;gait training;home exercise program;neuromuscular re-education;patient/family education;transfer training;ROM (range of motion);strengthening   Risk & Benefits of therapy have been explained evaluation/treatment results reviewed;care plan/treatment goals reviewed;risks/benefits reviewed;current/potential barriers reviewed;participants included;patient   PT Discharge Planning    PT Discharge Recommendation (DC Rec) Transitional Care Facility   PT Rationale for DC Rec Patient is far below baseline mobility, unable to stand with Ax2 and Giuliana Stedy. She lives alone at baseline. She will need continued therapy in hospital and TCU prior to returning home.   PT Brief overview of current status  Unable to stand with Ax2 and Giuliana Stedy. Pt will need lift transfers- not in lift room currently.  (nurse aide to inform RN of need for lift room)   Total Evaluation Time   Total Evaluation Time (Minutes) 10

## 2021-05-21 ENCOUNTER — APPOINTMENT (OUTPATIENT)
Dept: PHYSICAL THERAPY | Facility: CLINIC | Age: 86
DRG: 177 | End: 2021-05-21
Payer: COMMERCIAL

## 2021-05-21 ENCOUNTER — APPOINTMENT (OUTPATIENT)
Dept: SPEECH THERAPY | Facility: CLINIC | Age: 86
DRG: 177 | End: 2021-05-21
Payer: COMMERCIAL

## 2021-05-21 LAB — INR PPP: 1.66 (ref 0.86–1.14)

## 2021-05-21 PROCEDURE — 92526 ORAL FUNCTION THERAPY: CPT | Mod: GN | Performed by: SPEECH-LANGUAGE PATHOLOGIST

## 2021-05-21 PROCEDURE — 250N000011 HC RX IP 250 OP 636: Performed by: INTERNAL MEDICINE

## 2021-05-21 PROCEDURE — 97110 THERAPEUTIC EXERCISES: CPT | Mod: GP | Performed by: PHYSICAL THERAPIST

## 2021-05-21 PROCEDURE — 99233 SBSQ HOSP IP/OBS HIGH 50: CPT | Performed by: INTERNAL MEDICINE

## 2021-05-21 PROCEDURE — 250N000013 HC RX MED GY IP 250 OP 250 PS 637: Performed by: INTERNAL MEDICINE

## 2021-05-21 PROCEDURE — 85610 PROTHROMBIN TIME: CPT | Performed by: INTERNAL MEDICINE

## 2021-05-21 PROCEDURE — 36415 COLL VENOUS BLD VENIPUNCTURE: CPT | Performed by: INTERNAL MEDICINE

## 2021-05-21 PROCEDURE — 250N000013 HC RX MED GY IP 250 OP 250 PS 637: Performed by: PHYSICIAN ASSISTANT

## 2021-05-21 PROCEDURE — 250N000012 HC RX MED GY IP 250 OP 636 PS 637: Performed by: INTERNAL MEDICINE

## 2021-05-21 PROCEDURE — 120N000001 HC R&B MED SURG/OB

## 2021-05-21 PROCEDURE — 97530 THERAPEUTIC ACTIVITIES: CPT | Mod: GP | Performed by: PHYSICAL THERAPIST

## 2021-05-21 RX ORDER — CEFTRIAXONE 2 G/1
2 INJECTION, POWDER, FOR SOLUTION INTRAMUSCULAR; INTRAVENOUS EVERY 24 HOURS
Status: DISCONTINUED | OUTPATIENT
Start: 2021-05-21 | End: 2021-05-22

## 2021-05-21 RX ORDER — PANTOPRAZOLE SODIUM 40 MG/1
40 TABLET, DELAYED RELEASE ORAL
Status: DISCONTINUED | OUTPATIENT
Start: 2021-05-21 | End: 2021-05-25 | Stop reason: HOSPADM

## 2021-05-21 RX ORDER — WARFARIN SODIUM 4 MG/1
4 TABLET ORAL
Status: COMPLETED | OUTPATIENT
Start: 2021-05-21 | End: 2021-05-21

## 2021-05-21 RX ORDER — FUROSEMIDE 20 MG
20 TABLET ORAL DAILY
Status: DISCONTINUED | OUTPATIENT
Start: 2021-05-21 | End: 2021-05-22

## 2021-05-21 RX ADMIN — WARFARIN SODIUM 4 MG: 4 TABLET ORAL at 18:17

## 2021-05-21 RX ADMIN — AZITHROMYCIN MONOHYDRATE 250 MG: 250 TABLET ORAL at 18:17

## 2021-05-21 RX ADMIN — PREDNISONE 40 MG: 20 TABLET ORAL at 09:27

## 2021-05-21 RX ADMIN — ACETAMINOPHEN 650 MG: 325 TABLET, FILM COATED ORAL at 14:01

## 2021-05-21 RX ADMIN — ALBUTEROL SULFATE 2 PUFF: 90 AEROSOL, METERED RESPIRATORY (INHALATION) at 09:27

## 2021-05-21 RX ADMIN — IPRATROPIUM BROMIDE AND ALBUTEROL 1 PUFF: 20; 100 SPRAY, METERED RESPIRATORY (INHALATION) at 11:19

## 2021-05-21 RX ADMIN — IPRATROPIUM BROMIDE AND ALBUTEROL 1 PUFF: 20; 100 SPRAY, METERED RESPIRATORY (INHALATION) at 14:01

## 2021-05-21 RX ADMIN — CEFTRIAXONE SODIUM 2 G: 2 INJECTION, POWDER, FOR SOLUTION INTRAMUSCULAR; INTRAVENOUS at 21:21

## 2021-05-21 RX ADMIN — AMLODIPINE BESYLATE 5 MG: 5 TABLET ORAL at 21:59

## 2021-05-21 RX ADMIN — TAMSULOSIN HYDROCHLORIDE 0.4 MG: 0.4 CAPSULE ORAL at 09:28

## 2021-05-21 RX ADMIN — ALLOPURINOL 100 MG: 100 TABLET ORAL at 09:27

## 2021-05-21 RX ADMIN — FUROSEMIDE 20 MG: 20 TABLET ORAL at 15:05

## 2021-05-21 RX ADMIN — LISINOPRIL 20 MG: 20 TABLET ORAL at 21:59

## 2021-05-21 RX ADMIN — LISINOPRIL 20 MG: 20 TABLET ORAL at 09:27

## 2021-05-21 RX ADMIN — LEVOTHYROXINE SODIUM 100 MCG: 100 TABLET ORAL at 06:56

## 2021-05-21 RX ADMIN — IPRATROPIUM BROMIDE AND ALBUTEROL 1 PUFF: 20; 100 SPRAY, METERED RESPIRATORY (INHALATION) at 21:56

## 2021-05-21 RX ADMIN — PANTOPRAZOLE SODIUM 40 MG: 40 TABLET, DELAYED RELEASE ORAL at 09:35

## 2021-05-21 RX ADMIN — AMLODIPINE BESYLATE 5 MG: 5 TABLET ORAL at 09:27

## 2021-05-21 RX ADMIN — FLUTICASONE PROPIONATE 1 SPRAY: 50 SPRAY, METERED NASAL at 21:56

## 2021-05-21 ASSESSMENT — ACTIVITIES OF DAILY LIVING (ADL)
ADLS_ACUITY_SCORE: 23

## 2021-05-21 NOTE — PROGRESS NOTES
[facility]    HOSPITALIST PROGRESS NOTE :   --------------------------------------------------    Date of Admission:  5/18/2021    Cumulative Summary: Kenyatta Donald is a 85 year old female past medical history sniffing and for essential hypertension, history of breast cancer s/p bilateral mastectomy, chronic atrial fibrillation on long-term warfarin, diastolic heart failure, hypothyroidism, mild coronary artery disease, mitral valve regurgitation, sleep apnea and a spinal muscular atrophy type III who was admitted on May 18 when she presented with acute hypoxic respiratory failure and bilateral pneumonia.    Assessment & Plan     Bilateral consolidative pneumonia , most likely community-acquired, unknown organism, most likely bacterial:  Acute hypoxic respiratory failure:  COVID-19 infection ruled out:    Presents primarily with confusion. CXR with moderate to severe bilateral interstitial and ground glass infiltrates concerning for COVID19, patient initial swab came back negative.  This morning second COVID-19 PCR was sent which has come back negative.  Most likely her pneumonia is secondary to bacterial infection.  Presented with mild leukocytosis which has improved now, she does have history of congestive heart failure, does not seem to be fluid overloaded, labs are more suggestive of dehydration with significant hyaline cast and UA and mild hypercalcemia..  Patient continues to require 6 L of oxygen, discussed with nursing regarding start titrating her down as her oxygen saturation is close to 96%.Patient seems to be doing better clinically     -- Continue to monitor patient closely   --Discussed with bedside nursing, planning to wean patient off of oxygen.  Continue patient on IV ceftriaxone and oral azithromycin.  Patient has been evaluated by speech therapy, there is concern for esophageal dysphagia, will consult gastroenterology for possible inpatient versus outpatient EGD and  further follow-up.  Continue patient on dysphagia diet as recommended by speech therapy.  Continue patient on prednisone 40 mg p.o. daily, planning to treat her for 3 to 5 days considering hypoxia associated with community-acquired pneumonia.  According to patient she has received first dose of COVID-19 vaccination and is due for her second dose.  COVID-19 PCR was checked twice came back negative.  Repeated chest x-ray on May 28 showed increased opacification in the left lower hemithorax likely secondary to infiltrate/consolidation of left pleural effusion.  Patient has been a started on Combivent inhaler for times a day.  Continue albuterol inhaler and albuterol nebulizer as needed as needed for shortness of breath.    Acute encephalopathy  Possible underlying dementia  Mild hypercalcemia   Suspected secondary to pneumonia above, possibly with untreated hypoxia contributing. Head CT negative. UA unremarkable. Mild hypercalcemia (corrects to 11.5) and dehydration may be contributing. No focal neurologic deficits on exam.   Patient cognition is significantly improving as compared to the time of admission.  According to patient her older daughter lives in [place] who has been dealing with her ,s sickness and is also busy as she is a teacher.  According to patient and her daughter Kika has also been in the process of moving from [place] to their cabin in [place].  According to patient, her friend and PCA Khushbu is the best person to update who would be able to update the family also.    -- Re-orient as needed  -- Maintain normal day/night, sleep wake cycles  -- Minimize sedating/altering medications as able  -- Treat separate conditions as detailed above/below   --Patient is open to the idea of going to rehab, she would prefer rehab to be in [facility].     Spinal muscular atrophy type III  Has not recently followed by neurology.  Functional status declining at home per review of primary care  notes  -- PT consulted , recommending patient to be discharged to rehab.    Urinary retention;     --Patient required multiple straight caths, Bunch catheter has been placed on May 28.  --Patient has also been a started on Flomax.  --UA was checked on admission.  --We will give voiding trial tomorrow     Chronic diastolic congestive heart failure   Severe mitral regurgitation s/p mitral clip   Last EF 65% with moderate mitral regurgitation 5/2021. Follows with [facility] cardiology. Appears on hypovolemic side on admission.     -- Daily weights  -- her oral intake is slightly improved   -- will start patient on her PTA dose of Lasix 20 mg po daily      Atrial fibrillation  INR sub therapeutic this morning   -- Continue warfarin , pharmacy to dose , appreciate their help      History of pulmonary embolism  -- Continue warfarin with pharmacy to dose      Hypothyroidism  -- Continue prior to admission levothyroxine     Hypertension  -- Continue prior to admission amlodipine, lisinopril      Gout  -- Continue prior to admission allopurinol    Diet: Combination Diet Dysphagia Diet Level 2: Mechan Altered; Thin Liquids (water, ice chips, juice, milk gelatin, ice cream, etc); 2 gm NA Diet    Bunhc Catheter: in place, indication: Retention  DVT Prophylaxis: Warfarin  Code Status: No CPR- Do NOT Intubate    The patient's care was discussed with the Bedside Nurse and Patient.    Disposition Plan   Expected discharge: 1-2 days, might need rehab  , patient has been seen by physical therapy and seems to be needing TCU, Patient is ok to go to any TCU in [facility]   I will also ask OT to do cognitive evaluation   Might be ready on Sunday morning , still requiring significant amount of oxygen.  More than 35 min of the time was spend in care today, more than 50% of the time was spent in patient care coordination and counseling.      Ana Lilia Mcguire MD, FACP  Text Page (7am -  6pm)    ----------------------------------------------------------------------------------------------------------------------    Interval History    She was seen and examined, sitting in bed, looks clinically better but is still requiring 5 to 6 L of oxygen, thinks that she is can have a bowel movement does not have a bowel movement since her admission.  Seems to be more oriented able to give me some information about her daughters, told me that Khushbu is her friend and her PCA who provides the care is mostly is the person who is updating the family about her medical conditions.  Patient is also open to the idea of going to rehab , prefer to go to any good rehab in [facility].  Patient is denying any chest pain, palpitations or shortness of breath.  I also had discussion with patient regarding her esophageal dysphagia and GI consultation.    -Data reviewed today: I reviewed all new labs and imaging results over the last 24 hours.    I personally reviewed chest x-ray concerning for moderate to severe interstitial and groundglass infiltrates.    Physical Exam   Temp: 98.2  F (36.8  C) Temp src: Oral BP: 139/64 Pulse: 84   Resp: 20 SpO2: 94 % O2 Device: Nasal cannula Oxygen Delivery: 6 LPM  Vitals:    05/18/21 1519 05/20/21 0530   Weight: 99.8 kg (220 lb) 99.7 kg (219 lb 14.4 oz)     Vital Signs with Ranges  Temp:  [98.2  F (36.8  C)-98.7  F (37.1  C)] 98.2  F (36.8  C)  Pulse:  [70-87] 84  Resp:  [20-22] 20  BP: (120-144)/(54-64) 139/64  SpO2:  [89 %-94 %] 94 %  I/O last 3 completed shifts:  In: 330 [P.O.:330]  Out: 250 [Urine:250]    GENERAL: Alert , awake and oriented. NAD. Conversational, appropriate.   HEENT: Normocephalic. EOMI. No icterus or injection. Nares normal.   LUNGS: Clear to auscultation. No dyspnea at rest. Few rhonchi in bases with slight decreased breath sounds   HEART: Regular rate. Extremities perfused.   ABDOMEN: Soft, nontender, and nondistended. Positive bowel sounds.   EXTREMITIES: No LE  edema noted.   NEUROLOGIC: Moves extremities x4 on command. No acute focal neurologic abnormalities noted.     Medications     - MEDICATION INSTRUCTIONS -       Warfarin Therapy Reminder         allopurinol  100 mg Oral Daily     amLODIPine  5 mg Oral BID     azithromycin  250 mg Oral Q24H     cefTRIAXone  1 g Intravenous Q24H     fluticasone  1-2 spray Both Nostrils At Bedtime     ipratropium-albuterol  1 puff Inhalation 4x daily     levothyroxine  100 mcg Oral Daily     lisinopril  20 mg Oral BID     pantoprazole  40 mg Oral QAM AC     predniSONE  40 mg Oral Daily     sodium chloride (PF)  3 mL Intracatheter Q8H     tamsulosin  0.4 mg Oral Daily       Data   Recent Labs   Lab 05/21/21  0945 05/20/21  0914 05/19/21  1152 05/18/21  1532 05/18/21  1532   WBC  --  10.0 7.2  --  11.3*   HGB  --  10.8* 10.2*  --  11.0*   MCV  --  93 93  --  91   PLT  --  283 244  --  274   INR 1.66* 1.61* 1.73*   < >  --    NA  --   --  142  --  141   POTASSIUM  --  4.2 4.3  --  3.9   CHLORIDE  --   --  109  --  107   CO2  --   --  28  --  31   BUN  --   --  23  --  25   CR  --   --  0.78  --  0.94   ANIONGAP  --   --  5  --  3   VICKIE  --   --  9.9  --  10.7*   GLC  --   --  139*  --  115*   ALBUMIN  --   --   --   --  3.0*   PROTTOTAL  --   --   --   --  7.7   BILITOTAL  --   --   --   --  0.6   ALKPHOS  --   --   --   --  82   ALT  --   --   --   --  14   AST  --   --   --   --  13    < > = values in this interval not displayed.       Imaging:   Recent Results (from the past 24 hour(s))   XR Video Swallow with SLP or OT    Narrative    VIDEO SPEECH EVALUATION WITH OR WITHOUT ESOPHAGRAM  5/20/2021 3:00 PM     HISTORY: s/sx of aspiration on clinical evaluation. Need to further  assess.    COMPARISON: None.    FLUOROSCOPY TIME: .9 minutes.    SPOT FILMS: 5    TECHNIQUE: A modified barium swallow is performed with speech  pathology. Note that only the cervical esophagus was evaluated in  lateral view.      Impression    IMPRESSION: No  penetration or aspiration with any tested consistency  of barium.    Please see the speech pathology report for further details.

## 2021-05-21 NOTE — PLAN OF CARE
"DATE & TIME: 5/20/21 1012-7476  Cognitive Concerns/ Orientation : Alert to self and place (not specific hospital), disoriented to time/situation   BEHAVIOR & AGGRESSION TOOL COLOR: green  ABNL VS/O2: VSS on 6L NC   MOBILITY: Total cares, Turn and repo q2h. Likes to sit up 60-90 degrees, encouraged to lay more to take pressure off coccyx.  PAIN MANAGMENT: Denies pain  DIET: 2gm Na; appetite poor; DD2 thin liquids. \"twisted esophagus\", GI consulted.  BOWEL/BLADDER: Mac catheter- draining adequately. Voiding trial ordered in 48 hrs from mac placement. No BM this shift  ABNL LAB/BG: Neg Covid x2- out of precautions; INR 1.61; Hgb 10.8  DRAIN/DEVICES: PIV SL  TELEMETRY RHYTHM: n/a  SKIN: WDL ex pressure wound to coccyx, scab on left side of inner groin, and scattered bruising.   TESTS/PROCEDURES: nothing this shift  D/C DAY/GOALS/PLACE: pending progress; likely to TCU  OTHER IMPORTANT INFO: sometimes chews pills, other times can swallow without difficulty. Very poor appetite. PT/OT/SW/WOC/GI consulted      "

## 2021-05-21 NOTE — PLAN OF CARE
"DATE & TIME: 5/20/21 1996-1987  Cognitive Concerns/ Orientation : Alert to self and place (not specific hospital), disoriented to time/situation   BEHAVIOR & AGGRESSION TOOL COLOR: green  ABNL VS/O2: VSS on 6L NC   MOBILITY: Total cares, Turn and repo q2h. Likes to sit up 60-90 degrees, encouraged to lay more to take pressure off coccyx.  PAIN MANAGMENT: Denies pain  DIET: 2gm Na; appetite poor; DD2 thin liquids. \"twisted esophagus\", GI consulted.  BOWEL/BLADDER: Mac catheter- draining adequately. Voiding trial ordered in 48 hrs from mac placement. No BM this shift  ABNL LAB/BG: Neg Covid x2- out of precautions; INR 1.61; Hgb 10.8  DRAIN/DEVICES: PIV SL  TELEMETRY RHYTHM: n/a  SKIN: WDL ex pressure wound to coccyx, scab on left side of inner groin, and scattered bruising.   TESTS/PROCEDURES: nothing this shift  D/C DAY/GOALS/PLACE: pending progress; likely to TCU  OTHER IMPORTANT INFO: sometimes chews pills, other times can swallow without difficulty. Very poor appetite. PT/OT/SW/WOC/GI consulted      "

## 2021-05-21 NOTE — PLAN OF CARE
"DATE & TIME: 5/21/21; [id number]  Cognitive Concerns/ Orientation : Alert to self start of shift, Ox2-3 as the day went on. BEHAVIOR & AGGRESSION TOOL COLOR: Green  ABNL VS/O2: VSS on 6L NC, remains 92%.   MOBILITY: Turn and repo q2h. Pivot strong Ax2/up with lift.   PAIN MANAGMENT: Pain to buttocks, gave Tylenol and repositioned.   DIET: 2gm Na; appetite poor; DD2 thin liquids. \"twisted esophagus\", GI consulted.  BOWEL/BLADDER: Mac catheter- little output. Placed on Lasix. Voiding trial ordered in 48 hrs from mac placement (tomorrow). Small BM this shift.  ABNL LAB/BG: Neg Covid x2, INR 1.66. DRAIN/DEVICES: PIV SL  TELEMETRY RHYTHM: NA  SKIN: WDL ex pressure wound stage III to coccyx, scab on left side of inner groin, and scattered bruising.   TESTS/PROCEDURES: EGD to be completed as outpatient.  D/C DAY/GOALS/PLACE: Pending progress; likely to TCU tomorrow/next few days.   OTHER IMPORTANT INFO: Takes pills with applesauce. PT/OT/SW/WOC/GI consulted.  "

## 2021-05-21 NOTE — CONSULTS
[facility]  Gastroenterology Consultation         Kenyatta Donald  [address]   [facility] [address]  85 year old female    Admission Date/Time: 5/18/2021  Primary Care Provider: Andry Damico  Referring / Attending Physician:  Dr. North Mcguire    We were asked to see the patient in consultation by Dr. Ana Lilia Mcguire for evaluation of esophageal dysphagia.      CC: confusion    HPI:  Kenyatta Donald is a 85 year old female who has PMHx of diastolic heart failure, Mitral valve regurgitation, CAD, Chronic A fib on long term warfarin GERD, h/o breast cancer, Spinal muscular atrophy type III, and h/o PE whom presented to ED with confusion. She is a poor historian, therefore most of history is from a chart review. Her PCA was called in early morning hours 2 days ago, by the patient, and did not know where she was, her PCA called EMS with concerns. Chest xray noted bilateral pneumonia.    She has been evaluated by SLP and suspected esophageal dysphagia. Patient denies any difficulty or pain with swallowing. Has had no heartburn, diarrhea, constipation, melena, or h/o PUD.     Sp02 frequently drops below 90 with speaking while on 6 LPM nasal cannula. Remainder of vital signs stable.  Labs; Hemoglobin 10.8, Wbc 10.0, Platelet 283. Last INR 1.61 on 5/20 however, given warfarin after this.    ROS: A comprehensive ten point review of systems was negative aside from those in mentioned in the HPI.      PAST MED HX:  I have reviewed this patient's medical history and updated it with pertinent information if needed.   Past Medical History:   Diagnosis Date     Benign essential hypertension      Juan Pablo-tachy syndrome (H)      Breast cancer (H)     s/p bilateral mastectomy     Chronic a-fib (H)     On longterm warfarin anticoagulation.     Diastolic heart failure (H)      GERD (gastroesophageal reflux disease)      Hyperlipidemia LDL goal <100      Hypothyroidism      Kidney stone       Mild coronary artery disease     cardiac cath 2014: 40-50% mid RCA stenosis with minimal other disease     Mitral valve regurgitation     sp mitral clip 1/9/15     Need for SBE (subacute bacterial endocarditis) prophylaxis      Osteoporosis      Pulmonary embolism (H)      Sleep apnea      Spinal muscular atrophy type III (H)     Dr. Daily, [facility]       MEDICATIONS:   Prior to Admission Medications   Prescriptions Last Dose Informant Patient Reported? Taking?   ASPIRIN NOT PRESCRIBED (INTENTIONAL)  Friend No No   Sig: Please choose reason not prescribed, below   acetaminophen (TYLENOL) 325 MG tablet prn Friend No No   Sig: Take 2 tablets (650 mg) by mouth every 4 hours as needed for mild pain   allopurinol (ZYLOPRIM) 100 MG tablet 5/18/2021 at am Friend No Yes   Sig: Take 1 tablet (100 mg) by mouth daily   Patient taking differently: Take 100 mg by mouth every morning    amLODIPine (NORVASC) 5 MG tablet 5/18/2021 at am Friend No Yes   Sig: Take 1 tablet (5 mg) by mouth 2 times daily LAST REFILL WITHOUT AN APPOINTMENT   cholecalciferol (VITAMIN D) 1000 UNIT tablet 5/18/2021 at am Friend Yes Yes   Sig: Take 2,000 Units by mouth every morning    fluticasone (FLONASE) 50 MCG/ACT nasal spray 5/17/2021 at hs Friend Yes Yes   Sig: Spray 1-2 sprays into both nostrils At Bedtime    furosemide (LASIX) 20 MG tablet 5/18/2021 at am Friend No Yes   Sig: TAKE 1 TABLET(20 MG) BY MOUTH EVERY MORNING   Patient taking differently: Take 20 mg by mouth every morning    levothyroxine (SYNTHROID/LEVOTHROID) 100 MCG tablet 5/18/2021 at am Friend No Yes   Sig: Take 1 tablet (100 mcg) by mouth daily   lisinopril (ZESTRIL) 20 MG tablet 5/18/2021 at am Friend No Yes   Sig: Take 1 tablet (20 mg) by mouth 2 times daily Needs an appointment for refills.   nystatin (MYCOSTATIN) 534448 UNIT/GM external powder prn Friend No No   Sig: Apply topically 2 times daily   Patient taking differently: Apply topically 2 times daily as  "needed (groin rash)    warfarin ANTICOAGULANT (COUMADIN) 1 MG tablet 21 at am Friend No No   Sig: TAKE 2 TABLETS BY MOUTH EVERY MONDAY, WEDNESDAY, FRIDAY, SATURDAY. DOSE MAY CHANGE PER INR RESULT AS DIRECTED   Patient taking differently: Take 2-4 mg by mouth See Admin Instructions Per anticoagulation note on 21, \"4 mg every Sun, Tue, Thu; 2 mg all other days\"   warfarin ANTICOAGULANT (COUMADIN) 4 MG tablet 2021 at am Friend No Yes   Sig: Take 0.5-1 tablets (2-4 mg) by mouth daily Adjust dose per INR result as directed by anticoagulation clinic   Patient taking differently: Take 2-4 mg by mouth daily Per anticoagulation note on 21, \"4 mg every Sun, Tue, Thu; 2 mg all other days\"      Facility-Administered Medications: None       ALLERGIES: No Known Allergies    SOCIAL HISTORY:  Social History     Tobacco Use     Smoking status: Never Smoker     Smokeless tobacco: Never Used   Substance Use Topics     Alcohol use: No     Alcohol/week: 0.0 standard drinks     Drug use: No       FAMILY HISTORY:  Family History   Problem Relation Age of Onset     Cancer - colorectal Mother      Alzheimer Disease Mother          age 78     Diabetes Daughter      Asthma Daughter      Heart Disease Father          age 60     Family History Negative Daughter      Unknown/Adopted Maternal Grandmother      Unknown/Adopted Maternal Grandfather      Unknown/Adopted Paternal Grandmother      Unknown/Adopted Paternal Grandfather      Coronary Artery Disease No family hx of      Hypertension No family hx of      Hyperlipidemia No family hx of      Cerebrovascular Disease No family hx of      Breast Cancer No family hx of      Colon Cancer No family hx of      Prostate Cancer No family hx of      Other Cancer No family hx of      Depression No family hx of      Anxiety Disorder No family hx of      Mental Illness No family hx of      Substance Abuse No family hx of      Anesthesia Reaction No family hx of      " Osteoporosis No family hx of      Genetic Disorder No family hx of      Thyroid Disease No family hx of      Obesity No family hx of        PHYSICAL EXAM:   General  Alert and comfortable  Vital Signs with Ranges  Temp: 98.7  F (37.1  C) Temp src: Axillary BP: 139/62 Pulse: 70   Resp: 22 SpO2: 94 % O2 Device: Nasal cannula Oxygen Delivery: 6 LPM  I/O last 3 completed shifts:  In: 330 [P.O.:330]  Out: 250 [Urine:250]    Constitutional: healthy, alert and no distress   Cardiovascular: negative, PMI normal. No lifts, heaves, or thrills. RRR. No murmurs, clicks gallops or rub  Respiratory: negative, Percussion normal. Good diaphragmatic excursion. Lungs clear  Abdomen: Abdomen soft, non-tender. BS normal. No masses, organomegaly          ADDITIONAL COMMENTS:   I reviewed the patient's new clinical lab test results.   Recent Labs   Lab Test 05/20/21  0914 05/19/21  1152 05/18/21  1600 05/18/21  1532   WBC 10.0 7.2  --  11.3*   HGB 10.8* 10.2*  --  11.0*   MCV 93 93  --  91    244  --  274   INR 1.61* 1.73* 2.26*  --      Recent Labs   Lab Test 05/20/21  0914 05/19/21  1152 05/18/21  1532 03/17/21  1032   POTASSIUM 4.2 4.3 3.9 4.1   CHLORIDE  --  109 107 104   CO2  --  28 31 27   BUN  --  23 25 34*   ANIONGAP  --  5 3 6     Recent Labs   Lab Test 05/18/21  1600 05/18/21  1532 01/08/19  1235 07/13/18  1322 12/05/17  1715 12/05/17  1715 01/09/15  1330 01/09/15  1330 02/11/14  0930 02/11/14  0930   ALBUMIN  --  3.0*  --   --   --  3.6  --  2.8*  --   --    BILITOTAL  --  0.6  --   --   --  0.7  --  1.2  --   --    ALT  --  14 <5* <5*   < > 16   < > 22   < >  --    AST  --  13  --   --   --  19  --  19  --   --    PROTEIN Negative  --   --   --   --   --   --   --   --  Negative    < > = values in this interval not displayed.       I reviewed the patient's new imaging results.        CONSULTATION ASSESSMENT AND PLAN:    Kenyatta Donald is a 84 y/o F with PMHx of atrial fibrillation, PE and noted by SLP to possibly have  esophageal dysphagia on swallow study. GI consulted on 5/21/21.    Dysphagia  Patient denies any swallowing concerns. She is admitted with pneumonia and desaturates with speaking to SpO2 of less than 90 while on 6 LPM. She is currently on warfarin and given last night with no INR today.  Patient is not a candidate for EGD today given poor respiratory status and warfarin use. If an EGD is preformed may need dilation and at risk for bleed with elevated INR.    -- Recommend diet per SLP  -- Plan outpatient f/u with possible EGD  -- Daily pantoprazole  -- Ok with GI to resume warfarin      [facility].  Office: [phone]  Cell : [phone] (Dr. Strong)  Cell: [phone] (Shahida Lombardo PA-C)

## 2021-05-22 LAB — INR PPP: 1.98 (ref 0.86–1.14)

## 2021-05-22 PROCEDURE — 250N000013 HC RX MED GY IP 250 OP 250 PS 637: Performed by: INTERNAL MEDICINE

## 2021-05-22 PROCEDURE — 120N000001 HC R&B MED SURG/OB

## 2021-05-22 PROCEDURE — 250N000011 HC RX IP 250 OP 636: Performed by: INTERNAL MEDICINE

## 2021-05-22 PROCEDURE — 36415 COLL VENOUS BLD VENIPUNCTURE: CPT | Performed by: INTERNAL MEDICINE

## 2021-05-22 PROCEDURE — 250N000013 HC RX MED GY IP 250 OP 250 PS 637: Performed by: PHYSICIAN ASSISTANT

## 2021-05-22 PROCEDURE — 85610 PROTHROMBIN TIME: CPT | Performed by: INTERNAL MEDICINE

## 2021-05-22 PROCEDURE — 99233 SBSQ HOSP IP/OBS HIGH 50: CPT | Performed by: INTERNAL MEDICINE

## 2021-05-22 PROCEDURE — 250N000012 HC RX MED GY IP 250 OP 636 PS 637: Performed by: INTERNAL MEDICINE

## 2021-05-22 PROCEDURE — 250N000013 HC RX MED GY IP 250 OP 250 PS 637

## 2021-05-22 RX ORDER — PIPERACILLIN SODIUM, TAZOBACTAM SODIUM 3; .375 G/15ML; G/15ML
3.38 INJECTION, POWDER, LYOPHILIZED, FOR SOLUTION INTRAVENOUS EVERY 6 HOURS
Status: DISCONTINUED | OUTPATIENT
Start: 2021-05-22 | End: 2021-05-25 | Stop reason: HOSPADM

## 2021-05-22 RX ORDER — WARFARIN SODIUM 4 MG/1
4 TABLET ORAL
Status: COMPLETED | OUTPATIENT
Start: 2021-05-22 | End: 2021-05-22

## 2021-05-22 RX ORDER — FUROSEMIDE 20 MG
20 TABLET ORAL
Status: DISCONTINUED | OUTPATIENT
Start: 2021-05-22 | End: 2021-05-23

## 2021-05-22 RX ADMIN — TAMSULOSIN HYDROCHLORIDE 0.4 MG: 0.4 CAPSULE ORAL at 08:07

## 2021-05-22 RX ADMIN — IPRATROPIUM BROMIDE AND ALBUTEROL 1 PUFF: 20; 100 SPRAY, METERED RESPIRATORY (INHALATION) at 14:05

## 2021-05-22 RX ADMIN — LISINOPRIL 20 MG: 20 TABLET ORAL at 08:06

## 2021-05-22 RX ADMIN — FUROSEMIDE 20 MG: 20 TABLET ORAL at 08:06

## 2021-05-22 RX ADMIN — ALLOPURINOL 100 MG: 100 TABLET ORAL at 08:07

## 2021-05-22 RX ADMIN — PIPERACILLIN SODIUM AND TAZOBACTAM SODIUM 3.38 G: 3; .375 INJECTION, POWDER, LYOPHILIZED, FOR SOLUTION INTRAVENOUS at 12:19

## 2021-05-22 RX ADMIN — PREDNISONE 40 MG: 20 TABLET ORAL at 08:06

## 2021-05-22 RX ADMIN — AMLODIPINE BESYLATE 5 MG: 5 TABLET ORAL at 21:14

## 2021-05-22 RX ADMIN — IPRATROPIUM BROMIDE AND ALBUTEROL 1 PUFF: 20; 100 SPRAY, METERED RESPIRATORY (INHALATION) at 06:06

## 2021-05-22 RX ADMIN — PIPERACILLIN SODIUM AND TAZOBACTAM SODIUM 3.38 G: 3; .375 INJECTION, POWDER, LYOPHILIZED, FOR SOLUTION INTRAVENOUS at 18:11

## 2021-05-22 RX ADMIN — AMLODIPINE BESYLATE 5 MG: 5 TABLET ORAL at 08:06

## 2021-05-22 RX ADMIN — IPRATROPIUM BROMIDE AND ALBUTEROL 1 PUFF: 20; 100 SPRAY, METERED RESPIRATORY (INHALATION) at 18:11

## 2021-05-22 RX ADMIN — LEVOTHYROXINE SODIUM 100 MCG: 100 TABLET ORAL at 06:02

## 2021-05-22 RX ADMIN — ACETAMINOPHEN 650 MG: 325 TABLET, FILM COATED ORAL at 08:06

## 2021-05-22 RX ADMIN — IPRATROPIUM BROMIDE AND ALBUTEROL 1 PUFF: 20; 100 SPRAY, METERED RESPIRATORY (INHALATION) at 11:14

## 2021-05-22 RX ADMIN — PANTOPRAZOLE SODIUM 40 MG: 40 TABLET, DELAYED RELEASE ORAL at 06:02

## 2021-05-22 RX ADMIN — DOCUSATE SODIUM 50 MG AND SENNOSIDES 8.6 MG 1 TABLET: 8.6; 5 TABLET, FILM COATED ORAL at 08:07

## 2021-05-22 RX ADMIN — FLUTICASONE PROPIONATE 2 SPRAY: 50 SPRAY, METERED NASAL at 21:14

## 2021-05-22 RX ADMIN — FUROSEMIDE 20 MG: 20 TABLET ORAL at 15:46

## 2021-05-22 RX ADMIN — AZITHROMYCIN MONOHYDRATE 250 MG: 250 TABLET ORAL at 18:11

## 2021-05-22 RX ADMIN — WARFARIN SODIUM 4 MG: 4 TABLET ORAL at 18:11

## 2021-05-22 RX ADMIN — LISINOPRIL 20 MG: 20 TABLET ORAL at 21:14

## 2021-05-22 ASSESSMENT — ACTIVITIES OF DAILY LIVING (ADL)
ADLS_ACUITY_SCORE: 25
ADLS_ACUITY_SCORE: 23
ADLS_ACUITY_SCORE: 21
ADLS_ACUITY_SCORE: 25
ADLS_ACUITY_SCORE: 25
ADLS_ACUITY_SCORE: 23

## 2021-05-22 ASSESSMENT — MIFFLIN-ST. JEOR: SCORE: 1285.88

## 2021-05-22 NOTE — PLAN OF CARE
DATE & TIME: 5/21/21 Night   Cognitive Concerns/ Orientation : Alert, oriented x 2-3  BEHAVIOR & AGGRESSION TOOL COLOR: Green  ABNL VS/O2: HR tachy at times, other VSS on 5L NC, sats 92-94% but desats with repositioning  MOBILITY: Total care, ceiling lift, turn and repo  PAIN MANAGMENT: Denies  DIET: DD2 with thin liquids   BOWEL/BLADDER: Bunch patent.  Voiding trial 5/22. Incontinent of stool, BM this shift  ABNL LAB/BG:  INR 1.66.   DRAIN/DEVICES: PIV SL  SKIN: pressure wound  to coccyx - mepilex in place, scattered bruising.   TESTS/PROCEDURES:  na  D/C DAY/GOALS/PLACE: Pending   OTHER IMPORTANT INFO:  DEWITT, crackles auscultated in bilateral bases, murmur detected

## 2021-05-22 NOTE — PLAN OF CARE
DATE & TIME: 5/22/21; [id number]   Cognitive Concerns/ Orientation : Alert, oriented x 2-3  BEHAVIOR & AGGRESSION TOOL COLOR: Green  ABNL VS/O2: VSS on 4L NC, sats 92-94%  MOBILITY: Total care, ceiling lift, turn and repo.  PAIN MANAGMENT: Generalized discomfort, gave Tylenol and repositioned.   DIET: DD2 with thin liquids. Applesauce with pills.  BOWEL/BLADDER: Bunch removed at 1300, voiding trial. Incontinent of stool, BM this shift.  ABNL LAB/BG:  INR 1.98.   DRAIN/DEVICES: PIV SL  SKIN: Pressure wound  to coccyx, mepilex changed, scattered bruising.   TESTS/PROCEDURES: EGD to be completed as outpatient.  D/C DAY/GOALS/PLACE: Pending to TCU, MD is leaning towards Monday instead of tomorrow.   OTHER IMPORTANT INFO: Speech/PT following.

## 2021-05-22 NOTE — PROGRESS NOTES
[facility]    HOSPITALIST PROGRESS NOTE :   --------------------------------------------------    Date of Admission:  5/18/2021    Cumulative Summary: Kenyatta Donald is a 85 year old female past medical history sniffing and for essential hypertension, history of breast cancer s/p bilateral mastectomy, chronic atrial fibrillation on long-term warfarin, diastolic heart failure, hypothyroidism, mild coronary artery disease, mitral valve regurgitation, sleep apnea and a spinal muscular atrophy type III who was admitted on May 18 when she presented with acute hypoxic respiratory failure and bilateral pneumonia.    Assessment & Plan     Bilateral consolidative pneumonia , most likely aspiration pneumonia: Chest x-ray is concerning for consolidative pneumonia, initially patient was admitted due to the concern for community-acquired pneumonia and was a started on ceftriaxone and azithromycin but as patient was noticed to have issues with dysphagia, there is concern that we might be dealing with aspiration pneumonia.  Repeated chest x-ray on May 28 showed increased opacification in the left lower hemithorax likely secondary to infiltrate/consolidation of left pleural effusion.  Acute hypoxic respiratory failure: Patient continues to require oxygen, is able to be weaned down to 4 L of oxygen this morning, her fever is improved  COVID-19 infection ruled out:  Presents primarily with confusion. CXR with moderate to severe bilateral interstitial and ground glass infiltrates concerning for COVID19, patient initial swab came back negative.  This morning second COVID-19 PCR was sent which has come back negative.  Most likely her pneumonia is secondary to bacterial infection.  Presented with mild leukocytosis which has improved now, she does have history of congestive heart failure, does not seem to be fluid overloaded, labs are more suggestive of dehydration with significant hyaline cast and UA and mild  hypercalcemia..  Patient continues to require 5 L of oxygen, discussed with bedside nursing and continuing to wean patient down, patient is looking clinically better.    --Continue to monitor patient closely.  --Discussed with patient and bedside nursing, will discontinue IV ceftriaxone and start patient on IV Zosyn due to the concern for aspiration pneumonia.  --Continue patient on her oral azithromycin to complete the course for atypical coverage.  --Patient has been seen by speech therapy due to the concern for esophageal dysphagia.  --GI has been consulted planning to do EGD as an outpatient.  --Continue patient on dysphagia diet as recommended by speech therapy.  --Continue patient on prednisone 40 mg p.o. daily, will plan to treat for 5 to 7 days for hypoxia associated with pneumonia.  --According to patient she has received first dose of COVID-19 vaccination is due for her second dose.    --COVID-19 PCR has been checked twice, came back negative.  --Patient has been a started on Combivent inhaler for times a day.  --Continue albuterol inhaler and albuterol nebulizer as needed as needed for shortness of breath.  -- Patient does not seem to be fluid overloaded, but will give an additional dose of lasix today     Acute encephalopathy: improving, although there s concern fr some underlying cognitive decline or dementia   Possible underlying dementia  Mild hypercalcemia   Suspected secondary to pneumonia above, possibly with untreated hypoxia contributing. Head CT negative. UA unremarkable. Mild hypercalcemia (corrects to 11.5) and dehydration may be contributing. No focal neurologic deficits on exam.   Patient cognition is significantly improving as compared to the time of admission.  According to patient her older daughter lives in [place] who has been dealing with her ,s sickness and is also busy as she is a teacher.  According to patient and her daughter Kika has also been in the process of  moving from [place] to their cabin in [place].  According to patient, her friend and PCA Khushbu is the best person to update who would be able to update the family also.    -- Re-orient as needed  -- Maintain normal day/night, sleep wake cycles  -- Minimize sedating/altering medications as able  -- Treat separate conditions as detailed above/below   --Patient is open to the idea of going to rehab, she would prefer rehab to be in [place] area.  -- seems to be making improvement      Spinal muscular atrophy type III  Has not recently followed by neurology.  Functional status declining at home per review of primary care notes  -- PT consulted , recommending patient to be discharged to rehab.    Urinary retention;     --Patient required multiple straight caths, Bunch catheter has been placed on May 20.  --Patient has also been a started on Flomax.  --UA was checked on admission.  --We will give voiding trial tomorrow      Chronic diastolic congestive heart failure   Severe mitral regurgitation s/p mitral clip   Last EF 65% with moderate mitral regurgitation 5/2021. Follows with [facility] cardiology. Appears on hypovolemic side on admission.     -- Daily weights  -- her oral intake is slightly improved   -- Will give patient lasix 20 mg po BID , with plan for changing it back to daily dose from tomorrow      Atrial fibrillation  INR in 1.98 this morning   -- Continue warfarin , pharmacy to dose , appreciate their help      History of pulmonary embolism  -- Continue warfarin with pharmacy to dose      Hypothyroidism  -- Continue prior to admission levothyroxine     Hypertension  -- Continue prior to admission amlodipine, lisinopril      Gout  -- Continue prior to admission allopurinol    Diet: Combination Diet Dysphagia Diet Level 2: Mechan Altered; Thin Liquids (water, ice chips, juice, milk gelatin, ice cream, etc); 2 gm NA Diet  Snacks/Supplements Adult: Boost Shake; Between Meals    Bunch Catheter: in place,  indication: Retention  DVT Prophylaxis: Warfarin  Code Status: No CPR- Do NOT Intubate    The patient's care was discussed with the Bedside Nurse and Patient.    Disposition Plan   Expected discharge: tomorrow or Monday morning , if patient hypoxia starts to improve , she might still be on oxygen when leaving but would prefer her hypoxia to start improving and her oxygen demand to be down to 2-3 liters before discharge patient has been seen by physical therapy and seems to be needing TCU, Patient is ok to go to any TCU in [facility]   I will also ask OT to do cognitive evaluation    More than 35 min of the time was spend in care today, more than 50% of the time was spent in patient care coordination and counseling.    Ana Lilia Mcguire MD, FACP  Text Page (7am - 6pm)    ----------------------------------------------------------------------------------------------------------------------    Interval History    Patient was seen and examined, sitting in bed, looks clinically better, still requiring 5 L of oxygen, we discussed about changing her antibiotics due to the concern for possible aspiration pneumonia due to her esophageal dysphagia.  She understood that gastroenterology prefers to do EGD and further work-up as an outpatient once her hypoxia is improved, she will be continued on dysphagia diet, we also discussed about giving her an extra dose of Lasix, she is open to the idea of going to rehab, she is denying any chest pain, palpitations not sure if her shortness of breath is improving but she is comfortable on the oxygen.    -Data reviewed today: I reviewed all new labs and imaging results over the last 24 hours.    I personally reviewed chest x-ray concerning for moderate to severe interstitial and groundglass infiltrates.    Physical Exam   Temp: 98.3  F (36.8  C) Temp src: Oral BP: (!) 142/62 Pulse: 71   Resp: 20 SpO2: 90 % O2 Device: Nasal cannula Oxygen Delivery: 5 LPM  Vitals:    05/18/21 1519 05/20/21  0530   Weight: 99.8 kg (220 lb) 99.7 kg (219 lb 14.4 oz)     Vital Signs with Ranges  Temp:  [98  F (36.7  C)-98.3  F (36.8  C)] 98.3  F (36.8  C)  Pulse:  [71-86] 71  Resp:  [20] 20  BP: (142-171)/(62-71) 142/62  SpO2:  [90 %-94 %] 90 %  I/O last 3 completed shifts:  In: 320 [P.O.:320]  Out: 625 [Urine:625]    GENERAL: Alert , awake and oriented. NAD. Conversational, appropriate. Wearing nasal cannula   HEENT: Normocephalic. EOMI. No icterus or injection. Nares normal.   LUNGS: Clear to auscultation. No dyspnea at rest. Few rhonchi in bases with slight decreased breath sounds   HEART: Regular rate. Extremities perfused.   ABDOMEN: Soft, nontender, and nondistended. Positive bowel sounds.   EXTREMITIES: No LE edema noted.   NEUROLOGIC: Moves extremities x4 on command. No acute focal neurologic abnormalities noted.     Medications     - MEDICATION INSTRUCTIONS -       Warfarin Therapy Reminder         allopurinol  100 mg Oral Daily     amLODIPine  5 mg Oral BID     azithromycin  250 mg Oral Q24H     cefTRIAXone  2 g Intravenous Q24H     fluticasone  1-2 spray Both Nostrils At Bedtime     furosemide  20 mg Oral BID     ipratropium-albuterol  1 puff Inhalation 4x daily     levothyroxine  100 mcg Oral Daily     lisinopril  20 mg Oral BID     pantoprazole  40 mg Oral QAM AC     predniSONE  40 mg Oral Daily     sodium chloride (PF)  3 mL Intracatheter Q8H     tamsulosin  0.4 mg Oral Daily       Data   Recent Labs   Lab 05/22/21  0858 05/21/21  0945 05/20/21  0914 05/19/21  1152 05/18/21  1532 05/18/21  1532   WBC  --   --  10.0 7.2  --  11.3*   HGB  --   --  10.8* 10.2*  --  11.0*   MCV  --   --  93 93  --  91   PLT  --   --  283 244  --  274   INR 1.98* 1.66* 1.61* 1.73*   < >  --    NA  --   --   --  142  --  141   POTASSIUM  --   --  4.2 4.3  --  3.9   CHLORIDE  --   --   --  109  --  107   CO2  --   --   --  28  --  31   BUN  --   --   --  23  --  25   CR  --   --   --  0.78  --  0.94   ANIONGAP  --   --   --  5  --   3   VICKIE  --   --   --  9.9  --  10.7*   GLC  --   --   --  139*  --  115*   ALBUMIN  --   --   --   --   --  3.0*   PROTTOTAL  --   --   --   --   --  7.7   BILITOTAL  --   --   --   --   --  0.6   ALKPHOS  --   --   --   --   --  82   ALT  --   --   --   --   --  14   AST  --   --   --   --   --  13    < > = values in this interval not displayed.       Imaging:   No results found for this or any previous visit (from the past 24 hour(s)).

## 2021-05-22 NOTE — PLAN OF CARE
"DATE & TIME: 5/21/21; [id number]  Cognitive Concerns/ Orientation : A&Ox2-3   BEHAVIOR & AGGRESSION TOOL COLOR: Green  ABNL VS/O2: VSS on 5L NC, sats 92-94%  MOBILITY: Turn and repo q2h. Pivot strong Ax2/up with lift.   PAIN MANAGMENT: Denies  DIET: 2gm Na; appetite poor; DD2 thin liquids. \"twisted esophagus\", GI consulted.  BOWEL/BLADDER: Mac catheter-  Placed on Lasix. Voiding trial ordered in 48 hrs from mac placement (tomorrow).   ABNL LAB/BG: Neg Covid x2, INR 1.66. DRAIN/DEVICES: PIV SL  TELEMETRY RHYTHM: NA  SKIN: WDL ex pressure wound stage III to coccyx, scab on left side of inner groin, and scattered bruising.   TESTS/PROCEDURES: EGD to be completed as outpatient.  D/C DAY/GOALS/PLACE: Pending progress; likely to TCU tomorrow/next few days.   OTHER IMPORTANT INFO: Takes pills with applesauce. PT/OT/SW/WOC/GI consulted.  "

## 2021-05-23 ENCOUNTER — APPOINTMENT (OUTPATIENT)
Dept: SPEECH THERAPY | Facility: CLINIC | Age: 86
DRG: 177 | End: 2021-05-23
Payer: COMMERCIAL

## 2021-05-23 PROCEDURE — 250N000011 HC RX IP 250 OP 636: Performed by: INTERNAL MEDICINE

## 2021-05-23 PROCEDURE — 92526 ORAL FUNCTION THERAPY: CPT | Mod: GN

## 2021-05-23 PROCEDURE — 250N000013 HC RX MED GY IP 250 OP 250 PS 637: Performed by: PHYSICIAN ASSISTANT

## 2021-05-23 PROCEDURE — 250N000012 HC RX MED GY IP 250 OP 636 PS 637: Performed by: INTERNAL MEDICINE

## 2021-05-23 PROCEDURE — 120N000001 HC R&B MED SURG/OB

## 2021-05-23 PROCEDURE — 250N000013 HC RX MED GY IP 250 OP 250 PS 637: Performed by: INTERNAL MEDICINE

## 2021-05-23 PROCEDURE — 99232 SBSQ HOSP IP/OBS MODERATE 35: CPT | Performed by: INTERNAL MEDICINE

## 2021-05-23 PROCEDURE — 250N000013 HC RX MED GY IP 250 OP 250 PS 637

## 2021-05-23 RX ORDER — WARFARIN SODIUM 2 MG/1
2 TABLET ORAL
Status: COMPLETED | OUTPATIENT
Start: 2021-05-23 | End: 2021-05-23

## 2021-05-23 RX ORDER — FUROSEMIDE 20 MG
20 TABLET ORAL DAILY
Status: DISCONTINUED | OUTPATIENT
Start: 2021-05-24 | End: 2021-05-25 | Stop reason: HOSPADM

## 2021-05-23 RX ADMIN — LISINOPRIL 20 MG: 20 TABLET ORAL at 20:57

## 2021-05-23 RX ADMIN — PIPERACILLIN SODIUM AND TAZOBACTAM SODIUM 3.38 G: 3; .375 INJECTION, POWDER, LYOPHILIZED, FOR SOLUTION INTRAVENOUS at 13:08

## 2021-05-23 RX ADMIN — TAMSULOSIN HYDROCHLORIDE 0.4 MG: 0.4 CAPSULE ORAL at 08:07

## 2021-05-23 RX ADMIN — PANTOPRAZOLE SODIUM 40 MG: 40 TABLET, DELAYED RELEASE ORAL at 06:27

## 2021-05-23 RX ADMIN — IPRATROPIUM BROMIDE AND ALBUTEROL 1 PUFF: 20; 100 SPRAY, METERED RESPIRATORY (INHALATION) at 14:19

## 2021-05-23 RX ADMIN — PIPERACILLIN SODIUM AND TAZOBACTAM SODIUM 3.38 G: 3; .375 INJECTION, POWDER, LYOPHILIZED, FOR SOLUTION INTRAVENOUS at 06:19

## 2021-05-23 RX ADMIN — IPRATROPIUM BROMIDE AND ALBUTEROL 1 PUFF: 20; 100 SPRAY, METERED RESPIRATORY (INHALATION) at 08:06

## 2021-05-23 RX ADMIN — LISINOPRIL 20 MG: 20 TABLET ORAL at 08:06

## 2021-05-23 RX ADMIN — PREDNISONE 40 MG: 20 TABLET ORAL at 08:06

## 2021-05-23 RX ADMIN — PIPERACILLIN SODIUM AND TAZOBACTAM SODIUM 3.38 G: 3; .375 INJECTION, POWDER, LYOPHILIZED, FOR SOLUTION INTRAVENOUS at 00:54

## 2021-05-23 RX ADMIN — IPRATROPIUM BROMIDE AND ALBUTEROL 1 PUFF: 20; 100 SPRAY, METERED RESPIRATORY (INHALATION) at 11:20

## 2021-05-23 RX ADMIN — ALLOPURINOL 100 MG: 100 TABLET ORAL at 08:06

## 2021-05-23 RX ADMIN — WARFARIN SODIUM 2 MG: 2 TABLET ORAL at 18:21

## 2021-05-23 RX ADMIN — IPRATROPIUM BROMIDE AND ALBUTEROL 1 PUFF: 20; 100 SPRAY, METERED RESPIRATORY (INHALATION) at 18:21

## 2021-05-23 RX ADMIN — PIPERACILLIN SODIUM AND TAZOBACTAM SODIUM 3.38 G: 3; .375 INJECTION, POWDER, LYOPHILIZED, FOR SOLUTION INTRAVENOUS at 18:21

## 2021-05-23 RX ADMIN — AMLODIPINE BESYLATE 5 MG: 5 TABLET ORAL at 08:07

## 2021-05-23 RX ADMIN — FUROSEMIDE 20 MG: 20 TABLET ORAL at 08:07

## 2021-05-23 RX ADMIN — FLUTICASONE PROPIONATE 2 SPRAY: 50 SPRAY, METERED NASAL at 21:00

## 2021-05-23 RX ADMIN — AMLODIPINE BESYLATE 5 MG: 5 TABLET ORAL at 20:57

## 2021-05-23 RX ADMIN — ACETAMINOPHEN 650 MG: 325 TABLET, FILM COATED ORAL at 16:07

## 2021-05-23 RX ADMIN — LEVOTHYROXINE SODIUM 100 MCG: 100 TABLET ORAL at 06:21

## 2021-05-23 ASSESSMENT — ACTIVITIES OF DAILY LIVING (ADL)
ADLS_ACUITY_SCORE: 27
ADLS_ACUITY_SCORE: 27
ADLS_ACUITY_SCORE: 25
ADLS_ACUITY_SCORE: 25
ADLS_ACUITY_SCORE: 27
ADLS_ACUITY_SCORE: 23

## 2021-05-23 ASSESSMENT — MIFFLIN-ST. JEOR: SCORE: 1281.88

## 2021-05-23 NOTE — PLAN OF CARE
Date/Time: May 23, 2021   Reason for Admission: acute hypoxic failure + bilateral pneumonia     Cognitive/Mentation: x2-3; forgetful  Neuros/CMS: intact   VS:VSS on 4L NC;  B/P: 151/64, T: 98.6, P: 72, R: 16     :purewick in place with good output, Bladder scan for 500+, pt voided when asked; 2 loose/soft BMs overnight  Pain:denied pain    Skin: pressure injury on coccyx, mepilex in place; R lower forearm bruised + scattered brusies  Activity: lift assistance  Diet: dd2 + thin  Discharge:pending    End of shift summary: no events overnight. Takes pills in applesauce. Will need EGD outpatient. IV replaced. WOC consult ordered.

## 2021-05-23 NOTE — PROGRESS NOTES
[facility]    HOSPITALIST PROGRESS NOTE :   --------------------------------------------------    Date of Admission:  5/18/2021    Cumulative Summary: Kenyatta Donald is a 85 year old female past medical history sniffing and for essential hypertension, history of breast cancer s/p bilateral mastectomy, chronic atrial fibrillation on long-term warfarin, diastolic heart failure, hypothyroidism, mild coronary artery disease, mitral valve regurgitation, sleep apnea and a spinal muscular atrophy type III who was admitted on May 18 when she presented with acute hypoxic respiratory failure and bilateral pneumonia.    Assessment & Plan     Bilateral consolidative pneumonia , most likely aspiration pneumonia: Chest x-ray is concerning for consolidative pneumonia, initially patient was admitted due to the concern for community-acquired pneumonia and was a started on ceftriaxone and azithromycin but as patient was noticed to have issues with dysphagia, there is concern that we might be dealing with aspiration pneumonia.  Repeated chest x-ray on May 28 showed increased opacification in the left lower hemithorax likely secondary to infiltrate/consolidation of left pleural effusion.  Acute hypoxic respiratory failure: Patient continues to require oxygen, is able to be weaned down to 4 L of oxygen this morning, her fever is improved  COVID-19 infection ruled out:  Presents primarily with confusion. CXR with moderate to severe bilateral interstitial and ground glass infiltrates concerning for COVID19, patient initial swab came back negative.  Her second COVID-19 PCR come back negative and COVID isolation was discontinued   Most likely her pneumonia is secondary to bacterial infection, considering her esophageal dysphagia, we might be dealing with aspiration pneumonia.  After admission patient initially received IV ceftriaxone and IV azithromycin but did not make significant improvement and continued to  require 6 L of oxygen, repeated chest x-ray is concerning for consolidative pneumonia.  Her antibiotic was switched to IV Zosyn due to the concern for aspiration pneumonia once patient was evaluated by speech therapy and there was significant concern for esophageal dysphagia while azithromycin was continued to finish the course for atypical coverage   Presented with mild leukocytosis which has improved now, she does have history of congestive heart failure, does not seem to be fluid overloaded, labs are more suggestive of dehydration with significant hyaline cast and UA and mild hypercalcemia..  This morning patient is down to 4 L of oxygen and although slow but making clinical improvement.    --Continue to monitor patient closely, discussed with patient bedside nursing regarding continuing patient on IV Zosyn which is a started yesterday.  --Will wait till tomorrow morning to assess if patient can be discharged to rehab at least after patient has received couple of days of IV Zosyn and her oxygen saturation is down to 2 to 3 L.  --Continue patient on her oral azithromycin to complete the course for atypical coverage.  --Patient has been seen by speech therapy due to the concern for esophageal dysphagia.  --GI has been consulted planning to do EGD as an outpatient.  --Continue patient on dysphagia diet as recommended by speech therapy.  --Continue patient on prednisone 40 mg p.o. daily, will plan to treat for 5 to 7 days for hypoxia associated with pneumonia.  --According to patient she has received first dose of COVID-19 vaccination is due for her second dose.    --COVID-19 PCR has been checked twice, came back negative.  --Patient has been a started on Combivent inhaler for times a day.  --Continue albuterol inhaler and albuterol nebulizer as needed as needed for shortness of breath.  -- Patient does not seem to be fluid overloaded, had an additional dose of Lasix yesterday, will now change her back to Lasix 20 mg  p.o. daily    Acute encephalopathy: improving, although there is concern for some underlying cognitive decline or dementia   Possible underlying dementia  Mild hypercalcemia   Suspected secondary to pneumonia above, possibly with untreated hypoxia contributing. Head CT negative. UA unremarkable. Mild hypercalcemia (corrects to 11.5) and dehydration may be contributing. No focal neurologic deficits on exam.   Patient cognition is significantly improved as compared to the time of admission.  According to patient her older daughter lives in [place] who has been dealing with her ,s sickness and is also busy as she is a teacher.  According to patient and her daughter Kika has also been in the process of moving from [place] to their cabin in [place].  According to patient, her friend and PCA Khushbu is the best person to update who would be able to update the family also.    -- Re-orient as needed  -- Maintain normal day/night, sleep wake cycles  -- Minimize sedating/altering medications as able  -- Treat separate conditions as detailed above/below   --Patient is open to the idea of going to rehab, she would prefer rehab to be in [facility].  -- seems to be making improvement      Spinal muscular atrophy type III  Has not recently followed by neurology.  Functional status declining at home per review of primary care notes  -- PT consulted , recommending patient to be discharged to rehab.    Urinary retention;     --Patient required multiple straight caths, Bunch catheter has been placed on May 20.  --Patient has also been a started on Flomax.  --UA was checked on admission.  --Bunch catheter is not removed yesterday afternoon, patient has been emptying her bladder although not completely but nursing was advised to continue monitoring her for post void residual and continue patient on Flomax at the time of discharge.     Chronic diastolic congestive heart failure   Severe mitral regurgitation s/p  mitral clip   Last EF 65% with moderate mitral regurgitation 5/2021. Follows with [facility] [place] cardiology. Appears on hypovolemic side on admission.     -- Daily weights  -- her oral intake is slightly improved   --Gave Lasix 20 mg p.o. twice daily, will switch her back to Lasix 20 mg p.o. daily  --Check BMP.     Atrial fibrillation  INR last check was 1.98  -- Continue warfarin , pharmacy to dose , appreciate their help      History of pulmonary embolism  -- Continue warfarin with pharmacy to dose      Hypothyroidism  -- Continue prior to admission levothyroxine     Hypertension  -- Continue prior to admission amlodipine, lisinopril      Gout  -- Continue prior to admission allopurinol    Diet: Combination Diet Dysphagia Diet Level 2: Mechan Altered; Thin Liquids (water, ice chips, juice, milk gelatin, ice cream, etc); 2 gm NA Diet  Snacks/Supplements Adult: Boost Shake; Between Meals    Bunch Catheter: not present  DVT Prophylaxis: Warfarin  Code Status: No CPR- Do NOT Intubate    The patient's care was discussed with the Bedside Nurse and Patient.    Disposition Plan   Expected discharge tentative discharge tomorrow to acute rehab if her hypoxia continues to improve and she is down to 2 to 3 L of oxygen, she will probably still require oxygen at the time of discharge to rehab but hopefully should be able to come off the oxygen in the next coming days as her pneumonia continues to improve.  She will need PT OT and speech therapy.    Ana Lilia Mcguire MD, FACP  Text Page (7am - 6pm)    ----------------------------------------------------------------------------------------------------------------------    Interval History    She was seen and examined, sitting in bed, looks clinically better, still requiring 4 L of oxygen but is down from 5 L, her cough is improving, discussed with her regarding continuing patient on IV Zosyn and treating her more for aspiration pneumonia rather than the initial impression of  community-acquired pneumonia.  Once again told her that she is going to need outpatient gastroenterology follow-up as they would like to proceed with EGD once her hypoxia is improved and do further dysphagia work-up.  She will be continued on dysphagia diet.  We discussed about changing her Lasix to 20 mg p.o. daily.  She is otherwise denying any chest pain, palpitations or shortness of breath.  Wound care is also consulted for her pressure injury, which was noted on the admission.    -Data reviewed today: I reviewed all new labs and imaging results over the last 24 hours.    I personally reviewed chest x-ray concerning for moderate to severe interstitial and groundglass infiltrates.    Physical Exam   Temp: 98.2  F (36.8  C) Temp src: Oral BP: (!) 143/63 Pulse: 83   Resp: 16 SpO2: 91 % O2 Device: Nasal cannula Oxygen Delivery: 4 LPM  Vitals:    05/20/21 0530 05/22/21 1436 05/23/21 0500   Weight: 99.7 kg (219 lb 14.4 oz) 83.5 kg (184 lb 1.4 oz) 83.1 kg (183 lb 3.2 oz)     Vital Signs with Ranges  Temp:  [98.2  F (36.8  C)-98.6  F (37  C)] 98.2  F (36.8  C)  Pulse:  [68-83] 83  Resp:  [16-20] 16  BP: (129-161)/(54-67) 143/63  SpO2:  [91 %-93 %] 91 %  I/O last 3 completed shifts:  In: 120 [P.O.:120]  Out: 1425 [Urine:1425]    GENERAL: Alert , awake and oriented. NAD. Conversational, appropriate. Wearing nasal cannula   HEENT: Normocephalic. EOMI. No icterus or injection. Nares normal.   LUNGS: Clear to auscultation. No dyspnea at rest. Few rhonchi in bases with slight decreased breath sounds   HEART: Regular rate. Extremities perfused.   ABDOMEN: Soft, nontender, and nondistended. Positive bowel sounds.   EXTREMITIES: No LE edema noted.   NEUROLOGIC: Moves extremities x4 on command. No acute focal neurologic abnormalities noted.     Medications     - MEDICATION INSTRUCTIONS -       Warfarin Therapy Reminder         allopurinol  100 mg Oral Daily     amLODIPine  5 mg Oral BID     fluticasone  1-2 spray Both Nostrils At  Bedtime     furosemide  20 mg Oral BID     ipratropium-albuterol  1 puff Inhalation 4x daily     levothyroxine  100 mcg Oral Daily     lisinopril  20 mg Oral BID     pantoprazole  40 mg Oral QAM AC     piperacillin-tazobactam  3.375 g Intravenous Q6H     predniSONE  40 mg Oral Daily     sodium chloride (PF)  3 mL Intracatheter Q8H     tamsulosin  0.4 mg Oral Daily       Data   Recent Labs   Lab 05/22/21  0858 05/21/21  0945 05/20/21  0914 05/19/21  1152 05/18/21  1532 05/18/21  1532   WBC  --   --  10.0 7.2  --  11.3*   HGB  --   --  10.8* 10.2*  --  11.0*   MCV  --   --  93 93  --  91   PLT  --   --  283 244  --  274   INR 1.98* 1.66* 1.61* 1.73*   < >  --    NA  --   --   --  142  --  141   POTASSIUM  --   --  4.2 4.3  --  3.9   CHLORIDE  --   --   --  109  --  107   CO2  --   --   --  28  --  31   BUN  --   --   --  23  --  25   CR  --   --   --  0.78  --  0.94   ANIONGAP  --   --   --  5  --  3   VICKIE  --   --   --  9.9  --  10.7*   GLC  --   --   --  139*  --  115*   ALBUMIN  --   --   --   --   --  3.0*   PROTTOTAL  --   --   --   --   --  7.7   BILITOTAL  --   --   --   --   --  0.6   ALKPHOS  --   --   --   --   --  82   ALT  --   --   --   --   --  14   AST  --   --   --   --   --  13    < > = values in this interval not displayed.       Imaging:   No results found for this or any previous visit (from the past 24 hour(s)).

## 2021-05-23 NOTE — PLAN OF CARE
DATE & TIME: 5/23/2021; 4058-1610   Cognitive Concerns/ Orientation : Alert, oriented x 3, not to time  BEHAVIOR & AGGRESSION TOOL COLOR: Green  ABNL VS/O2: VSS on 4L NC, sats 92-94%  MOBILITY: Total care, ceiling lift, turn and repo. Up in chair.  PAIN MANAGMENT: Denies  DIET: DD3 with thin liquids. Applesauce with pills.  BOWEL/BLADDER: Bunch removed yesterday, urinating well with purewick in place.    ABNL LAB/BG:  INR 1.98.   DRAIN/DEVICES: PIV SL- ecchymotic   SKIN: Pressure wound  to coccyx, mepilex on, scattered bruising.   TESTS/PROCEDURES: EGD to be completed as outpatient.  D/C DAY/GOALS/PLACE: Pending to TCU maybe tomorrow.  OTHER IMPORTANT INFO: Speech/PT following.

## 2021-05-23 NOTE — PLAN OF CARE
DATE & TIME: 5/22/21; 8075-8162  Cognitive Concerns/ Orientation : Alert, oriented x 2-3  BEHAVIOR & AGGRESSION TOOL COLOR: Green  ABNL VS/O2: VSS on 4L NC, sats 92-94%  MOBILITY: Total care, ceiling lift, turn and repo.  PAIN MANAGMENT: Denies  DIET: DD2 with thin liquids. Applesauce with pills.  BOWEL/BLADDER: Bunch removed at 1300, voiding trial. Bladder scan revealed 368- straight cath 400 out. Incontinent of stool, BMx2   ABNL LAB/BG:  INR 1.98.   DRAIN/DEVICES: PIV SL- ecchymotic   SKIN: Pressure wound  to coccyx, mepilex on, scattered bruising.   TESTS/PROCEDURES: EGD to be completed as outpatient.  D/C DAY/GOALS/PLACE: Pending to TCU, MD is leaning towards Monday instead of tomorrow.   OTHER IMPORTANT INFO: Speech/PT following.

## 2021-05-24 LAB
ANION GAP SERPL CALCULATED.3IONS-SCNC: 3 MMOL/L (ref 3–14)
BUN SERPL-MCNC: 23 MG/DL (ref 7–30)
CALCIUM SERPL-MCNC: 9.9 MG/DL (ref 8.5–10.1)
CHLORIDE SERPL-SCNC: 108 MMOL/L (ref 94–109)
CO2 SERPL-SCNC: 33 MMOL/L (ref 20–32)
CREAT SERPL-MCNC: 0.83 MG/DL (ref 0.52–1.04)
GFR SERPL CREATININE-BSD FRML MDRD: 65 ML/MIN/{1.73_M2}
GLUCOSE SERPL-MCNC: 87 MG/DL (ref 70–99)
INR PPP: 2.39 (ref 0.86–1.14)
POTASSIUM SERPL-SCNC: 3.5 MMOL/L (ref 3.4–5.3)
SODIUM SERPL-SCNC: 144 MMOL/L (ref 133–144)

## 2021-05-24 PROCEDURE — 250N000012 HC RX MED GY IP 250 OP 636 PS 637: Performed by: INTERNAL MEDICINE

## 2021-05-24 PROCEDURE — 36415 COLL VENOUS BLD VENIPUNCTURE: CPT | Performed by: INTERNAL MEDICINE

## 2021-05-24 PROCEDURE — 250N000013 HC RX MED GY IP 250 OP 250 PS 637: Performed by: INTERNAL MEDICINE

## 2021-05-24 PROCEDURE — 99233 SBSQ HOSP IP/OBS HIGH 50: CPT | Performed by: INTERNAL MEDICINE

## 2021-05-24 PROCEDURE — 80048 BASIC METABOLIC PNL TOTAL CA: CPT | Performed by: INTERNAL MEDICINE

## 2021-05-24 PROCEDURE — G0463 HOSPITAL OUTPT CLINIC VISIT: HCPCS

## 2021-05-24 PROCEDURE — 250N000013 HC RX MED GY IP 250 OP 250 PS 637: Performed by: PHYSICIAN ASSISTANT

## 2021-05-24 PROCEDURE — 120N000001 HC R&B MED SURG/OB

## 2021-05-24 PROCEDURE — 85610 PROTHROMBIN TIME: CPT | Performed by: INTERNAL MEDICINE

## 2021-05-24 PROCEDURE — 250N000011 HC RX IP 250 OP 636: Performed by: INTERNAL MEDICINE

## 2021-05-24 RX ORDER — WARFARIN SODIUM 2 MG/1
2 TABLET ORAL
Status: COMPLETED | OUTPATIENT
Start: 2021-05-24 | End: 2021-05-24

## 2021-05-24 RX ADMIN — PIPERACILLIN SODIUM AND TAZOBACTAM SODIUM 3.38 G: 3; .375 INJECTION, POWDER, LYOPHILIZED, FOR SOLUTION INTRAVENOUS at 17:05

## 2021-05-24 RX ADMIN — PIPERACILLIN SODIUM AND TAZOBACTAM SODIUM 3.38 G: 3; .375 INJECTION, POWDER, LYOPHILIZED, FOR SOLUTION INTRAVENOUS at 11:02

## 2021-05-24 RX ADMIN — PIPERACILLIN SODIUM AND TAZOBACTAM SODIUM 3.38 G: 3; .375 INJECTION, POWDER, LYOPHILIZED, FOR SOLUTION INTRAVENOUS at 07:04

## 2021-05-24 RX ADMIN — IPRATROPIUM BROMIDE AND ALBUTEROL 1 PUFF: 20; 100 SPRAY, METERED RESPIRATORY (INHALATION) at 14:28

## 2021-05-24 RX ADMIN — LISINOPRIL 20 MG: 20 TABLET ORAL at 20:40

## 2021-05-24 RX ADMIN — PREDNISONE 40 MG: 20 TABLET ORAL at 08:35

## 2021-05-24 RX ADMIN — TAMSULOSIN HYDROCHLORIDE 0.4 MG: 0.4 CAPSULE ORAL at 08:35

## 2021-05-24 RX ADMIN — LEVOTHYROXINE SODIUM 100 MCG: 100 TABLET ORAL at 07:05

## 2021-05-24 RX ADMIN — PIPERACILLIN SODIUM AND TAZOBACTAM SODIUM 3.38 G: 3; .375 INJECTION, POWDER, LYOPHILIZED, FOR SOLUTION INTRAVENOUS at 00:40

## 2021-05-24 RX ADMIN — FUROSEMIDE 20 MG: 20 TABLET ORAL at 08:35

## 2021-05-24 RX ADMIN — AMLODIPINE BESYLATE 5 MG: 5 TABLET ORAL at 08:35

## 2021-05-24 RX ADMIN — IPRATROPIUM BROMIDE AND ALBUTEROL 1 PUFF: 20; 100 SPRAY, METERED RESPIRATORY (INHALATION) at 08:35

## 2021-05-24 RX ADMIN — ALLOPURINOL 100 MG: 100 TABLET ORAL at 08:35

## 2021-05-24 RX ADMIN — AMLODIPINE BESYLATE 5 MG: 5 TABLET ORAL at 20:40

## 2021-05-24 RX ADMIN — LISINOPRIL 20 MG: 20 TABLET ORAL at 08:36

## 2021-05-24 RX ADMIN — PANTOPRAZOLE SODIUM 40 MG: 40 TABLET, DELAYED RELEASE ORAL at 07:05

## 2021-05-24 RX ADMIN — IPRATROPIUM BROMIDE AND ALBUTEROL 1 PUFF: 20; 100 SPRAY, METERED RESPIRATORY (INHALATION) at 18:05

## 2021-05-24 RX ADMIN — WARFARIN SODIUM 2 MG: 2 TABLET ORAL at 17:07

## 2021-05-24 ASSESSMENT — ACTIVITIES OF DAILY LIVING (ADL)
ADLS_ACUITY_SCORE: 26
ADLS_ACUITY_SCORE: 29
ADLS_ACUITY_SCORE: 29
ADLS_ACUITY_SCORE: 26

## 2021-05-24 NOTE — PROGRESS NOTES
Care Management Follow Up    Length of Stay (days): 6    Expected Discharge Date: 05/26/21     Concerns to be Addressed: discharge planning     Patient plan of care discussed at interdisciplinary rounds: Yes    Anticipated Discharge Disposition: Transitional Care, Skilled Nursing Facilty     Anticipated Discharge Services:    Anticipated Discharge DME:      Patient/family educated on Medicare website which has current facility and service quality ratings: yes  Education Provided on the Discharge Plan:    Patient/Family in Agreement with the Plan:      Referrals Placed by CM/SW: Post Acute Facilities  Private pay costs discussed: Not applicable    Additional Information:  Writer spent time calling [facility] to get patient's insurance auth for TCU stay. Writer spoke with Mayra at [facility] who was unable to find patient in their system with the information that writer provided. Writer called and spoke with  who is going to contact [facility] regarding this and inform writer of her findings. Social Work will continue to follow for safe discharge planning.     ERNESTINA Harley, LSW   Social Work Intern   [phone]  [facility]

## 2021-05-24 NOTE — PROGRESS NOTES
Care Management Follow Up    Length of Stay (days): 6    Expected Discharge Date: 05/26/21     Concerns to be Addressed: discharge planning     Patient plan of care discussed at interdisciplinary rounds: Yes    Anticipated Discharge Disposition: Transitional Care, Skilled Nursing Facilty     Anticipated Discharge Services:    Anticipated Discharge DME:      Patient/family educated on Medicare website which has current facility and service quality ratings: yes  Education Provided on the Discharge Plan:    Patient/Family in Agreement with the Plan:      Referrals Placed by CM/SW: Post Acute Facilities  Private pay costs discussed: Not applicable    Additional Information:  Writer received notification from supervisor that she received Stuart aquino for patient. Insurance auth is: [id number] with 10 days approved from 5/25/21-6/3/21.      KYLEE HarleyW, LSW      [phone]  [facility]

## 2021-05-24 NOTE — PROGRESS NOTES
"[facility] Nurse Inpatient Pressure Injury Assessment   Reason for consultation: Evaluate and treat Coccyx       ASSESSMENT  Pressure Injury: on coccyx , present on admission   Pressure Injury is Stage 3   Contributing factor of the pressure injury: pressure, age, immobility and moisture  Status: initial assessment  Recommend provider order: None, at this time     TREATMENT PLAN  Coccyx wound: Every other day   1. Clean wound with saline or MicroKlenz Spray, pat dry  2. Wipe / \"clean\" the surrounding periwound tissue with several skin preps and allow to dry to help with dressing sticking  3. Cut a piece of Promogran Zeynep and place into wound bed. This is meant to dissolve into wound bed.   4. Press a Mepilex  Sacral Dressing ([id number])  to the area, making sure to conform nicely to skin curvatures.   5. Time and date dressing change  NOTE  -Reposition pt side to side roxana when in bed, every 2 hours-get the pt way over on side to completely offload pressure. This will benefit skin and respiratory function   -Keep heels elevated and floating on pillows at all times. Try using at least 2 pillows under each calf.  -When up to the chair pt needs to fully offload every 2 hours and use a chair cushion if needed     Orders Written  WO Nurse follow-up plan:weekly  Nursing to notify the Provider(s) and re-consult the [facility] Nurse if wound(s) deteriorates or new skin concern.    Patient History  According to provider note(s):  Kenyatta Donald is a 85 year old female past medical history sniffing and for essential hypertension, history of breast cancer s/p bilateral mastectomy, chronic atrial fibrillation on long-term warfarin, diastolic heart failure, hypothyroidism, mild coronary artery disease, mitral valve regurgitation, sleep apnea and a spinal muscular atrophy type III who was admitted on May 18 when she presented with acute hypoxic respiratory failure and bilateral pneumonia.       Objective Data  Containment of urine/stool: " Incontinence Protocol and Brief    Current Diet/ Nutrition:  Orders Placed This Encounter      Combination Diet Dysphagia Diet Level 3: Advanced; Thin Liquids (water, ice chips, juice, milk gelatin, ice cream, etc) (straw OK); 2 gm NA Diet      Output:   I/O last 3 completed shifts:  In: -   Out: 1400 [Urine:1400]    Risk Assessment:   Sensory Perception: 3-->slightly limited  Moisture: 3-->occasionally moist  Activity: 2-->chairfast  Mobility: 2-->very limited  Nutrition: 3-->adequate  Friction and Shear: 2-->potential problem  Nelson Score: 15      Labs:   Recent Labs   Lab 05/24/21  0830 05/20/21  0914 05/20/21  0914 05/18/21  1532 05/18/21  1532   ALBUMIN  --   --   --   --  3.0*   HGB  --   --  10.8*   < > 11.0*   INR 2.39*   < > 1.61*   < >  --    WBC  --   --  10.0   < > 11.3*    < > = values in this interval not displayed.       Physical Exam  Skin inspection: focused Coccyx    Wound Location:  Coccyx      Date of last Photo 5/24  Wound History: Pt with chronic non healing wound, pt describes as sitting on a rock and she just wants to pop it.   Measurements (length x width x depth, in cm) 1.2 cm x 0.9 cm  x  0.3 cm   Wound Base: pale pink moist tissue with thin layer of tan fibrin  Tunneling N/A  Undermining N/A  Palpation of the wound bed: normal   Periwound skin: intact and erythema- blanchable  Color: red  Temperature: normal   Drainage:, small  Description of drainage: serosanguinous  Odor: none  Pain: moderate, intermittent and tender    Interventions  Current support surface: Standard  Atmos Air mattress  Current off-loading measures: Pillows  Repositioning aid: Pillows  Visual inspection of wound(s) completed   Tube Securement: NA  Wound Care: was done per plan of care.  Supplies: ordered: Zeynep wound dressing, pulsate mattress  Educated provided: importance of repositioning, plan of care and Off-loading pressure  Education provided to: patient  and nurse  Discussed importance of:repositioning every  2 hours and off-loading pressure to wound  Discussed plan of care with Patient and Nurse    Tyron Hines RN CWOCN

## 2021-05-24 NOTE — PLAN OF CARE
DATE & TIME: 5/23/2021; 2347-2479  Cognitive: A&Ox4; Confused at times  BEHAVIOR & AGGRESSION TOOL COLOR: Green  ABNL VS/O2: HTN ('s- has scheduled BP meds) on 4L NC, sats 92-94%; other VSS  MOBILITY: Total care, ceiling lift, turn and repo. Up in chair for dinner.  PAIN MANAGMENT: Generalized pain- tylenolx1  DIET: DD3 with thin liquids. Applesauce with pills.  BOWEL/BLADDER: Bunch removed yesterday. Had 1 urine incontinence,  Bladder scan revealed 645, Straight cathx1-700ml  ABNL LAB/BG:  INR 1.98.   DRAIN/DEVICES: PIV SL L arm  SKIN: Pressure wound  to coccyx, mepilex on, scattered bruising.   TESTS/PROCEDURES: EGD to be completed as outpatient.  D/C DAY/GOALS/PLACE: Pending to TCU maybe tomorrow.  OTHER IMPORTANT INFO: Speech/PT following.

## 2021-05-24 NOTE — PLAN OF CARE
DATE & TIME: 5/24/2021 4547-3096  Cognitive: Alert, oriented x 2-3, forgetful   BEHAVIOR & AGGRESSION TOOL COLOR: Green  ABNL VS/O2: BP elevated 150/61, on 5 L NC, adrianna at times  MOBILITY: Total care, turn and repo Q2H  PAIN MANAGMENT:  Denies  DIET: DD3 with thin liquids. Applesauce with pills.  BOWEL/BLADDER:  Incontinent of bowel and bladder. Bladder scanned for 246, pt voided on own (no need to straight cath this shift)  ABNL LAB/BG:  INR 2.39  DRAIN/DEVICES: PIV SL L arm  SKIN: Pressure wound  to coccyx, mepilex  CDI.  Large bruise on right arm outlined with skin marker this shift. Perineum reddened and pt c/o burning with wiping. Perianal area reddened and tender.   TESTS/PROCEDURES: EGD to be completed as outpatient.   D/C DAY/GOALS/PLACE: Discharge to [facility] 5/25 or 5/26    OTHER IMPORTANT INFO: Speech/PT following. On IV Zosyn. Pt reports having baseline tingling in feet. LS fine crackles, BS active x4. Congested cough noted.

## 2021-05-24 NOTE — PROGRESS NOTES
[facility]    Medicine Progress Note - Hospitalist Service       Date of Admission:  5/18/2021  Assessment & Plan       Kenyatta Donald is a 85 year old female past medical history sniffing and for essential hypertension, history of breast cancer s/p bilateral mastectomy, chronic atrial fibrillation on long-term warfarin, diastolic heart failure, hypothyroidism, mild coronary artery disease, mitral valve regurgitation, sleep apnea and a spinal muscular atrophy type III who was admitted on May 18 when she presented with acute hypoxic respiratory failure and bilateral pneumonia.      Bilateral consolidative pneumonia , most likely aspiration pneumonia  Chest x-ray is concerning for consolidative pneumonia, initially patient was admitted due to the concern for community-acquired pneumonia and was a started on ceftriaxone and azithromycin but as patient was noticed to have issues with dysphagia, there is concern that we might be dealing with aspiration pneumonia.  Repeated chest x-ray on May 20 showed increased opacification in the left lower hemithorax likely secondary to infiltrate/consolidation of left pleural effusion.  Acute hypoxic respiratory failure  Patient continues to require oxygen, is able to be weaned down to 4-5 L of oxygen  COVID-19 infection ruled out  Presented primarily with confusion. CXR with moderate to severe bilateral interstitial and ground glass infiltrates concerning for COVID19, patient initial swab came back negative.  Her second COVID-19 PCR come back negative and COVID isolation was discontinued.    Most likely her pneumonia is secondary to bacterial infection, considering her esophageal dysphagia, aspiration pneumonia is high on the differential.  After admission patient initially received IV ceftriaxone and IV azithromycin but did not make significant improvement and continued to require 6 L of oxygen, repeated chest x-ray is concerning for consolidative  pneumonia.  Her antibiotic was switched to IV Zosyn due to the concern for aspiration pneumonia once patient was evaluated by speech therapy and there was significant concern for esophageal dysphagia while azithromycin was continued to finish the course for atypical coverage.  Presented with mild leukocytosis which has improved now, she does have history of congestive heart failure, does not seem to be fluid overloaded, labs are more suggestive of dehydration with significant hyaline cast and UA and mild hypercalcemia.  Of note, patient states she has received first dose of COVID-19 vaccination is due for her second dose.  - Titrate O2 as able.  Changed goal SpO2 >89%   - Continue Zosyn for now.  Can transition to Augmentin  - Finished a course of Azithromycin   - Speech following and defer diet recommendations to them   - GI has been consulted planning to do EGD as an outpatient  - Continue patient on prednisone 40 mg p.o. daily, will plan to treat for 7 days.  It is possibly patient has some underlying COPD due to significant second hand smoke exposure   - Patient has been a started on Combivent inhaler for times a day  - Continue albuterol inhaler and albuterol nebulizer as needed as needed for shortness of breath  - RCAT for pulmonary hygiene   - Patient would likely benefit from outpatient pulmonology evaluation and referral sent      Acute encephalopathy.  Improving   Possible underlying dementia  Mild hypercalcemia   Suspected secondary to pneumonia above, possibly with untreated hypoxia contributing. Head CT negative. UA unremarkable. Mild hypercalcemia (corrects to 11.5) and dehydration may be contributing. No focal neurologic deficits on exam.   Patient cognition is significantly improved as compared to the time of admission.  According to patient her older daughter lives in [place] who has been dealing with her ,s sickness and is also busy as she is a teacher.  According to patient and her  daughter Kika has also been in the process of moving from [place] to their cabin in [place].  According to patient, her friend and PCA Khushbu is the best person to update who would be able to update the family also.  - Re-orient as needed  - Maintain normal day/night, sleep wake cycles  - Minimize sedating/altering medications as able  - Treat separate conditions as detailed above/below      Spinal muscular atrophy type III  Has not recently followed by neurology.  Functional status declining at home per review of primary care notes  - PT consulted , recommending patient to be discharged to rehab     Urinary retention  Patient required multiple straight caths, Bunch catheter has been placed on May 20.  - Continue Flomax  - UA was checked on admission.  - Bunch catheter removed and monitoring PVRs     Chronic diastolic congestive heart failure. Possible mild decompensation  Severe mitral regurgitation s/p mitral clip   Last EF 65% with moderate mitral regurgitation 5/2021. Follows with [facility] cardiology. Appears on hypovolemic side on admission.   - Daily weights  - Gave Lasix 20 mg p.o. twice daily, will switch her back to Lasix 20 mg p.o. daily     Atrial fibrillation  INR last check was 1.98  - Continue warfarin , pharmacy to dose , appreciate their help      History of pulmonary embolism  - Coumadin as above      Hypothyroidism  - Continue prior to admission levothyroxine     Hypertension  - Continue prior to admission amlodipine, lisinopril      Gout  - Continue prior to admission allopurinol       Diet: Snacks/Supplements Adult: Boost Shake; Between Meals  Combination Diet Dysphagia Diet Level 3: Advanced; Thin Liquids (water, ice chips, juice, milk gelatin, ice cream, etc) (straw OK); 2 gm NA Diet    DVT Prophylaxis: Warfarin  Bunch Catheter: not present  Code Status: No CPR- Do NOT Intubate           Disposition Plan   Expected discharge: Tomorrow, recommended to transitional care unit once hypoxia  stable. Would like patient on 3 L of O2 or less.  Entered: Jose Mann DO 05/24/2021, 1:53 PM       The patient's care was discussed with the Bedside Nurse, Care Coordinator/ and Patient.    Time Spent on this Encounter   Over 35 minutes was spent examining and discussing the patient with greater than 50% time spent in counseling and/or coordination of care.     Jose Mann DO  Hospitalist [facility]  Contact information available via [facility] Paging/Directory    ______________________________________________________________________    Interval History   Patient seen and examined. No acute events over night.  Breathing is stable on 4-5 L.  Patient does report additional history of significant 2nd hand smoke exposure.  No fevers noted.  No pain currently.  Tolerating diet.    Data reviewed today: I reviewed all medications, new labs and imaging results over the last 24 hours. I personally reviewed no images or EKG's today.    Physical Exam   Vital Signs: Temp: 97.8  F (36.6  C) Temp src: Oral BP: 136/59 Pulse: 65   Resp: 18 SpO2: 96 % O2 Device: Nasal cannula Oxygen Delivery: 5 LPM  Weight: 183 lbs 3.24 oz  General Appearance: Resting comfortably.  NAD   Respiratory: ?Rhonchi?  No respiratory distress on 5 L and able to speak in full sentences   Cardiovascular: RRR.  No obvious murmurs  GI: Soft.  Non-distended  Skin: No obvious rashes or cyanosis   Other: Alert.  No edema      Data   Recent Labs   Lab 05/24/21  0830 05/22/21  0858 05/21/21  0945 05/20/21  0914 05/19/21  1152 05/18/21  1532 05/18/21  1532   WBC  --   --   --  10.0 7.2  --  11.3*   HGB  --   --   --  10.8* 10.2*  --  11.0*   MCV  --   --   --  93 93  --  91   PLT  --   --   --  283 244  --  274   INR 2.39* 1.98* 1.66* 1.61* 1.73*   < >  --      --   --   --  142  --  141   POTASSIUM 3.5  --   --  4.2 4.3  --  3.9   CHLORIDE 108  --   --   --  109  --  107   CO2 33*  --   --   --  28  --   31   BUN 23  --   --   --  23  --  25   CR 0.83  --   --   --  0.78  --  0.94   ANIONGAP 3  --   --   --  5  --  3   VICKIE 9.9  --   --   --  9.9  --  10.7*   GLC 87  --   --   --  139*  --  115*   ALBUMIN  --   --   --   --   --   --  3.0*   PROTTOTAL  --   --   --   --   --   --  7.7   BILITOTAL  --   --   --   --   --   --  0.6   ALKPHOS  --   --   --   --   --   --  82   ALT  --   --   --   --   --   --  14   AST  --   --   --   --   --   --  13    < > = values in this interval not displayed.     No results found for this or any previous visit (from the past 24 hour(s)).

## 2021-05-24 NOTE — PROGRESS NOTES
"MD Notification    Notified Person: MD    Notified Person Name:  Dr Hernandez  Notification Date/Time:   5/23/21 2200  Notification Interaction:  amcom  Purpose of Notification:  629-1 GERA SCOTT Pt had 700 out from straight cath now. Bunch out yesterday at 1300. For order of \"notify provider if needing straight cath for >24 hrs\"      Orders Received:    Comments:    "

## 2021-05-24 NOTE — PROGRESS NOTES
Care Management Follow Up    Length of Stay (days): 6    Expected Discharge Date: 05/25/21     Concerns to be Addressed: discharge planning     Patient plan of care discussed at interdisciplinary rounds: Yes    Anticipated Discharge Disposition: Transitional Care, Skilled Nursing Facilty     Anticipated Discharge Services:    Anticipated Discharge DME:      Patient/family educated on Medicare website which has current facility and service quality ratings: yes  Education Provided on the Discharge Plan:    Patient/Family in Agreement with the Plan:      Referrals Placed by CM/SW: Post Acute Facilities  Private pay costs discussed: Not applicable    Additional Information:  Per request of RNCC, writer called [facility] as this is one of patient's top choice.Writer spoke to Rambo at [facility] to determine if they are able to take patient. Rambo stated that she needs to review and get back to writer. Patient has BCBS Medicare advantage and auth is needed.     KYLEE HarleyW, LSW   Social Work Intern   [phone]  [facility]

## 2021-05-24 NOTE — PROGRESS NOTES
Care Management Follow Up    Length of Stay (days): 6    Expected Discharge Date: 05/26/21     Concerns to be Addressed: discharge planning     Patient plan of care discussed at interdisciplinary rounds: Yes    Anticipated Discharge Disposition: Transitional Care, Skilled Nursing Facilty     Anticipated Discharge Services:    Anticipated Discharge DME:      Patient/family educated on Medicare website which has current facility and service quality ratings: yes  Education Provided on the Discharge Plan:    Patient/Family in Agreement with the Plan:      Referrals Placed by CM/SW: Post Acute Facilities  Private pay costs discussed: Not applicable    Additional Information:  Writer received a call from Rambo at [facility] who stated that they can plan to accept patient for either Tuesday or Wednesday if her oxygen has decreased. Rambo stated that since patient is not fully vaccinated she needs to be quarantined for 14 days and family could arrange for compassionate visits through the nurse manager or . Patient would need the order for the neb to either be PRN or held. Social Work will touch base with [facility] in the morning.     Areli Orona, KYLEEW, LSW   Social Work Intern   [phone]  [facility]

## 2021-05-24 NOTE — PLAN OF CARE
DATE & TIME: 5/23/2021 Night  Cognitive: Alert, oriented x 2-3, forgetful   BEHAVIOR & AGGRESSION TOOL COLOR: Green  ABNL VS/O2: VSS on 4 L NC   MOBILITY: Total care, reposition  PAIN MANAGMENT:  Denies  DIET: DD3 with thin liquids. Applesauce with pills.  BOWEL/BLADDER:  Purewick in place, incontinent of stool. Bladder scanned for 470, Straight cathed for 350 ml this AM  ABNL LAB/BG:  INR 1.98.   DRAIN/DEVICES: PIV SL L arm  SKIN: Pressure wound  to coccyx, mepilex  CDI.  Large bruise on right arm outlined with skin marker this shift  TESTS/PROCEDURES: EGD to be completed as outpatient.  D/C DAY/GOALS/PLACE: Pending to TCU    OTHER IMPORTANT INFO:  Keep bed alarm on zone 2, patient occasionally puts her legs out of bed.

## 2021-05-25 ENCOUNTER — APPOINTMENT (OUTPATIENT)
Dept: SPEECH THERAPY | Facility: CLINIC | Age: 86
DRG: 177 | End: 2021-05-25
Payer: COMMERCIAL

## 2021-05-25 ENCOUNTER — TELEPHONE (OUTPATIENT)
Dept: GERIATRICS | Facility: CLINIC | Age: 86
End: 2021-05-25

## 2021-05-25 VITALS
HEIGHT: 65 IN | DIASTOLIC BLOOD PRESSURE: 59 MMHG | TEMPERATURE: 98.2 F | BODY MASS INDEX: 30.85 KG/M2 | WEIGHT: 185.19 LBS | RESPIRATION RATE: 16 BRPM | OXYGEN SATURATION: 93 % | HEART RATE: 66 BPM | SYSTOLIC BLOOD PRESSURE: 161 MMHG

## 2021-05-25 LAB — INR PPP: 2.38 (ref 0.86–1.14)

## 2021-05-25 PROCEDURE — 250N000013 HC RX MED GY IP 250 OP 250 PS 637: Performed by: INTERNAL MEDICINE

## 2021-05-25 PROCEDURE — 99239 HOSP IP/OBS DSCHRG MGMT >30: CPT | Performed by: INTERNAL MEDICINE

## 2021-05-25 PROCEDURE — 250N000013 HC RX MED GY IP 250 OP 250 PS 637: Performed by: PHYSICIAN ASSISTANT

## 2021-05-25 PROCEDURE — 999N000040 HC STATISTIC CONSULT NO CHARGE VASC ACCESS

## 2021-05-25 PROCEDURE — 85610 PROTHROMBIN TIME: CPT | Performed by: INTERNAL MEDICINE

## 2021-05-25 PROCEDURE — 92526 ORAL FUNCTION THERAPY: CPT | Mod: GN | Performed by: SPEECH-LANGUAGE PATHOLOGIST

## 2021-05-25 PROCEDURE — 999N000128 HC STATISTIC PERIPHERAL IV START W/O US GUIDANCE

## 2021-05-25 PROCEDURE — 250N000011 HC RX IP 250 OP 636: Performed by: INTERNAL MEDICINE

## 2021-05-25 PROCEDURE — 36415 COLL VENOUS BLD VENIPUNCTURE: CPT | Performed by: INTERNAL MEDICINE

## 2021-05-25 PROCEDURE — 250N000012 HC RX MED GY IP 250 OP 636 PS 637: Performed by: INTERNAL MEDICINE

## 2021-05-25 RX ORDER — PREDNISONE 10 MG/1
TABLET ORAL
Qty: 30 TABLET | Refills: 0 | DISCHARGE
Start: 2021-05-25 | End: 2021-06-08

## 2021-05-25 RX ORDER — MULTIPLE VITAMINS W/ MINERALS TAB 9MG-400MCG
1 TAB ORAL DAILY
DISCHARGE
Start: 2021-05-25

## 2021-05-25 RX ORDER — WARFARIN SODIUM 4 MG/1
4 TABLET ORAL
Status: DISCONTINUED | OUTPATIENT
Start: 2021-05-25 | End: 2021-05-25 | Stop reason: HOSPADM

## 2021-05-25 RX ORDER — PANTOPRAZOLE SODIUM 40 MG/1
40 TABLET, DELAYED RELEASE ORAL
DISCHARGE
Start: 2021-05-25

## 2021-05-25 RX ORDER — TAMSULOSIN HYDROCHLORIDE 0.4 MG/1
0.4 CAPSULE ORAL DAILY
DISCHARGE
Start: 2021-05-26

## 2021-05-25 RX ORDER — MULTIPLE VITAMINS W/ MINERALS TAB 9MG-400MCG
1 TAB ORAL DAILY
Status: DISCONTINUED | OUTPATIENT
Start: 2021-05-25 | End: 2021-05-25 | Stop reason: HOSPADM

## 2021-05-25 RX ADMIN — LEVOTHYROXINE SODIUM 100 MCG: 100 TABLET ORAL at 06:27

## 2021-05-25 RX ADMIN — PREDNISONE 40 MG: 20 TABLET ORAL at 09:00

## 2021-05-25 RX ADMIN — TAMSULOSIN HYDROCHLORIDE 0.4 MG: 0.4 CAPSULE ORAL at 09:00

## 2021-05-25 RX ADMIN — PIPERACILLIN SODIUM AND TAZOBACTAM SODIUM 3.38 G: 3; .375 INJECTION, POWDER, LYOPHILIZED, FOR SOLUTION INTRAVENOUS at 06:43

## 2021-05-25 RX ADMIN — ALLOPURINOL 100 MG: 100 TABLET ORAL at 09:00

## 2021-05-25 RX ADMIN — AMLODIPINE BESYLATE 5 MG: 5 TABLET ORAL at 09:01

## 2021-05-25 RX ADMIN — FLUTICASONE PROPIONATE 2 SPRAY: 50 SPRAY, METERED NASAL at 01:24

## 2021-05-25 RX ADMIN — IPRATROPIUM BROMIDE AND ALBUTEROL 1 PUFF: 20; 100 SPRAY, METERED RESPIRATORY (INHALATION) at 10:15

## 2021-05-25 RX ADMIN — PIPERACILLIN SODIUM AND TAZOBACTAM SODIUM 3.38 G: 3; .375 INJECTION, POWDER, LYOPHILIZED, FOR SOLUTION INTRAVENOUS at 01:10

## 2021-05-25 RX ADMIN — IPRATROPIUM BROMIDE AND ALBUTEROL 1 PUFF: 20; 100 SPRAY, METERED RESPIRATORY (INHALATION) at 14:06

## 2021-05-25 RX ADMIN — MULTIPLE VITAMINS W/ MINERALS TAB 1 TABLET: TAB at 12:31

## 2021-05-25 RX ADMIN — IPRATROPIUM BROMIDE AND ALBUTEROL 1 PUFF: 20; 100 SPRAY, METERED RESPIRATORY (INHALATION) at 06:48

## 2021-05-25 RX ADMIN — PANTOPRAZOLE SODIUM 40 MG: 40 TABLET, DELAYED RELEASE ORAL at 06:27

## 2021-05-25 RX ADMIN — FUROSEMIDE 20 MG: 20 TABLET ORAL at 09:00

## 2021-05-25 RX ADMIN — LISINOPRIL 20 MG: 20 TABLET ORAL at 09:00

## 2021-05-25 RX ADMIN — PIPERACILLIN SODIUM AND TAZOBACTAM SODIUM 3.38 G: 3; .375 INJECTION, POWDER, LYOPHILIZED, FOR SOLUTION INTRAVENOUS at 11:05

## 2021-05-25 ASSESSMENT — ACTIVITIES OF DAILY LIVING (ADL)
ADLS_ACUITY_SCORE: 26
ADLS_ACUITY_SCORE: 23
ADLS_ACUITY_SCORE: 23
ADLS_ACUITY_SCORE: 26
ADLS_ACUITY_SCORE: 26

## 2021-05-25 ASSESSMENT — MIFFLIN-ST. JEOR: SCORE: 1290.88

## 2021-05-25 NOTE — PROGRESS NOTES
Care Management Discharge Note    Discharge Date: 05/25/21  Expected Time of Departure: 5:30p via [facility] w/c transport    Discharge Disposition: Transitional Care, Skilled Nursing Facilty    Discharge Services: Transportation Services    Discharge DME: None    Discharge Transportation: agency    Private pay costs discussed: transportation costs    PAS Confirmation Code: [id number]  Patient/family educated on Medicare website which has current facility and service quality ratings: yes    Education Provided on the Discharge Plan:    Persons Notified of Discharge Plans: pt, friend Khushbu  Patient/Family in Agreement with the Plan: yes    Handoff Referral Completed: No    Additional Information:  Writer spoke to pt about discharge. She asked for a w/c transport and is agreeable to the cost. [facility] w[facility]c [facility] Writer notified pts friend Khushbu at pts request.     PAS-RR    D: Per DHS regulation, SW completed and submitted PAS-RR to [place] [employer] Direct Connect via the Senior LinkAge Line.  PAS-RR confirmation # is : [id number]    I: SW spoke with pt and they are aware a PAS-RR has been submitted.  SW reviewed with pt that they may be contacted for a follow up appointment within 10 days of hospital discharge if their SNF stay is < 30 days.  Contact information for [facility] LinkAge Line was also provided.    A: pt verbalized understanding.    P: Further questions may be directed to [facility] LinkAge Line at #[phone], option #4 for PAS-RR staff.    3:26p Addendum: Writer spoke with [facility] transport who stated that pt can transport at 4:15p. Writer changed transport to 4:15p and notified staff and facility.     JAYY Buenrostro

## 2021-05-25 NOTE — PLAN OF CARE
DATE & TIME: 5/25/21 [id number]  Cognitive: Alert, oriented x 2-3, forgetful   BEHAVIOR & AGGRESSION TOOL COLOR: Green  ABNL VS/O2: VSS on 4-5 L nc  MOBILITY: Total care, turn and repo Q2H  PAIN MANAGMENT:  Denies except for buttocks area burning when wet.  DIET: DD3 with thin liquids. Applesauce with pills.  BOWEL/BLADDER:  Incontinent of bowel and bladder. BM x1 this shift.   ABNL LAB/BG:  None new  DRAIN/DEVICES: PIV SL L arm  SKIN: Pressure wound  to coccyx, mepilex  changed and CDI.  Large bruise on right arm outlined with skin marker today. Perineum reddened and pt c/o burning with wiping. Perianal area reddened and tender.   TESTS/PROCEDURES: EGD to be completed as outpatient.   D/C DAY/GOALS/PLACE: Discharge to [facility] today or tomorrow (5/26)    OTHER IMPORTANT INFO: Speech/PT following. On IV Zosyn. Pt reports having baseline tingling in feet. LS fine crackles, BS active x4. Congested cough noted.     Addendum: Bladder scan this morning 711 mL; straight cath 625 mL.

## 2021-05-25 NOTE — DISCHARGE INSTRUCTIONS
"Wound care EVERY OTHER DAY     Comments: Coccyx wound: Every other day   1. Clean wound with saline or MicroKlenz Spray, pat dry   2. Wipe / \"clean\" the surrounding periwound tissue with several skin preps and allow to dry to help with dressing sticking   3. Cut a piece of Promogran Zeynep and place into wound bed. This is meant to dissolve into wound bed.   4. Press a Mepilex  Sacral Dressing ([id number])  to the area, making sure to conform nicely to skin curvatures.   5. Time and date dressing change   NOTE   -Reposition pt side to side roxana when in bed, every 2 hours-get the pt way over on side to completely offload pressure. This will benefit skin and respiratory function   -Keep heels elevated and floating on pillows at all times. Try using at least 2 pillows under each calf.   -When up to the chair pt needs to fully offload every 2 hours and use a chair cushion if needed       "

## 2021-05-25 NOTE — PROGRESS NOTES
"Physical Therapy Discharge Summary    Reason for therapy discharge:    Discharged to transitional care facility.    Progress towards therapy goal(s). See goals on Care Plan in Psychiatric electronic health record for goal details.  Goals partially met.  Barriers to achieving goals:   discharge from facility.    Therapy recommendation(s):    Continued therapy is recommended.  Rationale/Recommendations:  Below baseline. .     Last PT rx: \"Pt currently demonstrated ability to complete sit <> stand with mod-maxA of 2 and use of Giuliana Steady.\"      "

## 2021-05-25 NOTE — DISCHARGE SUMMARY
[facility]  Hospitalist Discharge Summary      Date of Admission:  5/18/2021  Date of Discharge:  5/25/2021  Discharging Provider: Jose Mann DO      Discharge Diagnoses   Bilateral consolidative pneumonia , most likely aspiration pneumonia  Acute hypoxic respiratory failure  COVID-19 infection ruled out  Acute encephalopathy.  Improving   Possible underlying dementia  Mild hypercalcemia   Spinal muscular atrophy type III  Urinary retention  Chronic diastolic congestive heart failure. Possible mild decompensation  Severe mitral regurgitation s/p mitral clip   Atrial fibrillation  History of pulmonary embolism  Hypothyroidism  Hypertension   Gout    Follow-ups Needed After Discharge   Follow-up Appointments     Follow Up and recommended labs and tests      Follow up with intermediate physician.  The following labs/tests are   recommended: INR to continue Coumadin adjustments as needed.  Follow up with PCP 1-2 weeks after TCU discharge  Outpatient referral for pulmonology ordered           Unresulted Labs Ordered in the Past 30 Days of this Admission     No orders found from 4/18/2021 to 5/19/2021.        Discharge Disposition   Discharged to TCU  Condition at discharge: Stable    Hospital Course   Kenyatta Donald is a 85 year old female past medical history sniffing and for essential hypertension, history of breast cancer s/p bilateral mastectomy, chronic atrial fibrillation on long-term warfarin, diastolic heart failure, hypothyroidism, mild coronary artery disease, mitral valve regurgitation, sleep apnea and a spinal muscular atrophy type III who was admitted on May 18 when she presented with acute hypoxic respiratory failure and bilateral pneumonia.      Bilateral consolidative pneumonia , most likely aspiration pneumonia  Chest x-ray is concerning for consolidative pneumonia, initially patient was admitted due to the concern for community-acquired pneumonia and was a started on  ceftriaxone and azithromycin but as patient was noticed to have issues with dysphagia, there is concern that we might be dealing with aspiration pneumonia.  Repeated chest x-ray on May 20 showed increased opacification in the left lower hemithorax likely secondary to infiltrate/consolidation of left pleural effusion.  Acute hypoxic respiratory failure  Patient continues to require oxygen, is able to be weaned down to 4-5 L of oxygen  COVID-19 infection ruled out  Presented primarily with confusion. CXR with moderate to severe bilateral interstitial and ground glass infiltrates concerning for COVID19, patient initial swab came back negative.  Her second COVID-19 PCR come back negative and COVID isolation was discontinued.    Most likely her pneumonia is secondary to bacterial infection, considering her esophageal dysphagia, aspiration pneumonia is high on the differential.  After admission patient initially received IV ceftriaxone and IV azithromycin but did not make significant improvement and continued to require 6 L of oxygen, repeated chest x-ray is concerning for consolidative pneumonia.  Her antibiotic was switched to IV Zosyn due to the concern for aspiration pneumonia once patient was evaluated by speech therapy and there was significant concern for esophageal dysphagia while azithromycin was continued to finish the course for atypical coverage.  Presented with mild leukocytosis which has improved now, she does have history of congestive heart failure, does not seem to be fluid overloaded, labs are more suggestive of dehydration with significant hyaline cast and UA and mild hypercalcemia.  Of note, patient states she has received first dose of COVID-19 vaccination is due for her second dose.  - Titrate O2 as able.  Changed goal SpO2 >89%   - Continue Zosyn for now.  Transitioned to Augmentin for a short course  - Finished a course of Azithromycin   - Speech following and defer diet recommendations to them   -  GI has been consulted planning to do EGD as an outpatient  - Continue patient on prednisone 40 mg p.o. daily.  Will taper over next 2 weeks  - Patient has been a started on Combivent inhaler for times a day  - Continue albuterol inhaler and albuterol nebulizer as needed as needed for shortness of breath  - RCAT for pulmonary hygiene   - Patient would likely benefit from outpatient pulmonology evaluation and referral sent      Acute encephalopathy.  Improving   Possible underlying dementia  Mild hypercalcemia   Suspected secondary to pneumonia above, possibly with untreated hypoxia contributing. Head CT negative. UA unremarkable. Mild hypercalcemia (corrects to 11.5) and dehydration may be contributing. No focal neurologic deficits on exam.   Patient cognition is significantly improved as compared to the time of admission.  According to patient her older daughter lives in [place] who has been dealing with her ,s sickness and is also busy as she is a teacher.  According to patient and her daughter Kika has also been in the process of moving from [place] to their cabin in [place].  According to patient, her friend and PCA Khushbu is the best person to update who would be able to update the family also.  - Re-orient as needed  - Maintain normal day/night, sleep wake cycles  - Minimize sedating/altering medications as able  - Treat separate conditions as detailed above/below      Spinal muscular atrophy type III  Has not recently followed by neurology.  Functional status declining at home per review of primary care notes  - PT consulted , recommending patient to be discharged to rehab     Urinary retention  Patient required multiple straight caths, Bunch catheter has been placed on May 20.  - Continue Flomax  - UA was checked on admission.  - Bunch catheter removed and monitoring PVRs     Chronic diastolic congestive heart failure. Possible mild decompensation  Severe mitral regurgitation s/p mitral  clip   Last EF 65% with moderate mitral regurgitation 5/2021. Follows with [facility] cardiology. Appears on hypovolemic side on admission.   - Daily weights  - Gave Lasix 20 mg p.o. twice daily, will switch her back to Lasix 20 mg p.o. daily     Atrial fibrillation  INR last check was 1.98  - Continue warfarin , pharmacy to dose , appreciate their help      History of pulmonary embolism  - Coumadin as above      Hypothyroidism  - Continue prior to admission levothyroxine     Hypertension  - Continue prior to admission amlodipine, lisinopril      Gout  - Continue prior to admission allopurinol      Consultations This Hospital Stay   PHARMACY TO DOSE WARFARIN  PHYSICAL THERAPY ADULT IP CONSULT  CARE MANAGEMENT / SOCIAL WORK IP CONSULT  SPEECH LANGUAGE PATH ADULT IP CONSULT  OCCUPATIONAL THERAPY ADULT IP CONSULT  OCCUPATIONAL THERAPY ADULT IP CONSULT  GASTROENTEROLOGY IP CONSULT  WOUND OSTOMY CONTINENCE NURSE  IP CONSULT  PHYSICAL THERAPY ADULT IP CONSULT  OCCUPATIONAL THERAPY ADULT IP CONSULT    Code Status   No CPR- Do NOT Intubate    Time Spent on this Encounter   IJose DO, personally saw the patient today and spent greater than 30 minutes discharging this patient.       Jose Mann DO  [place] [address] [facility]  [facility] [address] [place] [address]  Phone: [phone]  ______________________________________________________________________    Physical Exam   Vital Signs: Temp: 97.2  F (36.2  C) Temp src: Oral BP: (!) 170/65 Pulse: 65   Resp: 18 SpO2: 95 % O2 Device: Nasal cannula Oxygen Delivery: 4 LPM  Weight: 185 lbs 2.98 oz  General Appearance: Resting comfortably.  NAD   Respiratory: Clear to auscultation.  No respiratory distress  Cardiovascular: RRR.  No obvious murmurs  GI: Soft.  Non-distended  Skin: No obvious rashes or cyanosis  Other: Alert.  Moving all extremities grossly       Primary Care Physician   Andry Damico    Discharge Orders       Pulmonary Medicine Referral      CARE COORDINATION REFERRAL      General info for SNF    Length of Stay Estimate: Short Term Care: Estimated # of Days <30  Condition at Discharge: Stable  Level of care:skilled   Rehabilitation Potential: Good  Admission H&P remains valid and up-to-date: Yes  Recent Chemotherapy: N/A  Use Nursing Home Standing Orders: Yes     Mantoux instructions    Give two-step Mantoux (PPD) Per Facility Policy Yes     Reason for your hospital stay    Pneumonia, likely underlying lung disease not yet diagnosed     Follow Up and recommended labs and tests    Follow up with skilled nursing physician.  The following labs/tests are recommended: INR to continue Coumadin adjustments as needed.  Follow up with PCP 1-2 weeks after TCU discharge  Outpatient referral for pulmonology ordered     Bunch catheter    To straight gravity drainage. Change catheter every 2 weeks and PRN for leaking or decreased uring output with signs of bladder distention. DO NOT change catheter without a specific MD order IF diagnosis of benign prostatic hypertrophy (BPH), neurogenic bladder, or other urological conditions     Additional Discharge Instructions    Voiding trial in 3-4 days as activity improved     Activity - Up with nursing assistance     Physical Therapy Adult Consult    Evaluate and treat as clinically indicated.    Reason:  Deconditioning     Occupational Therapy Adult Consult    Evaluate and treat as clinically indicated.    Reason:  Deconditioning     Oxygen Adult/Peds    Titrate O2 as tolerated     Advance Diet as Tolerated    Follow this diet upon discharge: Orders Placed This Encounter      Snacks/Supplements Adult: Boost Shake; Between Meals      Combination Diet Dysphagia Diet Level 3: Advanced; Thin Liquids (water, ice chips, juice, milk gelatin, ice cream, etc) (straw OK); 2 gm NA Diet       Significant Results and Procedures   Most Recent 3 CBC's:  Recent Labs   Lab Test 05/20/21  0914 05/19/21  1152  05/18/21  1532   WBC 10.0 7.2 11.3*   HGB 10.8* 10.2* 11.0*   MCV 93 93 91    244 274     Most Recent 3 BMP's:  Recent Labs   Lab Test 05/24/21  0830 05/20/21  0914 05/19/21  1152 05/18/21  1532     --  142 141   POTASSIUM 3.5 4.2 4.3 3.9   CHLORIDE 108  --  109 107   CO2 33*  --  28 31   BUN 23  --  23 25   CR 0.83  --  0.78 0.94   ANIONGAP 3  --  5 3   VICKIE 9.9  --  9.9 10.7*   GLC 87  --  139* 115*   ,   Results for orders placed or performed during the hospital encounter of 05/18/21   Chest XR,  PA & LAT    Narrative    CHEST TWO VIEWS 5/18/2021 5:32 PM     HISTORY: Weak, confused.    COMPARISON: May 29, 2018       Impression    IMPRESSION: Moderate to severe interstitial and groundglass  infiltrates. No definite effusion. The cardiac silhouette is enlarged  similar to previous. Pulmonary vasculature is obscured.    SARAH SAMAYOA MD   Head CT w/o contrast    Narrative    CT SCAN OF THE HEAD WITHOUT CONTRAST   5/18/2021 6:08 PM     HISTORY: Confusion. Altered mental status not otherwise specified, of  unknown etiology.    TECHNIQUE:  Axial images of the head and coronal reformations without  IV contrast material. Radiation dose for this scan was reduced using  automated exposure control, adjustment of the mA and/or kV according  to patient size, or iterative reconstruction technique.    COMPARISON: None.    FINDINGS: There is no evidence of intracranial hemorrhage, mass, acute  infarct or anomaly. The ventricles are normal in size, shape and  configuration. Mild diffuse parenchymal volume loss. Mild patchy  periventricular white matter hypodensities which are nonspecific, but  likely related to chronic microvascular ischemic disease. Scattered  intracranial atherosclerotic calcifications primarily involving the  carotid siphons and vertebral arteries.     The visualized portions of the sinuses and mastoids appear normal.  Hyperostosis frontalis interna. The bony calvarium and bones of the  skull base  appear intact.       Impression    IMPRESSION:     1. No evidence of acute intracranial hemorrhage, mass, or herniation.  2. Diffuse parenchymal volume loss and white matter changes likely due  to chronic microvascular ischemic disease.      SARAH HENDERSON MD   XR Chest Port 1 View    Narrative    EXAM: XR CHEST PORT 1 VIEW  LOCATION: [facility]  DATE/TIME: 5/20/2021 5:35 AM    INDICATION: Follow-up pneumonia.  COMPARISON: 05/18/2021.      Impression    IMPRESSION: Cardiac enlargement. Pulmonary vascularity within normal limits. Increased opacification left lower hemithorax with obscured left hemidiaphragm likely due to infiltrate/consolidation and/or left pleural effusion. This appears increased   compared to previous with less opacification seen in the left lower lung and today's exam. Clinical correlation. Mild patchy right mid and lower lung infiltrate may be slightly increased in the lower lung. Previously seen infiltrate in the left lung is   improved. Aortic calcification. Degenerative changes both shoulders. Small metallic device projects over the heart. Remainder unremarkable.   XR Video Swallow with SLP or OT    Narrative    VIDEO SPEECH EVALUATION WITH OR WITHOUT ESOPHAGRAM  5/20/2021 3:00 PM     HISTORY: s/sx of aspiration on clinical evaluation. Need to further  assess.    COMPARISON: None.    FLUOROSCOPY TIME: .9 minutes.    SPOT FILMS: 5    TECHNIQUE: A modified barium swallow is performed with speech  pathology. Note that only the cervical esophagus was evaluated in  lateral view.      Impression    IMPRESSION: No penetration or aspiration with any tested consistency  of barium.    Please see the speech pathology report for further details.    SARAH SAMAYOA MD     *Note: Due to a large number of results and/or encounters for the requested time period, some results have not been displayed. A complete set of results can be found in Results Review.       Discharge Medications   Current  Discharge Medication List      START taking these medications    Details   amoxicillin-clavulanate (AUGMENTIN) 875-125 MG tablet Take 1 tablet by mouth 2 times daily for 4 days    Associated Diagnoses: Pneumonia of both lungs due to infectious organism, unspecified part of lung      guaiFENesin (ROBITUSSIN) 20 mg/mL SOLN solution Take 5 mLs by mouth every 6 hours as needed for cough  Qty:      Associated Diagnoses: Pneumonia of both lungs due to infectious organism, unspecified part of lung      ipratropium-albuterol (COMBIVENT RESPIMAT)  MCG/ACT inhaler Inhale 1 puff into the lungs 4 times daily  Qty:      Associated Diagnoses: Pneumonia of both lungs due to infectious organism, unspecified part of lung      multivitamin w/minerals (THERA-VIT-M) tablet Take 1 tablet by mouth daily  Qty:      Associated Diagnoses: Pneumonia of both lungs due to infectious organism, unspecified part of lung      pantoprazole (PROTONIX) 40 MG EC tablet Take 1 tablet (40 mg) by mouth every morning (before breakfast)  Qty:      Associated Diagnoses: Pneumonia of both lungs due to infectious organism, unspecified part of lung      predniSONE (DELTASONE) 10 MG tablet 4 tabs daily for 3 days, then 3 tabs daily for 3 days, then 2 tabs daily for 3 days, then 1 tab daily for 3 days, then stop  Qty: 30 tablet, Refills: 0    Associated Diagnoses: Pneumonia of both lungs due to infectious organism, unspecified part of lung      tamsulosin (FLOMAX) 0.4 MG capsule Take 1 capsule (0.4 mg) by mouth daily  Qty:      Associated Diagnoses: Pneumonia of both lungs due to infectious organism, unspecified part of lung         CONTINUE these medications which have NOT CHANGED    Details   allopurinol (ZYLOPRIM) 100 MG tablet Take 1 tablet (100 mg) by mouth daily  Qty: 90 tablet, Refills: 3    Comments: PROFILE FOR FUTURE REFILLS UNTIL PATIENT CALLS FOR REFILLS  Associated Diagnoses: Drug-induced gout involving toe, unspecified chronicity, unspecified  laterality      amLODIPine (NORVASC) 5 MG tablet Take 1 tablet (5 mg) by mouth 2 times daily LAST REFILL WITHOUT AN APPOINTMENT  Qty: 180 tablet, Refills: 0    Comments: OVERDUE FOR OFFICE VISIT AND LABS  Associated Diagnoses: Benign essential hypertension      cholecalciferol (VITAMIN D) 1000 UNIT tablet Take 2,000 Units by mouth every morning       fluticasone (FLONASE) 50 MCG/ACT nasal spray Spray 1-2 sprays into both nostrils At Bedtime     Associated Diagnoses: Chronic seasonal allergic rhinitis      furosemide (LASIX) 20 MG tablet TAKE 1 TABLET(20 MG) BY MOUTH EVERY MORNING  Qty: 270 tablet, Refills: 0    Comments: **Patient requests 90 days supply**  Associated Diagnoses: Edema, unspecified type      levothyroxine (SYNTHROID/LEVOTHROID) 100 MCG tablet Take 1 tablet (100 mcg) by mouth daily  Qty: 90 tablet, Refills: 3    Comments: PROFILE FOR FUTURE REFILLS UNTIL PATIENT CALLS FOR REFILLS  Associated Diagnoses: Acquired hypothyroidism      lisinopril (ZESTRIL) 20 MG tablet Take 1 tablet (20 mg) by mouth 2 times daily Needs an appointment for refills.  Qty: 180 tablet, Refills: 0    Associated Diagnoses: Essential hypertension, benign      !! warfarin ANTICOAGULANT (COUMADIN) 4 MG tablet Take 0.5-1 tablets (2-4 mg) by mouth daily Adjust dose per INR result as directed by anticoagulation clinic  Qty: 90 tablet, Refills: 0    Associated Diagnoses: Chronic atrial fibrillation (H)      acetaminophen (TYLENOL) 325 MG tablet Take 2 tablets (650 mg) by mouth every 4 hours as needed for mild pain  Qty: 100 tablet    Associated Diagnoses: Closed nondisplaced fracture of left patella, unspecified fracture morphology, initial encounter      ASPIRIN NOT PRESCRIBED (INTENTIONAL) Please choose reason not prescribed, below  Qty: 1 each, Refills: 0      nystatin (MYCOSTATIN) 947658 UNIT/GM external powder Apply topically 2 times daily  Qty: 60 Bottle, Refills: 1    Associated Diagnoses: Fungal dermatitis      !! warfarin  ANTICOAGULANT (COUMADIN) 1 MG tablet TAKE 2 TABLETS BY MOUTH EVERY MONDAY, WEDNESDAY, FRIDAY, SATURDAY. DOSE MAY CHANGE PER INR RESULT AS DIRECTED  Qty: 102 tablet, Refills: 1    Comments: **Patient requests 90 days supply**  Associated Diagnoses: Chronic atrial fibrillation (H)       !! - Potential duplicate medications found. Please discuss with provider.        Allergies   No Known Allergies

## 2021-05-25 NOTE — PLAN OF CARE
DATE & TIME: 5/25/21 1184-4634  Cognitive: Alert, oriented x 4, forgetful   BEHAVIOR & AGGRESSION TOOL COLOR: Green  ABNL VS/O2: BP elevated 170/65 ,161/59 on 4-5 L NC  MOBILITY: Total care, turn and repo Q2H  PAIN MANAGMENT:  Denies except for buttocks area burning when wet.  DIET: DD3 with thin liquids. Applesauce with pills.  BOWEL/BLADDER:  Incontinent of bowel and bladder. Pt unable to void, bladder scanned for 493. Bunch placed per MD with adequate output. Multiple BMs during shift.   ABNL LAB/BG:  INR 2.38  DRAIN/DEVICES: IV removed (discharging)  SKIN: Pressure wound  to coccyx, mepilex  changed and CDI.  Large bruise on right arm outlined with skin marker today. Perineum reddened and pt c/o burning with wiping. Perianal area reddened and tender.   TESTS/PROCEDURES: EGD to be completed as outpatient.   D/C DAY/GOALS/PLACE: Discharging to [facility] @ 1730  OTHER IMPORTANT INFO: Speech/PT following. On IV Zosyn. Pt reports having baseline tingling in feet. LS fine crackles, BS active x4. Congested cough noted.

## 2021-05-25 NOTE — PLAN OF CARE
DATE & TIME: 5/24/2021 4186-2121  Cognitive: Alert, oriented x 2-3, forgetful   BEHAVIOR & AGGRESSION TOOL COLOR: Green  ABNL VS/O2: BP elevated 150/61, on 5 L NC, adrianna at times  MOBILITY: Total care, turn and repo Q2H  PAIN MANAGMENT:  Denies except for buttocks area burning when wet.  DIET: DD3 with thin liquids. Applesauce with pills.  BOWEL/BLADDER:  Incontinent of bowel and bladder. Pt voided on own once this shift. Scan if not voiding every 4 hours.  ABNL LAB/BG:  INR 2.39 at 0830 this AM.  DRAIN/DEVICES: PIV SL L arm  SKIN: Pressure wound  to coccyx, mepilex  CDI.  Large bruise on right arm outlined with skin marker today. Perineum reddened and pt c/o burning with wiping. Perianal area reddened and tender.   TESTS/PROCEDURES: EGD to be completed as outpatient.   D/C DAY/GOALS/PLACE: Discharge to [facility] 5/25 or 5/26    OTHER IMPORTANT INFO: Speech/PT following. On IV Zosyn. Pt reports having baseline tingling in feet. LS fine crackles, BS active x4. Congested cough noted.

## 2021-05-25 NOTE — PROGRESS NOTES
CLINICAL NUTRITION SERVICES  -  ASSESSMENT NOTE    Recommendations Ordered by Registered Dietitian (RD):   Diet per SLP - With emphasis on protein  Continue protein supplement shakes BID  Add Thera vit M for wound healing. Defer additional micronutrients as pt is discharging later today     Malnutrition:   % Weight Loss:  None noted  % Intake:  <75% for >/= 3 months (non-severe malnutrition)  Subcutaneous Fat Loss:  Orbital region mild depletion and Upper arm region mild depletion  Muscle Loss:  Temporal region mild depletion, Clavicle bone region mild depletion, Acromion bone region mild depletion and Dorsal hand region mild depletion  Fluid Retention:  HF related    Malnutrition Diagnosis: Non-Severe malnutrition  In Context of:  Chronic illness or disease     REASON FOR ASSESSMENT  Kenyatta Donald is a 85 year old female seen by Registered Dietitian for LOS    NUTRITION HISTORY  - Information obtained from chart review and patient report.   - Patient lives independently, with ~2 hrs PCA each morning and a couple hours in the evening.   - Each morning her PCA makes breakfast: cereal and fruit, or 2 eggs w/ fruit.   - Her PCA also prepares meals for Kenyatta to keep in the freezer and heat up for dinner. These may consist of meatloaf, turkey, hamburger, noodles. Kenyatta will also eat up a [place] vegetable packet.   - She does not use high protein supplements at home.   - In general, Kenyatta states her appetite has been low due to reduced physical activity.   - NKFA      CURRENT NUTRITION ORDERS  Diet Order:     Dysphagia Diet Level 3 + Thin liquids   2g Na   Boost Shake BID between meals     Current Intake/Tolerance:  Kenyatta states that appetite is fair due to limited activity.   She has been receiving meals BID-TID, some on the smaller side (just fruit and broth), some larger. Intakes recorded range anywhere from 0-25-75%. RNs document good appetite at times.     NUTRITION FOCUSED PHYSICAL ASSESSMENT FOR DIAGNOSING  "MALNUTRITION)  Yes    Observed:    Muscle wasting (refer to documentation in Malnutrition section) and Subcutaneous fat loss (refer to documentation in Malnutrition section) --> at least some degree related to aging and limited mobility.     Obtained from Chart/Interdisciplinary Team:  Trace 1+ edema   Last BM 5/25  [facility] - Nutrition 2; Total 15    WOCN 5/24:   Pressure Injury: on coccyx, present on admission   Pressure Injury is Stage 3     ANTHROPOMETRICS  Height: 5' 5\"  Weight: 185 lbs 2.98 oz  (84 kg)   Body mass index is 30.82 kg/m .  Weight Status:  Obesity Grade I BMI 30-34.9  IBW: 56.8 kg   % IBW: 148%  Weight History: Wt changes with changes in fluid status. Pt reports stability.   Wt Readings from Last 10 Encounters:   05/25/21 84 kg (185 lb 3 oz)   01/22/20 101.8 kg (224 lb 6.4 oz)   10/03/19 91.2 kg (201 lb)   01/29/19 91 kg (200 lb 11.2 oz)   10/08/18 88.1 kg (194 lb 3.2 oz)   08/21/18 87.5 kg (193 lb)   07/13/18 88.1 kg (194 lb 4.8 oz)   05/29/18 85.7 kg (189 lb)   05/24/18 86.2 kg (190 lb)   03/20/18 84.8 kg (187 lb)       LABS  Labs reviewed    MEDICATIONS  Medications reviewed  Lasix 20 mg    ASSESSED NUTRITION NEEDS PER APPROVED PRACTICE GUIDELINES:  Dosing Weight 64 kg - adjusted   Estimated Energy Needs: 3201-9441+ kcals (25-30+ Kcal/Kg)  Justification: wound healing   Estimated Protein Needs: 96+ grams protein (1.5 g pro/Kg)  Justification: wound healing  Estimated Fluid Needs: 1 mL/kcal   Justification: maintenance / wound healing     MALNUTRITION:  % Weight Loss:  None noted  % Intake:  <75% for >/= 3 months (non-severe malnutrition)  Subcutaneous Fat Loss:  Orbital region mild depletion and Upper arm region mild depletion  Muscle Loss:  Temporal region mild depletion, Clavicle bone region mild depletion, Acromion bone region mild depletion and Dorsal hand region mild depletion  Fluid Retention:  HF related    Malnutrition Diagnosis: Non-Severe malnutrition  In Context of:  Chronic illness or " disease    NUTRITION DIAGNOSIS:  Increased nutrient needs (protein) related to wound healing/skin integrity as evidenced by stage 3 PI per WOCN note yesterday.       NUTRITION INTERVENTIONS  Recommendations / Nutrition Prescription  Diet per SLP - emphasis on protein  Continue protein supplement shakes BID  Add Thera vit M for wound healing. Defer additional micronutrients as pt is discharging later today      Implementation  Nutrition education: Provided education on protein sources and encouraged pt consume more protein with breakfast+include a protein supplement shake daily.   Multivitamin/Mineral: see above. Ordered in epic.       Nutrition Goals  Intake of at least 75% meals BID + 1-2 supplements daily.       MONITORING AND EVALUATION:  Progress towards goals will be monitored and evaluated per protocol and Practice Guidelines    Rosy Thomson RD, LD  [facility] [place], 67, 56, MH   Pager: [phone]  Weekend Pager: [phone]     interrupted

## 2021-05-25 NOTE — PROGRESS NOTES
Discharge    Patient discharged to [facility] via [facility] with HE transport    Listed belongings gathered and returned to patient. Yes  Belongings returned to patient from security/pharmacy Yes  Care Plan and Patient education resolved: Yes  Prescriptions if needed, hard copies sent with patient  NA  Home and hospital acquired medications returned to patient: Yes  Medication Bin checked and emptied on discharge Yes  Follow up appointment made for patient: No

## 2021-05-26 ENCOUNTER — DOCUMENTATION ONLY (OUTPATIENT)
Dept: INTERNAL MEDICINE | Facility: CLINIC | Age: 86
End: 2021-05-26

## 2021-05-26 ENCOUNTER — PATIENT OUTREACH (OUTPATIENT)
Dept: CARE COORDINATION | Facility: CLINIC | Age: 86
End: 2021-05-26

## 2021-05-26 ENCOUNTER — NURSING HOME VISIT (OUTPATIENT)
Dept: GERIATRICS | Facility: CLINIC | Age: 86
End: 2021-05-26
Payer: COMMERCIAL

## 2021-05-26 VITALS
OXYGEN SATURATION: 92 % | HEART RATE: 77 BPM | DIASTOLIC BLOOD PRESSURE: 76 MMHG | TEMPERATURE: 97.8 F | SYSTOLIC BLOOD PRESSURE: 145 MMHG | RESPIRATION RATE: 18 BRPM

## 2021-05-26 DIAGNOSIS — I50.32 CHRONIC DIASTOLIC HEART FAILURE (H): ICD-10-CM

## 2021-05-26 DIAGNOSIS — R33.9 URINARY RETENTION: ICD-10-CM

## 2021-05-26 DIAGNOSIS — J18.9 PNEUMONIA OF BOTH LUNGS DUE TO INFECTIOUS ORGANISM, UNSPECIFIED PART OF LUNG: Primary | ICD-10-CM

## 2021-05-26 DIAGNOSIS — R13.10 DYSPHAGIA, UNSPECIFIED TYPE: ICD-10-CM

## 2021-05-26 DIAGNOSIS — I10 BENIGN ESSENTIAL HYPERTENSION: ICD-10-CM

## 2021-05-26 DIAGNOSIS — Z79.01 LONG TERM CURRENT USE OF ANTICOAGULANT THERAPY: ICD-10-CM

## 2021-05-26 DIAGNOSIS — I48.20 CHRONIC ATRIAL FIBRILLATION (H): ICD-10-CM

## 2021-05-26 DIAGNOSIS — G93.40 ENCEPHALOPATHY: ICD-10-CM

## 2021-05-26 DIAGNOSIS — R53.81 PHYSICAL DECONDITIONING: ICD-10-CM

## 2021-05-26 DIAGNOSIS — R09.02 HYPOXIA: ICD-10-CM

## 2021-05-26 DIAGNOSIS — G12.1 SPINAL MUSCULAR ATROPHY TYPE III (H): ICD-10-CM

## 2021-05-26 PROCEDURE — 99310 SBSQ NF CARE HIGH MDM 45: CPT | Performed by: NURSE PRACTITIONER

## 2021-05-26 ASSESSMENT — ACTIVITIES OF DAILY LIVING (ADL): DEPENDENT_IADLS:: LAUNDRY;MEAL PREPARATION

## 2021-05-26 NOTE — PROGRESS NOTES
ANTICOAGULATION  MANAGEMENT: Discharge Review    Kenyatta Donald chart reviewed for anticoagulation continuity of care    Hospital Admission on 5/18 for pneumonia.    Discharge disposition: TCU    Results:    Recent labs: (last 7 days)     05/19/21  1152 05/20/21  0914 05/21/21  0945 05/22/21  0858 05/24/21  0830 05/25/21  0844   INR 1.73* 1.61* 1.66* 1.98* 2.39* 2.38*     Anticoagulation inpatient management:     not applicable     Anticoagulation discharge instructions:     Warfarin dosing: TCU to manage   Bridging: No   INR goal change: No      Medication changes affecting anticoagulation: No    Additional factors affecting anticoagulation: No    Plan     No adjustment to anticoagulation plan needed    ACC will resume monitoring upon discharge from TCU    No adjustment to Anticoagulation Calendar was required    Monique Brito RN

## 2021-05-26 NOTE — PROGRESS NOTES
Clinic Care Coordination Contact  Care Coordination Transition Communication    Referral Source: IP Handoff    Clinical Data: Patient was hospitalized at [facility] from 5/18/21 to 5/25/21 with diagnosis of pneumonia.    Transition to Facility:              Facility Name: [facility]              Contact name and phone number/fax: [phone]    Fax sent to facility containing CC RN contact information and request for notification when patient discharging.    Plan: RN/SW Care Coordinator will await notification from facility staff informing RN/SW Care Coordinator of patient's discharge plans/needs. RN/SW Care Coordinator will review chart and outreach to facility staff every 4 weeks and as needed.     Tyron Luna, RN  Clinic Care Coordinator  [facility]  Anast[facility] Figueroa [place], [place] [facility]  Ph: [phone]

## 2021-05-26 NOTE — PROGRESS NOTES
[facility]  PRIMARY CARE PROVIDER AND CLINIC:  Andry Damico MD, [facility] [address] [facility]*  Chief Complaint   Patient presents with     Hospital F/U     [facility] Medical Record Number:  [id number]  Place of Service where encounter took place:  [facility] ([facility]) [25]    Kenyatta Donald  is a 85 year old  (1935), admitted to the above facility from  [facility]. Hospital stay 5/18/21 through 5/25/21..  Admitted to this facility for  rehab, medical management and nursing care.    HPI:    HPI information obtained from: facility chart records, facility staff and patient report.   Brief Summary of Hospital Course:   PMH of essential hypertension, history of breast cancer s/p bilateral mastectomy, chronic atrial fibrillation on long-term warfarin, diastolic heart failure, hypothyroidism, mild coronary artery disease, mitral valve regurgitation, sleep apnea and a spinal muscular atrophy type III who was admitted on May 18 when she presented with acute hypoxic respiratory failure and bilateral pneumonia.  Bilateral consolidative pneumonia: found to have dysphagia, likely aspiration pneumonia, initial Tx IV ceftriaxone and azithromycin with no clinical improvement,   switched to zosyn due to concerns for aspiration pneumonia. . Continues to require O2, prednisone taper, transitioned to oral augmentin, speech therapy for dysphagia and diet recommendation. OP pulmonology and GI cont for EGD as OP  Encephalopathy: possible underlying dementia. F/u OT  Spinal muscular atrophy: functional status declining, PT in TCU  Urinary retention: required mac insertion, started on flomax, mac removed prior to discharge  Chronic diastolic CHF: severe mitral regurgitation s/p mitral clip: EF 65% follows [facility] cardiology. Follow daily weights  Updates on Status Since Skilled nursing Admission: On exam today patient resting in bed, has O2 in place,  states she get DEWITT, no SOB at rest, denies cough, congestion, CP, palpitations, N/V/D or constipation, states she slept well last night, has a mac catheter in place.     CODE STATUS/ADVANCE DIRECTIVES DISCUSSION:   No CPR- Do NOT Intubate  Patient's living condition: lives alone  ALLERGIES: Patient has no known allergies.  PAST MEDICAL HISTORY:  has a past medical history of Benign essential hypertension, Juan Pablo-tachy syndrome (H), Breast cancer (H), Chronic a-fib (H), Diastolic heart failure (H), GERD (gastroesophageal reflux disease), Hyperlipidemia LDL goal <100, Hypothyroidism, Kidney stone, Mild coronary artery disease, Mitral valve regurgitation, Need for SBE (subacute bacterial endocarditis) prophylaxis, Osteoporosis, Pulmonary embolism (H), Sleep apnea, and Spinal muscular atrophy type III (H).  PAST SURGICAL HISTORY:   has a past surgical history that includes GYN surgery; orthopedic surgery; Mastectomy; Anesthesia Out Of Or Percutaneous Mitral Valve Repair (N/A, 1/9/2015); and Percutaneious Mitral Valve Repair (1/9/2015).  FAMILY HISTORY: family history includes Alzheimer Disease in her mother; Asthma in her daughter; Cancer - colorectal in her mother; Diabetes in her daughter; Family History Negative in her daughter; Heart Disease in her father; Unknown/Adopted in her maternal grandfather, maternal grandmother, paternal grandfather, and paternal grandmother.  SOCIAL HISTORY:   reports that she has never smoked. She has never used smokeless tobacco. She reports that she does not drink alcohol or use drugs.    Post Discharge Medication Reconciliation Status: discharge medications reconciled, continue medications without change    Current Outpatient Medications   Medication Sig Dispense Refill     acetaminophen (TYLENOL) 325 MG tablet Take 2 tablets (650 mg) by mouth every 4 hours as needed for mild pain 100 tablet      allopurinol (ZYLOPRIM) 100 MG tablet Take 1 tablet (100 mg) by mouth daily (Patient  taking differently: Take 100 mg by mouth every morning ) 90 tablet 3     amLODIPine (NORVASC) 5 MG tablet Take 1 tablet (5 mg) by mouth 2 times daily LAST REFILL WITHOUT AN APPOINTMENT 180 tablet 0     amoxicillin-clavulanate (AUGMENTIN) 875-125 MG tablet Take 1 tablet by mouth 2 times daily for 4 days       cholecalciferol (VITAMIN D) 1000 UNIT tablet Take 2,000 Units by mouth every morning        fluticasone (FLONASE) 50 MCG/ACT nasal spray Spray 1-2 sprays into both nostrils At Bedtime        furosemide (LASIX) 20 MG tablet TAKE 1 TABLET(20 MG) BY MOUTH EVERY MORNING (Patient taking differently: Take 20 mg by mouth every morning ) 270 tablet 0     guaiFENesin (ROBITUSSIN) 20 mg/mL SOLN solution Take 5 mLs by mouth every 6 hours as needed for cough       ipratropium-albuterol (COMBIVENT RESPIMAT)  MCG/ACT inhaler Inhale 1 puff into the lungs 4 times daily       levothyroxine (SYNTHROID/LEVOTHROID) 100 MCG tablet Take 1 tablet (100 mcg) by mouth daily 90 tablet 3     lisinopril (ZESTRIL) 20 MG tablet Take 1 tablet (20 mg) by mouth 2 times daily Needs an appointment for refills. 180 tablet 0     multivitamin w/minerals (THERA-VIT-M) tablet Take 1 tablet by mouth daily       nystatin (MYCOSTATIN) 202421 UNIT/GM external powder Apply topically 2 times daily (Patient taking differently: Apply topically 2 times daily as needed (groin rash) ) 60 Bottle 1     pantoprazole (PROTONIX) 40 MG EC tablet Take 1 tablet (40 mg) by mouth every morning (before breakfast)       tamsulosin (FLOMAX) 0.4 MG capsule Take 1 capsule (0.4 mg) by mouth daily       ASPIRIN NOT PRESCRIBED (INTENTIONAL) Please choose reason not prescribed, below (Patient not taking: Reported on 5/26/2021) 1 each 0     predniSONE (DELTASONE) 10 MG tablet 4 tabs daily for 3 days, then 3 tabs daily for 3 days, then 2 tabs daily for 3 days, then 1 tab daily for 3 days, then stop (Patient not taking: Reported on 5/26/2021) 30 tablet 0     warfarin  ANTICOAGULANT (COUMADIN) 1 MG tablet TAKE 2 TABLETS BY MOUTH EVERY MONDAY, WEDNESDAY, FRIDAY, SATURDAY. DOSE MAY CHANGE PER INR RESULT AS DIRECTED (Patient not taking: Reported on 5/26/2021) 102 tablet 1     warfarin ANTICOAGULANT (COUMADIN) 4 MG tablet Take 0.5-1 tablets (2-4 mg) by mouth daily Adjust dose per INR result as directed by anticoagulation clinic (Patient not taking: Reported on 5/26/2021) 90 tablet 0         ROS:  10 point ROS of systems including Constitutional, Eyes, Respiratory, Cardiovascular, Gastroenterology, Genitourinary, Integumentary, Musculoskeletal, Psychiatric were all negative except for pertinent positives noted in my HPI.    Vitals:  BP (!) 145/76   Pulse 77   Temp 97.8  F (36.6  C)   Resp 18   LMP  (LMP Unknown)   SpO2 92%   Exam:  GENERAL APPEARANCE:  Alert, in no distress  ENT:  Mouth and posterior oropharynx normal, moist mucous membranes, Cheesh-Na  EYES:  EOM, conjunctivae, lids, pupils and irises normal, PERRL  RESP:  respiratory effort and palpation of chest normal, lungs clear to auscultation , no respiratory distress, diminished breath sounds bases bilaterally  CV:  Palpation and auscultation of heart done , regular rate and rhythm, no murmur, rub, or gallop, peripheral edema trace+ in RLE  ABDOMEN:  normal bowel sounds, soft, nontender, no hepatosplenomegaly or other masses  M/S:   patient able to move all 4 extremitie  SKIN:  Inspection of skin and subcutaneous tissue baseline  NEURO:   speech WNL  PSYCH:  affect and mood normal    Lab/Diagnostic data:    Most Recent 3 CBC's:  Recent Labs   Lab Test 05/20/21  0914 05/19/21  1152 05/18/21  1532   WBC 10.0 7.2 11.3*   HGB 10.8* 10.2* 11.0*   MCV 93 93 91    244 274     Most Recent 3 BMP's:  Recent Labs   Lab Test 05/24/21  0830 05/20/21  0914 05/19/21  1152 05/18/21  1532     --  142 141   POTASSIUM 3.5 4.2 4.3 3.9   CHLORIDE 108  --  109 107   CO2 33*  --  28 31   BUN 23  --  23 25   CR 0.83  --  0.78 0.94    ANIONGAP 3  --  5 3   VICKIE 9.9  --  9.9 10.7*   GLC 87  --  139* 115*       ASSESSMENT/PLAN:  Pneumonia of both lungs due to infectious organism, unspecified part of lung  Hypoxia  Physical deconditioning  Acute/ongoing: PT and OT, augmentin 875/125mg BID for 4 days, prednisone taper, IS QID and prn, monitor SaO2 at rest and with activity, wean off O2 keep SaO2 > 90%    Dysphagia:   Acute/ongoing: ST evaluation and Tx, continue diet DD3 with thin liquids    Encephalopathy  Acute/ongoing: possible dementia, OT evaluation    Urinary retention  Acute/ongoing: continue flomax 0.4mg QD, TOV when patient up walking > 100 feet with therapy    Chronic diastolic heart failure (H)  Chronic atrial fibrillation (H)  Benign essential hypertension  Long term current use of anticoagulant therapy  Acute/ongoing: daily weights, vitals daily and prn, BMP follow, continue lisinopril 20mg BID, Lasix 20mg QD, norvasc 5mg BID, coumadin as directed INR goal of 2-3    Spinal muscular atrophy type III causing decreased ability of respiratory mm-onset 45y/o   Ongoing: functional decline at home, PT eval.        Orders:   CBC and BMP on Tuesday  Wean off O2, keep SaO2 > 90%  TOV when patient walking > 100feet  ST eval. And Tx      Total time spent with patient visit at the skilled nursing facility was 35 min including patient visit and review of past records. Greater than 50% of total time spent with counseling and coordinating care due to discussed patient O2, will attempt to wean, encouraged iS QID and prn, will attempt to DC mac once patient up walking, will monitor labs, next labs on Tuesday.     Electronically signed by:  Tonya Lynn Haase, APRN CNP

## 2021-05-26 NOTE — LETTER
5/26/2021        RE: Kenyatta Donald  [address]  [facility] [address]        [place] [facility]  PRIMARY CARE PROVIDER AND CLINIC:  Andry Damico MD, [facility] [place] [address] [facility]*  Chief Complaint   Patient presents with     Hospital F/U     [facility] Record Number:  [id number]  Place of Service where encounter took place:  [facility] (U) [25]    Kenyatta Donald  is a 85 year old  (1935), admitted to the above facility from  [facility]. Hospital stay 5/18/21 through 5/25/21..  Admitted to this facility for  rehab, medical management and nursing care.    HPI:    HPI information obtained from: facility chart records, facility staff and patient report.   Brief Summary of Hospital Course:   PMH of essential hypertension, history of breast cancer s/p bilateral mastectomy, chronic atrial fibrillation on long-term warfarin, diastolic heart failure, hypothyroidism, mild coronary artery disease, mitral valve regurgitation, sleep apnea and a spinal muscular atrophy type III who was admitted on May 18 when she presented with acute hypoxic respiratory failure and bilateral pneumonia.  Bilateral consolidative pneumonia: found to have dysphagia, likely aspiration pneumonia, initial Tx IV ceftriaxone and azithromycin with no clinical improvement,   switched to zosyn due to concerns for aspiration pneumonia. . Continues to require O2, prednisone taper, transitioned to oral augmentin, speech therapy for dysphagia and diet recommendation. OP pulmonology and GI cont for EGD as OP  Encephalopathy: possible underlying dementia. F/u OT  Spinal muscular atrophy: functional status declining, PT in TCU  Urinary retention: required mac insertion, started on flomax, mac removed prior to discharge  Chronic diastolic CHF: severe mitral regurgitation s/p mitral clip: EF 65% follows [facility] cardiology. Follow daily  weights  Updates on Status Since Skilled nursing Admission: On exam today patient resting in bed, has O2 in place, states she get DEWITT, no SOB at rest, denies cough, congestion, CP, palpitations, N/V/D or constipation, states she slept well last night, has a mac catheter in place.     CODE STATUS/ADVANCE DIRECTIVES DISCUSSION:   No CPR- Do NOT Intubate  Patient's living condition: lives alone  ALLERGIES: Patient has no known allergies.  PAST MEDICAL HISTORY:  has a past medical history of Benign essential hypertension, Juan Pablo-tachy syndrome (H), Breast cancer (H), Chronic a-fib (H), Diastolic heart failure (H), GERD (gastroesophageal reflux disease), Hyperlipidemia LDL goal <100, Hypothyroidism, Kidney stone, Mild coronary artery disease, Mitral valve regurgitation, Need for SBE (subacute bacterial endocarditis) prophylaxis, Osteoporosis, Pulmonary embolism (H), Sleep apnea, and Spinal muscular atrophy type III (H).  PAST SURGICAL HISTORY:   has a past surgical history that includes GYN surgery; orthopedic surgery; Mastectomy; Anesthesia Out Of Or Percutaneous Mitral Valve Repair (N/A, 1/9/2015); and Percutaneious Mitral Valve Repair (1/9/2015).  FAMILY HISTORY: family history includes Alzheimer Disease in her mother; Asthma in her daughter; Cancer - colorectal in her mother; Diabetes in her daughter; Family History Negative in her daughter; Heart Disease in her father; Unknown/Adopted in her maternal grandfather, maternal grandmother, paternal grandfather, and paternal grandmother.  SOCIAL HISTORY:   reports that she has never smoked. She has never used smokeless tobacco. She reports that she does not drink alcohol or use drugs.    Post Discharge Medication Reconciliation Status: discharge medications reconciled, continue medications without change    Current Outpatient Medications   Medication Sig Dispense Refill     acetaminophen (TYLENOL) 325 MG tablet Take 2 tablets (650 mg) by mouth every 4 hours as needed for  mild pain 100 tablet      allopurinol (ZYLOPRIM) 100 MG tablet Take 1 tablet (100 mg) by mouth daily (Patient taking differently: Take 100 mg by mouth every morning ) 90 tablet 3     amLODIPine (NORVASC) 5 MG tablet Take 1 tablet (5 mg) by mouth 2 times daily LAST REFILL WITHOUT AN APPOINTMENT 180 tablet 0     amoxicillin-clavulanate (AUGMENTIN) 875-125 MG tablet Take 1 tablet by mouth 2 times daily for 4 days       cholecalciferol (VITAMIN D) 1000 UNIT tablet Take 2,000 Units by mouth every morning        fluticasone (FLONASE) 50 MCG/ACT nasal spray Spray 1-2 sprays into both nostrils At Bedtime        furosemide (LASIX) 20 MG tablet TAKE 1 TABLET(20 MG) BY MOUTH EVERY MORNING (Patient taking differently: Take 20 mg by mouth every morning ) 270 tablet 0     guaiFENesin (ROBITUSSIN) 20 mg/mL SOLN solution Take 5 mLs by mouth every 6 hours as needed for cough       ipratropium-albuterol (COMBIVENT RESPIMAT)  MCG/ACT inhaler Inhale 1 puff into the lungs 4 times daily       levothyroxine (SYNTHROID/LEVOTHROID) 100 MCG tablet Take 1 tablet (100 mcg) by mouth daily 90 tablet 3     lisinopril (ZESTRIL) 20 MG tablet Take 1 tablet (20 mg) by mouth 2 times daily Needs an appointment for refills. 180 tablet 0     multivitamin w/minerals (THERA-VIT-M) tablet Take 1 tablet by mouth daily       nystatin (MYCOSTATIN) 089277 UNIT/GM external powder Apply topically 2 times daily (Patient taking differently: Apply topically 2 times daily as needed (groin rash) ) 60 Bottle 1     pantoprazole (PROTONIX) 40 MG EC tablet Take 1 tablet (40 mg) by mouth every morning (before breakfast)       tamsulosin (FLOMAX) 0.4 MG capsule Take 1 capsule (0.4 mg) by mouth daily       ASPIRIN NOT PRESCRIBED (INTENTIONAL) Please choose reason not prescribed, below (Patient not taking: Reported on 5/26/2021) 1 each 0     predniSONE (DELTASONE) 10 MG tablet 4 tabs daily for 3 days, then 3 tabs daily for 3 days, then 2 tabs daily for 3 days, then 1  tab daily for 3 days, then stop (Patient not taking: Reported on 5/26/2021) 30 tablet 0     warfarin ANTICOAGULANT (COUMADIN) 1 MG tablet TAKE 2 TABLETS BY MOUTH EVERY MONDAY, WEDNESDAY, FRIDAY, SATURDAY. DOSE MAY CHANGE PER INR RESULT AS DIRECTED (Patient not taking: Reported on 5/26/2021) 102 tablet 1     warfarin ANTICOAGULANT (COUMADIN) 4 MG tablet Take 0.5-1 tablets (2-4 mg) by mouth daily Adjust dose per INR result as directed by anticoagulation clinic (Patient not taking: Reported on 5/26/2021) 90 tablet 0         ROS:  10 point ROS of systems including Constitutional, Eyes, Respiratory, Cardiovascular, Gastroenterology, Genitourinary, Integumentary, Musculoskeletal, Psychiatric were all negative except for pertinent positives noted in my HPI.    Vitals:  BP (!) 145/76   Pulse 77   Temp 97.8  F (36.6  C)   Resp 18   LMP  (LMP Unknown)   SpO2 92%   Exam:  GENERAL APPEARANCE:  Alert, in no distress  ENT:  Mouth and posterior oropharynx normal, moist mucous membranes, [place]  EYES:  EOM, conjunctivae, lids, pupils and irises normal, PERRL  RESP:  respiratory effort and palpation of chest normal, lungs clear to auscultation , no respiratory distress, diminished breath sounds bases bilaterally  CV:  Palpation and auscultation of heart done , regular rate and rhythm, no murmur, rub, or gallop, peripheral edema trace+ in RLE  ABDOMEN:  normal bowel sounds, soft, nontender, no hepatosplenomegaly or other masses  M/S:   patient able to move all 4 extremitie  SKIN:  Inspection of skin and subcutaneous tissue baseline  NEURO:   speech WNL  PSYCH:  affect and mood normal    Lab/Diagnostic data:    Most Recent 3 CBC's:  Recent Labs   Lab Test 05/20/21  0914 05/19/21  1152 05/18/21  1532   WBC 10.0 7.2 11.3*   HGB 10.8* 10.2* 11.0*   MCV 93 93 91    244 274     Most Recent 3 BMP's:  Recent Labs   Lab Test 05/24/21  0830 05/20/21  0914 05/19/21  1152 05/18/21  1532     --  142 141   POTASSIUM 3.5 4.2 4.3 3.9    CHLORIDE 108  --  109 107   CO2 33*  --  28 31   BUN 23  --  23 25   CR 0.83  --  0.78 0.94   ANIONGAP 3  --  5 3   VICKIE 9.9  --  9.9 10.7*   GLC 87  --  139* 115*       ASSESSMENT/PLAN:  Pneumonia of both lungs due to infectious organism, unspecified part of lung  Hypoxia  Physical deconditioning  Acute/ongoing: PT and OT, augmentin 875/125mg BID for 4 days, prednisone taper, IS QID and prn, monitor SaO2 at rest and with activity, wean off O2 keep SaO2 > 90%    Dysphagia:   Acute/ongoing: ST evaluation and Tx, continue diet DD3 with thin liquids    Encephalopathy  Acute/ongoing: possible dementia, OT evaluation    Urinary retention  Acute/ongoing: continue flomax 0.4mg QD, TOV when patient up walking > 100 feet with therapy    Chronic diastolic heart failure (H)  Chronic atrial fibrillation (H)  Benign essential hypertension  Long term current use of anticoagulant therapy  Acute/ongoing: daily weights, vitals daily and prn, BMP follow, continue lisinopril 20mg BID, Lasix 20mg QD, norvasc 5mg BID, coumadin as directed INR goal of 2-3    Spinal muscular atrophy type III causing decreased ability of respiratory mm-onset 43y/o   Ongoing: functional decline at home, PT eval.        Orders:   CBC and BMP on Tuesday  Wean off O2, keep SaO2 > 90%  TOV when patient walking > 100feet  ST eval. And Tx      Total time spent with patient visit at the skilled nursing facility was 35 min including patient visit and review of past records. Greater than 50% of total time spent with counseling and coordinating care due to discussed patient O2, will attempt to wean, encouraged iS QID and prn, will attempt to DC mac once patient up walking, will monitor labs, next labs on Tuesday.     Electronically signed by:  Tonya Lynn Haase, APRN CNP                         Sincerely,        Tonya Lynn Haase, APRN CNP

## 2021-05-26 NOTE — PLAN OF CARE
Speech Language Therapy Discharge Summary    Reason for therapy discharge:    Discharged to transitional care facility.    Progress towards therapy goal(s). See goals on Care Plan in [facility] electronic health record for goal details.  Goals not met.  Barriers to achieving goals:   discharge from facility.    Therapy recommendation(s):    Continued therapy is recommended.  Rationale/Recommendations:  Continue short term ST needs for dysphagia management.GI follow up as an OP and she discharged on a DDL 3 with thin liquids.

## 2021-05-26 NOTE — TELEPHONE ENCOUNTER
New TCU admit needing Coumadin orders. INR today 2.38 which is within goal of INR 2-3 for afib. However, she is also still on antibiotics and prednisone. Takes 2-4 mg nightly most recently as outpatient. I was able to review hospital administration mg as follows since 5/18/21: 0, 4, 2, 4, 4, 2, 2.     PLAN: Coumadin 3 mg tonight with recheck INR tomorrow. My colleague will being doing an admission visit then and can provide further on-going orders.    Ella Cheung, DO

## 2021-05-26 NOTE — LETTER
[facility]   To:   [facility]          Please give to facility    From:   Tyron Luna RN Care Coordinator [facility]     Patient Name:  Kenyatta Donald  YOB: 1935   Admit date: 5/25/21      *Information Needed:  Please contact me when the patient will discharge (or if they will move to long term care)- include the discharge date, disposition, and main diagnosis   - If the patient is discharged with home care services, please provide the name of the agency    Also- Please inform me if a care conference is being held.   Phone, Fax or Email with information       Thank You,   Tyron Luna, RN  Clinic Care Coordinator  [facility]  [facility]  Ph: [phone]    [email or web address]

## 2021-05-28 ENCOUNTER — NURSING HOME VISIT (OUTPATIENT)
Dept: GERIATRICS | Facility: CLINIC | Age: 86
End: 2021-05-28
Payer: COMMERCIAL

## 2021-05-28 VITALS
DIASTOLIC BLOOD PRESSURE: 74 MMHG | BODY MASS INDEX: 29.56 KG/M2 | HEART RATE: 83 BPM | HEIGHT: 65 IN | OXYGEN SATURATION: 92 % | SYSTOLIC BLOOD PRESSURE: 152 MMHG | WEIGHT: 177.4 LBS | RESPIRATION RATE: 18 BRPM | TEMPERATURE: 97.7 F

## 2021-05-28 DIAGNOSIS — J18.9 PNEUMONIA OF BOTH LUNGS DUE TO INFECTIOUS ORGANISM, UNSPECIFIED PART OF LUNG: Primary | ICD-10-CM

## 2021-05-28 DIAGNOSIS — I48.20 CHRONIC ATRIAL FIBRILLATION (H): ICD-10-CM

## 2021-05-28 DIAGNOSIS — Z79.01 LONG TERM CURRENT USE OF ANTICOAGULANT THERAPY: ICD-10-CM

## 2021-05-28 DIAGNOSIS — L89.323 PRESSURE INJURY OF LEFT BUTTOCK, STAGE 3 (H): ICD-10-CM

## 2021-05-28 DIAGNOSIS — G93.40 ENCEPHALOPATHY: ICD-10-CM

## 2021-05-28 DIAGNOSIS — R53.81 PHYSICAL DECONDITIONING: ICD-10-CM

## 2021-05-28 DIAGNOSIS — R33.9 URINARY RETENTION: ICD-10-CM

## 2021-05-28 DIAGNOSIS — R13.10 DYSPHAGIA, UNSPECIFIED TYPE: ICD-10-CM

## 2021-05-28 DIAGNOSIS — G12.1 SPINAL MUSCULAR ATROPHY TYPE III (H): ICD-10-CM

## 2021-05-28 DIAGNOSIS — I50.32 CHRONIC DIASTOLIC HEART FAILURE (H): ICD-10-CM

## 2021-05-28 DIAGNOSIS — I10 BENIGN ESSENTIAL HYPERTENSION: ICD-10-CM

## 2021-05-28 DIAGNOSIS — R09.02 HYPOXIA: ICD-10-CM

## 2021-05-28 PROCEDURE — 99310 SBSQ NF CARE HIGH MDM 45: CPT | Performed by: NURSE PRACTITIONER

## 2021-05-28 ASSESSMENT — MIFFLIN-ST. JEOR: SCORE: 1255.56

## 2021-05-28 NOTE — PROGRESS NOTES
"[place] [facility]  [place] Medical Record Number:  [id number]  Place of Service where encounter took place:  [facility] (TCU) [25]  Chief Complaint   Patient presents with     RECHECK       HPI:    Kenyatta Donald  is a 85 year old (1935), who is being seen today for an episodic care visit.  HPI information obtained from: facility chart records, facility staff, patient report and [facility] chart review. Today's concern is:  Pneumonia/hypoxia: on exam today patient resting in bed, states she is feeling \"better\" less SOB, no SOB at rest, she is not walking, states she needs lift to get OOB, SaO2 92% on room air, but patient on 3 liters of O2 this AM, denies cough, congestion, fever, chills  Encephalopathy: BIMS 10/15, SBT 10/28, patient alert, oriented to person and place this AM  Urinary retention: mac catheter in place, no c/o discomfort.   HTN/Atrial fib/dCHF: BP as follows 152/74, 152/75, 141/67 with HR in 70-80 range, denies CP, palpitations weight stable 177.4lbs.   Spinal muscular atrophy: patient deconditioned, needing total assist with cares and lift for transfers.   Pressure ulcer on left gluteal fold: stage 3 nursing notes wound measures 2.5cm X 1cm 20-30% slough. Patient states she has pain when she sits on her bottom.     Past Medical and Surgical History reviewed in Epic today.    MEDICATIONS:    Current Outpatient Medications   Medication Sig Dispense Refill     acetaminophen (TYLENOL) 325 MG tablet Take 2 tablets (650 mg) by mouth every 4 hours as needed for mild pain 100 tablet      allopurinol (ZYLOPRIM) 100 MG tablet Take 1 tablet (100 mg) by mouth daily (Patient taking differently: Take 100 mg by mouth every morning ) 90 tablet 3     amLODIPine (NORVASC) 5 MG tablet Take 1 tablet (5 mg) by mouth 2 times daily LAST REFILL WITHOUT AN APPOINTMENT 180 tablet 0     amoxicillin-clavulanate (AUGMENTIN) 875-125 MG tablet Take 1 tablet by mouth 2 times daily for 4 days       " ASPIRIN NOT PRESCRIBED (INTENTIONAL) Please choose reason not prescribed, below 1 each 0     cholecalciferol (VITAMIN D) 1000 UNIT tablet Take 2,000 Units by mouth every morning        fluticasone (FLONASE) 50 MCG/ACT nasal spray Spray 1-2 sprays into both nostrils At Bedtime        furosemide (LASIX) 20 MG tablet TAKE 1 TABLET(20 MG) BY MOUTH EVERY MORNING (Patient taking differently: Take 20 mg by mouth every morning ) 270 tablet 0     guaiFENesin (ROBITUSSIN) 20 mg/mL SOLN solution Take 5 mLs by mouth every 6 hours as needed for cough       ipratropium-albuterol (COMBIVENT RESPIMAT)  MCG/ACT inhaler Inhale 1 puff into the lungs 4 times daily       levothyroxine (SYNTHROID/LEVOTHROID) 100 MCG tablet Take 1 tablet (100 mcg) by mouth daily 90 tablet 3     lisinopril (ZESTRIL) 20 MG tablet Take 1 tablet (20 mg) by mouth 2 times daily Needs an appointment for refills. 180 tablet 0     multivitamin w/minerals (THERA-VIT-M) tablet Take 1 tablet by mouth daily       nystatin (MYCOSTATIN) 502953 UNIT/GM external powder Apply topically 2 times daily (Patient taking differently: Apply topically 2 times daily as needed (groin rash) ) 60 Bottle 1     pantoprazole (PROTONIX) 40 MG EC tablet Take 1 tablet (40 mg) by mouth every morning (before breakfast)       predniSONE (DELTASONE) 10 MG tablet 4 tabs daily for 3 days, then 3 tabs daily for 3 days, then 2 tabs daily for 3 days, then 1 tab daily for 3 days, then stop 30 tablet 0     tamsulosin (FLOMAX) 0.4 MG capsule Take 1 capsule (0.4 mg) by mouth daily       warfarin ANTICOAGULANT (COUMADIN) 1 MG tablet TAKE 2 TABLETS BY MOUTH EVERY MONDAY, WEDNESDAY, FRIDAY, SATURDAY. DOSE MAY CHANGE PER INR RESULT AS DIRECTED 102 tablet 1         REVIEW OF SYSTEMS:  10 point ROS of systems including Constitutional, Eyes, Respiratory, Cardiovascular, Gastroenterology, Genitourinary, Integumentary, Musculoskeletal, Psychiatric were all negative except for pertinent positives noted in  "my HPI.    Objective:  BP (!) 152/74   Pulse 83   Temp 97.7  F (36.5  C)   Resp 18   Ht 1.651 m (5' 5\")   Wt 80.5 kg (177 lb 6.4 oz)   LMP  (LMP Unknown)   SpO2 92%   BMI 29.52 kg/m    Exam:  GENERAL APPEARANCE:  Alert, in no distress  ENT:  Mouth and posterior oropharynx normal, moist mucous membranes, [place]  EYES:  EOM, conjunctivae, lids, pupils and irises normal, PERRL  RESP:  respiratory effort and palpation of chest normal, lungs clear to auscultation , no respiratory distress, diminished breath sounds bases bilaterally  CV:  Palpation and auscultation of heart done , regular rate and rhythm, no murmur, rub, or gallop, peripheral edema trace+ in RLE  ABDOMEN:  normal bowel sounds, soft, nontender, no hepatosplenomegaly or other masses  M/S:   patient able to move all 4 extremitie  SKIN:  Inspection of skin and subcutaneous tissue baseline  NEURO:   speech WNL  PSYCH:  affect and mood normal    Labs:     Most Recent 3 CBC's:  Recent Labs   Lab Test 05/20/21  0914 05/19/21  1152 05/18/21  1532   WBC 10.0 7.2 11.3*   HGB 10.8* 10.2* 11.0*   MCV 93 93 91    244 274     Most Recent 3 BMP's:  Recent Labs   Lab Test 05/24/21  0830 05/20/21  0914 05/19/21  1152 05/18/21  1532     --  142 141   POTASSIUM 3.5 4.2 4.3 3.9   CHLORIDE 108  --  109 107   CO2 33*  --  28 31   BUN 23  --  23 25   CR 0.83  --  0.78 0.94   ANIONGAP 3  --  5 3   VICKIE 9.9  --  9.9 10.7*   GLC 87  --  139* 115*       ASSESSMENT/PLAN:  Pneumonia of both lungs due to infectious organism, unspecified part of lung  Hypoxia  Physical deconditioning  Acute/ongoing: PT and OT, augmentin 875/125mg BID for 4 days, prednisone taper, IS QID and prn, monitor SaO2 at rest and with activity, wean off O2 keep SaO2 > 90%     Dysphagia:   Acute/ongoing: ST evaluation and Tx, continue diet DD3 with thin liquids     Encephalopathy  Acute/ongoing: possible dementia, OT evaluation     Urinary retention  Acute/ongoing: continue flomax 0.4mg QD, TOV " when patient up walking > 100 feet with therapy     Chronic diastolic heart failure (H)  Chronic atrial fibrillation (H)  Benign essential hypertension  Long term current use of anticoagulant therapy  Acute/ongoing: daily weights, vitals daily and prn, BMP follow, continue lisinopril 20mg BID, Lasix 20mg QD, norvasc 5mg BID, coumadin as directed INR goal of 2-3     Spinal muscular atrophy type III causing decreased ability of respiratory mm-onset 45y/o   Ongoing: functional decline at home, PT eval.     Stage 3 pressure ulcer left gluteal fold  Acute/ongoing: dressing change daily, followed by wound physician.     order  No new orders    Total time spent with patient visit at the skilled nursing facility was 35 min including patient visit and review of past records. Greater than 50% of total time spent with counseling and coordinating care due to discussed O2 and weaning down, encouraged patient to use IS and deep breathing to wean off o2, discussed therapy patient needing to get up and out of bed throughout the day.  Electronically signed by:  Tonya Lynn Haase, APRN CNP

## 2021-06-01 ENCOUNTER — NURSING HOME VISIT (OUTPATIENT)
Dept: GERIATRICS | Facility: CLINIC | Age: 86
End: 2021-06-01
Payer: COMMERCIAL

## 2021-06-01 ENCOUNTER — TRANSFERRED RECORDS (OUTPATIENT)
Dept: HEALTH INFORMATION MANAGEMENT | Facility: CLINIC | Age: 86
End: 2021-06-01

## 2021-06-01 VITALS
SYSTOLIC BLOOD PRESSURE: 121 MMHG | DIASTOLIC BLOOD PRESSURE: 58 MMHG | OXYGEN SATURATION: 97 % | BODY MASS INDEX: 29.47 KG/M2 | WEIGHT: 176.9 LBS | HEIGHT: 65 IN | TEMPERATURE: 97.9 F | HEART RATE: 74 BPM | RESPIRATION RATE: 16 BRPM

## 2021-06-01 DIAGNOSIS — I50.32 CHRONIC DIASTOLIC HEART FAILURE (H): ICD-10-CM

## 2021-06-01 DIAGNOSIS — Z79.01 LONG TERM CURRENT USE OF ANTICOAGULANT THERAPY: ICD-10-CM

## 2021-06-01 DIAGNOSIS — G93.40 ENCEPHALOPATHY: ICD-10-CM

## 2021-06-01 DIAGNOSIS — I10 BENIGN ESSENTIAL HYPERTENSION: ICD-10-CM

## 2021-06-01 DIAGNOSIS — I48.20 CHRONIC ATRIAL FIBRILLATION (H): ICD-10-CM

## 2021-06-01 DIAGNOSIS — R53.81 PHYSICAL DECONDITIONING: ICD-10-CM

## 2021-06-01 DIAGNOSIS — J18.9 PNEUMONIA OF BOTH LUNGS DUE TO INFECTIOUS ORGANISM, UNSPECIFIED PART OF LUNG: Primary | ICD-10-CM

## 2021-06-01 DIAGNOSIS — R13.10 DYSPHAGIA, UNSPECIFIED TYPE: ICD-10-CM

## 2021-06-01 DIAGNOSIS — G12.1 SPINAL MUSCULAR ATROPHY TYPE III (H): ICD-10-CM

## 2021-06-01 DIAGNOSIS — R09.02 HYPOXIA: ICD-10-CM

## 2021-06-01 DIAGNOSIS — R33.9 URINARY RETENTION: ICD-10-CM

## 2021-06-01 LAB
ANION GAP SERPL CALCULATED.3IONS-SCNC: 8 MMOL/L (ref 7–16)
BUN SERPL-MCNC: 37 MG/DL (ref 7–26)
CALCIUM SERPL-MCNC: 10.8 MG/DL (ref 8.4–10.4)
CHLORIDE SERPLBLD-SCNC: 97 MMOL/L (ref 98–109)
CO2 SERPL-SCNC: 35 MMOL/L (ref 20–29)
CREAT SERPL-MCNC: 0.66 MG/DL (ref 0.55–1.02)
GFR SERPL CREATININE-BSD FRML MDRD: >60 ML/MIN/1.73M2
GLUCOSE SERPL-MCNC: 75 MG/DL (ref 70–100)
HEMOGLOBIN: 11 G/DL (ref 12–15.5)
POTASSIUM SERPL-SCNC: 4.7 MMOL/L (ref 3.5–5.1)
SODIUM SERPL-SCNC: 140 MMOL/L (ref 136–145)

## 2021-06-01 PROCEDURE — 99309 SBSQ NF CARE MODERATE MDM 30: CPT | Performed by: NURSE PRACTITIONER

## 2021-06-01 ASSESSMENT — MIFFLIN-ST. JEOR: SCORE: 1253.29

## 2021-06-01 NOTE — PROGRESS NOTES
[facility] [facility]  [place] Medical Record Number:  [id number]  Place of Service where encounter took place:  [facility] (U) [25]  Chief Complaint   Patient presents with     RECHECK       HPI:    Kenyatta Donald  is a 85 year old (1935), who is being seen today for an episodic care visit.  HPI information obtained from: facility chart records, facility staff, patient report and [facility] chart review. Today's concern is:  Pneumonia/hypoxia: on exam today patient resting in bed, denies SOB, she requires use of a lift for transfers, , states she needs lift to get OOB, SaO2 98% on 2 liters of O2, denies cough, congestion, fever, chills  Encephalopathy: BIMS 10/15, SBT 10/28, patient alert, oriented to person and place this AM  Urinary retention: mac catheter in place, no c/o discomfort.   HTN/Atrial fib/dCHF: BP as follows 163/76, 121/58, 116/60 with HR in 70-80 range, denies CP, palpitations weight stable 177.4lbs.   Spinal muscular atrophy: patient deconditioned, needing total assist with cares and lift for transfers.   Pressure ulcer on left gluteal fold: stage 3 nursing notes wound measures 2.5cm X 1cm 20-30% slough. Patient states she has pain when she sits on her bottom.     Past Medical and Surgical History reviewed in Epic today.    MEDICATIONS:    Current Outpatient Medications   Medication Sig Dispense Refill     acetaminophen (TYLENOL) 325 MG tablet Take 2 tablets (650 mg) by mouth every 4 hours as needed for mild pain 100 tablet      allopurinol (ZYLOPRIM) 100 MG tablet Take 1 tablet (100 mg) by mouth daily (Patient taking differently: Take 100 mg by mouth every morning ) 90 tablet 3     amLODIPine (NORVASC) 5 MG tablet Take 1 tablet (5 mg) by mouth 2 times daily LAST REFILL WITHOUT AN APPOINTMENT 180 tablet 0     ASPIRIN NOT PRESCRIBED (INTENTIONAL) Please choose reason not prescribed, below 1 each 0     cholecalciferol (VITAMIN D) 1000 UNIT tablet Take 2,000 Units by  "mouth every morning        fluticasone (FLONASE) 50 MCG/ACT nasal spray Spray 1-2 sprays into both nostrils At Bedtime        furosemide (LASIX) 20 MG tablet TAKE 1 TABLET(20 MG) BY MOUTH EVERY MORNING (Patient taking differently: Take 20 mg by mouth every morning ) 270 tablet 0     guaiFENesin (ROBITUSSIN) 20 mg/mL SOLN solution Take 5 mLs by mouth every 6 hours as needed for cough       ipratropium-albuterol (COMBIVENT RESPIMAT)  MCG/ACT inhaler Inhale 1 puff into the lungs 4 times daily       levothyroxine (SYNTHROID/LEVOTHROID) 100 MCG tablet Take 1 tablet (100 mcg) by mouth daily 90 tablet 3     lisinopril (ZESTRIL) 20 MG tablet Take 1 tablet (20 mg) by mouth 2 times daily Needs an appointment for refills. 180 tablet 0     multivitamin w/minerals (THERA-VIT-M) tablet Take 1 tablet by mouth daily       nystatin (MYCOSTATIN) 758665 UNIT/GM external powder Apply topically 2 times daily (Patient taking differently: Apply topically 2 times daily as needed (groin rash) ) 60 Bottle 1     pantoprazole (PROTONIX) 40 MG EC tablet Take 1 tablet (40 mg) by mouth every morning (before breakfast)       predniSONE (DELTASONE) 10 MG tablet 4 tabs daily for 3 days, then 3 tabs daily for 3 days, then 2 tabs daily for 3 days, then 1 tab daily for 3 days, then stop 30 tablet 0     tamsulosin (FLOMAX) 0.4 MG capsule Take 1 capsule (0.4 mg) by mouth daily       warfarin ANTICOAGULANT (COUMADIN) 1 MG tablet TAKE 2 TABLETS BY MOUTH EVERY MONDAY, WEDNESDAY, FRIDAY, SATURDAY. DOSE MAY CHANGE PER INR RESULT AS DIRECTED 102 tablet 1         REVIEW OF SYSTEMS:  10 point ROS of systems including Constitutional, Eyes, Respiratory, Cardiovascular, Gastroenterology, Genitourinary, Integumentary, Musculoskeletal, Psychiatric were all negative except for pertinent positives noted in my HPI.    Objective:  /58   Pulse 74   Temp 97.9  F (36.6  C)   Resp 16   Ht 1.651 m (5' 5\")   Wt 80.2 kg (176 lb 14.4 oz)   LMP  (LMP Unknown)   " SpO2 97%   BMI 29.44 kg/m    Exam:  GENERAL APPEARANCE:  Alert, in no distress  ENT:  Mouth and posterior oropharynx normal, moist mucous membranes, [place]  EYES:  EOM, conjunctivae, lids, pupils and irises normal, PERRL  RESP:  respiratory effort and palpation of chest normal, lungs clear to auscultation , no respiratory distress, diminished breath sounds bases bilaterally  CV:  Palpation and auscultation of heart done , regular rate and rhythm, no murmur, rub, or gallop, peripheral edema trace+ in RLE  ABDOMEN:  normal bowel sounds, soft, nontender, no hepatosplenomegaly or other masses  M/S:   patient able to move all 4 extremitie  SKIN:  Inspection of skin and subcutaneous tissue baseline  NEURO:   speech WNL  PSYCH:  affect and mood normal    Labs:     Most Recent 3 CBC's:  Recent Labs   Lab Test 05/20/21  0914 05/19/21  1152 05/18/21  1532   WBC 10.0 7.2 11.3*   HGB 10.8* 10.2* 11.0*   MCV 93 93 91    244 274     Most Recent 3 BMP's:  Recent Labs   Lab Test 05/24/21  0830 05/20/21  0914 05/19/21  1152 05/18/21  1532     --  142 141   POTASSIUM 3.5 4.2 4.3 3.9   CHLORIDE 108  --  109 107   CO2 33*  --  28 31   BUN 23  --  23 25   CR 0.83  --  0.78 0.94   ANIONGAP 3  --  5 3   VICKIE 9.9  --  9.9 10.7*   GLC 87  --  139* 115*       ASSESSMENT/PLAN:  Pneumonia of both lungs due to infectious organism, unspecified part of lung  Hypoxia  Physical deconditioning  Acute/ongoing: PT and OT, augmentin finished, prednisone taper continues, IS QID and prn, monitor SaO2 at rest and with activity, wean off O2 keep SaO2 > 90%     Dysphagia:   Acute/ongoing:  continue soft and bite sized diet with thin liquids     Encephalopathy  Acute/ongoing: cognitive impairement, OT evaluation     Urinary retention  Acute/ongoing: continue flomax 0.4mg QD, TOV when patient up walking > 100 feet with therapy     Chronic diastolic heart failure (H)  Chronic atrial fibrillation (H)  Benign essential hypertension  Long term current  use of anticoagulant therapy  Acute/ongoing: daily weights, vitals daily and prn, BMP follow, continue lisinopril 20mg BID, Lasix 20mg QD, norvasc 5mg BID, coumadin as directed INR goal of 2-3     Spinal muscular atrophy type III causing decreased ability of respiratory mm-onset 43y/o   Ongoing: functional decline at home, PT eval.      Stage 3 pressure ulcer left gluteal fold  Acute/ongoing: dressing change daily, followed by wound physician.     orders  No new orders      Electronically signed by:  Tonya Lynn Haase, APRN CNP

## 2021-06-03 ENCOUNTER — NURSING HOME VISIT (OUTPATIENT)
Dept: GERIATRICS | Facility: CLINIC | Age: 86
End: 2021-06-03
Payer: COMMERCIAL

## 2021-06-03 VITALS
DIASTOLIC BLOOD PRESSURE: 68 MMHG | BODY MASS INDEX: 29.51 KG/M2 | WEIGHT: 177.1 LBS | TEMPERATURE: 97.8 F | HEART RATE: 81 BPM | OXYGEN SATURATION: 92 % | SYSTOLIC BLOOD PRESSURE: 150 MMHG | RESPIRATION RATE: 16 BRPM | HEIGHT: 65 IN

## 2021-06-03 DIAGNOSIS — R13.10 DYSPHAGIA, UNSPECIFIED TYPE: ICD-10-CM

## 2021-06-03 DIAGNOSIS — I50.32 CHRONIC DIASTOLIC HEART FAILURE (H): ICD-10-CM

## 2021-06-03 DIAGNOSIS — R33.9 URINARY RETENTION: ICD-10-CM

## 2021-06-03 DIAGNOSIS — Z79.01 LONG TERM CURRENT USE OF ANTICOAGULANT THERAPY: ICD-10-CM

## 2021-06-03 DIAGNOSIS — I10 BENIGN ESSENTIAL HYPERTENSION: ICD-10-CM

## 2021-06-03 DIAGNOSIS — R53.81 PHYSICAL DECONDITIONING: ICD-10-CM

## 2021-06-03 DIAGNOSIS — G12.1 SPINAL MUSCULAR ATROPHY TYPE III (H): ICD-10-CM

## 2021-06-03 DIAGNOSIS — R09.02 HYPOXIA: ICD-10-CM

## 2021-06-03 DIAGNOSIS — G93.40 ENCEPHALOPATHY: ICD-10-CM

## 2021-06-03 DIAGNOSIS — I48.20 CHRONIC ATRIAL FIBRILLATION (H): ICD-10-CM

## 2021-06-03 DIAGNOSIS — J18.9 PNEUMONIA OF BOTH LUNGS DUE TO INFECTIOUS ORGANISM, UNSPECIFIED PART OF LUNG: Primary | ICD-10-CM

## 2021-06-03 PROCEDURE — 99309 SBSQ NF CARE MODERATE MDM 30: CPT | Performed by: NURSE PRACTITIONER

## 2021-06-03 ASSESSMENT — MIFFLIN-ST. JEOR: SCORE: 1254.2

## 2021-06-03 NOTE — PROGRESS NOTES
[place] [facility]  [place] Medical Record Number:  [id number]  Place of Service where encounter took place:  [facility] ([facility]) [25]  Chief Complaint   Patient presents with     Nursing Home Acute       HPI:    Kenyatta Donald  is a 85 year old (1935), who is being seen today for an episodic care visit.  HPI information obtained from: facility chart records, facility staff, patient report and [facility] chart review. Today's concern is:  Pneumonia/hypoxia: on exam today patient resting in bed, denies SOB, she continues torequire use of a lift for transfers, standing in room with therapy, SaO2 92% on 0.5liters, denies cough, congestion, fever, chills  Encephalopathy: BIMS 10/15, SBT 10/28, patient alert, oriented to person and place this AM  Urinary retention: mac catheter in place, no c/o discomfort, patient will need to be able to transfer without use of lift to DC mac  HTN/Atrial fib/dCHF: BP as follows 150/68, 138/80, 130/73 with HR in 70-80 range, denies CP, palpitations weight stable 177.4lbs.   Spinal muscular atrophy: patient deconditioned, needing total assist with cares and lift for transfers.   Pressure ulcer on left gluteal fold: stage 3 nursing notes wound measures 2.5cm X 1cm 100% granulation. Patient states she has pain when she sits on her bottom.     Past Medical and Surgical History reviewed in Epic today.    MEDICATIONS:    Current Outpatient Medications   Medication Sig Dispense Refill     acetaminophen (TYLENOL) 325 MG tablet Take 2 tablets (650 mg) by mouth every 4 hours as needed for mild pain 100 tablet      allopurinol (ZYLOPRIM) 100 MG tablet Take 1 tablet (100 mg) by mouth daily (Patient taking differently: Take 100 mg by mouth every morning ) 90 tablet 3     amLODIPine (NORVASC) 5 MG tablet Take 1 tablet (5 mg) by mouth 2 times daily LAST REFILL WITHOUT AN APPOINTMENT 180 tablet 0     ASPIRIN NOT PRESCRIBED (INTENTIONAL) Please choose reason not prescribed,  below 1 each 0     cholecalciferol (VITAMIN D) 1000 UNIT tablet Take 2,000 Units by mouth every morning        fluticasone (FLONASE) 50 MCG/ACT nasal spray Spray 1-2 sprays into both nostrils At Bedtime        furosemide (LASIX) 20 MG tablet TAKE 1 TABLET(20 MG) BY MOUTH EVERY MORNING (Patient taking differently: Take 20 mg by mouth every morning ) 270 tablet 0     guaiFENesin (ROBITUSSIN) 20 mg/mL SOLN solution Take 5 mLs by mouth every 6 hours as needed for cough       ipratropium-albuterol (COMBIVENT RESPIMAT)  MCG/ACT inhaler Inhale 1 puff into the lungs 4 times daily       levothyroxine (SYNTHROID/LEVOTHROID) 100 MCG tablet Take 1 tablet (100 mcg) by mouth daily 90 tablet 3     lisinopril (ZESTRIL) 20 MG tablet Take 1 tablet (20 mg) by mouth 2 times daily Needs an appointment for refills. 180 tablet 0     multivitamin w/minerals (THERA-VIT-M) tablet Take 1 tablet by mouth daily       nystatin (MYCOSTATIN) 065788 UNIT/GM external powder Apply topically 2 times daily (Patient taking differently: Apply topically 2 times daily as needed (groin rash) ) 60 Bottle 1     pantoprazole (PROTONIX) 40 MG EC tablet Take 1 tablet (40 mg) by mouth every morning (before breakfast)       predniSONE (DELTASONE) 10 MG tablet 4 tabs daily for 3 days, then 3 tabs daily for 3 days, then 2 tabs daily for 3 days, then 1 tab daily for 3 days, then stop 30 tablet 0     tamsulosin (FLOMAX) 0.4 MG capsule Take 1 capsule (0.4 mg) by mouth daily       warfarin ANTICOAGULANT (COUMADIN) 1 MG tablet TAKE 2 TABLETS BY MOUTH EVERY MONDAY, WEDNESDAY, FRIDAY, SATURDAY. DOSE MAY CHANGE PER INR RESULT AS DIRECTED 102 tablet 1         REVIEW OF SYSTEMS:  10 point ROS of systems including Constitutional, Eyes, Respiratory, Cardiovascular, Gastroenterology, Genitourinary, Integumentary, Musculoskeletal, Psychiatric were all negative except for pertinent positives noted in my HPI.    Objective:  BP (!) 150/68   Pulse 81   Temp 97.8  F (36.6  C)   " Resp 16   Ht 1.651 m (5' 5\")   Wt 80.3 kg (177 lb 1.6 oz)   LMP  (LMP Unknown)   SpO2 92%   BMI 29.47 kg/m    Exam:  GENERAL APPEARANCE:  Alert, in no distress  ENT:  Mouth and posterior oropharynx normal, moist mucous membranes, [place]  EYES:  EOM, conjunctivae, lids, pupils and irises normal, PERRL  RESP:  respiratory effort and palpation of chest normal, lungs clear to auscultation , no respiratory distress, diminished breath sounds bases bilaterally  CV:  Palpation and auscultation of heart done , regular rate and rhythm, no murmur, rub, or gallop, peripheral edema trace+ in RLE  ABDOMEN:  normal bowel sounds, soft, nontender, no hepatosplenomegaly or other masses  M/S:   patient able to move all 4 extremitie  SKIN:  Inspection of skin and subcutaneous tissue baseline  NEURO:   speech WNL  PSYCH:  affect and mood normal       Labs:     Most Recent 3 CBC's:  Recent Labs   Lab Test 06/01/21 05/20/21  0914 05/19/21  1152 05/18/21  1532   WBC  --  10.0 7.2 11.3*   HGB 11.0* 10.8* 10.2* 11.0*   MCV  --  93 93 91   PLT  --  283 244 274     Most Recent 3 BMP's:  Recent Labs   Lab Test 06/01/21 05/24/21  0830 05/20/21  0914 05/19/21  1152    144  --  142   POTASSIUM 4.7 3.5 4.2 4.3   CHLORIDE 97* 108  --  109   CO2 35* 33*  --  28   BUN 37* 23  --  23   CR 0.66 0.83  --  0.78   ANIONGAP 8 3  --  5   VICKIE 10.8* 9.9  --  9.9   GLC 75 87  --  139*       ASSESSMENT/PLAN:  Pneumonia of both lungs due to infectious organism, unspecified part of lung  Hypoxia  Physical deconditioning  Acute/ongoing: PT and OT, augmentin finished, prednisone taper continues, IS QID and prn, monitor SaO2 at rest and with activity, wean off O2 keep SaO2 > 90%     Dysphagia:   Acute/ongoing:  continue soft and bite sized diet with thin liquids     Encephalopathy  Acute/ongoing: cognitive impairement, OT evaluation     Urinary retention  Acute/ongoing: continue flomax 0.4mg QD, TOV when patient able to transfer without use of " lift     Chronic diastolic heart failure (H)  Chronic atrial fibrillation (H)  Benign essential hypertension  Long term current use of anticoagulant therapy  Acute/ongoing: daily weights, vitals daily and prn, BMP follow, continue lisinopril 20mg BID, Lasix 20mg QD, norvasc 5mg BID, coumadin as directed INR goal of 2-3     Spinal muscular atrophy type III causing decreased ability of respiratory mm-onset 43y/o   Ongoing: functional decline at home, PT eval.      Stage 3 pressure ulcer left gluteal fold  Acute/ongoing: dressing change daily, followed by wound physician.        Orders  No new orders      Electronically signed by:  Tonya Lynn Haase, APRN CNP

## 2021-06-03 NOTE — LETTER
6/3/2021        RE: Kenyatta Donald  [address]  [facility] [address]        [place] [facility]  [place] Medical Record Number:  [id number]  Place of Service where encounter took place:  [facility] ([facility]) [25]  Chief Complaint   Patient presents with     Nursing Home Acute       HPI:    Kenyatta Donald  is a 85 year old (1935), who is being seen today for an episodic care visit.  HPI information obtained from: facility chart records, facility staff, patient report and [facility] chart review. Today's concern is:  Pneumonia/hypoxia: on exam today patient resting in bed, denies SOB, she continues torequire use of a lift for transfers, standing in room with therapy, SaO2 92% on 0.5liters, denies cough, congestion, fever, chills  Encephalopathy: BIMS 10/15, SBT 10/28, patient alert, oriented to person and place this AM  Urinary retention: mac catheter in place, no c/o discomfort, patient will need to be able to transfer without use of lift to DC mac  HTN/Atrial fib/dCHF: BP as follows 150/68, 138/80, 130/73 with HR in 70-80 range, denies CP, palpitations weight stable 177.4lbs.   Spinal muscular atrophy: patient deconditioned, needing total assist with cares and lift for transfers.   Pressure ulcer on left gluteal fold: stage 3 nursing notes wound measures 2.5cm X 1cm 100% granulation. Patient states she has pain when she sits on her bottom.     Past Medical and Surgical History reviewed in Epic today.    MEDICATIONS:    Current Outpatient Medications   Medication Sig Dispense Refill     acetaminophen (TYLENOL) 325 MG tablet Take 2 tablets (650 mg) by mouth every 4 hours as needed for mild pain 100 tablet      allopurinol (ZYLOPRIM) 100 MG tablet Take 1 tablet (100 mg) by mouth daily (Patient taking differently: Take 100 mg by mouth every morning ) 90 tablet 3     amLODIPine (NORVASC) 5 MG tablet Take 1 tablet (5 mg) by mouth 2 times daily LAST REFILL WITHOUT AN  APPOINTMENT 180 tablet 0     ASPIRIN NOT PRESCRIBED (INTENTIONAL) Please choose reason not prescribed, below 1 each 0     cholecalciferol (VITAMIN D) 1000 UNIT tablet Take 2,000 Units by mouth every morning        fluticasone (FLONASE) 50 MCG/ACT nasal spray Spray 1-2 sprays into both nostrils At Bedtime        furosemide (LASIX) 20 MG tablet TAKE 1 TABLET(20 MG) BY MOUTH EVERY MORNING (Patient taking differently: Take 20 mg by mouth every morning ) 270 tablet 0     guaiFENesin (ROBITUSSIN) 20 mg/mL SOLN solution Take 5 mLs by mouth every 6 hours as needed for cough       ipratropium-albuterol (COMBIVENT RESPIMAT)  MCG/ACT inhaler Inhale 1 puff into the lungs 4 times daily       levothyroxine (SYNTHROID/LEVOTHROID) 100 MCG tablet Take 1 tablet (100 mcg) by mouth daily 90 tablet 3     lisinopril (ZESTRIL) 20 MG tablet Take 1 tablet (20 mg) by mouth 2 times daily Needs an appointment for refills. 180 tablet 0     multivitamin w/minerals (THERA-VIT-M) tablet Take 1 tablet by mouth daily       nystatin (MYCOSTATIN) 603507 UNIT/GM external powder Apply topically 2 times daily (Patient taking differently: Apply topically 2 times daily as needed (groin rash) ) 60 Bottle 1     pantoprazole (PROTONIX) 40 MG EC tablet Take 1 tablet (40 mg) by mouth every morning (before breakfast)       predniSONE (DELTASONE) 10 MG tablet 4 tabs daily for 3 days, then 3 tabs daily for 3 days, then 2 tabs daily for 3 days, then 1 tab daily for 3 days, then stop 30 tablet 0     tamsulosin (FLOMAX) 0.4 MG capsule Take 1 capsule (0.4 mg) by mouth daily       warfarin ANTICOAGULANT (COUMADIN) 1 MG tablet TAKE 2 TABLETS BY MOUTH EVERY MONDAY, WEDNESDAY, FRIDAY, SATURDAY. DOSE MAY CHANGE PER INR RESULT AS DIRECTED 102 tablet 1         REVIEW OF SYSTEMS:  10 point ROS of systems including Constitutional, Eyes, Respiratory, Cardiovascular, Gastroenterology, Genitourinary, Integumentary, Musculoskeletal, Psychiatric were all negative except for  "pertinent positives noted in my HPI.    Objective:  BP (!) 150/68   Pulse 81   Temp 97.8  F (36.6  C)   Resp 16   Ht 1.651 m (5' 5\")   Wt 80.3 kg (177 lb 1.6 oz)   LMP  (LMP Unknown)   SpO2 92%   BMI 29.47 kg/m    Exam:  GENERAL APPEARANCE:  Alert, in no distress  ENT:  Mouth and posterior oropharynx normal, moist mucous membranes, [place]  EYES:  EOM, conjunctivae, lids, pupils and irises normal, PERRL  RESP:  respiratory effort and palpation of chest normal, lungs clear to auscultation , no respiratory distress, diminished breath sounds bases bilaterally  CV:  Palpation and auscultation of heart done , regular rate and rhythm, no murmur, rub, or gallop, peripheral edema trace+ in RLE  ABDOMEN:  normal bowel sounds, soft, nontender, no hepatosplenomegaly or other masses  M/S:   patient able to move all 4 extremitie  SKIN:  Inspection of skin and subcutaneous tissue baseline  NEURO:   speech WNL  PSYCH:  affect and mood normal       Labs:     Most Recent 3 CBC's:  Recent Labs   Lab Test 06/01/21 05/20/21  0914 05/19/21  1152 05/18/21  1532   WBC  --  10.0 7.2 11.3*   HGB 11.0* 10.8* 10.2* 11.0*   MCV  --  93 93 91   PLT  --  283 244 274     Most Recent 3 BMP's:  Recent Labs   Lab Test 06/01/21 05/24/21  0830 05/20/21  0914 05/19/21  1152    144  --  142   POTASSIUM 4.7 3.5 4.2 4.3   CHLORIDE 97* 108  --  109   CO2 35* 33*  --  28   BUN 37* 23  --  23   CR 0.66 0.83  --  0.78   ANIONGAP 8 3  --  5   VICKIE 10.8* 9.9  --  9.9   GLC 75 87  --  139*       ASSESSMENT/PLAN:  Pneumonia of both lungs due to infectious organism, unspecified part of lung  Hypoxia  Physical deconditioning  Acute/ongoing: PT and OT, augmentin finished, prednisone taper continues, IS QID and prn, monitor SaO2 at rest and with activity, wean off O2 keep SaO2 > 90%     Dysphagia:   Acute/ongoing:  continue soft and bite sized diet with thin liquids     Encephalopathy  Acute/ongoing: cognitive impairement, OT evaluation     Urinary " retention  Acute/ongoing: continue flomax 0.4mg QD, TOV when patient able to transfer without use of lift     Chronic diastolic heart failure (H)  Chronic atrial fibrillation (H)  Benign essential hypertension  Long term current use of anticoagulant therapy  Acute/ongoing: daily weights, vitals daily and prn, BMP follow, continue lisinopril 20mg BID, Lasix 20mg QD, norvasc 5mg BID, coumadin as directed INR goal of 2-3     Spinal muscular atrophy type III causing decreased ability of respiratory mm-onset 43y/o   Ongoing: functional decline at home, PT eval.      Stage 3 pressure ulcer left gluteal fold  Acute/ongoing: dressing change daily, followed by wound physician.        Orders  No new orders      Electronically signed by:  Tonya Lynn Haase, APRN CNP               Sincerely,        Tonya Lynn Haase, APRN CNP

## 2021-06-04 ENCOUNTER — TELEPHONE (OUTPATIENT)
Dept: CARDIOLOGY | Facility: CLINIC | Age: 86
End: 2021-06-04

## 2021-06-05 ASSESSMENT — MIFFLIN-ST. JEOR: SCORE: 1260.55

## 2021-06-07 ENCOUNTER — NURSING HOME VISIT (OUTPATIENT)
Dept: GERIATRICS | Facility: CLINIC | Age: 86
End: 2021-06-07
Payer: COMMERCIAL

## 2021-06-07 VITALS
TEMPERATURE: 97.6 F | DIASTOLIC BLOOD PRESSURE: 77 MMHG | RESPIRATION RATE: 16 BRPM | HEIGHT: 65 IN | OXYGEN SATURATION: 90 % | SYSTOLIC BLOOD PRESSURE: 130 MMHG | WEIGHT: 178.5 LBS | BODY MASS INDEX: 29.74 KG/M2 | HEART RATE: 80 BPM

## 2021-06-07 DIAGNOSIS — R33.9 URINARY RETENTION: ICD-10-CM

## 2021-06-07 DIAGNOSIS — R09.02 HYPOXIA: ICD-10-CM

## 2021-06-07 DIAGNOSIS — I10 BENIGN ESSENTIAL HYPERTENSION: ICD-10-CM

## 2021-06-07 DIAGNOSIS — J18.9 PNEUMONIA OF BOTH LUNGS DUE TO INFECTIOUS ORGANISM, UNSPECIFIED PART OF LUNG: Primary | ICD-10-CM

## 2021-06-07 DIAGNOSIS — G12.1 SPINAL MUSCULAR ATROPHY TYPE III (H): ICD-10-CM

## 2021-06-07 DIAGNOSIS — R13.10 DYSPHAGIA, UNSPECIFIED TYPE: ICD-10-CM

## 2021-06-07 DIAGNOSIS — Z79.01 LONG TERM CURRENT USE OF ANTICOAGULANT THERAPY: ICD-10-CM

## 2021-06-07 DIAGNOSIS — I50.32 CHRONIC DIASTOLIC HEART FAILURE (H): ICD-10-CM

## 2021-06-07 DIAGNOSIS — I48.20 CHRONIC ATRIAL FIBRILLATION (H): ICD-10-CM

## 2021-06-07 DIAGNOSIS — R41.89 COGNITIVE IMPAIRMENT: ICD-10-CM

## 2021-06-07 DIAGNOSIS — R53.81 PHYSICAL DECONDITIONING: ICD-10-CM

## 2021-06-07 DIAGNOSIS — L89.323 PRESSURE INJURY OF LEFT BUTTOCK, STAGE 3 (H): ICD-10-CM

## 2021-06-07 PROCEDURE — 99309 SBSQ NF CARE MODERATE MDM 30: CPT | Performed by: NURSE PRACTITIONER

## 2021-06-07 NOTE — PROGRESS NOTES
[facility] [facility]  [place] Medical Record Number:  [id number]  Place of Service where encounter took place:  [facility] (U) [25]  Chief Complaint   Patient presents with     Nursing Home Acute       HPI:    Kenyatta Donald  is a 85 year old (1935), who is being seen today for an episodic care visit.  HPI information obtained from: facility chart records, facility staff, patient report and [facility] chart review. Today's concern is:  Pneumonia/hypoxia: on exam today patient sitting up in w/c, denies SOB, she continues to require use of a lift for transfers,  SaO2 92% on 0.5liters, denies cough, congestion, fever, chill  Cognitive impairement: BIMS 10/15, SBT 10/28, patient alert, oriented to person and place this AM  Urinary retention: mac catheter in place, no c/o discomfort, patient will need to be able to transfer without use of lift to DC mac  HTN/Atrial fib/dCHF: BP as follows 124/72, 130/77, 133/75 with HR in 70-80 range, denies CP, palpitations weight stable 177.4lbs.   Spinal muscular atrophy: patient deconditioned, needing total assist with cares and lift for transfers. Standing with therapy but notes indicate patient is not motivated and fearful of falling  Pressure ulcer on left gluteal fold: stage 3 nursing notes wound measures 2.5cm X 1cm 100% granulation. Patient states she has pain when she sits on her bottom.     Past Medical and Surgical History reviewed in Epic today.    MEDICATIONS:    Current Outpatient Medications   Medication Sig Dispense Refill     acetaminophen (TYLENOL) 325 MG tablet Take 2 tablets (650 mg) by mouth every 4 hours as needed for mild pain 100 tablet      allopurinol (ZYLOPRIM) 100 MG tablet Take 1 tablet (100 mg) by mouth daily (Patient taking differently: Take 100 mg by mouth every morning ) 90 tablet 3     amLODIPine (NORVASC) 5 MG tablet Take 1 tablet (5 mg) by mouth 2 times daily LAST REFILL WITHOUT AN APPOINTMENT 180 tablet 0      ASPIRIN NOT PRESCRIBED (INTENTIONAL) Please choose reason not prescribed, below 1 each 0     cholecalciferol (VITAMIN D) 1000 UNIT tablet Take 2,000 Units by mouth every morning        fluticasone (FLONASE) 50 MCG/ACT nasal spray Spray 1-2 sprays into both nostrils At Bedtime        furosemide (LASIX) 20 MG tablet TAKE 1 TABLET(20 MG) BY MOUTH EVERY MORNING (Patient taking differently: Take 20 mg by mouth every morning ) 270 tablet 0     guaiFENesin (ROBITUSSIN) 20 mg/mL SOLN solution Take 5 mLs by mouth every 6 hours as needed for cough       ipratropium-albuterol (COMBIVENT RESPIMAT)  MCG/ACT inhaler Inhale 1 puff into the lungs 4 times daily       levothyroxine (SYNTHROID/LEVOTHROID) 100 MCG tablet Take 1 tablet (100 mcg) by mouth daily 90 tablet 3     lisinopril (ZESTRIL) 20 MG tablet Take 1 tablet (20 mg) by mouth 2 times daily Needs an appointment for refills. 180 tablet 0     multivitamin w/minerals (THERA-VIT-M) tablet Take 1 tablet by mouth daily       nystatin (MYCOSTATIN) 889359 UNIT/GM external powder Apply topically 2 times daily (Patient taking differently: Apply topically 2 times daily as needed (groin rash) ) 60 Bottle 1     pantoprazole (PROTONIX) 40 MG EC tablet Take 1 tablet (40 mg) by mouth every morning (before breakfast)       predniSONE (DELTASONE) 10 MG tablet 4 tabs daily for 3 days, then 3 tabs daily for 3 days, then 2 tabs daily for 3 days, then 1 tab daily for 3 days, then stop 30 tablet 0     tamsulosin (FLOMAX) 0.4 MG capsule Take 1 capsule (0.4 mg) by mouth daily       warfarin ANTICOAGULANT (COUMADIN) 1 MG tablet TAKE 2 TABLETS BY MOUTH EVERY MONDAY, WEDNESDAY, FRIDAY, SATURDAY. DOSE MAY CHANGE PER INR RESULT AS DIRECTED 102 tablet 1         REVIEW OF SYSTEMS:  10 point ROS of systems including Constitutional, Eyes, Respiratory, Cardiovascular, Gastroenterology, Genitourinary, Integumentary, Musculoskeletal, Psychiatric were all negative except for pertinent positives noted in  "my HPI.    Objective:  /77   Pulse 80   Temp 97.6  F (36.4  C)   Resp 16   Ht 1.651 m (5' 5\")   Wt 81 kg (178 lb 8 oz)   LMP  (LMP Unknown)   SpO2 90%   BMI 29.70 kg/m    Exam:  GENERAL APPEARANCE:  Alert, in no distress  ENT:  Mouth and posterior oropharynx normal, moist mucous membranes, [place]  EYES:  EOM, conjunctivae, lids, pupils and irises normal, PERRL  RESP:  respiratory effort and palpation of chest normal, lungs clear to auscultation , no respiratory distress, diminished breath sounds bases bilaterally  CV:  Palpation and auscultation of heart done , regular rate and rhythm, no murmur, rub, or gallop, peripheral edema trace+ in RLE  ABDOMEN:  normal bowel sounds, soft, nontender, no hepatosplenomegaly or other masses  M/S:   patient able to move all 4 extremitie  SKIN:  Inspection of skin and subcutaneous tissue baseline  NEURO:   speech WNL  PSYCH:  affect and mood normal    Labs:     Most Recent 3 CBC's:  Recent Labs   Lab Test 06/01/21 05/20/21  0914 05/19/21  1152 05/18/21  1532   WBC  --  10.0 7.2 11.3*   HGB 11.0* 10.8* 10.2* 11.0*   MCV  --  93 93 91   PLT  --  283 244 274     Most Recent 3 BMP's:  Recent Labs   Lab Test 06/01/21 05/24/21  0830 05/20/21  0914 05/19/21  1152    144  --  142   POTASSIUM 4.7 3.5 4.2 4.3   CHLORIDE 97* 108  --  109   CO2 35* 33*  --  28   BUN 37* 23  --  23   CR 0.66 0.83  --  0.78   ANIONGAP 8 3  --  5   VICKIE 10.8* 9.9  --  9.9   GLC 75 87  --  139*       ASSESSMENT/PLAN:  Pneumonia of both lungs due to infectious organism, unspecified part of lung  Hypoxia  Physical deconditioning  Acute/ongoing: PT and OT, augmentin finished, prednisone taper is complete, IS QID and prn, monitor SaO2 at rest and with activity, wean off O2 keep SaO2 > 90%     Dysphagia:   Acute/ongoing:  continue soft and bite sized diet with thin liquids     Cognitive impairement  Acute/ongoing: cognitive impairement, OT evaluation     Urinary retention  Acute/ongoing: continue " flomax 0.4mg QD, TOV when patient able to transfer without use of lift     Chronic diastolic heart failure (H)  Chronic atrial fibrillation (H)  Benign essential hypertension  Long term current use of anticoagulant therapy  Acute/ongoing: daily weights, vitals daily and prn, BMP follow, continue lisinopril 20mg BID, Lasix 20mg QD, norvasc 5mg BID, coumadin as directed INR goal of 2-3     Spinal muscular atrophy type III causing decreased ability of respiratory mm-onset 45y/o   Ongoing: functional decline at home, PT eval.      Stage 3 pressure ulcer left gluteal fold  Acute/ongoing: dressing change daily, followed by wound physician.        Orders  No new orders      Electronically signed by:  Tonya Lynn Haase, APRN CNP

## 2021-06-11 ENCOUNTER — NURSING HOME VISIT (OUTPATIENT)
Dept: GERIATRICS | Facility: CLINIC | Age: 86
End: 2021-06-11
Payer: COMMERCIAL

## 2021-06-11 VITALS
SYSTOLIC BLOOD PRESSURE: 106 MMHG | OXYGEN SATURATION: 93 % | TEMPERATURE: 97.9 F | WEIGHT: 174.5 LBS | HEART RATE: 82 BPM | BODY MASS INDEX: 29.07 KG/M2 | RESPIRATION RATE: 18 BRPM | HEIGHT: 65 IN | DIASTOLIC BLOOD PRESSURE: 70 MMHG

## 2021-06-11 DIAGNOSIS — G12.1 SPINAL MUSCULAR ATROPHY TYPE III (H): ICD-10-CM

## 2021-06-11 DIAGNOSIS — I50.32 CHRONIC DIASTOLIC HEART FAILURE (H): ICD-10-CM

## 2021-06-11 DIAGNOSIS — R09.02 HYPOXIA: ICD-10-CM

## 2021-06-11 DIAGNOSIS — R53.81 PHYSICAL DECONDITIONING: ICD-10-CM

## 2021-06-11 DIAGNOSIS — J18.9 PNEUMONIA OF BOTH LUNGS DUE TO INFECTIOUS ORGANISM, UNSPECIFIED PART OF LUNG: Primary | ICD-10-CM

## 2021-06-11 DIAGNOSIS — I10 BENIGN ESSENTIAL HYPERTENSION: ICD-10-CM

## 2021-06-11 DIAGNOSIS — I48.20 CHRONIC ATRIAL FIBRILLATION (H): ICD-10-CM

## 2021-06-11 DIAGNOSIS — R33.9 URINARY RETENTION: ICD-10-CM

## 2021-06-11 DIAGNOSIS — Z79.01 LONG TERM CURRENT USE OF ANTICOAGULANT THERAPY: ICD-10-CM

## 2021-06-11 DIAGNOSIS — R13.10 DYSPHAGIA, UNSPECIFIED TYPE: ICD-10-CM

## 2021-06-11 PROCEDURE — 99310 SBSQ NF CARE HIGH MDM 45: CPT | Performed by: NURSE PRACTITIONER

## 2021-06-11 ASSESSMENT — MIFFLIN-ST. JEOR: SCORE: 1242.41

## 2021-06-11 NOTE — LETTER
6/11/2021        RE: Kenyatta Donald  [address]  [facility] [id number]        [place] [facility]  [place] Medical Record Number:  [id number]  Place of Service where encounter took place:  [facility] ([facility]) [25]  Chief Complaint   Patient presents with     Nursing Home Acute       HPI:    Kenyatta Donald  is a 85 year old (1935), who is being seen today for an episodic care visit.  HPI information obtained from: facility chart records, facility staff, patient report and [facility] chart review. Today's concern is:  Pneumonia/hypoxia: on exam today patient sitting up in w/c, denies SOB, patient states this AM she was able to stand pivot transfer with CGA to 2,  SaO2 93-94% on room aire, denies cough, congestion, fever, chill  Cognitive impairement: BIMS 10/15, SBT 10/28, patient alert, oriented to person and place this AM, therapy notes indicate patient is self limiting in therapy, scared of falling.   Urinary retention: mac catheter in place, no c/o discomfort  HTN/Atrial fib/dCHF: BP as follows 103/54, 106/70, 126/63 with HR in 70-80 range, denies CP, palpitations weight on admit was stable 177.7lbs, current weight 174.5lbs   Spinal muscular atrophy: patient deconditioned, stand pivot transfer with therapy, not motivated and fearful of falling    Past Medical and Surgical History reviewed in Epic today.    MEDICATIONS:    Current Outpatient Medications   Medication Sig Dispense Refill     acetaminophen (TYLENOL) 325 MG tablet Take 2 tablets (650 mg) by mouth every 4 hours as needed for mild pain 100 tablet      allopurinol (ZYLOPRIM) 100 MG tablet Take 1 tablet (100 mg) by mouth daily (Patient taking differently: Take 100 mg by mouth every morning ) 90 tablet 3     amLODIPine (NORVASC) 5 MG tablet Take 1 tablet (5 mg) by mouth 2 times daily LAST REFILL WITHOUT AN APPOINTMENT 180 tablet 0     ASPIRIN NOT PRESCRIBED (INTENTIONAL) Please choose reason not  "prescribed, below 1 each 0     cholecalciferol (VITAMIN D) 1000 UNIT tablet Take 2,000 Units by mouth every morning        fluticasone (FLONASE) 50 MCG/ACT nasal spray Spray 1-2 sprays into both nostrils At Bedtime        furosemide (LASIX) 20 MG tablet TAKE 1 TABLET(20 MG) BY MOUTH EVERY MORNING (Patient taking differently: Take 20 mg by mouth every morning ) 270 tablet 0     guaiFENesin (ROBITUSSIN) 20 mg/mL SOLN solution Take 5 mLs by mouth every 6 hours as needed for cough       ipratropium-albuterol (COMBIVENT RESPIMAT)  MCG/ACT inhaler Inhale 1 puff into the lungs 4 times daily       levothyroxine (SYNTHROID/LEVOTHROID) 100 MCG tablet Take 1 tablet (100 mcg) by mouth daily 90 tablet 3     lisinopril (ZESTRIL) 20 MG tablet Take 1 tablet (20 mg) by mouth 2 times daily Needs an appointment for refills. 180 tablet 0     multivitamin w/minerals (THERA-VIT-M) tablet Take 1 tablet by mouth daily       nystatin (MYCOSTATIN) 642131 UNIT/GM external powder Apply topically 2 times daily (Patient taking differently: Apply topically 2 times daily as needed (groin rash) ) 60 Bottle 1     pantoprazole (PROTONIX) 40 MG EC tablet Take 1 tablet (40 mg) by mouth every morning (before breakfast)       tamsulosin (FLOMAX) 0.4 MG capsule Take 1 capsule (0.4 mg) by mouth daily       warfarin ANTICOAGULANT (COUMADIN) 1 MG tablet TAKE 2 TABLETS BY MOUTH EVERY MONDAY, WEDNESDAY, FRIDAY, SATURDAY. DOSE MAY CHANGE PER INR RESULT AS DIRECTED 102 tablet 1         REVIEW OF SYSTEMS:  10 point ROS of systems including Constitutional, Eyes, Respiratory, Cardiovascular, Gastroenterology, Genitourinary, Integumentary, Musculoskeletal, Psychiatric were all negative except for pertinent positives noted in my HPI.    Objective:  /70   Pulse 82   Temp 97.9  F (36.6  C)   Resp 18   Ht 1.651 m (5' 5\")   Wt 79.2 kg (174 lb 8 oz)   LMP  (LMP Unknown)   SpO2 93%   BMI 29.04 kg/m    Exam:  GENERAL APPEARANCE:  Alert, in no " distress  ENT:  Mouth and posterior oropharynx normal, moist mucous membranes, [place]  EYES:  EOM, conjunctivae, lids, pupils and irises normal, PERRL  RESP:  respiratory effort and palpation of chest normal, lungs clear to auscultation , no respiratory distress, diminished breath sounds bases bilaterally  CV:  Palpation and auscultation of heart done , regular rate and rhythm, no murmur, rub, or gallop, peripheral edema trace+ LE bilaterally  ABDOMEN:  normal bowel sounds, soft, nontender, no hepatosplenomegaly or other masses  M/S:   patient able to move all 4 extremitie  SKIN:  Inspection of skin and subcutaneous tissue baseline  NEURO:   speech WNL  PSYCH:  affect and mood normal       Labs:     Most Recent 3 CBC's:  Recent Labs   Lab Test 06/01/21 05/20/21  0914 05/19/21  1152 05/18/21  1532   WBC  --  10.0 7.2 11.3*   HGB 11.0* 10.8* 10.2* 11.0*   MCV  --  93 93 91   PLT  --  283 244 274     Most Recent 3 BMP's:  Recent Labs   Lab Test 06/01/21 05/24/21  0830 05/20/21  0914 05/19/21  1152    144  --  142   POTASSIUM 4.7 3.5 4.2 4.3   CHLORIDE 97* 108  --  109   CO2 35* 33*  --  28   BUN 37* 23  --  23   CR 0.66 0.83  --  0.78   ANIONGAP 8 3  --  5   VICKIE 10.8* 9.9  --  9.9   GLC 75 87  --  139*       ASSESSMENT/PLAN:  Pneumonia of both lungs due to infectious organism, unspecified part of lung  Physical deconditioning  Acute/ongoing: PT and OT, augmentin finished, prednisone taper is complete, IS QID and prn, monitor SaO2 at rest and with activity, keep SaO2 > 90%  Weaned off O2     Dysphagia:   Acute/ongoing:  continue soft and bite sized diet with thin liquids     Cognitive impairement  Acute/ongoing: cognitive impairement, OT ongoing     Urinary retention  Acute/ongoing: continue flomax 0.4mg, DC mac on Monday, PVR q shift, straight cath for PVR > 350cc     Chronic diastolic heart failure (H)  Chronic atrial fibrillation (H)  Benign essential hypertension  Long term current use of anticoagulant  therapy  Acute/ongoing: daily weights, vitals daily and prn, BMP follow, continue lisinopril 20mg BID, Lasix 20mg QD, norvasc 5mg BID, coumadin as directed INR goal of 2-3     Spinal muscular atrophy type III causing decreased ability of respiratory mm-onset 43y/o   Ongoing: functional decline at home, PT ongoing       Orders  DC mac cathter on Monday  PVR q shift, straight cath for PVR > 350cc    Total time spent with patient visit at the [facility] nursing facility was 35 min including patient visit and review of past records. Greater than 50% of total time spent with counseling and coordinating care due to discussed patient hypoxia, she is off o2 at this time, education on continued use of IS, discussed DC of mac catheter on Monday, will monitor for urinary retention.  Electronically signed by:  Tonya Lynn Haase, APRN CNP               Sincerely,        Tonya Lynn Haase, APRN CNP

## 2021-06-11 NOTE — PROGRESS NOTES
[place] [facility]  [place] Medical Record Number:  [id number]  Place of Service where encounter took place:  [facility] (U) [25]  Chief Complaint   Patient presents with     Nursing Home Acute       HPI:    Kenyatta Donald  is a 85 year old (1935), who is being seen today for an episodic care visit.  HPI information obtained from: facility chart records, facility staff, patient report and [facility] chart review. Today's concern is:  Pneumonia/hypoxia: on exam today patient sitting up in w/c, denies SOB, patient states this AM she was able to stand pivot transfer with CGA to 2,  SaO2 93-94% on room aire, denies cough, congestion, fever, chill  Cognitive impairement: BIMS 10/15, SBT 10/28, patient alert, oriented to person and place this AM, therapy notes indicate patient is self limiting in therapy, scared of falling.   Urinary retention: mac catheter in place, no c/o discomfort  HTN/Atrial fib/dCHF: BP as follows 103/54, 106/70, 126/63 with HR in 70-80 range, denies CP, palpitations weight on admit was stable 177.7lbs, current weight 174.5lbs   Spinal muscular atrophy: patient deconditioned, stand pivot transfer with therapy, not motivated and fearful of falling    Past Medical and Surgical History reviewed in Epic today.    MEDICATIONS:    Current Outpatient Medications   Medication Sig Dispense Refill     acetaminophen (TYLENOL) 325 MG tablet Take 2 tablets (650 mg) by mouth every 4 hours as needed for mild pain 100 tablet      allopurinol (ZYLOPRIM) 100 MG tablet Take 1 tablet (100 mg) by mouth daily (Patient taking differently: Take 100 mg by mouth every morning ) 90 tablet 3     amLODIPine (NORVASC) 5 MG tablet Take 1 tablet (5 mg) by mouth 2 times daily LAST REFILL WITHOUT AN APPOINTMENT 180 tablet 0     ASPIRIN NOT PRESCRIBED (INTENTIONAL) Please choose reason not prescribed, below 1 each 0     cholecalciferol (VITAMIN D) 1000 UNIT tablet Take 2,000 Units by mouth every  "morning        fluticasone (FLONASE) 50 MCG/ACT nasal spray Spray 1-2 sprays into both nostrils At Bedtime        furosemide (LASIX) 20 MG tablet TAKE 1 TABLET(20 MG) BY MOUTH EVERY MORNING (Patient taking differently: Take 20 mg by mouth every morning ) 270 tablet 0     guaiFENesin (ROBITUSSIN) 20 mg/mL SOLN solution Take 5 mLs by mouth every 6 hours as needed for cough       ipratropium-albuterol (COMBIVENT RESPIMAT)  MCG/ACT inhaler Inhale 1 puff into the lungs 4 times daily       levothyroxine (SYNTHROID/LEVOTHROID) 100 MCG tablet Take 1 tablet (100 mcg) by mouth daily 90 tablet 3     lisinopril (ZESTRIL) 20 MG tablet Take 1 tablet (20 mg) by mouth 2 times daily Needs an appointment for refills. 180 tablet 0     multivitamin w/minerals (THERA-VIT-M) tablet Take 1 tablet by mouth daily       nystatin (MYCOSTATIN) 832588 UNIT/GM external powder Apply topically 2 times daily (Patient taking differently: Apply topically 2 times daily as needed (groin rash) ) 60 Bottle 1     pantoprazole (PROTONIX) 40 MG EC tablet Take 1 tablet (40 mg) by mouth every morning (before breakfast)       tamsulosin (FLOMAX) 0.4 MG capsule Take 1 capsule (0.4 mg) by mouth daily       warfarin ANTICOAGULANT (COUMADIN) 1 MG tablet TAKE 2 TABLETS BY MOUTH EVERY MONDAY, WEDNESDAY, FRIDAY, SATURDAY. DOSE MAY CHANGE PER INR RESULT AS DIRECTED 102 tablet 1         REVIEW OF SYSTEMS:  10 point ROS of systems including Constitutional, Eyes, Respiratory, Cardiovascular, Gastroenterology, Genitourinary, Integumentary, Musculoskeletal, Psychiatric were all negative except for pertinent positives noted in my HPI.    Objective:  /70   Pulse 82   Temp 97.9  F (36.6  C)   Resp 18   Ht 1.651 m (5' 5\")   Wt 79.2 kg (174 lb 8 oz)   LMP  (LMP Unknown)   SpO2 93%   BMI 29.04 kg/m    Exam:  GENERAL APPEARANCE:  Alert, in no distress  ENT:  Mouth and posterior oropharynx normal, moist mucous membranes, [place]  EYES:  EOM, conjunctivae, lids, " pupils and irises normal, PERRL  RESP:  respiratory effort and palpation of chest normal, lungs clear to auscultation , no respiratory distress, diminished breath sounds bases bilaterally  CV:  Palpation and auscultation of heart done , regular rate and rhythm, no murmur, rub, or gallop, peripheral edema trace+ LE bilaterally  ABDOMEN:  normal bowel sounds, soft, nontender, no hepatosplenomegaly or other masses  M/S:   patient able to move all 4 extremitie  SKIN:  Inspection of skin and subcutaneous tissue baseline  NEURO:   speech WNL  PSYCH:  affect and mood normal       Labs:     Most Recent 3 CBC's:  Recent Labs   Lab Test 06/01/21 05/20/21  0914 05/19/21  1152 05/18/21  1532   WBC  --  10.0 7.2 11.3*   HGB 11.0* 10.8* 10.2* 11.0*   MCV  --  93 93 91   PLT  --  283 244 274     Most Recent 3 BMP's:  Recent Labs   Lab Test 06/01/21 05/24/21  0830 05/20/21  0914 05/19/21  1152    144  --  142   POTASSIUM 4.7 3.5 4.2 4.3   CHLORIDE 97* 108  --  109   CO2 35* 33*  --  28   BUN 37* 23  --  23   CR 0.66 0.83  --  0.78   ANIONGAP 8 3  --  5   VICKIE 10.8* 9.9  --  9.9   GLC 75 87  --  139*       ASSESSMENT/PLAN:  Pneumonia of both lungs due to infectious organism, unspecified part of lung  Physical deconditioning  Acute/ongoing: PT and OT, augmentin finished, prednisone taper is complete, IS QID and prn, monitor SaO2 at rest and with activity, keep SaO2 > 90%  Weaned off O2     Dysphagia:   Acute/ongoing:  continue soft and bite sized diet with thin liquids     Cognitive impairement  Acute/ongoing: cognitive impairement, OT ongoing     Urinary retention  Acute/ongoing: continue flomax 0.4mg, DC mac on Monday, PVR q shift, straight cath for PVR > 350cc     Chronic diastolic heart failure (H)  Chronic atrial fibrillation (H)  Benign essential hypertension  Long term current use of anticoagulant therapy  Acute/ongoing: daily weights, vitals daily and prn, BMP follow, continue lisinopril 20mg BID, Lasix 20mg QD,  norvasc 5mg BID, coumadin as directed INR goal of 2-3     Spinal muscular atrophy type III causing decreased ability of respiratory mm-onset 43y/o   Ongoing: functional decline at home, PT ongoing       Orders  DC mac cathter on Monday  PVR q shift, straight cath for PVR > 350cc    Total time spent with patient visit at the [facility] nursing facility was 35 min including patient visit and review of past records. Greater than 50% of total time spent with counseling and coordinating care due to discussed patient hypoxia, she is off o2 at this time, education on continued use of IS, discussed DC of mac catheter on Monday, will monitor for urinary retention.  Electronically signed by:  Tonya Lynn Haase, APRN CNP

## 2021-06-12 ASSESSMENT — MIFFLIN-ST. JEOR: SCORE: 1241.5

## 2021-06-14 ENCOUNTER — PATIENT OUTREACH (OUTPATIENT)
Dept: CARE COORDINATION | Facility: CLINIC | Age: 86
End: 2021-06-14

## 2021-06-14 ASSESSMENT — ACTIVITIES OF DAILY LIVING (ADL): DEPENDENT_IADLS:: LAUNDRY;MEAL PREPARATION

## 2021-06-14 NOTE — PROGRESS NOTES
Clinic Care Coordination Contact  Care Team Conversations    CC RN spoke with reception for [facility] who confirmed patient remains in their TCU.  Call was transferred to RN Manager Radha for further patient information; CC RN left voicemail for Radha requesting return call with updates on patient's status and discharge planning.  CC RN will await return call.    Tyron Luna, RN  Clinic Care Coordinator  [facility]  [facility]  Ph: [phone]

## 2021-06-16 ENCOUNTER — NURSING HOME VISIT (OUTPATIENT)
Dept: GERIATRICS | Facility: CLINIC | Age: 86
End: 2021-06-16
Payer: COMMERCIAL

## 2021-06-16 VITALS
OXYGEN SATURATION: 97 % | HEIGHT: 65 IN | WEIGHT: 174.3 LBS | SYSTOLIC BLOOD PRESSURE: 107 MMHG | BODY MASS INDEX: 29.04 KG/M2 | RESPIRATION RATE: 18 BRPM | TEMPERATURE: 97.6 F | DIASTOLIC BLOOD PRESSURE: 69 MMHG | HEART RATE: 66 BPM

## 2021-06-16 DIAGNOSIS — I50.32 CHRONIC DIASTOLIC HEART FAILURE (H): ICD-10-CM

## 2021-06-16 DIAGNOSIS — G12.1 SPINAL MUSCULAR ATROPHY TYPE III (H): ICD-10-CM

## 2021-06-16 DIAGNOSIS — I10 BENIGN ESSENTIAL HYPERTENSION: ICD-10-CM

## 2021-06-16 DIAGNOSIS — R09.02 HYPOXIA: ICD-10-CM

## 2021-06-16 DIAGNOSIS — R53.81 PHYSICAL DECONDITIONING: ICD-10-CM

## 2021-06-16 DIAGNOSIS — R33.9 URINARY RETENTION: ICD-10-CM

## 2021-06-16 DIAGNOSIS — I48.20 CHRONIC ATRIAL FIBRILLATION (H): ICD-10-CM

## 2021-06-16 DIAGNOSIS — Z79.01 LONG TERM CURRENT USE OF ANTICOAGULANT THERAPY: ICD-10-CM

## 2021-06-16 DIAGNOSIS — R13.10 DYSPHAGIA, UNSPECIFIED TYPE: ICD-10-CM

## 2021-06-16 DIAGNOSIS — J18.9 PNEUMONIA OF BOTH LUNGS DUE TO INFECTIOUS ORGANISM, UNSPECIFIED PART OF LUNG: Primary | ICD-10-CM

## 2021-06-16 PROCEDURE — 99310 SBSQ NF CARE HIGH MDM 45: CPT | Performed by: NURSE PRACTITIONER

## 2021-06-16 NOTE — PROGRESS NOTES
[place] [facility]  [place] Medical Record Number:  [id number]  Place of Service where encounter took place:  [facility] (U) [25]  Chief Complaint   Patient presents with     Nursing Home Acute       HPI:    Kenyatta Donald  is a 85 year old (1935), who is being seen today for an episodic care visit.  HPI information obtained from: facility chart records, facility staff, patient report and [facility] chart review. Today's concern is:  Pneumonia/hypoxia: patient is off O2, SaO2 94% on room air, no c/o SOB, cough or congestion, working with therapy needing CGA for bed mobility, ADL's, standing at bedside   Cognitive impairement: BIMS 10/15, SBT 10/28, will be transitioning to LTC  Urinary Retention: mac catheter removed, patient voiding incontinent brief., has been doing well past few days but today patient having difficulty urinating, feels she needs to but unable to void.    HTN/atrial fib/CHF: BP as follows: 107/69, 123/76, 140/74 Weight stable, admit weight 177.7lbs, current weight 174.3lbs  Spinal muscular atrophy: in therapy patient is doing some static standing with therapy, stand pivot transfers.     Past Medical and Surgical History reviewed in Epic today.    MEDICATIONS:    Current Outpatient Medications   Medication Sig Dispense Refill     acetaminophen (TYLENOL) 325 MG tablet Take 2 tablets (650 mg) by mouth every 4 hours as needed for mild pain 100 tablet      allopurinol (ZYLOPRIM) 100 MG tablet Take 1 tablet (100 mg) by mouth daily (Patient taking differently: Take 100 mg by mouth every morning ) 90 tablet 3     amLODIPine (NORVASC) 5 MG tablet Take 1 tablet (5 mg) by mouth 2 times daily LAST REFILL WITHOUT AN APPOINTMENT 180 tablet 0     ASPIRIN NOT PRESCRIBED (INTENTIONAL) Please choose reason not prescribed, below 1 each 0     cholecalciferol (VITAMIN D) 1000 UNIT tablet Take 2,000 Units by mouth every morning        fluticasone (FLONASE) 50 MCG/ACT nasal spray Spray  "1-2 sprays into both nostrils At Bedtime        furosemide (LASIX) 20 MG tablet TAKE 1 TABLET(20 MG) BY MOUTH EVERY MORNING (Patient taking differently: Take 20 mg by mouth every morning ) 270 tablet 0     guaiFENesin (ROBITUSSIN) 20 mg/mL SOLN solution Take 5 mLs by mouth every 6 hours as needed for cough       ipratropium-albuterol (COMBIVENT RESPIMAT)  MCG/ACT inhaler Inhale 1 puff into the lungs 4 times daily       levothyroxine (SYNTHROID/LEVOTHROID) 100 MCG tablet Take 1 tablet (100 mcg) by mouth daily 90 tablet 3     lisinopril (ZESTRIL) 20 MG tablet Take 1 tablet (20 mg) by mouth 2 times daily Needs an appointment for refills. 180 tablet 0     multivitamin w/minerals (THERA-VIT-M) tablet Take 1 tablet by mouth daily       nystatin (MYCOSTATIN) 976599 UNIT/GM external powder Apply topically 2 times daily (Patient taking differently: Apply topically 2 times daily as needed (groin rash) ) 60 Bottle 1     pantoprazole (PROTONIX) 40 MG EC tablet Take 1 tablet (40 mg) by mouth every morning (before breakfast)       tamsulosin (FLOMAX) 0.4 MG capsule Take 1 capsule (0.4 mg) by mouth daily       warfarin ANTICOAGULANT (COUMADIN) 1 MG tablet TAKE 2 TABLETS BY MOUTH EVERY MONDAY, WEDNESDAY, FRIDAY, SATURDAY. DOSE MAY CHANGE PER INR RESULT AS DIRECTED 102 tablet 1         REVIEW OF SYSTEMS:  10 point ROS of systems including Constitutional, Eyes, Respiratory, Cardiovascular, Gastroenterology, Genitourinary, Integumentary, Musculoskeletal, Psychiatric were all negative except for pertinent positives noted in my HPI.    Objective:  /69   Pulse 66   Temp 97.6  F (36.4  C)   Resp 18   Ht 1.651 m (5' 5\")   Wt 79.1 kg (174 lb 4.8 oz)   LMP  (LMP Unknown)   SpO2 97%   BMI 29.01 kg/m    Exam:  GENERAL APPEARANCE:  Alert, in no distress  ENT:  Mouth and posterior oropharynx normal, moist mucous membranes, [place]  EYES:  EOM, conjunctivae, lids, pupils and irises normal, PERRL  RESP:  respiratory effort and " palpation of chest normal, lungs clear to auscultation , no respiratory distress, diminished breath sounds bases bilaterally  CV:  Palpation and auscultation of heart done , regular rate and rhythm, no murmur, rub, or gallop, peripheral edema trace+ in LE bilaterally  ABDOMEN:  normal bowel sounds, soft, nontender, no hepatosplenomegaly or other masses  M/S:   patient resting in bed, able to move all 4 extremities  SKIN:  Inspection of skin and subcutaneous tissue baseline  NEURO:   speech wnl    Labs:     Most Recent 3 CBC's:  Recent Labs   Lab Test 06/01/21 05/20/21  0914 05/19/21  1152 05/18/21  1532   WBC  --  10.0 7.2 11.3*   HGB 11.0* 10.8* 10.2* 11.0*   MCV  --  93 93 91   PLT  --  283 244 274     Most Recent 3 BMP's:  Recent Labs   Lab Test 06/01/21 05/24/21  0830 05/20/21  0914 05/19/21  1152    144  --  142   POTASSIUM 4.7 3.5 4.2 4.3   CHLORIDE 97* 108  --  109   CO2 35* 33*  --  28   BUN 37* 23  --  23   CR 0.66 0.83  --  0.78   ANIONGAP 8 3  --  5   VICKIE 10.8* 9.9  --  9.9   GLC 75 87  --  139*       ASSESSMENT/PLAN:  Pneumonia of both lungs due to infectious organism, unspecified part of lung  Physical deconditioning  Acute/ongoing: PT and OT, augmentin finished, prednisone taper is complete, IS QID and prn, monitor SaO2 at rest and with activity, keep SaO2 > 90%  Weaned off O2     Dysphagia:   Acute/ongoing:  continue soft and bite sized diet with thin liquids     Cognitive impairement  Acute/ongoing: cognitive impairement, OT ongoing     Urinary retention  Acute/ongoing: continue flomax 0.4mg, DC mac on Monday, PVR q shift, straight cath for PVR > 350cc     Chronic diastolic heart failure (H)  Chronic atrial fibrillation (H)  Benign essential hypertension  Long term current use of anticoagulant therapy  Acute/ongoing: daily weights, vitals daily and prn, BMP follow, continue lisinopril 20mg BID, Lasix 20mg QD, norvasc 5mg BID, coumadin as directed INR goal of 2-3     Spinal muscular atrophy  type III causing decreased ability of respiratory mm-onset 45y/o   Ongoing: functional decline at home, PT ongoing, patient will be transitioning to LTC          Orders  UA/UC due to urinary retention    Total time spent with patient visit at the skilled nursing facility was 25 min including patient visit and review of past records. Greater than 50% of total time spent with counseling and coordinating care due to discussed patient trouble urinating, will check for UTI, discussed therapy and strengthening, patient not wanting to walk, able to transfer will be transitioning to LTC and patient is aware of that. .  Electronically signed by:  Tonya Lynn Haase, APRN CNP

## 2021-06-16 NOTE — LETTER
6/16/2021        RE: Kenyatta Donald  [address]  [place] [id number]        [place] [facility]  [place] Medical Record Number:  [id number]  Place of Service where encounter took place:  [facility] (U) [25]  Chief Complaint   Patient presents with     Nursing Home Acute       HPI:    Kenyatta Donald  is a 85 year old (1935), who is being seen today for an episodic care visit.  HPI information obtained from: facility chart records, facility staff, patient report and [facility] chart review. Today's concern is:  Pneumonia/hypoxia: patient is off O2, SaO2 94% on room air, no c/o SOB, cough or congestion, working with therapy needing CGA for bed mobility, ADL's, standing at bedside   Cognitive impairement: BIMS 10/15, SBT 10/28, will be transitioning to LTC  Urinary Retention: mac catheter removed, patient voiding incontinent brief., has been doing well past few days but today patient having difficulty urinating, feels she needs to but unable to void.    HTN/atrial fib/CHF: BP as follows: 107/69, 123/76, 140/74 Weight stable, admit weight 177.7lbs, current weight 174.3lbs  Spinal muscular atrophy: in therapy patient is doing some static standing with therapy, stand pivot transfers.     Past Medical and Surgical History reviewed in Epic today.    MEDICATIONS:    Current Outpatient Medications   Medication Sig Dispense Refill     acetaminophen (TYLENOL) 325 MG tablet Take 2 tablets (650 mg) by mouth every 4 hours as needed for mild pain 100 tablet      allopurinol (ZYLOPRIM) 100 MG tablet Take 1 tablet (100 mg) by mouth daily (Patient taking differently: Take 100 mg by mouth every morning ) 90 tablet 3     amLODIPine (NORVASC) 5 MG tablet Take 1 tablet (5 mg) by mouth 2 times daily LAST REFILL WITHOUT AN APPOINTMENT 180 tablet 0     ASPIRIN NOT PRESCRIBED (INTENTIONAL) Please choose reason not prescribed, below 1 each 0     cholecalciferol (VITAMIN D) 1000 UNIT  "tablet Take 2,000 Units by mouth every morning        fluticasone (FLONASE) 50 MCG/ACT nasal spray Spray 1-2 sprays into both nostrils At Bedtime        furosemide (LASIX) 20 MG tablet TAKE 1 TABLET(20 MG) BY MOUTH EVERY MORNING (Patient taking differently: Take 20 mg by mouth every morning ) 270 tablet 0     guaiFENesin (ROBITUSSIN) 20 mg/mL SOLN solution Take 5 mLs by mouth every 6 hours as needed for cough       ipratropium-albuterol (COMBIVENT RESPIMAT)  MCG/ACT inhaler Inhale 1 puff into the lungs 4 times daily       levothyroxine (SYNTHROID/LEVOTHROID) 100 MCG tablet Take 1 tablet (100 mcg) by mouth daily 90 tablet 3     lisinopril (ZESTRIL) 20 MG tablet Take 1 tablet (20 mg) by mouth 2 times daily Needs an appointment for refills. 180 tablet 0     multivitamin w/minerals (THERA-VIT-M) tablet Take 1 tablet by mouth daily       nystatin (MYCOSTATIN) 853036 UNIT/GM external powder Apply topically 2 times daily (Patient taking differently: Apply topically 2 times daily as needed (groin rash) ) 60 Bottle 1     pantoprazole (PROTONIX) 40 MG EC tablet Take 1 tablet (40 mg) by mouth every morning (before breakfast)       tamsulosin (FLOMAX) 0.4 MG capsule Take 1 capsule (0.4 mg) by mouth daily       warfarin ANTICOAGULANT (COUMADIN) 1 MG tablet TAKE 2 TABLETS BY MOUTH EVERY MONDAY, WEDNESDAY, FRIDAY, SATURDAY. DOSE MAY CHANGE PER INR RESULT AS DIRECTED 102 tablet 1         REVIEW OF SYSTEMS:  10 point ROS of systems including Constitutional, Eyes, Respiratory, Cardiovascular, Gastroenterology, Genitourinary, Integumentary, Musculoskeletal, Psychiatric were all negative except for pertinent positives noted in my HPI.    Objective:  /69   Pulse 66   Temp 97.6  F (36.4  C)   Resp 18   Ht 1.651 m (5' 5\")   Wt 79.1 kg (174 lb 4.8 oz)   LMP  (LMP Unknown)   SpO2 97%   BMI 29.01 kg/m    Exam:  GENERAL APPEARANCE:  Alert, in no distress  ENT:  Mouth and posterior oropharynx normal, moist mucous membranes, " [place]  EYES:  EOM, conjunctivae, lids, pupils and irises normal, PERRL  RESP:  respiratory effort and palpation of chest normal, lungs clear to auscultation , no respiratory distress, diminished breath sounds bases bilaterally  CV:  Palpation and auscultation of heart done , regular rate and rhythm, no murmur, rub, or gallop, peripheral edema trace+ in LE bilaterally  ABDOMEN:  normal bowel sounds, soft, nontender, no hepatosplenomegaly or other masses  M/S:   patient resting in bed, able to move all 4 extremities  SKIN:  Inspection of skin and subcutaneous tissue baseline  NEURO:   speech wnl    Labs:     Most Recent 3 CBC's:  Recent Labs   Lab Test 06/01/21 05/20/21  0914 05/19/21  1152 05/18/21  1532   WBC  --  10.0 7.2 11.3*   HGB 11.0* 10.8* 10.2* 11.0*   MCV  --  93 93 91   PLT  --  283 244 274     Most Recent 3 BMP's:  Recent Labs   Lab Test 06/01/21 05/24/21  0830 05/20/21  0914 05/19/21  1152    144  --  142   POTASSIUM 4.7 3.5 4.2 4.3   CHLORIDE 97* 108  --  109   CO2 35* 33*  --  28   BUN 37* 23  --  23   CR 0.66 0.83  --  0.78   ANIONGAP 8 3  --  5   VICKIE 10.8* 9.9  --  9.9   GLC 75 87  --  139*       ASSESSMENT/PLAN:  Pneumonia of both lungs due to infectious organism, unspecified part of lung  Physical deconditioning  Acute/ongoing: PT and OT, augmentin finished, prednisone taper is complete, IS QID and prn, monitor SaO2 at rest and with activity, keep SaO2 > 90%  Weaned off O2     Dysphagia:   Acute/ongoing:  continue soft and bite sized diet with thin liquids     Cognitive impairement  Acute/ongoing: cognitive impairement, OT ongoing     Urinary retention  Acute/ongoing: continue flomax 0.4mg, DC mac on Monday, PVR q shift, straight cath for PVR > 350cc     Chronic diastolic heart failure (H)  Chronic atrial fibrillation (H)  Benign essential hypertension  Long term current use of anticoagulant therapy  Acute/ongoing: daily weights, vitals daily and prn, BMP follow, continue lisinopril 20mg BID,  Lasix 20mg QD, norvasc 5mg BID, coumadin as directed INR goal of 2-3     Spinal muscular atrophy type III causing decreased ability of respiratory mm-onset 45y/o   Ongoing: functional decline at home, PT ongoing, patient will be transitioning to LTC          Orders  UA/UC due to urinary retention    Total time spent with patient visit at the skilled nursing facility was 25 min including patient visit and review of past records. Greater than 50% of total time spent with counseling and coordinating care due to discussed patient trouble urinating, will check for UTI, discussed therapy and strengthening, patient not wanting to walk, able to transfer will be transitioning to LTC and patient is aware of that. .  Electronically signed by:  Tonya Lynn Haase, APRN CNP               Sincerely,        Tonya Lynn Haase, APRN CNP

## 2021-06-17 ENCOUNTER — TRANSFERRED RECORDS (OUTPATIENT)
Dept: HEALTH INFORMATION MANAGEMENT | Facility: CLINIC | Age: 86
End: 2021-06-17

## 2021-06-19 ENCOUNTER — TELEPHONE (OUTPATIENT)
Dept: GERIATRICS | Facility: CLINIC | Age: 86
End: 2021-06-19

## 2021-06-19 NOTE — TELEPHONE ENCOUNTER
INR today 2.2  Yesterday 2.1, 6mg warfarin, just started bactrim on 6/18 through 25, requested daily INR's.  -warfarin 4mg today  -recheck INR tomorrow

## 2021-06-20 ENCOUNTER — TELEPHONE (OUTPATIENT)
Dept: GERIATRICS | Facility: CLINIC | Age: 86
End: 2021-06-20

## 2021-06-20 NOTE — TELEPHONE ENCOUNTER
INR 6/18 2.1, 6mg warfarin, just started bactrim on 6/18 through 25, requested daily INR's  INR 6/19 2.2, warfarin 4mg  INR today 2.5  -warfarin 2mg  -recheck on 6/21

## 2021-06-21 ENCOUNTER — TRANSFERRED RECORDS (OUTPATIENT)
Dept: HEALTH INFORMATION MANAGEMENT | Facility: CLINIC | Age: 86
End: 2021-06-21

## 2021-06-21 LAB
ANION GAP SERPL CALCULATED.3IONS-SCNC: 10 MMOL/L (ref 7–16)
BUN SERPL-MCNC: 62 MG/DL (ref 7–26)
CALCIUM SERPL-MCNC: 10.9 MG/DL (ref 8.4–10.4)
CHLORIDE SERPLBLD-SCNC: 103 MMOL/L (ref 98–109)
CO2 SERPL-SCNC: 23 MMOL/L (ref 20–29)
CREAT SERPL-MCNC: 1.57 MG/DL (ref 0.55–1.02)
GFR SERPL CREATININE-BSD FRML MDRD: 30 ML/MIN/1.73M2
GLUCOSE SERPL-MCNC: 64 MG/DL (ref 70–100)
POTASSIUM SERPL-SCNC: 5.2 MMOL/L (ref 3.5–5.1)
SODIUM SERPL-SCNC: 136 MMOL/L (ref 136–145)

## 2021-06-21 ASSESSMENT — MIFFLIN-ST. JEOR: SCORE: 1240.14

## 2021-06-21 NOTE — PROGRESS NOTES
[place] [facility]  [place] Medical Record Number:  [id number]  Place of Service where encounter took place:  [facility] (TCU) [25]  Chief Complaint   Patient presents with     Nursing Home Acute       HPI:    Kenyatta Donald  is a 85 year old (1935), who is being seen today for an episodic care visit.  HPI information obtained from: facility chart records, facility staff, patient report and [facility] chart review. Today's concern is:  Pneumonia/hypoxia: patient is off O2, SaO2 94% on room air, no c/o SOB, cough or congestion, working with therapy needing CGA for bed mobility, ADL's, standing at bedside, not walking, looking at moving to LTC  Cognitive impairement: BIMS 10/15, SBT 10/28, will be transitioning to LTC  Urinary Retention/UTI:  patient voiding incontinent brief. Started on bactrim for UTI > 100,000 gram negative bacilli  HTN/atrial fib/CHF: BP as follows: 102/67, 112/45, 108/51 Weight stable, admit weight 177.7lbs  current weight 174.lbs  Spinal muscular atrophy: in therapy patient is doing some static standing with therapy, stand pivot transfers. using EZ lift with nursing    Past Medical and Surgical History reviewed in Epic today.    MEDICATIONS:    Current Outpatient Medications   Medication Sig Dispense Refill     acetaminophen (TYLENOL) 325 MG tablet Take 2 tablets (650 mg) by mouth every 4 hours as needed for mild pain 100 tablet      allopurinol (ZYLOPRIM) 100 MG tablet Take 1 tablet (100 mg) by mouth daily (Patient taking differently: Take 100 mg by mouth every morning ) 90 tablet 3     amLODIPine (NORVASC) 5 MG tablet Take 1 tablet (5 mg) by mouth 2 times daily LAST REFILL WITHOUT AN APPOINTMENT 180 tablet 0     ASPIRIN NOT PRESCRIBED (INTENTIONAL) Please choose reason not prescribed, below 1 each 0     cholecalciferol (VITAMIN D) 1000 UNIT tablet Take 2,000 Units by mouth every morning        fluticasone (FLONASE) 50 MCG/ACT nasal spray Spray 1-2 sprays into both  "nostrils At Bedtime        furosemide (LASIX) 20 MG tablet TAKE 1 TABLET(20 MG) BY MOUTH EVERY MORNING (Patient taking differently: Take 20 mg by mouth every morning ) 270 tablet 0     guaiFENesin (ROBITUSSIN) 20 mg/mL SOLN solution Take 5 mLs by mouth every 6 hours as needed for cough       ipratropium-albuterol (COMBIVENT RESPIMAT)  MCG/ACT inhaler Inhale 1 puff into the lungs 4 times daily       levothyroxine (SYNTHROID/LEVOTHROID) 100 MCG tablet Take 1 tablet (100 mcg) by mouth daily 90 tablet 3     lisinopril (ZESTRIL) 20 MG tablet Take 1 tablet (20 mg) by mouth 2 times daily Needs an appointment for refills. 180 tablet 0     multivitamin w/minerals (THERA-VIT-M) tablet Take 1 tablet by mouth daily       nystatin (MYCOSTATIN) 788078 UNIT/GM external powder Apply topically 2 times daily (Patient taking differently: Apply topically 2 times daily as needed (groin rash) ) 60 Bottle 1     pantoprazole (PROTONIX) 40 MG EC tablet Take 1 tablet (40 mg) by mouth every morning (before breakfast)       tamsulosin (FLOMAX) 0.4 MG capsule Take 1 capsule (0.4 mg) by mouth daily       warfarin ANTICOAGULANT (COUMADIN) 1 MG tablet TAKE 2 TABLETS BY MOUTH EVERY MONDAY, WEDNESDAY, FRIDAY, SATURDAY. DOSE MAY CHANGE PER INR RESULT AS DIRECTED 102 tablet 1         REVIEW OF SYSTEMS:  10 point ROS of systems including Constitutional, Eyes, Respiratory, Cardiovascular, Gastroenterology, Genitourinary, Integumentary, Musculoskeletal, Psychiatric were all negative except for pertinent positives noted in my HPI.    Objective:  /67   Pulse 83   Temp 97.1  F (36.2  C)   Resp 16   Ht 1.651 m (5' 5\")   Wt 78.9 kg (174 lb)   LMP  (LMP Unknown)   SpO2 91%   BMI 28.96 kg/m    Exam:  GENERAL APPEARANCE:  Alert, in no distress  ENT:  Mouth and posterior oropharynx normal, moist mucous membranes, [place]  EYES:  EOM, conjunctivae, lids, pupils and irises normal, PERRL  RESP:  respiratory effort and palpation of chest normal, lungs " clear to auscultation , no respiratory distress, diminished breath sounds bases bilaterally  CV:  Palpation and auscultation of heart done , regular rate and rhythm, no murmur, rub, or gallop, peripheral edema trace+ in LE bilaterally  ABDOMEN:  normal bowel sounds, soft, nontender, no hepatosplenomegaly or other masses  M/S:   patient resting in bed, able to move all 4 extremities  SKIN:  Inspection of skin and subcutaneous tissue baseline  NEURO:   speech wnl    Labs:     Most Recent 3 CBC's:  Recent Labs   Lab Test 06/01/21 05/20/21  0914 05/19/21  1152 05/18/21  1532   WBC  --  10.0 7.2 11.3*   HGB 11.0* 10.8* 10.2* 11.0*   MCV  --  93 93 91   PLT  --  283 244 274     Most Recent 3 BMP's:  Recent Labs   Lab Test 06/21/21 06/01/21 05/24/21  0830    140 144   POTASSIUM 5.2* 4.7 3.5   CHLORIDE 103 97* 108   CO2 23 35* 33*   BUN 62* 37* 23   CR 1.57* 0.66 0.83   ANIONGAP 10 8 3   VICKIE 10.9* 10.8* 9.9   GLC 64* 75 87       ASSESSMENT/PLAN:  Pneumonia of both lungs due to infectious organism, unspecified part of lung  Physical deconditioning  Acute/ongoing: PT and OT, augmentin finished, prednisone taper is complete, IS QID and prn, monitor SaO2 at rest and with activity, keep SaO2 > 90%  Weaned off O2     Dysphagia:   Acute/ongoing:  continue soft and bite sized diet with thin liquids     Cognitive impairement  Acute/ongoing: cognitive impairement, OT ongoing     Urinary retention  UTI  Acute/ongoing: continue flomax 0.4mg, DC mac on Monday, PVR q shift, straight cath for PVR > 350cc  Started on bactrim ds on 6/18/21 will continue for 7 days     Chronic diastolic heart failure (H)  Chronic atrial fibrillation (H)  Benign essential hypertension  Long term current use of anticoagulant therapy  Acute/ongoing: daily weights, vitals daily and prn, BMP follow, continue lisinopril 20mg BID, Lasix 20mg QD, norvasc 5mg BID, coumadin as directed INR goal of 2-3     Spinal muscular atrophy type III causing decreased  ability of respiratory mm-onset 45y/o   Ongoing: functional decline at home, PT ongoing, patient will be transitioning to LTC     Orders  No new orders today      Electronically signed by:  Tonya Lynn Haase, APRN CNP

## 2021-06-22 ENCOUNTER — NURSING HOME VISIT (OUTPATIENT)
Dept: GERIATRICS | Facility: CLINIC | Age: 86
End: 2021-06-22
Payer: COMMERCIAL

## 2021-06-22 VITALS
DIASTOLIC BLOOD PRESSURE: 67 MMHG | SYSTOLIC BLOOD PRESSURE: 102 MMHG | HEART RATE: 83 BPM | TEMPERATURE: 97.1 F | WEIGHT: 174 LBS | OXYGEN SATURATION: 91 % | RESPIRATION RATE: 16 BRPM | HEIGHT: 65 IN | BODY MASS INDEX: 28.99 KG/M2

## 2021-06-22 DIAGNOSIS — I48.20 CHRONIC ATRIAL FIBRILLATION (H): ICD-10-CM

## 2021-06-22 DIAGNOSIS — J18.9 PNEUMONIA OF BOTH LUNGS DUE TO INFECTIOUS ORGANISM, UNSPECIFIED PART OF LUNG: Primary | ICD-10-CM

## 2021-06-22 DIAGNOSIS — R13.10 DYSPHAGIA, UNSPECIFIED TYPE: ICD-10-CM

## 2021-06-22 DIAGNOSIS — R33.9 URINARY RETENTION: ICD-10-CM

## 2021-06-22 DIAGNOSIS — I10 BENIGN ESSENTIAL HYPERTENSION: ICD-10-CM

## 2021-06-22 DIAGNOSIS — Z79.01 LONG TERM CURRENT USE OF ANTICOAGULANT THERAPY: ICD-10-CM

## 2021-06-22 DIAGNOSIS — R09.02 HYPOXIA: ICD-10-CM

## 2021-06-22 DIAGNOSIS — R53.81 PHYSICAL DECONDITIONING: ICD-10-CM

## 2021-06-22 DIAGNOSIS — G12.1 SPINAL MUSCULAR ATROPHY TYPE III (H): ICD-10-CM

## 2021-06-22 DIAGNOSIS — I50.32 CHRONIC DIASTOLIC HEART FAILURE (H): ICD-10-CM

## 2021-06-22 PROCEDURE — 99309 SBSQ NF CARE MODERATE MDM 30: CPT | Performed by: NURSE PRACTITIONER

## 2021-06-23 ENCOUNTER — NURSING HOME VISIT (OUTPATIENT)
Dept: GERIATRICS | Facility: CLINIC | Age: 86
End: 2021-06-23
Payer: COMMERCIAL

## 2021-06-23 VITALS
DIASTOLIC BLOOD PRESSURE: 49 MMHG | HEIGHT: 65 IN | WEIGHT: 174 LBS | RESPIRATION RATE: 17 BRPM | SYSTOLIC BLOOD PRESSURE: 90 MMHG | TEMPERATURE: 97.5 F | OXYGEN SATURATION: 90 % | HEART RATE: 84 BPM | BODY MASS INDEX: 28.99 KG/M2

## 2021-06-23 DIAGNOSIS — R53.81 PHYSICAL DECONDITIONING: ICD-10-CM

## 2021-06-23 DIAGNOSIS — R09.02 HYPOXIA: ICD-10-CM

## 2021-06-23 DIAGNOSIS — R13.10 DYSPHAGIA, UNSPECIFIED TYPE: ICD-10-CM

## 2021-06-23 DIAGNOSIS — J18.9 PNEUMONIA OF BOTH LUNGS DUE TO INFECTIOUS ORGANISM, UNSPECIFIED PART OF LUNG: Primary | ICD-10-CM

## 2021-06-23 DIAGNOSIS — I48.20 CHRONIC ATRIAL FIBRILLATION (H): ICD-10-CM

## 2021-06-23 DIAGNOSIS — I50.32 CHRONIC DIASTOLIC HEART FAILURE (H): ICD-10-CM

## 2021-06-23 DIAGNOSIS — G12.1 SPINAL MUSCULAR ATROPHY TYPE III (H): ICD-10-CM

## 2021-06-23 DIAGNOSIS — Z79.01 LONG TERM CURRENT USE OF ANTICOAGULANT THERAPY: ICD-10-CM

## 2021-06-23 DIAGNOSIS — R33.9 URINARY RETENTION: ICD-10-CM

## 2021-06-23 DIAGNOSIS — N39.0 URINARY TRACT INFECTION WITHOUT HEMATURIA, SITE UNSPECIFIED: ICD-10-CM

## 2021-06-23 DIAGNOSIS — I10 BENIGN ESSENTIAL HYPERTENSION: ICD-10-CM

## 2021-06-23 PROCEDURE — 99310 SBSQ NF CARE HIGH MDM 45: CPT | Performed by: NURSE PRACTITIONER

## 2021-06-23 ASSESSMENT — MIFFLIN-ST. JEOR: SCORE: 1240.14

## 2021-06-23 NOTE — LETTER
6/23/2021        RE: Kenyatta Donald  [address]  [facility] [address]        [place] [facility]  [place] Medical Record Number:  [id number]  Place of Service where encounter took place:  [facility] ([facility]) [25]  Chief Complaint   Patient presents with     Nursing Home Acute       HPI:    Kenyatta Donald  is a 85 year old (1935), who is being seen today for an episodic care visit.  HPI information obtained from: facility chart records, facility staff, patient report and [facility] chart review. Today's concern is:  Pneumonia/hypoxia: patient is off O2, SaO2 94% on room air, no c/o SOB, cough or congestion, working with therapy needing CGA for bed mobility, ADL's, standing at bedside, not walking, looking at moving to LTC  Cognitive impairement: BIMS 10/15, SBT 10/28, will be transitioning to LTC  Urinary Retention/UTI:  patient voiding incontinent brief. Started on bactrim for UTI > 100,000 gram negative bacilli  HTN/atrial fib/CHF: BP as follows: 101/57, 116/65, 90/49 qirh HE in 70-80 range,  Weight stable, admit weight 177.7lbs  current weight 174.lbs  Spinal muscular atrophy: in therapy patient is doing some static standing with therapy, stand pivot transfers. using EZ lift with nursing, not making any progress    Past Medical and Surgical History reviewed in Epic today.    MEDICATIONS:    Current Outpatient Medications   Medication Sig Dispense Refill     acetaminophen (TYLENOL) 325 MG tablet Take 2 tablets (650 mg) by mouth every 4 hours as needed for mild pain 100 tablet      allopurinol (ZYLOPRIM) 100 MG tablet Take 1 tablet (100 mg) by mouth daily (Patient taking differently: Take 100 mg by mouth every morning ) 90 tablet 3     amLODIPine (NORVASC) 5 MG tablet Take 1 tablet (5 mg) by mouth 2 times daily LAST REFILL WITHOUT AN APPOINTMENT 180 tablet 0     ASPIRIN NOT PRESCRIBED (INTENTIONAL) Please choose reason not prescribed, below 1 each 0      "cholecalciferol (VITAMIN D) 1000 UNIT tablet Take 2,000 Units by mouth every morning        fluticasone (FLONASE) 50 MCG/ACT nasal spray Spray 1-2 sprays into both nostrils At Bedtime        furosemide (LASIX) 20 MG tablet TAKE 1 TABLET(20 MG) BY MOUTH EVERY MORNING (Patient taking differently: Take 20 mg by mouth every morning ) 270 tablet 0     guaiFENesin (ROBITUSSIN) 20 mg/mL SOLN solution Take 5 mLs by mouth every 6 hours as needed for cough       ipratropium-albuterol (COMBIVENT RESPIMAT)  MCG/ACT inhaler Inhale 1 puff into the lungs 4 times daily       levothyroxine (SYNTHROID/LEVOTHROID) 100 MCG tablet Take 1 tablet (100 mcg) by mouth daily 90 tablet 3     lisinopril (ZESTRIL) 20 MG tablet Take 1 tablet (20 mg) by mouth 2 times daily Needs an appointment for refills. 180 tablet 0     multivitamin w/minerals (THERA-VIT-M) tablet Take 1 tablet by mouth daily       nystatin (MYCOSTATIN) 079873 UNIT/GM external powder Apply topically 2 times daily (Patient taking differently: Apply topically 2 times daily as needed (groin rash) ) 60 Bottle 1     pantoprazole (PROTONIX) 40 MG EC tablet Take 1 tablet (40 mg) by mouth every morning (before breakfast)       tamsulosin (FLOMAX) 0.4 MG capsule Take 1 capsule (0.4 mg) by mouth daily       warfarin ANTICOAGULANT (COUMADIN) 1 MG tablet TAKE 2 TABLETS BY MOUTH EVERY MONDAY, WEDNESDAY, FRIDAY, SATURDAY. DOSE MAY CHANGE PER INR RESULT AS DIRECTED 102 tablet 1         REVIEW OF SYSTEMS:  10 point ROS of systems including Constitutional, Eyes, Respiratory, Cardiovascular, Gastroenterology, Genitourinary, Integumentary, Musculoskeletal, Psychiatric were all negative except for pertinent positives noted in my HPI.    Objective:  BP 90/49   Pulse 84   Temp 97.5  F (36.4  C)   Resp 17   Ht 1.651 m (5' 5\")   Wt 78.9 kg (174 lb)   LMP  (LMP Unknown)   SpO2 90%   BMI 28.96 kg/m    Exam:  GENERAL APPEARANCE:  Alert, in no distress  ENT:  Mouth and posterior oropharynx " normal, moist mucous membranes, [place]  EYES:  EOM, conjunctivae, lids, pupils and irises normal, PERRL  RESP:  respiratory effort and palpation of chest normal, lungs clear to auscultation , no respiratory distress, diminished breath sounds bases bilaterally  CV:  Palpation and auscultation of heart done , regular rate and rhythm, no murmur, rub, or gallop, peripheral edema trace+ in LE bilaterally  ABDOMEN:  normal bowel sounds, soft, nontender, no hepatosplenomegaly or other masses  M/S:   patient resting in bed, able to move all 4 extremities, some tremors noted arms/hands  SKIN:  Inspection of skin and subcutaneous tissue baseline  NEURO:   speech wnl    Labs:     Most Recent 3 CBC's:  Recent Labs   Lab Test 06/01/21 05/20/21  0914 05/19/21  1152 05/18/21  1532   WBC  --  10.0 7.2 11.3*   HGB 11.0* 10.8* 10.2* 11.0*   MCV  --  93 93 91   PLT  --  283 244 274     Most Recent 3 BMP's:  Recent Labs   Lab Test 06/21/21 06/01/21 05/24/21  0830    140 144   POTASSIUM 5.2* 4.7 3.5   CHLORIDE 103 97* 108   CO2 23 35* 33*   BUN 62* 37* 23   CR 1.57* 0.66 0.83   ANIONGAP 10 8 3   VICKIE 10.9* 10.8* 9.9   GLC 64* 75 87       ASSESSMENT/PLAN:  Pneumonia of both lungs due to infectious organism, unspecified part of lung  Physical deconditioning  Acute/ongoing: PT and OT, augmentin finished, prednisone taper is complete, IS QID and prn, monitor SaO2 at rest and with activity, keep SaO2 > 90%  Weaned off O2     Dysphagia:   Acute/ongoing:  continue soft and bite sized diet with thin liquids     Cognitive impairement  Acute/ongoing: cognitive impairement, OT ongoing     Urinary retention  UTI  Acute/ongoing: continue flomax 0.4mg, DC mac on Monday, PVR q shift, straight cath for PVR > 350cc  Started on bactrim ds on 6/18/21 will continue for 7 days     Chronic diastolic heart failure (H)  Chronic atrial fibrillation (H)  Benign essential hypertension  Long term current use of anticoagulant therapy  Acute/ongoing: daily  weights, vitals daily and prn, BMP follow, continue lisinopril 20mg BID, Lasix 20mg QD, norvasc 5mg BID, coumadin as directed INR goal of 2-3     Spinal muscular atrophy type III causing decreased ability of respiratory mm-onset 45y/o   Ongoing: functional decline at home, PT ongoing, patient will be transitioning to LTC       orders  BMP and CBC in AM  Hold lisinopril and lasix     Total time spent with patient visit at the skilled nursing facility was 35 min including patient visit and review of past records. Greater than 50% of total time spent with counseling and coordinating care due to discussed patient poor appetite, encouraged patient to drink lots of fluids, water, juice, will order labs, discussed UTI and Abx, education on drinking adequate fluids. .  Electronically signed by:  Tonya Lynn Haase, APRN CNP               Sincerely,        Tonya Lynn Haase, APRN CNP

## 2021-06-23 NOTE — PROGRESS NOTES
[place] [facility]  [place] Medical Record Number:  [id number]  Place of Service where encounter took place:  [facility] (TCU) [25]  Chief Complaint   Patient presents with     Nursing Home Acute       HPI:    Kenyatta Donald  is a 85 year old (1935), who is being seen today for an episodic care visit.  HPI information obtained from: facility chart records, facility staff, patient report and [facility] chart review. Today's concern is:  Pneumonia/hypoxia: patient is off O2, SaO2 94% on room air, no c/o SOB, cough or congestion, working with therapy needing CGA for bed mobility, ADL's, standing at bedside, not walking, looking at moving to LTC  Cognitive impairement: BIMS 10/15, SBT 10/28, will be transitioning to LTC  Urinary Retention/UTI:  patient voiding incontinent brief. Started on bactrim for UTI > 100,000 gram negative bacilli  HTN/atrial fib/CHF: BP as follows: 101/57, 116/65, 90/49 qirh HE in 70-80 range,  Weight stable, admit weight 177.7lbs  current weight 174.lbs  Spinal muscular atrophy: in therapy patient is doing some static standing with therapy, stand pivot transfers. using EZ lift with nursing, not making any progress    Past Medical and Surgical History reviewed in Epic today.    MEDICATIONS:    Current Outpatient Medications   Medication Sig Dispense Refill     acetaminophen (TYLENOL) 325 MG tablet Take 2 tablets (650 mg) by mouth every 4 hours as needed for mild pain 100 tablet      allopurinol (ZYLOPRIM) 100 MG tablet Take 1 tablet (100 mg) by mouth daily (Patient taking differently: Take 100 mg by mouth every morning ) 90 tablet 3     amLODIPine (NORVASC) 5 MG tablet Take 1 tablet (5 mg) by mouth 2 times daily LAST REFILL WITHOUT AN APPOINTMENT 180 tablet 0     ASPIRIN NOT PRESCRIBED (INTENTIONAL) Please choose reason not prescribed, below 1 each 0     cholecalciferol (VITAMIN D) 1000 UNIT tablet Take 2,000 Units by mouth every morning        fluticasone (FLONASE)  "50 MCG/ACT nasal spray Spray 1-2 sprays into both nostrils At Bedtime        furosemide (LASIX) 20 MG tablet TAKE 1 TABLET(20 MG) BY MOUTH EVERY MORNING (Patient taking differently: Take 20 mg by mouth every morning ) 270 tablet 0     guaiFENesin (ROBITUSSIN) 20 mg/mL SOLN solution Take 5 mLs by mouth every 6 hours as needed for cough       ipratropium-albuterol (COMBIVENT RESPIMAT)  MCG/ACT inhaler Inhale 1 puff into the lungs 4 times daily       levothyroxine (SYNTHROID/LEVOTHROID) 100 MCG tablet Take 1 tablet (100 mcg) by mouth daily 90 tablet 3     lisinopril (ZESTRIL) 20 MG tablet Take 1 tablet (20 mg) by mouth 2 times daily Needs an appointment for refills. 180 tablet 0     multivitamin w/minerals (THERA-VIT-M) tablet Take 1 tablet by mouth daily       nystatin (MYCOSTATIN) 980064 UNIT/GM external powder Apply topically 2 times daily (Patient taking differently: Apply topically 2 times daily as needed (groin rash) ) 60 Bottle 1     pantoprazole (PROTONIX) 40 MG EC tablet Take 1 tablet (40 mg) by mouth every morning (before breakfast)       tamsulosin (FLOMAX) 0.4 MG capsule Take 1 capsule (0.4 mg) by mouth daily       warfarin ANTICOAGULANT (COUMADIN) 1 MG tablet TAKE 2 TABLETS BY MOUTH EVERY MONDAY, WEDNESDAY, FRIDAY, SATURDAY. DOSE MAY CHANGE PER INR RESULT AS DIRECTED 102 tablet 1         REVIEW OF SYSTEMS:  10 point ROS of systems including Constitutional, Eyes, Respiratory, Cardiovascular, Gastroenterology, Genitourinary, Integumentary, Musculoskeletal, Psychiatric were all negative except for pertinent positives noted in my HPI.    Objective:  BP 90/49   Pulse 84   Temp 97.5  F (36.4  C)   Resp 17   Ht 1.651 m (5' 5\")   Wt 78.9 kg (174 lb)   LMP  (LMP Unknown)   SpO2 90%   BMI 28.96 kg/m    Exam:  GENERAL APPEARANCE:  Alert, in no distress  ENT:  Mouth and posterior oropharynx normal, moist mucous membranes, [place]  EYES:  EOM, conjunctivae, lids, pupils and irises normal, " PERRL  RESP:  respiratory effort and palpation of chest normal, lungs clear to auscultation , no respiratory distress, diminished breath sounds bases bilaterally  CV:  Palpation and auscultation of heart done , regular rate and rhythm, no murmur, rub, or gallop, peripheral edema trace+ in LE bilaterally  ABDOMEN:  normal bowel sounds, soft, nontender, no hepatosplenomegaly or other masses  M/S:   patient resting in bed, able to move all 4 extremities, some tremors noted arms/hands  SKIN:  Inspection of skin and subcutaneous tissue baseline  NEURO:   speech wnl    Labs:     Most Recent 3 CBC's:  Recent Labs   Lab Test 06/01/21 05/20/21  0914 05/19/21  1152 05/18/21  1532   WBC  --  10.0 7.2 11.3*   HGB 11.0* 10.8* 10.2* 11.0*   MCV  --  93 93 91   PLT  --  283 244 274     Most Recent 3 BMP's:  Recent Labs   Lab Test 06/21/21 06/01/21 05/24/21  0830    140 144   POTASSIUM 5.2* 4.7 3.5   CHLORIDE 103 97* 108   CO2 23 35* 33*   BUN 62* 37* 23   CR 1.57* 0.66 0.83   ANIONGAP 10 8 3   VICKIE 10.9* 10.8* 9.9   GLC 64* 75 87       ASSESSMENT/PLAN:  Pneumonia of both lungs due to infectious organism, unspecified part of lung  Physical deconditioning  Acute/ongoing: PT and OT, augmentin finished, prednisone taper is complete, IS QID and prn, monitor SaO2 at rest and with activity, keep SaO2 > 90%  Weaned off O2     Dysphagia:   Acute/ongoing:  continue soft and bite sized diet with thin liquids     Cognitive impairement  Acute/ongoing: cognitive impairement, OT ongoing     Urinary retention  UTI  Acute/ongoing: continue flomax 0.4mg, DC mac on Monday, PVR q shift, straight cath for PVR > 350cc  Started on bactrim ds on 6/18/21 will continue for 7 days     Chronic diastolic heart failure (H)  Chronic atrial fibrillation (H)  Benign essential hypertension  Long term current use of anticoagulant therapy  Acute/ongoing: daily weights, vitals daily and prn, BMP follow, continue lisinopril 20mg BID, Lasix 20mg QD, norvasc 5mg  BID, coumadin as directed INR goal of 2-3     Spinal muscular atrophy type III causing decreased ability of respiratory mm-onset 45y/o   Ongoing: functional decline at home, PT ongoing, patient will be transitioning to LTC       orders  BMP and CBC in AM  Hold lisinopril and lasix     Total time spent with patient visit at the skilled nursing facility was 35 min including patient visit and review of past records. Greater than 50% of total time spent with counseling and coordinating care due to discussed patient poor appetite, encouraged patient to drink lots of fluids, water, juice, will order labs, discussed UTI and Abx, education on drinking adequate fluids. .  Electronically signed by:  Tonya Lynn Haase, APRN CNP

## 2021-06-24 ENCOUNTER — TRANSFERRED RECORDS (OUTPATIENT)
Dept: HEALTH INFORMATION MANAGEMENT | Facility: CLINIC | Age: 86
End: 2021-06-24

## 2021-06-24 LAB
ANION GAP SERPL CALCULATED.3IONS-SCNC: 10 MMOL/L (ref 7–16)
BUN SERPL-MCNC: 57 MG/DL (ref 7–26)
CALCIUM SERPL-MCNC: 11.2 MG/DL (ref 8.4–10.4)
CHLORIDE SERPLBLD-SCNC: 100 MMOL/L (ref 98–109)
CO2 SERPL-SCNC: 23 MMOL/L (ref 20–29)
CREAT SERPL-MCNC: 2.08 MG/DL (ref 0.55–1.02)
ERYTHROCYTE [DISTWIDTH] IN BLOOD BY AUTOMATED COUNT: 16.3 % (ref 11.9–15.5)
GFR SERPL CREATININE-BSD FRML MDRD: 21 ML/MIN/1.73M2
GLUCOSE SERPL-MCNC: 84 MG/DL (ref 70–100)
HCT VFR BLD AUTO: 33.4 % (ref 34.9–44.5)
HEMOGLOBIN: 9.9 G/DL (ref 12–15.5)
MCH RBC QN AUTO: 26.9 PG (ref 27.6–33.3)
MCHC RBC AUTO-ENTMCNC: 29.6 G/DL (ref 31.5–35.2)
MCV RBC AUTO: 90.8 FL (ref 80–100)
PLATELET # BLD AUTO: 292 X10(9)/L (ref 150–450)
POTASSIUM SERPL-SCNC: 5.8 MMOL/L (ref 3.5–5.1)
RBC # BLD AUTO: 3.68 X10(12)/L (ref 3.9–5.03)
SODIUM SERPL-SCNC: 133 MMOL/L (ref 136–145)
WBC # BLD AUTO: 5.7 X10(9)/L (ref 3.5–10.5)

## 2021-06-24 ASSESSMENT — MIFFLIN-ST. JEOR: SCORE: 1230.16

## 2021-06-25 ENCOUNTER — PATIENT OUTREACH (OUTPATIENT)
Dept: CARE COORDINATION | Facility: CLINIC | Age: 86
End: 2021-06-25

## 2021-06-25 ENCOUNTER — TRANSFERRED RECORDS (OUTPATIENT)
Dept: HEALTH INFORMATION MANAGEMENT | Facility: CLINIC | Age: 86
End: 2021-06-25

## 2021-06-25 ENCOUNTER — NURSING HOME VISIT (OUTPATIENT)
Dept: GERIATRICS | Facility: CLINIC | Age: 86
End: 2021-06-25
Payer: COMMERCIAL

## 2021-06-25 VITALS
HEART RATE: 73 BPM | RESPIRATION RATE: 18 BRPM | DIASTOLIC BLOOD PRESSURE: 53 MMHG | SYSTOLIC BLOOD PRESSURE: 105 MMHG | TEMPERATURE: 97.7 F | OXYGEN SATURATION: 95 % | BODY MASS INDEX: 28.62 KG/M2 | HEIGHT: 65 IN | WEIGHT: 171.8 LBS

## 2021-06-25 DIAGNOSIS — N39.0 URINARY TRACT INFECTION WITHOUT HEMATURIA, SITE UNSPECIFIED: ICD-10-CM

## 2021-06-25 DIAGNOSIS — N17.9 ACUTE RENAL FAILURE, UNSPECIFIED ACUTE RENAL FAILURE TYPE (H): Primary | ICD-10-CM

## 2021-06-25 DIAGNOSIS — I48.20 CHRONIC ATRIAL FIBRILLATION (H): ICD-10-CM

## 2021-06-25 DIAGNOSIS — R33.9 URINARY RETENTION: ICD-10-CM

## 2021-06-25 DIAGNOSIS — R53.81 PHYSICAL DECONDITIONING: ICD-10-CM

## 2021-06-25 DIAGNOSIS — I10 BENIGN ESSENTIAL HYPERTENSION: ICD-10-CM

## 2021-06-25 DIAGNOSIS — I50.32 CHRONIC DIASTOLIC HEART FAILURE (H): ICD-10-CM

## 2021-06-25 DIAGNOSIS — G12.1 SPINAL MUSCULAR ATROPHY TYPE III (H): ICD-10-CM

## 2021-06-25 DIAGNOSIS — R13.10 DYSPHAGIA, UNSPECIFIED TYPE: ICD-10-CM

## 2021-06-25 DIAGNOSIS — Z79.01 LONG TERM CURRENT USE OF ANTICOAGULANT THERAPY: ICD-10-CM

## 2021-06-25 DIAGNOSIS — R41.89 COGNITIVE IMPAIRMENT: ICD-10-CM

## 2021-06-25 LAB
ANION GAP SERPL CALCULATED.3IONS-SCNC: 9 MMOL/L (ref 7–16)
BUN SERPL-MCNC: 58 MG/DL (ref 7–26)
CALCIUM SERPL-MCNC: 10.7 MG/DL (ref 8.4–10.4)
CHLORIDE SERPLBLD-SCNC: 101 MMOL/L (ref 98–109)
CO2 SERPL-SCNC: 21 MMOL/L (ref 20–29)
CREAT SERPL-MCNC: 2.11 MG/DL (ref 0.55–1.02)
GFR SERPL CREATININE-BSD FRML MDRD: 21 ML/MIN/1.73M2
GLUCOSE SERPL-MCNC: 81 MG/DL (ref 70–100)
POTASSIUM SERPL-SCNC: 5.6 MMOL/L (ref 3.5–5.1)
SODIUM SERPL-SCNC: 131 MMOL/L (ref 136–145)

## 2021-06-25 PROCEDURE — 99310 SBSQ NF CARE HIGH MDM 45: CPT | Performed by: NURSE PRACTITIONER

## 2021-06-25 ASSESSMENT — ACTIVITIES OF DAILY LIVING (ADL): DEPENDENT_IADLS:: LAUNDRY;MEAL PREPARATION

## 2021-06-25 NOTE — LETTER
"    6/25/2021        RE: Kenyatta Donald  [address]  [facility] [address]        [place] [facility]  [place] Medical Record Number:  [id number]  Place of Service where encounter took place:  [facility] (TCU) [25]  Chief Complaint   Patient presents with     Nursing Home Acute       HPI:    Kenyatta Donald  is a 85 year old (1935), who is being seen today for an episodic care visit.  HPI information obtained from: facility chart records, facility staff, patient report and [facility] chart review. Today's concern is:  ROSARIO: creat elevated 2.0, IVF administered 1 liter overnight, on exam today patient is alert, pleasant, anxious  Because she will be moving to LTC facility in [place] on Monday, states \"I don't want to move there\"   Cognitive impairement: BIMS 10/15, SBT 10/28, will be transitioning to LTC on Monday  Urinary Retention/UTI:  patient voiding incontinent brief. Started on bactrim for UTI > 100,000 gram negative bacilli  HTN/atrial fib/CHF: BP as follows: 101/57, 116/65, 90/49 qirh HE in 70-80 range,  Weight stable, admit weight 177.7lbs  current weight 174.lbs  Spinal muscular atrophy: in therapy patient is doing some static standing with therapy, stand pivot transfers. using EZ lift with nursing, not making any progress    Past Medical and Surgical History reviewed in Epic today.    MEDICATIONS:    Current Outpatient Medications   Medication Sig Dispense Refill     acetaminophen (TYLENOL) 325 MG tablet Take 2 tablets (650 mg) by mouth every 4 hours as needed for mild pain 100 tablet      allopurinol (ZYLOPRIM) 100 MG tablet Take 1 tablet (100 mg) by mouth daily (Patient taking differently: Take 100 mg by mouth every morning ) 90 tablet 3     amLODIPine (NORVASC) 5 MG tablet Take 1 tablet (5 mg) by mouth 2 times daily LAST REFILL WITHOUT AN APPOINTMENT 180 tablet 0     ASPIRIN NOT PRESCRIBED (INTENTIONAL) Please choose reason not prescribed, below 1 each " "0     cholecalciferol (VITAMIN D) 1000 UNIT tablet Take 2,000 Units by mouth every morning        fluticasone (FLONASE) 50 MCG/ACT nasal spray Spray 1-2 sprays into both nostrils At Bedtime        guaiFENesin (ROBITUSSIN) 20 mg/mL SOLN solution Take 5 mLs by mouth every 6 hours as needed for cough       ipratropium-albuterol (COMBIVENT RESPIMAT)  MCG/ACT inhaler Inhale 1 puff into the lungs 4 times daily       levothyroxine (SYNTHROID/LEVOTHROID) 100 MCG tablet Take 1 tablet (100 mcg) by mouth daily 90 tablet 3     multivitamin w/minerals (THERA-VIT-M) tablet Take 1 tablet by mouth daily       nystatin (MYCOSTATIN) 047736 UNIT/GM external powder Apply topically 2 times daily (Patient taking differently: Apply topically 2 times daily as needed (groin rash) ) 60 Bottle 1     pantoprazole (PROTONIX) 40 MG EC tablet Take 1 tablet (40 mg) by mouth every morning (before breakfast)       tamsulosin (FLOMAX) 0.4 MG capsule Take 1 capsule (0.4 mg) by mouth daily       warfarin ANTICOAGULANT (COUMADIN) 1 MG tablet TAKE 2 TABLETS BY MOUTH EVERY MONDAY, WEDNESDAY, FRIDAY, SATURDAY. DOSE MAY CHANGE PER INR RESULT AS DIRECTED 102 tablet 1         REVIEW OF SYSTEMS:  10 point ROS of systems including Constitutional, Eyes, Respiratory, Cardiovascular, Gastroenterology, Genitourinary, Integumentary, Musculoskeletal, Psychiatric were all negative except for pertinent positives noted in my HPI.    Objective:  /53   Pulse 73   Temp 97.7  F (36.5  C)   Resp 18   Ht 1.651 m (5' 5\")   Wt 77.9 kg (171 lb 12.8 oz)   LMP  (LMP Unknown)   SpO2 95%   BMI 28.59 kg/m    Exam:  GENERAL APPEARANCE:  Alert, in no distress  ENT:  Mouth and posterior oropharynx normal, moist mucous membranes, [place]  EYES:  EOM, conjunctivae, lids, pupils and irises normal, PERRL  RESP:  respiratory effort and palpation of chest normal, lungs clear to auscultation , no respiratory distress, diminished breath sounds bases bilaterally  CV:  Palpation " and auscultation of heart done , regular rate and rhythm, no murmur, rub, or gallop, peripheral edema trace+ in LE bilaterally  ABDOMEN:  normal bowel sounds, soft, nontender, no hepatosplenomegaly or other masses  M/S:   patient resting in bed, able to move all 4 extremities, some tremors noted hands but improved  SKIN:  Inspection of skin and subcutaneous tissue baseline  NEURO:   speech wnl       Labs:     Most Recent 3 CBC's:  Recent Labs   Lab Test 06/24/21 06/01/21 05/20/21  0914 05/19/21  1152   WBC 5.7  --  10.0 7.2   HGB 9.9* 11.0* 10.8* 10.2*   MCV 90.8  --  93 93     --  283 244     Most Recent 3 BMP's:  Recent Labs   Lab Test 06/24/21 06/21/21 06/01/21   * 136 140   POTASSIUM 5.8* 5.2* 4.7   CHLORIDE 100 103 97*   CO2 23 23 35*   BUN 57* 62* 37*   CR 2.08* 1.57* 0.66   ANIONGAP 10 10 8   VICKIE 11.2* 10.9* 10.8*   GLC 84 64* 75       ASSESSMENT/PLAN:  Dysphagia:   Acute/ongoing:  continue soft and bite sized diet with thin liquids     Cognitive impairement  Acute/ongoing: cognitive impairement, OT ongoing     Urinary retention  UTI  ROSARIO  Acute/ongoing: continue flomax 0.4mg, voiding, incontinent  Started on bactrim ds on 6/18/21 stopped 6/24/21  IVF 1/2 NS 1 liter again tonight will be total of 2 liters of fluid  BMP again on Monday     Chronic diastolic heart failure (H)  Chronic atrial fibrillation (H)  Benign essential hypertension  Long term current use of anticoagulant therapy  Acute/ongoing: daily weights, vitals daily and prn, BMP follow, norvasc 5mg BID, coumadin as directed INR goal of 2-3  Continue holding lasix and lisinopril     Spinal muscular atrophy type III causing decreased ability of respiratory mm-onset 45y/o   Ongoing: functional decline at home, PT ongoing, patient will be transitioning to LTC       Orders  BMP monday 1/2 NS at 125cc/hour for one liter     Total time spent with patient visit at the skilled nursing facility was 35 min including patient visit and review of  past records. Greater than 50% of total time spent with counseling and coordinating care due to discussed labs, holding lisinopril and lasix, will continue to monitor closely, more IVF, encouraged patient to drink lots of fluids. .  Electronically signed by:  Tonya Lynn Haase, APRN CNP               Sincerely,        Tonya Lynn Haase, APRN CNP

## 2021-06-25 NOTE — PROGRESS NOTES
"[place] [facility]  [place] Medical Record Number:  [id number]  Place of Service where encounter took place:  [facility] (TCU) [25]  Chief Complaint   Patient presents with     Nursing Home Acute       HPI:    Kenyatta Donald  is a 85 year old (1935), who is being seen today for an episodic care visit.  HPI information obtained from: facility chart records, facility staff, patient report and [facility] chart review. Today's concern is:  ROSARIO: creat elevated 2.0, IVF administered 1 liter overnight, on exam today patient is alert, pleasant, anxious  Because she will be moving to LTC facility in [place] on Monday, states \"I don't want to move there\"   Cognitive impairement: BIMS 10/15, SBT 10/28, will be transitioning to LTC on Monday  Urinary Retention/UTI:  patient voiding incontinent brief. Started on bactrim for UTI > 100,000 gram negative bacilli  HTN/atrial fib/CHF: BP as follows: 101/57, 116/65, 90/49 qirh HE in 70-80 range,  Weight stable, admit weight 177.7lbs  current weight 174.lbs  Spinal muscular atrophy: in therapy patient is doing some static standing with therapy, stand pivot transfers. using EZ lift with nursing, not making any progress    Past Medical and Surgical History reviewed in Epic today.    MEDICATIONS:    Current Outpatient Medications   Medication Sig Dispense Refill     acetaminophen (TYLENOL) 325 MG tablet Take 2 tablets (650 mg) by mouth every 4 hours as needed for mild pain 100 tablet      allopurinol (ZYLOPRIM) 100 MG tablet Take 1 tablet (100 mg) by mouth daily (Patient taking differently: Take 100 mg by mouth every morning ) 90 tablet 3     amLODIPine (NORVASC) 5 MG tablet Take 1 tablet (5 mg) by mouth 2 times daily LAST REFILL WITHOUT AN APPOINTMENT 180 tablet 0     ASPIRIN NOT PRESCRIBED (INTENTIONAL) Please choose reason not prescribed, below 1 each 0     cholecalciferol (VITAMIN D) 1000 UNIT tablet Take 2,000 Units by mouth every morning        " "fluticasone (FLONASE) 50 MCG/ACT nasal spray Spray 1-2 sprays into both nostrils At Bedtime        guaiFENesin (ROBITUSSIN) 20 mg/mL SOLN solution Take 5 mLs by mouth every 6 hours as needed for cough       ipratropium-albuterol (COMBIVENT RESPIMAT)  MCG/ACT inhaler Inhale 1 puff into the lungs 4 times daily       levothyroxine (SYNTHROID/LEVOTHROID) 100 MCG tablet Take 1 tablet (100 mcg) by mouth daily 90 tablet 3     multivitamin w/minerals (THERA-VIT-M) tablet Take 1 tablet by mouth daily       nystatin (MYCOSTATIN) 955448 UNIT/GM external powder Apply topically 2 times daily (Patient taking differently: Apply topically 2 times daily as needed (groin rash) ) 60 Bottle 1     pantoprazole (PROTONIX) 40 MG EC tablet Take 1 tablet (40 mg) by mouth every morning (before breakfast)       tamsulosin (FLOMAX) 0.4 MG capsule Take 1 capsule (0.4 mg) by mouth daily       warfarin ANTICOAGULANT (COUMADIN) 1 MG tablet TAKE 2 TABLETS BY MOUTH EVERY MONDAY, WEDNESDAY, FRIDAY, SATURDAY. DOSE MAY CHANGE PER INR RESULT AS DIRECTED 102 tablet 1         REVIEW OF SYSTEMS:  10 point ROS of systems including Constitutional, Eyes, Respiratory, Cardiovascular, Gastroenterology, Genitourinary, Integumentary, Musculoskeletal, Psychiatric were all negative except for pertinent positives noted in my HPI.    Objective:  /53   Pulse 73   Temp 97.7  F (36.5  C)   Resp 18   Ht 1.651 m (5' 5\")   Wt 77.9 kg (171 lb 12.8 oz)   LMP  (LMP Unknown)   SpO2 95%   BMI 28.59 kg/m    Exam:  GENERAL APPEARANCE:  Alert, in no distress  ENT:  Mouth and posterior oropharynx normal, moist mucous membranes, [place]  EYES:  EOM, conjunctivae, lids, pupils and irises normal, PERRL  RESP:  respiratory effort and palpation of chest normal, lungs clear to auscultation , no respiratory distress, diminished breath sounds bases bilaterally  CV:  Palpation and auscultation of heart done , regular rate and rhythm, no murmur, rub, or gallop, peripheral " edema trace+ in LE bilaterally  ABDOMEN:  normal bowel sounds, soft, nontender, no hepatosplenomegaly or other masses  M/S:   patient resting in bed, able to move all 4 extremities, some tremors noted hands but improved  SKIN:  Inspection of skin and subcutaneous tissue baseline  NEURO:   speech wnl       Labs:     Most Recent 3 CBC's:  Recent Labs   Lab Test 06/24/21 06/01/21 05/20/21  0914 05/19/21  1152   WBC 5.7  --  10.0 7.2   HGB 9.9* 11.0* 10.8* 10.2*   MCV 90.8  --  93 93     --  283 244     Most Recent 3 BMP's:  Recent Labs   Lab Test 06/24/21 06/21/21 06/01/21   * 136 140   POTASSIUM 5.8* 5.2* 4.7   CHLORIDE 100 103 97*   CO2 23 23 35*   BUN 57* 62* 37*   CR 2.08* 1.57* 0.66   ANIONGAP 10 10 8   VICKIE 11.2* 10.9* 10.8*   GLC 84 64* 75       ASSESSMENT/PLAN:  Dysphagia:   Acute/ongoing:  continue soft and bite sized diet with thin liquids     Cognitive impairement  Acute/ongoing: cognitive impairement, OT ongoing     Urinary retention  UTI  ROSARIO  Acute/ongoing: continue flomax 0.4mg, voiding, incontinent  Started on bactrim ds on 6/18/21 stopped 6/24/21  IVF 1/2 NS 1 liter again tonight will be total of 2 liters of fluid  BMP again on Monday     Chronic diastolic heart failure (H)  Chronic atrial fibrillation (H)  Benign essential hypertension  Long term current use of anticoagulant therapy  Acute/ongoing: daily weights, vitals daily and prn, BMP follow, norvasc 5mg BID, coumadin as directed INR goal of 2-3  Continue holding lasix and lisinopril     Spinal muscular atrophy type III causing decreased ability of respiratory mm-onset 45y/o   Ongoing: functional decline at home, PT ongoing, patient will be transitioning to LTC       Orders  BMP monday 1/2 NS at 125cc/hour for one liter     Total time spent with patient visit at the skilled nursing facility was 35 min including patient visit and review of past records. Greater than 50% of total time spent with counseling and coordinating care due to  discussed labs, holding lisinopril and lasix, will continue to monitor closely, more IVF, encouraged patient to drink lots of fluids. .  Electronically signed by:  Tonya Lynn Haase, APRN CNP

## 2021-06-27 ENCOUNTER — TELEPHONE (OUTPATIENT)
Dept: GERIATRICS | Facility: CLINIC | Age: 86
End: 2021-06-27

## 2021-06-27 NOTE — TELEPHONE ENCOUNTER
INR on 6/25 was 3.0, warfarin 1mg  INR on 6/26 was 2.8, warfarin 2mg  INR today 2.3  -warfarin 3mg today  -recheck INR 6/28

## 2021-06-28 ENCOUNTER — TRANSFERRED RECORDS (OUTPATIENT)
Dept: HEALTH INFORMATION MANAGEMENT | Facility: CLINIC | Age: 86
End: 2021-06-28

## 2021-06-28 ENCOUNTER — DISCHARGE SUMMARY NURSING HOME (OUTPATIENT)
Dept: GERIATRICS | Facility: CLINIC | Age: 86
End: 2021-06-28
Payer: COMMERCIAL

## 2021-06-28 VITALS
OXYGEN SATURATION: 92 % | WEIGHT: 171.8 LBS | TEMPERATURE: 97.5 F | RESPIRATION RATE: 16 BRPM | SYSTOLIC BLOOD PRESSURE: 136 MMHG | HEIGHT: 65 IN | HEART RATE: 91 BPM | BODY MASS INDEX: 28.62 KG/M2 | DIASTOLIC BLOOD PRESSURE: 67 MMHG

## 2021-06-28 LAB
ANION GAP SERPL CALCULATED.3IONS-SCNC: 7 MMOL/L (ref 7–16)
BUN SERPL-MCNC: 41 MG/DL (ref 7–26)
CALCIUM SERPL-MCNC: 10.5 MG/DL (ref 8.4–10.4)
CHLORIDE SERPLBLD-SCNC: 103 MMOL/L (ref 98–109)
CO2 SERPL-SCNC: 23 MMOL/L (ref 20–29)
CREAT SERPL-MCNC: 1.34 MG/DL (ref 0.55–1.02)
GFR SERPL CREATININE-BSD FRML MDRD: 36 ML/MIN/1.73M2
GLUCOSE SERPL-MCNC: 89 MG/DL (ref 70–100)
POTASSIUM SERPL-SCNC: 4.8 MMOL/L (ref 3.5–5.1)
SODIUM SERPL-SCNC: 133 MMOL/L (ref 136–145)

## 2021-06-28 PROCEDURE — 99316 NF DSCHRG MGMT 30 MIN+: CPT | Performed by: NURSE PRACTITIONER

## 2021-06-28 RX ORDER — AMLODIPINE BESYLATE 2.5 MG/1
2.5 TABLET ORAL 2 TIMES DAILY
COMMUNITY
Start: 2021-06-16

## 2021-06-28 RX ORDER — NYSTATIN 100000 [USP'U]/G
POWDER TOPICAL 2 TIMES DAILY PRN
COMMUNITY

## 2021-06-28 RX ORDER — POLYETHYLENE GLYCOL 3350 17 G/17G
1 POWDER, FOR SOLUTION ORAL EVERY MORNING
COMMUNITY

## 2021-06-28 ASSESSMENT — MIFFLIN-ST. JEOR: SCORE: 1230.16

## 2021-06-28 NOTE — PROGRESS NOTES
[place] [facility] DISCHARGE SUMMARY  PATIENT'S NAME: Kenyatta Donald  YOB: 1935  MEDICAL RECORD NUMBER:  [id number]  Place of Service where encounter took place:  [facility] (U) [25]    PRIMARY CARE PROVIDER AND CLINIC RESPONSIBLE AFTER TRANSFER:   Andry Damico MD, [facility] [place] [address] [facility]*    G Provider     Transferring providers: Tonya Lynn Haase, APRN CNP, Dr. Arturo Jasmine MD  Recent Hospitalization/ED:  [facility] stay 5/18/21 to 5/25/21.  Date of SNF Admission: May 25, 2021  Date of SNF (anticipated) Discharge: June 28, 2021  Discharged to: previous independent home  Cognitive Scores: BIMS: 10/15 and Short blessed: 10/28  Physical Function: Wheelchair dependent  DME: Wheelchair    CODE STATUS/ADVANCE DIRECTIVES DISCUSSION:  No CPR- Do NOT Intubate   ALLERGIES: Patient has no known allergies.    DISCHARGE DIAGNOSIS/NURSING FACILITY COURSE:   Patient failed to progress to walking, able to stand pivot transfer with therapy, needing use of lift with transfers with nursing, mac discontinued successfully, patient has some retention after mac DC found to have UTI Tx with bactrim, patient able to void without difficulty at time of discharge. Will DC to [facility] in [place].     Past Medical History:  has a past medical history of Benign essential hypertension, Juan Pablo-tachy syndrome (H), Breast cancer (H), Chronic a-fib (H), Diastolic heart failure (H), GERD (gastroesophageal reflux disease), Hyperlipidemia LDL goal <100, Hypothyroidism, Kidney stone, Mild coronary artery disease, Mitral valve regurgitation, Need for SBE (subacute bacterial endocarditis) prophylaxis, Osteoporosis, Pulmonary embolism (H), Sleep apnea, and Spinal muscular atrophy type III (H).    Discharge Medications:    Current Outpatient Medications   Medication Sig Dispense Refill     acetaminophen (TYLENOL) 325 MG tablet  Take 2 tablets (650 mg) by mouth every 4 hours as needed for mild pain 100 tablet      allopurinol (ZYLOPRIM) 100 MG tablet Take 1 tablet (100 mg) by mouth daily (Patient taking differently: Take 100 mg by mouth every morning ) 90 tablet 3     amLODIPine (NORVASC) 2.5 MG tablet Take 2.5 mg by mouth 2 times daily       cholecalciferol (VITAMIN D) 1000 UNIT tablet Take 2,000 Units by mouth every morning        fluticasone (FLONASE) 50 MCG/ACT nasal spray Spray 1-2 sprays into both nostrils At Bedtime        guaiFENesin (ROBITUSSIN) 20 mg/mL SOLN solution Take 5 mLs by mouth every 6 hours as needed for cough       ipratropium-albuterol (COMBIVENT RESPIMAT)  MCG/ACT inhaler Inhale 1 puff into the lungs 4 times daily       levothyroxine (SYNTHROID/LEVOTHROID) 100 MCG tablet Take 1 tablet (100 mcg) by mouth daily 90 tablet 3     multivitamin w/minerals (THERA-VIT-M) tablet Take 1 tablet by mouth daily       nystatin (MYCOSTATIN) 622780 UNIT/GM external powder Apply topically 2 times daily as needed Apply to groin under breasts, abd topically as needed for redness, canididiasis bid prn       pantoprazole (PROTONIX) 40 MG EC tablet Take 1 tablet (40 mg) by mouth every morning (before breakfast)       polyethylene glycol (MIRALAX) 17 g packet Take 1 packet by mouth every morning Also taking 17 g po daily prn . For constipation       tamsulosin (FLOMAX) 0.4 MG capsule Take 1 capsule (0.4 mg) by mouth daily       warfarin ANTICOAGULANT (COUMADIN) 1 MG tablet TAKE 2 TABLETS BY MOUTH EVERY MONDAY, WEDNESDAY, FRIDAY, SATURDAY. DOSE MAY CHANGE PER INR RESULT AS DIRECTED 102 tablet 1     ASPIRIN NOT PRESCRIBED (INTENTIONAL) Please choose reason not prescribed, below (Patient not taking: Reported on 6/28/2021) 1 each 0       Medication Changes/Rationale:     Tx with Bactrim DS BID 6/18-6/24 for UTI,     ROSARIO due to UTI, Bactrim holding lasix and lisinopril until creat back to baseline    Controlled medications sent with  "patient:   not applicable/none     ROS:   10 point ROS of systems including Constitutional, Eyes, Respiratory, Cardiovascular, Gastroenterology, Genitourinary, Integumentary, Musculoskeletal, Psychiatric were all negative except for pertinent positives noted in my HPI.    Physical Exam:   Vitals: /67   Pulse 91   Temp 97.5  F (36.4  C)   Resp 16   Ht 1.651 m (5' 5\")   Wt 77.9 kg (171 lb 12.8 oz)   LMP  (LMP Unknown)   SpO2 92%   BMI 28.59 kg/m    BMI= Body mass index is 28.59 kg/m .  GENERAL APPEARANCE:  Alert, in no distress  ENT:  Mouth and posterior oropharynx normal, moist mucous membranes, [place]  EYES:  EOM, conjunctivae, lids, pupils and irises normal, PERRL  RESP:  respiratory effort and palpation of chest normal, lungs clear to auscultation , no respiratory distress  CV:  Palpation and auscultation of heart done , regular rate and rhythm, no murmur, rub, or gallop, peripheral edema trace+ in LE bilaterally  ABDOMEN:  normal bowel sounds, soft, nontender, no hepatosplenomegaly or other masses  M/S:   Examination of:   patient resting in bed, able to move all 4 extremities  Inspection, ROM, stability and muscle strength normal  SKIN:  Inspection of skin and subcutaneous tissue baseline  NEURO:   speech WNL  PSYCH:  affect and mood normal     SNF labs:   Most Recent 3 CBC's:  Recent Labs   Lab Test 06/24/21 06/01/21 05/20/21  0914 05/19/21  1152   WBC 5.7  --  10.0 7.2   HGB 9.9* 11.0* 10.8* 10.2*   MCV 90.8  --  93 93     --  283 244     Most Recent 3 BMP's:  Recent Labs   Lab Test 06/25/21 06/24/21 06/21/21   * 133* 136   POTASSIUM 5.6* 5.8* 5.2*   CHLORIDE 101 100 103   CO2 21 23 23   BUN 58* 57* 62*   CR 2.11* 2.08* 1.57*   ANIONGAP 9 10 10   VICKIE 10.7* 11.2* 10.9*   GLC 81 84 64*     ASSESSMENT/PLAN:  Dysphagia:   Acute/ongoing:  continue soft and bite sized diet with thin liquids     Cognitive impairement  Acute/ongoing: cognitive impairement transferring to [facility] in " [place]     Urinary retention  UTI  ROSARIO  Acute/ongoing: continue flomax 0.4mg, voiding, incontinent  Started on bactrim ds on 6/18/21 stopped 6/24/21  IVF 1/2 NS 1 liter X 2 DC IV heplock 6/28/21 prior to discharge  BMP pending for today will be faxed to LTC facility      Chronic diastolic heart failure (H)  Chronic atrial fibrillation (H)  Benign essential hypertension  Long term current use of anticoagulant therapy  Acute/ongoing: continue daily weights, norvasc 5mg BID, coumadin as directed INR goal of 2-3  Continue holding lasix and lisinopril restart when creat back to baseline   F/u with new PCP within one week     Spinal muscular atrophy type III causing decreased ability of respiratory mm-onset 45y/o   Ongoing: functional decline at home, DC to [facility]           DISCHARGE PLAN:    Follow up labs: BMP pending for today    Medical Follow Up:      Follow up with primary care provider in 1 weeks    MT referral needed and placed by this provider: No    Current [place] scheduled appointments:  Next 5 appointments (look out 90 days)    Sep 10, 2021  3:15 PM  Return Visit with Brain Lock MD  [facility] [place] ([facility] ) [address] [place] [id number] [phone]           Discharge Services: therapy per LTC    Discharge Instructions Verbalized to Patient at Discharge:     None      TOTAL DISCHARGE TIME:   Greater than 30 minutes  Electronically signed by:  Tonya Lynn Haase, APRN CNP

## 2021-06-28 NOTE — LETTER
6/28/2021        RE: Kenyatta Donald  [address]  [place] [address]        [place] [facility] DISCHARGE SUMMARY  PATIENT'S NAME: Kenyatta Donald  YOB: 1935  MEDICAL RECORD NUMBER:  [id number]  Place of Service where encounter took place:  [facility] (TCU) [25]    PRIMARY CARE PROVIDER AND CLINIC RESPONSIBLE AFTER TRANSFER:   Andry Damico MD, [facility] [place] [address] [facility]*    [facility] [place] Provider     Transferring providers: Tonya Lynn Haase, APRN CNP, Dr. Arturo Jasmine MD  Recent Hospitalization/ED:  [facility] stay 5/18/21 to 5/25/21.  Date of SNF Admission: May 25, 2021  Date of SNF (anticipated) Discharge: June 28, 2021  Discharged to: previous independent home  Cognitive Scores: BIMS: 10/15 and Short blessed: 10/28  Physical Function: Wheelchair dependent  DME: Wheelchair    CODE STATUS/ADVANCE DIRECTIVES DISCUSSION:  No CPR- Do NOT Intubate   ALLERGIES: Patient has no known allergies.    DISCHARGE DIAGNOSIS/NURSING FACILITY COURSE:   Patient failed to progress to walking, able to stand pivot transfer with therapy, needing use of lift with transfers with nursing, mac discontinued successfully, patient has some retention after mac DC found to have UTI Tx with bactrim, patient able to void without difficulty at time of discharge. Will DC to [facility] in [place].     Past Medical History:  has a past medical history of Benign essential hypertension, Juan Pablo-tachy syndrome (H), Breast cancer (H), Chronic a-fib (H), Diastolic heart failure (H), GERD (gastroesophageal reflux disease), Hyperlipidemia LDL goal <100, Hypothyroidism, Kidney stone, Mild coronary artery disease, Mitral valve regurgitation, Need for SBE (subacute bacterial endocarditis) prophylaxis, Osteoporosis, Pulmonary embolism (H), Sleep apnea, and Spinal muscular atrophy type III (H).    Discharge  Medications:    Current Outpatient Medications   Medication Sig Dispense Refill     acetaminophen (TYLENOL) 325 MG tablet Take 2 tablets (650 mg) by mouth every 4 hours as needed for mild pain 100 tablet      allopurinol (ZYLOPRIM) 100 MG tablet Take 1 tablet (100 mg) by mouth daily (Patient taking differently: Take 100 mg by mouth every morning ) 90 tablet 3     amLODIPine (NORVASC) 2.5 MG tablet Take 2.5 mg by mouth 2 times daily       cholecalciferol (VITAMIN D) 1000 UNIT tablet Take 2,000 Units by mouth every morning        fluticasone (FLONASE) 50 MCG/ACT nasal spray Spray 1-2 sprays into both nostrils At Bedtime        guaiFENesin (ROBITUSSIN) 20 mg/mL SOLN solution Take 5 mLs by mouth every 6 hours as needed for cough       ipratropium-albuterol (COMBIVENT RESPIMAT)  MCG/ACT inhaler Inhale 1 puff into the lungs 4 times daily       levothyroxine (SYNTHROID/LEVOTHROID) 100 MCG tablet Take 1 tablet (100 mcg) by mouth daily 90 tablet 3     multivitamin w/minerals (THERA-VIT-M) tablet Take 1 tablet by mouth daily       nystatin (MYCOSTATIN) 985049 UNIT/GM external powder Apply topically 2 times daily as needed Apply to groin under breasts, abd topically as needed for redness, canididiasis bid prn       pantoprazole (PROTONIX) 40 MG EC tablet Take 1 tablet (40 mg) by mouth every morning (before breakfast)       polyethylene glycol (MIRALAX) 17 g packet Take 1 packet by mouth every morning Also taking 17 g po daily prn . For constipation       tamsulosin (FLOMAX) 0.4 MG capsule Take 1 capsule (0.4 mg) by mouth daily       warfarin ANTICOAGULANT (COUMADIN) 1 MG tablet TAKE 2 TABLETS BY MOUTH EVERY MONDAY, WEDNESDAY, FRIDAY, SATURDAY. DOSE MAY CHANGE PER INR RESULT AS DIRECTED 102 tablet 1     ASPIRIN NOT PRESCRIBED (INTENTIONAL) Please choose reason not prescribed, below (Patient not taking: Reported on 6/28/2021) 1 each 0       Medication Changes/Rationale:     Tx with Bactrim DS BID 6/18-6/24 for UTI,     ROSARIO  "due to UTI, Bactrim holding lasix and lisinopril until creat back to baseline    Controlled medications sent with patient:   not applicable/none     ROS:   10 point ROS of systems including Constitutional, Eyes, Respiratory, Cardiovascular, Gastroenterology, Genitourinary, Integumentary, Musculoskeletal, Psychiatric were all negative except for pertinent positives noted in my HPI.    Physical Exam:   Vitals: /67   Pulse 91   Temp 97.5  F (36.4  C)   Resp 16   Ht 1.651 m (5' 5\")   Wt 77.9 kg (171 lb 12.8 oz)   LMP  (LMP Unknown)   SpO2 92%   BMI 28.59 kg/m    BMI= Body mass index is 28.59 kg/m .  GENERAL APPEARANCE:  Alert, in no distress  ENT:  Mouth and posterior oropharynx normal, moist mucous membranes, [place]  EYES:  EOM, conjunctivae, lids, pupils and irises normal, PERRL  RESP:  respiratory effort and palpation of chest normal, lungs clear to auscultation , no respiratory distress  CV:  Palpation and auscultation of heart done , regular rate and rhythm, no murmur, rub, or gallop, peripheral edema trace+ in LE bilaterally  ABDOMEN:  normal bowel sounds, soft, nontender, no hepatosplenomegaly or other masses  M/S:   Examination of:   patient resting in bed, able to move all 4 extremities  Inspection, ROM, stability and muscle strength normal  SKIN:  Inspection of skin and subcutaneous tissue baseline  NEURO:   speech WNL  PSYCH:  affect and mood normal     SNF labs:   Most Recent 3 CBC's:  Recent Labs   Lab Test 06/24/21 06/01/21 05/20/21  0914 05/19/21  1152   WBC 5.7  --  10.0 7.2   HGB 9.9* 11.0* 10.8* 10.2*   MCV 90.8  --  93 93     --  283 244     Most Recent 3 BMP's:  Recent Labs   Lab Test 06/25/21 06/24/21 06/21/21   * 133* 136   POTASSIUM 5.6* 5.8* 5.2*   CHLORIDE 101 100 103   CO2 21 23 23   BUN 58* 57* 62*   CR 2.11* 2.08* 1.57*   ANIONGAP 9 10 10   VICKIE 10.7* 11.2* 10.9*   GLC 81 84 64*     ASSESSMENT/PLAN:  Dysphagia:   Acute/ongoing:  continue soft and bite sized diet with " thin liquids     Cognitive impairement  Acute/ongoing: cognitive impairement transferring to [facility] in [place]     Urinary retention  UTI  ROSARIO  Acute/ongoing: continue flomax 0.4mg, voiding, incontinent  Started on bactrim ds on 6/18/21 stopped 6/24/21  IVF 1/2 NS 1 liter X 2 DC IV heplock 6/28/21 prior to discharge  BMP pending for today will be faxed to LTC facility      Chronic diastolic heart failure (H)  Chronic atrial fibrillation (H)  Benign essential hypertension  Long term current use of anticoagulant therapy  Acute/ongoing: continue daily weights, norvasc 5mg BID, coumadin as directed INR goal of 2-3  Continue holding lasix and lisinopril restart when creat back to baseline   F/u with new PCP within one week     Spinal muscular atrophy type III causing decreased ability of respiratory mm-onset 45y/o   Ongoing: functional decline at home, DC to [facility]           DISCHARGE PLAN:    Follow up labs: BMP pending for today    Medical Follow Up:      Follow up with primary care provider in 1 weeks    MTM referral needed and placed by this provider: No    Current [place] scheduled appointments:  Next 5 appointments (look out 90 days)    Sep 10, 2021  3:15 PM  Return Visit with Brain Lock MD  [facility] [place] ([facility] ) [address] [place] [id number] [phone]           Discharge Services: therapy per LTC    Discharge Instructions Verbalized to Patient at Discharge:     None      TOTAL DISCHARGE TIME:   Greater than 30 minutes  Electronically signed by:  Tonya Lynn Haase, APRN CNP                         Sincerely,        Tonya Lynn Haase, APRN CNP

## 2021-06-29 NOTE — PROGRESS NOTES
Clinic Care Coordination Contact  Care Team Conversations    CC RN did not receive return correspondence from [facility] SW so CC RN reached back out to [facility] to inquire further about patient's discharge plan.  CC RN spoke with Radha RN Manager at TCU, who informed that patient actually ended up discharging to Long Term Care at [facility] in [place].    CC RN called and left voicemail for Tania in LTC at [facility] (ph: [phone]); requested return call to confirm patient is there in LTC and inquire if patient being followed by in-house provider.  CC RN will await return call.    UPDATE at 1558: CC RN received call back from Tania at [facility]; she updated that patient will now be followed by Dr. Wilma George through their facility; Care Team updated.  She denied any outstanding patient needs/concerns at this time.    No further outreaches will be made as patient's care will be managed by LT care team moving forward.    Tyron Luna, RN  Clinic Care Coordinator  [facility]  Ph: [phone]

## 2021-07-12 ENCOUNTER — MEDICAL CORRESPONDENCE (OUTPATIENT)
Dept: HEALTH INFORMATION MANAGEMENT | Facility: CLINIC | Age: 86
End: 2021-07-12

## 2021-07-27 ENCOUNTER — TELEPHONE (OUTPATIENT)
Dept: CARDIOLOGY | Facility: CLINIC | Age: 86
End: 2021-07-27

## 2021-07-27 NOTE — TELEPHONE ENCOUNTER
Refill request received from [facility] for lisinopril 20mg BID. Medication is not currently on patient's medication list, was discontinued/put on hold per nursing home provider due to ROSARIO (see discharge summary 6/28/21). Called pharmacy to notify them of this change, defer to primary care for further refills/management.

## 2021-08-05 ENCOUNTER — TELEPHONE (OUTPATIENT)
Dept: ANTICOAGULATION | Facility: CLINIC | Age: 86
End: 2021-08-05

## 2021-08-05 DIAGNOSIS — Z79.01 LONG TERM CURRENT USE OF ANTICOAGULANT THERAPY: ICD-10-CM

## 2021-08-05 DIAGNOSIS — Z79.01 CHRONIC ANTICOAGULATION: Primary | ICD-10-CM

## 2021-08-05 DIAGNOSIS — Z86.711 HISTORY OF PULMONARY EMBOLISM: ICD-10-CM

## 2021-08-05 DIAGNOSIS — I48.20 CHRONIC ATRIAL FIBRILLATION (H): ICD-10-CM

## 2021-08-05 NOTE — CONFIDENTIAL NOTE
ANTICOAGULATION     Kenyatta Donald is overdue for INR check.     Was unable to reach patient and was unable to leave a voicemail.     Per chart review, patient was at [facility] T[facility] until 6/28 and then discharged to [facility] in [place]. Appears Dr. George is patient's PCP and likely managing her AC therapy. Writer tried to confirm this with Dr. George's team, however, office is closed.     ACC team please call Dr. George's office tomorrow at #[phone] (8-430 pm) and clarify they are managing INRs and patient can be dc'd from [facility].    Makenzie Bear RN

## 2021-08-06 NOTE — TELEPHONE ENCOUNTER
Pt is currently in a LTC facility and is being managed by a team of providers at Dr. George's office.  Will discontinue care.    ANTICOAGULATION  MANAGEMENT    Kenyatta Donald is being discharged from the [facility] Anticoagulation Management Program (ACC).    Reason for discharge: care has been transferred to [facility]    Anticoagulation episode resolved, ACC referral closed and INR Standing order discontinued    If patient needs warfarin management in the future, please send a new referral    Angie Rowland RN on 8/6/2021 at 2:16 PM

## 2021-08-10 NOTE — PROGRESS NOTES
Unable to reach Tamika, home care nurse.    LMTCB    No INR left on VM from homecare.    Radha Linares RN     Orthopedic

## 2021-12-03 NOTE — TELEPHONE ENCOUNTER
"LEVOTHYROXINE 0.100MG (100MCG) TAB  Last Written Prescription Date:  08/03/2018  Last Fill Quantity: 90,  # refills: 2   Last office visit: 8/21/2018 with prescribing provider:  08/21/2018   Future Office Visit:    Requested Prescriptions   Pending Prescriptions Disp Refills     levothyroxine (SYNTHROID/LEVOTHROID) 100 MCG tablet [Pharmacy Med Name: LEVOTHYROXINE 0.100MG (100MCG) TAB] 90 tablet 0     Sig: TAKE 1 TABLET BY MOUTH DAILY       Thyroid Protocol Failed - 5/1/2019 11:05 AM        Failed - Normal TSH on file in past 12 months     Recent Labs   Lab Test 08/25/17  1145   TSH 0.58              Passed - Patient is 12 years or older        Passed - Recent (12 mo) or future (30 days) visit within the authorizing provider's specialty     Patient had office visit in the last 12 months or has a visit in the next 30 days with authorizing provider or within the authorizing provider's specialty.  See \"Patient Info\" tab in inbasket, or \"Choose Columns\" in Meds & Orders section of the refill encounter.              Passed - Medication is active on med list        Passed - No active pregnancy on record     If patient is pregnant or has had a positive pregnancy test, please check TSH.          Passed - No positive pregnancy test in past 12 months     If patient is pregnant or has had a positive pregnancy test, please check TSH.            "
Routing refill request to provider for review/approval because:  Labs not current:  TSH        
General

## 2021-12-15 NOTE — LETTER
[facility]  Complex Care Plan  About Me  Patient Name:  Kenyatta Donald    YOB: 1935  Age:     82 year old   Iesha MRN:   [id number] Telephone Information:    Home Phone [phone]   Mobile [phone]       Address:    [address]   [facility] [address] Email address:  No e-mail address on record      Emergency Contact(s)  Name Relationship Lgl Grd Work Phone Home Phone Mobile Phone   1. PASHA RAGLAND Daughter No none [phone] none   2. ERNESTO SPICER Daughter No none [phone]           Primary language:  English     needed? No   [facility]:  [phone] op. 1  Other communication barriers:    Preferred Method of Communication:  Phone  Current living arrangement:    Mobility Status/ Medical Equipment: Independent w/Device    Health Maintenance  Health Maintenance Reviewed: Up to date    My Access Plan  Medical Emergency 911   Primary Clinic Line [facility] [phone]   24 Hour Appointment Line [phone] or  [phone]) (toll-free)   24 Hour Nurse Line [phone] (toll-free)   Preferred Urgent Care     Preferred Hospital     Preferred Pharmacy [facility] [address] AT [facility] [address]     Behavioral Health Crisis Line The National Suicide Prevention Lifeline at 1-134.518.7312 or 911     My Care Team Members  Patient Care Team       Relationship Specialty Notifications Start End    Bess Lion MD PCP - General Family Practice  12/12/12     Phone: [phone] Fax: [phone]         [address] [facility] [address]    Tommy Daily   Neurology  11/11/14     Phone: [phone] Fax: [phone]         \[facility] [place] [facility]\"" [facility] [address] [facility] [address]    Ana Benjamin, RN Lead Care Coordinator Primary Care - CC Admissions 8/16/16     Phone: [phone]  Fax: [phone]        Raphael Perez MD MD Orthopedics  8/16/16     Phone: [phone] Fax: [phone]         [facility] [address] [place] [address]    Chris Guan MD MD Cardiology  8/16/16     Phone: [phone] Fax: [phone] [address] [place] [address]         My Care Plans  Self Management and Treatment Plan  Goals and (Comments)  Goals        General    Medical (pt-stated)     Notes - Note created  4/5/2018  2:01 PM by Ana Benjamin RN    Goal: I do not want to be in the hospital.     Supportive: I will follow the Heart Failure Action Plan and seek medical attention quickly if I have increased symptoms of concern.        Pain Management (pt-stated)     Notes - Note created  4/5/2018  1:59 PM by Ana Benjamin RN    Goal : I want to have relief of pain in my back, shoulders and leg.     Supportive: I will work with PT in my home. I will use tylenol for pain.             Action Plans on File:               Heart Failure       Advance Care Plans/Directives Type:        My Medical and Care Information  Problem List   Patient Active Problem List   Diagnosis     Pain in joint, pelvic region and thigh     Pain in thoracic spine     Cervicalgia     Glucose intolerance (impaired glucose tolerance): HgbA1C= 5.5      Dysphagia     GERD (gastroesophageal reflux disease) for 10 + yrs ---2000     Anticoagulated     Osteoporosis     Juan Pablo-tachy syndrome (H)     Spinal muscular atrophy type III causing decreased ability of respiratory mm-onset 43y/o      Sleep apnea     Breast cancer (H)     Mitral valve regurgitation severe -- S/P Mitral Clip 1/2015     Risk for falls     Long term current use of anticoagulant therapy     Benign neoplasm of skin     Need for SBE (subacute bacterial endocarditis) prophylaxis     Acquired hypothyroidism     Hypercholesterolemia     Acute on chronic systolic heart failure (H)     Long-term (current) use of anticoagulants [Z79.01]      Benign essential hypertension     Chest pain     Painful respiration-ant chest wall from increased congestion w URI      Hypoxia since at least 2010 from poor muscle tone of chest      DOI 7-25-16     History of pulmonary embolism     Coronary artery disease involving native coronary artery of native heart without angina pectoris     Drug-induced gout involving toe, unspecified chronicity, unspecified laterality     Acute bilateral low back pain with right-sided sciatica onset end of 12-16     Acute right-sided low back pain with right-sided sciatica     * * * SBE PROPHYLAXIS * * *     Age-related osteoporosis without current pathological fracture     Chronic atrial fibrillation (H)     CHF (congestive heart failure) (H)     Cramp of limbs both UE & LE in am's since 43y/o but worse pain since 2-17      Current Medications and Allergies:  See printed Medication Report.    Care Coordination Start Date: 12/8/2017   Frequency of Care Coordination: monthly   Form Last Updated: 04/05/2018        1.77

## 2022-04-18 NOTE — PROGRESS NOTES
ANTICOAGULATION FOLLOW-UP CLINIC VISIT    Patient Name:  Kenyatta Donald  Date:  1/30/2017  Contact Type:  Face to Face    SUBJECTIVE:     Patient Findings     Positives No Problem Findings           OBJECTIVE    INR PROTIME   Date Value Ref Range Status   01/30/2017 4.5* 0.86 - 1.14 Final       ASSESSMENT / PLAN  INR assessment SUPRA    Recheck INR In: 1 WEEK    INR Location Clinic      Anticoagulation Summary as of 1/30/2017     INR goal 2.5-3.5   Selected INR 4.5! (1/30/2017)   Maintenance plan 6 mg (4 mg x 1 and 1 mg x 2) on Mon, Wed, Fri; 5 mg (4 mg x 1 and 1 mg x 1) all other days   Full instructions 1/30: Hold; 2/1: 4 mg; 2/3: 4 mg; Otherwise 6 mg on Mon, Wed, Fri; 5 mg all other days   Weekly total 38 mg   Plan last modified Nancy Zavala RN (11/28/2016)   Next INR check 2/6/2017   Target end date     Indications   Long-term (current) use of anticoagulants [Z79.01] [Z79.01]  Atrial fibrillation (H) [I48.91]  Mitral valve disorder s/po 1-9-15 remodeling percut  + clip  (Resolved) [I05.9]         Anticoagulation Episode Summary     INR check location [facility]    Preferred lab     Send INR reminders to [facility] INR/PROTIME    Comments       Anticoagulation Care Providers     Provider Role Specialty Phone number    Waqas Lionanahi Pyle MD [facility] [phone]            See the Encounter Report to view Anticoagulation Flowsheet and Dosing Calendar (Go to Encounters tab in chart review, and find the Anticoagulation Therapy Visit)        Gena Dias RN                 
18-Apr-2022

## 2022-11-13 NOTE — TELEPHONE ENCOUNTER
"Requested Prescriptions   Pending Prescriptions Disp Refills     levothyroxine (SYNTHROID/LEVOTHROID) 100 MCG tablet [Pharmacy Med Name:  Last Written Prescription Date:  5/31/2019  Last Fill Quantity: 30,  # refills: 0   Last office visit: 6/1/2019 with prescribing provider:  Dr TANK Lion   Future Office Visit:     LEVOTHYROXINE 0.100MG (100MCG) TAB] 30 tablet 0     Sig: TAKE 1 TABLET BY MOUTH DAILY       Thyroid Protocol Passed - 6/11/2019 11:26 AM        Passed - Patient is 12 years or older        Passed - Recent (12 mo) or future (30 days) visit within the authorizing provider's specialty     Patient had office visit in the last 12 months or has a visit in the next 30 days with authorizing provider or within the authorizing provider's specialty.  See \"Patient Info\" tab in inbasket, or \"Choose Columns\" in Meds & Orders section of the refill encounter.              Passed - Medication is active on med list        Passed - Normal TSH on file in past 12 months     Recent Labs   Lab Test 06/01/19  1202   TSH 1.04              Passed - No active pregnancy on record     If patient is pregnant or has had a positive pregnancy test, please check TSH.          Passed - No positive pregnancy test in past 12 months     If patient is pregnant or has had a positive pregnancy test, please check TSH.            "
Prescription approved per [facility] Refill Protocol.    
Total time spent to complete patient's bedside assessment, physical examination, review medical chart including labs & imaging, discuss medical plan of care with housestaff was more than 35 minutes

## 2024-11-01 NOTE — MR AVS SNAPSHOT
Kenyatta Donald   2/6/2017 2:30 PM   Anticoagulation Therapy Visit    Description:  81 year old female   Provider:   ANTICOAGULATION CLINIC   Department:   Anti Coagulation           INR as of 2/6/2017     Selected INR 2.2! (2/6/2017)      Anticoagulation Summary as of 2/6/2017     INR goal 2.5-3.5   Selected INR 2.2! (2/6/2017)   Full instructions 2/6: 8 mg; Otherwise 6 mg on Mon, Wed, Fri; 5 mg all other days   Next INR check 2/14/2017    Indications   Long-term (current) use of anticoagulants [Z79.01] [Z79.01]  Atrial fibrillation (H) [I48.91]  Mitral valve disorder s/po 1-9-15 remodeling percut  + clip  (Resolved) [I05.9]         Your next Anticoagulation Clinic appointment(s)     Feb 14, 2017  4:15 PM   Anticoagulation [facility] with  [facility] CLIN[facility]    [address] [address] [phone]              Contact Numbers     [facility]  Please call  [phone] to cancel and/or reschedule your appointment   Please call  [phone] with any problems or questions regarding your therapy.        February 2017 Details    Sun Mon Tue Wed Thu Fri Sat        1               2               3               4                 5               6      8 mg   See details      7      5 mg         8      6 mg         9      5 mg         10      6 mg         11      5 mg           12      5 mg         13      6 mg         14            15               16               17               18                 19               20               21               22               23               24               25                 26               27               28                    Date Details   02/06 This INR check       Date of next INR:  2/14/2017         How to take your warfarin dose     To take:  5 mg Take 1 of the 4 mg tablets and 1 of the 1 mg tablets.    To take:  6 mg Take 1 of the 4 mg tablets and 2 of  Pt reports to ED with complaint of dental pain. Pt reports 10/10 pain in top and bottom rows of teeth. Pt reports cigarette use does help with the dental pain, denies seeing dentist regularaly. 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 Meth use is due to unable to get adderal prescription.    the 1 mg tablets.    To take:  8 mg Take 2 of the 4 mg tablets.
